# Patient Record
Sex: MALE | Race: WHITE | NOT HISPANIC OR LATINO | Employment: OTHER | URBAN - METROPOLITAN AREA
[De-identification: names, ages, dates, MRNs, and addresses within clinical notes are randomized per-mention and may not be internally consistent; named-entity substitution may affect disease eponyms.]

---

## 2017-01-26 ENCOUNTER — APPOINTMENT (EMERGENCY)
Dept: RADIOLOGY | Facility: HOSPITAL | Age: 57
DRG: 853 | End: 2017-01-26
Payer: COMMERCIAL

## 2017-01-26 ENCOUNTER — ANESTHESIA (INPATIENT)
Dept: PERIOP | Facility: HOSPITAL | Age: 57
DRG: 853 | End: 2017-01-26
Payer: COMMERCIAL

## 2017-01-26 ENCOUNTER — HOSPITAL ENCOUNTER (INPATIENT)
Facility: HOSPITAL | Age: 57
LOS: 8 days | Discharge: NURSING, REHAB OR LONG-TERM CARE | DRG: 853 | End: 2017-02-03
Attending: EMERGENCY MEDICINE | Admitting: SURGERY
Payer: COMMERCIAL

## 2017-01-26 ENCOUNTER — ANESTHESIA EVENT (INPATIENT)
Dept: PERIOP | Facility: HOSPITAL | Age: 57
DRG: 853 | End: 2017-01-26
Payer: COMMERCIAL

## 2017-01-26 DIAGNOSIS — L97.519 DIABETIC ULCER OF RIGHT FOOT ASSOCIATED WITH DIABETES MELLITUS DUE TO UNDERLYING CONDITION (HCC): ICD-10-CM

## 2017-01-26 DIAGNOSIS — E08.621 DIABETIC ULCER OF RIGHT FOOT ASSOCIATED WITH DIABETES MELLITUS DUE TO UNDERLYING CONDITION (HCC): ICD-10-CM

## 2017-01-26 DIAGNOSIS — L03.031 CELLULITIS OF TOE OF RIGHT FOOT: Primary | ICD-10-CM

## 2017-01-26 DIAGNOSIS — R55 SYNCOPE: ICD-10-CM
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
## 2017-01-26 DIAGNOSIS — E11.65 TYPE 2 DIABETES MELLITUS WITH HYPERGLYCEMIA, WITHOUT LONG-TERM CURRENT USE OF INSULIN (HCC): ICD-10-CM

## 2017-01-26 LAB
ALBUMIN SERPL BCP-MCNC: 2.3 G/DL (ref 3.5–5)
ALP SERPL-CCNC: 137 U/L (ref 46–116)
ALT SERPL W P-5'-P-CCNC: 14 U/L (ref 12–78)
ANION GAP SERPL CALCULATED.3IONS-SCNC: 12 MMOL/L (ref 4–13)
ANION GAP SERPL CALCULATED.3IONS-SCNC: 12 MMOL/L (ref 4–13)
APTT PPP: 29 SECONDS (ref 24–36)
AST SERPL W P-5'-P-CCNC: 23 U/L (ref 5–45)
ATRIAL RATE: 127 BPM
BACTERIA UR QL AUTO: NORMAL /HPF
BASOPHILS # BLD AUTO: 0.02 THOUSANDS/ΜL (ref 0–0.1)
BASOPHILS NFR BLD AUTO: 0 % (ref 0–1)
BILIRUB SERPL-MCNC: 0.67 MG/DL (ref 0.2–1)
BILIRUB UR QL STRIP: NEGATIVE
BUN SERPL-MCNC: 18 MG/DL (ref 5–25)
BUN SERPL-MCNC: 21 MG/DL (ref 5–25)
CALCIUM SERPL-MCNC: 9 MG/DL (ref 8.3–10.1)
CALCIUM SERPL-MCNC: 9 MG/DL (ref 8.3–10.1)
CHLORIDE SERPL-SCNC: 93 MMOL/L (ref 100–108)
CHLORIDE SERPL-SCNC: 95 MMOL/L (ref 100–108)
CLARITY UR: CLEAR
CO2 SERPL-SCNC: 24 MMOL/L (ref 21–32)
CO2 SERPL-SCNC: 24 MMOL/L (ref 21–32)
COLOR UR: YELLOW
CREAT SERPL-MCNC: 1.2 MG/DL (ref 0.6–1.3)
CREAT SERPL-MCNC: 1.41 MG/DL (ref 0.6–1.3)
EOSINOPHIL # BLD AUTO: 0 THOUSAND/ΜL (ref 0–0.61)
EOSINOPHIL NFR BLD AUTO: 0 % (ref 0–6)
ERYTHROCYTE [DISTWIDTH] IN BLOOD BY AUTOMATED COUNT: 13.3 % (ref 11.6–15.1)
FINE GRAN CASTS URNS QL MICRO: NORMAL /LPF
GFR SERPL CREATININE-BSD FRML MDRD: 51.8 ML/MIN/1.73SQ M
GFR SERPL CREATININE-BSD FRML MDRD: >60 ML/MIN/1.73SQ M
GLUCOSE SERPL-MCNC: 317 MG/DL (ref 65–140)
GLUCOSE SERPL-MCNC: 357 MG/DL (ref 65–140)
GLUCOSE SERPL-MCNC: 384 MG/DL (ref 65–140)
GLUCOSE UR STRIP-MCNC: ABNORMAL MG/DL
HCT VFR BLD AUTO: 35.9 % (ref 36.5–49.3)
HGB BLD-MCNC: 12.1 G/DL (ref 12–17)
HGB UR QL STRIP.AUTO: ABNORMAL
INR PPP: 1.12 (ref 0.86–1.16)
KETONES UR STRIP-MCNC: ABNORMAL MG/DL
LACTATE SERPL-SCNC: 1 MMOL/L (ref 0.5–2)
LACTATE SERPL-SCNC: 2.4 MMOL/L (ref 0.5–2)
LEUKOCYTE ESTERASE UR QL STRIP: NEGATIVE
LYMPHOCYTES # BLD AUTO: 1.76 THOUSANDS/ΜL (ref 0.6–4.47)
LYMPHOCYTES NFR BLD AUTO: 8 % (ref 14–44)
MCH RBC QN AUTO: 28.4 PG (ref 26.8–34.3)
MCHC RBC AUTO-ENTMCNC: 33.7 G/DL (ref 31.4–37.4)
MCV RBC AUTO: 84 FL (ref 82–98)
MONOCYTES # BLD AUTO: 1.89 THOUSAND/ΜL (ref 0.17–1.22)
MONOCYTES NFR BLD AUTO: 9 % (ref 4–12)
NEUTROPHILS # BLD AUTO: 17.21 THOUSANDS/ΜL (ref 1.85–7.62)
NEUTS SEG NFR BLD AUTO: 83 % (ref 43–75)
NITRITE UR QL STRIP: NEGATIVE
NON-SQ EPI CELLS URNS QL MICRO: NORMAL /HPF
NRBC BLD AUTO-RTO: 0 /100 WBCS
P AXIS: 54 DEGREES
PH UR STRIP.AUTO: 5.5 [PH] (ref 4.5–8)
PLATELET # BLD AUTO: 376 THOUSANDS/UL (ref 149–390)
PMV BLD AUTO: 9.5 FL (ref 8.9–12.7)
POTASSIUM SERPL-SCNC: 4.9 MMOL/L (ref 3.5–5.3)
POTASSIUM SERPL-SCNC: 5 MMOL/L (ref 3.5–5.3)
PR INTERVAL: 134 MS
PROT SERPL-MCNC: 8.5 G/DL (ref 6.4–8.2)
PROT UR STRIP-MCNC: ABNORMAL MG/DL
PROTHROMBIN TIME: 14.5 SECONDS (ref 12–14.3)
QRS AXIS: -39 DEGREES
QRSD INTERVAL: 84 MS
QT INTERVAL: 290 MS
QTC INTERVAL: 421 MS
RBC # BLD AUTO: 4.26 MILLION/UL (ref 3.88–5.62)
RBC #/AREA URNS AUTO: NORMAL /HPF
SODIUM SERPL-SCNC: 129 MMOL/L (ref 136–145)
SODIUM SERPL-SCNC: 131 MMOL/L (ref 136–145)
SP GR UR STRIP.AUTO: 1.03 (ref 1–1.03)
T WAVE AXIS: 36 DEGREES
TROPONIN I SERPL-MCNC: <0.02 NG/ML
UROBILINOGEN UR QL STRIP.AUTO: 0.2 E.U./DL
VENTRICULAR RATE: 127 BPM
WBC # BLD AUTO: 20.97 THOUSAND/UL (ref 4.31–10.16)
WBC #/AREA URNS AUTO: NORMAL /HPF

## 2017-01-26 PROCEDURE — 85025 COMPLETE CBC W/AUTO DIFF WBC: CPT | Performed by: EMERGENCY MEDICINE

## 2017-01-26 PROCEDURE — 73630 X-RAY EXAM OF FOOT: CPT

## 2017-01-26 PROCEDURE — 93005 ELECTROCARDIOGRAM TRACING: CPT

## 2017-01-26 PROCEDURE — 87147 CULTURE TYPE IMMUNOLOGIC: CPT | Performed by: PODIATRIST

## 2017-01-26 PROCEDURE — 70450 CT HEAD/BRAIN W/O DYE: CPT

## 2017-01-26 PROCEDURE — 36415 COLL VENOUS BLD VENIPUNCTURE: CPT

## 2017-01-26 PROCEDURE — 85730 THROMBOPLASTIN TIME PARTIAL: CPT | Performed by: EMERGENCY MEDICINE

## 2017-01-26 PROCEDURE — 96360 HYDRATION IV INFUSION INIT: CPT

## 2017-01-26 PROCEDURE — 96361 HYDRATE IV INFUSION ADD-ON: CPT

## 2017-01-26 PROCEDURE — 96365 THER/PROPH/DIAG IV INF INIT: CPT

## 2017-01-26 PROCEDURE — 87077 CULTURE AEROBIC IDENTIFY: CPT | Performed by: EMERGENCY MEDICINE

## 2017-01-26 PROCEDURE — 0Y6M0ZD DETACHMENT AT RIGHT FOOT, PARTIAL 4TH RAY, OPEN APPROACH: ICD-10-PCS | Performed by: PODIATRIST

## 2017-01-26 PROCEDURE — 0Y6M0ZF DETACHMENT AT RIGHT FOOT, PARTIAL 5TH RAY, OPEN APPROACH: ICD-10-PCS | Performed by: PODIATRIST

## 2017-01-26 PROCEDURE — 87077 CULTURE AEROBIC IDENTIFY: CPT | Performed by: PODIATRIST

## 2017-01-26 PROCEDURE — 87086 URINE CULTURE/COLONY COUNT: CPT | Performed by: EMERGENCY MEDICINE

## 2017-01-26 PROCEDURE — 87205 SMEAR GRAM STAIN: CPT | Performed by: PODIATRIST

## 2017-01-26 PROCEDURE — 96367 TX/PROPH/DG ADDL SEQ IV INF: CPT

## 2017-01-26 PROCEDURE — 84484 ASSAY OF TROPONIN QUANT: CPT | Performed by: EMERGENCY MEDICINE

## 2017-01-26 PROCEDURE — 73700 CT LOWER EXTREMITY W/O DYE: CPT

## 2017-01-26 PROCEDURE — 0Y6M0ZB DETACHMENT AT RIGHT FOOT, PARTIAL 2ND RAY, OPEN APPROACH: ICD-10-PCS | Performed by: PODIATRIST

## 2017-01-26 PROCEDURE — 87186 SC STD MICRODIL/AGAR DIL: CPT | Performed by: PODIATRIST

## 2017-01-26 PROCEDURE — 81001 URINALYSIS AUTO W/SCOPE: CPT | Performed by: EMERGENCY MEDICINE

## 2017-01-26 PROCEDURE — 87070 CULTURE OTHR SPECIMN AEROBIC: CPT | Performed by: PODIATRIST

## 2017-01-26 PROCEDURE — 87075 CULTR BACTERIA EXCEPT BLOOD: CPT | Performed by: PODIATRIST

## 2017-01-26 PROCEDURE — 99285 EMERGENCY DEPT VISIT HI MDM: CPT

## 2017-01-26 PROCEDURE — 83605 ASSAY OF LACTIC ACID: CPT | Performed by: EMERGENCY MEDICINE

## 2017-01-26 PROCEDURE — 0Y6M0ZC DETACHMENT AT RIGHT FOOT, PARTIAL 3RD RAY, OPEN APPROACH: ICD-10-PCS | Performed by: PODIATRIST

## 2017-01-26 PROCEDURE — 90715 TDAP VACCINE 7 YRS/> IM: CPT | Performed by: EMERGENCY MEDICINE

## 2017-01-26 PROCEDURE — 82948 REAGENT STRIP/BLOOD GLUCOSE: CPT

## 2017-01-26 PROCEDURE — 71020 HB CHEST X-RAY 2VW FRONTAL&LATL: CPT

## 2017-01-26 PROCEDURE — 85610 PROTHROMBIN TIME: CPT | Performed by: EMERGENCY MEDICINE

## 2017-01-26 PROCEDURE — 80048 BASIC METABOLIC PNL TOTAL CA: CPT | Performed by: EMERGENCY MEDICINE

## 2017-01-26 PROCEDURE — 87185 SC STD ENZYME DETCJ PER NZM: CPT | Performed by: PODIATRIST

## 2017-01-26 PROCEDURE — 87040 BLOOD CULTURE FOR BACTERIA: CPT | Performed by: EMERGENCY MEDICINE

## 2017-01-26 PROCEDURE — 80053 COMPREHEN METABOLIC PANEL: CPT | Performed by: EMERGENCY MEDICINE

## 2017-01-26 PROCEDURE — 90471 IMMUNIZATION ADMIN: CPT

## 2017-01-26 PROCEDURE — 0Y6M0Z9 DETACHMENT AT RIGHT FOOT, PARTIAL 1ST RAY, OPEN APPROACH: ICD-10-PCS | Performed by: PODIATRIST

## 2017-01-26 RX ORDER — CLINDAMYCIN PHOSPHATE 600 MG/50ML
600 INJECTION INTRAVENOUS EVERY 8 HOURS
Status: DISCONTINUED | OUTPATIENT
Start: 2017-01-26 | End: 2017-01-27

## 2017-01-26 RX ORDER — WARFARIN SODIUM 1 MG/1
TABLET ORAL DAILY
COMMUNITY
End: 2017-02-03 | Stop reason: HOSPADM

## 2017-01-26 RX ORDER — ACETAMINOPHEN 325 MG/1
650 TABLET ORAL ONCE
Status: COMPLETED | OUTPATIENT
Start: 2017-01-26 | End: 2017-01-26

## 2017-01-26 RX ADMIN — ACETAMINOPHEN 650 MG: 325 TABLET, FILM COATED ORAL at 19:43

## 2017-01-26 RX ADMIN — TETANUS TOXOID, REDUCED DIPHTHERIA TOXOID AND ACELLULAR PERTUSSIS VACCINE, ADSORBED 0.5 ML: 5; 2.5; 8; 8; 2.5 SUSPENSION INTRAMUSCULAR at 19:24

## 2017-01-26 RX ADMIN — VANCOMYCIN HYDROCHLORIDE 1750 MG: 1 INJECTION, POWDER, LYOPHILIZED, FOR SOLUTION INTRAVENOUS at 22:07

## 2017-01-26 RX ADMIN — SODIUM CHLORIDE 1000 ML: 0.9 INJECTION, SOLUTION INTRAVENOUS at 19:02

## 2017-01-26 RX ADMIN — CEFEPIME 2000 MG: 2 INJECTION, POWDER, FOR SOLUTION INTRAMUSCULAR; INTRAVENOUS at 20:15

## 2017-01-26 RX ADMIN — SODIUM CHLORIDE 3270 ML: 0.9 INJECTION, SOLUTION INTRAVENOUS at 19:15

## 2017-01-26 RX ADMIN — SODIUM CHLORIDE 1000 ML: 0.9 INJECTION, SOLUTION INTRAVENOUS at 19:15

## 2017-01-26 RX ADMIN — SODIUM CHLORIDE 1000 ML: 0.9 INJECTION, SOLUTION INTRAVENOUS at 20:07

## 2017-01-26 RX ADMIN — METRONIDAZOLE 500 MG: 500 INJECTION, SOLUTION INTRAVENOUS at 21:09

## 2017-01-27 ENCOUNTER — APPOINTMENT (INPATIENT)
Dept: RADIOLOGY | Facility: HOSPITAL | Age: 57
DRG: 853 | End: 2017-01-27
Payer: COMMERCIAL

## 2017-01-27 PROBLEM — L97.529 SKIN ULCERS OF FOOT, BILATERAL (HCC): Status: ACTIVE | Noted: 2017-01-27

## 2017-01-27 PROBLEM — E11.65 DIABETES MELLITUS WITH HYPERGLYCEMIA (HCC): Status: ACTIVE | Noted: 2017-01-27

## 2017-01-27 PROBLEM — L97.519 SKIN ULCERS OF FOOT, BILATERAL (HCC): Status: ACTIVE | Noted: 2017-01-27

## 2017-01-27 PROBLEM — N17.9 AKI (ACUTE KIDNEY INJURY) (HCC): Status: ACTIVE | Noted: 2017-01-27

## 2017-01-27 PROBLEM — A41.9 SEPSIS (HCC): Status: ACTIVE | Noted: 2017-01-27

## 2017-01-27 PROBLEM — E11.40 DIABETIC NEUROPATHY (HCC): Status: ACTIVE | Noted: 2017-01-27

## 2017-01-27 PROBLEM — Z87.891 HISTORY OF TOBACCO ABUSE: Status: ACTIVE | Noted: 2017-01-27

## 2017-01-27 PROBLEM — A48.0 GAS GANGRENE OF FOOT (HCC): Status: ACTIVE | Noted: 2017-01-27

## 2017-01-27 PROBLEM — R65.20 SEVERE SEPSIS (HCC): Status: ACTIVE | Noted: 2017-01-27

## 2017-01-27 PROBLEM — E87.1 HYPONATREMIA: Status: ACTIVE | Noted: 2017-01-27

## 2017-01-27 LAB
ANION GAP SERPL CALCULATED.3IONS-SCNC: 10 MMOL/L (ref 4–13)
ANION GAP SERPL CALCULATED.3IONS-SCNC: 6 MMOL/L (ref 4–13)
BACTERIA UR CULT: NORMAL
BASOPHILS # BLD AUTO: 0.03 THOUSANDS/ΜL (ref 0–0.1)
BASOPHILS NFR BLD AUTO: 0 % (ref 0–1)
BUN SERPL-MCNC: 13 MG/DL (ref 5–25)
BUN SERPL-MCNC: 15 MG/DL (ref 5–25)
CALCIUM SERPL-MCNC: 7.6 MG/DL (ref 8.3–10.1)
CALCIUM SERPL-MCNC: 8.2 MG/DL (ref 8.3–10.1)
CHLORIDE SERPL-SCNC: 102 MMOL/L (ref 100–108)
CHLORIDE SERPL-SCNC: 103 MMOL/L (ref 100–108)
CO2 SERPL-SCNC: 22 MMOL/L (ref 21–32)
CO2 SERPL-SCNC: 25 MMOL/L (ref 21–32)
CREAT SERPL-MCNC: 0.91 MG/DL (ref 0.6–1.3)
CREAT SERPL-MCNC: 0.98 MG/DL (ref 0.6–1.3)
EOSINOPHIL # BLD AUTO: 0.05 THOUSAND/ΜL (ref 0–0.61)
EOSINOPHIL NFR BLD AUTO: 0 % (ref 0–6)
ERYTHROCYTE [DISTWIDTH] IN BLOOD BY AUTOMATED COUNT: 13.4 % (ref 11.6–15.1)
ERYTHROCYTE [DISTWIDTH] IN BLOOD BY AUTOMATED COUNT: 13.4 % (ref 11.6–15.1)
EST. AVERAGE GLUCOSE BLD GHB EST-MCNC: 292 MG/DL
GFR SERPL CREATININE-BSD FRML MDRD: >60 ML/MIN/1.73SQ M
GFR SERPL CREATININE-BSD FRML MDRD: >60 ML/MIN/1.73SQ M
GLUCOSE SERPL-MCNC: 210 MG/DL (ref 65–140)
GLUCOSE SERPL-MCNC: 239 MG/DL (ref 65–140)
GLUCOSE SERPL-MCNC: 248 MG/DL (ref 65–140)
GLUCOSE SERPL-MCNC: 251 MG/DL (ref 65–140)
GLUCOSE SERPL-MCNC: 264 MG/DL (ref 65–140)
GLUCOSE SERPL-MCNC: 316 MG/DL (ref 65–140)
GLUCOSE SERPL-MCNC: 346 MG/DL (ref 65–140)
HBA1C MFR BLD: 11.8 % (ref 4.2–6.3)
HCT VFR BLD AUTO: 29.8 % (ref 36.5–49.3)
HCT VFR BLD AUTO: 31.9 % (ref 36.5–49.3)
HGB BLD-MCNC: 10.7 G/DL (ref 12–17)
HGB BLD-MCNC: 9.8 G/DL (ref 12–17)
LACTATE SERPL-SCNC: 1.2 MMOL/L (ref 0.5–2)
LACTATE SERPL-SCNC: 1.5 MMOL/L (ref 0.5–2)
LYMPHOCYTES # BLD AUTO: 1.66 THOUSANDS/ΜL (ref 0.6–4.47)
LYMPHOCYTES NFR BLD AUTO: 13 % (ref 14–44)
MCH RBC QN AUTO: 27.9 PG (ref 26.8–34.3)
MCH RBC QN AUTO: 28.3 PG (ref 26.8–34.3)
MCHC RBC AUTO-ENTMCNC: 32.9 G/DL (ref 31.4–37.4)
MCHC RBC AUTO-ENTMCNC: 33.5 G/DL (ref 31.4–37.4)
MCV RBC AUTO: 84 FL (ref 82–98)
MCV RBC AUTO: 85 FL (ref 82–98)
MONOCYTES # BLD AUTO: 0.83 THOUSAND/ΜL (ref 0.17–1.22)
MONOCYTES NFR BLD AUTO: 7 % (ref 4–12)
NEUTROPHILS # BLD AUTO: 10.19 THOUSANDS/ΜL (ref 1.85–7.62)
NEUTS SEG NFR BLD AUTO: 80 % (ref 43–75)
NRBC BLD AUTO-RTO: 0 /100 WBCS
PLATELET # BLD AUTO: 298 THOUSANDS/UL (ref 149–390)
PLATELET # BLD AUTO: 300 THOUSANDS/UL (ref 149–390)
PLATELET # BLD AUTO: 310 THOUSANDS/UL (ref 149–390)
PMV BLD AUTO: 8.8 FL (ref 8.9–12.7)
PMV BLD AUTO: 9.3 FL (ref 8.9–12.7)
PMV BLD AUTO: 9.4 FL (ref 8.9–12.7)
POTASSIUM SERPL-SCNC: 4 MMOL/L (ref 3.5–5.3)
POTASSIUM SERPL-SCNC: 4.2 MMOL/L (ref 3.5–5.3)
RBC # BLD AUTO: 3.51 MILLION/UL (ref 3.88–5.62)
RBC # BLD AUTO: 3.78 MILLION/UL (ref 3.88–5.62)
SODIUM SERPL-SCNC: 133 MMOL/L (ref 136–145)
SODIUM SERPL-SCNC: 135 MMOL/L (ref 136–145)
WBC # BLD AUTO: 12.82 THOUSAND/UL (ref 4.31–10.16)
WBC # BLD AUTO: 14.12 THOUSAND/UL (ref 4.31–10.16)

## 2017-01-27 PROCEDURE — 88307 TISSUE EXAM BY PATHOLOGIST: CPT | Performed by: PODIATRIST

## 2017-01-27 PROCEDURE — 88311 DECALCIFY TISSUE: CPT | Performed by: PODIATRIST

## 2017-01-27 PROCEDURE — 97530 THERAPEUTIC ACTIVITIES: CPT

## 2017-01-27 PROCEDURE — 87147 CULTURE TYPE IMMUNOLOGIC: CPT | Performed by: PODIATRIST

## 2017-01-27 PROCEDURE — 83605 ASSAY OF LACTIC ACID: CPT | Performed by: EMERGENCY MEDICINE

## 2017-01-27 PROCEDURE — 83605 ASSAY OF LACTIC ACID: CPT | Performed by: INTERNAL MEDICINE

## 2017-01-27 PROCEDURE — G8979 MOBILITY GOAL STATUS: HCPCS

## 2017-01-27 PROCEDURE — 85027 COMPLETE CBC AUTOMATED: CPT | Performed by: PODIATRIST

## 2017-01-27 PROCEDURE — G8978 MOBILITY CURRENT STATUS: HCPCS

## 2017-01-27 PROCEDURE — 87040 BLOOD CULTURE FOR BACTERIA: CPT | Performed by: INTERNAL MEDICINE

## 2017-01-27 PROCEDURE — 82948 REAGENT STRIP/BLOOD GLUCOSE: CPT

## 2017-01-27 PROCEDURE — 87205 SMEAR GRAM STAIN: CPT | Performed by: PODIATRIST

## 2017-01-27 PROCEDURE — 87185 SC STD ENZYME DETCJ PER NZM: CPT | Performed by: PODIATRIST

## 2017-01-27 PROCEDURE — 87077 CULTURE AEROBIC IDENTIFY: CPT | Performed by: PODIATRIST

## 2017-01-27 PROCEDURE — 97163 PT EVAL HIGH COMPLEX 45 MIN: CPT

## 2017-01-27 PROCEDURE — 80048 BASIC METABOLIC PNL TOTAL CA: CPT | Performed by: INTERNAL MEDICINE

## 2017-01-27 PROCEDURE — 87176 TISSUE HOMOGENIZATION CULTR: CPT | Performed by: PODIATRIST

## 2017-01-27 PROCEDURE — 85025 COMPLETE CBC W/AUTO DIFF WBC: CPT | Performed by: INTERNAL MEDICINE

## 2017-01-27 PROCEDURE — 80048 BASIC METABOLIC PNL TOTAL CA: CPT | Performed by: EMERGENCY MEDICINE

## 2017-01-27 PROCEDURE — 94760 N-INVAS EAR/PLS OXIMETRY 1: CPT

## 2017-01-27 PROCEDURE — 73630 X-RAY EXAM OF FOOT: CPT

## 2017-01-27 PROCEDURE — 83036 HEMOGLOBIN GLYCOSYLATED A1C: CPT | Performed by: INTERNAL MEDICINE

## 2017-01-27 PROCEDURE — 87070 CULTURE OTHR SPECIMN AEROBIC: CPT | Performed by: PODIATRIST

## 2017-01-27 PROCEDURE — 85049 AUTOMATED PLATELET COUNT: CPT | Performed by: EMERGENCY MEDICINE

## 2017-01-27 PROCEDURE — 71010 HB CHEST X-RAY 1 VIEW FRONTAL (PORTABLE): CPT

## 2017-01-27 PROCEDURE — 87186 SC STD MICRODIL/AGAR DIL: CPT | Performed by: PODIATRIST

## 2017-01-27 PROCEDURE — 87075 CULTR BACTERIA EXCEPT BLOOD: CPT | Performed by: PODIATRIST

## 2017-01-27 RX ORDER — ONDANSETRON 2 MG/ML
4 INJECTION INTRAMUSCULAR; INTRAVENOUS ONCE AS NEEDED
Status: DISCONTINUED | OUTPATIENT
Start: 2017-01-27 | End: 2017-01-27 | Stop reason: HOSPADM

## 2017-01-27 RX ORDER — ONDANSETRON 2 MG/ML
INJECTION INTRAMUSCULAR; INTRAVENOUS AS NEEDED
Status: DISCONTINUED | OUTPATIENT
Start: 2017-01-27 | End: 2017-01-27 | Stop reason: SURG

## 2017-01-27 RX ORDER — FENTANYL CITRATE/PF 50 MCG/ML
25 SYRINGE (ML) INJECTION
Status: DISCONTINUED | OUTPATIENT
Start: 2017-01-27 | End: 2017-01-27

## 2017-01-27 RX ORDER — SUCCINYLCHOLINE CHLORIDE 20 MG/ML
INJECTION INTRAMUSCULAR; INTRAVENOUS AS NEEDED
Status: DISCONTINUED | OUTPATIENT
Start: 2017-01-27 | End: 2017-01-27 | Stop reason: SURG

## 2017-01-27 RX ORDER — OXYCODONE HYDROCHLORIDE AND ACETAMINOPHEN 5; 325 MG/1; MG/1
2 TABLET ORAL EVERY 4 HOURS PRN
Status: DISCONTINUED | OUTPATIENT
Start: 2017-01-27 | End: 2017-02-03 | Stop reason: HOSPADM

## 2017-01-27 RX ORDER — INSULIN GLARGINE 100 [IU]/ML
15 INJECTION, SOLUTION SUBCUTANEOUS
Status: DISCONTINUED | OUTPATIENT
Start: 2017-01-27 | End: 2017-01-28

## 2017-01-27 RX ORDER — SODIUM CHLORIDE, SODIUM LACTATE, POTASSIUM CHLORIDE, CALCIUM CHLORIDE 600; 310; 30; 20 MG/100ML; MG/100ML; MG/100ML; MG/100ML
INJECTION, SOLUTION INTRAVENOUS CONTINUOUS PRN
Status: DISCONTINUED | OUTPATIENT
Start: 2017-01-27 | End: 2017-01-27 | Stop reason: SURG

## 2017-01-27 RX ORDER — INSULIN GLARGINE 100 [IU]/ML
10 INJECTION, SOLUTION SUBCUTANEOUS
Status: DISCONTINUED | OUTPATIENT
Start: 2017-01-27 | End: 2017-01-27

## 2017-01-27 RX ORDER — ACETAMINOPHEN 325 MG/1
650 TABLET ORAL ONCE
Status: COMPLETED | OUTPATIENT
Start: 2017-01-27 | End: 2017-01-27

## 2017-01-27 RX ORDER — OXYCODONE HYDROCHLORIDE AND ACETAMINOPHEN 5; 325 MG/1; MG/1
1 TABLET ORAL EVERY 6 HOURS PRN
Status: DISCONTINUED | OUTPATIENT
Start: 2017-01-27 | End: 2017-02-03 | Stop reason: HOSPADM

## 2017-01-27 RX ORDER — PROPOFOL 10 MG/ML
INJECTION, EMULSION INTRAVENOUS AS NEEDED
Status: DISCONTINUED | OUTPATIENT
Start: 2017-01-27 | End: 2017-01-27 | Stop reason: SURG

## 2017-01-27 RX ORDER — SODIUM CHLORIDE 9 MG/ML
100 INJECTION, SOLUTION INTRAVENOUS CONTINUOUS
Status: DISCONTINUED | OUTPATIENT
Start: 2017-01-27 | End: 2017-01-28

## 2017-01-27 RX ORDER — MORPHINE SULFATE 2 MG/ML
2 INJECTION, SOLUTION INTRAMUSCULAR; INTRAVENOUS
Status: DISCONTINUED | OUTPATIENT
Start: 2017-01-27 | End: 2017-01-27 | Stop reason: HOSPADM

## 2017-01-27 RX ORDER — INSULIN GLARGINE 100 [IU]/ML
10 INJECTION, SOLUTION SUBCUTANEOUS ONCE
Status: COMPLETED | OUTPATIENT
Start: 2017-01-27 | End: 2017-01-27

## 2017-01-27 RX ORDER — FENTANYL CITRATE 50 UG/ML
INJECTION, SOLUTION INTRAMUSCULAR; INTRAVENOUS AS NEEDED
Status: DISCONTINUED | OUTPATIENT
Start: 2017-01-27 | End: 2017-01-27 | Stop reason: SURG

## 2017-01-27 RX ADMIN — INSULIN GLARGINE 10 UNITS: 100 INJECTION, SOLUTION SUBCUTANEOUS at 09:49

## 2017-01-27 RX ADMIN — INSULIN LISPRO 2 UNITS: 100 INJECTION, SOLUTION INTRAVENOUS; SUBCUTANEOUS at 20:19

## 2017-01-27 RX ADMIN — SUCCINYLCHOLINE CHLORIDE 100 MG: 20 INJECTION, SOLUTION INTRAMUSCULAR; INTRAVENOUS at 00:06

## 2017-01-27 RX ADMIN — INSULIN LISPRO 5 UNITS: 100 INJECTION, SOLUTION INTRAVENOUS; SUBCUTANEOUS at 20:19

## 2017-01-27 RX ADMIN — ENOXAPARIN SODIUM 40 MG: 40 INJECTION SUBCUTANEOUS at 09:49

## 2017-01-27 RX ADMIN — CLINDAMYCIN PHOSPHATE 600 MG: 12 INJECTION, SOLUTION INTRAMUSCULAR; INTRAVENOUS at 06:16

## 2017-01-27 RX ADMIN — SODIUM CHLORIDE, SODIUM LACTATE, POTASSIUM CHLORIDE, AND CALCIUM CHLORIDE: .6; .31; .03; .02 INJECTION, SOLUTION INTRAVENOUS at 00:03

## 2017-01-27 RX ADMIN — PIPERACILLIN AND TAZOBACTAM 3.38 G: 36; 4.5 INJECTION, POWDER, FOR SOLUTION INTRAVENOUS at 16:44

## 2017-01-27 RX ADMIN — INSULIN HUMAN 4 UNITS: 100 INJECTION, SOLUTION PARENTERAL at 00:56

## 2017-01-27 RX ADMIN — SODIUM CHLORIDE 100 ML/HR: 0.9 INJECTION, SOLUTION INTRAVENOUS at 02:33

## 2017-01-27 RX ADMIN — INSULIN LISPRO 2 UNITS: 100 INJECTION, SOLUTION INTRAVENOUS; SUBCUTANEOUS at 09:48

## 2017-01-27 RX ADMIN — PIPERACILLIN AND TAZOBACTAM 3.38 G: 36; 4.5 INJECTION, POWDER, FOR SOLUTION INTRAVENOUS at 23:05

## 2017-01-27 RX ADMIN — INSULIN LISPRO 2 UNITS: 100 INJECTION, SOLUTION INTRAVENOUS; SUBCUTANEOUS at 13:59

## 2017-01-27 RX ADMIN — INSULIN LISPRO 5 UNITS: 100 INJECTION, SOLUTION INTRAVENOUS; SUBCUTANEOUS at 09:47

## 2017-01-27 RX ADMIN — VANCOMYCIN HYDROCHLORIDE 1750 MG: 1 INJECTION, POWDER, LYOPHILIZED, FOR SOLUTION INTRAVENOUS at 11:00

## 2017-01-27 RX ADMIN — FENTANYL CITRATE 50 MCG: 50 INJECTION, SOLUTION INTRAMUSCULAR; INTRAVENOUS at 00:17

## 2017-01-27 RX ADMIN — FENTANYL CITRATE 50 MCG: 50 INJECTION, SOLUTION INTRAMUSCULAR; INTRAVENOUS at 00:11

## 2017-01-27 RX ADMIN — PIPERACILLIN AND TAZOBACTAM 3.38 G: 36; 4.5 INJECTION, POWDER, FOR SOLUTION INTRAVENOUS at 04:11

## 2017-01-27 RX ADMIN — INSULIN GLARGINE 15 UNITS: 100 INJECTION, SOLUTION SUBCUTANEOUS at 23:04

## 2017-01-27 RX ADMIN — ACETAMINOPHEN 650 MG: 325 TABLET, FILM COATED ORAL at 20:18

## 2017-01-27 RX ADMIN — ONDANSETRON 4 MG: 2 INJECTION INTRAMUSCULAR; INTRAVENOUS at 00:59

## 2017-01-27 RX ADMIN — PROPOFOL 200 MG: 10 INJECTION, EMULSION INTRAVENOUS at 00:06

## 2017-01-27 RX ADMIN — PIPERACILLIN AND TAZOBACTAM 3.38 G: 36; 4.5 INJECTION, POWDER, FOR SOLUTION INTRAVENOUS at 09:41

## 2017-01-27 RX ADMIN — INSULIN LISPRO 5 UNITS: 100 INJECTION, SOLUTION INTRAVENOUS; SUBCUTANEOUS at 13:59

## 2017-01-27 RX ADMIN — SODIUM CHLORIDE 100 ML/HR: 0.9 INJECTION, SOLUTION INTRAVENOUS at 18:56

## 2017-01-27 RX ADMIN — VANCOMYCIN HYDROCHLORIDE 1750 MG: 1 INJECTION, POWDER, LYOPHILIZED, FOR SOLUTION INTRAVENOUS at 23:05

## 2017-01-27 RX ADMIN — INSULIN HUMAN 5 UNITS: 100 INJECTION, SOLUTION PARENTERAL at 02:13

## 2017-01-28 ENCOUNTER — APPOINTMENT (INPATIENT)
Dept: RADIOLOGY | Facility: HOSPITAL | Age: 57
DRG: 853 | End: 2017-01-28
Payer: COMMERCIAL

## 2017-01-28 PROBLEM — N17.9 AKI (ACUTE KIDNEY INJURY) (HCC): Status: RESOLVED | Noted: 2017-01-27 | Resolved: 2017-01-28

## 2017-01-28 PROBLEM — E87.1 HYPONATREMIA: Status: RESOLVED | Noted: 2017-01-27 | Resolved: 2017-01-28

## 2017-01-28 LAB
ANION GAP SERPL CALCULATED.3IONS-SCNC: 5 MMOL/L (ref 4–13)
BASOPHILS # BLD AUTO: 0.04 THOUSANDS/ΜL (ref 0–0.1)
BASOPHILS NFR BLD AUTO: 0 % (ref 0–1)
BUN SERPL-MCNC: 9 MG/DL (ref 5–25)
CALCIUM SERPL-MCNC: 7.8 MG/DL (ref 8.3–10.1)
CHLORIDE SERPL-SCNC: 105 MMOL/L (ref 100–108)
CO2 SERPL-SCNC: 27 MMOL/L (ref 21–32)
CREAT SERPL-MCNC: 0.84 MG/DL (ref 0.6–1.3)
EOSINOPHIL # BLD AUTO: 0.18 THOUSAND/ΜL (ref 0–0.61)
EOSINOPHIL NFR BLD AUTO: 2 % (ref 0–6)
ERYTHROCYTE [DISTWIDTH] IN BLOOD BY AUTOMATED COUNT: 13.6 % (ref 11.6–15.1)
GFR SERPL CREATININE-BSD FRML MDRD: >60 ML/MIN/1.73SQ M
GLUCOSE SERPL-MCNC: 203 MG/DL (ref 65–140)
GLUCOSE SERPL-MCNC: 205 MG/DL (ref 65–140)
GLUCOSE SERPL-MCNC: 261 MG/DL (ref 65–140)
GLUCOSE SERPL-MCNC: 301 MG/DL (ref 65–140)
GLUCOSE SERPL-MCNC: 309 MG/DL (ref 65–140)
GLUCOSE SERPL-MCNC: 334 MG/DL (ref 65–140)
HCT VFR BLD AUTO: 28.4 % (ref 36.5–49.3)
HGB BLD-MCNC: 9.3 G/DL (ref 12–17)
LYMPHOCYTES # BLD AUTO: 2.09 THOUSANDS/ΜL (ref 0.6–4.47)
LYMPHOCYTES NFR BLD AUTO: 17 % (ref 14–44)
MCH RBC QN AUTO: 27.9 PG (ref 26.8–34.3)
MCHC RBC AUTO-ENTMCNC: 32.7 G/DL (ref 31.4–37.4)
MCV RBC AUTO: 85 FL (ref 82–98)
MONOCYTES # BLD AUTO: 0.96 THOUSAND/ΜL (ref 0.17–1.22)
MONOCYTES NFR BLD AUTO: 8 % (ref 4–12)
NEUTROPHILS # BLD AUTO: 8.83 THOUSANDS/ΜL (ref 1.85–7.62)
NEUTS SEG NFR BLD AUTO: 73 % (ref 43–75)
NRBC BLD AUTO-RTO: 0 /100 WBCS
PLATELET # BLD AUTO: 313 THOUSANDS/UL (ref 149–390)
PMV BLD AUTO: 9.3 FL (ref 8.9–12.7)
POTASSIUM SERPL-SCNC: 4 MMOL/L (ref 3.5–5.3)
RBC # BLD AUTO: 3.33 MILLION/UL (ref 3.88–5.62)
SODIUM SERPL-SCNC: 137 MMOL/L (ref 136–145)
VANCOMYCIN TROUGH SERPL-MCNC: 14.6 UG/ML (ref 10–20)
WBC # BLD AUTO: 12.15 THOUSAND/UL (ref 4.31–10.16)

## 2017-01-28 PROCEDURE — 97116 GAIT TRAINING THERAPY: CPT

## 2017-01-28 PROCEDURE — 85025 COMPLETE CBC W/AUTO DIFF WBC: CPT | Performed by: INTERNAL MEDICINE

## 2017-01-28 PROCEDURE — 82948 REAGENT STRIP/BLOOD GLUCOSE: CPT

## 2017-01-28 PROCEDURE — 73720 MRI LWR EXTREMITY W/O&W/DYE: CPT

## 2017-01-28 PROCEDURE — 80202 ASSAY OF VANCOMYCIN: CPT | Performed by: INTERNAL MEDICINE

## 2017-01-28 PROCEDURE — 80048 BASIC METABOLIC PNL TOTAL CA: CPT | Performed by: INTERNAL MEDICINE

## 2017-01-28 PROCEDURE — 97530 THERAPEUTIC ACTIVITIES: CPT

## 2017-01-28 PROCEDURE — A9585 GADOBUTROL INJECTION: HCPCS | Performed by: INTERNAL MEDICINE

## 2017-01-28 RX ORDER — HEPARIN SODIUM 5000 [USP'U]/ML
5000 INJECTION, SOLUTION INTRAVENOUS; SUBCUTANEOUS EVERY 8 HOURS SCHEDULED
Status: DISCONTINUED | OUTPATIENT
Start: 2017-01-28 | End: 2017-02-03 | Stop reason: HOSPADM

## 2017-01-28 RX ORDER — INSULIN GLARGINE 100 [IU]/ML
20 INJECTION, SOLUTION SUBCUTANEOUS
Status: DISCONTINUED | OUTPATIENT
Start: 2017-01-28 | End: 2017-01-29

## 2017-01-28 RX ADMIN — INSULIN LISPRO 5 UNITS: 100 INJECTION, SOLUTION INTRAVENOUS; SUBCUTANEOUS at 09:58

## 2017-01-28 RX ADMIN — INSULIN GLARGINE 20 UNITS: 100 INJECTION, SOLUTION SUBCUTANEOUS at 22:52

## 2017-01-28 RX ADMIN — HEPARIN SODIUM 5000 UNITS: 5000 INJECTION, SOLUTION INTRAVENOUS; SUBCUTANEOUS at 13:02

## 2017-01-28 RX ADMIN — PIPERACILLIN AND TAZOBACTAM 3.38 G: 36; 4.5 INJECTION, POWDER, FOR SOLUTION INTRAVENOUS at 04:32

## 2017-01-28 RX ADMIN — HEPARIN SODIUM 5000 UNITS: 5000 INJECTION, SOLUTION INTRAVENOUS; SUBCUTANEOUS at 22:52

## 2017-01-28 RX ADMIN — INSULIN LISPRO 3 UNITS: 100 INJECTION, SOLUTION INTRAVENOUS; SUBCUTANEOUS at 12:59

## 2017-01-28 RX ADMIN — ENOXAPARIN SODIUM 40 MG: 40 INJECTION SUBCUTANEOUS at 09:58

## 2017-01-28 RX ADMIN — PIPERACILLIN AND TAZOBACTAM 3.38 G: 36; 4.5 INJECTION, POWDER, FOR SOLUTION INTRAVENOUS at 09:57

## 2017-01-28 RX ADMIN — INSULIN LISPRO 4 UNITS: 100 INJECTION, SOLUTION INTRAVENOUS; SUBCUTANEOUS at 18:28

## 2017-01-28 RX ADMIN — INSULIN LISPRO 2 UNITS: 100 INJECTION, SOLUTION INTRAVENOUS; SUBCUTANEOUS at 09:58

## 2017-01-28 RX ADMIN — INSULIN LISPRO 1 UNITS: 100 INJECTION, SOLUTION INTRAVENOUS; SUBCUTANEOUS at 04:39

## 2017-01-28 RX ADMIN — GADOBUTROL 10 ML: 604.72 INJECTION INTRAVENOUS at 08:18

## 2017-01-28 RX ADMIN — VANCOMYCIN HYDROCHLORIDE 1750 MG: 1 INJECTION, POWDER, LYOPHILIZED, FOR SOLUTION INTRAVENOUS at 09:58

## 2017-01-28 RX ADMIN — PIPERACILLIN AND TAZOBACTAM 3.38 G: 36; 4.5 INJECTION, POWDER, FOR SOLUTION INTRAVENOUS at 22:52

## 2017-01-28 RX ADMIN — PIPERACILLIN AND TAZOBACTAM 3.38 G: 36; 4.5 INJECTION, POWDER, FOR SOLUTION INTRAVENOUS at 18:24

## 2017-01-29 LAB
ANION GAP SERPL CALCULATED.3IONS-SCNC: 8 MMOL/L (ref 4–13)
BACTERIA BLD CULT: NORMAL
BACTERIA SPEC ANAEROBE CULT: NORMAL
BUN SERPL-MCNC: 8 MG/DL (ref 5–25)
CALCIUM SERPL-MCNC: 8.3 MG/DL (ref 8.3–10.1)
CHLORIDE SERPL-SCNC: 105 MMOL/L (ref 100–108)
CO2 SERPL-SCNC: 26 MMOL/L (ref 21–32)
CREAT SERPL-MCNC: 0.87 MG/DL (ref 0.6–1.3)
ERYTHROCYTE [DISTWIDTH] IN BLOOD BY AUTOMATED COUNT: 13.5 % (ref 11.6–15.1)
GFR SERPL CREATININE-BSD FRML MDRD: >60 ML/MIN/1.73SQ M
GLUCOSE SERPL-MCNC: 150 MG/DL (ref 65–140)
GLUCOSE SERPL-MCNC: 156 MG/DL (ref 65–140)
GLUCOSE SERPL-MCNC: 177 MG/DL (ref 65–140)
GLUCOSE SERPL-MCNC: 186 MG/DL (ref 65–140)
GLUCOSE SERPL-MCNC: 229 MG/DL (ref 65–140)
GLUCOSE SERPL-MCNC: 258 MG/DL (ref 65–140)
GRAM STN SPEC: NORMAL
HCT VFR BLD AUTO: 29.8 % (ref 36.5–49.3)
HGB BLD-MCNC: 9.7 G/DL (ref 12–17)
MCH RBC QN AUTO: 27.6 PG (ref 26.8–34.3)
MCHC RBC AUTO-ENTMCNC: 32.6 G/DL (ref 31.4–37.4)
MCV RBC AUTO: 85 FL (ref 82–98)
PLATELET # BLD AUTO: 393 THOUSANDS/UL (ref 149–390)
PMV BLD AUTO: 9.5 FL (ref 8.9–12.7)
POTASSIUM SERPL-SCNC: 3.6 MMOL/L (ref 3.5–5.3)
RBC # BLD AUTO: 3.51 MILLION/UL (ref 3.88–5.62)
SODIUM SERPL-SCNC: 139 MMOL/L (ref 136–145)
WBC # BLD AUTO: 12.24 THOUSAND/UL (ref 4.31–10.16)

## 2017-01-29 PROCEDURE — 80048 BASIC METABOLIC PNL TOTAL CA: CPT | Performed by: INTERNAL MEDICINE

## 2017-01-29 PROCEDURE — 85027 COMPLETE CBC AUTOMATED: CPT | Performed by: INTERNAL MEDICINE

## 2017-01-29 PROCEDURE — 82948 REAGENT STRIP/BLOOD GLUCOSE: CPT

## 2017-01-29 RX ORDER — INSULIN GLARGINE 100 [IU]/ML
10 INJECTION, SOLUTION SUBCUTANEOUS
Status: DISCONTINUED | OUTPATIENT
Start: 2017-01-29 | End: 2017-01-31

## 2017-01-29 RX ADMIN — INSULIN LISPRO 1 UNITS: 100 INJECTION, SOLUTION INTRAVENOUS; SUBCUTANEOUS at 17:57

## 2017-01-29 RX ADMIN — INSULIN LISPRO 2 UNITS: 100 INJECTION, SOLUTION INTRAVENOUS; SUBCUTANEOUS at 12:54

## 2017-01-29 RX ADMIN — HEPARIN SODIUM 5000 UNITS: 5000 INJECTION, SOLUTION INTRAVENOUS; SUBCUTANEOUS at 22:16

## 2017-01-29 RX ADMIN — PIPERACILLIN AND TAZOBACTAM 3.38 G: 36; 4.5 INJECTION, POWDER, FOR SOLUTION INTRAVENOUS at 05:19

## 2017-01-29 RX ADMIN — HEPARIN SODIUM 5000 UNITS: 5000 INJECTION, SOLUTION INTRAVENOUS; SUBCUTANEOUS at 16:13

## 2017-01-29 RX ADMIN — INSULIN GLARGINE 10 UNITS: 100 INJECTION, SOLUTION SUBCUTANEOUS at 22:17

## 2017-01-29 RX ADMIN — INSULIN LISPRO 1 UNITS: 100 INJECTION, SOLUTION INTRAVENOUS; SUBCUTANEOUS at 09:45

## 2017-01-29 RX ADMIN — PIPERACILLIN AND TAZOBACTAM 3.38 G: 36; 4.5 INJECTION, POWDER, FOR SOLUTION INTRAVENOUS at 09:52

## 2017-01-29 RX ADMIN — AMPICILLIN SODIUM AND SULBACTAM SODIUM 3 G: 2; 1 INJECTION, POWDER, FOR SOLUTION INTRAMUSCULAR; INTRAVENOUS at 16:51

## 2017-01-29 RX ADMIN — AMPICILLIN SODIUM AND SULBACTAM SODIUM 3 G: 2; 1 INJECTION, POWDER, FOR SOLUTION INTRAMUSCULAR; INTRAVENOUS at 22:22

## 2017-01-29 RX ADMIN — HEPARIN SODIUM 5000 UNITS: 5000 INJECTION, SOLUTION INTRAVENOUS; SUBCUTANEOUS at 05:19

## 2017-01-29 RX ADMIN — INSULIN LISPRO 1 UNITS: 100 INJECTION, SOLUTION INTRAVENOUS; SUBCUTANEOUS at 02:51

## 2017-01-30 ENCOUNTER — APPOINTMENT (INPATIENT)
Dept: RADIOLOGY | Facility: HOSPITAL | Age: 57
DRG: 853 | End: 2017-01-30
Payer: COMMERCIAL

## 2017-01-30 ENCOUNTER — ANESTHESIA (INPATIENT)
Dept: PERIOP | Facility: HOSPITAL | Age: 57
DRG: 853 | End: 2017-01-30
Payer: COMMERCIAL

## 2017-01-30 ENCOUNTER — ANESTHESIA EVENT (INPATIENT)
Dept: PERIOP | Facility: HOSPITAL | Age: 57
DRG: 853 | End: 2017-01-30
Payer: COMMERCIAL

## 2017-01-30 PROBLEM — M86.9 OSTEOMYELITIS OF LEFT FOOT (HCC): Status: ACTIVE | Noted: 2017-01-30

## 2017-01-30 LAB
ANION GAP SERPL CALCULATED.3IONS-SCNC: 8 MMOL/L (ref 4–13)
BACTERIA SPEC ANAEROBE CULT: NORMAL
BACTERIA SPEC ANAEROBE CULT: NORMAL
BACTERIA TISS AEROBE CULT: NORMAL
BACTERIA WND AEROBE CULT: NORMAL
BUN SERPL-MCNC: 6 MG/DL (ref 5–25)
CALCIUM SERPL-MCNC: 8.4 MG/DL (ref 8.3–10.1)
CHLORIDE SERPL-SCNC: 106 MMOL/L (ref 100–108)
CO2 SERPL-SCNC: 27 MMOL/L (ref 21–32)
CREAT SERPL-MCNC: 0.86 MG/DL (ref 0.6–1.3)
ERYTHROCYTE [DISTWIDTH] IN BLOOD BY AUTOMATED COUNT: 13.8 % (ref 11.6–15.1)
GFR SERPL CREATININE-BSD FRML MDRD: >60 ML/MIN/1.73SQ M
GLUCOSE SERPL-MCNC: 145 MG/DL (ref 65–140)
GLUCOSE SERPL-MCNC: 179 MG/DL (ref 65–140)
GLUCOSE SERPL-MCNC: 191 MG/DL (ref 65–140)
GLUCOSE SERPL-MCNC: 196 MG/DL (ref 65–140)
GLUCOSE SERPL-MCNC: 65 MG/DL (ref 65–140)
GLUCOSE SERPL-MCNC: 97 MG/DL (ref 65–140)
GRAM STN SPEC: NORMAL
HCT VFR BLD AUTO: 31.1 % (ref 36.5–49.3)
HCT VFR BLD AUTO: 32.7 % (ref 36.5–49.3)
HGB BLD-MCNC: 10.1 G/DL (ref 12–17)
HGB BLD-MCNC: 10.6 G/DL (ref 12–17)
MCH RBC QN AUTO: 27.7 PG (ref 26.8–34.3)
MCHC RBC AUTO-ENTMCNC: 32.5 G/DL (ref 31.4–37.4)
MCV RBC AUTO: 85 FL (ref 82–98)
PLATELET # BLD AUTO: 453 THOUSANDS/UL (ref 149–390)
PMV BLD AUTO: 9.3 FL (ref 8.9–12.7)
POTASSIUM SERPL-SCNC: 3.7 MMOL/L (ref 3.5–5.3)
RBC # BLD AUTO: 3.64 MILLION/UL (ref 3.88–5.62)
SODIUM SERPL-SCNC: 141 MMOL/L (ref 136–145)
WBC # BLD AUTO: 10.91 THOUSAND/UL (ref 4.31–10.16)

## 2017-01-30 PROCEDURE — 88305 TISSUE EXAM BY PATHOLOGIST: CPT | Performed by: PODIATRIST

## 2017-01-30 PROCEDURE — 85014 HEMATOCRIT: CPT | Performed by: PODIATRIST

## 2017-01-30 PROCEDURE — 85027 COMPLETE CBC AUTOMATED: CPT | Performed by: INTERNAL MEDICINE

## 2017-01-30 PROCEDURE — 0YBM0ZZ EXCISION OF RIGHT FOOT, OPEN APPROACH: ICD-10-PCS | Performed by: PODIATRIST

## 2017-01-30 PROCEDURE — 85018 HEMOGLOBIN: CPT | Performed by: PODIATRIST

## 2017-01-30 PROCEDURE — 0Y6N0Z9 DETACHMENT AT LEFT FOOT, PARTIAL 1ST RAY, OPEN APPROACH: ICD-10-PCS | Performed by: PODIATRIST

## 2017-01-30 PROCEDURE — 80048 BASIC METABOLIC PNL TOTAL CA: CPT | Performed by: INTERNAL MEDICINE

## 2017-01-30 PROCEDURE — 88311 DECALCIFY TISSUE: CPT | Performed by: PODIATRIST

## 2017-01-30 PROCEDURE — 0L8N0ZZ DIVISION OF RIGHT LOWER LEG TENDON, OPEN APPROACH: ICD-10-PCS | Performed by: PODIATRIST

## 2017-01-30 PROCEDURE — 82948 REAGENT STRIP/BLOOD GLUCOSE: CPT

## 2017-01-30 PROCEDURE — 97530 THERAPEUTIC ACTIVITIES: CPT

## 2017-01-30 PROCEDURE — 97110 THERAPEUTIC EXERCISES: CPT

## 2017-01-30 PROCEDURE — 73630 X-RAY EXAM OF FOOT: CPT

## 2017-01-30 RX ORDER — SODIUM CHLORIDE 9 MG/ML
100 INJECTION, SOLUTION INTRAVENOUS CONTINUOUS
Status: DISCONTINUED | OUTPATIENT
Start: 2017-01-30 | End: 2017-02-01

## 2017-01-30 RX ORDER — ONDANSETRON 2 MG/ML
INJECTION INTRAMUSCULAR; INTRAVENOUS AS NEEDED
Status: DISCONTINUED | OUTPATIENT
Start: 2017-01-30 | End: 2017-01-30 | Stop reason: SURG

## 2017-01-30 RX ORDER — BUPIVACAINE HYDROCHLORIDE 5 MG/ML
INJECTION, SOLUTION PERINEURAL AS NEEDED
Status: DISCONTINUED | OUTPATIENT
Start: 2017-01-30 | End: 2017-01-30 | Stop reason: HOSPADM

## 2017-01-30 RX ORDER — PROPOFOL 10 MG/ML
INJECTION, EMULSION INTRAVENOUS AS NEEDED
Status: DISCONTINUED | OUTPATIENT
Start: 2017-01-30 | End: 2017-01-30 | Stop reason: SURG

## 2017-01-30 RX ORDER — LIDOCAINE HYDROCHLORIDE 10 MG/ML
INJECTION, SOLUTION INFILTRATION; PERINEURAL AS NEEDED
Status: DISCONTINUED | OUTPATIENT
Start: 2017-01-30 | End: 2017-01-30 | Stop reason: HOSPADM

## 2017-01-30 RX ORDER — FENTANYL CITRATE/PF 50 MCG/ML
50 SYRINGE (ML) INJECTION
Status: DISCONTINUED | OUTPATIENT
Start: 2017-01-30 | End: 2017-01-30 | Stop reason: HOSPADM

## 2017-01-30 RX ORDER — ONDANSETRON 2 MG/ML
4 INJECTION INTRAMUSCULAR; INTRAVENOUS ONCE AS NEEDED
Status: DISCONTINUED | OUTPATIENT
Start: 2017-01-30 | End: 2017-01-30 | Stop reason: HOSPADM

## 2017-01-30 RX ORDER — FENTANYL CITRATE 50 UG/ML
INJECTION, SOLUTION INTRAMUSCULAR; INTRAVENOUS AS NEEDED
Status: DISCONTINUED | OUTPATIENT
Start: 2017-01-30 | End: 2017-01-30 | Stop reason: SURG

## 2017-01-30 RX ADMIN — PROPOFOL 150 MG: 10 INJECTION, EMULSION INTRAVENOUS at 18:51

## 2017-01-30 RX ADMIN — AMPICILLIN SODIUM AND SULBACTAM SODIUM 3 G: 2; 1 INJECTION, POWDER, FOR SOLUTION INTRAMUSCULAR; INTRAVENOUS at 04:59

## 2017-01-30 RX ADMIN — AMPICILLIN SODIUM AND SULBACTAM SODIUM 3 G: 2; 1 INJECTION, POWDER, FOR SOLUTION INTRAMUSCULAR; INTRAVENOUS at 10:06

## 2017-01-30 RX ADMIN — HEPARIN SODIUM 5000 UNITS: 5000 INJECTION, SOLUTION INTRAVENOUS; SUBCUTANEOUS at 22:58

## 2017-01-30 RX ADMIN — AMPICILLIN SODIUM AND SULBACTAM SODIUM 3 G: 2; 1 INJECTION, POWDER, FOR SOLUTION INTRAMUSCULAR; INTRAVENOUS at 22:57

## 2017-01-30 RX ADMIN — FENTANYL CITRATE 50 MCG: 50 INJECTION, SOLUTION INTRAMUSCULAR; INTRAVENOUS at 18:54

## 2017-01-30 RX ADMIN — FENTANYL CITRATE 50 MCG: 50 INJECTION, SOLUTION INTRAMUSCULAR; INTRAVENOUS at 19:19

## 2017-01-30 RX ADMIN — SODIUM CHLORIDE: 0.9 INJECTION, SOLUTION INTRAVENOUS at 18:44

## 2017-01-30 RX ADMIN — SODIUM CHLORIDE 100 ML/HR: 0.9 INJECTION, SOLUTION INTRAVENOUS at 10:06

## 2017-01-30 RX ADMIN — INSULIN GLARGINE 10 UNITS: 100 INJECTION, SOLUTION SUBCUTANEOUS at 22:57

## 2017-01-30 RX ADMIN — AMPICILLIN SODIUM AND SULBACTAM SODIUM 3 G: 2; 1 INJECTION, POWDER, FOR SOLUTION INTRAMUSCULAR; INTRAVENOUS at 16:53

## 2017-01-30 RX ADMIN — ONDANSETRON 4 MG: 2 INJECTION INTRAMUSCULAR; INTRAVENOUS at 19:57

## 2017-01-31 ENCOUNTER — APPOINTMENT (INPATIENT)
Dept: RADIOLOGY | Facility: HOSPITAL | Age: 57
DRG: 853 | End: 2017-01-31
Payer: COMMERCIAL

## 2017-01-31 LAB
ANION GAP SERPL CALCULATED.3IONS-SCNC: 8 MMOL/L (ref 4–13)
BACTERIA BLD CULT: NORMAL
BUN SERPL-MCNC: 6 MG/DL (ref 5–25)
CALCIUM SERPL-MCNC: 7.8 MG/DL (ref 8.3–10.1)
CHLORIDE SERPL-SCNC: 107 MMOL/L (ref 100–108)
CO2 SERPL-SCNC: 25 MMOL/L (ref 21–32)
CREAT SERPL-MCNC: 0.88 MG/DL (ref 0.6–1.3)
ERYTHROCYTE [DISTWIDTH] IN BLOOD BY AUTOMATED COUNT: 13.9 % (ref 11.6–15.1)
GFR SERPL CREATININE-BSD FRML MDRD: >60 ML/MIN/1.73SQ M
GLUCOSE SERPL-MCNC: 183 MG/DL (ref 65–140)
GLUCOSE SERPL-MCNC: 189 MG/DL (ref 65–140)
GLUCOSE SERPL-MCNC: 220 MG/DL (ref 65–140)
GLUCOSE SERPL-MCNC: 223 MG/DL (ref 65–140)
GLUCOSE SERPL-MCNC: 259 MG/DL (ref 65–140)
GLUCOSE SERPL-MCNC: 322 MG/DL (ref 65–140)
HCT VFR BLD AUTO: 30.4 % (ref 36.5–49.3)
HGB BLD-MCNC: 9.6 G/DL (ref 12–17)
MCH RBC QN AUTO: 27.4 PG (ref 26.8–34.3)
MCHC RBC AUTO-ENTMCNC: 31.6 G/DL (ref 31.4–37.4)
MCV RBC AUTO: 87 FL (ref 82–98)
PLATELET # BLD AUTO: 428 THOUSANDS/UL (ref 149–390)
PMV BLD AUTO: 9 FL (ref 8.9–12.7)
POTASSIUM SERPL-SCNC: 3.5 MMOL/L (ref 3.5–5.3)
RBC # BLD AUTO: 3.5 MILLION/UL (ref 3.88–5.62)
SODIUM SERPL-SCNC: 140 MMOL/L (ref 136–145)
WBC # BLD AUTO: 11.28 THOUSAND/UL (ref 4.31–10.16)

## 2017-01-31 PROCEDURE — G8982 BODY POS GOAL STATUS: HCPCS

## 2017-01-31 PROCEDURE — 93971 EXTREMITY STUDY: CPT

## 2017-01-31 PROCEDURE — 85027 COMPLETE CBC AUTOMATED: CPT | Performed by: INTERNAL MEDICINE

## 2017-01-31 PROCEDURE — G8981 BODY POS CURRENT STATUS: HCPCS

## 2017-01-31 PROCEDURE — 82948 REAGENT STRIP/BLOOD GLUCOSE: CPT

## 2017-01-31 PROCEDURE — 80048 BASIC METABOLIC PNL TOTAL CA: CPT | Performed by: INTERNAL MEDICINE

## 2017-01-31 PROCEDURE — 97164 PT RE-EVAL EST PLAN CARE: CPT

## 2017-01-31 RX ORDER — INSULIN GLARGINE 100 [IU]/ML
20 INJECTION, SOLUTION SUBCUTANEOUS
Status: DISCONTINUED | OUTPATIENT
Start: 2017-01-31 | End: 2017-02-01

## 2017-01-31 RX ADMIN — HEPARIN SODIUM 5000 UNITS: 5000 INJECTION, SOLUTION INTRAVENOUS; SUBCUTANEOUS at 05:20

## 2017-01-31 RX ADMIN — OXYCODONE HYDROCHLORIDE AND ACETAMINOPHEN 2 TABLET: 5; 325 TABLET ORAL at 01:16

## 2017-01-31 RX ADMIN — INSULIN LISPRO 2 UNITS: 100 INJECTION, SOLUTION INTRAVENOUS; SUBCUTANEOUS at 18:45

## 2017-01-31 RX ADMIN — AMPICILLIN SODIUM AND SULBACTAM SODIUM 3 G: 2; 1 INJECTION, POWDER, FOR SOLUTION INTRAMUSCULAR; INTRAVENOUS at 16:39

## 2017-01-31 RX ADMIN — HEPARIN SODIUM 5000 UNITS: 5000 INJECTION, SOLUTION INTRAVENOUS; SUBCUTANEOUS at 13:34

## 2017-01-31 RX ADMIN — INSULIN LISPRO 2 UNITS: 100 INJECTION, SOLUTION INTRAVENOUS; SUBCUTANEOUS at 01:14

## 2017-01-31 RX ADMIN — OXYCODONE HYDROCHLORIDE AND ACETAMINOPHEN 1 TABLET: 5; 325 TABLET ORAL at 16:48

## 2017-01-31 RX ADMIN — AMPICILLIN SODIUM AND SULBACTAM SODIUM 3 G: 2; 1 INJECTION, POWDER, FOR SOLUTION INTRAMUSCULAR; INTRAVENOUS at 05:20

## 2017-01-31 RX ADMIN — AMPICILLIN SODIUM AND SULBACTAM SODIUM 3 G: 2; 1 INJECTION, POWDER, FOR SOLUTION INTRAMUSCULAR; INTRAVENOUS at 23:00

## 2017-01-31 RX ADMIN — HEPARIN SODIUM 5000 UNITS: 5000 INJECTION, SOLUTION INTRAVENOUS; SUBCUTANEOUS at 23:21

## 2017-01-31 RX ADMIN — INSULIN LISPRO 3 UNITS: 100 INJECTION, SOLUTION INTRAVENOUS; SUBCUTANEOUS at 13:32

## 2017-01-31 RX ADMIN — INSULIN GLARGINE 20 UNITS: 100 INJECTION, SOLUTION SUBCUTANEOUS at 23:23

## 2017-01-31 RX ADMIN — AMPICILLIN SODIUM AND SULBACTAM SODIUM 3 G: 2; 1 INJECTION, POWDER, FOR SOLUTION INTRAMUSCULAR; INTRAVENOUS at 11:52

## 2017-02-01 LAB
ANION GAP SERPL CALCULATED.3IONS-SCNC: 8 MMOL/L (ref 4–13)
BACTERIA BLD CULT: NORMAL
BACTERIA BLD CULT: NORMAL
BUN SERPL-MCNC: 5 MG/DL (ref 5–25)
CALCIUM SERPL-MCNC: 8.2 MG/DL (ref 8.3–10.1)
CHLORIDE SERPL-SCNC: 107 MMOL/L (ref 100–108)
CO2 SERPL-SCNC: 25 MMOL/L (ref 21–32)
CREAT SERPL-MCNC: 0.83 MG/DL (ref 0.6–1.3)
ERYTHROCYTE [DISTWIDTH] IN BLOOD BY AUTOMATED COUNT: 14.1 % (ref 11.6–15.1)
GFR SERPL CREATININE-BSD FRML MDRD: >60 ML/MIN/1.73SQ M
GLUCOSE SERPL-MCNC: 138 MG/DL (ref 65–140)
GLUCOSE SERPL-MCNC: 152 MG/DL (ref 65–140)
GLUCOSE SERPL-MCNC: 171 MG/DL (ref 65–140)
GLUCOSE SERPL-MCNC: 184 MG/DL (ref 65–140)
GLUCOSE SERPL-MCNC: 258 MG/DL (ref 65–140)
GLUCOSE SERPL-MCNC: 268 MG/DL (ref 65–140)
HCT VFR BLD AUTO: 31.2 % (ref 36.5–49.3)
HGB BLD-MCNC: 10 G/DL (ref 12–17)
MCH RBC QN AUTO: 27.5 PG (ref 26.8–34.3)
MCHC RBC AUTO-ENTMCNC: 32.1 G/DL (ref 31.4–37.4)
MCV RBC AUTO: 86 FL (ref 82–98)
PLATELET # BLD AUTO: 507 THOUSANDS/UL (ref 149–390)
PMV BLD AUTO: 8.9 FL (ref 8.9–12.7)
POTASSIUM SERPL-SCNC: 4 MMOL/L (ref 3.5–5.3)
RBC # BLD AUTO: 3.63 MILLION/UL (ref 3.88–5.62)
SODIUM SERPL-SCNC: 140 MMOL/L (ref 136–145)
WBC # BLD AUTO: 13.39 THOUSAND/UL (ref 4.31–10.16)

## 2017-02-01 PROCEDURE — 97110 THERAPEUTIC EXERCISES: CPT

## 2017-02-01 PROCEDURE — 80048 BASIC METABOLIC PNL TOTAL CA: CPT | Performed by: INTERNAL MEDICINE

## 2017-02-01 PROCEDURE — 85027 COMPLETE CBC AUTOMATED: CPT | Performed by: INTERNAL MEDICINE

## 2017-02-01 PROCEDURE — 82948 REAGENT STRIP/BLOOD GLUCOSE: CPT

## 2017-02-01 PROCEDURE — 97112 NEUROMUSCULAR REEDUCATION: CPT

## 2017-02-01 RX ORDER — INSULIN GLARGINE 100 [IU]/ML
25 INJECTION, SOLUTION SUBCUTANEOUS
Status: DISCONTINUED | OUTPATIENT
Start: 2017-02-01 | End: 2017-02-03 | Stop reason: HOSPADM

## 2017-02-01 RX ADMIN — HEPARIN SODIUM 5000 UNITS: 5000 INJECTION, SOLUTION INTRAVENOUS; SUBCUTANEOUS at 21:58

## 2017-02-01 RX ADMIN — AMPICILLIN SODIUM AND SULBACTAM SODIUM 3 G: 2; 1 INJECTION, POWDER, FOR SOLUTION INTRAMUSCULAR; INTRAVENOUS at 10:02

## 2017-02-01 RX ADMIN — INSULIN GLARGINE 25 UNITS: 100 INJECTION, SOLUTION SUBCUTANEOUS at 21:58

## 2017-02-01 RX ADMIN — INSULIN LISPRO 1 UNITS: 100 INJECTION, SOLUTION INTRAVENOUS; SUBCUTANEOUS at 10:02

## 2017-02-01 RX ADMIN — HEPARIN SODIUM 5000 UNITS: 5000 INJECTION, SOLUTION INTRAVENOUS; SUBCUTANEOUS at 05:01

## 2017-02-01 RX ADMIN — AMPICILLIN SODIUM AND SULBACTAM SODIUM 3 G: 2; 1 INJECTION, POWDER, FOR SOLUTION INTRAMUSCULAR; INTRAVENOUS at 16:46

## 2017-02-01 RX ADMIN — INSULIN LISPRO 2 UNITS: 100 INJECTION, SOLUTION INTRAVENOUS; SUBCUTANEOUS at 18:12

## 2017-02-01 RX ADMIN — INSULIN LISPRO 2 UNITS: 100 INJECTION, SOLUTION INTRAVENOUS; SUBCUTANEOUS at 13:33

## 2017-02-01 RX ADMIN — HEPARIN SODIUM 5000 UNITS: 5000 INJECTION, SOLUTION INTRAVENOUS; SUBCUTANEOUS at 13:33

## 2017-02-01 RX ADMIN — AMPICILLIN SODIUM AND SULBACTAM SODIUM 3 G: 2; 1 INJECTION, POWDER, FOR SOLUTION INTRAMUSCULAR; INTRAVENOUS at 04:50

## 2017-02-01 RX ADMIN — AMPICILLIN SODIUM AND SULBACTAM SODIUM 3 G: 2; 1 INJECTION, POWDER, FOR SOLUTION INTRAMUSCULAR; INTRAVENOUS at 21:58

## 2017-02-02 LAB
ERYTHROCYTE [DISTWIDTH] IN BLOOD BY AUTOMATED COUNT: 14.3 % (ref 11.6–15.1)
GLUCOSE SERPL-MCNC: 111 MG/DL (ref 65–140)
GLUCOSE SERPL-MCNC: 114 MG/DL (ref 65–140)
GLUCOSE SERPL-MCNC: 122 MG/DL (ref 65–140)
GLUCOSE SERPL-MCNC: 160 MG/DL (ref 65–140)
GLUCOSE SERPL-MCNC: 179 MG/DL (ref 65–140)
HCT VFR BLD AUTO: 33.8 % (ref 36.5–49.3)
HGB BLD-MCNC: 10.7 G/DL (ref 12–17)
MCH RBC QN AUTO: 27.4 PG (ref 26.8–34.3)
MCHC RBC AUTO-ENTMCNC: 31.7 G/DL (ref 31.4–37.4)
MCV RBC AUTO: 86 FL (ref 82–98)
PLATELET # BLD AUTO: 362 THOUSANDS/UL (ref 149–390)
PMV BLD AUTO: 9.6 FL (ref 8.9–12.7)
RBC # BLD AUTO: 3.91 MILLION/UL (ref 3.88–5.62)
WBC # BLD AUTO: 14.98 THOUSAND/UL (ref 4.31–10.16)

## 2017-02-02 PROCEDURE — 97116 GAIT TRAINING THERAPY: CPT

## 2017-02-02 PROCEDURE — 97530 THERAPEUTIC ACTIVITIES: CPT

## 2017-02-02 PROCEDURE — 82948 REAGENT STRIP/BLOOD GLUCOSE: CPT

## 2017-02-02 PROCEDURE — 85027 COMPLETE CBC AUTOMATED: CPT | Performed by: INTERNAL MEDICINE

## 2017-02-02 PROCEDURE — 97110 THERAPEUTIC EXERCISES: CPT

## 2017-02-02 RX ADMIN — AMPICILLIN SODIUM AND SULBACTAM SODIUM 3 G: 2; 1 INJECTION, POWDER, FOR SOLUTION INTRAMUSCULAR; INTRAVENOUS at 10:09

## 2017-02-02 RX ADMIN — HEPARIN SODIUM 5000 UNITS: 5000 INJECTION, SOLUTION INTRAVENOUS; SUBCUTANEOUS at 05:27

## 2017-02-02 RX ADMIN — INSULIN LISPRO 1 UNITS: 100 INJECTION, SOLUTION INTRAVENOUS; SUBCUTANEOUS at 13:32

## 2017-02-02 RX ADMIN — AMPICILLIN SODIUM AND SULBACTAM SODIUM 3 G: 2; 1 INJECTION, POWDER, FOR SOLUTION INTRAMUSCULAR; INTRAVENOUS at 21:48

## 2017-02-02 RX ADMIN — AMPICILLIN SODIUM AND SULBACTAM SODIUM 3 G: 2; 1 INJECTION, POWDER, FOR SOLUTION INTRAMUSCULAR; INTRAVENOUS at 04:05

## 2017-02-02 RX ADMIN — INSULIN GLARGINE 25 UNITS: 100 INJECTION, SOLUTION SUBCUTANEOUS at 21:48

## 2017-02-02 RX ADMIN — HEPARIN SODIUM 5000 UNITS: 5000 INJECTION, SOLUTION INTRAVENOUS; SUBCUTANEOUS at 13:28

## 2017-02-02 RX ADMIN — AMPICILLIN SODIUM AND SULBACTAM SODIUM 3 G: 2; 1 INJECTION, POWDER, FOR SOLUTION INTRAMUSCULAR; INTRAVENOUS at 15:28

## 2017-02-02 RX ADMIN — HEPARIN SODIUM 5000 UNITS: 5000 INJECTION, SOLUTION INTRAVENOUS; SUBCUTANEOUS at 21:48

## 2017-02-03 VITALS
RESPIRATION RATE: 18 BRPM | DIASTOLIC BLOOD PRESSURE: 72 MMHG | WEIGHT: 240.08 LBS | TEMPERATURE: 98.1 F | HEIGHT: 70 IN | BODY MASS INDEX: 34.37 KG/M2 | SYSTOLIC BLOOD PRESSURE: 139 MMHG | OXYGEN SATURATION: 96 % | HEART RATE: 90 BPM

## 2017-02-03 LAB
ANION GAP SERPL CALCULATED.3IONS-SCNC: 9 MMOL/L (ref 4–13)
BUN SERPL-MCNC: 10 MG/DL (ref 5–25)
CALCIUM SERPL-MCNC: 8.7 MG/DL (ref 8.3–10.1)
CHLORIDE SERPL-SCNC: 105 MMOL/L (ref 100–108)
CO2 SERPL-SCNC: 26 MMOL/L (ref 21–32)
CREAT SERPL-MCNC: 0.97 MG/DL (ref 0.6–1.3)
ERYTHROCYTE [DISTWIDTH] IN BLOOD BY AUTOMATED COUNT: 14.2 % (ref 11.6–15.1)
GFR SERPL CREATININE-BSD FRML MDRD: >60 ML/MIN/1.73SQ M
GLUCOSE SERPL-MCNC: 110 MG/DL (ref 65–140)
GLUCOSE SERPL-MCNC: 111 MG/DL (ref 65–140)
GLUCOSE SERPL-MCNC: 155 MG/DL (ref 65–140)
GLUCOSE SERPL-MCNC: 169 MG/DL (ref 65–140)
HCT VFR BLD AUTO: 32 % (ref 36.5–49.3)
HGB BLD-MCNC: 10.5 G/DL (ref 12–17)
MCH RBC QN AUTO: 28.3 PG (ref 26.8–34.3)
MCHC RBC AUTO-ENTMCNC: 32.8 G/DL (ref 31.4–37.4)
MCV RBC AUTO: 86 FL (ref 82–98)
PLATELET # BLD AUTO: 612 THOUSANDS/UL (ref 149–390)
PMV BLD AUTO: 8.6 FL (ref 8.9–12.7)
POTASSIUM SERPL-SCNC: 3.9 MMOL/L (ref 3.5–5.3)
RBC # BLD AUTO: 3.71 MILLION/UL (ref 3.88–5.62)
SODIUM SERPL-SCNC: 140 MMOL/L (ref 136–145)
WBC # BLD AUTO: 13.92 THOUSAND/UL (ref 4.31–10.16)

## 2017-02-03 PROCEDURE — 97110 THERAPEUTIC EXERCISES: CPT

## 2017-02-03 PROCEDURE — 80048 BASIC METABOLIC PNL TOTAL CA: CPT | Performed by: DERMATOLOGY

## 2017-02-03 PROCEDURE — 82948 REAGENT STRIP/BLOOD GLUCOSE: CPT

## 2017-02-03 PROCEDURE — 85027 COMPLETE CBC AUTOMATED: CPT | Performed by: DERMATOLOGY

## 2017-02-03 PROCEDURE — 97116 GAIT TRAINING THERAPY: CPT

## 2017-02-03 RX ORDER — AMOXICILLIN AND CLAVULANATE POTASSIUM 875; 125 MG/1; MG/1
1 TABLET, FILM COATED ORAL 2 TIMES DAILY
Qty: 11 TABLET | Refills: 0
Start: 2017-02-03 | End: 2017-02-13

## 2017-02-03 RX ORDER — INSULIN GLARGINE 100 [IU]/ML
25 INJECTION, SOLUTION SUBCUTANEOUS
Qty: 750 UNITS | Refills: 0 | Status: ON HOLD
Start: 2017-02-03 | End: 2017-07-29

## 2017-02-03 RX ADMIN — AMPICILLIN SODIUM AND SULBACTAM SODIUM 3 G: 2; 1 INJECTION, POWDER, FOR SOLUTION INTRAMUSCULAR; INTRAVENOUS at 09:17

## 2017-02-03 RX ADMIN — AMPICILLIN SODIUM AND SULBACTAM SODIUM 3 G: 2; 1 INJECTION, POWDER, FOR SOLUTION INTRAMUSCULAR; INTRAVENOUS at 03:58

## 2017-02-03 RX ADMIN — INSULIN LISPRO 1 UNITS: 100 INJECTION, SOLUTION INTRAVENOUS; SUBCUTANEOUS at 03:34

## 2017-02-03 RX ADMIN — INSULIN LISPRO 1 UNITS: 100 INJECTION, SOLUTION INTRAVENOUS; SUBCUTANEOUS at 13:23

## 2017-02-03 RX ADMIN — HEPARIN SODIUM 5000 UNITS: 5000 INJECTION, SOLUTION INTRAVENOUS; SUBCUTANEOUS at 05:33

## 2017-07-17 ENCOUNTER — TRANSCRIBE ORDERS (OUTPATIENT)
Dept: ADMINISTRATIVE | Facility: HOSPITAL | Age: 57
End: 2017-07-17

## 2017-07-17 DIAGNOSIS — E11.621 DIABETIC FOOT ULCER ASSOCIATED WITH TYPE 2 DIABETES MELLITUS, UNSPECIFIED LATERALITY, UNSPECIFIED PART OF FOOT, UNSPECIFIED ULCER STAGE (HCC): ICD-10-CM

## 2017-07-17 DIAGNOSIS — L97.512 RIGHT FOOT ULCER, WITH FAT LAYER EXPOSED (HCC): Primary | ICD-10-CM

## 2017-07-17 DIAGNOSIS — L97.509 DIABETIC FOOT ULCER ASSOCIATED WITH TYPE 2 DIABETES MELLITUS, UNSPECIFIED LATERALITY, UNSPECIFIED PART OF FOOT, UNSPECIFIED ULCER STAGE (HCC): ICD-10-CM

## 2017-07-21 ENCOUNTER — HOSPITAL ENCOUNTER (INPATIENT)
Facility: HOSPITAL | Age: 57
LOS: 8 days | DRG: 474 | End: 2017-07-29
Attending: EMERGENCY MEDICINE | Admitting: PODIATRIST
Payer: COMMERCIAL

## 2017-07-21 ENCOUNTER — APPOINTMENT (EMERGENCY)
Dept: RADIOLOGY | Facility: HOSPITAL | Age: 57
DRG: 474 | End: 2017-07-21
Payer: COMMERCIAL

## 2017-07-21 ENCOUNTER — APPOINTMENT (INPATIENT)
Dept: RADIOLOGY | Facility: HOSPITAL | Age: 57
DRG: 474 | End: 2017-07-21
Payer: COMMERCIAL

## 2017-07-21 DIAGNOSIS — M86.9 OSTEOMYELITIS OF LEFT FOOT (HCC): ICD-10-CM

## 2017-07-21 DIAGNOSIS — S91.301S WOUND, OPEN, FOOT, RIGHT, SEQUELA: ICD-10-CM

## 2017-07-21 DIAGNOSIS — R65.20 SEVERE SEPSIS (HCC): ICD-10-CM

## 2017-07-21 DIAGNOSIS — L97.519 SKIN ULCERS OF FOOT, BILATERAL (HCC): Primary | ICD-10-CM

## 2017-07-21 DIAGNOSIS — A41.9 SEPSIS (HCC): ICD-10-CM

## 2017-07-21 DIAGNOSIS — A48.0 GAS GANGRENE OF FOOT (HCC): ICD-10-CM

## 2017-07-21 DIAGNOSIS — R69 DIAGNOSIS UNKNOWN: ICD-10-CM

## 2017-07-21 DIAGNOSIS — E11.65 TYPE 2 DIABETES MELLITUS WITH HYPERGLYCEMIA, UNSPECIFIED LONG TERM INSULIN USE STATUS: ICD-10-CM

## 2017-07-21 DIAGNOSIS — A41.9 SEVERE SEPSIS (HCC): ICD-10-CM

## 2017-07-21 DIAGNOSIS — L97.529 SKIN ULCERS OF FOOT, BILATERAL (HCC): Primary | ICD-10-CM

## 2017-07-21 LAB
ALBUMIN SERPL BCP-MCNC: 2.6 G/DL (ref 3.5–5)
ALP SERPL-CCNC: 135 U/L (ref 46–116)
ALT SERPL W P-5'-P-CCNC: 18 U/L (ref 12–78)
ANION GAP SERPL CALCULATED.3IONS-SCNC: 11 MMOL/L (ref 4–13)
APTT PPP: 46 SECONDS (ref 23–35)
AST SERPL W P-5'-P-CCNC: 12 U/L (ref 5–45)
ATRIAL RATE: 118 BPM
BASOPHILS # BLD AUTO: 0.04 THOUSANDS/ΜL (ref 0–0.1)
BASOPHILS NFR BLD AUTO: 0 % (ref 0–1)
BILIRUB SERPL-MCNC: 0.63 MG/DL (ref 0.2–1)
BUN SERPL-MCNC: 17 MG/DL (ref 5–25)
CALCIUM SERPL-MCNC: 9.3 MG/DL (ref 8.3–10.1)
CHLORIDE SERPL-SCNC: 98 MMOL/L (ref 100–108)
CO2 SERPL-SCNC: 23 MMOL/L (ref 21–32)
CREAT SERPL-MCNC: 1.37 MG/DL (ref 0.6–1.3)
EOSINOPHIL # BLD AUTO: 0.09 THOUSAND/ΜL (ref 0–0.61)
EOSINOPHIL NFR BLD AUTO: 1 % (ref 0–6)
ERYTHROCYTE [DISTWIDTH] IN BLOOD BY AUTOMATED COUNT: 13.3 % (ref 11.6–15.1)
ERYTHROCYTE [SEDIMENTATION RATE] IN BLOOD: 90 MM/HOUR (ref 0–10)
GFR SERPL CREATININE-BSD FRML MDRD: 53.4 ML/MIN/1.73SQ M
GLUCOSE SERPL-MCNC: 277 MG/DL (ref 65–140)
GLUCOSE SERPL-MCNC: 312 MG/DL (ref 65–140)
GLUCOSE SERPL-MCNC: 334 MG/DL (ref 65–140)
HCT VFR BLD AUTO: 37.6 % (ref 36.5–49.3)
HGB BLD-MCNC: 12.9 G/DL (ref 12–17)
INR PPP: 1.16 (ref 0.86–1.16)
LACTATE SERPL-SCNC: 0.9 MMOL/L (ref 0.5–2)
LACTATE SERPL-SCNC: 1.9 MMOL/L (ref 0.5–2)
LYMPHOCYTES # BLD AUTO: 1.07 THOUSANDS/ΜL (ref 0.6–4.47)
LYMPHOCYTES NFR BLD AUTO: 9 % (ref 14–44)
MCH RBC QN AUTO: 29.2 PG (ref 26.8–34.3)
MCHC RBC AUTO-ENTMCNC: 34.3 G/DL (ref 31.4–37.4)
MCV RBC AUTO: 85 FL (ref 82–98)
MONOCYTES # BLD AUTO: 1.12 THOUSAND/ΜL (ref 0.17–1.22)
MONOCYTES NFR BLD AUTO: 10 % (ref 4–12)
NEUTROPHILS # BLD AUTO: 9.24 THOUSANDS/ΜL (ref 1.85–7.62)
NEUTS SEG NFR BLD AUTO: 80 % (ref 43–75)
NRBC BLD AUTO-RTO: 0 /100 WBCS
P AXIS: 57 DEGREES
PLATELET # BLD AUTO: 395 THOUSANDS/UL (ref 149–390)
PMV BLD AUTO: 9.2 FL (ref 8.9–12.7)
POTASSIUM SERPL-SCNC: 4.1 MMOL/L (ref 3.5–5.3)
PR INTERVAL: 130 MS
PROT SERPL-MCNC: 8.4 G/DL (ref 6.4–8.2)
PROTHROMBIN TIME: 14.9 SECONDS (ref 12.1–14.4)
QRS AXIS: -22 DEGREES
QRSD INTERVAL: 82 MS
QT INTERVAL: 300 MS
QTC INTERVAL: 420 MS
RBC # BLD AUTO: 4.42 MILLION/UL (ref 3.88–5.62)
SODIUM SERPL-SCNC: 132 MMOL/L (ref 136–145)
T WAVE AXIS: 45 DEGREES
TROPONIN I SERPL-MCNC: <0.02 NG/ML
VENTRICULAR RATE: 118 BPM
WBC # BLD AUTO: 11.6 THOUSAND/UL (ref 4.31–10.16)

## 2017-07-21 PROCEDURE — 82948 REAGENT STRIP/BLOOD GLUCOSE: CPT

## 2017-07-21 PROCEDURE — 85025 COMPLETE CBC W/AUTO DIFF WBC: CPT | Performed by: EMERGENCY MEDICINE

## 2017-07-21 PROCEDURE — 86140 C-REACTIVE PROTEIN: CPT | Performed by: STUDENT IN AN ORGANIZED HEALTH CARE EDUCATION/TRAINING PROGRAM

## 2017-07-21 PROCEDURE — 93005 ELECTROCARDIOGRAM TRACING: CPT | Performed by: EMERGENCY MEDICINE

## 2017-07-21 PROCEDURE — 93005 ELECTROCARDIOGRAM TRACING: CPT | Performed by: STUDENT IN AN ORGANIZED HEALTH CARE EDUCATION/TRAINING PROGRAM

## 2017-07-21 PROCEDURE — 87147 CULTURE TYPE IMMUNOLOGIC: CPT | Performed by: EMERGENCY MEDICINE

## 2017-07-21 PROCEDURE — 83605 ASSAY OF LACTIC ACID: CPT | Performed by: HOSPITALIST

## 2017-07-21 PROCEDURE — 87186 SC STD MICRODIL/AGAR DIL: CPT | Performed by: EMERGENCY MEDICINE

## 2017-07-21 PROCEDURE — 85730 THROMBOPLASTIN TIME PARTIAL: CPT | Performed by: EMERGENCY MEDICINE

## 2017-07-21 PROCEDURE — 83605 ASSAY OF LACTIC ACID: CPT | Performed by: EMERGENCY MEDICINE

## 2017-07-21 PROCEDURE — 87070 CULTURE OTHR SPECIMN AEROBIC: CPT | Performed by: EMERGENCY MEDICINE

## 2017-07-21 PROCEDURE — 71020 HB CHEST X-RAY 2VW FRONTAL&LATL: CPT

## 2017-07-21 PROCEDURE — 84484 ASSAY OF TROPONIN QUANT: CPT | Performed by: EMERGENCY MEDICINE

## 2017-07-21 PROCEDURE — 36415 COLL VENOUS BLD VENIPUNCTURE: CPT | Performed by: EMERGENCY MEDICINE

## 2017-07-21 PROCEDURE — 85652 RBC SED RATE AUTOMATED: CPT | Performed by: STUDENT IN AN ORGANIZED HEALTH CARE EDUCATION/TRAINING PROGRAM

## 2017-07-21 PROCEDURE — 85610 PROTHROMBIN TIME: CPT | Performed by: EMERGENCY MEDICINE

## 2017-07-21 PROCEDURE — 73630 X-RAY EXAM OF FOOT: CPT

## 2017-07-21 PROCEDURE — 96365 THER/PROPH/DIAG IV INF INIT: CPT

## 2017-07-21 PROCEDURE — 87040 BLOOD CULTURE FOR BACTERIA: CPT | Performed by: EMERGENCY MEDICINE

## 2017-07-21 PROCEDURE — 99284 EMERGENCY DEPT VISIT MOD MDM: CPT

## 2017-07-21 PROCEDURE — 87205 SMEAR GRAM STAIN: CPT | Performed by: EMERGENCY MEDICINE

## 2017-07-21 PROCEDURE — 80053 COMPREHEN METABOLIC PANEL: CPT | Performed by: EMERGENCY MEDICINE

## 2017-07-21 RX ORDER — LISINOPRIL 5 MG/1
5 TABLET ORAL DAILY
COMMUNITY
End: 2018-07-02 | Stop reason: SDUPTHER

## 2017-07-21 RX ORDER — MULTIVIT-MIN/IRON FUM/FOLIC AC 7.5 MG-4
1 TABLET ORAL DAILY
Status: ON HOLD | COMMUNITY
End: 2017-08-10 | Stop reason: CLARIF

## 2017-07-21 RX ORDER — INSULIN GLARGINE 100 [IU]/ML
25 INJECTION, SOLUTION SUBCUTANEOUS
Status: DISCONTINUED | OUTPATIENT
Start: 2017-07-21 | End: 2017-07-23

## 2017-07-21 RX ORDER — ACETAMINOPHEN 325 MG/1
650 TABLET ORAL ONCE
Status: COMPLETED | OUTPATIENT
Start: 2017-07-21 | End: 2017-07-21

## 2017-07-21 RX ORDER — LISINOPRIL 5 MG/1
5 TABLET ORAL DAILY
Status: DISCONTINUED | OUTPATIENT
Start: 2017-07-22 | End: 2017-07-29 | Stop reason: HOSPADM

## 2017-07-21 RX ORDER — METRONIDAZOLE 500 MG/1
500 TABLET ORAL EVERY 8 HOURS SCHEDULED
Status: DISCONTINUED | OUTPATIENT
Start: 2017-07-21 | End: 2017-07-23

## 2017-07-21 RX ADMIN — INSULIN GLARGINE 25 UNITS: 100 INJECTION, SOLUTION SUBCUTANEOUS at 23:06

## 2017-07-21 RX ADMIN — VANCOMYCIN HYDROCHLORIDE 1500 MG: 1 INJECTION, POWDER, LYOPHILIZED, FOR SOLUTION INTRAVENOUS at 18:40

## 2017-07-21 RX ADMIN — SODIUM CHLORIDE 2994 ML: 0.9 INJECTION, SOLUTION INTRAVENOUS at 17:47

## 2017-07-21 RX ADMIN — METRONIDAZOLE 500 MG: 500 TABLET ORAL at 23:06

## 2017-07-21 RX ADMIN — ACETAMINOPHEN 650 MG: 325 TABLET, FILM COATED ORAL at 17:49

## 2017-07-21 RX ADMIN — INSULIN LISPRO 3 UNITS: 100 INJECTION, SOLUTION INTRAVENOUS; SUBCUTANEOUS at 23:36

## 2017-07-21 RX ADMIN — CEFEPIME 2000 MG: 2 INJECTION, POWDER, FOR SOLUTION INTRAMUSCULAR; INTRAVENOUS at 17:47

## 2017-07-22 ENCOUNTER — APPOINTMENT (INPATIENT)
Dept: RADIOLOGY | Facility: HOSPITAL | Age: 57
DRG: 474 | End: 2017-07-22
Payer: COMMERCIAL

## 2017-07-22 LAB
ANION GAP SERPL CALCULATED.3IONS-SCNC: 9 MMOL/L (ref 4–13)
BUN SERPL-MCNC: 11 MG/DL (ref 5–25)
CALCIUM SERPL-MCNC: 8.5 MG/DL (ref 8.3–10.1)
CHLORIDE SERPL-SCNC: 105 MMOL/L (ref 100–108)
CO2 SERPL-SCNC: 25 MMOL/L (ref 21–32)
CREAT SERPL-MCNC: 0.86 MG/DL (ref 0.6–1.3)
ERYTHROCYTE [DISTWIDTH] IN BLOOD BY AUTOMATED COUNT: 13.7 % (ref 11.6–15.1)
EST. AVERAGE GLUCOSE BLD GHB EST-MCNC: 194 MG/DL
GFR SERPL CREATININE-BSD FRML MDRD: >60 ML/MIN/1.73SQ M
GLUCOSE SERPL-MCNC: 168 MG/DL (ref 65–140)
GLUCOSE SERPL-MCNC: 181 MG/DL (ref 65–140)
GLUCOSE SERPL-MCNC: 183 MG/DL (ref 65–140)
GLUCOSE SERPL-MCNC: 194 MG/DL (ref 65–140)
GLUCOSE SERPL-MCNC: 213 MG/DL (ref 65–140)
HBA1C MFR BLD: 8.4 % (ref 4.2–6.3)
HCT VFR BLD AUTO: 33.2 % (ref 36.5–49.3)
HGB BLD-MCNC: 11.3 G/DL (ref 12–17)
MCH RBC QN AUTO: 29 PG (ref 26.8–34.3)
MCHC RBC AUTO-ENTMCNC: 34 G/DL (ref 31.4–37.4)
MCV RBC AUTO: 85 FL (ref 82–98)
PLATELET # BLD AUTO: 345 THOUSANDS/UL (ref 149–390)
PMV BLD AUTO: 9.2 FL (ref 8.9–12.7)
POTASSIUM SERPL-SCNC: 3.7 MMOL/L (ref 3.5–5.3)
RBC # BLD AUTO: 3.9 MILLION/UL (ref 3.88–5.62)
SODIUM SERPL-SCNC: 139 MMOL/L (ref 136–145)
WBC # BLD AUTO: 9.77 THOUSAND/UL (ref 4.31–10.16)

## 2017-07-22 PROCEDURE — 82948 REAGENT STRIP/BLOOD GLUCOSE: CPT

## 2017-07-22 PROCEDURE — 85027 COMPLETE CBC AUTOMATED: CPT | Performed by: STUDENT IN AN ORGANIZED HEALTH CARE EDUCATION/TRAINING PROGRAM

## 2017-07-22 PROCEDURE — 83036 HEMOGLOBIN GLYCOSYLATED A1C: CPT | Performed by: STUDENT IN AN ORGANIZED HEALTH CARE EDUCATION/TRAINING PROGRAM

## 2017-07-22 PROCEDURE — 80048 BASIC METABOLIC PNL TOTAL CA: CPT | Performed by: STUDENT IN AN ORGANIZED HEALTH CARE EDUCATION/TRAINING PROGRAM

## 2017-07-22 PROCEDURE — A9585 GADOBUTROL INJECTION: HCPCS | Performed by: PODIATRIST

## 2017-07-22 PROCEDURE — 73720 MRI LWR EXTREMITY W/O&W/DYE: CPT

## 2017-07-22 RX ADMIN — Medication 1 TABLET: at 08:45

## 2017-07-22 RX ADMIN — INSULIN LISPRO 1 UNITS: 100 INJECTION, SOLUTION INTRAVENOUS; SUBCUTANEOUS at 18:03

## 2017-07-22 RX ADMIN — ENOXAPARIN SODIUM 40 MG: 40 INJECTION SUBCUTANEOUS at 08:45

## 2017-07-22 RX ADMIN — VANCOMYCIN HYDROCHLORIDE 1500 MG: 1 INJECTION, POWDER, LYOPHILIZED, FOR SOLUTION INTRAVENOUS at 17:00

## 2017-07-22 RX ADMIN — METRONIDAZOLE 500 MG: 500 TABLET ORAL at 05:07

## 2017-07-22 RX ADMIN — INSULIN GLARGINE 25 UNITS: 100 INJECTION, SOLUTION SUBCUTANEOUS at 21:41

## 2017-07-22 RX ADMIN — INSULIN LISPRO 1 UNITS: 100 INJECTION, SOLUTION INTRAVENOUS; SUBCUTANEOUS at 13:20

## 2017-07-22 RX ADMIN — INSULIN LISPRO 1 UNITS: 100 INJECTION, SOLUTION INTRAVENOUS; SUBCUTANEOUS at 08:46

## 2017-07-22 RX ADMIN — INSULIN LISPRO 10 UNITS: 100 INJECTION, SOLUTION INTRAVENOUS; SUBCUTANEOUS at 08:47

## 2017-07-22 RX ADMIN — GADOBUTROL 10 ML: 604.72 INJECTION INTRAVENOUS at 09:24

## 2017-07-22 RX ADMIN — METRONIDAZOLE 500 MG: 500 TABLET ORAL at 21:42

## 2017-07-22 RX ADMIN — INSULIN LISPRO 10 UNITS: 100 INJECTION, SOLUTION INTRAVENOUS; SUBCUTANEOUS at 18:02

## 2017-07-22 RX ADMIN — INSULIN LISPRO 10 UNITS: 100 INJECTION, SOLUTION INTRAVENOUS; SUBCUTANEOUS at 13:21

## 2017-07-22 RX ADMIN — CEFEPIME HYDROCHLORIDE 2000 MG: 2 INJECTION, POWDER, FOR SOLUTION INTRAVENOUS at 16:06

## 2017-07-22 RX ADMIN — INSULIN LISPRO 1 UNITS: 100 INJECTION, SOLUTION INTRAVENOUS; SUBCUTANEOUS at 21:42

## 2017-07-22 RX ADMIN — METRONIDAZOLE 500 MG: 500 TABLET ORAL at 15:10

## 2017-07-22 RX ADMIN — LISINOPRIL 5 MG: 5 TABLET ORAL at 08:45

## 2017-07-23 LAB
ANION GAP SERPL CALCULATED.3IONS-SCNC: 8 MMOL/L (ref 4–13)
BUN SERPL-MCNC: 9 MG/DL (ref 5–25)
CALCIUM SERPL-MCNC: 8.5 MG/DL (ref 8.3–10.1)
CHLORIDE SERPL-SCNC: 105 MMOL/L (ref 100–108)
CO2 SERPL-SCNC: 24 MMOL/L (ref 21–32)
CREAT SERPL-MCNC: 0.76 MG/DL (ref 0.6–1.3)
ERYTHROCYTE [DISTWIDTH] IN BLOOD BY AUTOMATED COUNT: 13.5 % (ref 11.6–15.1)
GFR SERPL CREATININE-BSD FRML MDRD: >60 ML/MIN/1.73SQ M
GLUCOSE SERPL-MCNC: 154 MG/DL (ref 65–140)
GLUCOSE SERPL-MCNC: 167 MG/DL (ref 65–140)
GLUCOSE SERPL-MCNC: 180 MG/DL (ref 65–140)
GLUCOSE SERPL-MCNC: 193 MG/DL (ref 65–140)
GLUCOSE SERPL-MCNC: 196 MG/DL (ref 65–140)
GLUCOSE SERPL-MCNC: 241 MG/DL (ref 65–140)
HCT VFR BLD AUTO: 35.6 % (ref 36.5–49.3)
HGB BLD-MCNC: 11.9 G/DL (ref 12–17)
MCH RBC QN AUTO: 28.6 PG (ref 26.8–34.3)
MCHC RBC AUTO-ENTMCNC: 33.4 G/DL (ref 31.4–37.4)
MCV RBC AUTO: 86 FL (ref 82–98)
PLATELET # BLD AUTO: 359 THOUSANDS/UL (ref 149–390)
PMV BLD AUTO: 9.3 FL (ref 8.9–12.7)
POTASSIUM SERPL-SCNC: 3.5 MMOL/L (ref 3.5–5.3)
RBC # BLD AUTO: 4.16 MILLION/UL (ref 3.88–5.62)
SODIUM SERPL-SCNC: 137 MMOL/L (ref 136–145)
WBC # BLD AUTO: 8.53 THOUSAND/UL (ref 4.31–10.16)

## 2017-07-23 PROCEDURE — 82948 REAGENT STRIP/BLOOD GLUCOSE: CPT

## 2017-07-23 PROCEDURE — 85027 COMPLETE CBC AUTOMATED: CPT | Performed by: STUDENT IN AN ORGANIZED HEALTH CARE EDUCATION/TRAINING PROGRAM

## 2017-07-23 PROCEDURE — 80048 BASIC METABOLIC PNL TOTAL CA: CPT | Performed by: STUDENT IN AN ORGANIZED HEALTH CARE EDUCATION/TRAINING PROGRAM

## 2017-07-23 RX ORDER — INSULIN GLARGINE 100 [IU]/ML
30 INJECTION, SOLUTION SUBCUTANEOUS
Status: DISCONTINUED | OUTPATIENT
Start: 2017-07-23 | End: 2017-07-24

## 2017-07-23 RX ADMIN — ENOXAPARIN SODIUM 40 MG: 40 INJECTION SUBCUTANEOUS at 08:26

## 2017-07-23 RX ADMIN — VANCOMYCIN HYDROCHLORIDE 1500 MG: 1 INJECTION, POWDER, LYOPHILIZED, FOR SOLUTION INTRAVENOUS at 02:49

## 2017-07-23 RX ADMIN — INSULIN LISPRO 1 UNITS: 100 INJECTION, SOLUTION INTRAVENOUS; SUBCUTANEOUS at 08:27

## 2017-07-23 RX ADMIN — Medication 1 TABLET: at 08:25

## 2017-07-23 RX ADMIN — INSULIN LISPRO 1 UNITS: 100 INJECTION, SOLUTION INTRAVENOUS; SUBCUTANEOUS at 18:02

## 2017-07-23 RX ADMIN — INSULIN LISPRO 10 UNITS: 100 INJECTION, SOLUTION INTRAVENOUS; SUBCUTANEOUS at 18:01

## 2017-07-23 RX ADMIN — LISINOPRIL 5 MG: 5 TABLET ORAL at 08:25

## 2017-07-23 RX ADMIN — INSULIN LISPRO 10 UNITS: 100 INJECTION, SOLUTION INTRAVENOUS; SUBCUTANEOUS at 12:19

## 2017-07-23 RX ADMIN — METRONIDAZOLE 500 MG: 500 TABLET ORAL at 05:23

## 2017-07-23 RX ADMIN — CEFEPIME HYDROCHLORIDE 2000 MG: 2 INJECTION, POWDER, FOR SOLUTION INTRAVENOUS at 02:49

## 2017-07-23 RX ADMIN — INSULIN LISPRO 2 UNITS: 100 INJECTION, SOLUTION INTRAVENOUS; SUBCUTANEOUS at 22:16

## 2017-07-23 RX ADMIN — INSULIN LISPRO 10 UNITS: 100 INJECTION, SOLUTION INTRAVENOUS; SUBCUTANEOUS at 08:25

## 2017-07-23 RX ADMIN — INSULIN GLARGINE 30 UNITS: 100 INJECTION, SOLUTION SUBCUTANEOUS at 22:16

## 2017-07-23 RX ADMIN — VANCOMYCIN HYDROCHLORIDE 1500 MG: 1 INJECTION, POWDER, LYOPHILIZED, FOR SOLUTION INTRAVENOUS at 15:12

## 2017-07-23 RX ADMIN — INSULIN LISPRO 1 UNITS: 100 INJECTION, SOLUTION INTRAVENOUS; SUBCUTANEOUS at 12:19

## 2017-07-24 ENCOUNTER — APPOINTMENT (INPATIENT)
Dept: NON INVASIVE DIAGNOSTICS | Facility: HOSPITAL | Age: 57
DRG: 474 | End: 2017-07-24
Payer: COMMERCIAL

## 2017-07-24 LAB
ABO GROUP BLD: NORMAL
ANION GAP SERPL CALCULATED.3IONS-SCNC: 8 MMOL/L (ref 4–13)
ATRIAL RATE: 82 BPM
BACTERIA BLD CULT: ABNORMAL
BACTERIA BLD CULT: NORMAL
BLD GP AB SCN SERPL QL: NEGATIVE
BUN SERPL-MCNC: 8 MG/DL (ref 5–25)
CALCIUM SERPL-MCNC: 8.7 MG/DL (ref 8.3–10.1)
CHLORIDE SERPL-SCNC: 106 MMOL/L (ref 100–108)
CO2 SERPL-SCNC: 23 MMOL/L (ref 21–32)
CREAT SERPL-MCNC: 0.74 MG/DL (ref 0.6–1.3)
ERYTHROCYTE [DISTWIDTH] IN BLOOD BY AUTOMATED COUNT: 13.8 % (ref 11.6–15.1)
GFR SERPL CREATININE-BSD FRML MDRD: >60 ML/MIN/1.73SQ M
GLUCOSE SERPL-MCNC: 129 MG/DL (ref 65–140)
GLUCOSE SERPL-MCNC: 239 MG/DL (ref 65–140)
GLUCOSE SERPL-MCNC: 302 MG/DL (ref 65–140)
GLUCOSE SERPL-MCNC: 89 MG/DL (ref 65–140)
GLUCOSE SERPL-MCNC: 95 MG/DL (ref 65–140)
GRAM STN SPEC: ABNORMAL
GRAM STN SPEC: NORMAL
HCT VFR BLD AUTO: 36.6 % (ref 36.5–49.3)
HGB BLD-MCNC: 12.4 G/DL (ref 12–17)
MCH RBC QN AUTO: 29 PG (ref 26.8–34.3)
MCHC RBC AUTO-ENTMCNC: 33.9 G/DL (ref 31.4–37.4)
MCV RBC AUTO: 86 FL (ref 82–98)
P AXIS: 44 DEGREES
PLATELET # BLD AUTO: 434 THOUSANDS/UL (ref 149–390)
PMV BLD AUTO: 9.3 FL (ref 8.9–12.7)
POTASSIUM SERPL-SCNC: 3.7 MMOL/L (ref 3.5–5.3)
PR INTERVAL: 156 MS
QRS AXIS: -10 DEGREES
QRSD INTERVAL: 88 MS
QT INTERVAL: 408 MS
QTC INTERVAL: 476 MS
RBC # BLD AUTO: 4.28 MILLION/UL (ref 3.88–5.62)
RH BLD: POSITIVE
SODIUM SERPL-SCNC: 137 MMOL/L (ref 136–145)
SPECIMEN EXPIRATION DATE: NORMAL
T WAVE AXIS: 17 DEGREES
VANCOMYCIN TROUGH SERPL-MCNC: 14.6 UG/ML (ref 10–20)
VENTRICULAR RATE: 82 BPM
WBC # BLD AUTO: 10.14 THOUSAND/UL (ref 4.31–10.16)

## 2017-07-24 PROCEDURE — 80202 ASSAY OF VANCOMYCIN: CPT | Performed by: INTERNAL MEDICINE

## 2017-07-24 PROCEDURE — 85027 COMPLETE CBC AUTOMATED: CPT | Performed by: STUDENT IN AN ORGANIZED HEALTH CARE EDUCATION/TRAINING PROGRAM

## 2017-07-24 PROCEDURE — 80048 BASIC METABOLIC PNL TOTAL CA: CPT | Performed by: STUDENT IN AN ORGANIZED HEALTH CARE EDUCATION/TRAINING PROGRAM

## 2017-07-24 PROCEDURE — 86920 COMPATIBILITY TEST SPIN: CPT

## 2017-07-24 PROCEDURE — 82948 REAGENT STRIP/BLOOD GLUCOSE: CPT

## 2017-07-24 PROCEDURE — 86900 BLOOD TYPING SEROLOGIC ABO: CPT | Performed by: SURGERY

## 2017-07-24 PROCEDURE — 86901 BLOOD TYPING SEROLOGIC RH(D): CPT | Performed by: SURGERY

## 2017-07-24 PROCEDURE — 87040 BLOOD CULTURE FOR BACTERIA: CPT | Performed by: INTERNAL MEDICINE

## 2017-07-24 PROCEDURE — 93306 TTE W/DOPPLER COMPLETE: CPT

## 2017-07-24 PROCEDURE — 86850 RBC ANTIBODY SCREEN: CPT | Performed by: SURGERY

## 2017-07-24 RX ORDER — INSULIN GLARGINE 100 [IU]/ML
28 INJECTION, SOLUTION SUBCUTANEOUS
Status: DISCONTINUED | OUTPATIENT
Start: 2017-07-24 | End: 2017-07-29 | Stop reason: HOSPADM

## 2017-07-24 RX ORDER — LORAZEPAM 0.5 MG/1
0.5 TABLET ORAL ONCE
Status: COMPLETED | OUTPATIENT
Start: 2017-07-24 | End: 2017-07-24

## 2017-07-24 RX ADMIN — LISINOPRIL 5 MG: 5 TABLET ORAL at 09:38

## 2017-07-24 RX ADMIN — INSULIN GLARGINE 28 UNITS: 100 INJECTION, SOLUTION SUBCUTANEOUS at 21:20

## 2017-07-24 RX ADMIN — VANCOMYCIN HYDROCHLORIDE 1750 MG: 1 INJECTION, POWDER, LYOPHILIZED, FOR SOLUTION INTRAVENOUS at 13:46

## 2017-07-24 RX ADMIN — LORAZEPAM 0.5 MG: 0.5 TABLET ORAL at 21:47

## 2017-07-24 RX ADMIN — INSULIN LISPRO 10 UNITS: 100 INJECTION, SOLUTION INTRAVENOUS; SUBCUTANEOUS at 17:30

## 2017-07-24 RX ADMIN — INSULIN LISPRO 3 UNITS: 100 INJECTION, SOLUTION INTRAVENOUS; SUBCUTANEOUS at 17:30

## 2017-07-24 RX ADMIN — INSULIN LISPRO 10 UNITS: 100 INJECTION, SOLUTION INTRAVENOUS; SUBCUTANEOUS at 09:40

## 2017-07-24 RX ADMIN — ENOXAPARIN SODIUM 40 MG: 40 INJECTION SUBCUTANEOUS at 09:38

## 2017-07-24 RX ADMIN — Medication 1 TABLET: at 09:38

## 2017-07-24 RX ADMIN — INSULIN LISPRO 2 UNITS: 100 INJECTION, SOLUTION INTRAVENOUS; SUBCUTANEOUS at 21:21

## 2017-07-24 RX ADMIN — VANCOMYCIN HYDROCHLORIDE 1500 MG: 1 INJECTION, POWDER, LYOPHILIZED, FOR SOLUTION INTRAVENOUS at 02:55

## 2017-07-25 ENCOUNTER — ANESTHESIA (INPATIENT)
Dept: PERIOP | Facility: HOSPITAL | Age: 57
DRG: 474 | End: 2017-07-25
Payer: COMMERCIAL

## 2017-07-25 ENCOUNTER — ANESTHESIA EVENT (INPATIENT)
Dept: PERIOP | Facility: HOSPITAL | Age: 57
DRG: 474 | End: 2017-07-25
Payer: COMMERCIAL

## 2017-07-25 LAB
ANION GAP SERPL CALCULATED.3IONS-SCNC: 7 MMOL/L (ref 4–13)
BACTERIA WND AEROBE CULT: ABNORMAL
BACTERIA WND AEROBE CULT: ABNORMAL
BUN SERPL-MCNC: 9 MG/DL (ref 5–25)
CALCIUM SERPL-MCNC: 9 MG/DL (ref 8.3–10.1)
CHLORIDE SERPL-SCNC: 106 MMOL/L (ref 100–108)
CO2 SERPL-SCNC: 25 MMOL/L (ref 21–32)
CREAT SERPL-MCNC: 0.78 MG/DL (ref 0.6–1.3)
ERYTHROCYTE [DISTWIDTH] IN BLOOD BY AUTOMATED COUNT: 14 % (ref 11.6–15.1)
GFR SERPL CREATININE-BSD FRML MDRD: 99 ML/MIN/1.73SQ M
GLUCOSE SERPL-MCNC: 147 MG/DL (ref 65–140)
GLUCOSE SERPL-MCNC: 270 MG/DL (ref 65–140)
GLUCOSE SERPL-MCNC: 84 MG/DL (ref 65–140)
GLUCOSE SERPL-MCNC: 87 MG/DL (ref 65–140)
GLUCOSE SERPL-MCNC: 90 MG/DL (ref 65–140)
GRAM STN SPEC: ABNORMAL
HCT VFR BLD AUTO: 36.6 % (ref 36.5–49.3)
HGB BLD-MCNC: 12.2 G/DL (ref 12–17)
INR PPP: 1.11 (ref 0.86–1.16)
MCH RBC QN AUTO: 28.4 PG (ref 26.8–34.3)
MCHC RBC AUTO-ENTMCNC: 33.3 G/DL (ref 31.4–37.4)
MCV RBC AUTO: 85 FL (ref 82–98)
PLATELET # BLD AUTO: 510 THOUSANDS/UL (ref 149–390)
PMV BLD AUTO: 9.2 FL (ref 8.9–12.7)
POTASSIUM SERPL-SCNC: 4.2 MMOL/L (ref 3.5–5.3)
PROTHROMBIN TIME: 14.3 SECONDS (ref 12.1–14.4)
RBC # BLD AUTO: 4.3 MILLION/UL (ref 3.88–5.62)
SODIUM SERPL-SCNC: 138 MMOL/L (ref 136–145)
WBC # BLD AUTO: 11.18 THOUSAND/UL (ref 4.31–10.16)

## 2017-07-25 PROCEDURE — 85027 COMPLETE CBC AUTOMATED: CPT | Performed by: SURGERY

## 2017-07-25 PROCEDURE — 88311 DECALCIFY TISSUE: CPT | Performed by: SURGERY

## 2017-07-25 PROCEDURE — 82948 REAGENT STRIP/BLOOD GLUCOSE: CPT

## 2017-07-25 PROCEDURE — 0Y6H0Z1 DETACHMENT AT RIGHT LOWER LEG, HIGH, OPEN APPROACH: ICD-10-PCS | Performed by: SURGERY

## 2017-07-25 PROCEDURE — 88307 TISSUE EXAM BY PATHOLOGIST: CPT | Performed by: SURGERY

## 2017-07-25 PROCEDURE — 80048 BASIC METABOLIC PNL TOTAL CA: CPT | Performed by: SURGERY

## 2017-07-25 PROCEDURE — 85610 PROTHROMBIN TIME: CPT | Performed by: SURGERY

## 2017-07-25 RX ORDER — PROPOFOL 10 MG/ML
INJECTION, EMULSION INTRAVENOUS AS NEEDED
Status: DISCONTINUED | OUTPATIENT
Start: 2017-07-25 | End: 2017-07-25 | Stop reason: SURG

## 2017-07-25 RX ORDER — PREGABALIN 100 MG/1
100 CAPSULE ORAL ONCE
Status: COMPLETED | OUTPATIENT
Start: 2017-07-25 | End: 2017-07-25

## 2017-07-25 RX ORDER — OXYCODONE HCL 10 MG/1
10 TABLET, FILM COATED, EXTENDED RELEASE ORAL ONCE
Status: COMPLETED | OUTPATIENT
Start: 2017-07-25 | End: 2017-07-25

## 2017-07-25 RX ORDER — EPHEDRINE SULFATE 50 MG/ML
INJECTION, SOLUTION INTRAVENOUS AS NEEDED
Status: DISCONTINUED | OUTPATIENT
Start: 2017-07-25 | End: 2017-07-25 | Stop reason: SURG

## 2017-07-25 RX ORDER — FENTANYL CITRATE 50 UG/ML
INJECTION, SOLUTION INTRAMUSCULAR; INTRAVENOUS AS NEEDED
Status: DISCONTINUED | OUTPATIENT
Start: 2017-07-25 | End: 2017-07-25 | Stop reason: SURG

## 2017-07-25 RX ORDER — ALBUMIN, HUMAN INJ 5% 5 %
SOLUTION INTRAVENOUS CONTINUOUS PRN
Status: DISCONTINUED | OUTPATIENT
Start: 2017-07-25 | End: 2017-07-25 | Stop reason: SURG

## 2017-07-25 RX ORDER — ACETAMINOPHEN 325 MG/1
975 TABLET ORAL EVERY 6 HOURS SCHEDULED
Status: DISCONTINUED | OUTPATIENT
Start: 2017-07-25 | End: 2017-07-27

## 2017-07-25 RX ORDER — ONDANSETRON 2 MG/ML
4 INJECTION INTRAMUSCULAR; INTRAVENOUS EVERY 6 HOURS PRN
Status: DISCONTINUED | OUTPATIENT
Start: 2017-07-25 | End: 2017-07-29 | Stop reason: HOSPADM

## 2017-07-25 RX ORDER — ONDANSETRON 2 MG/ML
4 INJECTION INTRAMUSCULAR; INTRAVENOUS EVERY 4 HOURS PRN
Status: DISCONTINUED | OUTPATIENT
Start: 2017-07-25 | End: 2017-07-25 | Stop reason: HOSPADM

## 2017-07-25 RX ORDER — SODIUM CHLORIDE 9 MG/ML
100 INJECTION, SOLUTION INTRAVENOUS CONTINUOUS
Status: DISCONTINUED | OUTPATIENT
Start: 2017-07-25 | End: 2017-07-25

## 2017-07-25 RX ORDER — SUCCINYLCHOLINE/SOD CL,ISO/PF 100 MG/5ML
SYRINGE (ML) INTRAVENOUS AS NEEDED
Status: DISCONTINUED | OUTPATIENT
Start: 2017-07-25 | End: 2017-07-25 | Stop reason: SURG

## 2017-07-25 RX ORDER — KETAMINE HYDROCHLORIDE 50 MG/ML
INJECTION, SOLUTION, CONCENTRATE INTRAMUSCULAR; INTRAVENOUS AS NEEDED
Status: DISCONTINUED | OUTPATIENT
Start: 2017-07-25 | End: 2017-07-25 | Stop reason: SURG

## 2017-07-25 RX ORDER — DEXMEDETOMIDINE HYDROCHLORIDE 100 UG/ML
INJECTION, SOLUTION INTRAVENOUS AS NEEDED
Status: DISCONTINUED | OUTPATIENT
Start: 2017-07-25 | End: 2017-07-25 | Stop reason: SURG

## 2017-07-25 RX ORDER — FENTANYL CITRATE/PF 50 MCG/ML
50 SYRINGE (ML) INJECTION
Status: DISCONTINUED | OUTPATIENT
Start: 2017-07-25 | End: 2017-07-25 | Stop reason: HOSPADM

## 2017-07-25 RX ORDER — ONDANSETRON 2 MG/ML
INJECTION INTRAMUSCULAR; INTRAVENOUS AS NEEDED
Status: DISCONTINUED | OUTPATIENT
Start: 2017-07-25 | End: 2017-07-25 | Stop reason: SURG

## 2017-07-25 RX ORDER — MIDAZOLAM HYDROCHLORIDE 1 MG/ML
INJECTION INTRAMUSCULAR; INTRAVENOUS AS NEEDED
Status: DISCONTINUED | OUTPATIENT
Start: 2017-07-25 | End: 2017-07-25 | Stop reason: SURG

## 2017-07-25 RX ORDER — MAGNESIUM HYDROXIDE 1200 MG/15ML
LIQUID ORAL AS NEEDED
Status: DISCONTINUED | OUTPATIENT
Start: 2017-07-25 | End: 2017-07-25 | Stop reason: HOSPADM

## 2017-07-25 RX ADMIN — FENTANYL CITRATE 50 MCG: 50 INJECTION, SOLUTION INTRAMUSCULAR; INTRAVENOUS at 14:48

## 2017-07-25 RX ADMIN — EPHEDRINE SULFATE 10 MG: 50 INJECTION, SOLUTION INTRAMUSCULAR; INTRAVENOUS; SUBCUTANEOUS at 14:49

## 2017-07-25 RX ADMIN — HYDROMORPHONE HYDROCHLORIDE 0.2 MG: 1 INJECTION, SOLUTION INTRAMUSCULAR; INTRAVENOUS; SUBCUTANEOUS at 15:51

## 2017-07-25 RX ADMIN — MIDAZOLAM HYDROCHLORIDE 2 MG: 1 INJECTION, SOLUTION INTRAMUSCULAR; INTRAVENOUS at 13:27

## 2017-07-25 RX ADMIN — PREGABALIN 100 MG: 100 CAPSULE ORAL at 13:20

## 2017-07-25 RX ADMIN — SODIUM CHLORIDE 100 ML/HR: 0.9 INJECTION, SOLUTION INTRAVENOUS at 03:15

## 2017-07-25 RX ADMIN — SODIUM CHLORIDE: 0.9 INJECTION, SOLUTION INTRAVENOUS at 06:45

## 2017-07-25 RX ADMIN — DEXMEDETOMIDINE HYDROCHLORIDE 8 MCG: 100 INJECTION, SOLUTION INTRAVENOUS at 15:17

## 2017-07-25 RX ADMIN — FENTANYL CITRATE 50 MCG: 50 INJECTION INTRAMUSCULAR; INTRAVENOUS at 17:10

## 2017-07-25 RX ADMIN — SODIUM CHLORIDE: 0.9 INJECTION, SOLUTION INTRAVENOUS at 14:15

## 2017-07-25 RX ADMIN — KETAMINE HYDROCHLORIDE 20 MG: 50 INJECTION, SOLUTION INTRAMUSCULAR; INTRAVENOUS at 13:50

## 2017-07-25 RX ADMIN — EPHEDRINE SULFATE 5 MG: 50 INJECTION, SOLUTION INTRAMUSCULAR; INTRAVENOUS; SUBCUTANEOUS at 15:04

## 2017-07-25 RX ADMIN — Medication 100 MG: at 13:38

## 2017-07-25 RX ADMIN — KETAMINE HYDROCHLORIDE 20 MG: 50 INJECTION, SOLUTION INTRAMUSCULAR; INTRAVENOUS at 14:29

## 2017-07-25 RX ADMIN — EPHEDRINE SULFATE 10 MG: 50 INJECTION, SOLUTION INTRAMUSCULAR; INTRAVENOUS; SUBCUTANEOUS at 15:46

## 2017-07-25 RX ADMIN — HYDROMORPHONE HYDROCHLORIDE 0.3 MG: 1 INJECTION, SOLUTION INTRAMUSCULAR; INTRAVENOUS; SUBCUTANEOUS at 15:37

## 2017-07-25 RX ADMIN — OXYCODONE HYDROCHLORIDE 10 MG: 10 TABLET, FILM COATED, EXTENDED RELEASE ORAL at 13:19

## 2017-07-25 RX ADMIN — INSULIN GLARGINE 28 UNITS: 100 INJECTION, SOLUTION SUBCUTANEOUS at 22:25

## 2017-07-25 RX ADMIN — ACETAMINOPHEN 975 MG: 325 TABLET, FILM COATED ORAL at 18:36

## 2017-07-25 RX ADMIN — FENTANYL CITRATE 50 MCG: 50 INJECTION INTRAMUSCULAR; INTRAVENOUS at 17:01

## 2017-07-25 RX ADMIN — DEXMEDETOMIDINE HYDROCHLORIDE 8 MCG: 100 INJECTION, SOLUTION INTRAVENOUS at 15:35

## 2017-07-25 RX ADMIN — SODIUM CHLORIDE 100 ML/HR: 0.9 INJECTION, SOLUTION INTRAVENOUS at 17:04

## 2017-07-25 RX ADMIN — FENTANYL CITRATE 25 MCG: 50 INJECTION, SOLUTION INTRAMUSCULAR; INTRAVENOUS at 15:10

## 2017-07-25 RX ADMIN — HYDROMORPHONE HYDROCHLORIDE 0.5 MG: 1 INJECTION, SOLUTION INTRAMUSCULAR; INTRAVENOUS; SUBCUTANEOUS at 20:59

## 2017-07-25 RX ADMIN — KETAMINE HYDROCHLORIDE 10 MG: 50 INJECTION, SOLUTION INTRAMUSCULAR; INTRAVENOUS at 14:23

## 2017-07-25 RX ADMIN — ACETAMINOPHEN 975 MG: 325 TABLET, FILM COATED ORAL at 13:18

## 2017-07-25 RX ADMIN — CEFAZOLIN SODIUM 2000 MG: 2 SOLUTION INTRAVENOUS at 13:45

## 2017-07-25 RX ADMIN — EPHEDRINE SULFATE 5 MG: 50 INJECTION, SOLUTION INTRAMUSCULAR; INTRAVENOUS; SUBCUTANEOUS at 15:19

## 2017-07-25 RX ADMIN — FENTANYL CITRATE 25 MCG: 50 INJECTION, SOLUTION INTRAMUSCULAR; INTRAVENOUS at 15:02

## 2017-07-25 RX ADMIN — ONDANSETRON 4 MG: 2 INJECTION INTRAMUSCULAR; INTRAVENOUS at 16:00

## 2017-07-25 RX ADMIN — HYDROMORPHONE HYDROCHLORIDE 0.4 MG: 1 INJECTION, SOLUTION INTRAMUSCULAR; INTRAVENOUS; SUBCUTANEOUS at 17:19

## 2017-07-25 RX ADMIN — PROPOFOL 150 MG: 10 INJECTION, EMULSION INTRAVENOUS at 13:38

## 2017-07-25 RX ADMIN — EPHEDRINE SULFATE 10 MG: 50 INJECTION, SOLUTION INTRAMUSCULAR; INTRAVENOUS; SUBCUTANEOUS at 14:55

## 2017-07-25 RX ADMIN — INSULIN LISPRO 3 UNITS: 100 INJECTION, SOLUTION INTRAVENOUS; SUBCUTANEOUS at 22:26

## 2017-07-25 RX ADMIN — VANCOMYCIN HYDROCHLORIDE 1750 MG: 1 INJECTION, POWDER, LYOPHILIZED, FOR SOLUTION INTRAVENOUS at 01:54

## 2017-07-25 RX ADMIN — LISINOPRIL 5 MG: 5 TABLET ORAL at 08:51

## 2017-07-25 RX ADMIN — EPHEDRINE SULFATE 10 MG: 50 INJECTION, SOLUTION INTRAMUSCULAR; INTRAVENOUS; SUBCUTANEOUS at 15:43

## 2017-07-25 RX ADMIN — ALBUMIN HUMAN: 0.05 INJECTION, SOLUTION INTRAVENOUS at 15:05

## 2017-07-25 RX ADMIN — FENTANYL CITRATE 250 MCG: 50 INJECTION, SOLUTION INTRAMUSCULAR; INTRAVENOUS at 13:38

## 2017-07-26 LAB
ANION GAP SERPL CALCULATED.3IONS-SCNC: 4 MMOL/L (ref 4–13)
BASOPHILS # BLD AUTO: 0.04 THOUSANDS/ΜL (ref 0–0.1)
BASOPHILS NFR BLD AUTO: 0 % (ref 0–1)
BUN SERPL-MCNC: 11 MG/DL (ref 5–25)
CALCIUM SERPL-MCNC: 8.1 MG/DL (ref 8.3–10.1)
CHLORIDE SERPL-SCNC: 105 MMOL/L (ref 100–108)
CO2 SERPL-SCNC: 25 MMOL/L (ref 21–32)
CREAT SERPL-MCNC: 0.83 MG/DL (ref 0.6–1.3)
CRP SERPL QL: >90 MG/L
EOSINOPHIL # BLD AUTO: 0.13 THOUSAND/ΜL (ref 0–0.61)
EOSINOPHIL NFR BLD AUTO: 1 % (ref 0–6)
ERYTHROCYTE [DISTWIDTH] IN BLOOD BY AUTOMATED COUNT: 14.3 % (ref 11.6–15.1)
GFR SERPL CREATININE-BSD FRML MDRD: 97 ML/MIN/1.73SQ M
GLUCOSE SERPL-MCNC: 105 MG/DL (ref 65–140)
GLUCOSE SERPL-MCNC: 142 MG/DL (ref 65–140)
GLUCOSE SERPL-MCNC: 164 MG/DL (ref 65–140)
GLUCOSE SERPL-MCNC: 197 MG/DL (ref 65–140)
GLUCOSE SERPL-MCNC: 238 MG/DL (ref 65–140)
HCT VFR BLD AUTO: 33.6 % (ref 36.5–49.3)
HGB BLD-MCNC: 10.6 G/DL (ref 12–17)
LYMPHOCYTES # BLD AUTO: 2.43 THOUSANDS/ΜL (ref 0.6–4.47)
LYMPHOCYTES NFR BLD AUTO: 24 % (ref 14–44)
MCH RBC QN AUTO: 28.2 PG (ref 26.8–34.3)
MCHC RBC AUTO-ENTMCNC: 31.5 G/DL (ref 31.4–37.4)
MCV RBC AUTO: 89 FL (ref 82–98)
MONOCYTES # BLD AUTO: 0.67 THOUSAND/ΜL (ref 0.17–1.22)
MONOCYTES NFR BLD AUTO: 7 % (ref 4–12)
NEUTROPHILS # BLD AUTO: 6.67 THOUSANDS/ΜL (ref 1.85–7.62)
NEUTS SEG NFR BLD AUTO: 68 % (ref 43–75)
NRBC BLD AUTO-RTO: 0 /100 WBCS
PLATELET # BLD AUTO: 457 THOUSANDS/UL (ref 149–390)
PMV BLD AUTO: 9.1 FL (ref 8.9–12.7)
POTASSIUM SERPL-SCNC: 4.1 MMOL/L (ref 3.5–5.3)
RBC # BLD AUTO: 3.76 MILLION/UL (ref 3.88–5.62)
SODIUM SERPL-SCNC: 134 MMOL/L (ref 136–145)
WBC # BLD AUTO: 9.98 THOUSAND/UL (ref 4.31–10.16)

## 2017-07-26 PROCEDURE — G8987 SELF CARE CURRENT STATUS: HCPCS

## 2017-07-26 PROCEDURE — 82948 REAGENT STRIP/BLOOD GLUCOSE: CPT

## 2017-07-26 PROCEDURE — 80048 BASIC METABOLIC PNL TOTAL CA: CPT | Performed by: INTERNAL MEDICINE

## 2017-07-26 PROCEDURE — 85025 COMPLETE CBC W/AUTO DIFF WBC: CPT | Performed by: INTERNAL MEDICINE

## 2017-07-26 PROCEDURE — G8979 MOBILITY GOAL STATUS: HCPCS

## 2017-07-26 PROCEDURE — G8988 SELF CARE GOAL STATUS: HCPCS

## 2017-07-26 PROCEDURE — G8978 MOBILITY CURRENT STATUS: HCPCS

## 2017-07-26 PROCEDURE — 97166 OT EVAL MOD COMPLEX 45 MIN: CPT

## 2017-07-26 PROCEDURE — 97163 PT EVAL HIGH COMPLEX 45 MIN: CPT

## 2017-07-26 RX ADMIN — Medication 1 TABLET: at 09:50

## 2017-07-26 RX ADMIN — ACETAMINOPHEN 975 MG: 325 TABLET, FILM COATED ORAL at 18:13

## 2017-07-26 RX ADMIN — HYDROMORPHONE HYDROCHLORIDE 0.5 MG: 1 INJECTION, SOLUTION INTRAMUSCULAR; INTRAVENOUS; SUBCUTANEOUS at 09:51

## 2017-07-26 RX ADMIN — VANCOMYCIN HYDROCHLORIDE 1750 MG: 1 INJECTION, POWDER, LYOPHILIZED, FOR SOLUTION INTRAVENOUS at 01:53

## 2017-07-26 RX ADMIN — INSULIN LISPRO 1 UNITS: 100 INJECTION, SOLUTION INTRAVENOUS; SUBCUTANEOUS at 18:17

## 2017-07-26 RX ADMIN — HYDROMORPHONE HYDROCHLORIDE 0.5 MG: 1 INJECTION, SOLUTION INTRAMUSCULAR; INTRAVENOUS; SUBCUTANEOUS at 13:20

## 2017-07-26 RX ADMIN — ACETAMINOPHEN 975 MG: 325 TABLET, FILM COATED ORAL at 05:04

## 2017-07-26 RX ADMIN — INSULIN LISPRO 10 UNITS: 100 INJECTION, SOLUTION INTRAVENOUS; SUBCUTANEOUS at 13:21

## 2017-07-26 RX ADMIN — INSULIN GLARGINE 28 UNITS: 100 INJECTION, SOLUTION SUBCUTANEOUS at 22:10

## 2017-07-26 RX ADMIN — HYDROMORPHONE HYDROCHLORIDE 0.5 MG: 1 INJECTION, SOLUTION INTRAMUSCULAR; INTRAVENOUS; SUBCUTANEOUS at 19:33

## 2017-07-26 RX ADMIN — LISINOPRIL 5 MG: 5 TABLET ORAL at 09:50

## 2017-07-26 RX ADMIN — ENOXAPARIN SODIUM 40 MG: 40 INJECTION SUBCUTANEOUS at 09:50

## 2017-07-26 RX ADMIN — HYDROMORPHONE HYDROCHLORIDE 0.5 MG: 1 INJECTION, SOLUTION INTRAMUSCULAR; INTRAVENOUS; SUBCUTANEOUS at 02:00

## 2017-07-26 RX ADMIN — INSULIN LISPRO 2 UNITS: 100 INJECTION, SOLUTION INTRAVENOUS; SUBCUTANEOUS at 13:22

## 2017-07-26 RX ADMIN — INSULIN LISPRO 10 UNITS: 100 INJECTION, SOLUTION INTRAVENOUS; SUBCUTANEOUS at 09:51

## 2017-07-26 RX ADMIN — VANCOMYCIN HYDROCHLORIDE 1750 MG: 1 INJECTION, POWDER, LYOPHILIZED, FOR SOLUTION INTRAVENOUS at 13:24

## 2017-07-26 RX ADMIN — INSULIN LISPRO 10 UNITS: 100 INJECTION, SOLUTION INTRAVENOUS; SUBCUTANEOUS at 18:17

## 2017-07-26 RX ADMIN — ACETAMINOPHEN 975 MG: 325 TABLET, FILM COATED ORAL at 13:21

## 2017-07-26 RX ADMIN — ACETAMINOPHEN 975 MG: 325 TABLET, FILM COATED ORAL at 23:40

## 2017-07-27 ENCOUNTER — GENERIC CONVERSION - ENCOUNTER (OUTPATIENT)
Dept: OTHER | Facility: OTHER | Age: 57
End: 2017-07-27

## 2017-07-27 LAB
ABO GROUP BLD BPU: NORMAL
ABO GROUP BLD BPU: NORMAL
ANION GAP SERPL CALCULATED.3IONS-SCNC: 6 MMOL/L (ref 4–13)
BASOPHILS # BLD AUTO: 0.04 THOUSANDS/ΜL (ref 0–0.1)
BASOPHILS NFR BLD AUTO: 0 % (ref 0–1)
BPU ID: NORMAL
BPU ID: NORMAL
BUN SERPL-MCNC: 9 MG/DL (ref 5–25)
CALCIUM SERPL-MCNC: 8.6 MG/DL (ref 8.3–10.1)
CHLORIDE SERPL-SCNC: 107 MMOL/L (ref 100–108)
CO2 SERPL-SCNC: 24 MMOL/L (ref 21–32)
CREAT SERPL-MCNC: 0.74 MG/DL (ref 0.6–1.3)
CROSSMATCH: NORMAL
CROSSMATCH: NORMAL
EOSINOPHIL # BLD AUTO: 0.22 THOUSAND/ΜL (ref 0–0.61)
EOSINOPHIL NFR BLD AUTO: 2 % (ref 0–6)
ERYTHROCYTE [DISTWIDTH] IN BLOOD BY AUTOMATED COUNT: 14.2 % (ref 11.6–15.1)
GFR SERPL CREATININE-BSD FRML MDRD: 102 ML/MIN/1.73SQ M
GLUCOSE SERPL-MCNC: 126 MG/DL (ref 65–140)
GLUCOSE SERPL-MCNC: 150 MG/DL (ref 65–140)
GLUCOSE SERPL-MCNC: 185 MG/DL (ref 65–140)
GLUCOSE SERPL-MCNC: 208 MG/DL (ref 65–140)
GLUCOSE SERPL-MCNC: 295 MG/DL (ref 65–140)
HCT VFR BLD AUTO: 32.8 % (ref 36.5–49.3)
HGB BLD-MCNC: 10.7 G/DL (ref 12–17)
LYMPHOCYTES # BLD AUTO: 3 THOUSANDS/ΜL (ref 0.6–4.47)
LYMPHOCYTES NFR BLD AUTO: 30 % (ref 14–44)
MCH RBC QN AUTO: 28.2 PG (ref 26.8–34.3)
MCHC RBC AUTO-ENTMCNC: 32.6 G/DL (ref 31.4–37.4)
MCV RBC AUTO: 86 FL (ref 82–98)
MONOCYTES # BLD AUTO: 0.87 THOUSAND/ΜL (ref 0.17–1.22)
MONOCYTES NFR BLD AUTO: 9 % (ref 4–12)
NEUTROPHILS # BLD AUTO: 5.78 THOUSANDS/ΜL (ref 1.85–7.62)
NEUTS SEG NFR BLD AUTO: 59 % (ref 43–75)
NRBC BLD AUTO-RTO: 0 /100 WBCS
PLATELET # BLD AUTO: 526 THOUSANDS/UL (ref 149–390)
PMV BLD AUTO: 8.7 FL (ref 8.9–12.7)
POTASSIUM SERPL-SCNC: 3.7 MMOL/L (ref 3.5–5.3)
RBC # BLD AUTO: 3.8 MILLION/UL (ref 3.88–5.62)
SODIUM SERPL-SCNC: 137 MMOL/L (ref 136–145)
UNIT DISPENSE STATUS: NORMAL
UNIT DISPENSE STATUS: NORMAL
UNIT PRODUCT CODE: NORMAL
UNIT PRODUCT CODE: NORMAL
UNIT RH: NORMAL
UNIT RH: NORMAL
WBC # BLD AUTO: 9.95 THOUSAND/UL (ref 4.31–10.16)

## 2017-07-27 PROCEDURE — 85025 COMPLETE CBC W/AUTO DIFF WBC: CPT | Performed by: INTERNAL MEDICINE

## 2017-07-27 PROCEDURE — 02HV33Z INSERTION OF INFUSION DEVICE INTO SUPERIOR VENA CAVA, PERCUTANEOUS APPROACH: ICD-10-PCS | Performed by: INTERNAL MEDICINE

## 2017-07-27 PROCEDURE — C1751 CATH, INF, PER/CENT/MIDLINE: HCPCS

## 2017-07-27 PROCEDURE — 80048 BASIC METABOLIC PNL TOTAL CA: CPT | Performed by: INTERNAL MEDICINE

## 2017-07-27 PROCEDURE — 97535 SELF CARE MNGMENT TRAINING: CPT

## 2017-07-27 PROCEDURE — 82948 REAGENT STRIP/BLOOD GLUCOSE: CPT

## 2017-07-27 PROCEDURE — 36569 INSJ PICC 5 YR+ W/O IMAGING: CPT

## 2017-07-27 PROCEDURE — 97112 NEUROMUSCULAR REEDUCATION: CPT

## 2017-07-27 RX ORDER — ACETAMINOPHEN 325 MG/1
650 TABLET ORAL EVERY 6 HOURS SCHEDULED
Status: DISCONTINUED | OUTPATIENT
Start: 2017-07-27 | End: 2017-07-29 | Stop reason: HOSPADM

## 2017-07-27 RX ORDER — OXYCODONE HYDROCHLORIDE AND ACETAMINOPHEN 5; 325 MG/1; MG/1
1 TABLET ORAL EVERY 8 HOURS PRN
Status: DISCONTINUED | OUTPATIENT
Start: 2017-07-27 | End: 2017-07-29 | Stop reason: HOSPADM

## 2017-07-27 RX ADMIN — INSULIN GLARGINE 28 UNITS: 100 INJECTION, SOLUTION SUBCUTANEOUS at 22:47

## 2017-07-27 RX ADMIN — VANCOMYCIN HYDROCHLORIDE 1750 MG: 1 INJECTION, POWDER, LYOPHILIZED, FOR SOLUTION INTRAVENOUS at 01:26

## 2017-07-27 RX ADMIN — ENOXAPARIN SODIUM 40 MG: 40 INJECTION SUBCUTANEOUS at 08:32

## 2017-07-27 RX ADMIN — OXYCODONE HYDROCHLORIDE AND ACETAMINOPHEN 1 TABLET: 5; 325 TABLET ORAL at 15:50

## 2017-07-27 RX ADMIN — INSULIN LISPRO 1 UNITS: 100 INJECTION, SOLUTION INTRAVENOUS; SUBCUTANEOUS at 13:14

## 2017-07-27 RX ADMIN — ACETAMINOPHEN 650 MG: 325 TABLET, FILM COATED ORAL at 20:42

## 2017-07-27 RX ADMIN — HYDROMORPHONE HYDROCHLORIDE 0.5 MG: 1 INJECTION, SOLUTION INTRAMUSCULAR; INTRAVENOUS; SUBCUTANEOUS at 01:27

## 2017-07-27 RX ADMIN — ACETAMINOPHEN 975 MG: 325 TABLET, FILM COATED ORAL at 05:28

## 2017-07-27 RX ADMIN — INSULIN LISPRO 3 UNITS: 100 INJECTION, SOLUTION INTRAVENOUS; SUBCUTANEOUS at 22:47

## 2017-07-27 RX ADMIN — INSULIN LISPRO 10 UNITS: 100 INJECTION, SOLUTION INTRAVENOUS; SUBCUTANEOUS at 08:32

## 2017-07-27 RX ADMIN — INSULIN LISPRO 1 UNITS: 100 INJECTION, SOLUTION INTRAVENOUS; SUBCUTANEOUS at 08:32

## 2017-07-27 RX ADMIN — LISINOPRIL 5 MG: 5 TABLET ORAL at 08:33

## 2017-07-27 RX ADMIN — Medication 1 TABLET: at 08:33

## 2017-07-27 RX ADMIN — VANCOMYCIN HYDROCHLORIDE 1750 MG: 1 INJECTION, POWDER, LYOPHILIZED, FOR SOLUTION INTRAVENOUS at 14:58

## 2017-07-27 RX ADMIN — INSULIN LISPRO 10 UNITS: 100 INJECTION, SOLUTION INTRAVENOUS; SUBCUTANEOUS at 13:14

## 2017-07-28 LAB
ANION GAP SERPL CALCULATED.3IONS-SCNC: 8 MMOL/L (ref 4–13)
BASOPHILS # BLD AUTO: 0.06 THOUSANDS/ΜL (ref 0–0.1)
BASOPHILS NFR BLD AUTO: 1 % (ref 0–1)
BUN SERPL-MCNC: 8 MG/DL (ref 5–25)
CALCIUM SERPL-MCNC: 8.6 MG/DL (ref 8.3–10.1)
CHLORIDE SERPL-SCNC: 106 MMOL/L (ref 100–108)
CO2 SERPL-SCNC: 26 MMOL/L (ref 21–32)
CREAT SERPL-MCNC: 0.78 MG/DL (ref 0.6–1.3)
EOSINOPHIL # BLD AUTO: 0.31 THOUSAND/ΜL (ref 0–0.61)
EOSINOPHIL NFR BLD AUTO: 3 % (ref 0–6)
ERYTHROCYTE [DISTWIDTH] IN BLOOD BY AUTOMATED COUNT: 14.1 % (ref 11.6–15.1)
GFR SERPL CREATININE-BSD FRML MDRD: 99 ML/MIN/1.73SQ M
GLUCOSE SERPL-MCNC: 139 MG/DL (ref 65–140)
GLUCOSE SERPL-MCNC: 151 MG/DL (ref 65–140)
GLUCOSE SERPL-MCNC: 153 MG/DL (ref 65–140)
GLUCOSE SERPL-MCNC: 193 MG/DL (ref 65–140)
GLUCOSE SERPL-MCNC: 85 MG/DL (ref 65–140)
HCT VFR BLD AUTO: 32.4 % (ref 36.5–49.3)
HGB BLD-MCNC: 10.5 G/DL (ref 12–17)
LYMPHOCYTES # BLD AUTO: 3.41 THOUSANDS/ΜL (ref 0.6–4.47)
LYMPHOCYTES NFR BLD AUTO: 37 % (ref 14–44)
MCH RBC QN AUTO: 28.3 PG (ref 26.8–34.3)
MCHC RBC AUTO-ENTMCNC: 32.4 G/DL (ref 31.4–37.4)
MCV RBC AUTO: 87 FL (ref 82–98)
MONOCYTES # BLD AUTO: 0.63 THOUSAND/ΜL (ref 0.17–1.22)
MONOCYTES NFR BLD AUTO: 7 % (ref 4–12)
NEUTROPHILS # BLD AUTO: 4.71 THOUSANDS/ΜL (ref 1.85–7.62)
NEUTS SEG NFR BLD AUTO: 52 % (ref 43–75)
NRBC BLD AUTO-RTO: 0 /100 WBCS
PLATELET # BLD AUTO: 527 THOUSANDS/UL (ref 149–390)
PMV BLD AUTO: 8.7 FL (ref 8.9–12.7)
POTASSIUM SERPL-SCNC: 3.6 MMOL/L (ref 3.5–5.3)
RBC # BLD AUTO: 3.71 MILLION/UL (ref 3.88–5.62)
SODIUM SERPL-SCNC: 140 MMOL/L (ref 136–145)
WBC # BLD AUTO: 9.18 THOUSAND/UL (ref 4.31–10.16)

## 2017-07-28 PROCEDURE — 82948 REAGENT STRIP/BLOOD GLUCOSE: CPT

## 2017-07-28 PROCEDURE — 97530 THERAPEUTIC ACTIVITIES: CPT

## 2017-07-28 PROCEDURE — 97116 GAIT TRAINING THERAPY: CPT

## 2017-07-28 PROCEDURE — 85025 COMPLETE CBC W/AUTO DIFF WBC: CPT | Performed by: INTERNAL MEDICINE

## 2017-07-28 PROCEDURE — 97112 NEUROMUSCULAR REEDUCATION: CPT

## 2017-07-28 PROCEDURE — 80048 BASIC METABOLIC PNL TOTAL CA: CPT | Performed by: INTERNAL MEDICINE

## 2017-07-28 RX ADMIN — OXYCODONE HYDROCHLORIDE AND ACETAMINOPHEN 1 TABLET: 5; 325 TABLET ORAL at 22:48

## 2017-07-28 RX ADMIN — INSULIN LISPRO 1 UNITS: 100 INJECTION, SOLUTION INTRAVENOUS; SUBCUTANEOUS at 08:14

## 2017-07-28 RX ADMIN — VANCOMYCIN HYDROCHLORIDE 1750 MG: 1 INJECTION, POWDER, LYOPHILIZED, FOR SOLUTION INTRAVENOUS at 14:25

## 2017-07-28 RX ADMIN — ACETAMINOPHEN 650 MG: 325 TABLET, FILM COATED ORAL at 12:30

## 2017-07-28 RX ADMIN — VANCOMYCIN HYDROCHLORIDE 1750 MG: 1 INJECTION, POWDER, LYOPHILIZED, FOR SOLUTION INTRAVENOUS at 03:01

## 2017-07-28 RX ADMIN — OXYCODONE HYDROCHLORIDE AND ACETAMINOPHEN 1 TABLET: 5; 325 TABLET ORAL at 08:13

## 2017-07-28 RX ADMIN — LISINOPRIL 5 MG: 5 TABLET ORAL at 08:13

## 2017-07-28 RX ADMIN — ACETAMINOPHEN 650 MG: 325 TABLET, FILM COATED ORAL at 17:35

## 2017-07-28 RX ADMIN — Medication 1 TABLET: at 08:13

## 2017-07-28 RX ADMIN — ENOXAPARIN SODIUM 40 MG: 40 INJECTION SUBCUTANEOUS at 08:14

## 2017-07-28 RX ADMIN — INSULIN LISPRO 10 UNITS: 100 INJECTION, SOLUTION INTRAVENOUS; SUBCUTANEOUS at 12:30

## 2017-07-28 RX ADMIN — INSULIN LISPRO 10 UNITS: 100 INJECTION, SOLUTION INTRAVENOUS; SUBCUTANEOUS at 18:50

## 2017-07-28 RX ADMIN — ACETAMINOPHEN 650 MG: 325 TABLET, FILM COATED ORAL at 05:18

## 2017-07-28 RX ADMIN — INSULIN LISPRO 10 UNITS: 100 INJECTION, SOLUTION INTRAVENOUS; SUBCUTANEOUS at 08:14

## 2017-07-28 RX ADMIN — INSULIN LISPRO 1 UNITS: 100 INJECTION, SOLUTION INTRAVENOUS; SUBCUTANEOUS at 12:30

## 2017-07-28 RX ADMIN — INSULIN GLARGINE 28 UNITS: 100 INJECTION, SOLUTION SUBCUTANEOUS at 22:48

## 2017-07-28 RX ADMIN — OXYCODONE HYDROCHLORIDE AND ACETAMINOPHEN 1 TABLET: 5; 325 TABLET ORAL at 00:35

## 2017-07-29 ENCOUNTER — HOSPITAL ENCOUNTER (INPATIENT)
Facility: HOSPITAL | Age: 57
LOS: 12 days | Discharge: HOME HEALTH | DRG: 559 | End: 2017-08-10
Attending: PHYSICAL MEDICINE & REHABILITATION | Admitting: PHYSICAL MEDICINE & REHABILITATION
Payer: COMMERCIAL

## 2017-07-29 VITALS
TEMPERATURE: 97.8 F | HEART RATE: 63 BPM | DIASTOLIC BLOOD PRESSURE: 64 MMHG | WEIGHT: 217.5 LBS | HEIGHT: 69 IN | SYSTOLIC BLOOD PRESSURE: 129 MMHG | RESPIRATION RATE: 18 BRPM | BODY MASS INDEX: 32.22 KG/M2 | OXYGEN SATURATION: 97 %

## 2017-07-29 DIAGNOSIS — R65.20 SEVERE SEPSIS (HCC): Primary | ICD-10-CM

## 2017-07-29 DIAGNOSIS — A41.9 SEVERE SEPSIS (HCC): Primary | ICD-10-CM

## 2017-07-29 DIAGNOSIS — E11.65 TYPE 2 DIABETES MELLITUS WITH HYPERGLYCEMIA, UNSPECIFIED LONG TERM INSULIN USE STATUS: ICD-10-CM

## 2017-07-29 DIAGNOSIS — Z89.511 HX OF RIGHT BKA (HCC): ICD-10-CM

## 2017-07-29 LAB
BACTERIA BLD CULT: NORMAL
BACTERIA BLD CULT: NORMAL
GLUCOSE SERPL-MCNC: 108 MG/DL (ref 65–140)
GLUCOSE SERPL-MCNC: 131 MG/DL (ref 65–140)
GLUCOSE SERPL-MCNC: 168 MG/DL (ref 65–140)
GLUCOSE SERPL-MCNC: 168 MG/DL (ref 65–140)

## 2017-07-29 PROCEDURE — 82948 REAGENT STRIP/BLOOD GLUCOSE: CPT

## 2017-07-29 RX ORDER — ASPIRIN 81 MG/1
81 TABLET ORAL DAILY
COMMUNITY

## 2017-07-29 RX ORDER — MULTIVIT-MIN/IRON FUM/FOLIC AC 7.5 MG-4
1 TABLET ORAL DAILY
Status: DISCONTINUED | OUTPATIENT
Start: 2017-07-29 | End: 2017-07-29

## 2017-07-29 RX ORDER — LISINOPRIL 5 MG/1
5 TABLET ORAL DAILY
Status: DISCONTINUED | OUTPATIENT
Start: 2017-07-30 | End: 2017-08-10 | Stop reason: HOSPADM

## 2017-07-29 RX ORDER — INSULIN GLARGINE 100 [IU]/ML
20 INJECTION, SOLUTION SUBCUTANEOUS
COMMUNITY
End: 2017-08-10 | Stop reason: HOSPADM

## 2017-07-29 RX ORDER — ACETAMINOPHEN 325 MG/1
650 TABLET ORAL EVERY 6 HOURS SCHEDULED
Status: DISCONTINUED | OUTPATIENT
Start: 2017-07-29 | End: 2017-08-10

## 2017-07-29 RX ORDER — OXYCODONE HYDROCHLORIDE AND ACETAMINOPHEN 5; 325 MG/1; MG/1
1 TABLET ORAL EVERY 8 HOURS PRN
Status: DISCONTINUED | OUTPATIENT
Start: 2017-07-29 | End: 2017-08-10 | Stop reason: HOSPADM

## 2017-07-29 RX ORDER — INSULIN GLARGINE 100 [IU]/ML
28 INJECTION, SOLUTION SUBCUTANEOUS
Status: DISCONTINUED | OUTPATIENT
Start: 2017-07-29 | End: 2017-07-31

## 2017-07-29 RX ORDER — ATORVASTATIN CALCIUM 10 MG/1
TABLET, FILM COATED ORAL DAILY
Status: ON HOLD | COMMUNITY
End: 2017-08-10 | Stop reason: CLARIF

## 2017-07-29 RX ORDER — INSULIN GLARGINE 100 [IU]/ML
25 INJECTION, SOLUTION SUBCUTANEOUS
Status: DISCONTINUED | OUTPATIENT
Start: 2017-07-29 | End: 2017-07-29

## 2017-07-29 RX ADMIN — ACETAMINOPHEN 650 MG: 325 TABLET, FILM COATED ORAL at 00:10

## 2017-07-29 RX ADMIN — INSULIN LISPRO 1 UNITS: 100 INJECTION, SOLUTION INTRAVENOUS; SUBCUTANEOUS at 21:51

## 2017-07-29 RX ADMIN — VANCOMYCIN HYDROCHLORIDE 1750 MG: 1 INJECTION, POWDER, LYOPHILIZED, FOR SOLUTION INTRAVENOUS at 13:20

## 2017-07-29 RX ADMIN — Medication 1 TABLET: at 08:50

## 2017-07-29 RX ADMIN — ACETAMINOPHEN 650 MG: 325 TABLET, FILM COATED ORAL at 20:48

## 2017-07-29 RX ADMIN — LISINOPRIL 5 MG: 5 TABLET ORAL at 08:50

## 2017-07-29 RX ADMIN — INSULIN GLARGINE 28 UNITS: 100 INJECTION, SOLUTION SUBCUTANEOUS at 21:51

## 2017-07-29 RX ADMIN — ACETAMINOPHEN 650 MG: 325 TABLET, FILM COATED ORAL at 12:58

## 2017-07-29 RX ADMIN — ENOXAPARIN SODIUM 40 MG: 40 INJECTION SUBCUTANEOUS at 08:50

## 2017-07-29 RX ADMIN — VANCOMYCIN HYDROCHLORIDE 1750 MG: 1 INJECTION, POWDER, LYOPHILIZED, FOR SOLUTION INTRAVENOUS at 02:44

## 2017-07-29 RX ADMIN — INSULIN LISPRO 10 UNITS: 100 INJECTION, SOLUTION INTRAVENOUS; SUBCUTANEOUS at 12:58

## 2017-07-29 RX ADMIN — INSULIN LISPRO 10 UNITS: 100 INJECTION, SOLUTION INTRAVENOUS; SUBCUTANEOUS at 16:55

## 2017-07-29 RX ADMIN — ACETAMINOPHEN 650 MG: 325 TABLET, FILM COATED ORAL at 05:30

## 2017-07-29 RX ADMIN — INSULIN LISPRO 10 UNITS: 100 INJECTION, SOLUTION INTRAVENOUS; SUBCUTANEOUS at 08:52

## 2017-07-30 LAB
ANION GAP SERPL CALCULATED.3IONS-SCNC: 8 MMOL/L (ref 4–13)
BASOPHILS # BLD AUTO: 0.07 THOUSANDS/ΜL (ref 0–0.1)
BASOPHILS NFR BLD AUTO: 1 % (ref 0–1)
BUN SERPL-MCNC: 11 MG/DL (ref 5–25)
CALCIUM SERPL-MCNC: 8.9 MG/DL (ref 8.3–10.1)
CHLORIDE SERPL-SCNC: 106 MMOL/L (ref 100–108)
CO2 SERPL-SCNC: 27 MMOL/L (ref 21–32)
CREAT SERPL-MCNC: 0.75 MG/DL (ref 0.6–1.3)
EOSINOPHIL # BLD AUTO: 0.37 THOUSAND/ΜL (ref 0–0.61)
EOSINOPHIL NFR BLD AUTO: 4 % (ref 0–6)
ERYTHROCYTE [DISTWIDTH] IN BLOOD BY AUTOMATED COUNT: 14 % (ref 11.6–15.1)
GFR SERPL CREATININE-BSD FRML MDRD: 101 ML/MIN/1.73SQ M
GLUCOSE P FAST SERPL-MCNC: 80 MG/DL (ref 65–99)
GLUCOSE SERPL-MCNC: 119 MG/DL (ref 65–140)
GLUCOSE SERPL-MCNC: 229 MG/DL (ref 65–140)
GLUCOSE SERPL-MCNC: 80 MG/DL (ref 65–140)
GLUCOSE SERPL-MCNC: 82 MG/DL (ref 65–140)
GLUCOSE SERPL-MCNC: 90 MG/DL (ref 65–140)
HCT VFR BLD AUTO: 34.8 % (ref 36.5–49.3)
HGB BLD-MCNC: 11.3 G/DL (ref 12–17)
LYMPHOCYTES # BLD AUTO: 3.32 THOUSANDS/ΜL (ref 0.6–4.47)
LYMPHOCYTES NFR BLD AUTO: 37 % (ref 14–44)
MCH RBC QN AUTO: 28.3 PG (ref 26.8–34.3)
MCHC RBC AUTO-ENTMCNC: 32.5 G/DL (ref 31.4–37.4)
MCV RBC AUTO: 87 FL (ref 82–98)
MONOCYTES # BLD AUTO: 0.62 THOUSAND/ΜL (ref 0.17–1.22)
MONOCYTES NFR BLD AUTO: 7 % (ref 4–12)
NEUTROPHILS # BLD AUTO: 4.66 THOUSANDS/ΜL (ref 1.85–7.62)
NEUTS SEG NFR BLD AUTO: 51 % (ref 43–75)
NRBC BLD AUTO-RTO: 0 /100 WBCS
PLATELET # BLD AUTO: 560 THOUSANDS/UL (ref 149–390)
PMV BLD AUTO: 8.4 FL (ref 8.9–12.7)
POTASSIUM SERPL-SCNC: 3.9 MMOL/L (ref 3.5–5.3)
RBC # BLD AUTO: 4 MILLION/UL (ref 3.88–5.62)
SODIUM SERPL-SCNC: 141 MMOL/L (ref 136–145)
WBC # BLD AUTO: 9.07 THOUSAND/UL (ref 4.31–10.16)

## 2017-07-30 PROCEDURE — 97116 GAIT TRAINING THERAPY: CPT

## 2017-07-30 PROCEDURE — 97110 THERAPEUTIC EXERCISES: CPT

## 2017-07-30 PROCEDURE — 97166 OT EVAL MOD COMPLEX 45 MIN: CPT

## 2017-07-30 PROCEDURE — 84075 ASSAY ALKALINE PHOSPHATASE: CPT | Performed by: PHYSICAL MEDICINE & REHABILITATION

## 2017-07-30 PROCEDURE — 97535 SELF CARE MNGMENT TRAINING: CPT

## 2017-07-30 PROCEDURE — 80048 BASIC METABOLIC PNL TOTAL CA: CPT | Performed by: PHYSICAL MEDICINE & REHABILITATION

## 2017-07-30 PROCEDURE — 97530 THERAPEUTIC ACTIVITIES: CPT

## 2017-07-30 PROCEDURE — 82948 REAGENT STRIP/BLOOD GLUCOSE: CPT

## 2017-07-30 PROCEDURE — 97162 PT EVAL MOD COMPLEX 30 MIN: CPT

## 2017-07-30 PROCEDURE — 85025 COMPLETE CBC W/AUTO DIFF WBC: CPT | Performed by: PHYSICAL MEDICINE & REHABILITATION

## 2017-07-30 RX ADMIN — INSULIN GLARGINE 28 UNITS: 100 INJECTION, SOLUTION SUBCUTANEOUS at 21:42

## 2017-07-30 RX ADMIN — VANCOMYCIN HYDROCHLORIDE 1750 MG: 1 INJECTION, POWDER, LYOPHILIZED, FOR SOLUTION INTRAVENOUS at 21:42

## 2017-07-30 RX ADMIN — ACETAMINOPHEN 650 MG: 325 TABLET, FILM COATED ORAL at 17:32

## 2017-07-30 RX ADMIN — VANCOMYCIN HYDROCHLORIDE 1750 MG: 1 INJECTION, POWDER, LYOPHILIZED, FOR SOLUTION INTRAVENOUS at 00:30

## 2017-07-30 RX ADMIN — INSULIN LISPRO 10 UNITS: 100 INJECTION, SOLUTION INTRAVENOUS; SUBCUTANEOUS at 12:09

## 2017-07-30 RX ADMIN — ACETAMINOPHEN 650 MG: 325 TABLET, FILM COATED ORAL at 23:30

## 2017-07-30 RX ADMIN — LISINOPRIL 5 MG: 5 TABLET ORAL at 09:00

## 2017-07-30 RX ADMIN — INSULIN LISPRO 10 UNITS: 100 INJECTION, SOLUTION INTRAVENOUS; SUBCUTANEOUS at 09:11

## 2017-07-30 RX ADMIN — VANCOMYCIN HYDROCHLORIDE 1750 MG: 1 INJECTION, POWDER, LYOPHILIZED, FOR SOLUTION INTRAVENOUS at 12:16

## 2017-07-30 RX ADMIN — ENOXAPARIN SODIUM 40 MG: 40 INJECTION SUBCUTANEOUS at 09:00

## 2017-07-30 RX ADMIN — INSULIN LISPRO 10 UNITS: 100 INJECTION, SOLUTION INTRAVENOUS; SUBCUTANEOUS at 16:43

## 2017-07-30 RX ADMIN — OXYCODONE HYDROCHLORIDE AND ACETAMINOPHEN 1 TABLET: 5; 325 TABLET ORAL at 08:53

## 2017-07-30 RX ADMIN — Medication 1 TABLET: at 09:00

## 2017-07-30 RX ADMIN — ACETAMINOPHEN 650 MG: 325 TABLET, FILM COATED ORAL at 06:02

## 2017-07-30 RX ADMIN — ACETAMINOPHEN 650 MG: 325 TABLET, FILM COATED ORAL at 12:15

## 2017-07-30 RX ADMIN — INSULIN LISPRO 2 UNITS: 100 INJECTION, SOLUTION INTRAVENOUS; SUBCUTANEOUS at 21:42

## 2017-07-31 PROBLEM — Z89.511 HX OF RIGHT BKA (HCC): Status: ACTIVE | Noted: 2017-07-31

## 2017-07-31 LAB
ALP SERPL-CCNC: 100 U/L (ref 46–116)
GLUCOSE SERPL-MCNC: 136 MG/DL (ref 65–140)
GLUCOSE SERPL-MCNC: 150 MG/DL (ref 65–140)
GLUCOSE SERPL-MCNC: 171 MG/DL (ref 65–140)
GLUCOSE SERPL-MCNC: 54 MG/DL (ref 65–140)
VANCOMYCIN TROUGH SERPL-MCNC: 25.8 UG/ML (ref 10–20)

## 2017-07-31 PROCEDURE — 82948 REAGENT STRIP/BLOOD GLUCOSE: CPT

## 2017-07-31 PROCEDURE — 97535 SELF CARE MNGMENT TRAINING: CPT

## 2017-07-31 PROCEDURE — 97530 THERAPEUTIC ACTIVITIES: CPT

## 2017-07-31 PROCEDURE — 97112 NEUROMUSCULAR REEDUCATION: CPT

## 2017-07-31 PROCEDURE — 97110 THERAPEUTIC EXERCISES: CPT

## 2017-07-31 PROCEDURE — 80202 ASSAY OF VANCOMYCIN: CPT | Performed by: INTERNAL MEDICINE

## 2017-07-31 PROCEDURE — 97116 GAIT TRAINING THERAPY: CPT

## 2017-07-31 RX ORDER — INSULIN GLARGINE 100 [IU]/ML
20 INJECTION, SOLUTION SUBCUTANEOUS
Status: DISCONTINUED | OUTPATIENT
Start: 2017-07-31 | End: 2017-08-01

## 2017-07-31 RX ADMIN — INSULIN LISPRO 10 UNITS: 100 INJECTION, SOLUTION INTRAVENOUS; SUBCUTANEOUS at 16:56

## 2017-07-31 RX ADMIN — ACETAMINOPHEN 650 MG: 325 TABLET, FILM COATED ORAL at 23:46

## 2017-07-31 RX ADMIN — METFORMIN HYDROCHLORIDE 500 MG: 500 TABLET, FILM COATED ORAL at 16:55

## 2017-07-31 RX ADMIN — OXYCODONE HYDROCHLORIDE AND ACETAMINOPHEN 1 TABLET: 5; 325 TABLET ORAL at 22:40

## 2017-07-31 RX ADMIN — INSULIN LISPRO 10 UNITS: 100 INJECTION, SOLUTION INTRAVENOUS; SUBCUTANEOUS at 08:29

## 2017-07-31 RX ADMIN — LISINOPRIL 5 MG: 5 TABLET ORAL at 08:30

## 2017-07-31 RX ADMIN — ENOXAPARIN SODIUM 40 MG: 40 INJECTION SUBCUTANEOUS at 08:30

## 2017-07-31 RX ADMIN — Medication 1 TABLET: at 08:30

## 2017-07-31 RX ADMIN — INSULIN LISPRO 1 UNITS: 100 INJECTION, SOLUTION INTRAVENOUS; SUBCUTANEOUS at 16:56

## 2017-07-31 RX ADMIN — ACETAMINOPHEN 650 MG: 325 TABLET, FILM COATED ORAL at 05:59

## 2017-07-31 RX ADMIN — ACETAMINOPHEN 650 MG: 325 TABLET, FILM COATED ORAL at 18:37

## 2017-07-31 RX ADMIN — INSULIN LISPRO 1 UNITS: 100 INJECTION, SOLUTION INTRAVENOUS; SUBCUTANEOUS at 21:31

## 2017-07-31 RX ADMIN — ACETAMINOPHEN 650 MG: 325 TABLET, FILM COATED ORAL at 11:29

## 2017-07-31 RX ADMIN — VANCOMYCIN HYDROCHLORIDE 1750 MG: 1 INJECTION, POWDER, LYOPHILIZED, FOR SOLUTION INTRAVENOUS at 10:19

## 2017-07-31 RX ADMIN — INSULIN GLARGINE 20 UNITS: 100 INJECTION, SOLUTION SUBCUTANEOUS at 21:30

## 2017-08-01 LAB
ALP SERPL-CCNC: 99 U/L (ref 46–116)
ANION GAP SERPL CALCULATED.3IONS-SCNC: 6 MMOL/L (ref 4–13)
BASOPHILS # BLD AUTO: 0.12 THOUSANDS/ΜL (ref 0–0.1)
BASOPHILS NFR BLD AUTO: 1 % (ref 0–1)
BUN SERPL-MCNC: 16 MG/DL (ref 5–25)
CALCIUM SERPL-MCNC: 9 MG/DL (ref 8.3–10.1)
CHLORIDE SERPL-SCNC: 106 MMOL/L (ref 100–108)
CO2 SERPL-SCNC: 28 MMOL/L (ref 21–32)
CREAT SERPL-MCNC: 0.9 MG/DL (ref 0.6–1.3)
EOSINOPHIL # BLD AUTO: 0.37 THOUSAND/ΜL (ref 0–0.61)
EOSINOPHIL NFR BLD AUTO: 4 % (ref 0–6)
ERYTHROCYTE [DISTWIDTH] IN BLOOD BY AUTOMATED COUNT: 14.1 % (ref 11.6–15.1)
GFR SERPL CREATININE-BSD FRML MDRD: 94 ML/MIN/1.73SQ M
GLUCOSE P FAST SERPL-MCNC: 69 MG/DL (ref 65–99)
GLUCOSE SERPL-MCNC: 163 MG/DL (ref 65–140)
GLUCOSE SERPL-MCNC: 188 MG/DL (ref 65–140)
GLUCOSE SERPL-MCNC: 69 MG/DL (ref 65–140)
GLUCOSE SERPL-MCNC: 81 MG/DL (ref 65–140)
GLUCOSE SERPL-MCNC: 85 MG/DL (ref 65–140)
HCT VFR BLD AUTO: 35 % (ref 36.5–49.3)
HGB BLD-MCNC: 11.2 G/DL (ref 12–17)
LYMPHOCYTES # BLD AUTO: 3.68 THOUSANDS/ΜL (ref 0.6–4.47)
LYMPHOCYTES NFR BLD AUTO: 40 % (ref 14–44)
MCH RBC QN AUTO: 28 PG (ref 26.8–34.3)
MCHC RBC AUTO-ENTMCNC: 32 G/DL (ref 31.4–37.4)
MCV RBC AUTO: 88 FL (ref 82–98)
MONOCYTES # BLD AUTO: 0.69 THOUSAND/ΜL (ref 0.17–1.22)
MONOCYTES NFR BLD AUTO: 8 % (ref 4–12)
NEUTROPHILS # BLD AUTO: 4.33 THOUSANDS/ΜL (ref 1.85–7.62)
NEUTS SEG NFR BLD AUTO: 47 % (ref 43–75)
NRBC BLD AUTO-RTO: 0 /100 WBCS
PLATELET # BLD AUTO: 495 THOUSANDS/UL (ref 149–390)
PMV BLD AUTO: 8.3 FL (ref 8.9–12.7)
POTASSIUM SERPL-SCNC: 3.9 MMOL/L (ref 3.5–5.3)
RBC # BLD AUTO: 4 MILLION/UL (ref 3.88–5.62)
SODIUM SERPL-SCNC: 140 MMOL/L (ref 136–145)
WBC # BLD AUTO: 9.26 THOUSAND/UL (ref 4.31–10.16)

## 2017-08-01 PROCEDURE — 84075 ASSAY ALKALINE PHOSPHATASE: CPT | Performed by: PHYSICAL MEDICINE & REHABILITATION

## 2017-08-01 PROCEDURE — 80048 BASIC METABOLIC PNL TOTAL CA: CPT | Performed by: INTERNAL MEDICINE

## 2017-08-01 PROCEDURE — 97530 THERAPEUTIC ACTIVITIES: CPT | Performed by: OCCUPATIONAL THERAPY ASSISTANT

## 2017-08-01 PROCEDURE — 97116 GAIT TRAINING THERAPY: CPT | Performed by: PHYSICAL THERAPIST

## 2017-08-01 PROCEDURE — 97110 THERAPEUTIC EXERCISES: CPT | Performed by: PHYSICAL THERAPIST

## 2017-08-01 PROCEDURE — 82948 REAGENT STRIP/BLOOD GLUCOSE: CPT

## 2017-08-01 PROCEDURE — 97110 THERAPEUTIC EXERCISES: CPT | Performed by: OCCUPATIONAL THERAPY ASSISTANT

## 2017-08-01 PROCEDURE — 97530 THERAPEUTIC ACTIVITIES: CPT | Performed by: PHYSICAL THERAPIST

## 2017-08-01 PROCEDURE — 85025 COMPLETE CBC W/AUTO DIFF WBC: CPT | Performed by: INTERNAL MEDICINE

## 2017-08-01 PROCEDURE — 97112 NEUROMUSCULAR REEDUCATION: CPT | Performed by: PHYSICAL THERAPIST

## 2017-08-01 RX ORDER — INSULIN GLARGINE 100 [IU]/ML
15 INJECTION, SOLUTION SUBCUTANEOUS
Status: DISCONTINUED | OUTPATIENT
Start: 2017-08-01 | End: 2017-08-04

## 2017-08-01 RX ADMIN — ACETAMINOPHEN 650 MG: 325 TABLET, FILM COATED ORAL at 17:40

## 2017-08-01 RX ADMIN — OXYCODONE HYDROCHLORIDE AND ACETAMINOPHEN 1 TABLET: 5; 325 TABLET ORAL at 21:12

## 2017-08-01 RX ADMIN — INSULIN GLARGINE 15 UNITS: 100 INJECTION, SOLUTION SUBCUTANEOUS at 22:12

## 2017-08-01 RX ADMIN — ACETAMINOPHEN 650 MG: 325 TABLET, FILM COATED ORAL at 23:22

## 2017-08-01 RX ADMIN — VANCOMYCIN HYDROCHLORIDE 1250 MG: 1 INJECTION, POWDER, LYOPHILIZED, FOR SOLUTION INTRAVENOUS at 21:11

## 2017-08-01 RX ADMIN — METFORMIN HYDROCHLORIDE 500 MG: 500 TABLET, FILM COATED ORAL at 17:40

## 2017-08-01 RX ADMIN — ACETAMINOPHEN 650 MG: 325 TABLET, FILM COATED ORAL at 05:27

## 2017-08-01 RX ADMIN — OXYCODONE HYDROCHLORIDE AND ACETAMINOPHEN 1 TABLET: 5; 325 TABLET ORAL at 06:22

## 2017-08-01 RX ADMIN — ACETAMINOPHEN 650 MG: 325 TABLET, FILM COATED ORAL at 12:07

## 2017-08-01 RX ADMIN — METFORMIN HYDROCHLORIDE 500 MG: 500 TABLET, FILM COATED ORAL at 08:31

## 2017-08-01 RX ADMIN — INSULIN LISPRO 1 UNITS: 100 INJECTION, SOLUTION INTRAVENOUS; SUBCUTANEOUS at 12:07

## 2017-08-01 RX ADMIN — Medication 1 TABLET: at 08:31

## 2017-08-01 RX ADMIN — VANCOMYCIN HYDROCHLORIDE 1250 MG: 1 INJECTION, POWDER, LYOPHILIZED, FOR SOLUTION INTRAVENOUS at 10:53

## 2017-08-01 RX ADMIN — INSULIN LISPRO 1 UNITS: 100 INJECTION, SOLUTION INTRAVENOUS; SUBCUTANEOUS at 22:13

## 2017-08-01 RX ADMIN — ENOXAPARIN SODIUM 40 MG: 40 INJECTION SUBCUTANEOUS at 08:32

## 2017-08-01 RX ADMIN — LISINOPRIL 5 MG: 5 TABLET ORAL at 08:32

## 2017-08-02 LAB
GLUCOSE SERPL-MCNC: 103 MG/DL (ref 65–140)
GLUCOSE SERPL-MCNC: 120 MG/DL (ref 65–140)
GLUCOSE SERPL-MCNC: 131 MG/DL (ref 65–140)
GLUCOSE SERPL-MCNC: 99 MG/DL (ref 65–140)

## 2017-08-02 PROCEDURE — 82948 REAGENT STRIP/BLOOD GLUCOSE: CPT

## 2017-08-02 PROCEDURE — 97535 SELF CARE MNGMENT TRAINING: CPT | Performed by: OCCUPATIONAL THERAPY ASSISTANT

## 2017-08-02 PROCEDURE — 97116 GAIT TRAINING THERAPY: CPT

## 2017-08-02 PROCEDURE — 97110 THERAPEUTIC EXERCISES: CPT

## 2017-08-02 PROCEDURE — 97112 NEUROMUSCULAR REEDUCATION: CPT

## 2017-08-02 PROCEDURE — 97530 THERAPEUTIC ACTIVITIES: CPT | Performed by: OCCUPATIONAL THERAPY ASSISTANT

## 2017-08-02 PROCEDURE — 97110 THERAPEUTIC EXERCISES: CPT | Performed by: OCCUPATIONAL THERAPY ASSISTANT

## 2017-08-02 RX ADMIN — VANCOMYCIN HYDROCHLORIDE 1250 MG: 1 INJECTION, POWDER, LYOPHILIZED, FOR SOLUTION INTRAVENOUS at 09:24

## 2017-08-02 RX ADMIN — ACETAMINOPHEN 650 MG: 325 TABLET, FILM COATED ORAL at 11:26

## 2017-08-02 RX ADMIN — Medication 1 TABLET: at 08:37

## 2017-08-02 RX ADMIN — LISINOPRIL 5 MG: 5 TABLET ORAL at 08:38

## 2017-08-02 RX ADMIN — ACETAMINOPHEN 650 MG: 325 TABLET, FILM COATED ORAL at 05:25

## 2017-08-02 RX ADMIN — OXYCODONE HYDROCHLORIDE AND ACETAMINOPHEN 1 TABLET: 5; 325 TABLET ORAL at 16:01

## 2017-08-02 RX ADMIN — VANCOMYCIN HYDROCHLORIDE 1250 MG: 1 INJECTION, POWDER, LYOPHILIZED, FOR SOLUTION INTRAVENOUS at 22:06

## 2017-08-02 RX ADMIN — ACETAMINOPHEN 650 MG: 325 TABLET, FILM COATED ORAL at 23:38

## 2017-08-02 RX ADMIN — ENOXAPARIN SODIUM 40 MG: 40 INJECTION SUBCUTANEOUS at 08:38

## 2017-08-02 RX ADMIN — METFORMIN HYDROCHLORIDE 500 MG: 500 TABLET, FILM COATED ORAL at 16:01

## 2017-08-02 RX ADMIN — METFORMIN HYDROCHLORIDE 500 MG: 500 TABLET, FILM COATED ORAL at 08:38

## 2017-08-02 RX ADMIN — ACETAMINOPHEN 650 MG: 325 TABLET, FILM COATED ORAL at 18:39

## 2017-08-02 RX ADMIN — INSULIN GLARGINE 15 UNITS: 100 INJECTION, SOLUTION SUBCUTANEOUS at 22:06

## 2017-08-02 RX ADMIN — OXYCODONE HYDROCHLORIDE AND ACETAMINOPHEN 1 TABLET: 5; 325 TABLET ORAL at 08:38

## 2017-08-03 LAB
ANION GAP SERPL CALCULATED.3IONS-SCNC: 8 MMOL/L (ref 4–13)
BASOPHILS # BLD AUTO: 0.09 THOUSANDS/ΜL (ref 0–0.1)
BASOPHILS NFR BLD AUTO: 1 % (ref 0–1)
BUN SERPL-MCNC: 17 MG/DL (ref 5–25)
CALCIUM SERPL-MCNC: 9.2 MG/DL (ref 8.3–10.1)
CHLORIDE SERPL-SCNC: 104 MMOL/L (ref 100–108)
CO2 SERPL-SCNC: 27 MMOL/L (ref 21–32)
CREAT SERPL-MCNC: 0.85 MG/DL (ref 0.6–1.3)
EOSINOPHIL # BLD AUTO: 0.3 THOUSAND/ΜL (ref 0–0.61)
EOSINOPHIL NFR BLD AUTO: 3 % (ref 0–6)
ERYTHROCYTE [DISTWIDTH] IN BLOOD BY AUTOMATED COUNT: 14.1 % (ref 11.6–15.1)
GFR SERPL CREATININE-BSD FRML MDRD: 96 ML/MIN/1.73SQ M
GLUCOSE P FAST SERPL-MCNC: 70 MG/DL (ref 65–99)
GLUCOSE SERPL-MCNC: 102 MG/DL (ref 65–140)
GLUCOSE SERPL-MCNC: 233 MG/DL (ref 65–140)
GLUCOSE SERPL-MCNC: 70 MG/DL (ref 65–140)
GLUCOSE SERPL-MCNC: 88 MG/DL (ref 65–140)
GLUCOSE SERPL-MCNC: 92 MG/DL (ref 65–140)
HCT VFR BLD AUTO: 35.4 % (ref 36.5–49.3)
HGB BLD-MCNC: 11.5 G/DL (ref 12–17)
LYMPHOCYTES # BLD AUTO: 3.67 THOUSANDS/ΜL (ref 0.6–4.47)
LYMPHOCYTES NFR BLD AUTO: 42 % (ref 14–44)
MCH RBC QN AUTO: 28 PG (ref 26.8–34.3)
MCHC RBC AUTO-ENTMCNC: 32.5 G/DL (ref 31.4–37.4)
MCV RBC AUTO: 86 FL (ref 82–98)
MONOCYTES # BLD AUTO: 0.59 THOUSAND/ΜL (ref 0.17–1.22)
MONOCYTES NFR BLD AUTO: 7 % (ref 4–12)
NEUTROPHILS # BLD AUTO: 4.13 THOUSANDS/ΜL (ref 1.85–7.62)
NEUTS SEG NFR BLD AUTO: 47 % (ref 43–75)
NRBC BLD AUTO-RTO: 0 /100 WBCS
PLATELET # BLD AUTO: 523 THOUSANDS/UL (ref 149–390)
PMV BLD AUTO: 8.5 FL (ref 8.9–12.7)
POTASSIUM SERPL-SCNC: 4 MMOL/L (ref 3.5–5.3)
RBC # BLD AUTO: 4.11 MILLION/UL (ref 3.88–5.62)
SODIUM SERPL-SCNC: 139 MMOL/L (ref 136–145)
WBC # BLD AUTO: 8.83 THOUSAND/UL (ref 4.31–10.16)

## 2017-08-03 PROCEDURE — 97110 THERAPEUTIC EXERCISES: CPT

## 2017-08-03 PROCEDURE — 80048 BASIC METABOLIC PNL TOTAL CA: CPT | Performed by: PHYSICIAN ASSISTANT

## 2017-08-03 PROCEDURE — 82948 REAGENT STRIP/BLOOD GLUCOSE: CPT

## 2017-08-03 PROCEDURE — 97530 THERAPEUTIC ACTIVITIES: CPT

## 2017-08-03 PROCEDURE — 85025 COMPLETE CBC W/AUTO DIFF WBC: CPT | Performed by: PHYSICIAN ASSISTANT

## 2017-08-03 PROCEDURE — 97116 GAIT TRAINING THERAPY: CPT

## 2017-08-03 RX ADMIN — OXYCODONE HYDROCHLORIDE AND ACETAMINOPHEN 1 TABLET: 5; 325 TABLET ORAL at 05:15

## 2017-08-03 RX ADMIN — INSULIN GLARGINE 15 UNITS: 100 INJECTION, SOLUTION SUBCUTANEOUS at 22:25

## 2017-08-03 RX ADMIN — ENOXAPARIN SODIUM 40 MG: 40 INJECTION SUBCUTANEOUS at 08:05

## 2017-08-03 RX ADMIN — ACETAMINOPHEN 650 MG: 325 TABLET, FILM COATED ORAL at 17:03

## 2017-08-03 RX ADMIN — Medication 1 TABLET: at 08:06

## 2017-08-03 RX ADMIN — VANCOMYCIN HYDROCHLORIDE 1250 MG: 1 INJECTION, POWDER, LYOPHILIZED, FOR SOLUTION INTRAVENOUS at 22:25

## 2017-08-03 RX ADMIN — METFORMIN HYDROCHLORIDE 500 MG: 500 TABLET, FILM COATED ORAL at 16:48

## 2017-08-03 RX ADMIN — INSULIN LISPRO 2 UNITS: 100 INJECTION, SOLUTION INTRAVENOUS; SUBCUTANEOUS at 22:24

## 2017-08-03 RX ADMIN — VANCOMYCIN HYDROCHLORIDE 1250 MG: 1 INJECTION, POWDER, LYOPHILIZED, FOR SOLUTION INTRAVENOUS at 10:13

## 2017-08-03 RX ADMIN — ACETAMINOPHEN 650 MG: 325 TABLET, FILM COATED ORAL at 12:16

## 2017-08-03 RX ADMIN — LISINOPRIL 5 MG: 5 TABLET ORAL at 08:09

## 2017-08-03 RX ADMIN — ACETAMINOPHEN 650 MG: 325 TABLET, FILM COATED ORAL at 05:13

## 2017-08-03 RX ADMIN — METFORMIN HYDROCHLORIDE 500 MG: 500 TABLET, FILM COATED ORAL at 08:06

## 2017-08-04 LAB
GLUCOSE SERPL-MCNC: 120 MG/DL (ref 65–140)
GLUCOSE SERPL-MCNC: 144 MG/DL (ref 65–140)
GLUCOSE SERPL-MCNC: 79 MG/DL (ref 65–140)
GLUCOSE SERPL-MCNC: 88 MG/DL (ref 65–140)

## 2017-08-04 PROCEDURE — 97116 GAIT TRAINING THERAPY: CPT

## 2017-08-04 PROCEDURE — 97530 THERAPEUTIC ACTIVITIES: CPT

## 2017-08-04 PROCEDURE — 82948 REAGENT STRIP/BLOOD GLUCOSE: CPT

## 2017-08-04 PROCEDURE — 97110 THERAPEUTIC EXERCISES: CPT

## 2017-08-04 PROCEDURE — 97112 NEUROMUSCULAR REEDUCATION: CPT

## 2017-08-04 RX ORDER — INSULIN GLARGINE 100 [IU]/ML
10 INJECTION, SOLUTION SUBCUTANEOUS
Status: DISCONTINUED | OUTPATIENT
Start: 2017-08-04 | End: 2017-08-06

## 2017-08-04 RX ADMIN — OXYCODONE HYDROCHLORIDE AND ACETAMINOPHEN 1 TABLET: 5; 325 TABLET ORAL at 00:22

## 2017-08-04 RX ADMIN — METFORMIN HYDROCHLORIDE 500 MG: 500 TABLET, FILM COATED ORAL at 18:05

## 2017-08-04 RX ADMIN — ENOXAPARIN SODIUM 40 MG: 40 INJECTION SUBCUTANEOUS at 09:27

## 2017-08-04 RX ADMIN — ACETAMINOPHEN 650 MG: 325 TABLET, FILM COATED ORAL at 00:10

## 2017-08-04 RX ADMIN — ACETAMINOPHEN 650 MG: 325 TABLET, FILM COATED ORAL at 23:49

## 2017-08-04 RX ADMIN — INSULIN GLARGINE 10 UNITS: 100 INJECTION, SOLUTION SUBCUTANEOUS at 22:05

## 2017-08-04 RX ADMIN — OXYCODONE HYDROCHLORIDE AND ACETAMINOPHEN 1 TABLET: 5; 325 TABLET ORAL at 10:05

## 2017-08-04 RX ADMIN — Medication 1 TABLET: at 09:29

## 2017-08-04 RX ADMIN — VANCOMYCIN HYDROCHLORIDE 1250 MG: 1 INJECTION, POWDER, LYOPHILIZED, FOR SOLUTION INTRAVENOUS at 22:09

## 2017-08-04 RX ADMIN — ACETAMINOPHEN 650 MG: 325 TABLET, FILM COATED ORAL at 06:05

## 2017-08-04 RX ADMIN — ACETAMINOPHEN 650 MG: 325 TABLET, FILM COATED ORAL at 18:05

## 2017-08-04 RX ADMIN — ACETAMINOPHEN 650 MG: 325 TABLET, FILM COATED ORAL at 11:41

## 2017-08-04 RX ADMIN — VANCOMYCIN HYDROCHLORIDE 1250 MG: 1 INJECTION, POWDER, LYOPHILIZED, FOR SOLUTION INTRAVENOUS at 10:06

## 2017-08-04 RX ADMIN — METFORMIN HYDROCHLORIDE 500 MG: 500 TABLET, FILM COATED ORAL at 09:29

## 2017-08-04 RX ADMIN — LISINOPRIL 5 MG: 5 TABLET ORAL at 09:29

## 2017-08-05 LAB
GLUCOSE SERPL-MCNC: 100 MG/DL (ref 65–140)
GLUCOSE SERPL-MCNC: 106 MG/DL (ref 65–140)
GLUCOSE SERPL-MCNC: 130 MG/DL (ref 65–140)
GLUCOSE SERPL-MCNC: 85 MG/DL (ref 65–140)

## 2017-08-05 PROCEDURE — 97542 WHEELCHAIR MNGMENT TRAINING: CPT

## 2017-08-05 PROCEDURE — 97530 THERAPEUTIC ACTIVITIES: CPT

## 2017-08-05 PROCEDURE — 97116 GAIT TRAINING THERAPY: CPT

## 2017-08-05 PROCEDURE — 82948 REAGENT STRIP/BLOOD GLUCOSE: CPT

## 2017-08-05 PROCEDURE — 97110 THERAPEUTIC EXERCISES: CPT

## 2017-08-05 RX ADMIN — ACETAMINOPHEN 650 MG: 325 TABLET, FILM COATED ORAL at 23:57

## 2017-08-05 RX ADMIN — METFORMIN HYDROCHLORIDE 500 MG: 500 TABLET, FILM COATED ORAL at 08:45

## 2017-08-05 RX ADMIN — ACETAMINOPHEN 650 MG: 325 TABLET, FILM COATED ORAL at 12:09

## 2017-08-05 RX ADMIN — ACETAMINOPHEN 650 MG: 325 TABLET, FILM COATED ORAL at 05:11

## 2017-08-05 RX ADMIN — ACETAMINOPHEN 650 MG: 325 TABLET, FILM COATED ORAL at 18:04

## 2017-08-05 RX ADMIN — ALTEPLASE 2 MG: 2.2 INJECTION, POWDER, LYOPHILIZED, FOR SOLUTION INTRAVENOUS at 18:53

## 2017-08-05 RX ADMIN — LISINOPRIL 5 MG: 5 TABLET ORAL at 08:46

## 2017-08-05 RX ADMIN — Medication 1 TABLET: at 08:45

## 2017-08-05 RX ADMIN — ENOXAPARIN SODIUM 40 MG: 40 INJECTION SUBCUTANEOUS at 08:45

## 2017-08-05 RX ADMIN — VANCOMYCIN HYDROCHLORIDE 1250 MG: 1 INJECTION, POWDER, LYOPHILIZED, FOR SOLUTION INTRAVENOUS at 22:23

## 2017-08-05 RX ADMIN — VANCOMYCIN HYDROCHLORIDE 1250 MG: 1 INJECTION, POWDER, LYOPHILIZED, FOR SOLUTION INTRAVENOUS at 10:48

## 2017-08-05 RX ADMIN — INSULIN GLARGINE 10 UNITS: 100 INJECTION, SOLUTION SUBCUTANEOUS at 22:23

## 2017-08-05 RX ADMIN — METFORMIN HYDROCHLORIDE 500 MG: 500 TABLET, FILM COATED ORAL at 18:04

## 2017-08-06 LAB
GLUCOSE SERPL-MCNC: 122 MG/DL (ref 65–140)
GLUCOSE SERPL-MCNC: 174 MG/DL (ref 65–140)
GLUCOSE SERPL-MCNC: 186 MG/DL (ref 65–140)
GLUCOSE SERPL-MCNC: 84 MG/DL (ref 65–140)

## 2017-08-06 PROCEDURE — 97535 SELF CARE MNGMENT TRAINING: CPT | Performed by: STUDENT IN AN ORGANIZED HEALTH CARE EDUCATION/TRAINING PROGRAM

## 2017-08-06 PROCEDURE — 97530 THERAPEUTIC ACTIVITIES: CPT | Performed by: PHYSICAL THERAPIST

## 2017-08-06 PROCEDURE — 97530 THERAPEUTIC ACTIVITIES: CPT | Performed by: STUDENT IN AN ORGANIZED HEALTH CARE EDUCATION/TRAINING PROGRAM

## 2017-08-06 PROCEDURE — 82948 REAGENT STRIP/BLOOD GLUCOSE: CPT

## 2017-08-06 PROCEDURE — 97110 THERAPEUTIC EXERCISES: CPT | Performed by: PHYSICAL THERAPIST

## 2017-08-06 RX ADMIN — ACETAMINOPHEN 650 MG: 325 TABLET, FILM COATED ORAL at 18:05

## 2017-08-06 RX ADMIN — VANCOMYCIN HYDROCHLORIDE 1250 MG: 1 INJECTION, POWDER, LYOPHILIZED, FOR SOLUTION INTRAVENOUS at 22:22

## 2017-08-06 RX ADMIN — LISINOPRIL 5 MG: 5 TABLET ORAL at 08:51

## 2017-08-06 RX ADMIN — ACETAMINOPHEN 650 MG: 325 TABLET, FILM COATED ORAL at 05:18

## 2017-08-06 RX ADMIN — ENOXAPARIN SODIUM 40 MG: 40 INJECTION SUBCUTANEOUS at 08:42

## 2017-08-06 RX ADMIN — INSULIN LISPRO 1 UNITS: 100 INJECTION, SOLUTION INTRAVENOUS; SUBCUTANEOUS at 12:17

## 2017-08-06 RX ADMIN — METFORMIN HYDROCHLORIDE 500 MG: 500 TABLET, FILM COATED ORAL at 08:46

## 2017-08-06 RX ADMIN — VANCOMYCIN HYDROCHLORIDE 1250 MG: 1 INJECTION, POWDER, LYOPHILIZED, FOR SOLUTION INTRAVENOUS at 11:03

## 2017-08-06 RX ADMIN — METFORMIN HYDROCHLORIDE 500 MG: 500 TABLET, FILM COATED ORAL at 18:05

## 2017-08-06 RX ADMIN — INSULIN LISPRO 1 UNITS: 100 INJECTION, SOLUTION INTRAVENOUS; SUBCUTANEOUS at 22:23

## 2017-08-06 RX ADMIN — ACETAMINOPHEN 650 MG: 325 TABLET, FILM COATED ORAL at 12:18

## 2017-08-06 RX ADMIN — Medication 1 TABLET: at 08:46

## 2017-08-06 RX ADMIN — OXYCODONE HYDROCHLORIDE AND ACETAMINOPHEN 1 TABLET: 5; 325 TABLET ORAL at 00:02

## 2017-08-06 RX ADMIN — ACETAMINOPHEN 650 MG: 325 TABLET, FILM COATED ORAL at 23:35

## 2017-08-07 LAB
ANION GAP SERPL CALCULATED.3IONS-SCNC: 7 MMOL/L (ref 4–13)
BASOPHILS # BLD AUTO: 0.11 THOUSANDS/ΜL (ref 0–0.1)
BASOPHILS NFR BLD AUTO: 2 % (ref 0–1)
BUN SERPL-MCNC: 19 MG/DL (ref 5–25)
CALCIUM SERPL-MCNC: 9 MG/DL (ref 8.3–10.1)
CHLORIDE SERPL-SCNC: 106 MMOL/L (ref 100–108)
CO2 SERPL-SCNC: 26 MMOL/L (ref 21–32)
CREAT SERPL-MCNC: 0.91 MG/DL (ref 0.6–1.3)
EOSINOPHIL # BLD AUTO: 0.33 THOUSAND/ΜL (ref 0–0.61)
EOSINOPHIL NFR BLD AUTO: 5 % (ref 0–6)
ERYTHROCYTE [DISTWIDTH] IN BLOOD BY AUTOMATED COUNT: 14.5 % (ref 11.6–15.1)
GFR SERPL CREATININE-BSD FRML MDRD: 93 ML/MIN/1.73SQ M
GLUCOSE P FAST SERPL-MCNC: 87 MG/DL (ref 65–99)
GLUCOSE SERPL-MCNC: 105 MG/DL (ref 65–140)
GLUCOSE SERPL-MCNC: 138 MG/DL (ref 65–140)
GLUCOSE SERPL-MCNC: 147 MG/DL (ref 65–140)
GLUCOSE SERPL-MCNC: 87 MG/DL (ref 65–140)
GLUCOSE SERPL-MCNC: 91 MG/DL (ref 65–140)
HCT VFR BLD AUTO: 34.5 % (ref 36.5–49.3)
HGB BLD-MCNC: 11.2 G/DL (ref 12–17)
LYMPHOCYTES # BLD AUTO: 2.96 THOUSANDS/ΜL (ref 0.6–4.47)
LYMPHOCYTES NFR BLD AUTO: 41 % (ref 14–44)
MCH RBC QN AUTO: 27.9 PG (ref 26.8–34.3)
MCHC RBC AUTO-ENTMCNC: 32.5 G/DL (ref 31.4–37.4)
MCV RBC AUTO: 86 FL (ref 82–98)
MONOCYTES # BLD AUTO: 0.63 THOUSAND/ΜL (ref 0.17–1.22)
MONOCYTES NFR BLD AUTO: 9 % (ref 4–12)
NEUTROPHILS # BLD AUTO: 3.11 THOUSANDS/ΜL (ref 1.85–7.62)
NEUTS SEG NFR BLD AUTO: 43 % (ref 43–75)
NRBC BLD AUTO-RTO: 0 /100 WBCS
PLATELET # BLD AUTO: 353 THOUSANDS/UL (ref 149–390)
PMV BLD AUTO: 8.8 FL (ref 8.9–12.7)
POTASSIUM SERPL-SCNC: 4.2 MMOL/L (ref 3.5–5.3)
RBC # BLD AUTO: 4.01 MILLION/UL (ref 3.88–5.62)
SODIUM SERPL-SCNC: 139 MMOL/L (ref 136–145)
VANCOMYCIN TROUGH SERPL-MCNC: 16.8 UG/ML (ref 10–20)
WBC # BLD AUTO: 7.16 THOUSAND/UL (ref 4.31–10.16)

## 2017-08-07 PROCEDURE — 80048 BASIC METABOLIC PNL TOTAL CA: CPT | Performed by: PHYSICIAN ASSISTANT

## 2017-08-07 PROCEDURE — 97530 THERAPEUTIC ACTIVITIES: CPT | Performed by: OCCUPATIONAL THERAPY ASSISTANT

## 2017-08-07 PROCEDURE — 85025 COMPLETE CBC W/AUTO DIFF WBC: CPT | Performed by: PHYSICIAN ASSISTANT

## 2017-08-07 PROCEDURE — 97110 THERAPEUTIC EXERCISES: CPT | Performed by: PHYSICAL THERAPIST

## 2017-08-07 PROCEDURE — 97116 GAIT TRAINING THERAPY: CPT | Performed by: PHYSICAL THERAPIST

## 2017-08-07 PROCEDURE — 80202 ASSAY OF VANCOMYCIN: CPT | Performed by: INTERNAL MEDICINE

## 2017-08-07 PROCEDURE — 97535 SELF CARE MNGMENT TRAINING: CPT | Performed by: OCCUPATIONAL THERAPY ASSISTANT

## 2017-08-07 PROCEDURE — 97530 THERAPEUTIC ACTIVITIES: CPT | Performed by: PHYSICAL THERAPIST

## 2017-08-07 PROCEDURE — 82948 REAGENT STRIP/BLOOD GLUCOSE: CPT

## 2017-08-07 PROCEDURE — 97110 THERAPEUTIC EXERCISES: CPT | Performed by: OCCUPATIONAL THERAPY ASSISTANT

## 2017-08-07 RX ADMIN — ACETAMINOPHEN 650 MG: 325 TABLET, FILM COATED ORAL at 18:36

## 2017-08-07 RX ADMIN — LISINOPRIL 5 MG: 5 TABLET ORAL at 08:12

## 2017-08-07 RX ADMIN — Medication 1 TABLET: at 08:12

## 2017-08-07 RX ADMIN — METFORMIN HYDROCHLORIDE 500 MG: 500 TABLET, FILM COATED ORAL at 16:18

## 2017-08-07 RX ADMIN — OXYCODONE HYDROCHLORIDE AND ACETAMINOPHEN 1 TABLET: 5; 325 TABLET ORAL at 16:18

## 2017-08-07 RX ADMIN — ACETAMINOPHEN 650 MG: 325 TABLET, FILM COATED ORAL at 05:25

## 2017-08-07 RX ADMIN — ENOXAPARIN SODIUM 40 MG: 40 INJECTION SUBCUTANEOUS at 08:12

## 2017-08-07 RX ADMIN — VANCOMYCIN HYDROCHLORIDE 1250 MG: 1 INJECTION, POWDER, LYOPHILIZED, FOR SOLUTION INTRAVENOUS at 10:54

## 2017-08-07 RX ADMIN — ACETAMINOPHEN 650 MG: 325 TABLET, FILM COATED ORAL at 12:36

## 2017-08-07 RX ADMIN — VANCOMYCIN HYDROCHLORIDE 1250 MG: 1 INJECTION, POWDER, LYOPHILIZED, FOR SOLUTION INTRAVENOUS at 22:24

## 2017-08-07 RX ADMIN — METFORMIN HYDROCHLORIDE 500 MG: 500 TABLET, FILM COATED ORAL at 08:12

## 2017-08-08 LAB
GLUCOSE SERPL-MCNC: 127 MG/DL (ref 65–140)
GLUCOSE SERPL-MCNC: 129 MG/DL (ref 65–140)
GLUCOSE SERPL-MCNC: 136 MG/DL (ref 65–140)
GLUCOSE SERPL-MCNC: 180 MG/DL (ref 65–140)

## 2017-08-08 PROCEDURE — 97110 THERAPEUTIC EXERCISES: CPT

## 2017-08-08 PROCEDURE — 97112 NEUROMUSCULAR REEDUCATION: CPT | Performed by: PHYSICAL THERAPIST

## 2017-08-08 PROCEDURE — 97535 SELF CARE MNGMENT TRAINING: CPT

## 2017-08-08 PROCEDURE — 97116 GAIT TRAINING THERAPY: CPT | Performed by: PHYSICAL THERAPIST

## 2017-08-08 PROCEDURE — 82948 REAGENT STRIP/BLOOD GLUCOSE: CPT

## 2017-08-08 PROCEDURE — 97530 THERAPEUTIC ACTIVITIES: CPT

## 2017-08-08 PROCEDURE — 97110 THERAPEUTIC EXERCISES: CPT | Performed by: PHYSICAL THERAPIST

## 2017-08-08 PROCEDURE — 97530 THERAPEUTIC ACTIVITIES: CPT | Performed by: PHYSICAL THERAPIST

## 2017-08-08 RX ADMIN — ACETAMINOPHEN 650 MG: 325 TABLET, FILM COATED ORAL at 11:30

## 2017-08-08 RX ADMIN — LISINOPRIL 5 MG: 5 TABLET ORAL at 09:51

## 2017-08-08 RX ADMIN — ACETAMINOPHEN 650 MG: 325 TABLET, FILM COATED ORAL at 01:28

## 2017-08-08 RX ADMIN — METFORMIN HYDROCHLORIDE 500 MG: 500 TABLET, FILM COATED ORAL at 07:43

## 2017-08-08 RX ADMIN — VANCOMYCIN HYDROCHLORIDE 1250 MG: 1 INJECTION, POWDER, LYOPHILIZED, FOR SOLUTION INTRAVENOUS at 21:00

## 2017-08-08 RX ADMIN — INSULIN LISPRO 1 UNITS: 100 INJECTION, SOLUTION INTRAVENOUS; SUBCUTANEOUS at 22:03

## 2017-08-08 RX ADMIN — VANCOMYCIN HYDROCHLORIDE 1250 MG: 1 INJECTION, POWDER, LYOPHILIZED, FOR SOLUTION INTRAVENOUS at 09:50

## 2017-08-08 RX ADMIN — ACETAMINOPHEN 650 MG: 325 TABLET, FILM COATED ORAL at 18:51

## 2017-08-08 RX ADMIN — METFORMIN HYDROCHLORIDE 500 MG: 500 TABLET, FILM COATED ORAL at 16:28

## 2017-08-08 RX ADMIN — ENOXAPARIN SODIUM 40 MG: 40 INJECTION SUBCUTANEOUS at 09:51

## 2017-08-08 RX ADMIN — Medication 1 TABLET: at 09:50

## 2017-08-08 RX ADMIN — ACETAMINOPHEN 650 MG: 325 TABLET, FILM COATED ORAL at 06:08

## 2017-08-09 LAB
GLUCOSE SERPL-MCNC: 127 MG/DL (ref 65–140)
GLUCOSE SERPL-MCNC: 153 MG/DL (ref 65–140)
GLUCOSE SERPL-MCNC: 98 MG/DL (ref 65–140)
GLUCOSE SERPL-MCNC: 99 MG/DL (ref 65–140)

## 2017-08-09 PROCEDURE — 97530 THERAPEUTIC ACTIVITIES: CPT

## 2017-08-09 PROCEDURE — 97535 SELF CARE MNGMENT TRAINING: CPT

## 2017-08-09 PROCEDURE — 97110 THERAPEUTIC EXERCISES: CPT

## 2017-08-09 PROCEDURE — 82948 REAGENT STRIP/BLOOD GLUCOSE: CPT

## 2017-08-09 PROCEDURE — 97542 WHEELCHAIR MNGMENT TRAINING: CPT

## 2017-08-09 PROCEDURE — 97116 GAIT TRAINING THERAPY: CPT

## 2017-08-09 RX ADMIN — ACETAMINOPHEN 650 MG: 325 TABLET, FILM COATED ORAL at 00:37

## 2017-08-09 RX ADMIN — ACETAMINOPHEN 650 MG: 325 TABLET, FILM COATED ORAL at 18:35

## 2017-08-09 RX ADMIN — VANCOMYCIN HYDROCHLORIDE 1250 MG: 1 INJECTION, POWDER, LYOPHILIZED, FOR SOLUTION INTRAVENOUS at 08:24

## 2017-08-09 RX ADMIN — Medication 1 TABLET: at 08:24

## 2017-08-09 RX ADMIN — METFORMIN HYDROCHLORIDE 500 MG: 500 TABLET, FILM COATED ORAL at 08:24

## 2017-08-09 RX ADMIN — OXYCODONE HYDROCHLORIDE AND ACETAMINOPHEN 1 TABLET: 5; 325 TABLET ORAL at 00:37

## 2017-08-09 RX ADMIN — ACETAMINOPHEN 650 MG: 325 TABLET, FILM COATED ORAL at 05:42

## 2017-08-09 RX ADMIN — ACETAMINOPHEN 650 MG: 325 TABLET, FILM COATED ORAL at 23:41

## 2017-08-09 RX ADMIN — INSULIN LISPRO 1 UNITS: 100 INJECTION, SOLUTION INTRAVENOUS; SUBCUTANEOUS at 16:58

## 2017-08-09 RX ADMIN — METFORMIN HYDROCHLORIDE 500 MG: 500 TABLET, FILM COATED ORAL at 16:58

## 2017-08-09 RX ADMIN — LISINOPRIL 5 MG: 5 TABLET ORAL at 08:24

## 2017-08-09 RX ADMIN — ACETAMINOPHEN 650 MG: 325 TABLET, FILM COATED ORAL at 11:30

## 2017-08-09 RX ADMIN — VANCOMYCIN HYDROCHLORIDE 1250 MG: 1 INJECTION, POWDER, LYOPHILIZED, FOR SOLUTION INTRAVENOUS at 20:16

## 2017-08-09 RX ADMIN — ENOXAPARIN SODIUM 40 MG: 40 INJECTION SUBCUTANEOUS at 08:25

## 2017-08-10 VITALS
SYSTOLIC BLOOD PRESSURE: 117 MMHG | RESPIRATION RATE: 20 BRPM | BODY MASS INDEX: 29.04 KG/M2 | HEIGHT: 70 IN | HEART RATE: 68 BPM | DIASTOLIC BLOOD PRESSURE: 65 MMHG | OXYGEN SATURATION: 97 % | TEMPERATURE: 97.5 F | WEIGHT: 202.82 LBS

## 2017-08-10 LAB
GLUCOSE SERPL-MCNC: 110 MG/DL (ref 65–140)
GLUCOSE SERPL-MCNC: 123 MG/DL (ref 65–140)
GLUCOSE SERPL-MCNC: 187 MG/DL (ref 65–140)

## 2017-08-10 PROCEDURE — 97542 WHEELCHAIR MNGMENT TRAINING: CPT

## 2017-08-10 PROCEDURE — 82948 REAGENT STRIP/BLOOD GLUCOSE: CPT

## 2017-08-10 PROCEDURE — 97530 THERAPEUTIC ACTIVITIES: CPT

## 2017-08-10 PROCEDURE — 97110 THERAPEUTIC EXERCISES: CPT

## 2017-08-10 PROCEDURE — 97535 SELF CARE MNGMENT TRAINING: CPT

## 2017-08-10 RX ORDER — LISINOPRIL 5 MG/1
5 TABLET ORAL DAILY
Qty: 30 TABLET | Refills: 0 | Status: SHIPPED | OUTPATIENT
Start: 2017-08-11 | End: 2018-03-26 | Stop reason: ALTCHOICE

## 2017-08-10 RX ADMIN — VANCOMYCIN HYDROCHLORIDE 1250 MG: 1 INJECTION, POWDER, LYOPHILIZED, FOR SOLUTION INTRAVENOUS at 08:46

## 2017-08-10 RX ADMIN — ALTEPLASE 2 MG: 2.2 INJECTION, POWDER, LYOPHILIZED, FOR SOLUTION INTRAVENOUS at 12:02

## 2017-08-10 RX ADMIN — METFORMIN HYDROCHLORIDE 500 MG: 500 TABLET, FILM COATED ORAL at 16:57

## 2017-08-10 RX ADMIN — ACETAMINOPHEN 650 MG: 325 TABLET, FILM COATED ORAL at 05:29

## 2017-08-10 RX ADMIN — Medication 1 TABLET: at 08:10

## 2017-08-10 RX ADMIN — ACETAMINOPHEN 650 MG: 325 TABLET, FILM COATED ORAL at 12:00

## 2017-08-10 RX ADMIN — ENOXAPARIN SODIUM 40 MG: 40 INJECTION SUBCUTANEOUS at 08:10

## 2017-08-10 RX ADMIN — METFORMIN HYDROCHLORIDE 500 MG: 500 TABLET, FILM COATED ORAL at 08:10

## 2017-08-17 ENCOUNTER — ALLSCRIPTS OFFICE VISIT (OUTPATIENT)
Dept: OTHER | Facility: OTHER | Age: 57
End: 2017-08-17

## 2017-08-28 ENCOUNTER — GENERIC CONVERSION - ENCOUNTER (OUTPATIENT)
Dept: OTHER | Facility: OTHER | Age: 57
End: 2017-08-28

## 2017-08-29 ENCOUNTER — GENERIC CONVERSION - ENCOUNTER (OUTPATIENT)
Dept: OTHER | Facility: OTHER | Age: 57
End: 2017-08-29

## 2017-08-31 ENCOUNTER — TRANSCRIBE ORDERS (OUTPATIENT)
Dept: ADMINISTRATIVE | Facility: HOSPITAL | Age: 57
End: 2017-08-31

## 2017-08-31 ENCOUNTER — ALLSCRIPTS OFFICE VISIT (OUTPATIENT)
Dept: OTHER | Facility: OTHER | Age: 57
End: 2017-08-31

## 2017-08-31 ENCOUNTER — APPOINTMENT (OUTPATIENT)
Dept: LAB | Facility: HOSPITAL | Age: 57
End: 2017-08-31
Payer: COMMERCIAL

## 2017-08-31 DIAGNOSIS — A41.9 SEVERE SEPSIS (HCC): ICD-10-CM

## 2017-08-31 DIAGNOSIS — R65.20 SEVERE SEPSIS (HCC): ICD-10-CM

## 2017-08-31 PROCEDURE — 36415 COLL VENOUS BLD VENIPUNCTURE: CPT

## 2017-08-31 PROCEDURE — 87040 BLOOD CULTURE FOR BACTERIA: CPT

## 2017-09-05 LAB
BACTERIA BLD CULT: NORMAL
BACTERIA BLD CULT: NORMAL

## 2017-09-27 ENCOUNTER — ALLSCRIPTS OFFICE VISIT (OUTPATIENT)
Dept: OTHER | Facility: OTHER | Age: 57
End: 2017-09-27

## 2017-09-27 DIAGNOSIS — Z12.11 ENCOUNTER FOR SCREENING FOR MALIGNANT NEOPLASM OF COLON: ICD-10-CM

## 2017-09-27 DIAGNOSIS — R26.2 DIFFICULTY IN WALKING, NOT ELSEWHERE CLASSIFIED: ICD-10-CM

## 2017-09-27 DIAGNOSIS — Z89.511 ACQUIRED ABSENCE OF RIGHT LEG BELOW KNEE (HCC): ICD-10-CM

## 2017-10-24 ENCOUNTER — APPOINTMENT (OUTPATIENT)
Dept: PHYSICAL THERAPY | Facility: CLINIC | Age: 57
End: 2017-10-24
Payer: COMMERCIAL

## 2017-10-24 ENCOUNTER — GENERIC CONVERSION - ENCOUNTER (OUTPATIENT)
Dept: OTHER | Facility: OTHER | Age: 57
End: 2017-10-24

## 2017-10-24 DIAGNOSIS — Z89.511 ACQUIRED ABSENCE OF RIGHT LEG BELOW KNEE (HCC): ICD-10-CM

## 2017-10-24 DIAGNOSIS — R26.2 DIFFICULTY IN WALKING, NOT ELSEWHERE CLASSIFIED: ICD-10-CM

## 2017-10-24 PROCEDURE — G8991 OTHER PT/OT GOAL STATUS: HCPCS

## 2017-10-24 PROCEDURE — G8990 OTHER PT/OT CURRENT STATUS: HCPCS

## 2017-10-24 PROCEDURE — 97161 PT EVAL LOW COMPLEX 20 MIN: CPT

## 2017-10-24 PROCEDURE — 97116 GAIT TRAINING THERAPY: CPT

## 2017-10-26 ENCOUNTER — APPOINTMENT (OUTPATIENT)
Dept: PHYSICAL THERAPY | Facility: CLINIC | Age: 57
End: 2017-10-26
Payer: COMMERCIAL

## 2017-10-26 PROCEDURE — 97110 THERAPEUTIC EXERCISES: CPT

## 2017-10-30 ENCOUNTER — APPOINTMENT (OUTPATIENT)
Dept: PHYSICAL THERAPY | Facility: CLINIC | Age: 57
End: 2017-10-30
Payer: COMMERCIAL

## 2017-10-30 PROCEDURE — 97110 THERAPEUTIC EXERCISES: CPT

## 2017-11-01 ENCOUNTER — APPOINTMENT (OUTPATIENT)
Dept: PHYSICAL THERAPY | Facility: CLINIC | Age: 57
End: 2017-11-01
Payer: COMMERCIAL

## 2017-11-01 PROCEDURE — 97110 THERAPEUTIC EXERCISES: CPT

## 2017-11-03 ENCOUNTER — APPOINTMENT (OUTPATIENT)
Dept: PHYSICAL THERAPY | Facility: CLINIC | Age: 57
End: 2017-11-03
Payer: COMMERCIAL

## 2017-11-03 PROCEDURE — 97116 GAIT TRAINING THERAPY: CPT

## 2017-11-03 PROCEDURE — 97110 THERAPEUTIC EXERCISES: CPT

## 2017-11-06 ENCOUNTER — APPOINTMENT (OUTPATIENT)
Dept: PHYSICAL THERAPY | Facility: CLINIC | Age: 57
End: 2017-11-06
Payer: COMMERCIAL

## 2017-11-08 ENCOUNTER — APPOINTMENT (OUTPATIENT)
Dept: PHYSICAL THERAPY | Facility: CLINIC | Age: 57
End: 2017-11-08
Payer: COMMERCIAL

## 2017-11-08 PROCEDURE — 97112 NEUROMUSCULAR REEDUCATION: CPT

## 2017-11-08 PROCEDURE — 97116 GAIT TRAINING THERAPY: CPT

## 2017-11-08 PROCEDURE — 97110 THERAPEUTIC EXERCISES: CPT

## 2017-11-10 ENCOUNTER — APPOINTMENT (OUTPATIENT)
Dept: PHYSICAL THERAPY | Facility: CLINIC | Age: 57
End: 2017-11-10
Payer: COMMERCIAL

## 2017-11-10 PROCEDURE — 97112 NEUROMUSCULAR REEDUCATION: CPT

## 2017-11-10 PROCEDURE — 97110 THERAPEUTIC EXERCISES: CPT

## 2017-11-10 PROCEDURE — 97116 GAIT TRAINING THERAPY: CPT

## 2017-11-13 ENCOUNTER — TRANSCRIBE ORDERS (OUTPATIENT)
Dept: LAB | Facility: MEDICAL CENTER | Age: 57
End: 2017-11-13

## 2017-11-13 ENCOUNTER — APPOINTMENT (OUTPATIENT)
Dept: PHYSICAL THERAPY | Facility: CLINIC | Age: 57
End: 2017-11-13
Payer: COMMERCIAL

## 2017-11-13 ENCOUNTER — APPOINTMENT (OUTPATIENT)
Dept: LAB | Facility: MEDICAL CENTER | Age: 57
End: 2017-11-13
Payer: COMMERCIAL

## 2017-11-13 ENCOUNTER — GENERIC CONVERSION - ENCOUNTER (OUTPATIENT)
Dept: OTHER | Facility: OTHER | Age: 57
End: 2017-11-13

## 2017-11-13 DIAGNOSIS — E13.8 DIABETES MELLITUS OF OTHER TYPE WITH COMPLICATION, UNSPECIFIED LONG TERM INSULIN USE STATUS: ICD-10-CM

## 2017-11-13 DIAGNOSIS — Z12.11 ENCOUNTER FOR SCREENING FOR MALIGNANT NEOPLASM OF COLON: ICD-10-CM

## 2017-11-13 DIAGNOSIS — E13.8 DIABETES MELLITUS OF OTHER TYPE WITH COMPLICATION, UNSPECIFIED LONG TERM INSULIN USE STATUS: Primary | ICD-10-CM

## 2017-11-13 LAB
25(OH)D3 SERPL-MCNC: 26.6 NG/ML (ref 30–100)
ALBUMIN SERPL BCP-MCNC: 3.6 G/DL (ref 3.5–5)
ALP SERPL-CCNC: 125 U/L (ref 46–116)
ALT SERPL W P-5'-P-CCNC: 22 U/L (ref 12–78)
ANION GAP SERPL CALCULATED.3IONS-SCNC: 6 MMOL/L (ref 4–13)
AST SERPL W P-5'-P-CCNC: 12 U/L (ref 5–45)
BILIRUB SERPL-MCNC: 0.42 MG/DL (ref 0.2–1)
BUN SERPL-MCNC: 15 MG/DL (ref 5–25)
CALCIUM SERPL-MCNC: 9.4 MG/DL (ref 8.3–10.1)
CHLORIDE SERPL-SCNC: 105 MMOL/L (ref 100–108)
CHOLEST SERPL-MCNC: 145 MG/DL (ref 50–200)
CO2 SERPL-SCNC: 25 MMOL/L (ref 21–32)
CREAT SERPL-MCNC: 1.05 MG/DL (ref 0.6–1.3)
CREAT UR-MCNC: 193 MG/DL
EST. AVERAGE GLUCOSE BLD GHB EST-MCNC: 197 MG/DL
GFR SERPL CREATININE-BSD FRML MDRD: 78 ML/MIN/1.73SQ M
GLUCOSE P FAST SERPL-MCNC: 141 MG/DL (ref 65–99)
HBA1C MFR BLD: 8.5 % (ref 4.2–6.3)
HDLC SERPL-MCNC: 42 MG/DL (ref 40–60)
HEMOCCULT STL QL IA: POSITIVE
LDLC SERPL CALC-MCNC: 48 MG/DL (ref 0–100)
MICROALBUMIN UR-MCNC: 125 MG/L (ref 0–20)
MICROALBUMIN/CREAT 24H UR: 65 MG/G CREATININE (ref 0–30)
POTASSIUM SERPL-SCNC: 4.4 MMOL/L (ref 3.5–5.3)
PROT SERPL-MCNC: 7.8 G/DL (ref 6.4–8.2)
SODIUM SERPL-SCNC: 136 MMOL/L (ref 136–145)
TRIGL SERPL-MCNC: 275 MG/DL

## 2017-11-13 PROCEDURE — G8992 OTHER PT/OT  D/C STATUS: HCPCS

## 2017-11-13 PROCEDURE — G8991 OTHER PT/OT GOAL STATUS: HCPCS

## 2017-11-13 PROCEDURE — 82306 VITAMIN D 25 HYDROXY: CPT

## 2017-11-13 PROCEDURE — G0328 FECAL BLOOD SCRN IMMUNOASSAY: HCPCS

## 2017-11-13 PROCEDURE — 83036 HEMOGLOBIN GLYCOSYLATED A1C: CPT

## 2017-11-13 PROCEDURE — 80061 LIPID PANEL: CPT

## 2017-11-13 PROCEDURE — 97110 THERAPEUTIC EXERCISES: CPT

## 2017-11-13 PROCEDURE — 80053 COMPREHEN METABOLIC PANEL: CPT

## 2017-11-13 PROCEDURE — 97112 NEUROMUSCULAR REEDUCATION: CPT

## 2017-11-13 PROCEDURE — 36415 COLL VENOUS BLD VENIPUNCTURE: CPT

## 2017-11-13 PROCEDURE — 82570 ASSAY OF URINE CREATININE: CPT

## 2017-11-13 PROCEDURE — 82043 UR ALBUMIN QUANTITATIVE: CPT

## 2017-11-14 ENCOUNTER — GENERIC CONVERSION - ENCOUNTER (OUTPATIENT)
Dept: OTHER | Facility: OTHER | Age: 57
End: 2017-11-14

## 2017-11-15 ENCOUNTER — APPOINTMENT (OUTPATIENT)
Dept: PHYSICAL THERAPY | Facility: CLINIC | Age: 57
End: 2017-11-15
Payer: COMMERCIAL

## 2017-11-17 ENCOUNTER — APPOINTMENT (OUTPATIENT)
Dept: PHYSICAL THERAPY | Facility: CLINIC | Age: 57
End: 2017-11-17
Payer: COMMERCIAL

## 2017-11-24 ENCOUNTER — ALLSCRIPTS OFFICE VISIT (OUTPATIENT)
Dept: OTHER | Facility: OTHER | Age: 57
End: 2017-11-24

## 2017-11-24 DIAGNOSIS — Z89.511 ACQUIRED ABSENCE OF RIGHT LEG BELOW KNEE (HCC): ICD-10-CM

## 2017-11-24 DIAGNOSIS — E11.9 TYPE 2 DIABETES MELLITUS WITHOUT COMPLICATIONS (HCC): ICD-10-CM

## 2017-11-25 NOTE — PROGRESS NOTES
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 (V49 75) (Z89 511)  3  Ambulatory dysfunction (719 7) (R26 2)    Plan  Diabetes mellitus    · Start: Accu-Chek Patricia Plus In Vitro Strip; USE 1 STRIP Daily   · (1) HEMOGLOBIN A1C; Status:Active; Requested for:24Nov2017;    · *VB - Eye Exam; Status:Active; Requested for:24Nov2017;   Diabetes mellitus, Partial nontraumatic amputation of left foot, Status post below knee amputation ofright lower extremity    · Follow-up visit in 4 Months Evaluation and Treatment  Follow-up  Status: Hold For - Scheduling Requested for: [id number]  Diabetes mellitus, 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    · (1) COMPREHENSIVE METABOLIC PANEL; Status:Active; Requested for:24Nov2017;     Discussion/Summary  Discussion Summary:   Continue low carb diet, monitor sugars  [place] a1c  Increase water  Do home exercises  Fall precautions  Continue present rx  Rto early spring/prn  Mentioned shingrix, pt will continue1     Medication SE Review and Pt Understands Tx: Possible side effects of new medications were reviewed with the patient/guardian today  The treatment plan was reviewed with the patient/guardian  The patient/guardian understands and agrees with the treatment plan    Self Referrals:   Self Referrals: No       1 Amended By: Dedrick Dyer; Nov 24 2017 10:32 PM EST    Chief Complaint  Chief Complaint Free Text Note Form: Pt presents for routine f/up exam  Pt feels well today  No falls and he does use cane  Refills done as requested  Appetite is normal per patient  Pt is due eye exam and he is aware, will sched  in the new year  Chief Complaint Chronic Condition [facility]: Patient is here today for follow up of chronic conditions described in HPI  History of Present Illness  HPI: Pt feels well  Using cane today, prosthetic training went well  No falls  Blood sugars are better, now 160 range   No cp or sob  Doing home exercises  1        1 Amended By: Corina Mendez; Nov 24 2017 10:30 PM EST    Review of Systems  Complete-Male:  Constitutional:1  No fever or chills, feels well, no tiredness, no recent weight gain or weight loss1   Eyes:1  No complaints of eye pain, no red eyes, no discharge from eyes, no itchy eyes1   ENT:1  no complaints of earache, no hearing loss, no nosebleeds, no nasal discharge, no sore throat, no hoarseness1   Cardiovascular: 1  No complaints of slow heart rate, no fast heart rate, no chest pain, no palpitations, no leg claudication, no lower extremity1   Respiratory:1  shortness of breath during exertion1   Gastrointestinal:1  No complaints of abdominal pain, no constipation, no nausea or vomiting, no diarrhea or bloody stools1   Genitourinary:1  No complaints of dysuria, no incontinence, no hesitancy, no nocturia, no genital lesion, no testicular pain1   Musculoskeletal:1  joint stiffness1   Integumentary:1  No complaints of skin rash or skin lesions, no itching, no skin wound, no dry skin1   Neurological:1  difficulty walking1   Endocrine:1  No complaints of proptosis, no hot flashes, no muscle weakness, no erectile dysfunction, no deepening of the voice, no feelings of weakness1   Hematologic/Lymphatic:1  No complaints of swollen glands, no swollen glands in the neck, does not bleed easily, no easy bruising1         1 Amended By: Corina Mendez; Nov 24 2017 10:30 PM EST    Active Problems  1  Ambulatory dysfunction (719 7) (R26 2)  2  Diabetes mellitus (250 00) (E11 9)  3  Encounter for prostate cancer screening (V76 44) (Z12 5)  4  Occult blood positive stool (792 1) (R19 5)  5  Partial nontraumatic amputation of left foot (V49 73) (Z89 432)  6  Screening for depression (V79 0) (Z13 89)  7  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 (V49 75) (Z89 511)  8  Wheelchair bound (V46 3) (Z99 3)    Past Medical History  1   History of No significant past medical history  Active Problems And Past Medical History Reviewed: The active problems and past medical history were reviewed and updated today  Surgical History  1  History of Amputation Of Leg Below Knee  Surgical History Reviewed: The surgical history was reviewed and updated today  Family History  Mother   1  Family history of diabetes mellitus (V18 0) (Z83 3)  Family History Reviewed: The family history was reviewed and updated today  Social History     · Always uses seat belt   · Caffeine use (V49 89) (F15 90)   · Dental care, regularly   · Former smoker (V15 82) (N38 355)   · No illicit drug use   · Occasional alcohol use   · Retired   · Single  Social History Reviewed: The social history was reviewed and updated today  The social history was reviewed and is unchanged  Current Meds  1  Adult Aspirin Low Strength 81 MG TBDP; Take 1 tablet daily; Therapy: (Recorded:17Aug2017) to Recorded  2  Atorvastatin Calcium TABS; TAKE 1 TABLET DAILY; Therapy: (Recorded:17Aug2017) to Recorded  3  HumaLOG 100 UNIT/ML Subcutaneous Solution; Therapy: (Recorded:27Sep2017) to Recorded  4  Lisinopril 2 5 MG Oral Tablet; TAKE 1 TABLET DAILY  Requested for: [id number]; Last Rx:[id number] Ordered  5  MetFORMIN HCl - 500 MG Oral Tablet; TAKE 1 TABLET TWICE DAILY WITH FOOD  Requested for: 33JFB2996; Last Rx:[id number] Ordered  6  Multi Vitamin Mens TABS; TAKE 1 TABLET DAILY; Therapy: (Recorded:17Aug2017) to Recorded  Medication List Reviewed: The medication list was reviewed and updated today  Allergies  1  No Known Drug Allergies  2   No Known Latex Allergies    Vitals  Vital Signs    Recorded: 07IWP4360 10:42AM Recorded: 21XEE3527 10:17AM   Temperature  97 F, Tympanic   Heart Rate  796   Systolic 009    Diastolic 72    Height  5 ft 10 in   Weight  245 lb 5 oz   BMI Calculated  35 2   BSA Calculated  2 28   O2 Saturation  95, RA       Physical Exam   Constitutional1   General appearance: No acute distress, well appearing and well nourished  1 -- Looks well1   Eyes1   Conjunctiva and lids: No swelling, erythema, or discharge  1   Pupils and irises: Equal, round and reactive to light  1   Ears, Nose, Mouth, and Throat1   External inspection of ears and nose: Normal 1   Otoscopic examination: Tympanic membrance translucent with normal light reflex  Canals patent without erythema  1   Nasal mucosa, septum, and turbinates: Normal without edema or erythema  1   Oropharynx: Normal with no erythema, edema, exudate or lesions  1   Pulmonary1   Respiratory effort: No increased work of breathing or signs of respiratory distress  1   Auscultation of lungs: Clear to auscultation, equal breath sounds bilaterally, no wheezes, no rales, no rhonci  1   Cardiovascular1   Palpation of heart: Normal PMI, no thrills  1   Auscultation of heart: Normal rate and rhythm, normal S1 and S2, without murmurs  1   Examination of extremities for edema and/or varicosities: Normal 1   Carotid pulses: Normal 1   Abdomen1   Abdomen: Non-tender, no masses  1   Liver and spleen: No hepatomegaly or splenomegaly  1   Lymphatic1   Palpation of lymph nodes in neck: No lymphadenopathy  1   Musculoskeletal1   Gait and station: Abnormal  1 -- Uses cane1   Digits and nails: Normal without clubbing or cyanosis  1   Inspection/palpation of joints, bones, and muscles: Abnormal  1   Skin1   Skin and subcutaneous tissue: Normal without rashes or lesions  1   Neurologic1   Cranial nerves: Cranial nerves 2-12 intact  1   Reflexes: 2+ and symmetric  1   Sensation: No sensory loss  1   Psychiatric1   Orientation to person, place and time: Normal 1   Mood and affect: Normal 1          1 Amended By: Corina Mendez;  Nov 24 2017 10:31 PM EST    Signatures   Electronically signed by : Marcelino Solis DO; Nov 24 2017 10:47AM EST                       (Author)    Electronically signed by : Marcelino Solis DO; Nov 24 2017 10:32PM EST                       (Author)

## 2018-01-13 NOTE — PROCEDURES
Procedures by Stevo Saravia RN at 7/27/2017 11:07 AM      Author:  Stevo Saravia RN Service:  (none) Author Type:  Registered Nurse    Filed:  7/27/2017 11:08 AM Date of Service:  7/27/2017 11:07 AM Status:  Signed    :  Stevo Saravia RN (Registered Nurse)        Procedure Orders:       1  Insert PICC line [id number] ordered by Carey Brown MD at 07/27/17 1003                  Insert PICC  line  Date/Time: 7/27/2017 11:07 AM  Performed by: Arden Verma by: Dereck Bishop     Patient location:  Bedside  Other Assisting Provider:  Yes (comment) (Sabrina arias)    Consent:     Consent obtained:  Written    Consent given by:  Patient    Procedural risks discussed: by md     Alternatives discussed: by md   Universal protocol:     Procedure explained and questions answered to patient or proxy's satisfaction: yes      Relevant documents present and verified: yes      Test results available and properly labeled: yes       Imaging studies available: yes      Required blood products, implants, devices, and special equipment available: yes      Site/side marked: yes      Immediately prior to procedure, a time out was called: yes      Patient identity confirmed:  Verbally with patient and arm band  Pre-procedure details:     Hand hygiene: Hand hygiene performed prior to insertion      Sterile barrier technique: All elements of maximal sterile technique followed      Skin preparation:  2% chlorhexidine    Skin preparation agent: Skin preparation agent completely dried prior to procedure    Indications:     PICC line indications: long term antibiotics    Anesthesia (see MAR for exact dosages):      Anesthesia method:  Local infiltration    Local anesthetic:  Lidocaine 1% w/o epi  Procedure details:     Vessel type: vein      Laterality:  Right    Approach: percutaneous technique used      Patient position:  Flat    Procedural supplies:  Double lumen    Catheter size:  5 Fr    Landmarks identified: yes      Ultrasound guidance: yes      Sterile ultrasound techniques: Sterile gel and sterile probe covers were used      Number of attempts:  1    Successful placement: yes      Vessel of catheter tip end:  Sherlock 3CG confirmed    Total catheter length (cm):  41    Catheter out on skin (cm):  0    Max flow rate:  999    Arm circumference:  33  Post-procedure details:     Post-procedure:  Dressing applied and securement device placed    Assessment:  Blood return through all ports    Post-procedure complications: none      Patient tolerance of procedure:   Tolerated well, no immediate complications                     Received for:Provider  EPIC   Jul 27 2017 11:08AM [facility] Standard Time

## 2018-01-14 VITALS
SYSTOLIC BLOOD PRESSURE: 126 MMHG | HEIGHT: 70 IN | DIASTOLIC BLOOD PRESSURE: 75 MMHG | HEART RATE: 78 BPM | WEIGHT: 202 LBS | BODY MASS INDEX: 28.92 KG/M2 | TEMPERATURE: 97.1 F

## 2018-01-14 VITALS
TEMPERATURE: 97 F | WEIGHT: 245.31 LBS | OXYGEN SATURATION: 95 % | BODY MASS INDEX: 35.12 KG/M2 | HEART RATE: 105 BPM | SYSTOLIC BLOOD PRESSURE: 124 MMHG | HEIGHT: 70 IN | DIASTOLIC BLOOD PRESSURE: 72 MMHG

## 2018-01-14 VITALS
OXYGEN SATURATION: 94 % | TEMPERATURE: 98.3 F | SYSTOLIC BLOOD PRESSURE: 124 MMHG | WEIGHT: 202 LBS | BODY MASS INDEX: 28.92 KG/M2 | HEART RATE: 99 BPM | DIASTOLIC BLOOD PRESSURE: 72 MMHG | HEIGHT: 70 IN

## 2018-01-14 VITALS
WEIGHT: 202 LBS | DIASTOLIC BLOOD PRESSURE: 61 MMHG | TEMPERATURE: 97.9 F | HEIGHT: 70 IN | HEART RATE: 89 BPM | SYSTOLIC BLOOD PRESSURE: 137 MMHG | BODY MASS INDEX: 28.92 KG/M2

## 2018-01-17 NOTE — MISCELLANEOUS
Message  Patient had a R BKA done was told by Dr Rashaun Arguello that he would get back to him after he contacts someone about getting him a Prosthetic  I looked in the patients note but Dr Rashaun Arguello did not complete it and was unable to give him information he needed  I reached out to Ricardo Lowe and she remembered the person and stated that she would call the patient with the information  Active Problems    1  Diabetes mellitus (250 00) (E11 9)    Current Meds   1  Adult Aspirin Low Strength 81 MG TBDP; Take 1 tablet daily; Therapy: (Recorded:17Aug2017) to Recorded   2  Atorvastatin Calcium TABS; TAKE 1 TABLET DAILY; Therapy: (Recorded:17Aug2017) to Recorded   3  Lisinopril 2 5 MG Oral Tablet; TAKE 1 TABLET DAILY; Therapy: (Recorded:17Aug2017) to Recorded   4  MetFORMIN HCl - 500 MG Oral Tablet; TAKE 1 TABLET TWICE DAILY WITH FOOD; Therapy: (Recorded:17Aug2017) to Recorded   5  Multi Vitamin Mens TABS; TAKE 1 TABLET DAILY; Therapy: (Recorded:17Aug2017) to Recorded    Allergies    1   No Known Drug Allergies    Signatures   Electronically signed by : Pantera Olivas OM; Aug 28 2017 12:45PM EST                       (Author)

## 2018-01-17 NOTE — MISCELLANEOUS
Message  I spoke with Lata Shelton, He has Ilya from [employer] card  He is to call [facility] and make an appointment  I was told that [facility] are the ones who should set up his prosthetic  I told Lata Shelton if there are any problems at all to call me and I will figure it out  Active Problems    1  Diabetes mellitus (250 00) (E11 9)    Current Meds   1  Adult Aspirin Low Strength 81 MG TBDP; Take 1 tablet daily; Therapy: (Recorded:17Aug2017) to Recorded   2  Atorvastatin Calcium TABS; TAKE 1 TABLET DAILY; Therapy: (Recorded:17Aug2017) to Recorded   3  Lisinopril 2 5 MG Oral Tablet; TAKE 1 TABLET DAILY; Therapy: (Recorded:17Aug2017) to Recorded   4  MetFORMIN HCl - 500 MG Oral Tablet; TAKE 1 TABLET TWICE DAILY WITH FOOD; Therapy: (Recorded:17Aug2017) to Recorded   5  Multi Vitamin Mens TABS; TAKE 1 TABLET DAILY; Therapy: (Recorded:17Aug2017) to Recorded    Allergies    1   No Known Drug Allergies    Signatures   Electronically signed by : Daniela Briceño, ; Aug 29 2017  8:05AM EST                       (Author)

## 2018-03-22 RX ORDER — ATORVASTATIN CALCIUM 80 MG/1
1 TABLET, FILM COATED ORAL DAILY
COMMUNITY
End: 2018-07-09 | Stop reason: SDUPTHER

## 2018-03-26 ENCOUNTER — OFFICE VISIT (OUTPATIENT)
Dept: INTERNAL MEDICINE CLINIC | Facility: CLINIC | Age: 58
End: 2018-03-26
Payer: COMMERCIAL

## 2018-03-26 VITALS
DIASTOLIC BLOOD PRESSURE: 70 MMHG | HEIGHT: 70 IN | WEIGHT: 249.13 LBS | SYSTOLIC BLOOD PRESSURE: 124 MMHG | TEMPERATURE: 97.2 F | BODY MASS INDEX: 35.66 KG/M2 | HEART RATE: 100 BPM | OXYGEN SATURATION: 97 %

## 2018-03-26 DIAGNOSIS — Z89.511 HX OF RIGHT BKA (HCC): ICD-10-CM

## 2018-03-26 DIAGNOSIS — R19.5 HEME POSITIVE STOOL: Primary | ICD-10-CM

## 2018-03-26 DIAGNOSIS — M86.372 CHRONIC MULTIFOCAL OSTEOMYELITIS OF LEFT FOOT (HCC): ICD-10-CM

## 2018-03-26 DIAGNOSIS — R26.2 AMBULATORY DYSFUNCTION: ICD-10-CM

## 2018-03-26 DIAGNOSIS — E11.42 TYPE 2 DIABETES MELLITUS WITH DIABETIC POLYNEUROPATHY, UNSPECIFIED LONG TERM INSULIN USE STATUS: ICD-10-CM

## 2018-03-26 PROBLEM — E11.9 DIABETES MELLITUS (HCC): Status: ACTIVE | Noted: 2017-01-27

## 2018-03-26 PROCEDURE — 99214 OFFICE O/P EST MOD 30 MIN: CPT | Performed by: INTERNAL MEDICINE

## 2018-03-26 RX ORDER — INSULIN GLARGINE 100 [IU]/ML
15 INJECTION, SOLUTION SUBCUTANEOUS
COMMUNITY
End: 2018-07-27 | Stop reason: SDUPTHER

## 2018-03-26 NOTE — PROGRESS NOTES
Assessment/Plan:    No problem-specific Assessment & Plan notes found for this encounter  Diagnoses and all orders for this visit:    Heme positive stool  -     Ambulatory referral to Colorectal Surgery; Future    Type 2 diabetes mellitus with diabetic polyneuropathy, unspecified long term insulin use status (HCC)    Chronic multifocal osteomyelitis of left foot (HCC)    Hx of right BKA (HCC)    Ambulatory dysfunction    Other orders  -     atorvastatin (LIPITOR) 80 mg tablet; Take 1 tablet by mouth daily  -     Insulin Syringe-Needle U-100 (B-D INS SYR ULTRAFINE 1CC/30G) 30G X 1/2" 1 ML MISC; by Does not apply route daily  -     glucose blood (GLUCOCARD VITAL TEST) test strip; 1 Squirt by In Vitro route daily  -     Discontinue: insulin lispro (HUMALOG) 100 units/mL injection; Inject under the skin  -     Multiple Vitamin (MULTI-VITAMIN DAILY PO); Take 1 tablet by mouth daily  -     insulin glargine (LANTUS) 100 units/mL subcutaneous injection; Inject 20 Units under the skin daily at bedtime           Patient ID: Eileen Murphy is a 60 y o  male  HPI   Pt doing better overall  No falls  Sugar is below 200 range and he is trying more with his diet  He does some home exercises  He feels better and has been starting to get out of the house  Did not get labs yet  The following portions of the patient's history were reviewed and updated as appropriate:   Past Medical History:   Diagnosis Date    Diabetes mellitus ([facility] [address] )     Hypertension      Past Surgical History:   Procedure Laterality Date    LEG AMPUTATION THROUGH LOWER TIBIA AND FIBULA Right 7/25/2017    Procedure: AMPUTATION BELOW KNEE (BKA);   Surgeon: Dionicio Sandoval MD;  [facility]: [facility];  Service: General    MS AMPUTATION FOOT,TRANSMETATARSAL Right 1/26/2017    Procedure: AMPUTATION TRANSMETATARSAL (TMA); OPEN;  Surgeon: Zabrina Melendez DPM;  [facility];  Service: Podiatry    MS AMPUTATION METATARSAL+TOE,SINGLE Left 1/30/2017 Procedure: Left partial 1st ray amputation;  Surgeon: Cristela Orourke DPM;  Location: [facility];  Service: Podiatry   [address] Right 1/30/2017    Procedure: DEBRIDEMENT FOOT/TOE ([address]) delayed closure, LINDA;  Surgeon: Cristela Orourke DPM;  Location: [facility];  Service:      Social History     Social History    Marital status: Single     Spouse name: N/A    Number of children: N/A    Years of education: N/A     Occupational History    retired      Social History Main Topics    Smoking status: Former Smoker    Smokeless tobacco: Never Used      Comment: quit 9 years ago    Alcohol use 6 6 oz/week     6 Cans of beer, 5 Shots of liquor per week      Comment: social ; occasional use as per [employer]    Drug use: No    Sexual activity: Not on file     Other Topics Concern    Not on file     Social History Narrative    Always uses seat belt    Caffeine use    Dental care regularly     Family History   Problem Relation Age of Onset    Diabetes Mother      No Known Allergies      Review of Systems   Constitutional: Negative  HENT: Negative  Eyes: Negative  Respiratory: Positive for shortness of breath  Cardiovascular: Negative  Gastrointestinal: Negative  Endocrine: Negative  Genitourinary: Negative  Musculoskeletal: Positive for gait problem  Skin: Negative  Allergic/Immunologic: Negative  Neurological: Positive for numbness  Hematological: Negative  Psychiatric/Behavioral: Negative  /70   Pulse 100   Temp (!) 97 2 °F (36 2 °C) (Tympanic)   Ht 5' 10" (1 778 m)   Wt 113 kg (249 lb 2 oz)   SpO2 97%   BMI 35 75 kg/m²      Physical Exam   Constitutional: He is oriented to person, place, and time  He appears well-developed and well-nourished  No distress  HENT:   Head: Normocephalic and atraumatic     Right Ear: External ear normal    Left Ear: External ear normal    Nose: Nose normal    Mouth/Throat: Oropharynx is clear and moist  No oropharyngeal exudate  Eyes: Conjunctivae and EOM are normal  Pupils are equal, round, and reactive to light  No scleral icterus  Neck: Normal range of motion  Neck supple  No JVD present  Cardiovascular: Normal rate, regular rhythm, normal heart sounds and intact distal pulses  Pulmonary/Chest: Effort normal and breath sounds normal    Abdominal: Soft  Bowel sounds are normal    Musculoskeletal: He exhibits no edema, tenderness or deformity  Lymphadenopathy:     He has no cervical adenopathy  Neurological: He is alert and oriented to person, place, and time  He has normal reflexes  He displays normal reflexes  No cranial nerve deficit  He exhibits abnormal muscle tone  Coordination abnormal    Skin: Skin is warm and dry  He is not diaphoretic  Psychiatric: He has a normal mood and affect  His behavior is normal  Judgment and thought content normal    Nursing note and vitals reviewed

## 2018-06-27 ENCOUNTER — APPOINTMENT (OUTPATIENT)
Dept: LAB | Facility: MEDICAL CENTER | Age: 58
End: 2018-06-27
Payer: COMMERCIAL

## 2018-06-27 ENCOUNTER — TRANSCRIBE ORDERS (OUTPATIENT)
Dept: RADIOLOGY | Facility: MEDICAL CENTER | Age: 58
End: 2018-06-27

## 2018-06-27 DIAGNOSIS — E11.9 TYPE 2 DIABETES MELLITUS WITHOUT COMPLICATIONS (HCC): ICD-10-CM

## 2018-06-27 DIAGNOSIS — Z89.511 ACQUIRED ABSENCE OF RIGHT LEG BELOW KNEE (HCC): ICD-10-CM

## 2018-06-27 LAB
ALBUMIN SERPL BCP-MCNC: 3.7 G/DL (ref 3.5–5)
ALP SERPL-CCNC: 123 U/L (ref 46–116)
ALT SERPL W P-5'-P-CCNC: 28 U/L (ref 12–78)
ANION GAP SERPL CALCULATED.3IONS-SCNC: 8 MMOL/L (ref 4–13)
AST SERPL W P-5'-P-CCNC: 12 U/L (ref 5–45)
BILIRUB SERPL-MCNC: 0.67 MG/DL (ref 0.2–1)
BUN SERPL-MCNC: 13 MG/DL (ref 5–25)
CALCIUM SERPL-MCNC: 9.5 MG/DL (ref 8.3–10.1)
CHLORIDE SERPL-SCNC: 104 MMOL/L (ref 100–108)
CO2 SERPL-SCNC: 26 MMOL/L (ref 21–32)
CREAT SERPL-MCNC: 1.02 MG/DL (ref 0.6–1.3)
EST. AVERAGE GLUCOSE BLD GHB EST-MCNC: 174 MG/DL
GFR SERPL CREATININE-BSD FRML MDRD: 80 ML/MIN/1.73SQ M
GLUCOSE P FAST SERPL-MCNC: 147 MG/DL (ref 65–99)
HBA1C MFR BLD: 7.7 % (ref 4.2–6.3)
POTASSIUM SERPL-SCNC: 4.5 MMOL/L (ref 3.5–5.3)
PROT SERPL-MCNC: 8 G/DL (ref 6.4–8.2)
SODIUM SERPL-SCNC: 138 MMOL/L (ref 136–145)

## 2018-06-27 PROCEDURE — 36415 COLL VENOUS BLD VENIPUNCTURE: CPT

## 2018-06-27 PROCEDURE — 83036 HEMOGLOBIN GLYCOSYLATED A1C: CPT

## 2018-06-27 PROCEDURE — 80053 COMPREHEN METABOLIC PANEL: CPT

## 2018-07-02 DIAGNOSIS — I10 HYPERTENSION, UNSPECIFIED TYPE: ICD-10-CM

## 2018-07-02 DIAGNOSIS — E11.9 TYPE 2 DIABETES MELLITUS WITHOUT COMPLICATION, WITHOUT LONG-TERM CURRENT USE OF INSULIN (HCC): ICD-10-CM

## 2018-07-02 DIAGNOSIS — E11.9 TYPE 2 DIABETES MELLITUS WITHOUT COMPLICATION, WITHOUT LONG-TERM CURRENT USE OF INSULIN (HCC): Primary | ICD-10-CM

## 2018-07-02 PROCEDURE — 4010F ACE/ARB THERAPY RXD/TAKEN: CPT | Performed by: INTERNAL MEDICINE

## 2018-07-02 RX ORDER — LISINOPRIL 2.5 MG/1
2.5 TABLET ORAL DAILY
Qty: 90 TABLET | Refills: 3 | Status: SHIPPED | OUTPATIENT
Start: 2018-07-02 | End: 2018-07-02 | Stop reason: SDUPTHER

## 2018-07-02 RX ORDER — LISINOPRIL 2.5 MG/1
2.5 TABLET ORAL DAILY
Qty: 90 TABLET | Refills: 0 | Status: SHIPPED | OUTPATIENT
Start: 2018-07-02 | End: 2018-11-02 | Stop reason: SDUPTHER

## 2018-07-09 DIAGNOSIS — E78.5 HYPERLIPIDEMIA, UNSPECIFIED HYPERLIPIDEMIA TYPE: Primary | ICD-10-CM

## 2018-07-09 RX ORDER — ATORVASTATIN CALCIUM 80 MG/1
80 TABLET, FILM COATED ORAL DAILY
Qty: 30 TABLET | Refills: 5 | Status: SHIPPED | OUTPATIENT
Start: 2018-07-09 | End: 2019-05-03 | Stop reason: SDUPTHER

## 2018-07-27 ENCOUNTER — OFFICE VISIT (OUTPATIENT)
Dept: INTERNAL MEDICINE CLINIC | Facility: CLINIC | Age: 58
End: 2018-07-27
Payer: COMMERCIAL

## 2018-07-27 VITALS
BODY MASS INDEX: 35.38 KG/M2 | HEART RATE: 110 BPM | HEIGHT: 70 IN | SYSTOLIC BLOOD PRESSURE: 122 MMHG | TEMPERATURE: 97.2 F | DIASTOLIC BLOOD PRESSURE: 70 MMHG | WEIGHT: 247.13 LBS | OXYGEN SATURATION: 96 %

## 2018-07-27 DIAGNOSIS — Z89.511 HX OF RIGHT BKA (HCC): ICD-10-CM

## 2018-07-27 DIAGNOSIS — Z12.5 PROSTATE CANCER SCREENING: ICD-10-CM

## 2018-07-27 DIAGNOSIS — Z79.4 TYPE 2 DIABETES MELLITUS WITHOUT COMPLICATION, WITH LONG-TERM CURRENT USE OF INSULIN (HCC): Primary | ICD-10-CM

## 2018-07-27 DIAGNOSIS — R26.2 AMBULATORY DYSFUNCTION: ICD-10-CM

## 2018-07-27 DIAGNOSIS — E11.9 TYPE 2 DIABETES MELLITUS WITHOUT COMPLICATION, WITH LONG-TERM CURRENT USE OF INSULIN (HCC): Primary | ICD-10-CM

## 2018-07-27 DIAGNOSIS — I10 ESSENTIAL HYPERTENSION: ICD-10-CM

## 2018-07-27 PROBLEM — E78.5 HYPERLIPIDEMIA: Status: ACTIVE | Noted: 2017-04-14

## 2018-07-27 PROCEDURE — 99214 OFFICE O/P EST MOD 30 MIN: CPT | Performed by: INTERNAL MEDICINE

## 2018-07-27 PROCEDURE — 3074F SYST BP LT 130 MM HG: CPT | Performed by: INTERNAL MEDICINE

## 2018-07-27 PROCEDURE — 3078F DIAST BP <80 MM HG: CPT | Performed by: INTERNAL MEDICINE

## 2018-07-27 PROCEDURE — 3008F BODY MASS INDEX DOCD: CPT | Performed by: INTERNAL MEDICINE

## 2018-07-27 RX ORDER — INSULIN GLARGINE 100 [IU]/ML
15 INJECTION, SOLUTION SUBCUTANEOUS
Qty: 10 ML | Refills: 5 | Status: SHIPPED | OUTPATIENT
Start: 2018-07-27 | End: 2018-07-27 | Stop reason: SDUPTHER

## 2018-07-27 RX ORDER — INSULIN GLARGINE 100 [IU]/ML
15 INJECTION, SOLUTION SUBCUTANEOUS
Qty: 10 ML | Refills: 0 | Status: CANCELLED | OUTPATIENT
Start: 2018-07-27

## 2018-07-27 RX ORDER — ATORVASTATIN CALCIUM 40 MG/1
TABLET, FILM COATED ORAL
COMMUNITY
Start: 2018-05-29 | End: 2018-07-27 | Stop reason: SDUPTHER

## 2018-07-27 NOTE — PROGRESS NOTES
Assessment/Plan:         Diagnoses and all orders for this visit:    Type 2 diabetes mellitus without complication, with long-term current use of insulin (HCC)  -     insulin glargine (LANTUS) 100 units/mL subcutaneous injection; Inject 15 Units under the skin daily at bedtime  -     HEMOGLOBIN A1C W/ EAG ESTIMATION; Future  -     LDL cholesterol, direct; Future    Prostate cancer screening  -     PSA, Total Screen; Future    Essential hypertension  -     Comprehensive metabolic panel; Future  -     LDL cholesterol, direct; Future    Hx of right BKA (HCC)  Pt stable - getting out a little more and is comfortable on the prosthesis    Ambulatory dysfunction  He is comfortable on prosthesis and does some home exercises    Other orders  -     B-D INS SYRINGE 0 5CC/30GX1/2" 30G X 1/2" 0 5 ML MISC;   -     Discontinue: atorvastatin (LIPITOR) 40 mg tablet;     Rto 3 months  Flu shot in the Fall  Discussed shingrix       Patient ID: Vasquez Paul is a 60 y o  male  HPI   Pt doing well generally  Average sugar is down to 160 range  He has appt with GI for heme positive stools - bowels regular and no bleeding noted  No abdominal pain or weight loss  No falls and is more active - getting out of the house a bit more  Review of Systems   HENT: Negative  Eyes: Negative  Respiratory: Negative  Cardiovascular: Negative  Gastrointestinal: Negative  Endocrine: Negative  Genitourinary: Negative  Musculoskeletal: Positive for arthralgias  Skin: Negative  Allergic/Immunologic: Negative  Neurological: Negative for weakness  Hematological: Negative  Psychiatric/Behavioral: Negative  /70   Pulse (!) 110   Temp (!) 97 2 °F (36 2 °C) (Tympanic)   Ht 5' 10" (1 778 m)   Wt 112 kg (247 lb 2 oz)   SpO2 96%   BMI 35 46 kg/m²      Physical Exam   Constitutional: He is oriented to person, place, and time  He appears well-developed and well-nourished  No distress     HENT:   Head: Normocephalic and atraumatic  Right Ear: External ear normal    Left Ear: External ear normal    Nose: Nose normal    Mouth/Throat: Oropharynx is clear and moist  No oropharyngeal exudate  Eyes: Conjunctivae and EOM are normal  Pupils are equal, round, and reactive to light  No scleral icterus  Neck: Normal range of motion  Neck supple  No JVD present  Cardiovascular: Normal rate, regular rhythm, normal heart sounds and intact distal pulses  Pulmonary/Chest: Effort normal and breath sounds normal    Abdominal: Soft  Bowel sounds are normal    Musculoskeletal: Normal range of motion  He exhibits deformity  He exhibits no edema or tenderness  Lymphadenopathy:     He has no cervical adenopathy  Neurological: He is alert and oriented to person, place, and time  He has normal reflexes  He displays normal reflexes  No cranial nerve deficit  He exhibits abnormal muscle tone  Coordination abnormal    Right bka   Skin: Skin is warm and dry  He is not diaphoretic  Psychiatric: He has a normal mood and affect  His behavior is normal  Judgment and thought content normal    Nursing note and vitals reviewed

## 2018-08-06 ENCOUNTER — TELEPHONE (OUTPATIENT)
Dept: INTERNAL MEDICINE CLINIC | Facility: CLINIC | Age: 58
End: 2018-08-06

## 2018-08-06 NOTE — TELEPHONE ENCOUNTER
Pt states that he can no longer afford the lantus 15 unit at bedtime  He states that he spends $210 every 3 months on the medication and wants to know if there is a cheaper alternative

## 2018-08-06 NOTE — TELEPHONE ENCOUNTER
Let patient know - are there any other insulins like levimir or another optionor can we get prior auth or patient assistance

## 2018-08-06 NOTE — TELEPHONE ENCOUNTER
Barbara Desouza can you check with his insurance and see if there is a less expensive option similar to lantus

## 2018-10-08 DIAGNOSIS — E16.2 HYPOGLYCEMIA: Primary | ICD-10-CM

## 2018-10-22 ENCOUNTER — APPOINTMENT (OUTPATIENT)
Dept: LAB | Facility: MEDICAL CENTER | Age: 58
End: 2018-10-22
Payer: COMMERCIAL

## 2018-10-22 DIAGNOSIS — Z79.4 TYPE 2 DIABETES MELLITUS WITHOUT COMPLICATION, WITH LONG-TERM CURRENT USE OF INSULIN (HCC): ICD-10-CM

## 2018-10-22 DIAGNOSIS — Z12.5 PROSTATE CANCER SCREENING: ICD-10-CM

## 2018-10-22 DIAGNOSIS — I10 ESSENTIAL HYPERTENSION: ICD-10-CM

## 2018-10-22 DIAGNOSIS — E11.9 TYPE 2 DIABETES MELLITUS WITHOUT COMPLICATION, WITH LONG-TERM CURRENT USE OF INSULIN (HCC): ICD-10-CM

## 2018-10-22 LAB
ALBUMIN SERPL BCP-MCNC: 3.6 G/DL (ref 3.5–5)
ALP SERPL-CCNC: 122 U/L (ref 46–116)
ALT SERPL W P-5'-P-CCNC: 32 U/L (ref 12–78)
ANION GAP SERPL CALCULATED.3IONS-SCNC: 5 MMOL/L (ref 4–13)
AST SERPL W P-5'-P-CCNC: 11 U/L (ref 5–45)
BILIRUB SERPL-MCNC: 0.81 MG/DL (ref 0.2–1)
BUN SERPL-MCNC: 16 MG/DL (ref 5–25)
CALCIUM SERPL-MCNC: 8.8 MG/DL (ref 8.3–10.1)
CHLORIDE SERPL-SCNC: 104 MMOL/L (ref 100–108)
CO2 SERPL-SCNC: 27 MMOL/L (ref 21–32)
CREAT SERPL-MCNC: 1.09 MG/DL (ref 0.6–1.3)
EST. AVERAGE GLUCOSE BLD GHB EST-MCNC: 189 MG/DL
GFR SERPL CREATININE-BSD FRML MDRD: 74 ML/MIN/1.73SQ M
GLUCOSE P FAST SERPL-MCNC: 190 MG/DL (ref 65–99)
HBA1C MFR BLD: 8.2 % (ref 4.2–6.3)
LDLC SERPL DIRECT ASSAY-MCNC: 117 MG/DL (ref 0–100)
POTASSIUM SERPL-SCNC: 4.6 MMOL/L (ref 3.5–5.3)
PROT SERPL-MCNC: 7.8 G/DL (ref 6.4–8.2)
PSA SERPL-MCNC: 1.1 NG/ML (ref 0–4)
SODIUM SERPL-SCNC: 136 MMOL/L (ref 136–145)

## 2018-10-22 PROCEDURE — G0103 PSA SCREENING: HCPCS

## 2018-10-22 PROCEDURE — 83721 ASSAY OF BLOOD LIPOPROTEIN: CPT

## 2018-10-22 PROCEDURE — 83036 HEMOGLOBIN GLYCOSYLATED A1C: CPT

## 2018-10-22 PROCEDURE — 36415 COLL VENOUS BLD VENIPUNCTURE: CPT

## 2018-10-22 PROCEDURE — 80053 COMPREHEN METABOLIC PANEL: CPT

## 2018-10-31 ENCOUNTER — OFFICE VISIT (OUTPATIENT)
Dept: GASTROENTEROLOGY | Facility: HOSPITAL | Age: 58
End: 2018-10-31
Payer: COMMERCIAL

## 2018-10-31 VITALS
TEMPERATURE: 97.8 F | HEIGHT: 70 IN | DIASTOLIC BLOOD PRESSURE: 81 MMHG | WEIGHT: 247 LBS | SYSTOLIC BLOOD PRESSURE: 168 MMHG | BODY MASS INDEX: 35.36 KG/M2 | HEART RATE: 91 BPM

## 2018-10-31 DIAGNOSIS — Z12.11 ENCOUNTER FOR SCREENING COLONOSCOPY: ICD-10-CM

## 2018-10-31 DIAGNOSIS — E11.42 TYPE 2 DIABETES MELLITUS WITH DIABETIC POLYNEUROPATHY, UNSPECIFIED WHETHER LONG TERM INSULIN USE (HCC): ICD-10-CM

## 2018-10-31 DIAGNOSIS — Z12.11 ENCOUNTER FOR SCREENING COLONOSCOPY: Primary | ICD-10-CM

## 2018-10-31 DIAGNOSIS — R19.5 POSITIVE FECAL IMMUNOCHEMICAL TEST: Primary | ICD-10-CM

## 2018-10-31 PROCEDURE — 99244 OFF/OP CNSLTJ NEW/EST MOD 40: CPT | Performed by: INTERNAL MEDICINE

## 2018-10-31 RX ORDER — POLYETHYLENE GLYCOL 3350, SODIUM SULFATE, SODIUM CHLORIDE, POTASSIUM CHLORIDE, ASCORBIC ACID, SODIUM ASCORBATE 7.5-2.691G
2000 KIT ORAL ONCE
Qty: 2000 ML | Refills: 0 | Status: SHIPPED | OUTPATIENT
Start: 2018-10-31 | End: 2019-08-09 | Stop reason: ALTCHOICE

## 2018-10-31 NOTE — PROGRESS NOTES
[facility] - Outpatient Consultation  Judy Walters 60 y o  male MRN: [id number]  Encounter: [id number]          ASSESSMENT AND PLAN:      1  Positive fecal immunochemical test  I will schedule for colonoscopy because of his history of a fecal immunochemical test   I spoke to him about the benefits and risks of the procedure as well as instructions for the bowel preparation and answered all of his questions  - Case request operating room: COLONOSCOPY  - MOVIPREP 100 g; Take 2,000 mL by mouth once for 1 dose  Dispense: 2000 mL; Refill: 0    3  Type 2 diabetes mellitus with diabetic polyneuropathy, unspecified whether long term insulin use ([address] )  Because of his insulin-dependent diabetes mellitus will schedule his procedure at the hospital   We have given him a handout which details his medication adjustments in relation to the timing of the procedure     ______________________________________________________________________    HPI:  He presents for evaluation because of a positive fecal immunochemical test   He has never previously had a colonoscopy and he denies any family history of colon polyps or colon cancer  He also denies any bleeding, abdominal pain, change in bowel habits, and weight loss and any upper GI symptoms such as reflux or dysphagia  He has diabetes mellitus complicated by neuropathy and a right BKA  REVIEW OF SYSTEMS:    CONSTITUTIONAL: Denies any fever, chills, rigors, and weight loss  HEENT: No earache or tinnitus  Denies hearing loss or visual disturbances  CARDIOVASCULAR: No chest pain or palpitations  RESPIRATORY: Denies any cough, hemoptysis, shortness of breath or dyspnea on exertion  GASTROINTESTINAL: As noted in the History of Present Illness  GENITOURINARY: No problems with urination  Denies any hematuria or dysuria  NEUROLOGIC: No dizziness or vertigo, denies headaches  MUSCULOSKELETAL: Denies any muscle or joint pain, but has right BKA  SKIN: Denies skin rashes or itching  ENDOCRINE: Denies excessive thirst  Denies intolerance to heat or cold  PSYCHOSOCIAL: Denies depression or anxiety  Denies any recent memory loss  Historical Information   Past Medical History:   Diagnosis Date    Diabetes mellitus ([address] )     Hypertension      Past Surgical History:   Procedure Laterality Date    LEG AMPUTATION THROUGH LOWER TIBIA AND FIBULA Right 7/25/2017    Procedure: AMPUTATION BELOW KNEE (BKA);   Surgeon: Tiffany Maier MD;  [facility];  Service: [address] Right 1/26/2017    Procedure: AMPUTATION TRANSMETATARSAL (TMA); OPEN;  Surgeon: Cristela Orourke DPM;  [facility];  Service: Podiatry    SC AMPUTATION METATARSAL+TOE,SINGLE Left 1/30/2017    Procedure: Left partial 1st ray amputation;  Surgeon: Cristela Orourke DPM;  [facility];  Service: Podiatry    WOUND DEBRIDEMENT Right 1/30/2017    Procedure: DEBRIDEMENT FOOT/TOE [facility] OUT) delayed closure, LINDA;  Surgeon: Cristela Orourke DPM;  [facility];  Service:      Social History   History   Alcohol Use    6 6 oz/week    6 Cans of beer, 5 Shots of liquor per week     Comment: social ; occasional use as per [employer]     History   Drug Use No     History   Smoking Status    Former Smoker    Types: Cigarettes   Smokeless Tobacco    Never Used     Comment: quit 9 years ago     Family History   Problem Relation Age of Onset    Diabetes Mother        Meds/Allergies       Current Outpatient Prescriptions:     aspirin (ECOTRIN LOW STRENGTH) 81 mg EC tablet    atorvastatin (LIPITOR) 80 mg tablet    B-D INS SYRINGE 0 5CC/30GX1/2" 30G X 1/2" 0 5 ML MISC    glucose blood (GLUCOCARD VITAL TEST) test strip    insulin glargine (BASAGLAR KWIKPEN) 100 units/mL injection pen    Insulin Syringe-Needle U-100 (B-D INS SYR ULTRAFINE 1CC/30G) 30G X 1/2" 1 ML MISC    lisinopril (ZESTRIL) 2 5 mg tablet    metFORMIN (GLUCOPHAGE) 500 mg tablet    Multiple Vitamin (MULTI-VITAMIN DAILY PO)    MOVIPREP 100 g    No Known Allergies        Objective     Blood pressure 168/81, pulse 91, temperature 97 8 °F (36 6 °C), temperature source Tympanic, height 5' 10" (1 778 m), weight 112 kg (247 lb)  Body mass index is 35 44 kg/m²  PHYSICAL EXAM:      General Appearance:   Alert, cooperative, no distress   HEENT:   Normocephalic, atraumatic, anicteric      Neck:  Supple, symmetrical, trachea midline   Lungs:   Clear to auscultation bilaterally; no rales, rhonchi or wheezing; respirations unlabored    Heart[de-identified]   Regular rate and rhythm; no murmur, rub, or gallop  Abdomen:   Soft, obese, non-tender, non-distended; normal bowel sounds; no masses, no organomegaly    Genitalia:   Deferred    Rectal:   Deferred    Extremities:  No cyanosis, clubbing or edema, but has right BKA    Pulses:  2+ and symmetric    Skin:  No jaundice, rashes, or lesions    Lymph nodes:  No palpable cervical lymphadenopathy        Lab Results:   No visits with results within 1 Day(s) from this visit  Latest known visit with results is:   Appointment on 10/22/2018   Component Date Value    PSA 10/22/2018 1 1     Hemoglobin A1C 10/22/2018 8 2*    EAG 10/22/2018 189     Sodium 10/22/2018 136     Potassium 10/22/2018 4 6     Chloride 10/22/2018 104     CO2 10/22/2018 27     ANION GAP 10/22/2018 5     BUN 10/22/2018 16     Creatinine 10/22/2018 1 09     Glucose, Fasting 10/22/2018 190*    Calcium 10/22/2018 8 8     AST 10/22/2018 11     ALT 10/22/2018 32     Alkaline Phosphatase 10/22/2018 122*    Total Protein 10/22/2018 7 8     Albumin 10/22/2018 3 6     Total Bilirubin 10/22/2018 0 81     eGFR 10/22/2018 74     LDL Direct 10/22/2018 117*         Radiology Results:   No results found

## 2018-10-31 NOTE — LETTER
October 31, 2018     Lorelei Stallworth DO  [facility]    Patient: Quan Joshua   YOB: 1960   Date of Visit: 10/31/2018       Dear Dr Donta Short: Thank you for referring Cristy Elena to me for evaluation  Below are my notes for this consultation  If you have questions, please do not hesitate to call me  I look forward to following your patient along with you  Sincerely,        Cherie Cooper MD        CC: No Recipients  Cherie Cooper MD  10/31/2018 12:08 PM  Sign at close encounter  [facility] - Outpatient Consultation  Quan Joshua 60 y o  male MRN: [id number]  Encounter: [id number]          ASSESSMENT AND PLAN:      1  Positive fecal immunochemical test  I will schedule for colonoscopy because of his history of a fecal immunochemical test   I spoke to him about the benefits and risks of the procedure as well as instructions for the bowel preparation and answered all of his questions  - Case request operating room: COLONOSCOPY  - MOVIPREP 100 g; Take 2,000 mL by mouth once for 1 dose  Dispense: 2000 mL; Refill: 0    3  Type 2 diabetes mellitus with diabetic polyneuropathy, unspecified whether long term insulin use ([facility] )  Because of his insulin-dependent diabetes mellitus will schedule his procedure at the hospital   We have given him a handout which details his medication adjustments in relation to the timing of the procedure     ______________________________________________________________________    HPI:  He presents for evaluation because of a positive fecal immunochemical test   He has never previously had a colonoscopy and he denies any family history of colon polyps or colon cancer  He also denies any bleeding, abdominal pain, change in bowel habits, and weight loss and any upper GI symptoms such as reflux or dysphagia  He has diabetes mellitus complicated by neuropathy and a right BKA        REVIEW OF SYSTEMS:    CONSTITUTIONAL: Denies any fever, chills, rigors, and weight loss  HEENT: No earache or tinnitus  Denies hearing loss or visual disturbances  CARDIOVASCULAR: No chest pain or palpitations  RESPIRATORY: Denies any cough, hemoptysis, shortness of breath or dyspnea on exertion  GASTROINTESTINAL: As noted in the History of Present Illness  GENITOURINARY: No problems with urination  Denies any hematuria or dysuria  NEUROLOGIC: No dizziness or vertigo, denies headaches  MUSCULOSKELETAL: Denies any muscle or joint pain, but has right BKA  SKIN: Denies skin rashes or itching  ENDOCRINE: Denies excessive thirst  Denies intolerance to heat or cold  PSYCHOSOCIAL: Denies depression or anxiety  Denies any recent memory loss  Historical Information   Past Medical History:   Diagnosis Date    Diabetes mellitus [address] )     Hypertension      Past Surgical History:   Procedure Laterality Date    LEG AMPUTATION THROUGH LOWER TIBIA AND FIBULA Right 7/25/2017    Procedure: AMPUTATION BELOW KNEE (BKA);   Surgeon: Nanette Hopper MD;  [facility];  Service: General    NC AMPUTATION FOOT,TRANSMETATARSAL Right 1/26/2017    Procedure: AMPUTATION TRANSMETATARSAL (TMA); OPEN;  Surgeon: Lindy Hugo DPM;  [facility];  Service: Podiatry    NC AMPUTATION METATARSAL+TOE,SINGLE Left 1/30/2017    Procedure: Left partial 1st ray amputation;  Surgeon: Lindy Hugo DPM;  [facility];  Service: Podiatry    WOUND DEBRIDEMENT Right 1/30/2017    Procedure: DEBRIDEMENT FOOT/TOE ([address]) delayed closure, LINDA;  Surgeon: Lindy Hugo DPM;  [facility];  Service:      Social History   History   Alcohol Use    6 6 oz/week    6 Cans of beer, 5 Shots of liquor per week     Comment: social ; occasional use as per [employer]     History   Drug Use No     History   Smoking Status    Former Smoker    Types: Cigarettes   Smokeless Tobacco    Never Used     Comment: quit 9 years ago Family History   Problem Relation Age of Onset    Diabetes Mother        Meds/Allergies       Current Outpatient Prescriptions:     aspirin (ECOTRIN LOW STRENGTH) 81 mg EC tablet    atorvastatin (LIPITOR) 80 mg tablet    B-D INS SYRINGE 0 5CC/30GX1/2" 30G X 1/2" 0 5 ML MISC    glucose blood (GLUCOCARD VITAL TEST) test strip    insulin glargine (BASAGLAR KWIKPEN) 100 units/mL injection pen    Insulin Syringe-Needle U-100 (B-D INS SYR ULTRAFINE 1CC/30G) 30G X 1/2" 1 ML MISC    lisinopril (ZESTRIL) 2 5 mg tablet    metFORMIN (GLUCOPHAGE) 500 mg tablet    Multiple Vitamin (MULTI-VITAMIN DAILY PO)    MOVIPREP 100 g    No Known Allergies        Objective     Blood pressure 168/81, pulse 91, temperature 97 8 °F (36 6 °C), temperature source Tympanic, height 5' 10" (1 778 m), weight 112 kg (247 lb)  Body mass index is 35 44 kg/m²  PHYSICAL EXAM:      General Appearance:   Alert, cooperative, no distress   HEENT:   Normocephalic, atraumatic, anicteric      Neck:  Supple, symmetrical, trachea midline   Lungs:   Clear to auscultation bilaterally; no rales, rhonchi or wheezing; respirations unlabored    Heart[de-identified]   Regular rate and rhythm; no murmur, rub, or gallop  Abdomen:   Soft, obese, non-tender, non-distended; normal bowel sounds; no masses, no organomegaly    Genitalia:   Deferred    Rectal:   Deferred    Extremities:  No cyanosis, clubbing or edema, but has right BKA    Pulses:  2+ and symmetric    Skin:  No jaundice, rashes, or lesions    Lymph nodes:  No palpable cervical lymphadenopathy        Lab Results:   No visits with results within 1 Day(s) from this visit     Latest known visit with results is:   Appointment on 10/22/2018   Component Date Value    PSA 10/22/2018 1 1     Hemoglobin A1C 10/22/2018 8 2*    EAG 10/22/2018 189     Sodium 10/22/2018 136     Potassium 10/22/2018 4 6     Chloride 10/22/2018 104     CO2 10/22/2018 27     ANION GAP 10/22/2018 5     BUN 10/22/2018 16     Creatinine 10/22/2018 1 09     Glucose, Fasting 10/22/2018 190*    Calcium 10/22/2018 8 8     AST 10/22/2018 11     ALT 10/22/2018 32     Alkaline Phosphatase 10/22/2018 122*    Total Protein 10/22/2018 7 8     Albumin 10/22/2018 3 6     Total Bilirubin 10/22/2018 0 81     eGFR 10/22/2018 74     LDL Direct 10/22/2018 117*         Radiology Results:   No results found

## 2018-11-01 ENCOUNTER — TELEPHONE (OUTPATIENT)
Dept: INTERNAL MEDICINE CLINIC | Facility: CLINIC | Age: 58
End: 2018-11-01

## 2018-11-01 NOTE — TELEPHONE ENCOUNTER
Pts insurance company wants to know what other insulin is similar to lantus so they can see what is covered

## 2018-11-02 ENCOUNTER — OFFICE VISIT (OUTPATIENT)
Dept: INTERNAL MEDICINE CLINIC | Facility: CLINIC | Age: 58
End: 2018-11-02
Payer: COMMERCIAL

## 2018-11-02 VITALS
BODY MASS INDEX: 35.15 KG/M2 | TEMPERATURE: 97.9 F | DIASTOLIC BLOOD PRESSURE: 72 MMHG | HEIGHT: 70 IN | HEART RATE: 105 BPM | OXYGEN SATURATION: 96 % | WEIGHT: 245.5 LBS | SYSTOLIC BLOOD PRESSURE: 124 MMHG

## 2018-11-02 DIAGNOSIS — Z79.4 TYPE 2 DIABETES MELLITUS WITHOUT COMPLICATION, WITH LONG-TERM CURRENT USE OF INSULIN (HCC): Primary | ICD-10-CM

## 2018-11-02 DIAGNOSIS — E11.9 TYPE 2 DIABETES MELLITUS WITHOUT COMPLICATION, WITHOUT LONG-TERM CURRENT USE OF INSULIN (HCC): ICD-10-CM

## 2018-11-02 DIAGNOSIS — Z12.11 COLON CANCER SCREENING: ICD-10-CM

## 2018-11-02 DIAGNOSIS — Z23 NEED FOR INFLUENZA VACCINATION: ICD-10-CM

## 2018-11-02 DIAGNOSIS — Z89.511 HX OF RIGHT BKA (HCC): ICD-10-CM

## 2018-11-02 DIAGNOSIS — E11.9 TYPE 2 DIABETES MELLITUS WITHOUT COMPLICATION, WITH LONG-TERM CURRENT USE OF INSULIN (HCC): Primary | ICD-10-CM

## 2018-11-02 DIAGNOSIS — R26.2 AMBULATORY DYSFUNCTION: ICD-10-CM

## 2018-11-02 DIAGNOSIS — I10 HYPERTENSION, UNSPECIFIED TYPE: ICD-10-CM

## 2018-11-02 DIAGNOSIS — Z87.891 HISTORY OF TOBACCO ABUSE: ICD-10-CM

## 2018-11-02 PROCEDURE — 3008F BODY MASS INDEX DOCD: CPT | Performed by: INTERNAL MEDICINE

## 2018-11-02 PROCEDURE — 4010F ACE/ARB THERAPY RXD/TAKEN: CPT | Performed by: INTERNAL MEDICINE

## 2018-11-02 PROCEDURE — 90682 RIV4 VACC RECOMBINANT DNA IM: CPT

## 2018-11-02 PROCEDURE — 3078F DIAST BP <80 MM HG: CPT | Performed by: INTERNAL MEDICINE

## 2018-11-02 PROCEDURE — 99214 OFFICE O/P EST MOD 30 MIN: CPT | Performed by: INTERNAL MEDICINE

## 2018-11-02 PROCEDURE — 90471 IMMUNIZATION ADMIN: CPT

## 2018-11-02 PROCEDURE — 3074F SYST BP LT 130 MM HG: CPT | Performed by: INTERNAL MEDICINE

## 2018-11-02 RX ORDER — LISINOPRIL 2.5 MG/1
2.5 TABLET ORAL DAILY
Qty: 90 TABLET | Refills: 3 | Status: SHIPPED | OUTPATIENT
Start: 2018-11-02 | End: 2019-05-03 | Stop reason: SDUPTHER

## 2018-11-02 NOTE — PROGRESS NOTES
Assessment/Plan:         Diagnoses and all orders for this visit:    Type 2 diabetes mellitus without complication, with long-term current use of insulin ([facility])  -     insulin glargine (BASAGLAR KWIKPEN) 100 units/mL injection pen; Inject 15 Units under the skin daily at bedtime  Pt did run out of his insulin and has been off for a period of time  He will monitor and call in 3 weeks if no change    Colon cancer screening  -     Occult Blood, Fecal Immunochemical; Future    Need for influenza vaccination  -     influenza vaccine, 5803-5121, quadrivalent, recombinant, PF, 0 5 mL, for patients 18 yr+ (FLUBLOK)  Flu shot today    Hypertension, unspecified type  -     lisinopril (ZESTRIL) 2 5 mg tablet; Take 1 tablet (2 5 mg total) by mouth daily for 90 days Resume on 8/11/17  No added salt diet He tries to walk as the weather allows around town    Hx right BKA - patient tolerating his prosthetic well and is more active    History of tobacco abuse    Ambulatory dysfunction  No falls, tries to stay active in town as the weather permits    Aware of shingrix and will consider when available    Rto 3 month       Patient ID: Vania Schaeffer is a 60 y o  male  HPI  Pt has been feeling well overall but admits his sugar has been higher - he did run out of lantus and then insurance would not cover the same Rx so he just got basaglar rx a day ago  No chest pain or sob  He is getting out and tries to stay active  Prosthesis has been functioning and no stump issues reported  No falls  He does want flu shot today  He states his sugar will get down again once back on the insulin as well as watching his food choices better  He offers no new complaints  He has not noted any change in vision since his sugars have been higher  Review of Systems   HENT: Negative  Eyes: Negative  Respiratory: Negative  Cardiovascular: Negative  Gastrointestinal: Negative  Endocrine: Negative  Genitourinary: Negative  Musculoskeletal: Positive for arthralgias and gait problem  Skin: Negative  Allergic/Immunologic: Negative  Neurological: Positive for weakness  Hematological: Negative  Psychiatric/Behavioral: Negative  Past Medical History:   Diagnosis Date    Diabetes mellitus ([address] )     Hypertension      Past Surgical History:   Procedure Laterality Date    LEG AMPUTATION THROUGH LOWER TIBIA AND FIBULA Right 7/25/2017    Procedure: AMPUTATION BELOW KNEE (BKA);   Surgeon: Ha Fisher MD;  [facility];  Service: General    [place] [facility] Right 1/26/2017    Procedure: AMPUTATION TRANSMETATARSAL (TMA); OPEN;  Surgeon: Perla Desouza DPM;  [facility];  Service: Podiatry    [place] AMPUTATION METATARSAL+TOE,SINGLE Left 1/30/2017    Procedure: Left partial 1st ray amputation;  Surgeon: Perla Desouza DPM;  [facility];  Service: Podiatry   [address] [place] [address] Right 1/30/2017    Procedure: DEBRIDEMENT FOOT/TOE ([address]) delayed closure, LINDA;  Surgeon: Perla Desouza DPM;  [facility];  Service:      No Known Allergies  Social History     Social History    Marital status: Single     Spouse name: N/A    Number of children: N/A    Years of education: N/A     Occupational History    retired      Social History Main Topics    Smoking status: Former Smoker     Types: Cigarettes    Smokeless tobacco: Never Used      Comment: quit 9 years ago    Alcohol use 6 6 oz/week     6 Cans of beer, 5 Shots of liquor per week      Comment: social ; occasional use as per [employer]    Drug use: No    Sexual activity: Not on file     Other Topics Concern    Not on file     Social History Narrative    Always uses seat belt    Caffeine use    Dental care regularly               /72   Pulse 105   Temp 97 9 °F (36 6 °C) (Tympanic)   Ht 5' 10" (1 778 m)   Wt 111 kg (245 lb 8 oz)   SpO2 96%   BMI 35 23 kg/m²          Physical Exam   Constitutional: He is oriented to person, place, and time  He appears well-developed and well-nourished  No distress  HENT:   Head: Normocephalic and atraumatic  Right Ear: External ear normal    Left Ear: External ear normal    Nose: Nose normal    Mouth/Throat: Oropharynx is clear and moist  No oropharyngeal exudate  Eyes: Pupils are equal, round, and reactive to light  Conjunctivae and EOM are normal  No scleral icterus  Neck: Normal range of motion  Neck supple  No JVD present  Cardiovascular: Normal rate, regular rhythm, normal heart sounds and intact distal pulses  No murmur heard  Pulmonary/Chest: Effort normal and breath sounds normal  No respiratory distress  He has no wheezes  He has no rales  Abdominal: Soft  Bowel sounds are normal    Musculoskeletal: Normal range of motion  He exhibits deformity  He exhibits no edema or tenderness  Lymphadenopathy:     He has no cervical adenopathy  Neurological: He is alert and oriented to person, place, and time  Skin: Skin is warm and dry  He is not diaphoretic  Psychiatric: He has a normal mood and affect  His behavior is normal  Judgment and thought content normal    Nursing note and vitals reviewed

## 2018-11-06 ENCOUNTER — TELEPHONE (OUTPATIENT)
Dept: GASTROENTEROLOGY | Facility: CLINIC | Age: 58
End: 2018-11-06

## 2018-11-06 NOTE — TELEPHONE ENCOUNTER
Colonoscopy scheduled with Dr Shelly Case 11/28/18 at [facility] and diabetic instructions given in office

## 2018-11-12 DIAGNOSIS — E11.9 TYPE 2 DIABETES MELLITUS WITHOUT COMPLICATION, WITH LONG-TERM CURRENT USE OF INSULIN (HCC): Primary | ICD-10-CM

## 2018-11-12 DIAGNOSIS — Z79.4 TYPE 2 DIABETES MELLITUS WITHOUT COMPLICATION, WITH LONG-TERM CURRENT USE OF INSULIN (HCC): Primary | ICD-10-CM

## 2018-11-27 ENCOUNTER — ANESTHESIA EVENT (OUTPATIENT)
Dept: PERIOP | Facility: HOSPITAL | Age: 58
End: 2018-11-27
Payer: COMMERCIAL

## 2018-11-28 ENCOUNTER — ANESTHESIA (OUTPATIENT)
Dept: PERIOP | Facility: HOSPITAL | Age: 58
End: 2018-11-28
Payer: COMMERCIAL

## 2018-11-28 ENCOUNTER — HOSPITAL ENCOUNTER (OUTPATIENT)
Facility: HOSPITAL | Age: 58
Setting detail: OUTPATIENT SURGERY
Discharge: HOME | End: 2018-11-28
Attending: INTERNAL MEDICINE | Admitting: INTERNAL MEDICINE
Payer: COMMERCIAL

## 2018-11-28 VITALS
RESPIRATION RATE: 16 BRPM | HEART RATE: 84 BPM | WEIGHT: 247 LBS | BODY MASS INDEX: 35.36 KG/M2 | DIASTOLIC BLOOD PRESSURE: 65 MMHG | TEMPERATURE: 97 F | HEIGHT: 70 IN | OXYGEN SATURATION: 95 % | SYSTOLIC BLOOD PRESSURE: 136 MMHG

## 2018-11-28 DIAGNOSIS — R19.5 POSITIVE FECAL IMMUNOCHEMICAL TEST: ICD-10-CM

## 2018-11-28 PROCEDURE — 88305 TISSUE EXAM BY PATHOLOGIST: CPT | Performed by: PATHOLOGY

## 2018-11-28 PROCEDURE — 45385 COLONOSCOPY W/LESION REMOVAL: CPT | Performed by: INTERNAL MEDICINE

## 2018-11-28 RX ORDER — LIDOCAINE HYDROCHLORIDE 10 MG/ML
INJECTION, SOLUTION INFILTRATION; PERINEURAL AS NEEDED
Status: DISCONTINUED | OUTPATIENT
Start: 2018-11-28 | End: 2018-11-28 | Stop reason: SURG

## 2018-11-28 RX ORDER — PROPOFOL 10 MG/ML
INJECTION, EMULSION INTRAVENOUS CONTINUOUS PRN
Status: DISCONTINUED | OUTPATIENT
Start: 2018-11-28 | End: 2018-11-28 | Stop reason: SURG

## 2018-11-28 RX ORDER — SODIUM CHLORIDE, SODIUM LACTATE, POTASSIUM CHLORIDE, CALCIUM CHLORIDE 600; 310; 30; 20 MG/100ML; MG/100ML; MG/100ML; MG/100ML
125 INJECTION, SOLUTION INTRAVENOUS CONTINUOUS
Status: DISCONTINUED | OUTPATIENT
Start: 2018-11-28 | End: 2018-11-28 | Stop reason: HOSPADM

## 2018-11-28 RX ORDER — PROPOFOL 10 MG/ML
INJECTION, EMULSION INTRAVENOUS AS NEEDED
Status: DISCONTINUED | OUTPATIENT
Start: 2018-11-28 | End: 2018-11-28 | Stop reason: SURG

## 2018-11-28 RX ORDER — SODIUM CHLORIDE, SODIUM LACTATE, POTASSIUM CHLORIDE, CALCIUM CHLORIDE 600; 310; 30; 20 MG/100ML; MG/100ML; MG/100ML; MG/100ML
125 INJECTION, SOLUTION INTRAVENOUS CONTINUOUS
Status: DISCONTINUED | OUTPATIENT
Start: 2018-11-28 | End: 2018-11-28

## 2018-11-28 RX ADMIN — PROPOFOL 100 MG: 10 INJECTION, EMULSION INTRAVENOUS at 10:56

## 2018-11-28 RX ADMIN — SODIUM CHLORIDE, SODIUM LACTATE, POTASSIUM CHLORIDE, AND CALCIUM CHLORIDE 125 ML/HR: .6; .31; .03; .02 INJECTION, SOLUTION INTRAVENOUS at 10:16

## 2018-11-28 RX ADMIN — LIDOCAINE HYDROCHLORIDE 30 MG: 10 INJECTION, SOLUTION INFILTRATION; PERINEURAL at 10:56

## 2018-11-28 RX ADMIN — PROPOFOL 30 MG: 10 INJECTION, EMULSION INTRAVENOUS at 11:03

## 2018-11-28 RX ADMIN — PROPOFOL 20 MG: 10 INJECTION, EMULSION INTRAVENOUS at 10:58

## 2018-11-28 RX ADMIN — SODIUM CHLORIDE, SODIUM LACTATE, POTASSIUM CHLORIDE, AND CALCIUM CHLORIDE: .6; .31; .03; .02 INJECTION, SOLUTION INTRAVENOUS at 10:22

## 2018-11-28 RX ADMIN — PROPOFOL 80 MCG/KG/MIN: 10 INJECTION, EMULSION INTRAVENOUS at 10:59

## 2018-11-28 NOTE — ANESTHESIA POSTPROCEDURE EVALUATION
Post-Op Assessment Note      CV Status:  Stable    Mental Status:  Alert and awake    Hydration Status:  Euvolemic    PONV Controlled:  Controlled    Airway Patency:  Patent    Post Op Vitals Reviewed: Yes          Staff: Anesthesiologist, CRNA       Comments: VSS, reflexes intact, maintains own airway            /57 (11/28/18 1124)    Temp (!) 97 4 °F (36 3 °C) (11/28/18 1124)    Pulse 85 (11/28/18 1124)   Resp 18 (11/28/18 1124)    SpO2 97 % (11/28/18 1124)

## 2018-11-28 NOTE — H&P (VIEW-ONLY)
[facility] - Outpatient Consultation  Jethro Rick 60 y o  male MRN: [id number]  Encounter: [id number]          ASSESSMENT AND PLAN:      1  Positive fecal immunochemical test  I will schedule for colonoscopy because of his history of a fecal immunochemical test   I spoke to him about the benefits and risks of the procedure as well as instructions for the bowel preparation and answered all of his questions  - Case request operating room: COLONOSCOPY  - MOVIPREP 100 g; Take 2,000 mL by mouth once for 1 dose  Dispense: 2000 mL; Refill: 0    3  Type 2 diabetes mellitus with diabetic polyneuropathy, unspecified whether long term insulin use ([address] )  Because of his insulin-dependent diabetes mellitus will schedule his procedure at the hospital   We have given him a handout which details his medication adjustments in relation to the timing of the procedure     ______________________________________________________________________    HPI:  He presents for evaluation because of a positive fecal immunochemical test   He has never previously had a colonoscopy and he denies any family history of colon polyps or colon cancer  He also denies any bleeding, abdominal pain, change in bowel habits, and weight loss and any upper GI symptoms such as reflux or dysphagia  He has diabetes mellitus complicated by neuropathy and a right BKA  REVIEW OF SYSTEMS:    CONSTITUTIONAL: Denies any fever, chills, rigors, and weight loss  HEENT: No earache or tinnitus  Denies hearing loss or visual disturbances  CARDIOVASCULAR: No chest pain or palpitations  RESPIRATORY: Denies any cough, hemoptysis, shortness of breath or dyspnea on exertion  GASTROINTESTINAL: As noted in the History of Present Illness  GENITOURINARY: No problems with urination  Denies any hematuria or dysuria  NEUROLOGIC: No dizziness or vertigo, denies headaches  MUSCULOSKELETAL: Denies any muscle or joint pain, but has right BKA  SKIN: Denies skin rashes or itching  ENDOCRINE: Denies excessive thirst  Denies intolerance to heat or cold  PSYCHOSOCIAL: Denies depression or anxiety  Denies any recent memory loss  Historical Information   Past Medical History:   Diagnosis Date    Diabetes mellitus [address] )     Hypertension      Past Surgical History:   Procedure Laterality Date    LEG AMPUTATION THROUGH LOWER TIBIA AND FIBULA Right 7/25/2017    Procedure: AMPUTATION BELOW KNEE (BKA);   Surgeon: Nica Soni MD;  [facility];  Service: [address] Right 1/26/2017    Procedure: AMPUTATION TRANSMETATARSAL (TMA); OPEN;  Surgeon: Christel Collins DPM;  [facility];  Service: Podiatry    DC AMPUTATION METATARSAL+TOE,SINGLE Left 1/30/2017    Procedure: Left partial 1st ray amputation;  Surgeon: Christel Collins DPM;  [facility];  Service: Podiatry    WOUND DEBRIDEMENT Right 1/30/2017    Procedure: DEBRIDEMENT FOOT/TOE [facility] OUT) delayed closure, LINDA;  Surgeon: Christel Collins DPM;  [facility];  Service:      Social History   History   Alcohol Use    6 6 oz/week    6 Cans of beer, 5 Shots of liquor per week     Comment: social ; occasional use as per [employer]     History   Drug Use No     History   Smoking Status    Former Smoker    Types: Cigarettes   Smokeless Tobacco    Never Used     Comment: quit 9 years ago     Family History   Problem Relation Age of Onset    Diabetes Mother        Meds/Allergies       Current Outpatient Prescriptions:     aspirin (ECOTRIN LOW STRENGTH) 81 mg EC tablet    atorvastatin (LIPITOR) 80 mg tablet    B-D INS SYRINGE 0 5CC/30GX1/2" 30G X 1/2" 0 5 ML MISC    glucose blood (GLUCOCARD VITAL TEST) test strip    insulin glargine (BASAGLAR KWIKPEN) 100 units/mL injection pen    Insulin Syringe-Needle U-100 (B-D INS SYR ULTRAFINE 1CC/30G) 30G X 1/2" 1 ML MISC    lisinopril (ZESTRIL) 2 5 mg tablet    metFORMIN (GLUCOPHAGE) 500 mg tablet    Multiple Vitamin (MULTI-VITAMIN DAILY PO)    MOVIPREP 100 g    No Known Allergies        Objective     Blood pressure 168/81, pulse 91, temperature 97 8 °F (36 6 °C), temperature source Tympanic, height 5' 10" (1 778 m), weight 112 kg (247 lb)  Body mass index is 35 44 kg/m²  PHYSICAL EXAM:      General Appearance:   Alert, cooperative, no distress   HEENT:   Normocephalic, atraumatic, anicteric      Neck:  Supple, symmetrical, trachea midline   Lungs:   Clear to auscultation bilaterally; no rales, rhonchi or wheezing; respirations unlabored    Heart[de-identified]   Regular rate and rhythm; no murmur, rub, or gallop  Abdomen:   Soft, obese, non-tender, non-distended; normal bowel sounds; no masses, no organomegaly    Genitalia:   Deferred    Rectal:   Deferred    Extremities:  No cyanosis, clubbing or edema, but has right BKA    Pulses:  2+ and symmetric    Skin:  No jaundice, rashes, or lesions    Lymph nodes:  No palpable cervical lymphadenopathy        Lab Results:   No visits with results within 1 Day(s) from this visit  Latest known visit with results is:   Appointment on 10/22/2018   Component Date Value    PSA 10/22/2018 1 1     Hemoglobin A1C 10/22/2018 8 2*    EAG 10/22/2018 189     Sodium 10/22/2018 136     Potassium 10/22/2018 4 6     Chloride 10/22/2018 104     CO2 10/22/2018 27     ANION GAP 10/22/2018 5     BUN 10/22/2018 16     Creatinine 10/22/2018 1 09     Glucose, Fasting 10/22/2018 190*    Calcium 10/22/2018 8 8     AST 10/22/2018 11     ALT 10/22/2018 32     Alkaline Phosphatase 10/22/2018 122*    Total Protein 10/22/2018 7 8     Albumin 10/22/2018 3 6     Total Bilirubin 10/22/2018 0 81     eGFR 10/22/2018 74     LDL Direct 10/22/2018 117*         Radiology Results:   No results found

## 2018-11-28 NOTE — ANESTHESIA PREPROCEDURE EVALUATION
Review of Systems/Medical History  Patient summary reviewed    No history of anesthetic complications     Cardiovascular  EKG reviewed, Hyperlipidemia, Hypertension controlled,    Pulmonary  Smoker ex-smoker  ,        GI/Hepatic    Bowel prep       Negative  ROS        Endo/Other  Diabetes well controlled type 2 Insulin,   Obesity  morbid obesity   GYN       Hematology  Negative hematology ROS      Musculoskeletal  Negative musculoskeletal ROS        Neurology  Negative neurology ROS      Psychology   Negative psychology ROS              Physical Exam    Airway    Mallampati score: I  TM Distance: >3 FB  Neck ROM: full     Dental   Comment: Very poor dental condition with multiple missing and broken teeth ,     Cardiovascular  Rhythm: regular, Rate: normal,     Pulmonary  Breath sounds clear to auscultation,     Other Findings        Anesthesia Plan  ASA Score- 3     Anesthesia Type- IV sedation with anesthesia with ASA Monitors  Additional Monitors:   Airway Plan:         Plan Factors-  Patient did not smoke on day of surgery  Induction- intravenous  Postoperative Plan-     Informed Consent- Anesthetic plan and risks discussed with patient  I personally reviewed this patient with the CRNA  Discussed and agreed on the Anesthesia Plan with the CARLOS MANUEL Regan

## 2018-11-28 NOTE — OP NOTE
Colonoscopy Procedure Note    Procedure: Colonoscopy    Sedation: Monitored anesthesia care, check anesthesia records      ASA Class: 3    INDICATIONS:  Positive fecal immunochemical test    POST-OP DIAGNOSIS: See the impression below    Procedure Details     Prior colonoscopy: No prior colonoscopy  Informed consent was obtained for the procedure, including sedation  Risks of perforation, hemorrhage, adverse drug reaction and aspiration were discussed  The patient was placed in the left lateral decubitus position  Based on the pre-procedure assessment, including review of the patient's medical history, medications, allergies, and review of systems, he had been deemed to be an appropriate candidate for conscious sedation; he was therefore sedated with the medications listed below  The patient was monitored continuously with telemetry, pulse oximetry, blood pressure monitoring, and direct observations  A rectal examination was performed  The colonoscope was inserted into the rectum and advanced under direct vision to the cecum, which was identified by the ileocecal valve and appendiceal orifice  The quality of the colonic preparation was good  A careful inspection was made as the colonoscope was withdrawn, including a retroflexed view of the rectum; findings and interventions are described below  Findings: There was a 10 millimeter sessile polyp in the descending colon that was removed with cold snare polypectomy  There was a 20 millimeter pedunculated polyp in the sigmoid colon that was removed with hot snare polypectomy  Retroflexed view of the rectum was unremarkable  Complications: None; patient tolerated the procedure well  Impression:    Two colon polyps were removed    Recommendations:  Repeat colonoscopy in 3 years or sooner if clinically indicated      Repeat colonoscopy is being recommended at an interval of less than 10 years, this is because of a personal history of polyps or colon cancer  Await pathology results

## 2019-02-01 ENCOUNTER — OFFICE VISIT (OUTPATIENT)
Dept: INTERNAL MEDICINE CLINIC | Facility: CLINIC | Age: 59
End: 2019-02-01
Payer: COMMERCIAL

## 2019-02-01 VITALS
SYSTOLIC BLOOD PRESSURE: 128 MMHG | WEIGHT: 229.38 LBS | HEART RATE: 118 BPM | OXYGEN SATURATION: 97 % | DIASTOLIC BLOOD PRESSURE: 70 MMHG | TEMPERATURE: 96.3 F | BODY MASS INDEX: 32.84 KG/M2 | HEIGHT: 70 IN

## 2019-02-01 DIAGNOSIS — E11.43 DIABETIC AUTONOMIC NEUROPATHY ASSOCIATED WITH TYPE 2 DIABETES MELLITUS (HCC): ICD-10-CM

## 2019-02-01 DIAGNOSIS — E55.9 VITAMIN D DEFICIENCY: ICD-10-CM

## 2019-02-01 DIAGNOSIS — E78.2 MIXED HYPERLIPIDEMIA: ICD-10-CM

## 2019-02-01 DIAGNOSIS — E11.9 TYPE 2 DIABETES MELLITUS WITHOUT COMPLICATION, WITH LONG-TERM CURRENT USE OF INSULIN (HCC): Primary | ICD-10-CM

## 2019-02-01 DIAGNOSIS — Z79.4 TYPE 2 DIABETES MELLITUS WITHOUT COMPLICATION, WITH LONG-TERM CURRENT USE OF INSULIN (HCC): Primary | ICD-10-CM

## 2019-02-01 DIAGNOSIS — E66.9 OBESITY (BMI 30.0-34.9): ICD-10-CM

## 2019-02-01 PROBLEM — L97.519 SKIN ULCERS OF FOOT, BILATERAL (HCC): Status: RESOLVED | Noted: 2017-01-27 | Resolved: 2019-02-01

## 2019-02-01 PROBLEM — A48.0 GAS GANGRENE OF FOOT (HCC): Status: RESOLVED | Noted: 2017-01-27 | Resolved: 2019-02-01

## 2019-02-01 PROBLEM — L97.529 SKIN ULCERS OF FOOT, BILATERAL (HCC): Status: RESOLVED | Noted: 2017-01-27 | Resolved: 2019-02-01

## 2019-02-01 PROBLEM — R19.5 POSITIVE FECAL IMMUNOCHEMICAL TEST: Status: RESOLVED | Noted: 2018-10-31 | Resolved: 2019-02-01

## 2019-02-01 PROBLEM — A41.9 SEVERE SEPSIS (HCC): Status: RESOLVED | Noted: 2017-01-27 | Resolved: 2019-02-01

## 2019-02-01 PROBLEM — R65.20 SEVERE SEPSIS (HCC): Status: RESOLVED | Noted: 2017-01-27 | Resolved: 2019-02-01

## 2019-02-01 PROBLEM — M86.9 OSTEOMYELITIS OF LEFT FOOT (HCC): Status: RESOLVED | Noted: 2017-01-30 | Resolved: 2019-02-01

## 2019-02-01 PROCEDURE — 99214 OFFICE O/P EST MOD 30 MIN: CPT | Performed by: INTERNAL MEDICINE

## 2019-02-01 PROCEDURE — 1036F TOBACCO NON-USER: CPT | Performed by: INTERNAL MEDICINE

## 2019-02-01 PROCEDURE — 3008F BODY MASS INDEX DOCD: CPT | Performed by: INTERNAL MEDICINE

## 2019-02-01 NOTE — PATIENT INSTRUCTIONS
Low Fat Diet   AMBULATORY CARE:   A low-fat diet  is an eating plan that is low in total fat, unhealthy fat, and cholesterol  You may need to follow a low-fat diet if you have trouble digesting or absorbing fat  You may also need to follow this diet if you have high cholesterol  You can also lower your cholesterol by increasing the amount of fiber in your diet  Soluble fiber is a type of fiber that helps to decrease cholesterol levels  Different types of fat in food:   · Limit unhealthy fats  A diet that is high in cholesterol, saturated fat, and trans fat may cause unhealthy cholesterol levels  Unhealthy cholesterol levels increase your risk of heart disease  ¨ Cholesterol:  Limit intake of cholesterol to less than 200 mg per day  Cholesterol is found in meat, eggs, and dairy  ¨ Saturated fat:  Limit saturated fat to less than 7% of your total daily calories  Ask your dietitian how many calories you need each day  Saturated fat is found in butter, cheese, ice cream, whole milk, and palm oil  Saturated fat is also found in meat, such as beef, pork, chicken skin, and processed meats  Processed meats include sausage, hot dogs, and bologna  ¨ Trans fat:  Avoid trans fat as much as possible  Trans fat is used in fried and baked foods  Foods that say trans fat free on the label may still have up to 0 5 grams of trans fat per serving  · Include healthy fats  Replace foods that are high in saturated and trans fat with foods high in healthy fats  This may help to decrease high cholesterol levels  ¨ Monounsaturated fats: These are found in avocados, nuts, and vegetable oils, such as olive, canola, and sunflower oil  ¨ Polyunsaturated fats: These can be found in vegetable oils, such as soybean or corn oil  Omega-3 fats can help to decrease the risk of heart disease  Omega-3 fats are found in fish, such as salmon, herring, trout, and tuna   Omega-3 fats can also be found in plant foods, such as walnuts, flaxseed, soybeans, and canola oil    Foods to limit or avoid:   · Grains:      ¨ Snacks that are made with partially hydrogenated oils, such as chips, regular crackers, and butter-flavored popcorn    ¨ High-fat baked goods, such as biscuits, croissants, doughnuts, pies, cookies, and pastries    · Dairy:      ¨ Whole milk, 2% milk, and yogurt and ice cream made with whole milk    ¨ Half and half creamer, heavy cream, and whipping cream    ¨ Cheese, cream cheese, and sour cream    · Meats and proteins:      ¨ High-fat cuts of meat (T-bone steak, regular hamburger, and ribs)    ¨ Fried meat, poultry (turkey and chicken), and fish    ¨ Poultry (chicken and turkey) with skin    ¨ Cold cuts (salami or bologna), hot dogs, ortega, and sausage    ¨ Whole eggs and egg yolks    · Vegetables and fruits with added fat:      ¨ Fried vegetables or vegetables in butter or high-fat sauces, such as cream or cheese sauces    ¨ Fried fruit or fruit served with butter or cream    · Fats:      ¨ Butter, stick margarine, and shortening    ¨ Coconut, palm oil, and palm kernel oil  Foods to include:   · Grains:      ¨ Whole-grain breads, cereals, pasta, and brown rice    ¨ Low-fat crackers and pretzels    · Vegetables and fruits:      ¨ Fresh, frozen, or canned vegetables (no salt or low-sodium)    ¨ Fresh, frozen, dried, or canned fruit (canned in light syrup or fruit juice)    ¨ Avocado    · Low-fat dairy products:      ¨ Nonfat (skim) or 1% milk    ¨ Nonfat or low-fat cheese, yogurt, and cottage cheese    · Meats and proteins:      ¨ Chicken or turkey with no skin    ¨ Baked or broiled fish    ¨ Lean beef and pork (loin, round, extra lean hamburger)    ¨ Beans and peas, unsalted nuts, soy products    ¨ Egg whites and substitutes    ¨ Seeds and nuts    · Fats:      ¨ Unsaturated oil, such as canola, olive, peanut, soybean, or sunflower oil    ¨ Soft or liquid margarine and vegetable oil spread    ¨ Low-fat salad dressing  Other ways to decrease fat:   · Read food labels before you buy foods  Choose foods that have less than 30% of calories from fat  Choose low-fat or fat-free dairy products  Remember that fat free does not mean calorie free  These foods still contain calories, and too many calories can lead to weight gain  · Trim fat from meat and avoid fried food  Trim all visible fat from meat before you cook it  Remove the skin from poultry  Do not simon meat, fish, or poultry  Bake, roast, boil, or broil these foods instead  Avoid fried foods  Eat a baked potato instead of Western Arely fries  Steam vegetables instead of sautéing them in butter  · Add less fat to foods  Use imitation ortega bits on salads and baked potatoes instead of regular ortega bits  Use fat-free or low-fat salad dressings instead of regular dressings  Use low-fat or nonfat butter-flavored topping instead of regular butter or margarine on popcorn and other foods  Ways to decrease fat in recipes:  Replace high-fat ingredients with low-fat or nonfat ones  This may cause baked goods to be drier than usual  You may need to use nonfat cooking spray on pans to prevent food from sticking  You also may need to change the amount of other ingredients, such as water, in the recipe  Try the following:  · Use low-fat or light margarine instead of regular margarine or shortening  · Use lean ground turkey breast or chicken, or lean ground beef (less than 5% fat) instead of hamburger  · Add 1 teaspoon of canola oil to 8 ounces of skim milk instead of using cream or half and half  · Use grated zucchini, carrots, or apples in breads instead of coconut  · Use blenderized, low-fat cottage cheese, plain tofu, or low-fat ricotta cheese instead of cream cheese  · Use 1 egg white and 1 teaspoon of canola oil, or use ¼ cup (2 ounces) of fat-free egg substitute instead of a whole egg       · Replace half of the oil that is called for in a recipe with applesauce when you bake  Use 3 tablespoons of cocoa powder and 1 tablespoon of canola oil instead of a square of baking chocolate  How to increase fiber:  Eat enough high-fiber foods to get 20 to 30 grams of fiber every day  Slowly increase your fiber intake to avoid stomach cramps, gas, and other problems  · Eat 3 ounces of whole-grain foods each day  An ounce is about 1 slice of bread  Eat whole-grain breads, such as whole-wheat bread  Whole wheat, whole-wheat flour, or other whole grains should be listed as the first ingredient on the food label  Replace white flour with whole-grain flour or use half of each in recipes  Whole-grain flour is heavier than white flour, so you may have to add more yeast or baking powder  · Eat a high-fiber cereal for breakfast   Oatmeal is a good source of soluble fiber  Look for cereals that have bran or fiber in the name  Choose whole-grain products, such as brown rice, barley, and whole-wheat pasta  · Eat more beans, peas, and lentils  For example, add beans to soups or salads  Eat at least 5 cups of fruits and vegetables each day  Eat fruits and vegetables with the peel because the peel is high in fiber  © 2017 [address] [employer] Information is for End User's use only and may not be sold, redistributed or otherwise used for commercial purposes  All illustrations and images included in CareNotes® are the copyrighted property of A [employer] M [employer]  or Jhony Hatch  The above information is an  only  It is not intended as medical advice for individual conditions or treatments  Talk to your doctor, nurse or pharmacist before following any medical regimen to see if it is safe and effective for you

## 2019-02-01 NOTE — PROGRESS NOTES
Assessment/Plan:         Diagnoses and all orders for this visit:    Type 2 diabetes mellitus without complication, with long-term current use of insulin ([facility])  -     Microalbumin / creatinine urine ratio  Recheck hgba1c and he is working on diet and exercise at home    Diabetic autonomic neuropathy associated with type 2 diabetes mellitus ([facility] )    Mixed hyperlipidemia  Low fat diet and exercise more regularly  Continue atorvastatin    Vitamin d deficiency  Recheck vit d level with upcoming labs  Continue supplement       Patient ID: Myriam Bosworth is a 60 y o  male  HPI   Pt doing generally well but sugar has been higher which he attributes to cabin fever and less activity  Has been a little more tired as well Diet fair control and no worse Drinking more water  No falls but getting out with the snow and ice has been limiting due to his amputation  He tries to eat healthier foods but it is hard living alone  Review of Systems   Constitutional: Negative  HENT: Negative  Eyes: Negative  Respiratory: Negative  Cardiovascular: Negative  Gastrointestinal: Negative  Endocrine: Negative  Genitourinary: Negative  Musculoskeletal: Positive for arthralgias  Skin: Negative  Neurological: Negative  Hematological: Negative  Psychiatric/Behavioral: Negative  Past Medical History:   Diagnosis Date    Diabetes mellitus ([facility] )     Hypertension      Past Surgical History:   Procedure Laterality Date    LEG AMPUTATION THROUGH LOWER TIBIA AND FIBULA Right 7/25/2017    Procedure: AMPUTATION BELOW KNEE (BKA);   Surgeon: Quinten Keller MD;  [facility];  Service: General    NV AMPUTATION FOOT,TRANSMETATARSAL Right 1/26/2017    Procedure: AMPUTATION TRANSMETATARSAL (TMA); OPEN;  Surgeon: Linn Martinez DPM;  [facility];  Service: Podiatry    NV AMPUTATION METATARSAL+TOE,SINGLE Left 1/30/2017    Procedure: Left partial 1st ray amputation;  Surgeon: Linn Martinez BERNY;  [facility];  Service: Podiatry    NE COLONOSCOPY FLX DX W/COLLJ [place] 1978 PFRMD N/A 11/28/2018    Procedure: COLONOSCOPY;  Surgeon: Gabby Sweet MD;  [facility];  Service: Gastroenterology    WOUND DEBRIDEMENT Right 1/30/2017    Procedure: DEBRIDEMENT FOOT/TOE ([address]) delayed closure, LINDA;  Surgeon: Perla Desouza DPM;  [facility];  Service:      Social History     Social History    Marital status: Single     Spouse name: N/A    Number of children: N/A    Years of education: N/A     Occupational History    retired      Social History Main Topics    Smoking status: Former Smoker     Types: Cigarettes    Smokeless tobacco: Never Used      Comment: quit 9 years ago    Alcohol use 6 6 oz/week     6 Cans of beer, 5 Shots of liquor per week      Comment: social ; occasional use as per [employer]    Drug use: No    Sexual activity: Not on file     Other Topics Concern    Not on file     Social History Narrative    Always uses seat belt    Caffeine use    Dental care regularly     No Known Allergies              /70   Pulse (!) 118   Temp (!) 96 3 °F (35 7 °C) (Tympanic)   Ht 5' 10" (1 778 m)   Wt 104 kg (229 lb 6 oz)   SpO2 97%   BMI 32 91 kg/m²          Physical Exam   Constitutional: He is oriented to person, place, and time  He appears well-developed and well-nourished  No distress  HENT:   Head: Normocephalic and atraumatic  Right Ear: External ear normal    Left Ear: External ear normal    Nose: Nose normal    Mouth/Throat: Oropharynx is clear and moist  No oropharyngeal exudate  Eyes: Pupils are equal, round, and reactive to light  Conjunctivae and EOM are normal  No scleral icterus  Neck: Normal range of motion  Neck supple  No JVD present  Cardiovascular: Normal rate, regular rhythm, normal heart sounds and intact distal pulses  No murmur heard  Pulmonary/Chest: Effort normal and breath sounds normal    Abdominal: Soft   Bowel sounds are normal  He exhibits no distension  There is no tenderness  There is no rebound  Musculoskeletal: Normal range of motion  He exhibits deformity  He exhibits no edema or tenderness  Lymphadenopathy:     He has no cervical adenopathy  Neurological: He is alert and oriented to person, place, and time  He displays abnormal reflex  No cranial nerve deficit  He exhibits abnormal muscle tone  Coordination abnormal    Skin: Skin is warm and dry  He is not diaphoretic  Psychiatric: He has a normal mood and affect  His behavior is normal  Judgment and thought content normal    Nursing note and vitals reviewed  BMI Counseling: Body mass index is 32 91 kg/m²  Discussed the patient's BMI with him  The BMI is above average  BMI counseling and education was provided to the patient  Exercise recommendations include exercising 3-5 times per week

## 2019-04-18 ENCOUNTER — APPOINTMENT (OUTPATIENT)
Dept: LAB | Facility: MEDICAL CENTER | Age: 59
End: 2019-04-18
Payer: COMMERCIAL

## 2019-04-18 DIAGNOSIS — E78.2 MIXED HYPERLIPIDEMIA: ICD-10-CM

## 2019-04-18 DIAGNOSIS — Z79.4 TYPE 2 DIABETES MELLITUS WITHOUT COMPLICATION, WITH LONG-TERM CURRENT USE OF INSULIN (HCC): ICD-10-CM

## 2019-04-18 DIAGNOSIS — E55.9 VITAMIN D DEFICIENCY: ICD-10-CM

## 2019-04-18 DIAGNOSIS — E11.9 TYPE 2 DIABETES MELLITUS WITHOUT COMPLICATION, WITH LONG-TERM CURRENT USE OF INSULIN (HCC): ICD-10-CM

## 2019-04-18 LAB
25(OH)D3 SERPL-MCNC: 19.7 NG/ML (ref 30–100)
ALBUMIN SERPL BCP-MCNC: 3.5 G/DL (ref 3.5–5)
ALP SERPL-CCNC: 126 U/L (ref 46–116)
ALT SERPL W P-5'-P-CCNC: 19 U/L (ref 12–78)
ANION GAP SERPL CALCULATED.3IONS-SCNC: 7 MMOL/L (ref 4–13)
AST SERPL W P-5'-P-CCNC: 8 U/L (ref 5–45)
BILIRUB SERPL-MCNC: 0.38 MG/DL (ref 0.2–1)
BUN SERPL-MCNC: 8 MG/DL (ref 5–25)
CALCIUM SERPL-MCNC: 8.7 MG/DL (ref 8.3–10.1)
CHLORIDE SERPL-SCNC: 108 MMOL/L (ref 100–108)
CHOLEST SERPL-MCNC: 217 MG/DL (ref 50–200)
CO2 SERPL-SCNC: 25 MMOL/L (ref 21–32)
CREAT SERPL-MCNC: 0.97 MG/DL (ref 0.6–1.3)
CREAT UR-MCNC: 210 MG/DL
EST. AVERAGE GLUCOSE BLD GHB EST-MCNC: 232 MG/DL
GFR SERPL CREATININE-BSD FRML MDRD: 84 ML/MIN/1.73SQ M
GLUCOSE P FAST SERPL-MCNC: 280 MG/DL (ref 65–99)
HBA1C MFR BLD: 9.7 % (ref 4.2–6.3)
HDLC SERPL-MCNC: 36 MG/DL (ref 40–60)
LDLC SERPL CALC-MCNC: 118 MG/DL (ref 0–100)
MICROALBUMIN UR-MCNC: 454 MG/L (ref 0–20)
MICROALBUMIN/CREAT 24H UR: 216 MG/G CREATININE (ref 0–30)
NONHDLC SERPL-MCNC: 181 MG/DL
POTASSIUM SERPL-SCNC: 4.3 MMOL/L (ref 3.5–5.3)
PROT SERPL-MCNC: 7.6 G/DL (ref 6.4–8.2)
SODIUM SERPL-SCNC: 140 MMOL/L (ref 136–145)
TRIGL SERPL-MCNC: 313 MG/DL

## 2019-04-18 PROCEDURE — 82306 VITAMIN D 25 HYDROXY: CPT

## 2019-04-18 PROCEDURE — 3060F POS MICROALBUMINURIA REV: CPT | Performed by: INTERNAL MEDICINE

## 2019-04-18 PROCEDURE — 82570 ASSAY OF URINE CREATININE: CPT | Performed by: INTERNAL MEDICINE

## 2019-04-18 PROCEDURE — 83036 HEMOGLOBIN GLYCOSYLATED A1C: CPT

## 2019-04-18 PROCEDURE — 82043 UR ALBUMIN QUANTITATIVE: CPT | Performed by: INTERNAL MEDICINE

## 2019-04-18 PROCEDURE — 80053 COMPREHEN METABOLIC PANEL: CPT

## 2019-04-18 PROCEDURE — 36415 COLL VENOUS BLD VENIPUNCTURE: CPT

## 2019-04-18 PROCEDURE — 80061 LIPID PANEL: CPT

## 2019-05-03 ENCOUNTER — OFFICE VISIT (OUTPATIENT)
Dept: INTERNAL MEDICINE CLINIC | Facility: CLINIC | Age: 59
End: 2019-05-03
Payer: COMMERCIAL

## 2019-05-03 VITALS
WEIGHT: 235 LBS | TEMPERATURE: 97.8 F | HEART RATE: 99 BPM | OXYGEN SATURATION: 95 % | HEIGHT: 70 IN | DIASTOLIC BLOOD PRESSURE: 72 MMHG | SYSTOLIC BLOOD PRESSURE: 124 MMHG | BODY MASS INDEX: 33.64 KG/M2

## 2019-05-03 DIAGNOSIS — E55.9 VITAMIN D DEFICIENCY: ICD-10-CM

## 2019-05-03 DIAGNOSIS — E11.43 DIABETIC AUTONOMIC NEUROPATHY ASSOCIATED WITH TYPE 2 DIABETES MELLITUS (HCC): Primary | ICD-10-CM

## 2019-05-03 DIAGNOSIS — Z79.4 TYPE 2 DIABETES MELLITUS WITHOUT COMPLICATION, WITH LONG-TERM CURRENT USE OF INSULIN (HCC): ICD-10-CM

## 2019-05-03 DIAGNOSIS — I10 HYPERTENSION, UNSPECIFIED TYPE: ICD-10-CM

## 2019-05-03 DIAGNOSIS — E16.2 HYPOGLYCEMIA: ICD-10-CM

## 2019-05-03 DIAGNOSIS — I10 ESSENTIAL HYPERTENSION: ICD-10-CM

## 2019-05-03 DIAGNOSIS — R26.2 AMBULATORY DYSFUNCTION: ICD-10-CM

## 2019-05-03 DIAGNOSIS — Z89.511 HX OF RIGHT BKA (HCC): ICD-10-CM

## 2019-05-03 DIAGNOSIS — E11.9 TYPE 2 DIABETES MELLITUS WITHOUT COMPLICATION, WITHOUT LONG-TERM CURRENT USE OF INSULIN (HCC): ICD-10-CM

## 2019-05-03 DIAGNOSIS — E78.5 HYPERLIPIDEMIA, UNSPECIFIED HYPERLIPIDEMIA TYPE: ICD-10-CM

## 2019-05-03 DIAGNOSIS — E11.9 TYPE 2 DIABETES MELLITUS WITHOUT COMPLICATION, WITH LONG-TERM CURRENT USE OF INSULIN (HCC): ICD-10-CM

## 2019-05-03 PROBLEM — R19.5 HEME POSITIVE STOOL: Status: RESOLVED | Noted: 2017-11-14 | Resolved: 2019-05-03

## 2019-05-03 PROCEDURE — 3074F SYST BP LT 130 MM HG: CPT | Performed by: INTERNAL MEDICINE

## 2019-05-03 PROCEDURE — 4010F ACE/ARB THERAPY RXD/TAKEN: CPT | Performed by: INTERNAL MEDICINE

## 2019-05-03 PROCEDURE — 3008F BODY MASS INDEX DOCD: CPT | Performed by: INTERNAL MEDICINE

## 2019-05-03 PROCEDURE — 1036F TOBACCO NON-USER: CPT | Performed by: INTERNAL MEDICINE

## 2019-05-03 PROCEDURE — 99214 OFFICE O/P EST MOD 30 MIN: CPT | Performed by: INTERNAL MEDICINE

## 2019-05-03 PROCEDURE — 3078F DIAST BP <80 MM HG: CPT | Performed by: INTERNAL MEDICINE

## 2019-05-03 RX ORDER — LISINOPRIL 2.5 MG/1
2.5 TABLET ORAL DAILY
Qty: 90 TABLET | Refills: 3 | Status: SHIPPED | OUTPATIENT
Start: 2019-05-03 | End: 2020-01-07 | Stop reason: SDUPTHER

## 2019-05-03 RX ORDER — ATORVASTATIN CALCIUM 80 MG/1
80 TABLET, FILM COATED ORAL DAILY
Qty: 90 TABLET | Refills: 3 | Status: SHIPPED | OUTPATIENT
Start: 2019-05-03 | End: 2020-04-10 | Stop reason: SDUPTHER

## 2019-08-02 ENCOUNTER — APPOINTMENT (OUTPATIENT)
Dept: LAB | Facility: MEDICAL CENTER | Age: 59
End: 2019-08-02
Payer: MEDICARE

## 2019-08-02 DIAGNOSIS — I10 ESSENTIAL HYPERTENSION: ICD-10-CM

## 2019-08-02 DIAGNOSIS — E11.43 DIABETIC AUTONOMIC NEUROPATHY ASSOCIATED WITH TYPE 2 DIABETES MELLITUS (HCC): ICD-10-CM

## 2019-08-02 LAB
ALBUMIN SERPL BCP-MCNC: 3.6 G/DL (ref 3.5–5)
ALP SERPL-CCNC: 122 U/L (ref 46–116)
ALT SERPL W P-5'-P-CCNC: 28 U/L (ref 12–78)
ANION GAP SERPL CALCULATED.3IONS-SCNC: 7 MMOL/L (ref 4–13)
AST SERPL W P-5'-P-CCNC: 9 U/L (ref 5–45)
BILIRUB SERPL-MCNC: 0.5 MG/DL (ref 0.2–1)
BUN SERPL-MCNC: 12 MG/DL (ref 5–25)
CALCIUM SERPL-MCNC: 9.2 MG/DL (ref 8.3–10.1)
CHLORIDE SERPL-SCNC: 109 MMOL/L (ref 100–108)
CO2 SERPL-SCNC: 24 MMOL/L (ref 21–32)
CREAT SERPL-MCNC: 1.03 MG/DL (ref 0.6–1.3)
EST. AVERAGE GLUCOSE BLD GHB EST-MCNC: 212 MG/DL
GFR SERPL CREATININE-BSD FRML MDRD: 79 ML/MIN/1.73SQ M
GLUCOSE P FAST SERPL-MCNC: 161 MG/DL (ref 65–99)
HBA1C MFR BLD: 9 % (ref 4.2–6.3)
LDLC SERPL DIRECT ASSAY-MCNC: 45 MG/DL (ref 0–100)
POTASSIUM SERPL-SCNC: 3.8 MMOL/L (ref 3.5–5.3)
PROT SERPL-MCNC: 7.6 G/DL (ref 6.4–8.2)
SODIUM SERPL-SCNC: 140 MMOL/L (ref 136–145)

## 2019-08-02 PROCEDURE — 80053 COMPREHEN METABOLIC PANEL: CPT

## 2019-08-02 PROCEDURE — 83721 ASSAY OF BLOOD LIPOPROTEIN: CPT

## 2019-08-02 PROCEDURE — 83036 HEMOGLOBIN GLYCOSYLATED A1C: CPT

## 2019-08-02 PROCEDURE — 36415 COLL VENOUS BLD VENIPUNCTURE: CPT

## 2019-08-09 ENCOUNTER — OFFICE VISIT (OUTPATIENT)
Dept: INTERNAL MEDICINE CLINIC | Facility: CLINIC | Age: 59
End: 2019-08-09
Payer: MEDICARE

## 2019-08-09 VITALS
DIASTOLIC BLOOD PRESSURE: 78 MMHG | HEIGHT: 70 IN | SYSTOLIC BLOOD PRESSURE: 124 MMHG | HEART RATE: 111 BPM | TEMPERATURE: 98.5 F | WEIGHT: 232 LBS | OXYGEN SATURATION: 96 % | BODY MASS INDEX: 33.21 KG/M2

## 2019-08-09 DIAGNOSIS — E11.42 TYPE 2 DIABETES MELLITUS WITH DIABETIC POLYNEUROPATHY, UNSPECIFIED WHETHER LONG TERM INSULIN USE (HCC): Primary | ICD-10-CM

## 2019-08-09 DIAGNOSIS — E11.43 DIABETIC AUTONOMIC NEUROPATHY ASSOCIATED WITH TYPE 2 DIABETES MELLITUS (HCC): ICD-10-CM

## 2019-08-09 DIAGNOSIS — E78.2 MIXED HYPERLIPIDEMIA: ICD-10-CM

## 2019-08-09 DIAGNOSIS — Z89.511 HX OF RIGHT BKA (HCC): ICD-10-CM

## 2019-08-09 PROCEDURE — 99214 OFFICE O/P EST MOD 30 MIN: CPT | Performed by: INTERNAL MEDICINE

## 2019-08-09 NOTE — PROGRESS NOTES
Assessment/Plan:         Diagnoses and all orders for this visit:    Type 2 diabetes mellitus with diabetic polyneuropathy, unspecified whether long term insulin use ([facility] )  -     HEMOGLOBIN A1C W/ EAG ESTIMATION; Future  -     Comprehensive metabolic panel; Future  His average for past month is 150 range so am optimistic as is patient that subsequent A1c will be below 8  He is motivated and compliant with insulin now  He will setup eye appt by next visit    Hyperlipidemia-  LDL stable range,continue statin    S/p BKA - use walking stick,fall precautions Pt remains quite active    Diabetic neuropathy - Hopeful that improved A1c will help control sxs He denies any new sxs of tingling or numbness    Rto 3 months/flu shot       Patient ID: Michelle Richard is a 60 y o  male  HPI   Pt excited because his sugars have been better He was disappointed his A1c was not lower range  His average for past month was 156 and he has noted lower numbers He is taking his insulin regularly and trying to portion cotnrol food choices He is drinking more water as well and is happy to see lower numbers  No falls He does use a cane when out  He has been active but not getting out as much due to humid weather recently  No chest pain or sob He is overdue for eye exam and will schedule        Review of Systems   Constitutional: Negative  HENT: Negative  Respiratory: Negative  Cardiovascular: Negative  Gastrointestinal: Negative  Genitourinary: Negative  Musculoskeletal: Positive for gait problem  Skin: Negative  Hematological: Negative  Psychiatric/Behavioral: Negative  Past Medical History:   Diagnosis Date    Diabetes mellitus ([facility] )     Hypertension      Past Surgical History:   Procedure Laterality Date    LEG AMPUTATION THROUGH LOWER TIBIA AND FIBULA Right 7/25/2017    Procedure: AMPUTATION BELOW KNEE (BKA);   Surgeon: Sánchez Jarvis MD;  [facility];  Service: General    CA AMPUTATION FOOT,TRANSMETATARSAL Right 1/26/2017    Procedure: AMPUTATION TRANSMETATARSAL (TMA); OPEN;  Surgeon: Abdelrahman Cordova DPM;  [facility];  Service: Podiatry    [place] AMPUTATION METATARSAL+TOE,SINGLE Left 1/30/2017    Procedure: Left partial 1st ray amputation;  Surgeon: Abdelrahman Cordova DPM;  [facility];  Service: Podiatry    [place] COLONOSCOPY FLX DX W/COLLJ [address] 1978 PFRMD N/A 11/28/2018    Procedure: COLONOSCOPY;  Surgeon: Warren Trejo MD;  [facility];  Service: Gastroenterology    WOUND DEBRIDEMENT Right 1/30/2017    Procedure: DEBRIDEMENT FOOT/TOE ([address]) delayed closure, LINDA;  Surgeon: Abdelrahman Cordova DPM;  [facility];  Service:      Social History     Socioeconomic History    Marital status: Single     Spouse name: Not on file    Number of children: Not on file    Years of education: Not on file    Highest education level: Not on file   Occupational History    Occupation: retired   Social Needs    Financial resource strain: Not on file    Food insecurity:     Worry: Not on file     Inability: Not on file   [employer] needs:     Medical: Not on file     Non-medical: Not on file   Tobacco Use    Smoking status: Former Smoker     Types: Cigarettes    Smokeless tobacco: Never Used    Tobacco comment: quit 9 years ago   Substance and Sexual Activity    Alcohol use:  Yes     Alcohol/week: 11 0 standard drinks     Types: 6 Cans of beer, 5 Shots of liquor per week     Comment: social ; occasional use as per [employer]    Drug use: No    Sexual activity: Not on file   Lifestyle    Physical activity:     Days per week: Not on file     Minutes per session: Not on file    Stress: Not on file   Relationships    Social connections:     Talks on phone: Not on file     Gets together: Not on file     Attends [employer] service: Not on file     Active member of club or organization: Not on file     Attends meetings of clubs or organizations: Not on file     Relationship status: Not on file    Intimate partner violence:     Fear of current or ex partner: Not on file     Emotionally abused: Not on file     Physically abused: Not on file     Forced sexual activity: Not on file   Other Topics Concern    Not on file   Social History Narrative    Always uses seat belt    Caffeine use    Dental care regularly     No Known Allergies            /78   Pulse (!) 111   Temp 98 5 °F (36 9 °C) (Tympanic)   Ht 5' 10" (1 778 m)   Wt 105 kg (232 lb)   SpO2 96%   BMI 33 29 kg/m²          Physical Exam   Constitutional: He is oriented to person, place, and time  He appears well-developed and well-nourished  No distress  HENT:   Head: Normocephalic and atraumatic  Mouth/Throat: No oropharyngeal exudate  Eyes: Pupils are equal, round, and reactive to light  Conjunctivae and EOM are normal  No scleral icterus  Neck: Normal range of motion  Neck supple  No JVD present  Cardiovascular: Normal rate and regular rhythm  Pulmonary/Chest: Effort normal and breath sounds normal  No respiratory distress  He has no wheezes  Abdominal: Soft  Bowel sounds are normal    Musculoskeletal: Normal range of motion  Lymphadenopathy:     He has no cervical adenopathy  Neurological: He is alert and oriented to person, place, and time  He displays normal reflexes  No cranial nerve deficit  Skin: Skin is warm and dry  He is not diaphoretic  Psychiatric: He has a normal mood and affect  His behavior is normal  Judgment and thought content normal    Nursing note and vitals reviewed

## 2019-08-16 DIAGNOSIS — E11.9 TYPE 2 DIABETES MELLITUS WITHOUT COMPLICATION, WITH LONG-TERM CURRENT USE OF INSULIN (HCC): ICD-10-CM

## 2019-08-16 DIAGNOSIS — Z79.4 TYPE 2 DIABETES MELLITUS WITHOUT COMPLICATION, WITH LONG-TERM CURRENT USE OF INSULIN (HCC): ICD-10-CM

## 2019-08-17 RX ORDER — INSULIN GLARGINE 100 [IU]/ML
INJECTION, SOLUTION SUBCUTANEOUS
Qty: 15 ML | Refills: 0 | Status: SHIPPED | OUTPATIENT
Start: 2019-08-17 | End: 2020-02-18 | Stop reason: SDUPTHER

## 2019-10-28 ENCOUNTER — APPOINTMENT (OUTPATIENT)
Dept: LAB | Facility: MEDICAL CENTER | Age: 59
End: 2019-10-28
Payer: MEDICARE

## 2019-10-28 DIAGNOSIS — E11.42 TYPE 2 DIABETES MELLITUS WITH DIABETIC POLYNEUROPATHY, UNSPECIFIED WHETHER LONG TERM INSULIN USE (HCC): ICD-10-CM

## 2019-10-28 LAB
ALBUMIN SERPL BCP-MCNC: 3.8 G/DL (ref 3.5–5)
ALP SERPL-CCNC: 120 U/L (ref 46–116)
ALT SERPL W P-5'-P-CCNC: 29 U/L (ref 12–78)
ANION GAP SERPL CALCULATED.3IONS-SCNC: 7 MMOL/L (ref 4–13)
AST SERPL W P-5'-P-CCNC: 12 U/L (ref 5–45)
BILIRUB SERPL-MCNC: 0.68 MG/DL (ref 0.2–1)
BUN SERPL-MCNC: 11 MG/DL (ref 5–25)
CALCIUM SERPL-MCNC: 9.7 MG/DL (ref 8.3–10.1)
CHLORIDE SERPL-SCNC: 107 MMOL/L (ref 100–108)
CO2 SERPL-SCNC: 27 MMOL/L (ref 21–32)
CREAT SERPL-MCNC: 0.98 MG/DL (ref 0.6–1.3)
GFR SERPL CREATININE-BSD FRML MDRD: 83 ML/MIN/1.73SQ M
GLUCOSE P FAST SERPL-MCNC: 115 MG/DL (ref 65–99)
POTASSIUM SERPL-SCNC: 4 MMOL/L (ref 3.5–5.3)
PROT SERPL-MCNC: 7.8 G/DL (ref 6.4–8.2)
SODIUM SERPL-SCNC: 141 MMOL/L (ref 136–145)

## 2019-10-28 PROCEDURE — 80053 COMPREHEN METABOLIC PANEL: CPT

## 2019-10-28 PROCEDURE — 36415 COLL VENOUS BLD VENIPUNCTURE: CPT

## 2019-11-04 ENCOUNTER — APPOINTMENT (OUTPATIENT)
Dept: LAB | Facility: MEDICAL CENTER | Age: 59
End: 2019-11-04
Payer: MEDICARE

## 2019-11-04 DIAGNOSIS — E11.42 TYPE 2 DIABETES MELLITUS WITH DIABETIC POLYNEUROPATHY, UNSPECIFIED WHETHER LONG TERM INSULIN USE (HCC): ICD-10-CM

## 2019-11-04 LAB
EST. AVERAGE GLUCOSE BLD GHB EST-MCNC: 192 MG/DL
HBA1C MFR BLD: 8.3 % (ref 4.2–6.3)

## 2019-11-04 PROCEDURE — 83036 HEMOGLOBIN GLYCOSYLATED A1C: CPT

## 2019-11-04 PROCEDURE — 36415 COLL VENOUS BLD VENIPUNCTURE: CPT

## 2019-11-18 ENCOUNTER — OFFICE VISIT (OUTPATIENT)
Dept: INTERNAL MEDICINE CLINIC | Facility: CLINIC | Age: 59
End: 2019-11-18
Payer: MEDICARE

## 2019-11-18 VITALS
TEMPERATURE: 98.2 F | HEART RATE: 102 BPM | HEIGHT: 70 IN | BODY MASS INDEX: 34.56 KG/M2 | SYSTOLIC BLOOD PRESSURE: 122 MMHG | WEIGHT: 241.38 LBS | OXYGEN SATURATION: 98 % | DIASTOLIC BLOOD PRESSURE: 78 MMHG

## 2019-11-18 DIAGNOSIS — Z79.4 TYPE 2 DIABETES MELLITUS WITHOUT COMPLICATION, WITH LONG-TERM CURRENT USE OF INSULIN (HCC): ICD-10-CM

## 2019-11-18 DIAGNOSIS — Z00.00 MEDICARE ANNUAL WELLNESS VISIT, SUBSEQUENT: Primary | ICD-10-CM

## 2019-11-18 DIAGNOSIS — Z23 FLU VACCINE NEED: ICD-10-CM

## 2019-11-18 DIAGNOSIS — Z11.59 ENCOUNTER FOR HEPATITIS C SCREENING TEST FOR LOW RISK PATIENT: ICD-10-CM

## 2019-11-18 DIAGNOSIS — E11.9 TYPE 2 DIABETES MELLITUS WITHOUT COMPLICATION, WITH LONG-TERM CURRENT USE OF INSULIN (HCC): ICD-10-CM

## 2019-11-18 PROCEDURE — G0008 ADMIN INFLUENZA VIRUS VAC: HCPCS

## 2019-11-18 PROCEDURE — G0402 INITIAL PREVENTIVE EXAM: HCPCS | Performed by: INTERNAL MEDICINE

## 2019-11-18 PROCEDURE — 90682 RIV4 VACC RECOMBINANT DNA IM: CPT

## 2019-11-18 NOTE — PROGRESS NOTES
Assessment and Plan:     Problem List Items Addressed This Visit        Endocrine    Diabetes mellitus ([facility] )    Relevant Orders    HEMOGLOBIN A1C W/ EAG ESTIMATION       Other    Medicare annual wellness visit, subsequent - Primary    Flu vaccine need    Relevant Orders    influenza vaccine, [id number], quadrivalent, recombinant, PF, 0 5 mL, for patients 18 yr+ (FLUBLOK)      Flu shot today  Recheck A1c in 3 months It is improving and FBS much better  Weight loss, portion control  Rto 3 months     Preventive health issues were discussed with patient, and age appropriate screening tests were ordered as noted in patient's After Visit Summary  Personalized health advice and appropriate referrals for health education or preventive services given if needed, as noted in patient's After Visit Summary  History of Present Illness:     Patient presents for Medicare Annual Wellness visit    Patient Care Team:  Otilia Renee DO as PCP - Eric Cranker, DO Margreta Pitcher, DO Lowella Lofty, MD as Endoscopist     Problem List:     Patient Active Problem List   Diagnosis    Diabetes mellitus ([facility] )    Diabetic neuropathy [facility]    Hx of right BKA ([facility] )    Ambulatory dysfunction    Hypertension    Hyperlipidemia    Vitamin D deficiency    Medicare annual wellness visit, subsequent    Flu vaccine need      Past Medical and Surgical History:     Past Medical History:   Diagnosis Date    Diabetes mellitus ([facility] )     Hypertension      Past Surgical History:   Procedure Laterality Date    LEG AMPUTATION THROUGH LOWER TIBIA AND FIBULA Right 7/25/2017    Procedure: AMPUTATION BELOW KNEE (BKA);   Surgeon: Mariana Mohan MD;  [facility];  Service: General    AK AMPUTATION FOOT,TRANSMETATARSAL Right 1/26/2017    Procedure: AMPUTATION TRANSMETATARSAL (TMA); OPEN;  Surgeon: Janett Barbosa DPM;  [facility];  Service: Podiatry    AK AMPUTATION METATARSAL+TOE,SINGLE Left 1/30/2017    Procedure: Left partial 1st ray amputation;  Surgeon: Robbi Saint, DPM;  [facility];  Service: Podiatry    ID COLONOSCOPY FLX DX W/COLLGERALD Booth 1978 PFRMD N/A 11/28/2018    Procedure: COLONOSCOPY;  Surgeon: Alexandria Irvin MD;  [facility];  Service: Gastroenterology    WOUND DEBRIDEMENT Right 1/30/2017    Procedure: DEBRIDEMENT FOOT/TOE ([address]) delayed closure, LINDA;  Surgeon: Robbi Saint, DPM;  [facility];  Service:       Family History:     Family History   Problem Relation Age of Onset    Diabetes Mother       Social History:     Social History     Socioeconomic History    Marital status: Single     Spouse name: None    Number of children: None    Years of education: None    Highest education level: None   Occupational History    Occupation: retired   Social Needs    Financial resource strain: None    Food insecurity:     Worry: None     Inability: None    Transportation needs:     Medical: None     Non-medical: None   Tobacco Use    Smoking status: Former Smoker     Types: Cigarettes    Smokeless tobacco: Never Used    Tobacco comment: quit 9 years ago   Substance and Sexual Activity    Alcohol use:  Yes     Alcohol/week: 11 0 standard drinks     Types: 6 Cans of beer, 5 Shots of liquor per week     Comment: social ; occasional use as per [employer]    Drug use: No    Sexual activity: None   Lifestyle    Physical activity:     Days per week: None     Minutes per session: None    Stress: None   Relationships    Social connections:     Talks on phone: None     Gets together: None     Attends Anabaptism service: None     Active member of club or organization: None     Attends meetings of clubs or organizations: None     Relationship status: None    Intimate partner violence:     Fear of current or ex partner: None     Emotionally abused: None     Physically abused: None     Forced sexual activity: None   Other Topics Concern    None   Social History Narrative    Always uses seat belt    Caffeine use    Dental care regularly       Medications and Allergies:     Current Outpatient Medications   Medication Sig Dispense Refill    aspirin (ECOTRIN LOW STRENGTH) 81 mg EC tablet Take 81 mg by mouth daily Resume on 8/11/17       B-D INS SYRINGE 0 5CC/30GX1/2" 30G X 1/2" 0 5 ML MISC       BASAGLAR KWIKPEN 100 units/mL injection pen INJECT 15 UNITS UNDER THE SKIN DAILY AT BEDTIME FOR 90 DAYS 15 mL 0    Insulin Pen Needle 30G X 8 MM MISC Inject under the skin daily at bedtime 100 each 5    Insulin Syringe-Needle U-100 (B-D INS SYR ULTRAFINE 1CC/30G) 30G X 1/2" 1 ML MISC by Does not apply route daily      Multiple Vitamin (MULTI-VITAMIN DAILY PO) Take 1 tablet by mouth daily      atorvastatin (LIPITOR) 80 mg tablet Take 1 tablet (80 mg total) by mouth daily for 90 days 90 tablet 3    lisinopril (ZESTRIL) 2 5 mg tablet Take 1 tablet (2 5 mg total) by mouth daily for 90 days Resume on 8/11/17 90 tablet 3    metFORMIN (GLUCOPHAGE) 500 mg tablet Take 1 tablet (500 mg total) by mouth 2 (two) times a day with meals for 90 days 180 tablet 3     No current facility-administered medications for this visit        No Known Allergies   Immunizations:     Immunization History   Administered Date(s) Administered    INFLUENZA 01/13/2012    Influenza Quadrivalent Preservative Free 3 years and older IM 11/01/2016    Influenza Quadrivalent, 6-35 Months IM 09/27/2017    Influenza, recombinant, quadrivalent,injectable, preservative free 11/02/2018    Pneumococcal Polysaccharide PPV23 01/13/2012, 11/01/2016    Tdap 01/26/2017, 01/26/2017      Health Maintenance:         Topic Date Due    Hepatitis C Screening  1960    CRC Screening: Colonoscopy  11/28/2028         Topic Date Due    Hepatitis B Vaccine (1 of 3 - Risk 3-dose series) 03/07/1978    Influenza Vaccine  07/01/2019      Medicare Health Risk Assessment:     /78   Pulse 102   Temp 98 2 °F (36 8 °C) (Tympanic)   Ht 5' 10" (1 778 m) Wt 109 kg (241 lb 6 oz)   SpO2 98%   BMI 34 63 kg/m²      Letty March is here for his Subsequent Wellness visit  Last Medicare Wellness visit information reviewed, patient interviewed and updates made to the record today  Health Risk Assessment:   Patient rates overall health as good  Patient feels that their physical health rating is slightly better  Eyesight was rated as same  Hearing was rated as same  Patient feels that their emotional and mental health rating is same  Pain experienced in the last 7 days has been none  Patient states that he has experienced no weight loss or gain in last 6 months  Depression Screening:   PHQ-2 Score: 0      Fall Risk Screening: In the past year, patient has experienced: no history of falling in past year      Home Safety:  Patient does not have trouble with stairs inside or outside of their home  Patient has working smoke alarms and has no working carbon monoxide detector  Home safety hazards include: none  Nutrition:   Current diet is Diabetic  Medications:   Patient is currently taking over-the-counter supplements  OTC medications include: see medication list  Patient is able to manage medications  Activities of Daily Living (ADLs)/Instrumental Activities of Daily Living (IADLs):   Walk and transfer into and out of bed and chair?: Yes  Dress and groom yourself?: Yes    Bathe or shower yourself?: Yes    Feed yourself?  Yes  Do your laundry/housekeeping?: Yes  Manage your money, pay your bills and track your expenses?: Yes  Make your own meals?: Yes    Do your own shopping?: Yes    Previous Hospitalizations:   Any hospitalizations or ED visits within the last 12 months?: No      Advance Care Planning:   Living will: No    Durable POA for healthcare: No    Advanced directive: No    End of Life Decisions reviewed with patient: Yes    Provider agrees with end of life decisions: Yes      PREVENTIVE SCREENINGS      Cardiovascular Screening:    General: History Lipid Disorder and Screening Current      Diabetes Screening:     General: History Diabetes and Screening Current      Colorectal Cancer Screening:     General: Screening Current      Prostate Cancer Screening:    General: Screening Not Indicated      Osteoporosis Screening:    General: Screening Not Indicated      Abdominal Aortic Aneurysm (AAA) Screening:    Risk factors include: tobacco use        General: Screening Not Indicated      Lung Cancer Screening:     General: Screening Not Indicated      Hepatitis C Screening:    General: Risks and Benefits Discussed    Hep C Screening Accepted: Yes      Other Counseling Topics:   Calcium and vitamin D intake         Flory Dick DO

## 2020-01-07 DIAGNOSIS — I10 HYPERTENSION, UNSPECIFIED TYPE: ICD-10-CM

## 2020-01-07 RX ORDER — LISINOPRIL 2.5 MG/1
2.5 TABLET ORAL DAILY
Qty: 90 TABLET | Refills: 3 | Status: SHIPPED | OUTPATIENT
Start: 2020-01-07 | End: 2020-06-05 | Stop reason: SDUPTHER

## 2020-02-05 ENCOUNTER — APPOINTMENT (OUTPATIENT)
Dept: LAB | Facility: MEDICAL CENTER | Age: 60
End: 2020-02-05
Payer: COMMERCIAL

## 2020-02-05 DIAGNOSIS — E11.9 TYPE 2 DIABETES MELLITUS WITHOUT COMPLICATION, WITH LONG-TERM CURRENT USE OF INSULIN (HCC): ICD-10-CM

## 2020-02-05 DIAGNOSIS — Z11.59 ENCOUNTER FOR HEPATITIS C SCREENING TEST FOR LOW RISK PATIENT: ICD-10-CM

## 2020-02-05 DIAGNOSIS — Z79.4 TYPE 2 DIABETES MELLITUS WITHOUT COMPLICATION, WITH LONG-TERM CURRENT USE OF INSULIN (HCC): ICD-10-CM

## 2020-02-05 LAB
EST. AVERAGE GLUCOSE BLD GHB EST-MCNC: 237 MG/DL
HBA1C MFR BLD: 9.9 % (ref 4.2–6.3)
HCV AB SER QL: NORMAL

## 2020-02-05 PROCEDURE — 83036 HEMOGLOBIN GLYCOSYLATED A1C: CPT

## 2020-02-05 PROCEDURE — 86803 HEPATITIS C AB TEST: CPT

## 2020-02-05 PROCEDURE — 36415 COLL VENOUS BLD VENIPUNCTURE: CPT

## 2020-02-18 ENCOUNTER — OFFICE VISIT (OUTPATIENT)
Dept: INTERNAL MEDICINE CLINIC | Facility: CLINIC | Age: 60
End: 2020-02-18
Payer: COMMERCIAL

## 2020-02-18 ENCOUNTER — CLINICAL SUPPORT (OUTPATIENT)
Dept: INTERNAL MEDICINE CLINIC | Facility: CLINIC | Age: 60
End: 2020-02-18
Payer: COMMERCIAL

## 2020-02-18 VITALS
OXYGEN SATURATION: 98 % | HEIGHT: 70 IN | DIASTOLIC BLOOD PRESSURE: 70 MMHG | TEMPERATURE: 98.5 F | BODY MASS INDEX: 34.83 KG/M2 | WEIGHT: 243.25 LBS | SYSTOLIC BLOOD PRESSURE: 124 MMHG | HEART RATE: 109 BPM

## 2020-02-18 DIAGNOSIS — Z79.4 TYPE 2 DIABETES MELLITUS WITHOUT COMPLICATION, WITH LONG-TERM CURRENT USE OF INSULIN (HCC): Primary | ICD-10-CM

## 2020-02-18 DIAGNOSIS — E11.9 TYPE 2 DIABETES MELLITUS WITHOUT COMPLICATION, WITH LONG-TERM CURRENT USE OF INSULIN (HCC): Primary | ICD-10-CM

## 2020-02-18 DIAGNOSIS — I10 ESSENTIAL HYPERTENSION: ICD-10-CM

## 2020-02-18 DIAGNOSIS — E11.51 DIABETIC PERIPHERAL ANGIOPATHY (HCC): ICD-10-CM

## 2020-02-18 DIAGNOSIS — Z89.511 HX OF RIGHT BKA (HCC): ICD-10-CM

## 2020-02-18 DIAGNOSIS — E78.2 MIXED HYPERLIPIDEMIA: ICD-10-CM

## 2020-02-18 LAB
LEFT EYE DIABETIC RETINOPATHY: NORMAL
LEFT EYE IMAGE QUALITY: NORMAL
LEFT EYE MACULAR EDEMA: NORMAL
LEFT EYE OTHER RETINOPATHY: NORMAL
RIGHT EYE DIABETIC RETINOPATHY: NORMAL
RIGHT EYE IMAGE QUALITY: NORMAL
RIGHT EYE MACULAR EDEMA: NORMAL
RIGHT EYE OTHER RETINOPATHY: NORMAL
SEVERITY (EYE EXAM): NORMAL

## 2020-02-18 PROCEDURE — 3008F BODY MASS INDEX DOCD: CPT | Performed by: INTERNAL MEDICINE

## 2020-02-18 PROCEDURE — 2025F 7 FLD RTA PHOTO W/O RTNOPTHY: CPT | Performed by: INTERNAL MEDICINE

## 2020-02-18 PROCEDURE — 99214 OFFICE O/P EST MOD 30 MIN: CPT | Performed by: INTERNAL MEDICINE

## 2020-02-18 PROCEDURE — 1036F TOBACCO NON-USER: CPT | Performed by: INTERNAL MEDICINE

## 2020-02-18 PROCEDURE — 3046F HEMOGLOBIN A1C LEVEL >9.0%: CPT | Performed by: INTERNAL MEDICINE

## 2020-02-18 PROCEDURE — 3078F DIAST BP <80 MM HG: CPT | Performed by: INTERNAL MEDICINE

## 2020-02-18 PROCEDURE — 92250 FUNDUS PHOTOGRAPHY W/I&R: CPT

## 2020-02-18 PROCEDURE — 3074F SYST BP LT 130 MM HG: CPT | Performed by: INTERNAL MEDICINE

## 2020-02-18 NOTE — PROGRESS NOTES
Assessment/Plan:         Diagnoses and all orders for this visit:    Type 2 diabetes mellitus without complication, with long-term current use of insulin ([facility])  -     HEMOGLOBIN A1C W/ EAG ESTIMATION; Future  -     insulin glargine (Basaglar KwikPen) 100 units/mL injection pen; Inject 15 Units under the skin daily  -     Lipid panel; Future  -     Comprehensive metabolic panel; Future  He is aware of elevated HgbA1c He will improve diet and recheck 3 months     Hx of right BKA [facility])  Fall precautions   He will call Mahi for possible adjustment as has leg length discrepancy    Diabetic peripheral angiopathy (HCC)  -     Comprehensive metabolic panel; Future    Mixed hyperlipidemia  Rx for fbw    Essential hypertension  No added salt diet  Exercise at home Increase water intake      Rto 3months         Patient ID: Rashmi Soliz is a 60 y o  male  HPI  Pt doing ok He has noted his one leg seems shorter No pain but he thinks needs adjustment of prosthetic No falls No chest pain or sob He is ware a1c is higher and states he did cheat often with cookies  He is thinking his diet is back to baseline and he will monitor more as his sugars had been higher  No chest pain or sob  No uri sxs Bowels are regular He is having Iris test done here today      Review of Systems   Constitutional: Negative for chills and fever  HENT: Negative  Respiratory: Negative for cough and shortness of breath  Cardiovascular: Negative  Gastrointestinal: Negative  Genitourinary: Negative  Musculoskeletal: Positive for arthralgias and gait problem  Skin: Negative  Neurological: Negative for dizziness and headaches  Hematological: Negative  Psychiatric/Behavioral: Negative          Past Medical History:   Diagnosis Date    Diabetes mellitus ([facility] )     Hypertension      Past Surgical History:   Procedure Laterality Date    LEG AMPUTATION THROUGH LOWER TIBIA AND FIBULA Right 7/25/2017    Procedure: AMPUTATION BELOW KNEE (BKA);  Surgeon: Michelle Santana MD;  [facility];  Service: General    VA AMPUTATION FOOT,TRANSMETATARSAL Right 1/26/2017    Procedure: AMPUTATION TRANSMETATARSAL (TMA); OPEN;  Surgeon: Keshav Gomez DPM;  [facility];  Service: Podiatry    VA AMPUTATION METATARSAL+TOE,SINGLE Left 1/30/2017    Procedure: Left partial 1st ray amputation;  Surgeon: Keshav Gomez DPM;  [facility];  Service: Podiatry    VA COLONOSCOPY FLX DX W/COLLJ Soharsha 1978 PFRMD N/A 11/28/2018    Procedure: COLONOSCOPY;  Surgeon: Luis Walker MD;  [facility];  Service: Gastroenterology    WOUND DEBRIDEMENT Right 1/30/2017    Procedure: DEBRIDEMENT FOOT/TOE ([address]) delayed closure, LINDA;  Surgeon: Keshav Gomez DPM;  [facility];  Service:      Social History     Socioeconomic History    Marital status: Single     Spouse name: Not on file    Number of children: Not on file    Years of education: Not on file    Highest education level: Not on file   Occupational History    Occupation: retired   Social Needs    Financial resource strain: Not on file    Food insecurity:     Worry: Not on file     Inability: Not on file   [employer] needs:     Medical: Not on file     Non-medical: Not on file   Tobacco Use    Smoking status: Former Smoker     Types: Cigarettes    Smokeless tobacco: Never Used    Tobacco comment: quit 9 years ago   Substance and Sexual Activity    Alcohol use:  Yes     Alcohol/week: 11 0 standard drinks     Types: 6 Cans of beer, 5 Shots of liquor per week     Comment: social ; occasional use as per [employer]    Drug use: No    Sexual activity: Not on file   Lifestyle    Physical activity:     Days per week: Not on file     Minutes per session: Not on file    Stress: Not on file   Relationships    Social connections:     Talks on phone: Not on file     Gets together: Not on file     Attends [employer] service: Not on file     Active member of club or organization: Not on file     Attends meetings of clubs or organizations: Not on file     Relationship status: Not on file    Intimate partner violence:     Fear of current or ex partner: Not on file     Emotionally abused: Not on file     Physically abused: Not on file     Forced sexual activity: Not on file   Other Topics Concern    Not on file   Social History Narrative    Always uses seat belt    Caffeine use    Dental care regularly     No Known Allergies  BMI Counseling: Body mass index is 34 9 kg/m²  The BMI is above normal  Nutrition recommendations include moderation in carbohydrate intake  Exercise recommendations include strength training exercises  No pharmacotherapy was ordered  /70   Pulse (!) 109   Temp 98 5 °F (36 9 °C) (Tympanic)   Ht 5' 10" (1 778 m)   Wt 110 kg (243 lb 4 oz)   SpO2 98%   BMI 34 90 kg/m²          Physical Exam   Constitutional: He is oriented to person, place, and time  He appears well-developed and well-nourished  No distress  HENT:   Head: Normocephalic and atraumatic  Mouth/Throat: Oropharynx is clear and moist    Eyes: Pupils are equal, round, and reactive to light  Conjunctivae and EOM are normal  No scleral icterus  Neck: Normal range of motion  Neck supple  No JVD present  Cardiovascular: Normal rate and regular rhythm  No murmur heard  Pulmonary/Chest: Effort normal and breath sounds normal  No respiratory distress  He has no wheezes  Abdominal: Soft  Bowel sounds are normal  He exhibits no distension  There is no tenderness  Musculoskeletal: Normal range of motion  He exhibits deformity  He exhibits no edema  Lymphadenopathy:     He has no cervical adenopathy  Neurological: He is alert and oriented to person, place, and time  He displays abnormal reflex  No cranial nerve deficit  He exhibits abnormal muscle tone  Coordination abnormal    Skin: Skin is warm and dry  Capillary refill takes less than 2 seconds  He is not diaphoretic   No erythema  Psychiatric: He has a normal mood and affect  His behavior is normal  Judgment and thought content normal    Nursing note and vitals reviewed

## 2020-04-10 DIAGNOSIS — E78.5 HYPERLIPIDEMIA, UNSPECIFIED HYPERLIPIDEMIA TYPE: ICD-10-CM

## 2020-04-10 RX ORDER — ATORVASTATIN CALCIUM 80 MG/1
80 TABLET, FILM COATED ORAL DAILY
Qty: 90 TABLET | Refills: 3 | Status: SHIPPED | OUTPATIENT
Start: 2020-04-10 | End: 2020-12-14

## 2020-05-11 ENCOUNTER — APPOINTMENT (OUTPATIENT)
Dept: LAB | Facility: MEDICAL CENTER | Age: 60
End: 2020-05-11
Payer: COMMERCIAL

## 2020-05-11 DIAGNOSIS — E11.51 DIABETIC PERIPHERAL ANGIOPATHY (HCC): ICD-10-CM

## 2020-05-11 DIAGNOSIS — Z79.4 TYPE 2 DIABETES MELLITUS WITHOUT COMPLICATION, WITH LONG-TERM CURRENT USE OF INSULIN (HCC): ICD-10-CM

## 2020-05-11 DIAGNOSIS — E11.9 TYPE 2 DIABETES MELLITUS WITHOUT COMPLICATION, WITH LONG-TERM CURRENT USE OF INSULIN (HCC): ICD-10-CM

## 2020-05-11 LAB
ALBUMIN SERPL BCP-MCNC: 3.7 G/DL (ref 3.5–5)
ALP SERPL-CCNC: 122 U/L (ref 46–116)
ALT SERPL W P-5'-P-CCNC: 27 U/L (ref 12–78)
ANION GAP SERPL CALCULATED.3IONS-SCNC: 7 MMOL/L (ref 4–13)
AST SERPL W P-5'-P-CCNC: 13 U/L (ref 5–45)
BILIRUB SERPL-MCNC: 0.59 MG/DL (ref 0.2–1)
BUN SERPL-MCNC: 11 MG/DL (ref 5–25)
CALCIUM SERPL-MCNC: 9.4 MG/DL (ref 8.3–10.1)
CHLORIDE SERPL-SCNC: 105 MMOL/L (ref 100–108)
CHOLEST SERPL-MCNC: 117 MG/DL (ref 50–200)
CO2 SERPL-SCNC: 27 MMOL/L (ref 21–32)
CREAT SERPL-MCNC: 1.06 MG/DL (ref 0.6–1.3)
EST. AVERAGE GLUCOSE BLD GHB EST-MCNC: 223 MG/DL
GFR SERPL CREATININE-BSD FRML MDRD: 75 ML/MIN/1.73SQ M
GLUCOSE P FAST SERPL-MCNC: 156 MG/DL (ref 65–99)
HBA1C MFR BLD: 9.4 %
HDLC SERPL-MCNC: 41 MG/DL
LDLC SERPL CALC-MCNC: 45 MG/DL (ref 0–100)
NONHDLC SERPL-MCNC: 76 MG/DL
POTASSIUM SERPL-SCNC: 4.2 MMOL/L (ref 3.5–5.3)
PROT SERPL-MCNC: 7.7 G/DL (ref 6.4–8.2)
SODIUM SERPL-SCNC: 139 MMOL/L (ref 136–145)
TRIGL SERPL-MCNC: 157 MG/DL

## 2020-05-11 PROCEDURE — 3046F HEMOGLOBIN A1C LEVEL >9.0%: CPT | Performed by: INTERNAL MEDICINE

## 2020-05-11 PROCEDURE — 80061 LIPID PANEL: CPT

## 2020-05-11 PROCEDURE — 83036 HEMOGLOBIN GLYCOSYLATED A1C: CPT

## 2020-05-11 PROCEDURE — 36415 COLL VENOUS BLD VENIPUNCTURE: CPT

## 2020-05-11 PROCEDURE — 80053 COMPREHEN METABOLIC PANEL: CPT

## 2020-05-18 ENCOUNTER — TELEMEDICINE (OUTPATIENT)
Dept: INTERNAL MEDICINE CLINIC | Facility: CLINIC | Age: 60
End: 2020-05-18
Payer: COMMERCIAL

## 2020-05-18 DIAGNOSIS — E11.9 TYPE 2 DIABETES MELLITUS WITHOUT COMPLICATION, WITH LONG-TERM CURRENT USE OF INSULIN (HCC): Primary | ICD-10-CM

## 2020-05-18 DIAGNOSIS — Z89.511 HX OF RIGHT BKA (HCC): ICD-10-CM

## 2020-05-18 DIAGNOSIS — Z79.4 TYPE 2 DIABETES MELLITUS WITHOUT COMPLICATION, WITH LONG-TERM CURRENT USE OF INSULIN (HCC): Primary | ICD-10-CM

## 2020-05-18 DIAGNOSIS — E78.2 MIXED HYPERLIPIDEMIA: ICD-10-CM

## 2020-05-18 PROCEDURE — 99213 OFFICE O/P EST LOW 20 MIN: CPT | Performed by: INTERNAL MEDICINE

## 2020-06-05 ENCOUNTER — TELEPHONE (OUTPATIENT)
Dept: INTERNAL MEDICINE CLINIC | Facility: CLINIC | Age: 60
End: 2020-06-05

## 2020-06-05 DIAGNOSIS — I10 HYPERTENSION, UNSPECIFIED TYPE: ICD-10-CM

## 2020-06-05 DIAGNOSIS — E11.9 TYPE 2 DIABETES MELLITUS WITHOUT COMPLICATION, WITHOUT LONG-TERM CURRENT USE OF INSULIN (HCC): ICD-10-CM

## 2020-06-05 PROCEDURE — 4010F ACE/ARB THERAPY RXD/TAKEN: CPT | Performed by: INTERNAL MEDICINE

## 2020-06-05 RX ORDER — LISINOPRIL 2.5 MG/1
2.5 TABLET ORAL DAILY
Qty: 90 TABLET | Refills: 3 | Status: SHIPPED | OUTPATIENT
Start: 2020-06-05 | End: 2020-10-20 | Stop reason: SDUPTHER

## 2020-10-20 DIAGNOSIS — I10 HYPERTENSION, UNSPECIFIED TYPE: ICD-10-CM

## 2020-10-20 RX ORDER — LISINOPRIL 2.5 MG/1
2.5 TABLET ORAL DAILY
Qty: 90 TABLET | Refills: 3 | Status: SHIPPED | OUTPATIENT
Start: 2020-10-20 | End: 2021-08-29 | Stop reason: SDUPTHER

## 2020-11-10 ENCOUNTER — VBI (OUTPATIENT)
Dept: ADMINISTRATIVE | Facility: OTHER | Age: 60
End: 2020-11-10

## 2020-11-19 ENCOUNTER — VBI (OUTPATIENT)
Dept: ADMINISTRATIVE | Facility: OTHER | Age: 60
End: 2020-11-19

## 2020-11-23 ENCOUNTER — VBI (OUTPATIENT)
Dept: ADMINISTRATIVE | Facility: OTHER | Age: 60
End: 2020-11-23

## 2020-11-24 ENCOUNTER — OFFICE VISIT (OUTPATIENT)
Dept: INTERNAL MEDICINE CLINIC | Facility: CLINIC | Age: 60
End: 2020-11-24
Payer: COMMERCIAL

## 2020-11-24 VITALS
SYSTOLIC BLOOD PRESSURE: 124 MMHG | HEIGHT: 70 IN | TEMPERATURE: 96.8 F | BODY MASS INDEX: 34.25 KG/M2 | DIASTOLIC BLOOD PRESSURE: 70 MMHG | WEIGHT: 239.25 LBS

## 2020-11-24 DIAGNOSIS — Z00.00 MEDICARE ANNUAL WELLNESS VISIT, SUBSEQUENT: Primary | ICD-10-CM

## 2020-11-24 DIAGNOSIS — Z23 FLU VACCINE NEED: ICD-10-CM

## 2020-11-24 DIAGNOSIS — E11.9 TYPE 2 DIABETES MELLITUS WITHOUT COMPLICATION, WITHOUT LONG-TERM CURRENT USE OF INSULIN (HCC): ICD-10-CM

## 2020-11-24 LAB — SL AMB POCT HEMOGLOBIN AIC: 10.3 (ref ?–6.5)

## 2020-11-24 PROCEDURE — 3078F DIAST BP <80 MM HG: CPT | Performed by: INTERNAL MEDICINE

## 2020-11-24 PROCEDURE — 90682 RIV4 VACC RECOMBINANT DNA IM: CPT | Performed by: INTERNAL MEDICINE

## 2020-11-24 PROCEDURE — 3074F SYST BP LT 130 MM HG: CPT | Performed by: INTERNAL MEDICINE

## 2020-11-24 PROCEDURE — G0008 ADMIN INFLUENZA VIRUS VAC: HCPCS | Performed by: INTERNAL MEDICINE

## 2020-11-24 PROCEDURE — 1036F TOBACCO NON-USER: CPT | Performed by: INTERNAL MEDICINE

## 2020-11-24 PROCEDURE — 3725F SCREEN DEPRESSION PERFORMED: CPT | Performed by: INTERNAL MEDICINE

## 2020-11-24 PROCEDURE — 3008F BODY MASS INDEX DOCD: CPT | Performed by: INTERNAL MEDICINE

## 2020-11-24 PROCEDURE — 3046F HEMOGLOBIN A1C LEVEL >9.0%: CPT | Performed by: INTERNAL MEDICINE

## 2020-11-24 PROCEDURE — 83036 HEMOGLOBIN GLYCOSYLATED A1C: CPT | Performed by: INTERNAL MEDICINE

## 2020-11-24 PROCEDURE — G0439 PPPS, SUBSEQ VISIT: HCPCS | Performed by: INTERNAL MEDICINE

## 2020-12-13 DIAGNOSIS — E78.5 HYPERLIPIDEMIA, UNSPECIFIED HYPERLIPIDEMIA TYPE: ICD-10-CM

## 2020-12-14 RX ORDER — ATORVASTATIN CALCIUM 80 MG/1
TABLET, FILM COATED ORAL
Qty: 90 TABLET | Refills: 3 | Status: SHIPPED | OUTPATIENT
Start: 2020-12-14

## 2021-01-06 ENCOUNTER — OFFICE VISIT (OUTPATIENT)
Dept: ENDOCRINOLOGY | Facility: CLINIC | Age: 61
End: 2021-01-06
Payer: COMMERCIAL

## 2021-01-06 VITALS
HEIGHT: 70 IN | DIASTOLIC BLOOD PRESSURE: 72 MMHG | HEART RATE: 91 BPM | OXYGEN SATURATION: 98 % | WEIGHT: 242.6 LBS | BODY MASS INDEX: 34.73 KG/M2 | SYSTOLIC BLOOD PRESSURE: 138 MMHG

## 2021-01-06 DIAGNOSIS — E55.9 VITAMIN D DEFICIENCY: ICD-10-CM

## 2021-01-06 DIAGNOSIS — E78.2 MIXED HYPERLIPIDEMIA: ICD-10-CM

## 2021-01-06 DIAGNOSIS — E11.42 TYPE 2 DIABETES MELLITUS WITH DIABETIC POLYNEUROPATHY, UNSPECIFIED WHETHER LONG TERM INSULIN USE (HCC): Primary | ICD-10-CM

## 2021-01-06 DIAGNOSIS — I10 ESSENTIAL HYPERTENSION: ICD-10-CM

## 2021-01-06 PROCEDURE — 3078F DIAST BP <80 MM HG: CPT | Performed by: NURSE PRACTITIONER

## 2021-01-06 PROCEDURE — 1036F TOBACCO NON-USER: CPT | Performed by: NURSE PRACTITIONER

## 2021-01-06 PROCEDURE — 3008F BODY MASS INDEX DOCD: CPT | Performed by: NURSE PRACTITIONER

## 2021-01-06 PROCEDURE — 99204 OFFICE O/P NEW MOD 45 MIN: CPT | Performed by: NURSE PRACTITIONER

## 2021-01-06 PROCEDURE — 3075F SYST BP GE 130 - 139MM HG: CPT | Performed by: NURSE PRACTITIONER

## 2021-01-06 NOTE — PATIENT INSTRUCTIONS
1  Increase Metformin to 1000 mg with breakfast and dinner  2  Increase Basaglar to 25 units  3  Test your blood sugar 2x daily at different times  4  Send me your sheet every 1-2 weeks  5  I will call you if I want you to start meal time insulin (humalog)  6  Get your labs done prior to your next appointment

## 2021-01-06 NOTE — ASSESSMENT & PLAN NOTE
Recommended that patient continue to supplement daily with 2000 International Units use of vitamin-D  Last vitamin-D from 1 year ago was low

## 2021-01-06 NOTE — PROGRESS NOTES
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, and peripheral neuropathy and angiopathy  When patient was initially diagnosed, he was placed on metformin  Eventually long-acting Basaglar was added  He typically checks his blood sugars once in the morning  He states that they had been running between 130 and 160 but now have been elevated over 250 without any significant change in his diet or daily routine  He denies any recent infections or hospitalizations  He does notice increased thirst and urination as well as a mild change in vision  Current regimen:     Basaglar 15 units daily (takes around 10-11pm); usually takes 20  Metformin 500 mg BID    Diet:  Patient states that he does follow a low carb diet  He does not eat white bread  He states he enjoys lean proteins like fish and chicken  He states he eats a fair number of vegetables  B: cereal, PB toast, toaster waffles  L: sandwich on wheat bread with tomato or has some fruit on the side  D: protein, instant mashed, vegetables  Vice: chip in the evening     Meter: unsure    Exercise: Sedentary- does try to get out and walk daily; walks to senior center when it is open; this has been negatively impacted by COVID-19      Influenza vaccine: Up to date    Pneumovax: Up to date    Ophthalmology:  Referral provided    Podiatry/Foot care: + numbness/tingling in left foot; no recent podiatry appointment; Last diabetic foot exam performed by PCP on 11/24/2020    Dentist: Looking for a dentist, thinks he wants dentures    Diabetes education/nutrition:  None    For his hypertension, he is taking 2 5 mg of lisinopril daily  He denies headache and cough  For his hyperlipidemia, he is taking 80 mg of atorvastatin daily  He denies myalgias  Patient Active Problem List   Diagnosis    Diabetes mellitus ([place] [address] )    Diabetic neuropathy ([place] [address] )    Hx of right BKA ([place] [address] )    Ambulatory dysfunction    Hypertension    Hyperlipidemia    Vitamin D deficiency    Medicare annual wellness visit, subsequent    Flu vaccine need    Diabetic peripheral angiopathy ([place] [address] )      Past Medical History:   Diagnosis Date    Diabetes mellitus ([place] [address] )     Hypertension       Past Surgical History:   Procedure Laterality Date    LEG AMPUTATION THROUGH LOWER TIBIA AND FIBULA Right 7/25/2017    Procedure: AMPUTATION BELOW KNEE (BKA);   Surgeon: Cole Baker MD;  [facility];  Service: General    WI AMPUTATION FOOT,TRANSMETATARSAL Right 1/26/2017    Procedure: AMPUTATION TRANSMETATARSAL (TMA); OPEN;  Surgeon: Anh Ernst DPM;  [facility];  Service: Podiatry    WI AMPUTATION METATARSAL+TOE,SINGLE Left 1/30/2017    Procedure: Left partial 1st ray amputation;  Surgeon: Anh Ernst DPM;  [facility];  Service: Podiatry    WI COLONOSCOPY FLX DX W/HERMAN Booth 1978 PFRMD N/A 11/28/2018    Procedure: COLONOSCOPY;  Surgeon: Lisy Jung MD;  [facility];  Service: Gastroenterology    WOUND DEBRIDEMENT Right 1/30/2017    Procedure: DEBRIDEMENT FOOT/TOE ([address]) delayed closure, LINDA;  Surgeon: Anh Ernst DPM;  [facility];  Service:       Family History   Problem Relation Age of Onset    Diabetes Mother      Social History     Tobacco Use    Smoking status: Former Smoker     Types: Cigarettes    Smokeless tobacco: Never Used    Tobacco comment: quit 9 years ago   Substance Use Topics    Alcohol use: Yes     Alcohol/week: 11 0 standard drinks     Types: 6 Cans of beer, 5 Shots of liquor per week     Comment: social ; occasional use as per All[employer]     No Known Allergies      Current Outpatient Medications:     aspirin (ECOTRIN LOW STRENGTH) 81 mg EC tablet, Take 81 mg by mouth daily Resume on 8/11/17 , Disp: , Rfl:     atorvastatin (LIPITOR) 80 mg tablet, TAKE 1 TABLET DAILY, Disp: 90 tablet, Rfl: 3    B-D INS SYRINGE 0 5CC/30GX1/2" 30G X 1/2" 0 5 ML MISC, , Disp: , Rfl:     insulin glargine (Basaglar KwikPen) 100 units/mL injection pen, Inject 15 Units under the skin daily, Disp: 5 pen, Rfl: 5    Insulin Pen Needle 30G X 8 MM MISC, Inject under the skin daily at bedtime, Disp: 100 each, Rfl: 5    Insulin Syringe-Needle U-100 (B-D INS SYR ULTRAFINE 1CC/30G) 30G X 1/2" 1 ML MISC, by Does not apply route daily, Disp: , Rfl:     lisinopril (ZESTRIL) 2 5 mg tablet, Take 1 tablet (2 5 mg total) by mouth daily, Disp: 90 tablet, Rfl: 3    Multiple Vitamin (MULTI-VITAMIN DAILY PO), Take 1 tablet by mouth daily, Disp: , Rfl:     metFORMIN (GLUCOPHAGE) 1000 MG tablet, Take 1 tablet (1,000 mg total) by mouth 2 (two) times a day with meals, Disp: 180 tablet, Rfl: 0    Review of Systems   Constitutional: Negative for activity change, appetite change, fatigue and unexpected weight change  HENT: Positive for dental problem  Negative for sore throat, trouble swallowing and voice change  Eyes: Positive for visual disturbance  Respiratory: Positive for shortness of breath (with exertion)  Negative for cough and chest tightness  Cardiovascular: Negative for chest pain, palpitations and leg swelling  Gastrointestinal: Negative for constipation, diarrhea, nausea and vomiting  Endocrine: Positive for polydipsia and polyuria  Negative for polyphagia  Genitourinary: Positive for frequency  Musculoskeletal: Positive for arthralgias, back pain and gait problem (Uses cane)  Negative for myalgias  Skin: Negative for wound  Allergic/Immunologic: Positive for environmental allergies  Negative for food allergies  Neurological: Positive for weakness and numbness  Negative for dizziness, light-headedness and headaches  Hematological: Does not bruise/bleed easily  Psychiatric/Behavioral: Negative for decreased concentration, dysphoric mood and sleep disturbance  The patient is not nervous/anxious  Physical Exam:  Body mass index is 34 81 kg/m²  /72 (BP Location: Left arm, Cuff Size: Adult)   Pulse 91   Ht 5' 10" (1 778 m)   Wt 110 kg (242 lb 9 6 oz)   SpO2 98%   BMI 34 81 kg/m²    Wt Readings from Last 3 Encounters:   01/06/21 110 kg (242 lb 9 6 oz)   11/24/20 109 kg (239 lb 4 oz)   02/18/20 110 kg (243 lb 4 oz)       Physical Exam  Vitals signs reviewed  Constitutional:       General: He is not in acute distress  Appearance: He is well-developed  He is not ill-appearing  HENT:      Head: Normocephalic and atraumatic  Comments: Mask in place  Eyes:      Pupils: Pupils are equal, round, and reactive to light  Neck:      Musculoskeletal: Normal range of motion and neck supple  Thyroid: No thyromegaly  Cardiovascular:      Rate and Rhythm: Normal rate and regular rhythm  Pulses: Normal pulses  Heart sounds: Normal heart sounds  Pulmonary:      Effort: Pulmonary effort is normal       Breath sounds: Normal breath sounds  Abdominal:      General: Bowel sounds are normal       Palpations: Abdomen is soft  Musculoskeletal:      Right lower leg: No edema  Left lower leg: No edema  Lymphadenopathy:      Cervical: No cervical adenopathy  Skin:     General: Skin is warm and dry  Capillary Refill: Capillary refill takes less than 2 seconds  Neurological:      Mental Status: He is alert and oriented to person, place, and time        Gait: Gait abnormal    Psychiatric:         Mood and Affect: Mood normal          Behavior: Behavior normal  Thought Content: Thought content normal          Judgment: Judgment normal          Labs:   Lab Results   Component Value Date    HGBA1C 10 3 (A) 11/24/2020    HGBA1C 9 4 (H) 05/11/2020    HGBA1C 9 9 (H) 02/05/2020     Lab Results   Component Value Date    CREATININE 1 06 05/11/2020    CREATININE 0 98 10/28/2019    CREATININE 1 03 08/02/2019    BUN 11 05/11/2020     01/06/2014    K 4 2 05/11/2020     05/11/2020    CO2 27 05/11/2020     eGFR   Date Value Ref Range Status   05/11/2020 75 ml/min/1 73sq m Final     Lab Results   Component Value Date    HDL 41 05/11/2020    TRIG 157 (H) 05/11/2020     Lab Results   Component Value Date    ALT 27 05/11/2020    AST 13 05/11/2020    ALKPHOS 122 (H) 05/11/2020     No results found for: JRD0NBGRLVOR  No results found for: FREET4, TSI    Impression & Plan:    Problem List Items Addressed This Visit        Endocrine    Diabetes mellitus ([facility] [address] ) - Primary     Patient is uncontrolled  Likely will need mealtime insulin  However, given lack of data, will increase metformin to 1000 mg twice daily and Basaglar to 25 units daily  Test blood sugar at least 2 times daily at various times and send in sugar logs every week  Once those are reviewed, will likely initiate mealtime insulin  Given history of amputations, will avoid SGLT-2 inhibitors  However, once patient's HgA1C has reduced, he may benefit from a GLP-1  Lab Results   Component Value Date    HGBA1C 10 3 (A) 11/24/2020            Relevant Medications    metFORMIN (GLUCOPHAGE) 1000 MG tablet    Other Relevant Orders    Ambulatory referral to Ophthalmology    Ambulatory referral to Diabetic Education    Microalbumin / creatinine urine ratio Lab Collect    CBC and differential Lab Collect       Cardiovascular and Mediastinum    Hypertension     BP is 138/72  Continue current regimen  Other    Hyperlipidemia     Continue statin           Vitamin D deficiency     Recommended that patient continue to supplement daily with 2000 International Units use of vitamin-D  Last vitamin-D from 1 year ago was low  Orders Placed This Encounter   Procedures    Microalbumin / creatinine urine ratio Lab Collect     Standing Status:   Future     Standing Expiration Date:   1/6/2022    CBC and differential Lab Collect     This is a patient instruction: This test is non-fasting  Please drink two glasses of water morning of bloodwork  Standing Status:   Future     Standing Expiration Date:   1/6/2022    Ambulatory referral to Ophthalmology     Standing Status:   Future     Standing Expiration Date:   1/6/2022     Referral Priority:   Routine     Referral Type:   Consult - AMB     Referral Reason:   Specialty Services Required     Referred to Provider:   Perla Matthew MD     Requested Specialty:   Ophthalmology     Number of Visits Requested:   1     Expiration Date:   1/6/2022    Ambulatory referral to Diabetic Education     Standing Status:   Future     Standing Expiration Date:   1/6/2022     Referral Priority:   Routine     Referral Type:   Consult - AMB     Referral Reason:   Specialty Services Required     Requested Specialty:   Diabetes Services     Number of Visits Requested:   1     Expiration Date:   1/6/2022       Patient Instructions   1  Increase Metformin to 1000 mg with breakfast and dinner  2  Increase Basaglar to 25 units  3  Test your blood sugar 2x daily at different times  4  Send me your sheet every 1-2 weeks  5  I will call you if I want you to start meal time insulin (humalog)  6  Get your labs done prior to your next appointment  Discussed with the patient and all questioned fully answered  He will call me if any problems arise  Follow-up appointment in 3 months       Counseled patient on diagnostic results, prognosis, risk and benefit of treatment options, instruction for management, importance of treatment compliance, Risk  factor reduction and impressions    ALFONSO Jean

## 2021-01-06 NOTE — ASSESSMENT & PLAN NOTE
Patient is uncontrolled  Likely will need mealtime insulin  However, given lack of data, will increase metformin to 1000 mg twice daily and Basaglar to 25 units daily  Test blood sugar at least 2 times daily at various times and send in sugar logs every week  Once those are reviewed, will likely initiate mealtime insulin  Given history of amputations, will avoid SGLT-2 inhibitors  However, once patient's HgA1C has reduced, he may benefit from a GLP-1     Lab Results   Component Value Date    HGBA1C 10 3 (A) 11/24/2020

## 2021-01-13 ENCOUNTER — TELEPHONE (OUTPATIENT)
Dept: DIABETES SERVICES | Facility: CLINIC | Age: 61
End: 2021-01-13

## 2021-01-13 NOTE — TELEPHONE ENCOUNTER
Patient was to be seen for MNT today for type 2 diabetes  It is noted that he is admitted inpatient at [facility] today  We will cancel the appointment and reach out to reschedule in the future

## 2021-02-05 ENCOUNTER — PATIENT OUTREACH (OUTPATIENT)
Dept: INTERNAL MEDICINE CLINIC | Facility: CLINIC | Age: 61
End: 2021-02-05

## 2021-02-05 ENCOUNTER — TELEPHONE (OUTPATIENT)
Dept: INTERNAL MEDICINE CLINIC | Facility: CLINIC | Age: 61
End: 2021-02-05

## 2021-02-05 DIAGNOSIS — Z71.89 ENCOUNTER FOR COUNSELING FOR CARE MANAGEMENT OF PATIENT WITH CHRONIC CONDITIONS AND COMPLEX HEALTH NEEDS USING NURSE-BASED MODEL: Primary | ICD-10-CM

## 2021-02-05 NOTE — TELEPHONE ENCOUNTER
Baldev Medina, are you able to help this patient with ideas about transportation to office and for testing please?

## 2021-02-05 NOTE — TELEPHONE ENCOUNTER
Thank you! He doesn't have anything pending at this time but will have labs to do after visit and possibly other testing

## 2021-02-05 NOTE — TELEPHONE ENCOUNTER
Patient unable to come to appt because [facility] doesn't come to [facility]  He has no other source of transportation or way to get testing done thru this area  Asking if you have any ideas, he would like to continue to see you but unsure about getting to office further?

## 2021-02-05 NOTE — PROGRESS NOTES
Outpatient Care Management Note:  Received in basket PCP referral for a patient requiring transportation  Patient lives in [place] and his PCP is in [facility]  Patient does have services with [facility]  [facility]  Confirmes that they will not transport a patient out of county unless they have medical assistance transportation  Call placed to [employer], patient's medical insurer  Patient does not have a transportation benefit  Only other alternative would be transportation through [place], if available, which would be billed to the PCP office  It would be approximately 33 miles round trip  In basket message sent to PCP and office staff to make them aware of same

## 2021-02-05 NOTE — TELEPHONE ENCOUNTER
Spoke with patient and he said he found a ride for the appointment and he will be coming in  A1C can be checked in office if needed

## 2021-02-09 ENCOUNTER — TELEPHONE (OUTPATIENT)
Dept: ENDOCRINOLOGY | Facility: CLINIC | Age: 61
End: 2021-02-09

## 2021-02-09 DIAGNOSIS — E11.42 TYPE 2 DIABETES MELLITUS WITH DIABETIC POLYNEUROPATHY, UNSPECIFIED WHETHER LONG TERM INSULIN USE (HCC): Primary | ICD-10-CM

## 2021-02-09 NOTE — TELEPHONE ENCOUNTER
New email sent    Blood sugar log    Medications:  [id number]  BID  Basaglar 25 units once daily

## 2021-02-12 NOTE — TELEPHONE ENCOUNTER
Log reviewed  I will start mealtime insulin for him, especially for dinner  I will wait to do this until next week when we will all be here to talk to him and provide education

## 2021-02-18 RX ORDER — INSULIN LISPRO 100 [IU]/ML
INJECTION, SOLUTION INTRAVENOUS; SUBCUTANEOUS
Qty: 5 PEN | Refills: 2 | Status: SHIPPED | OUTPATIENT
Start: 2021-02-18 | End: 2021-02-19 | Stop reason: SDUPTHER

## 2021-02-18 NOTE — TELEPHONE ENCOUNTER
I see that I misread when his appointment was  I would like to increase his Basaglar to 30 units daily  I would like him to start mealtime insulin  Humalog 5 units 3 times daily before meals  Anticipate that he will need more than this  However, I would like to start him slow so we do not cause any hypoglycemia  Please continue to check blood sugar 3 times daily and provide us with those numbers

## 2021-02-19 DIAGNOSIS — E11.9 TYPE 2 DIABETES MELLITUS WITHOUT COMPLICATION, WITH LONG-TERM CURRENT USE OF INSULIN (HCC): ICD-10-CM

## 2021-02-19 DIAGNOSIS — Z79.4 TYPE 2 DIABETES MELLITUS WITHOUT COMPLICATION, WITH LONG-TERM CURRENT USE OF INSULIN (HCC): ICD-10-CM

## 2021-02-19 RX ORDER — INSULIN GLARGINE 100 [IU]/ML
15 INJECTION, SOLUTION SUBCUTANEOUS DAILY
Qty: 5 PEN | Refills: 5 | Status: SHIPPED | OUTPATIENT
Start: 2021-02-19 | End: 2021-03-30 | Stop reason: DRUGHIGH

## 2021-02-19 NOTE — TELEPHONE ENCOUNTER
Called pt and advised    Notes he can't drive so he desires future Rx's to be sent to [facility]   Rx entered now for same

## 2021-02-22 ENCOUNTER — TELEPHONE (OUTPATIENT)
Dept: ENDOCRINOLOGY | Facility: CLINIC | Age: 61
End: 2021-02-22

## 2021-02-22 DIAGNOSIS — E11.42 TYPE 2 DIABETES MELLITUS WITH DIABETIC POLYNEUROPATHY, UNSPECIFIED WHETHER LONG TERM INSULIN USE (HCC): Primary | ICD-10-CM

## 2021-02-22 NOTE — TELEPHONE ENCOUNTER
Blood sugar log    Medications:  Basaglar 15 units daily  Humalog 5 units 3 times a day  Metformin 1000mg twice a day

## 2021-02-23 RX ORDER — INSULIN LISPRO 100 [IU]/ML
INJECTION, SOLUTION INTRAVENOUS; SUBCUTANEOUS
Qty: 5 PEN | Refills: 2 | Status: SHIPPED | OUTPATIENT
Start: 2021-02-23 | End: 2021-03-30 | Stop reason: CLARIF

## 2021-02-23 NOTE — TELEPHONE ENCOUNTER
Has not been able to get humalog from [facility] at this time  SO HE IS NOT TAKING IT  Rx was sent today    Also needsTesting supplies sent  to [employer] [facility]  for [employer]  Test strips   Testing 3-4 times daily

## 2021-02-23 NOTE — TELEPHONE ENCOUNTER
Results reviewed  Please ask the patient to be sure to check BG at least three times daily especially now that he has been started on mealtime insulin  Thank you

## 2021-02-24 ENCOUNTER — OFFICE VISIT (OUTPATIENT)
Dept: INTERNAL MEDICINE CLINIC | Facility: CLINIC | Age: 61
End: 2021-02-24
Payer: COMMERCIAL

## 2021-02-24 VITALS
DIASTOLIC BLOOD PRESSURE: 78 MMHG | OXYGEN SATURATION: 96 % | TEMPERATURE: 93 F | BODY MASS INDEX: 34.5 KG/M2 | HEIGHT: 70 IN | SYSTOLIC BLOOD PRESSURE: 124 MMHG | WEIGHT: 241 LBS

## 2021-02-24 DIAGNOSIS — Z89.511 HX OF RIGHT BKA (HCC): ICD-10-CM

## 2021-02-24 DIAGNOSIS — Z79.4 TYPE 2 DIABETES MELLITUS WITHOUT COMPLICATION, WITH LONG-TERM CURRENT USE OF INSULIN (HCC): Primary | ICD-10-CM

## 2021-02-24 DIAGNOSIS — I10 ESSENTIAL HYPERTENSION: ICD-10-CM

## 2021-02-24 DIAGNOSIS — E11.9 TYPE 2 DIABETES MELLITUS WITHOUT COMPLICATION, WITH LONG-TERM CURRENT USE OF INSULIN (HCC): Primary | ICD-10-CM

## 2021-02-24 LAB — SL AMB POCT HEMOGLOBIN AIC: 10.7 (ref ?–6.5)

## 2021-02-24 PROCEDURE — 3078F DIAST BP <80 MM HG: CPT | Performed by: INTERNAL MEDICINE

## 2021-02-24 PROCEDURE — 3074F SYST BP LT 130 MM HG: CPT | Performed by: INTERNAL MEDICINE

## 2021-02-24 PROCEDURE — 1036F TOBACCO NON-USER: CPT | Performed by: INTERNAL MEDICINE

## 2021-02-24 PROCEDURE — 3046F HEMOGLOBIN A1C LEVEL >9.0%: CPT | Performed by: INTERNAL MEDICINE

## 2021-02-24 PROCEDURE — 83036 HEMOGLOBIN GLYCOSYLATED A1C: CPT | Performed by: INTERNAL MEDICINE

## 2021-02-24 PROCEDURE — 99214 OFFICE O/P EST MOD 30 MIN: CPT | Performed by: INTERNAL MEDICINE

## 2021-02-24 PROCEDURE — 3008F BODY MASS INDEX DOCD: CPT | Performed by: INTERNAL MEDICINE

## 2021-02-24 RX ORDER — INSULIN ASPART 100 [IU]/ML
INJECTION, SOLUTION INTRAVENOUS; SUBCUTANEOUS
Qty: 5 PEN | Refills: 2 | Status: SHIPPED | OUTPATIENT
Start: 2021-02-24

## 2021-02-24 NOTE — PROGRESS NOTES
Assessment/Plan:         Diagnoses and all orders for this visit:    Type 2 diabetes mellitus without complication, with long-term current use of insulin (HCC)  -     POCT hemoglobin A1c  He is working with endocrine and sugars are trending lower A1c now 10 7 which is coming down  He will setup eye exam  He has endocrine appt in April     Hx of right BKA ([facility] [address] )  Pt had fall last year with fx ribs but is stable at present He has been in touch with prosthetic company but presently feels stable     Essential hypertension  Bp stable Continue current rx    Pt can no longer get transport service to [facility] so he was provided with [address] office in [address] and he will call to schedule followup there     Pt is interested in covid vaccine [place] and is on My Chart          Patient ID: Ayaan Faye is a 63 y o  male  HPI  Pt doing ok He had a fall with fx of multiple ribs several months ago He was in [facility] for J[facility]ony Hold[facility] he is feeling better [facility] no longer transports to this county so pt is planning to change PCP to Niru Jhaveri option He is working with endocrine thru [address] and his sugars are starting to come down but today it was in the 220 range still His diet is fair control No chest pain or sob No fever or chills He does want covid vaccine when he can get and would go to [facility]       Review of Systems   Constitutional: Negative  HENT: Negative  Respiratory: Negative  Cardiovascular: Negative  Gastrointestinal: Negative  Genitourinary: Negative  Musculoskeletal: Positive for arthralgias and gait problem  Skin: Negative  Hematological: Negative  Psychiatric/Behavioral: Negative  Past Medical History:   Diagnosis Date    Diabetes mellitus ([facility] [address] )     Hypertension      Past Surgical History:   Procedure Laterality Date    LEG AMPUTATION THROUGH LOWER TIBIA AND FIBULA Right 7/25/2017    Procedure: AMPUTATION BELOW KNEE (BKA);   Surgeon: Elizabeth Gleason MD;  Location:  MAIN OR;  Service: General    HI AMPUTATION FOOT,TRANSMETATARSAL Right 1/26/2017    Procedure: AMPUTATION TRANSMETATARSAL (TMA); OPEN;  Surgeon: Ly Castro DPM;  [facility];  Service: Podiatry    HI AMPUTATION METATARSAL+TOE,SINGLE Left 1/30/2017    Procedure: Left partial 1st ray amputation;  Surgeon: Ly Castro DPM;  [facility];  Service: Podiatry    HI COLONOSCOPY FLX DX W/COLLJ Emmy 1978 PFRMD N/A 11/28/2018    Procedure: COLONOSCOPY;  Surgeon: Dorita Romero MD;  [facility];  Service: Gastroenterology    WOUND DEBRIDEMENT Right 1/30/2017    Procedure: DEBRIDEMENT FOOT/TOE ([address]) delayed closure, LINDA;  Surgeon: Ly Castro DPM;  [facility];  Service:      Social History     Socioeconomic History    Marital status: Single     Spouse name: Not on file    Number of children: Not on file    Years of education: Not on file    Highest education level: Not on file   Occupational History    Occupation: retired   Social Needs    Financial resource strain: Not on file    Food insecurity     Worry: Not on file     Inability: Not on file   Sumerian needs     Medical: Not on file     Non-medical: Not on file   Tobacco Use    Smoking status: Former Smoker     Types: Cigarettes    Smokeless tobacco: Never Used    Tobacco comment: quit 9 years ago   Substance and Sexual Activity    Alcohol use:  Yes     Alcohol/week: 11 0 standard drinks     Types: 6 Cans of beer, 5 Shots of liquor per week     Comment: social ; occasional use as per [employer]scripts    Drug use: No    Sexual activity: Not on file   Lifestyle    Physical activity     Days per week: Not on file     Minutes per session: Not on file    Stress: Not on file   Relationships    Social connections     Talks on phone: Not on file     Gets together: Not on file     Attends [employer] service: Not on file     Active member of club or organization: Not on file     Attends meetings of clubs or organizations: Not on file     Relationship status: Not on file    Intimate partner violence     Fear of current or ex partner: Not on file     Emotionally abused: Not on file     Physically abused: Not on file     Forced sexual activity: Not on file   Other Topics Concern    Not on file   Social History Narrative    Always uses seat belt    Caffeine use    Dental care regularly     No Known Allergies  BMI Counseling: Body mass index is 34 58 kg/m²  The BMI is above normal  Nutrition recommendations include consuming healthier snacks and moderation in carbohydrate intake  Exercise recommendations include exercising 3-5 times per week  No pharmacotherapy was ordered  /78   Temp (!) 93 °F (33 9 °C)   Ht 5' 10" (1 778 m)   Wt 109 kg (241 lb)   SpO2 96%   BMI 34 58 kg/m²          Physical Exam  Vitals signs reviewed  Constitutional:       General: He is not in acute distress  Appearance: Normal appearance  He is normal weight  He is not ill-appearing, toxic-appearing or diaphoretic  HENT:      Head: Normocephalic and atraumatic  Right Ear: Tympanic membrane, ear canal and external ear normal  There is no impacted cerumen  Left Ear: Tympanic membrane, ear canal and external ear normal  There is no impacted cerumen  Nose: Nose normal       Mouth/Throat:      Mouth: Mucous membranes are dry  Eyes:      General: No scleral icterus  Extraocular Movements: Extraocular movements intact  Conjunctiva/sclera: Conjunctivae normal       Pupils: Pupils are equal, round, and reactive to light  Neck:      Musculoskeletal: Normal range of motion and neck supple  Cardiovascular:      Rate and Rhythm: Normal rate and regular rhythm  Pulses: Normal pulses  Heart sounds: Normal heart sounds  Pulmonary:      Effort: Pulmonary effort is normal  No respiratory distress  Breath sounds: Normal breath sounds  No wheezing     Abdominal:      General: Bowel sounds are normal  There is no distension  Palpations: Abdomen is soft  Tenderness: There is no abdominal tenderness  Musculoskeletal:         General: Deformity present  No tenderness  Left lower leg: No edema  Skin:     General: Skin is dry  Coloration: Skin is not jaundiced or pale  Findings: Lesion present  No erythema  Comments: Scabbed lesion left calf   Neurological:      General: No focal deficit present  Mental Status: He is alert and oriented to person, place, and time  Mental status is at baseline  Cranial Nerves: No cranial nerve deficit  Sensory: Sensory deficit present  Gait: Gait abnormal    Psychiatric:         Mood and Affect: Mood normal          Behavior: Behavior normal          Thought Content:  Thought content normal          Judgment: Judgment normal

## 2021-03-10 DIAGNOSIS — Z23 ENCOUNTER FOR IMMUNIZATION: ICD-10-CM

## 2021-03-11 ENCOUNTER — TELEPHONE (OUTPATIENT)
Dept: ENDOCRINOLOGY | Facility: CLINIC | Age: 61
End: 2021-03-11

## 2021-03-11 NOTE — TELEPHONE ENCOUNTER
Glucose log reviewed  Patient's sugars are consistently elevated  Increase NovoLog to 10 units prior to meals  Increase Basaglar to 35 units daily

## 2021-03-22 ENCOUNTER — TELEPHONE (OUTPATIENT)
Dept: ENDOCRINOLOGY | Facility: CLINIC | Age: 61
End: 2021-03-22

## 2021-03-22 DIAGNOSIS — E11.42 TYPE 2 DIABETES MELLITUS WITH DIABETIC POLYNEUROPATHY, UNSPECIFIED WHETHER LONG TERM INSULIN USE (HCC): Primary | ICD-10-CM

## 2021-03-22 NOTE — TELEPHONE ENCOUNTER
Blood sugar log    Medications:  Novolog 10 units 3 times a day prior to meals  Basaglar 35 units daily

## 2021-03-30 RX ORDER — INSULIN GLARGINE 100 [IU]/ML
INJECTION, SOLUTION SUBCUTANEOUS
Qty: 30 ML | Refills: 2 | Status: SHIPPED | OUTPATIENT
Start: 2021-03-30 | End: 2021-07-23 | Stop reason: SDUPTHER

## 2021-03-30 NOTE — TELEPHONE ENCOUNTER
Called pt and advised  Notes he can't afford to increase his Basaglar as its costing him approx $140 each time he gets it refills   Asked if he would be interested in a patient assistance program but notes he is not eligible  At this time waiting for a call back as he prefers to not increase dose

## 2021-03-30 NOTE — TELEPHONE ENCOUNTER
Glucose log reviewed  Please increase Basaglar to 40 units daily  Continue to focus on a low-carbohydrate diet

## 2021-03-30 NOTE — TELEPHONE ENCOUNTER
Called pt with advice  P became very frustrated and upset  Feels we just don't understand  Notes he pays $240 between both insulins monthly  Again suggested he try for pt assistance however pt made aware he would need to  the meds in the office every 3 months and he is not able/willing to do that  Apologized for his tone with the nurse but feels he can't do anything else at this time  Asked if he could wait and discuss at  4/13/21 appt upcoming   Advised I would inform Karen Hampton

## 2021-03-30 NOTE — TELEPHONE ENCOUNTER
Would he be willing to add a once weekly injection of a GLP-1 like ozempic or trulicity? This would give him better glucose control as well as provide some cardiac protection  These medications can also be expensive but we may be able to get financial assistance from the company? His most recent HgA1C is 10 7 %  This increases his risk for worsening kidney function, neuropathy, heart attack, and stroke

## 2021-03-31 NOTE — TELEPHONE ENCOUNTER
Patient called really upset saying that [facility] charged his account over $150 00  He said that he does not have money for that and has other bills to pay  I told him I will call [facility] [employer] and see what they say  [employer] and they stated it was already shipped  He will have to call [phone] to get a return shipping label and return it for his money back

## 2021-04-13 ENCOUNTER — TELEMEDICINE (OUTPATIENT)
Dept: ENDOCRINOLOGY | Facility: CLINIC | Age: 61
End: 2021-04-13
Payer: COMMERCIAL

## 2021-04-13 DIAGNOSIS — E78.2 MIXED HYPERLIPIDEMIA: ICD-10-CM

## 2021-04-13 DIAGNOSIS — E11.42 TYPE 2 DIABETES MELLITUS WITH DIABETIC POLYNEUROPATHY, UNSPECIFIED WHETHER LONG TERM INSULIN USE (HCC): Primary | ICD-10-CM

## 2021-04-13 PROCEDURE — 99213 OFFICE O/P EST LOW 20 MIN: CPT | Performed by: NURSE PRACTITIONER

## 2021-04-13 NOTE — ASSESSMENT & PLAN NOTE
Patient continues to experience hyperglycemia  Due to his set monthly budget, he does not feel he can afford to increase his insulin doses at this time  Reviewed how and when to take his insulins  Reviewed the importance of following a low carbohydrate diet     Lab Results   Component Value Date    HGBA1C 10 7 (A) 02/24/2021

## 2021-04-13 NOTE — PROGRESS NOTES
Virtual Brief Visit    Assessment/Plan:    Problem List Items Addressed This Visit        Endocrine    Diabetes mellitus ([facility] [address] ) - Primary     Patient continues to experience hyperglycemia  Due to his set monthly budget, he does not feel he can afford to increase his insulin doses at this time  Reviewed how and when to take his insulins  Reviewed the importance of following a low carbohydrate diet  Lab Results   Component Value Date    HGBA1C 10 7 (A) 02/24/2021               Other    Hyperlipidemia     Check fasting lipid panel  Continue statin  Reason for visit is   Chief Complaint   Patient presents with    Virtual Brief Visit        [facility]    Provider located at [place] [id number] [phone]    Recent Visits  No visits were found meeting these conditions  Showing recent visits within past 7 days and meeting all other requirements     Today's Visits  Date Type Provider Dept   04/13/21 [id number], CRNP  [facility] [place]   Showing today's visits and meeting all other requirements     Future Appointments  No visits were found meeting these conditions  Showing future appointments within next 150 days and meeting all other requirements        After connecting through telephone, the patient was identified by name and date of birth  Yves Winchester was informed that this is a telemedicine visit and that the visit is being conducted through telephone  My office door was closed  No one else was in the room  He acknowledged consent and understanding of privacy and security of the platform  The patient has agreed to participate and understands he can discontinue the visit at any time  Patient is aware this is a billable service   It was my intent to perform this visit via video technology but the patient was not able to do a video connection so the visit was completed via audio telephone only  History of Present Illness:   Maura Yeh is a 57 y o  male with a history of type 2 diabetes with long term use of insulin for approximately 10 years  Reports complications of R BKA d/t gangrene, and peripheral neuropathy and angiopathy  Denies recent illness or hospitalizations  Denies recent severe hypoglycemic or severe hyperglycemic episodes  Denies any issues with his current regimen  home glucose monitoring: are performed regularly twice daily    Home blood glucose readings: 130-150   225-250    Current regimen: Basaglar 40 units daily  Novolog 5-5-5 (patient not taking)  Metformin 1000 BID      Patient reports that he is feeling well  He had a hospitalization since his last office visit after falling and fracturing his ribs  He is healing well and feeling better at this time  In discussing how he is taking his insulin, he states that he is taking his 5 units of novolog after he eats  Last Eye Exam: Overdue  Last Foot Exam: 11/24/2020    For HTN, he is taking 2 5 mg of lisinopril daily  He denies HA and cough  For his HLD, he is taking 80 mg of atorvastatin daily  He denies myalgias  Past Medical History:   Diagnosis Date    Diabetes mellitus ([address] )     Hypertension        Past Surgical History:   Procedure Laterality Date    LEG AMPUTATION THROUGH LOWER TIBIA AND FIBULA Right 7/25/2017    Procedure: AMPUTATION BELOW KNEE (BKA);   Surgeon: Lydia Storm MD;  [facility];  Service: General    NE AMPUTATION FOOT,TRANSMETATARSAL Right 1/26/2017    Procedure: AMPUTATION TRANSMETATARSAL (TMA); OPEN;  Surgeon: Ava Goodwin DPM;  [facility];  Service: Podiatry    NE AMPUTATION METATARSAL+TOE,SINGLE Left 1/30/2017    Procedure: Left partial 1st ray amputation;  Surgeon: Ava Goodwin DPM;  [facility];  Service: Podiatry    NE COLONOSCOPY FLX DX W/COLLJ SPEC WHEN PFRMD N/A 11/28/2018    Procedure: COLONOSCOPY;  Surgeon: Tray Ritchie MD;  Location: [facility];  Service: Gastroenterology    WOUND DEBRIDEMENT Right 1/30/2017    Procedure: DEBRIDEMENT FOOT/TOE ([address]) delayed closure, LINDA;  Surgeon: Luz Aguilera DPM;  [facility];  Service:        Current Outpatient Medications   Medication Sig Dispense Refill    aspirin (ECOTRIN LOW STRENGTH) 81 mg EC tablet Take 81 mg by mouth daily Resume on 8/11/17       atorvastatin (LIPITOR) 80 mg tablet TAKE 1 TABLET DAILY 90 tablet 3    B-D INS SYRINGE 0 5CC/30GX1/2" 30G X 1/2" 0 5 ML MISC       glucose blood test strip Test blood sugars 4 times a day 400 each 3    insulin aspart (NovoLOG FlexPen) 100 UNIT/ML injection pen Inject 5 units three times a day prior to meals (Patient not taking: Reported on 2/24/2021) 5 pen 2    insulin glargine (Basaglar KwikPen) 100 units/mL injection pen Inject 40 units daily  30 mL 2    Insulin Pen Needle 30G X 8 MM MISC Inject under the skin daily at bedtime 100 each 5    Insulin Syringe-Needle U-100 (B-D INS SYR ULTRAFINE 1CC/30G) 30G X 1/2" 1 ML MISC by Does not apply route daily      lisinopril (ZESTRIL) 2 5 mg tablet Take 1 tablet (2 5 mg total) by mouth daily 90 tablet 3    metFORMIN (GLUCOPHAGE) 1000 MG tablet Take 1 tablet (1,000 mg total) by mouth 2 (two) times a day with meals (Patient taking differently: Take 2,000 mg by mouth 2 (two) times a day with meals ) 180 tablet 0    Multiple Vitamin (MULTI-VITAMIN DAILY PO) Take 1 tablet by mouth daily       No current facility-administered medications for this visit  No Known Allergies    Review of Systems   Constitutional: Negative for activity change, appetite change, fatigue and unexpected weight change  HENT: Negative for dental problem  Eyes: Negative for visual disturbance  Respiratory: Positive for chest tightness  Negative for cough and shortness of breath      Cardiovascular: Negative for chest pain, palpitations and leg swelling  Gastrointestinal: Negative for constipation, diarrhea, nausea and vomiting  Endocrine: Negative for polydipsia, polyphagia and polyuria  Genitourinary: Negative for frequency  Musculoskeletal: Positive for arthralgias, back pain and gait problem  Negative for joint swelling and myalgias  Skin: Negative for wound  Neurological: Positive for numbness  Negative for dizziness, weakness, light-headedness and headaches  Psychiatric/Behavioral: Negative for decreased concentration, dysphoric mood and sleep disturbance  The patient is not nervous/anxious  There were no vitals filed for this visit  I spent 15 minutes directly with the patient during this visit    [address] [place] [address] acknowledges that he has consented to an online visit or consultation  He understands that the online visit is based solely on information provided by him, and that, in the absence of a face-to-face physical evaluation by the physician, the diagnosis he receives is both limited and provisional in terms of accuracy and completeness  This is not intended to replace a full medical face-to-face evaluation by the physician  Grecia Ortiz understands and accepts these terms

## 2021-04-19 ENCOUNTER — IMMUNIZATIONS (OUTPATIENT)
Dept: FAMILY MEDICINE CLINIC | Facility: HOSPITAL | Age: 61
End: 2021-04-19

## 2021-04-19 DIAGNOSIS — Z23 ENCOUNTER FOR IMMUNIZATION: Primary | ICD-10-CM

## 2021-04-19 PROCEDURE — 91301 SARS-COV-2 / COVID-19 MRNA VACCINE (MODERNA) 100 MCG: CPT

## 2021-04-19 PROCEDURE — 0011A SARS-COV-2 / COVID-19 MRNA VACCINE (MODERNA) 100 MCG: CPT

## 2021-04-26 ENCOUNTER — OFFICE VISIT (OUTPATIENT)
Dept: INTERNAL MEDICINE CLINIC | Facility: CLINIC | Age: 61
End: 2021-04-26
Payer: COMMERCIAL

## 2021-04-26 VITALS
WEIGHT: 250.4 LBS | HEART RATE: 89 BPM | BODY MASS INDEX: 35.85 KG/M2 | SYSTOLIC BLOOD PRESSURE: 116 MMHG | OXYGEN SATURATION: 98 % | DIASTOLIC BLOOD PRESSURE: 54 MMHG | HEIGHT: 70 IN | TEMPERATURE: 97.1 F

## 2021-04-26 DIAGNOSIS — E11.51 DIABETIC PERIPHERAL ANGIOPATHY (HCC): ICD-10-CM

## 2021-04-26 DIAGNOSIS — E11.42 TYPE 2 DIABETES MELLITUS WITH DIABETIC POLYNEUROPATHY, WITH LONG-TERM CURRENT USE OF INSULIN (HCC): Primary | ICD-10-CM

## 2021-04-26 DIAGNOSIS — Z79.4 TYPE 2 DIABETES MELLITUS WITH DIABETIC POLYNEUROPATHY, WITH LONG-TERM CURRENT USE OF INSULIN (HCC): Primary | ICD-10-CM

## 2021-04-26 DIAGNOSIS — E55.9 VITAMIN D DEFICIENCY: ICD-10-CM

## 2021-04-26 DIAGNOSIS — E78.2 MIXED HYPERLIPIDEMIA: ICD-10-CM

## 2021-04-26 DIAGNOSIS — Z12.5 SCREENING FOR PROSTATE CANCER: ICD-10-CM

## 2021-04-26 DIAGNOSIS — E11.43 DIABETIC AUTONOMIC NEUROPATHY ASSOCIATED WITH TYPE 2 DIABETES MELLITUS (HCC): ICD-10-CM

## 2021-04-26 DIAGNOSIS — I10 ESSENTIAL HYPERTENSION: ICD-10-CM

## 2021-04-26 DIAGNOSIS — Z89.511 HX OF RIGHT BKA (HCC): ICD-10-CM

## 2021-04-26 PROCEDURE — 3078F DIAST BP <80 MM HG: CPT | Performed by: FAMILY MEDICINE

## 2021-04-26 PROCEDURE — 1036F TOBACCO NON-USER: CPT | Performed by: FAMILY MEDICINE

## 2021-04-26 PROCEDURE — 3008F BODY MASS INDEX DOCD: CPT | Performed by: FAMILY MEDICINE

## 2021-04-26 PROCEDURE — 3074F SYST BP LT 130 MM HG: CPT | Performed by: FAMILY MEDICINE

## 2021-04-26 PROCEDURE — 99214 OFFICE O/P EST MOD 30 MIN: CPT | Performed by: FAMILY MEDICINE

## 2021-04-26 RX ORDER — CHOLECALCIFEROL (VITAMIN D3) 125 MCG
CAPSULE ORAL
COMMUNITY

## 2021-04-26 NOTE — PROGRESS NOTES
Assessment/Plan:    No problem-specific Assessment & Plan notes found for this encounter  Diagnoses and all orders for this visit:    Type 2 diabetes mellitus with diabetic polyneuropathy, with long-term current use of insulin ([facility] )  -     Comprehensive metabolic panel; Future  -     Hemoglobin A1C; Future  -     Microalbumin / creatinine urine ratio  -     Ambulatory referral to Ophthalmology; Future    Diabetic autonomic neuropathy associated with type 2 diabetes mellitus ([facility] [address] )    Diabetic peripheral angiopathy (HCC)    Essential hypertension  -     CBC and differential; Future  -     Comprehensive metabolic panel; Future    Mixed hyperlipidemia  -     Comprehensive metabolic panel; Future  -     Lipid panel; Future  -     TSH, 3rd generation; Future    Hx of right BKA (HCC)    Vitamin D deficiency  -     Vitamin D 25 hydroxy; Future    Screening for prostate cancer  -     PSA, Total Screen; Future    Other orders  -     Cholecalciferol (Vitamin D) 125 MCG (5000 UT) CAPS; Take by mouth         orders and recommendations as noted above  Reviewed available old records  Reviewed previous laboratory testing  Hemoglobin A1c with previous laboratory testing was significantly elevated around 11  Discussed with him that this needs to be much better controlled  He does feel that it is improved since following up with endocrine and having medication adjustments  Slip given for repeat laboratory testing  Discussed with him the importance of following up with Ophthalmology on a regular basis especially with his poor control of the blood sugars  Stressed him the importance of better blood sugar control especially with him having already had a right BKA  Check feet daily especially since he has minimal sensation on the bottom of his left foot  He should follow-up with Podiatry regularly  He does have a small superficial abrasion on medial aspect of his left shin; advised him to watch this area closely    Continue follow blood sugars at least 2 to 3 times a day  Continue with the atorvastatin  Has not had a recent lipid panel  Slip given for this  Discussed with him a goal of an LDL less than 100 but preferably less than 70  Discussed with him a goal for his triglycerides less than 150  Watch diet for both fatty foods and carbohydrates  Watch for any hypoglycemic episodes  Increase fiber in diet  Continue with the metformin as previously  Continue with the lisinopril for blood pressure but also for renal protection  Continue with vitamin-D supplementation  Will check vitamin-D level with upcoming laboratory testing  Referral given for Ophthalmology  He slip given for PSA  He is up-to-date on his colonoscopy  He will need Prevnar 13 vaccination but advised him that he will need to wait at least 2 weeks after his 2nd COVID vaccination  Be very careful to avoid falls  Will have him follow up in about 3-4 months or sooner if needed  Subjective:      Patient ID: Leslee Crawley is a 57 y o  male  He presents to establish as a new patient  He had been treated for his diabetes for approximately the last 5 years  He thinks that he was likely diabetic longer than this  Does have a strong family history of diabetes  About 3 years ago he developed an ulceration on his right foot and ultimately ended up with right BKA  He has been using prosthesis since that point  He admits that his blood sugars had been very poorly controlled in the past   Had usually been running significantly above 200  He has begun following up with endocrinology and has been on doses of the insulin that have been adjusted  Continues with the metformin  Denies any hypoglycemic episodes  Blood sugars now ir usually in the mid 100s  Last laboratory testing which was completed a few months ago did still show a significantly elevated hemoglobin A1c at above 11  Has not followed up with Ophthalmology regularly    Tolerating his atorvastatin without difficulty  Has not had a recent lipid panel  Denies any muscle aches or weakness with this  Denies any chest pain or palpitations  Denies any significant shortness of breath  Continues with the lisinopril for blood pressure but also for renal protection with his being diabetic  Continues with vitamin-D supplementation  Appetite has been generally stable  Denies any nausea, vomiting, or diarrhea  He is up-to-date on his colonoscopy  Denies any current urinary symptoms  Usually sleeps relatively well  Has some chronic numbness on the bottom of his left foot  The following portions of the patient's history were reviewed and updated as appropriate:   He  has a past medical history of Diabetes mellitus ([place] [address] ) and Hypertension  He   Patient Active Problem List    Diagnosis Date Noted    Diabetic peripheral angiopathy ([place] [address] ) 02/18/2020    Vitamin D deficiency 02/01/2019    Ambulatory dysfunction 10/17/2017    Hx of right BKA ([place] [address] ) 07/31/2017    Hypertension 04/14/2017    Hyperlipidemia 04/14/2017    Diabetes mellitus ([place] [address] ) 01/27/2017    Diabetic neuropathy ([place] [address] ) 01/27/2017     He  has a past surgical history that includes pr amputation metatarsal+toe,single (Left, 1/30/2017); Wound debridement (Right, 1/30/2017); pr amputation foot,transmetatarsal (Right, 1/26/2017); Leg amputation, lower tibia/fibula (Right, 7/25/2017); and pr colonoscopy flx dx w/collj spec when pfrmd (N/A, 11/28/2018)  His family history includes Diabetes in his mother  He  reports that he has quit smoking  His smoking use included cigarettes  He has never used smokeless tobacco  He reports current alcohol use of about 11 0 standard drinks of alcohol per week  He reports that he does not use drugs    Current Outpatient Medications   Medication Sig Dispense Refill    aspirin (ECOTRIN LOW STRENGTH) 81 mg EC tablet Take 81 mg by mouth daily Resume on 8/11/17       atorvastatin (LIPITOR) 80 mg tablet TAKE 1 TABLET DAILY 90 tablet 3    B-D INS SYRINGE 0 5CC/30GX1/2" 30G X 1/2" 0 5 ML MISC       Cholecalciferol (Vitamin D) 125 MCG (5000 UT) CAPS Take by mouth      glucose blood test strip Test blood sugars 4 times a day 400 each 3    insulin aspart (NovoLOG FlexPen) 100 UNIT/ML injection pen Inject 5 units three times a day prior to meals (Patient taking differently: Inject 10 units three times a day prior to meals) 5 pen 2    insulin glargine (Basaglar KwikPen) 100 units/mL injection pen Inject 40 units daily  (Patient taking differently: Inject 35 units daily ) 30 mL 2    Insulin Pen Needle 30G X 8 MM MISC Inject under the skin daily at bedtime 100 each 5    Insulin Syringe-Needle U-100 (B-D INS SYR ULTRAFINE 1CC/30G) 30G X 1/2" 1 ML MISC by Does not apply route daily      lisinopril (ZESTRIL) 2 5 mg tablet Take 1 tablet (2 5 mg total) by mouth daily 90 tablet 3    metFORMIN (GLUCOPHAGE) 1000 MG tablet Take 1 tablet (1,000 mg total) by mouth 2 (two) times a day with meals 180 tablet 0    Multiple Vitamin (MULTI-VITAMIN DAILY PO) Take 1 tablet by mouth daily       No current facility-administered medications for this visit  Current Outpatient Medications on File Prior to Visit   Medication Sig    aspirin (ECOTRIN LOW STRENGTH) 81 mg EC tablet Take 81 mg by mouth daily Resume on 8/11/17     atorvastatin (LIPITOR) 80 mg tablet TAKE 1 TABLET DAILY    B-D INS SYRINGE 0 5CC/30GX1/2" 30G X 1/2" 0 5 ML MISC     Cholecalciferol (Vitamin D) 125 MCG (5000 UT) CAPS Take by mouth    glucose blood test strip Test blood sugars 4 times a day    insulin aspart (NovoLOG FlexPen) 100 UNIT/ML injection pen Inject 5 units three times a day prior to meals (Patient taking differently: Inject 10 units three times a day prior to meals)    insulin glargine (Basaglar KwikPen) 100 units/mL injection pen Inject 40 units daily   (Patient taking differently: Inject 35 units daily )    Insulin Pen Needle 30G X 8 MM MISC Inject under the skin daily at bedtime    Insulin Syringe-Needle U-100 (B-D INS SYR ULTRAFINE 1CC/30G) 30G X 1/2" 1 ML MISC by Does not apply route daily    lisinopril (ZESTRIL) 2 5 mg tablet Take 1 tablet (2 5 mg total) by mouth daily    metFORMIN (GLUCOPHAGE) 1000 MG tablet Take 1 tablet (1,000 mg total) by mouth 2 (two) times a day with meals    Multiple Vitamin (MULTI-VITAMIN DAILY PO) Take 1 tablet by mouth daily     No current facility-administered medications on file prior to visit  He has No Known Allergies       Review of Systems   Constitutional: Negative for activity change, appetite change, chills and fever  HENT: Negative for hearing loss  Eyes: Negative for pain and visual disturbance  Respiratory: Negative for cough, chest tightness and shortness of breath  Cardiovascular: Negative for chest pain and palpitations  Gastrointestinal: Negative for abdominal pain, blood in stool, diarrhea, nausea and vomiting  Endocrine: Negative for polydipsia, polyphagia and polyuria  Genitourinary: Negative for dysuria  Musculoskeletal: Positive for arthralgias and gait problem  Skin: Negative for color change  Neurological: Positive for numbness  Negative for dizziness and headaches  Hematological: Does not bruise/bleed easily  Psychiatric/Behavioral: Negative for behavioral problems, decreased concentration, dysphoric mood and sleep disturbance  The patient is not nervous/anxious  Objective:      /54 (BP Location: Left arm, Patient Position: Sitting, Cuff Size: Large)   Pulse 89   Temp (!) 97 1 °F (36 2 °C)   Ht 5' 10" (1 778 m)   Wt 114 kg (250 lb 6 4 oz)   SpO2 98%   BMI 35 93 kg/m²          Physical Exam  Vitals signs and nursing note reviewed  Constitutional:       General: He is not in acute distress  Appearance: He is well-developed, well-groomed and overweight  HENT:      Head: Normocephalic and atraumatic        Comments:   Moist mucous membranes; slight cerumen bilaterally     Nose: Nose normal    Eyes:      Conjunctiva/sclera: Conjunctivae normal       Pupils: Pupils are equal, round, and reactive to light  Neck:      Thyroid: No thyromegaly  Vascular: No carotid bruit  Cardiovascular:      Rate and Rhythm: Normal rate and regular rhythm  Heart sounds: Normal heart sounds  No murmur  No friction rub  No gallop  Pulmonary:      Breath sounds: No wheezing or rales  Chest:      Chest wall: No tenderness  Abdominal:      General: There is no distension  Tenderness: There is no abdominal tenderness  There is no guarding or rebound  Musculoskeletal:      Comments:  Right BKA   Lymphadenopathy:      Cervical: No cervical adenopathy  Skin:     General: Skin is warm and dry  Comments:  Significantly dry skin over the left lower leg; venous stasis changes; small scabbed area left medial lower leg with no surrounding erythema   Neurological:      Mental Status: He is alert and oriented to person, place, and time  Psychiatric:         Mood and Affect: Mood and affect normal          Speech: Speech normal          Behavior: Behavior normal  Behavior is cooperative

## 2021-04-27 ENCOUNTER — VBI (OUTPATIENT)
Dept: ADMINISTRATIVE | Facility: OTHER | Age: 61
End: 2021-04-27

## 2021-04-30 ENCOUNTER — VBI (OUTPATIENT)
Dept: ADMINISTRATIVE | Facility: OTHER | Age: 61
End: 2021-04-30

## 2021-04-30 NOTE — TELEPHONE ENCOUNTER
04/30/21 7:23 AM     See documentation in the [facility]

## 2021-05-18 ENCOUNTER — IMMUNIZATIONS (OUTPATIENT)
Dept: FAMILY MEDICINE CLINIC | Facility: HOSPITAL | Age: 61
End: 2021-05-18

## 2021-05-18 DIAGNOSIS — Z23 ENCOUNTER FOR IMMUNIZATION: Primary | ICD-10-CM

## 2021-05-18 PROCEDURE — 0012A SARS-COV-2 / COVID-19 MRNA VACCINE (MODERNA) 100 MCG: CPT

## 2021-05-18 PROCEDURE — 91301 SARS-COV-2 / COVID-19 MRNA VACCINE (MODERNA) 100 MCG: CPT

## 2021-06-11 ENCOUNTER — VBI (OUTPATIENT)
Dept: ADMINISTRATIVE | Facility: OTHER | Age: 61
End: 2021-06-11

## 2021-06-14 DIAGNOSIS — E11.42 TYPE 2 DIABETES MELLITUS WITH DIABETIC POLYNEUROPATHY, UNSPECIFIED WHETHER LONG TERM INSULIN USE (HCC): ICD-10-CM

## 2021-07-23 DIAGNOSIS — E11.42 TYPE 2 DIABETES MELLITUS WITH DIABETIC POLYNEUROPATHY, UNSPECIFIED WHETHER LONG TERM INSULIN USE (HCC): ICD-10-CM

## 2021-07-26 RX ORDER — INSULIN GLARGINE 100 [IU]/ML
INJECTION, SOLUTION SUBCUTANEOUS
Qty: 30 ML | Refills: 2 | Status: SHIPPED | OUTPATIENT
Start: 2021-07-26 | End: 2022-03-10

## 2021-08-24 ENCOUNTER — RA CDI HCC (OUTPATIENT)
Dept: OTHER | Facility: HOSPITAL | Age: 61
End: 2021-08-24

## 2021-08-25 ENCOUNTER — APPOINTMENT (OUTPATIENT)
Dept: LAB | Facility: MEDICAL CENTER | Age: 61
End: 2021-08-25
Payer: COMMERCIAL

## 2021-08-25 DIAGNOSIS — E11.9 TYPE 2 DIABETES MELLITUS WITHOUT COMPLICATION, WITHOUT LONG-TERM CURRENT USE OF INSULIN (HCC): ICD-10-CM

## 2021-08-25 DIAGNOSIS — E11.42 TYPE 2 DIABETES MELLITUS WITH DIABETIC POLYNEUROPATHY, UNSPECIFIED WHETHER LONG TERM INSULIN USE (HCC): ICD-10-CM

## 2021-08-25 DIAGNOSIS — E78.2 MIXED HYPERLIPIDEMIA: ICD-10-CM

## 2021-08-25 DIAGNOSIS — I10 ESSENTIAL HYPERTENSION: ICD-10-CM

## 2021-08-25 DIAGNOSIS — E55.9 VITAMIN D DEFICIENCY: ICD-10-CM

## 2021-08-25 DIAGNOSIS — Z12.5 SCREENING FOR PROSTATE CANCER: ICD-10-CM

## 2021-08-25 DIAGNOSIS — E11.42 TYPE 2 DIABETES MELLITUS WITH DIABETIC POLYNEUROPATHY, WITH LONG-TERM CURRENT USE OF INSULIN (HCC): ICD-10-CM

## 2021-08-25 DIAGNOSIS — Z79.4 TYPE 2 DIABETES MELLITUS WITH DIABETIC POLYNEUROPATHY, WITH LONG-TERM CURRENT USE OF INSULIN (HCC): ICD-10-CM

## 2021-08-25 PROBLEM — E66.01 MORBID OBESITY DUE TO EXCESS CALORIES (HCC): Status: ACTIVE | Noted: 2021-08-25

## 2021-08-25 LAB
25(OH)D3 SERPL-MCNC: 28.8 NG/ML (ref 30–100)
ALBUMIN SERPL BCP-MCNC: 3.4 G/DL (ref 3.5–5)
ALP SERPL-CCNC: 139 U/L (ref 46–116)
ALT SERPL W P-5'-P-CCNC: 31 U/L (ref 12–78)
ANION GAP SERPL CALCULATED.3IONS-SCNC: 6 MMOL/L (ref 4–13)
AST SERPL W P-5'-P-CCNC: 14 U/L (ref 5–45)
BASOPHILS # BLD AUTO: 0.09 THOUSANDS/ΜL (ref 0–0.1)
BASOPHILS NFR BLD AUTO: 1 % (ref 0–1)
BILIRUB SERPL-MCNC: 0.61 MG/DL (ref 0.2–1)
BUN SERPL-MCNC: 16 MG/DL (ref 5–25)
CALCIUM ALBUM COR SERPL-MCNC: 9.8 MG/DL (ref 8.3–10.1)
CALCIUM SERPL-MCNC: 9.3 MG/DL (ref 8.3–10.1)
CHLORIDE SERPL-SCNC: 108 MMOL/L (ref 100–108)
CHOLEST SERPL-MCNC: 97 MG/DL (ref 50–200)
CO2 SERPL-SCNC: 26 MMOL/L (ref 21–32)
CREAT SERPL-MCNC: 0.96 MG/DL (ref 0.6–1.3)
CREAT UR-MCNC: 277 MG/DL
EOSINOPHIL # BLD AUTO: 0.19 THOUSAND/ΜL (ref 0–0.61)
EOSINOPHIL NFR BLD AUTO: 2 % (ref 0–6)
ERYTHROCYTE [DISTWIDTH] IN BLOOD BY AUTOMATED COUNT: 13.3 % (ref 11.6–15.1)
EST. AVERAGE GLUCOSE BLD GHB EST-MCNC: 206 MG/DL
GFR SERPL CREATININE-BSD FRML MDRD: 84 ML/MIN/1.73SQ M
GLUCOSE P FAST SERPL-MCNC: 73 MG/DL (ref 65–99)
HBA1C MFR BLD: 8.8 %
HCT VFR BLD AUTO: 47.9 % (ref 36.5–49.3)
HDLC SERPL-MCNC: 35 MG/DL
HGB BLD-MCNC: 15.6 G/DL (ref 12–17)
IMM GRANULOCYTES # BLD AUTO: 0.05 THOUSAND/UL (ref 0–0.2)
IMM GRANULOCYTES NFR BLD AUTO: 1 % (ref 0–2)
LDLC SERPL CALC-MCNC: 30 MG/DL (ref 0–100)
LYMPHOCYTES # BLD AUTO: 4.95 THOUSANDS/ΜL (ref 0.6–4.47)
LYMPHOCYTES NFR BLD AUTO: 45 % (ref 14–44)
MCH RBC QN AUTO: 30.1 PG (ref 26.8–34.3)
MCHC RBC AUTO-ENTMCNC: 32.6 G/DL (ref 31.4–37.4)
MCV RBC AUTO: 93 FL (ref 82–98)
MICROALBUMIN UR-MCNC: 483 MG/L (ref 0–20)
MICROALBUMIN/CREAT 24H UR: 174 MG/G CREATININE (ref 0–30)
MONOCYTES # BLD AUTO: 0.8 THOUSAND/ΜL (ref 0.17–1.22)
MONOCYTES NFR BLD AUTO: 7 % (ref 4–12)
NEUTROPHILS # BLD AUTO: 4.94 THOUSANDS/ΜL (ref 1.85–7.62)
NEUTS SEG NFR BLD AUTO: 44 % (ref 43–75)
NONHDLC SERPL-MCNC: 62 MG/DL
NRBC BLD AUTO-RTO: 0 /100 WBCS
PLATELET # BLD AUTO: 327 THOUSANDS/UL (ref 149–390)
PMV BLD AUTO: 9.3 FL (ref 8.9–12.7)
POTASSIUM SERPL-SCNC: 3.9 MMOL/L (ref 3.5–5.3)
PROT SERPL-MCNC: 7.6 G/DL (ref 6.4–8.2)
PSA SERPL-MCNC: 1.7 NG/ML (ref 0–4)
RBC # BLD AUTO: 5.18 MILLION/UL (ref 3.88–5.62)
SODIUM SERPL-SCNC: 140 MMOL/L (ref 136–145)
TRIGL SERPL-MCNC: 161 MG/DL
TSH SERPL DL<=0.05 MIU/L-ACNC: 0.72 UIU/ML (ref 0.36–3.74)
WBC # BLD AUTO: 11.02 THOUSAND/UL (ref 4.31–10.16)

## 2021-08-25 PROCEDURE — 84443 ASSAY THYROID STIM HORMONE: CPT

## 2021-08-25 PROCEDURE — 82570 ASSAY OF URINE CREATININE: CPT | Performed by: FAMILY MEDICINE

## 2021-08-25 PROCEDURE — 36415 COLL VENOUS BLD VENIPUNCTURE: CPT

## 2021-08-25 PROCEDURE — 80061 LIPID PANEL: CPT

## 2021-08-25 PROCEDURE — 3062F POS MACROALBUMINURIA REV: CPT | Performed by: FAMILY MEDICINE

## 2021-08-25 PROCEDURE — G0103 PSA SCREENING: HCPCS

## 2021-08-25 PROCEDURE — 85025 COMPLETE CBC W/AUTO DIFF WBC: CPT

## 2021-08-25 PROCEDURE — 80053 COMPREHEN METABOLIC PANEL: CPT

## 2021-08-25 PROCEDURE — 83036 HEMOGLOBIN GLYCOSYLATED A1C: CPT

## 2021-08-25 PROCEDURE — 82043 UR ALBUMIN QUANTITATIVE: CPT | Performed by: FAMILY MEDICINE

## 2021-08-25 PROCEDURE — 82306 VITAMIN D 25 HYDROXY: CPT

## 2021-08-25 PROCEDURE — 3052F HG A1C>EQUAL 8.0%<EQUAL 9.0%: CPT | Performed by: FAMILY MEDICINE

## 2021-08-29 PROCEDURE — 4010F ACE/ARB THERAPY RXD/TAKEN: CPT | Performed by: FAMILY MEDICINE

## 2021-08-30 ENCOUNTER — OFFICE VISIT (OUTPATIENT)
Dept: INTERNAL MEDICINE CLINIC | Facility: CLINIC | Age: 61
End: 2021-08-30
Payer: COMMERCIAL

## 2021-08-30 VITALS
WEIGHT: 254 LBS | OXYGEN SATURATION: 96 % | HEART RATE: 81 BPM | HEIGHT: 70 IN | DIASTOLIC BLOOD PRESSURE: 64 MMHG | TEMPERATURE: 98.2 F | BODY MASS INDEX: 36.36 KG/M2 | SYSTOLIC BLOOD PRESSURE: 118 MMHG

## 2021-08-30 DIAGNOSIS — H25.9 AGE-RELATED CATARACT OF BOTH EYES, UNSPECIFIED AGE-RELATED CATARACT TYPE: ICD-10-CM

## 2021-08-30 DIAGNOSIS — E78.2 MIXED HYPERLIPIDEMIA: ICD-10-CM

## 2021-08-30 DIAGNOSIS — Z01.818 PRE-OP EVALUATION: ICD-10-CM

## 2021-08-30 DIAGNOSIS — Z89.511 HX OF RIGHT BKA (HCC): ICD-10-CM

## 2021-08-30 DIAGNOSIS — E11.51 DIABETIC PERIPHERAL ANGIOPATHY (HCC): ICD-10-CM

## 2021-08-30 DIAGNOSIS — I10 ESSENTIAL HYPERTENSION: ICD-10-CM

## 2021-08-30 DIAGNOSIS — E11.43 DIABETIC AUTONOMIC NEUROPATHY ASSOCIATED WITH TYPE 2 DIABETES MELLITUS (HCC): ICD-10-CM

## 2021-08-30 DIAGNOSIS — E11.42 TYPE 2 DIABETES MELLITUS WITH DIABETIC POLYNEUROPATHY, WITH LONG-TERM CURRENT USE OF INSULIN (HCC): Primary | ICD-10-CM

## 2021-08-30 DIAGNOSIS — Z79.4 TYPE 2 DIABETES MELLITUS WITH DIABETIC POLYNEUROPATHY, WITH LONG-TERM CURRENT USE OF INSULIN (HCC): Primary | ICD-10-CM

## 2021-08-30 DIAGNOSIS — E55.9 VITAMIN D DEFICIENCY: ICD-10-CM

## 2021-08-30 PROBLEM — E66.01 MORBID OBESITY DUE TO EXCESS CALORIES (HCC): Status: RESOLVED | Noted: 2021-08-25 | Resolved: 2021-08-30

## 2021-08-30 PROCEDURE — 99214 OFFICE O/P EST MOD 30 MIN: CPT | Performed by: FAMILY MEDICINE

## 2021-08-30 PROCEDURE — 1036F TOBACCO NON-USER: CPT | Performed by: FAMILY MEDICINE

## 2021-08-30 PROCEDURE — 3074F SYST BP LT 130 MM HG: CPT | Performed by: FAMILY MEDICINE

## 2021-08-30 PROCEDURE — 3008F BODY MASS INDEX DOCD: CPT | Performed by: FAMILY MEDICINE

## 2021-08-30 PROCEDURE — 3078F DIAST BP <80 MM HG: CPT | Performed by: FAMILY MEDICINE

## 2021-08-30 PROCEDURE — 3725F SCREEN DEPRESSION PERFORMED: CPT | Performed by: FAMILY MEDICINE

## 2021-08-30 NOTE — PROGRESS NOTES
Assessment/Plan:    No problem-specific Assessment & Plan notes found for this encounter  Diagnoses and all orders for this visit:    Type 2 diabetes mellitus with diabetic polyneuropathy, with long-term current use of insulin (HCC)  -     CBC and differential; Future  -     Comprehensive metabolic panel; Future  -     Hemoglobin A1C; Future    Diabetic autonomic neuropathy associated with type 2 diabetes mellitus (HCC)  -     CBC and differential; Future  -     Comprehensive metabolic panel; Future  -     Hemoglobin A1C; Future    Diabetic peripheral angiopathy (HCC)  -     CBC and differential; Future  -     Comprehensive metabolic panel; Future  -     Hemoglobin A1C; Future    Essential hypertension  -     CBC and differential; Future  -     Comprehensive metabolic panel; Future    Mixed hyperlipidemia  -     CBC and differential; Future  -     Comprehensive metabolic panel; Future  -     Lipid panel; Future  -     TSH, 3rd generation; Future    Vitamin D deficiency  -     CBC and differential; Future  -     Comprehensive metabolic panel; Future  -     Vitamin D 25 hydroxy; Future    Hx of right BKA (HCC)  -     CBC and differential; Future  -     Comprehensive metabolic panel; Future    Pre-op evaluation    Age-related cataract of both eyes, unspecified age-related cataract type       Orders and recommendations as noted above  Reviewed recent laboratory testing with him  Hemoglobin A1c has improved significantly but not nearly at goal   Discussed with him increasing the dose if his Basaglar but he declines at present  He would rather try to watch his diet more closely and try to increase his activity level with the cooler fall weather coming  Discussed goal of trying to get the hemoglobin A1c less than 8  Watch carbohydrate intake  Try to eat healthier  Continue follow-up with Ophthalmology on a regular basis  He may proceed with the cataract surgery as planned    This is a low risk procedure and does not require sedation with his ophthalmologist   Check left foot daily  Watch for any open areas  Watch for any irritation along the stump on the right where he has his prosthesis  Continue with the atorvastatin  Cholesterol relatively well controlled  Stressed to him the importance of watching for any open areas especially since he does have the neuropathy  Continue with vitamin-D supplementation  Continue with the lisinopril as previously  Watch salt intake  Increase activity level as noted  Up-to-date on the COVID vaccination  Recommend flu shot in the fall  He wishes to hold off on the additional pneumonia vaccine at present  May consider this with the flu shot  Will have him follow up in about 3-4 months with laboratory testing prior to that visit  Follow up sooner if needed  Subjective:      Patient ID: Te Azevedo is a 57 y o  male  He presents for routine follow-up but also for preop prior to cataract surgery  He will be proceeding with cataract surgery near the middle of September with his subsequent surgery being in October  Vision has been steadily worsening  Having difficulty with reading  Follows up regularly with Ophthalmology  He has noticed significant improvement in his blood sugars  They have been ranging in mid to upper 100s which is a significant improvement for him  Denies any hypoglycemic episodes  Tolerating the Basaglar and the metformin without difficulty  Continues to check his left foot daily  Has not noticed any open areas  Has not had any difficulty with his right lower extremity prosthesis  Continues with the aspirin on a daily basis  Tolerating his atorvastatin without difficulty  Denies any significant muscle aches or weakness with this  Denies any chest pain or palpitations  Denies any significant shortness of breath  Tolerating lisinopril well  Denies any headaches or localized weakness  Appetite has been generally good    Denies any nausea, vomiting, or diarrhea  He does admit that his appetite has decreased somewhat with warmer weather  Trying to increases activity level  Wants to try to walk more but has difficulty since where he lives [place] a lot [place]  Denies any recent illnesses  The following portions of the patient's history were reviewed and updated as appropriate:   He  has a past medical history of Diabetes mellitus ([place] [address] ) and Hypertension  He   Patient Active Problem List    Diagnosis Date Noted    Pre-op evaluation 08/30/2021    Age-related cataract of both eyes 08/30/2021    Diabetic peripheral angiopathy ([place] [address] ) 02/18/2020    Vitamin D deficiency 02/01/2019    Ambulatory dysfunction 10/17/2017    Hx of right BKA ([place] [address] ) 07/31/2017    Hypertension 04/14/2017    Hyperlipidemia 04/14/2017    Diabetes mellitus ([place] [address] ) 01/27/2017    Diabetic neuropathy ([place] [address] ) 01/27/2017     He  has a past surgical history that includes pr amputation metatarsal+toe,single (Left, 1/30/2017); Wound debridement (Right, 1/30/2017); pr amputation foot,transmetatarsal (Right, 1/26/2017); Leg amputation, lower tibia/fibula (Right, 7/25/2017); and pr colonoscopy flx dx w/collj spec when pfrmd (N/A, 11/28/2018)  His family history includes Diabetes in his mother  He  reports that he has quit smoking  His smoking use included cigarettes  He has never used smokeless tobacco  He reports current alcohol use of about 11 0 standard drinks of alcohol per week  He reports that he does not use drugs    Current Outpatient Medications   Medication Sig Dispense Refill    aspirin (ECOTRIN LOW STRENGTH) 81 mg EC tablet Take 81 mg by mouth daily Resume on 8/11/17       atorvastatin (LIPITOR) 80 mg tablet TAKE 1 TABLET DAILY 90 tablet 3    B-D INS SYRINGE 0 5CC/30GX1/2" 30G X 1/2" 0 5 ML MISC       Cholecalciferol (Vitamin D) 125 MCG (5000 UT) CAPS Take by mouth      glucose blood test strip Test blood sugars 4 times a day 400 each 3    insulin aspart (NovoLOG FlexPen) 100 UNIT/ML injection pen Inject 5 units three times a day prior to meals (Patient taking differently: Inject 10 units three times a day prior to meals) 5 pen 2    insulin glargine (Basaglar KwikPen) 100 units/mL injection pen Inject 40 units daily  30 mL 2    Insulin Pen Needle 30G X 8 MM MISC Inject under the skin daily at bedtime 100 each 5    Insulin Syringe-Needle U-100 (B-D INS SYR ULTRAFINE 1CC/30G) 30G X 1/2" 1 ML MISC by Does not apply route daily      lisinopril (ZESTRIL) 2 5 mg tablet Take 1 tablet (2 5 mg total) by mouth daily 90 tablet 3    metFORMIN (GLUCOPHAGE) 1000 MG tablet Take 1 tablet (1,000 mg total) by mouth 2 (two) times a day with meals 180 tablet 1    Multiple Vitamin (MULTI-VITAMIN DAILY PO) Take 1 tablet by mouth daily       No current facility-administered medications for this visit  Current Outpatient Medications on File Prior to Visit   Medication Sig    aspirin (ECOTRIN LOW STRENGTH) 81 mg EC tablet Take 81 mg by mouth daily Resume on 8/11/17     atorvastatin (LIPITOR) 80 mg tablet TAKE 1 TABLET DAILY    B-D INS SYRINGE 0 5CC/30GX1/2" 30G X 1/2" 0 5 ML MISC     Cholecalciferol (Vitamin D) 125 MCG (5000 UT) CAPS Take by mouth    glucose blood test strip Test blood sugars 4 times a day    insulin aspart (NovoLOG FlexPen) 100 UNIT/ML injection pen Inject 5 units three times a day prior to meals (Patient taking differently: Inject 10 units three times a day prior to meals)    insulin glargine (Basaglar KwikPen) 100 units/mL injection pen Inject 40 units daily      Insulin Pen Needle 30G X 8 MM MISC Inject under the skin daily at bedtime    Insulin Syringe-Needle U-100 (B-D INS SYR ULTRAFINE 1CC/30G) 30G X 1/2" 1 ML MISC by Does not apply route daily    lisinopril (ZESTRIL) 2 5 mg tablet Take 1 tablet (2 5 mg total) by mouth daily    metFORMIN (GLUCOPHAGE) 1000 MG tablet Take 1 tablet (1,000 mg total) by mouth 2 (two) times a day with meals    Multiple Vitamin (MULTI-VITAMIN DAILY PO) Take 1 tablet by mouth daily     No current facility-administered medications on file prior to visit  He has No Known Allergies       Review of Systems   Constitutional: Negative for activity change, appetite change, chills and fever  HENT: Negative for hearing loss  Eyes: Positive for visual disturbance  Negative for pain  Respiratory: Negative for cough, chest tightness and shortness of breath  Cardiovascular: Negative for chest pain and palpitations  Gastrointestinal: Negative for abdominal pain, blood in stool, diarrhea, nausea and vomiting  Endocrine: Negative for polydipsia, polyphagia and polyuria  As per HPI   Genitourinary: Negative for dysuria  Musculoskeletal: Positive for gait problem  Negative for arthralgias  Skin: Negative for color change  Neurological: Negative for dizziness and headaches  Hematological: Does not bruise/bleed easily  Psychiatric/Behavioral: Negative for behavioral problems, decreased concentration and dysphoric mood  The patient is not nervous/anxious  Objective:      /64 (BP Location: Left arm, Patient Position: Sitting, Cuff Size: Large)   Pulse 81   Temp 98 2 °F (36 8 °C) (Temporal)   Ht 5' 10" (1 778 m)   Wt 115 kg (254 lb)   SpO2 96%   BMI 36 45 kg/m²          Physical Exam  Vitals and nursing note reviewed  Constitutional:       General: He is not in acute distress  Appearance: He is well-developed, well-groomed and overweight  HENT:      Head: Normocephalic and atraumatic  Nose: Nose normal    Eyes:      Conjunctiva/sclera: Conjunctivae normal       Pupils: Pupils are equal, round, and reactive to light  Comments:   Cataracts bilaterally   Cardiovascular:      Rate and Rhythm: Normal rate and regular rhythm  Heart sounds: Normal heart sounds  No murmur heard  No friction rub  No gallop  Pulmonary:      Breath sounds: No wheezing or rales     Chest:      Chest wall: No tenderness  Abdominal:      General: There is no distension  Tenderness: There is no abdominal tenderness  There is no guarding or rebound  Musculoskeletal:      Comments:  Right BKA; walks steadily with cane   Lymphadenopathy:      Cervical: No cervical adenopathy  Skin:     General: Skin is warm and dry  Comments:  Venous stasis changes left lower extremity; no open areas   Neurological:      Mental Status: He is alert and oriented to person, place, and time  Psychiatric:         Mood and Affect: Mood and affect normal          Behavior: Behavior normal  Behavior is cooperative

## 2021-09-14 ENCOUNTER — VBI (OUTPATIENT)
Dept: ADMINISTRATIVE | Facility: OTHER | Age: 61
End: 2021-09-14

## 2021-10-05 ENCOUNTER — VBI (OUTPATIENT)
Dept: ADMINISTRATIVE | Facility: OTHER | Age: 61
End: 2021-10-05

## 2021-12-28 ENCOUNTER — RA CDI HCC (OUTPATIENT)
Dept: OTHER | Facility: HOSPITAL | Age: 61
End: 2021-12-28

## 2021-12-28 NOTE — PROGRESS NOTES
[facility]  coding opportunities          Number of diagnosis code(s) already on the problem list added to FYI fla     Chart Reviewed * (Number of) Inbasket suggestions sent to Provider: 1     Problem listed updated   Provider Accepted, (number of) suggestions accepted: 1               Patients insurance company: [address]  (Medicare Advantage and Commercial)           [facility]  coding opportunities          Number of diagnosis code(s) already on the problem list added to [employer] fla     Chart Reviewed * (Number of) Inbasket suggestions sent to Provider: 1      E11 42, Z79 4 T2DM with diabetic polyneuropathy and long term current insulin use ([facility] )    E11 51 T2DM with diabetic peripheral angiopathy without gangrene ([facility] )    Z89 511 History of right BKA ([facility] )    E66 01 Severe obesity with a BMI 35 0 -39 9 ([facility] )  * BMI > 35 with comorbid condition     If this is correct, please document and assess at your next visit 22                Patients insurance company: [address]  (Medicare Advantage and Commercial)

## 2022-01-12 ENCOUNTER — RA CDI HCC (OUTPATIENT)
Dept: OTHER | Facility: HOSPITAL | Age: 62
End: 2022-01-12

## 2022-01-12 NOTE — PROGRESS NOTES
[place] [address]  coding opportunities          Number of diagnosis code(s) already on the problem list added to FYI fla                     Patients insurance company: [address]  (Medicare Advantage and Commercial)     Visit status: Patient canceled the appointment        [place] [address]  coding opportunities          Number of diagnosis code(s) already on the problem list added to FYI fla      Based on clinical documentation indicated in your record, it appears that the patient may have the following conditions;    E11 42, Z79 4 T2DM with polyneuropathy and long term current insulin use ([facility] )  E11 51 T2DM with peripheral angiopathy without gangrene  Z89 511 Personal hx of right below knee amputation ([facility] )  E66 01 Severe obesity with BMI 35 0 - 39 9 ([facility] )    If this is correct, please document and assess at your next visit  22               Patients insurance company: [address]  (Medicare Advantage and Commercial)

## 2022-03-09 ENCOUNTER — APPOINTMENT (OUTPATIENT)
Dept: LAB | Facility: MEDICAL CENTER | Age: 62
End: 2022-03-09
Payer: COMMERCIAL

## 2022-03-09 DIAGNOSIS — Z89.511 HX OF RIGHT BKA (HCC): ICD-10-CM

## 2022-03-09 DIAGNOSIS — Z79.4 TYPE 2 DIABETES MELLITUS WITH DIABETIC POLYNEUROPATHY, WITH LONG-TERM CURRENT USE OF INSULIN (HCC): ICD-10-CM

## 2022-03-09 DIAGNOSIS — E11.42 TYPE 2 DIABETES MELLITUS WITH DIABETIC POLYNEUROPATHY, WITH LONG-TERM CURRENT USE OF INSULIN (HCC): ICD-10-CM

## 2022-03-09 DIAGNOSIS — E78.2 MIXED HYPERLIPIDEMIA: ICD-10-CM

## 2022-03-09 DIAGNOSIS — E55.9 VITAMIN D DEFICIENCY: ICD-10-CM

## 2022-03-09 DIAGNOSIS — I10 ESSENTIAL HYPERTENSION: ICD-10-CM

## 2022-03-09 DIAGNOSIS — E11.51 DIABETIC PERIPHERAL ANGIOPATHY (HCC): ICD-10-CM

## 2022-03-09 DIAGNOSIS — E11.43 DIABETIC AUTONOMIC NEUROPATHY ASSOCIATED WITH TYPE 2 DIABETES MELLITUS (HCC): ICD-10-CM

## 2022-03-09 LAB
25(OH)D3 SERPL-MCNC: 24.2 NG/ML (ref 30–100)
ALBUMIN SERPL BCP-MCNC: 3.5 G/DL (ref 3.5–5)
ALP SERPL-CCNC: 138 U/L (ref 46–116)
ALT SERPL W P-5'-P-CCNC: 32 U/L (ref 12–78)
ANION GAP SERPL CALCULATED.3IONS-SCNC: 2 MMOL/L (ref 4–13)
AST SERPL W P-5'-P-CCNC: 9 U/L (ref 5–45)
BASOPHILS # BLD AUTO: 0.08 THOUSANDS/ΜL (ref 0–0.1)
BASOPHILS NFR BLD AUTO: 1 % (ref 0–1)
BILIRUB SERPL-MCNC: 0.44 MG/DL (ref 0.2–1)
BUN SERPL-MCNC: 12 MG/DL (ref 5–25)
CALCIUM SERPL-MCNC: 9.3 MG/DL (ref 8.3–10.1)
CHLORIDE SERPL-SCNC: 107 MMOL/L (ref 100–108)
CHOLEST SERPL-MCNC: 121 MG/DL
CO2 SERPL-SCNC: 30 MMOL/L (ref 21–32)
CREAT SERPL-MCNC: 0.98 MG/DL (ref 0.6–1.3)
EOSINOPHIL # BLD AUTO: 0.23 THOUSAND/ΜL (ref 0–0.61)
EOSINOPHIL NFR BLD AUTO: 2 % (ref 0–6)
ERYTHROCYTE [DISTWIDTH] IN BLOOD BY AUTOMATED COUNT: 12.9 % (ref 11.6–15.1)
EST. AVERAGE GLUCOSE BLD GHB EST-MCNC: 249 MG/DL
GFR SERPL CREATININE-BSD FRML MDRD: 81 ML/MIN/1.73SQ M
GLUCOSE P FAST SERPL-MCNC: 158 MG/DL (ref 65–99)
HBA1C MFR BLD: 10.3 %
HCT VFR BLD AUTO: 47.1 % (ref 36.5–49.3)
HDLC SERPL-MCNC: 36 MG/DL
HGB BLD-MCNC: 15.7 G/DL (ref 12–17)
IMM GRANULOCYTES # BLD AUTO: 0.04 THOUSAND/UL (ref 0–0.2)
IMM GRANULOCYTES NFR BLD AUTO: 0 % (ref 0–2)
LDLC SERPL CALC-MCNC: 48 MG/DL (ref 0–100)
LYMPHOCYTES # BLD AUTO: 4.24 THOUSANDS/ΜL (ref 0.6–4.47)
LYMPHOCYTES NFR BLD AUTO: 42 % (ref 14–44)
MCH RBC QN AUTO: 29.1 PG (ref 26.8–34.3)
MCHC RBC AUTO-ENTMCNC: 33.3 G/DL (ref 31.4–37.4)
MCV RBC AUTO: 87 FL (ref 82–98)
MONOCYTES # BLD AUTO: 0.62 THOUSAND/ΜL (ref 0.17–1.22)
MONOCYTES NFR BLD AUTO: 6 % (ref 4–12)
NEUTROPHILS # BLD AUTO: 4.85 THOUSANDS/ΜL (ref 1.85–7.62)
NEUTS SEG NFR BLD AUTO: 49 % (ref 43–75)
NONHDLC SERPL-MCNC: 85 MG/DL
NRBC BLD AUTO-RTO: 0 /100 WBCS
PLATELET # BLD AUTO: 290 THOUSANDS/UL (ref 149–390)
PMV BLD AUTO: 9.3 FL (ref 8.9–12.7)
POTASSIUM SERPL-SCNC: 4 MMOL/L (ref 3.5–5.3)
PROT SERPL-MCNC: 7.5 G/DL (ref 6.4–8.2)
RBC # BLD AUTO: 5.39 MILLION/UL (ref 3.88–5.62)
SODIUM SERPL-SCNC: 139 MMOL/L (ref 136–145)
TRIGL SERPL-MCNC: 185 MG/DL
TSH SERPL DL<=0.05 MIU/L-ACNC: 1.27 UIU/ML (ref 0.36–3.74)
WBC # BLD AUTO: 10.06 THOUSAND/UL (ref 4.31–10.16)

## 2022-03-09 PROCEDURE — 3046F HEMOGLOBIN A1C LEVEL >9.0%: CPT | Performed by: FAMILY MEDICINE

## 2022-03-09 PROCEDURE — 85025 COMPLETE CBC W/AUTO DIFF WBC: CPT

## 2022-03-09 PROCEDURE — 84443 ASSAY THYROID STIM HORMONE: CPT

## 2022-03-09 PROCEDURE — 83036 HEMOGLOBIN GLYCOSYLATED A1C: CPT

## 2022-03-09 PROCEDURE — 80061 LIPID PANEL: CPT

## 2022-03-09 PROCEDURE — 80053 COMPREHEN METABOLIC PANEL: CPT

## 2022-03-09 PROCEDURE — 36415 COLL VENOUS BLD VENIPUNCTURE: CPT

## 2022-03-09 PROCEDURE — 82306 VITAMIN D 25 HYDROXY: CPT

## 2022-03-10 ENCOUNTER — OFFICE VISIT (OUTPATIENT)
Dept: INTERNAL MEDICINE CLINIC | Facility: CLINIC | Age: 62
End: 2022-03-10
Payer: COMMERCIAL

## 2022-03-10 VITALS
DIASTOLIC BLOOD PRESSURE: 76 MMHG | SYSTOLIC BLOOD PRESSURE: 118 MMHG | HEART RATE: 85 BPM | TEMPERATURE: 97.3 F | BODY MASS INDEX: 35.65 KG/M2 | WEIGHT: 249 LBS | HEIGHT: 70 IN | OXYGEN SATURATION: 92 %

## 2022-03-10 DIAGNOSIS — E11.51 DIABETIC PERIPHERAL ANGIOPATHY (HCC): ICD-10-CM

## 2022-03-10 DIAGNOSIS — E11.43 DIABETIC AUTONOMIC NEUROPATHY ASSOCIATED WITH TYPE 2 DIABETES MELLITUS (HCC): Primary | ICD-10-CM

## 2022-03-10 DIAGNOSIS — E78.2 MIXED HYPERLIPIDEMIA: ICD-10-CM

## 2022-03-10 DIAGNOSIS — Z89.511 HX OF RIGHT BKA (HCC): ICD-10-CM

## 2022-03-10 DIAGNOSIS — E66.01 SEVERE OBESITY (BMI 35.0-39.9) WITH COMORBIDITY (HCC): ICD-10-CM

## 2022-03-10 DIAGNOSIS — E11.42 TYPE 2 DIABETES MELLITUS WITH DIABETIC POLYNEUROPATHY, UNSPECIFIED WHETHER LONG TERM INSULIN USE (HCC): ICD-10-CM

## 2022-03-10 DIAGNOSIS — E55.9 VITAMIN D DEFICIENCY: ICD-10-CM

## 2022-03-10 PROBLEM — H25.9 AGE-RELATED CATARACT OF BOTH EYES: Status: RESOLVED | Noted: 2021-08-30 | Resolved: 2022-03-10

## 2022-03-10 PROCEDURE — G0439 PPPS, SUBSEQ VISIT: HCPCS | Performed by: FAMILY MEDICINE

## 2022-03-10 PROCEDURE — 3725F SCREEN DEPRESSION PERFORMED: CPT | Performed by: FAMILY MEDICINE

## 2022-03-10 PROCEDURE — 99214 OFFICE O/P EST MOD 30 MIN: CPT | Performed by: FAMILY MEDICINE

## 2022-03-10 PROCEDURE — 3074F SYST BP LT 130 MM HG: CPT | Performed by: FAMILY MEDICINE

## 2022-03-10 PROCEDURE — 3078F DIAST BP <80 MM HG: CPT | Performed by: FAMILY MEDICINE

## 2022-03-10 PROCEDURE — 3008F BODY MASS INDEX DOCD: CPT | Performed by: FAMILY MEDICINE

## 2022-03-10 PROCEDURE — 1036F TOBACCO NON-USER: CPT | Performed by: FAMILY MEDICINE

## 2022-03-10 RX ORDER — INSULIN GLARGINE 100 [IU]/ML
25 INJECTION, SOLUTION SUBCUTANEOUS EVERY 12 HOURS SCHEDULED
Qty: 30 ML | Refills: 2
Start: 2022-03-10 | End: 2022-07-11 | Stop reason: SDUPTHER

## 2022-03-10 NOTE — PATIENT INSTRUCTIONS

## 2022-03-10 NOTE — PROGRESS NOTES
Assessment and Plan:     Problem List Items Addressed This Visit        Endocrine    Diabetes mellitus ([facility] )    Relevant Medications    metFORMIN (GLUCOPHAGE) 1000 MG tablet    insulin glargine (Basaglar KwikPen) 100 units/mL injection pen    Other Relevant Orders    CBC and differential    Comprehensive metabolic panel    Hemoglobin A1C    Diabetic neuropathy (HCC) - Primary    Relevant Medications    metFORMIN (GLUCOPHAGE) 1000 MG tablet    insulin glargine (Basaglar KwikPen) 100 units/mL injection pen    Other Relevant Orders    CBC and differential    Comprehensive metabolic panel    Hemoglobin A1C    Diabetic peripheral angiopathy (HCC)    Relevant Medications    metFORMIN (GLUCOPHAGE) 1000 MG tablet    insulin glargine (Basaglar KwikPen) 100 units/mL injection pen    Other Relevant Orders    CBC and differential    Hemoglobin A1C       Other    Hx of right BKA (HCC)    Relevant Orders    CBC and differential    Hyperlipidemia    Relevant Orders    CBC and differential    Lipid panel    TSH, 3rd generation    Vitamin D deficiency    Relevant Orders    CBC and differential    Vitamin D 25 hydroxy    Severe obesity (BMI 35 0-39  9) with comorbidity ([facility] )    Relevant Orders    CBC and differential        BMI Counseling: Body mass index is 35 73 kg/m²  The BMI is above normal  Nutrition recommendations include decreasing portion sizes, encouraging healthy choices of fruits and vegetables, decreasing fast food intake, consuming healthier snacks, limiting drinks that contain sugar, moderation in carbohydrate intake and reducing intake of cholesterol  Exercise recommendations include exercising 3-5 times per week  Rationale for BMI follow-up plan is due to patient being overweight or obese  Depression Screening and Follow-up Plan: Patient was screened for depression during today's encounter  They screened negative with a PHQ-2 score of 0        Preventive health issues were discussed with patient, and age appropriate screening tests were ordered as noted in patient's After Visit Summary  Personalized health advice and appropriate referrals for health education or preventive services given if needed, as noted in patient's After Visit Summary  History of Present Illness:     Patient presents for Medicare Annual Wellness visit    Patient Care Team:  Itz Fang MD as PCP - General (Family Medicine)  Miranda Franks, DO Belkys Amado, DO Brad Mclaughlin MD as Endoscopist     Problem List:     Patient Active Problem List   Diagnosis    Diabetes mellitus ([facility] )    Diabetic neuropathy ([facility] )    Hx of right BKA ([facility] )    Ambulatory dysfunction    Hypertension    Hyperlipidemia    Vitamin D deficiency    Diabetic peripheral angiopathy ([facility] )    Severe obesity (BMI 35 0-39  9) with comorbidity [facility])      Past Medical and Surgical History:     Past Medical History:   Diagnosis Date    Diabetes mellitus ([facility] )     Hypertension      Past Surgical History:   Procedure Laterality Date    LEG AMPUTATION THROUGH LOWER TIBIA AND FIBULA Right 7/25/2017    Procedure: AMPUTATION BELOW KNEE (BKA);   Surgeon: Tahir Banuelos MD;  [facility];  Service: General    ND AMPUTATION FOOT,TRANSMETATARSAL Right 1/26/2017    Procedure: AMPUTATION TRANSMETATARSAL (TMA); OPEN;  Surgeon: Mona Carpenter DPM;  [facility];  Service: Podiatry    ND AMPUTATION METATARSAL+TOE,SINGLE Left 1/30/2017    Procedure: Left partial 1st ray amputation;  Surgeon: Mona Carpenter DPM;  [facility];  Service: Podiatry    ND COLONOSCOPY FLX DX W/COLLJ Emmy 1978 PFRMD N/A 11/28/2018    Procedure: COLONOSCOPY;  Surgeon: Brad Mclaughlin MD;  Location: [facility];  Service: Gastroenterology    WOUND DEBRIDEMENT Right 1/30/2017    Procedure: DEBRIDEMENT FOOT/TOE ([address]) delayed closure, LINDA;  Surgeon: Mona Carpenter DPM;  [facility];  Service:       Family History:     Family History   Problem Relation Age of Onset    Diabetes Mother       Social History:     Social History     Socioeconomic History    Marital status: Single     Spouse name: None    Number of children: None    Years of education: None    Highest education level: None   Occupational History    Occupation: retired   Tobacco Use    Smoking status: Former Smoker     Types: Cigarettes    Smokeless tobacco: Never Used    Tobacco comment: quit 9 years ago   Vaping Use    Vaping Use: Never used   Substance and Sexual Activity    Alcohol use: Yes     Alcohol/week: 11 0 standard drinks     Types: 6 Cans of beer, 5 Shots of liquor per week     Comment: social ; occasional use as per [employer]    Drug use: No    Sexual activity: None   Other Topics Concern    None   Social History Narrative    Always uses seat belt    Caffeine use    Dental care regularly     Social Determinants of Health     Financial Resource Strain: Not on file   Food Insecurity: Not on file   Transportation Needs: Not on file   Physical Activity: Not on file   Stress: Not on file   Social Connections: Not on file   Intimate Partner Violence: Not on file   Housing Stability: Not on file      Medications and Allergies:     Current Outpatient Medications   Medication Sig Dispense Refill    aspirin (ECOTRIN LOW STRENGTH) 81 mg EC tablet Take 81 mg by mouth daily Resume on 8/11/17       atorvastatin (LIPITOR) 80 mg tablet TAKE 1 TABLET DAILY 90 tablet 3    Cholecalciferol (Vitamin D) 125 MCG (5000 UT) CAPS Take by mouth      glucose blood test strip Test blood sugars 4 times a day 400 each 3    insulin aspart (NovoLOG FlexPen) 100 UNIT/ML injection pen Inject 5 units three times a day prior to meals (Patient taking differently: Inject 10 units three times a day prior to meals) 5 pen 2    insulin glargine (Basaglar KwikPen) 100 units/mL injection pen Inject 25 Units under the skin every 12 (twelve) hours Inject 40 units daily   30 mL 2    Insulin Pen Needle 30G X 8 MM MISC Inject under the skin daily at bedtime 100 each 5    lisinopril (ZESTRIL) 2 5 mg tablet Take 1 tablet (2 5 mg total) by mouth daily 90 tablet 3    metFORMIN (GLUCOPHAGE) 1000 MG tablet Take 1 tablet (1,000 mg total) by mouth 2 (two) times a day with meals 180 tablet 1    Multiple Vitamin (MULTI-VITAMIN DAILY PO) Take 1 tablet by mouth daily       No current facility-administered medications for this visit  No Known Allergies   Immunizations:     Immunization History   Administered Date(s) Administered    COVID-19 MODERNA VACC 0 5 ML IM 04/19/2021, 05/18/2021    INFLUENZA 01/13/2012    Influenza Quadrivalent Preservative Free 3 years and older IM 11/01/2016    Influenza Quadrivalent, 6-35 Months IM 09/27/2017    Influenza, recombinant, quadrivalent,injectable, preservative free 11/02/2018, 11/18/2019, 11/24/2020    Pneumococcal Polysaccharide PPV23 01/13/2012, 11/01/2016    Tdap 01/26/2017, 01/26/2017      Health Maintenance:         Topic Date Due    HIV Screening  Never done    Colorectal Cancer Screening  11/28/2028    Hepatitis C Screening  Completed         Topic Date Due    Influenza Vaccine (1) 09/01/2021    COVID-19 Vaccine (3 - Booster for Moderna series) 10/18/2021      Medicare Health Risk Assessment:     /76 (BP Location: Left arm, Patient Position: Sitting, Cuff Size: Large)   Pulse 85   Temp (!) 97 3 °F (36 3 °C)   Ht 5' 10" (1 778 m)   Wt 113 kg (249 lb)   SpO2 92%   BMI 35 73 kg/m²      Lucretia Tolentino is here for his Subsequent Wellness visit  Last Medicare Wellness visit information reviewed, patient interviewed and updates made to the record today  Health Risk Assessment:   Patient rates overall health as very good  Patient feels that their physical health rating is much better  Patient is satisfied with their life  Eyesight was rated as same  Hearing was rated as same  Patient feels that their emotional and mental health rating is same  Patients states they are never, rarely angry  Patient states they are never, rarely unusually tired/fatigued  Pain experienced in the last 7 days has been none  Patient states that he has experienced no weight loss or gain in last 6 months  Depression Screening:   PHQ-2 Score: 0      Fall Risk Screening: In the past year, patient has experienced: no history of falling in past year      Home Safety:  Patient has trouble with stairs inside or outside of their home  Patient has working smoke alarms and has working carbon monoxide detector  Home safety hazards include: none  Nutrition:   Current diet is Regular and Low Carb  Medications:   Patient is currently taking over-the-counter supplements  OTC medications include: see medication list  Patient is able to manage medications  Activities of Daily Living (ADLs)/Instrumental Activities of Daily Living (IADLs):   Walk and transfer into and out of bed and chair?: Yes  Dress and groom yourself?: Yes    Bathe or shower yourself?: Yes    Feed yourself? Yes  Do your laundry/housekeeping?: Yes  Manage your money, pay your bills and track your expenses?: Yes  Make your own meals?: Yes    Do your own shopping?: Yes    Previous Hospitalizations:   Any hospitalizations or ED visits within the last 12 months?: No      Advance Care Planning:   Living will: Yes    Durable POA for healthcare:  Yes    Advanced directive: Yes    Provider agrees with end of life decisions: Yes      Cognitive Screening:   Provider or family/friend/caregiver concerned regarding cognition?: No    PREVENTIVE SCREENINGS      Cardiovascular Screening:    General: Screening Not Indicated and History Lipid Disorder      Diabetes Screening:     General: Screening Not Indicated and History Diabetes      Colorectal Cancer Screening:     General: Screening Current      Prostate Cancer Screening:    General: Screening Current      Osteoporosis Screening:    General: Screening Not Indicated      Abdominal Aortic Aneurysm (AAA) Screening:    Risk factors include: tobacco use        General: Screening Not Indicated      Lung Cancer Screening:     General: Screening Not Indicated      Hepatitis C Screening:    General: Screening Current    Screening, Brief Intervention, and Referral to Treatment (SBIRT)    Screening  Typical number of drinks in a day: 0  Typical number of drinks in a week: 0  Interpretation: Low risk drinking behavior  Single Item Drug Screening:  How often have you used an illegal drug (including marijuana) or a prescription medication for non-medical reasons in the past year? never    Single Item Drug Screen Score: 0  Interpretation: Negative screen for possible drug use disorder    Brief Intervention  Alcohol & drug use screenings were reviewed  No concerns regarding substance use disorder identified         Sara Trotter MD

## 2022-03-11 NOTE — PROGRESS NOTES
Assessment/Plan:    No problem-specific Assessment & Plan notes found for this encounter  Diagnoses and all orders for this visit:    Diabetic autonomic neuropathy associated with type 2 diabetes mellitus ([facility] )  -     CBC and differential; Future  -     Comprehensive metabolic panel; Future  -     Hemoglobin A1C; Future    Type 2 diabetes mellitus with diabetic polyneuropathy, unspecified whether long term insulin use (HCC)  -     metFORMIN (GLUCOPHAGE) 1000 MG tablet; Take 1 tablet (1,000 mg total) by mouth 2 (two) times a day with meals  -     insulin glargine (Basaglar KwikPen) 100 units/mL injection pen; Inject 25 Units under the skin every 12 (twelve) hours Inject 40 units daily   -     CBC and differential; Future  -     Comprehensive metabolic panel; Future  -     Hemoglobin A1C; Future    Diabetic peripheral angiopathy (HCC)  -     CBC and differential; Future  -     Hemoglobin A1C; Future    Mixed hyperlipidemia  -     CBC and differential; Future  -     Lipid panel; Future  -     TSH, 3rd generation; Future    Vitamin D deficiency  -     CBC and differential; Future  -     Vitamin D 25 hydroxy; Future    Hx of right BKA (HCC)  -     CBC and differential; Future    Severe obesity (BMI 35 0-39  9) with comorbidity ([place] [address] )  -     CBC and differential; Future      orders recommendations as noted above  At blood sugars still significantly higher than goal   Discussed options with him  Discussed the addition of another medication verses increasing his Basaglar  Will start by increasing the [address] [facility] [address]  Will gradually increase  Will have him start taking the Basaglar 25 mg twice a day  Advised him to call the office in about 2 weeks  Will gradually increase the dosage by 5 units at a time for better blood sugar control  Discussed with him the need to watch his carbohydrate intake much more closely  Discussed the risks of chronically elevated blood sugars especially since he has already had a right BKA  Continue with the NovoLog with meals  Continue with the metformin  Watch for any hypoglycemic episodes  This is especially important when he increases is activity level with the nicer weather  Continue with the lisinopril for kidney protection  Continue with the atorvastatin  Reviewed recent laboratory testing with him  Continue with vitamin-D supplementation  Check left foot daily  Recommend following up with Podiatry regularly  Be very careful to avoid falls  Wishes to hold off on the pneumonia vaccinations at present  Will have him follow up in about 3-5 months with laboratory testing prior to that visit  Follow up sooner if needed  Subjective:      Patient ID: Constantin Gaming is a 60 y o  male  He presents for routine follow-up as well as Medicare wellness visit  Has generally been doing well  Is going somewhat stir crazy being stuck indoors with the winter weather  Had not been going out very much because of fear of falling with the ice  Is looking forward to the nicer weather when he can get out walking more  Blood sugars have improved over the last week or so  Blood sugars have now been in the 150s to 180s generally over the last few weeks  Prior to that the blood sugars had been significantly elevated often over 250  He is unsure why this happened since his diet does not vary greatly  Has been taking the Basaglar daily as well as NovoLog with meals and the metformin  Denies any hypoglycemic episodes  Has some occasional phantom limb pain on the right  Some numbness and tingling in the left foot  Vision has been generally stable  Continues with the lisinopril for renal protection  Tolerating the atorvastatin without difficulty  Denies any significant muscle aches or weakness  Appetite has been good  Denies any nausea, vomiting, or diarrhea  Sleeps generally well          The following portions of the patient's history were reviewed and updated as appropriate:   He  has a past medical history of Diabetes mellitus ([place] [address] ) and Hypertension  He   Patient Active Problem List    Diagnosis Date Noted    Severe obesity (BMI 35 0-39  9) with comorbidity ([place] [address] ) 08/25/2021    Diabetic peripheral angiopathy ([place] [address] ) 02/18/2020    Vitamin D deficiency 02/01/2019    Ambulatory dysfunction 10/17/2017    Hx of right BKA ([place] [address] ) 07/31/2017    Hypertension 04/14/2017    Hyperlipidemia 04/14/2017    Diabetes mellitus ([place] [address] ) 01/27/2017    Diabetic neuropathy ([place] [address] ) 01/27/2017     He  has a past surgical history that includes pr amputation metatarsal+toe,single (Left, 1/30/2017); Wound debridement (Right, 1/30/2017); pr amputation foot,transmetatarsal (Right, 1/26/2017); Leg amputation, lower tibia/fibula (Right, 7/25/2017); and pr colonoscopy flx dx w/collj spec when pfrmd (N/A, 11/28/2018)  His family history includes Diabetes in his mother  He  reports that he has quit smoking  His smoking use included cigarettes  He has never used smokeless tobacco  He reports current alcohol use of about 11 0 standard drinks of alcohol per week  He reports that he does not use drugs  Current Outpatient Medications   Medication Sig Dispense Refill    aspirin (ECOTRIN LOW STRENGTH) 81 mg EC tablet Take 81 mg by mouth daily Resume on 8/11/17       atorvastatin (LIPITOR) 80 mg tablet TAKE 1 TABLET DAILY 90 tablet 3    Cholecalciferol (Vitamin D) 125 MCG (5000 UT) CAPS Take by mouth      glucose blood test strip Test blood sugars 4 times a day 400 each 3    insulin aspart (NovoLOG FlexPen) 100 UNIT/ML injection pen Inject 5 units three times a day prior to meals (Patient taking differently: Inject 10 units three times a day prior to meals) 5 pen 2    insulin glargine (Basaglar KwikPen) 100 units/mL injection pen Inject 25 Units under the skin every 12 (twelve) hours Inject 40 units daily   30 mL 2    Insulin Pen Needle 30G X 8 MM MISC Inject under the skin daily at bedtime 100 each 5    lisinopril (ZESTRIL) 2 5 mg tablet Take 1 tablet (2 5 mg total) by mouth daily 90 tablet 3    metFORMIN (GLUCOPHAGE) 1000 MG tablet Take 1 tablet (1,000 mg total) by mouth 2 (two) times a day with meals 180 tablet 1    Multiple Vitamin (MULTI-VITAMIN DAILY PO) Take 1 tablet by mouth daily       No current facility-administered medications for this visit  Current Outpatient Medications on File Prior to Visit   Medication Sig    aspirin (ECOTRIN LOW STRENGTH) 81 mg EC tablet Take 81 mg by mouth daily Resume on 8/11/17     atorvastatin (LIPITOR) 80 mg tablet TAKE 1 TABLET DAILY    Cholecalciferol (Vitamin D) 125 MCG (5000 UT) CAPS Take by mouth    glucose blood test strip Test blood sugars 4 times a day    insulin aspart (NovoLOG FlexPen) 100 UNIT/ML injection pen Inject 5 units three times a day prior to meals (Patient taking differently: Inject 10 units three times a day prior to meals)    Insulin Pen Needle 30G X 8 MM MISC Inject under the skin daily at bedtime    lisinopril (ZESTRIL) 2 5 mg tablet Take 1 tablet (2 5 mg total) by mouth daily    Multiple Vitamin (MULTI-VITAMIN DAILY PO) Take 1 tablet by mouth daily     No current facility-administered medications on file prior to visit  He has No Known Allergies       Review of Systems   Constitutional: Negative for activity change, appetite change, chills and fever  HENT: Negative for hearing loss  Eyes: Negative for pain and visual disturbance  Respiratory: Negative for cough, chest tightness and shortness of breath  Cardiovascular: Negative for chest pain and palpitations  Gastrointestinal: Negative for abdominal pain, blood in stool, diarrhea, nausea and vomiting  Endocrine: Negative for polydipsia, polyphagia and polyuria  As per HPI   Genitourinary: Negative for dysuria  Musculoskeletal: Negative for arthralgias and gait problem  Skin: Negative for color change  Neurological: Negative for dizziness, light-headedness and headaches  Hematological: Does not bruise/bleed easily  Psychiatric/Behavioral: Negative for behavioral problems and sleep disturbance  The patient is not nervous/anxious  Objective:      /76 (BP Location: Left arm, Patient Position: Sitting, Cuff Size: Large)   Pulse 85   Temp (!) 97 3 °F (36 3 °C)   Ht 5' 10" (1 778 m)   Wt 113 kg (249 lb)   SpO2 92%   BMI 35 73 kg/m²          Physical Exam  Vitals and nursing note reviewed  Constitutional:       General: He is not in acute distress  Appearance: He is well-developed, well-groomed and overweight  Comments: Pleasant and interactive   HENT:      Head: Normocephalic and atraumatic  Comments: Moist mucous membranes; slight cerumen bilaterally     Nose: Nose normal    Eyes:      Conjunctiva/sclera: Conjunctivae normal       Pupils: Pupils are equal, round, and reactive to light  Neck:      Thyroid: No thyromegaly  Cardiovascular:      Rate and Rhythm: Normal rate and regular rhythm  Heart sounds: Normal heart sounds  No murmur heard  No friction rub  No gallop  Pulmonary:      Breath sounds: No decreased breath sounds, wheezing or rales  Chest:      Chest wall: No tenderness  Abdominal:      General: There is no distension  Tenderness: There is no abdominal tenderness  There is no guarding or rebound  Musculoskeletal:      Cervical back: Neck supple  Comments: Right BKA; slight degenerative changes left knee   Lymphadenopathy:      Cervical: No cervical adenopathy  Skin:     General: Skin is warm and dry  Neurological:      Mental Status: He is alert and oriented to person, place, and time  Psychiatric:         Mood and Affect: Mood and affect normal          Speech: Speech normal          Behavior: Behavior normal  Behavior is cooperative           Cognition and Memory: Cognition and memory normal

## 2022-03-11 NOTE — PROGRESS NOTES
Diabetic Foot Exam    Patient's shoes and socks removed  Right Foot/Ankle   Right Foot Inspection  Amputation: amputation right foot     Left Foot/Ankle  Left Foot Inspection  Skin Exam: skin intact, dry skin and callus  No warmth, no erythema, no maceration, normal color, no pre-ulcer and no ulcer  Toe Exam: ROM and strength within normal limits  Sensory   Monofilament testing: diminished    Vascular  Capillary refills: < 3 seconds  The left DP pulse is 1+  The left PT pulse is 0       Assign Risk Category  Deformity present  Loss of protective sensation  Weak pulses  Risk: 2

## 2022-07-05 ENCOUNTER — RA CDI HCC (OUTPATIENT)
Dept: OTHER | Facility: HOSPITAL | Age: 62
End: 2022-07-05

## 2022-07-06 ENCOUNTER — APPOINTMENT (OUTPATIENT)
Dept: LAB | Facility: MEDICAL CENTER | Age: 62
End: 2022-07-06
Payer: COMMERCIAL

## 2022-07-06 DIAGNOSIS — E11.43 DIABETIC AUTONOMIC NEUROPATHY ASSOCIATED WITH TYPE 2 DIABETES MELLITUS (HCC): ICD-10-CM

## 2022-07-06 DIAGNOSIS — E11.42 TYPE 2 DIABETES MELLITUS WITH DIABETIC POLYNEUROPATHY, UNSPECIFIED WHETHER LONG TERM INSULIN USE (HCC): ICD-10-CM

## 2022-07-06 DIAGNOSIS — E11.51 DIABETIC PERIPHERAL ANGIOPATHY (HCC): ICD-10-CM

## 2022-07-06 DIAGNOSIS — E78.2 MIXED HYPERLIPIDEMIA: ICD-10-CM

## 2022-07-06 DIAGNOSIS — Z89.511 HX OF RIGHT BKA (HCC): ICD-10-CM

## 2022-07-06 DIAGNOSIS — E66.01 SEVERE OBESITY (BMI 35.0-39.9) WITH COMORBIDITY (HCC): ICD-10-CM

## 2022-07-06 DIAGNOSIS — E55.9 VITAMIN D DEFICIENCY: ICD-10-CM

## 2022-07-06 LAB
25(OH)D3 SERPL-MCNC: 25.9 NG/ML (ref 30–100)
ALBUMIN SERPL BCP-MCNC: 3.5 G/DL (ref 3.5–5)
ALP SERPL-CCNC: 137 U/L (ref 46–116)
ALT SERPL W P-5'-P-CCNC: 27 U/L (ref 12–78)
ANION GAP SERPL CALCULATED.3IONS-SCNC: 7 MMOL/L (ref 4–13)
AST SERPL W P-5'-P-CCNC: 12 U/L (ref 5–45)
BASOPHILS # BLD AUTO: 0.08 THOUSANDS/ΜL (ref 0–0.1)
BASOPHILS NFR BLD AUTO: 1 % (ref 0–1)
BILIRUB SERPL-MCNC: 0.89 MG/DL (ref 0.2–1)
BUN SERPL-MCNC: 15 MG/DL (ref 5–25)
CALCIUM SERPL-MCNC: 9.4 MG/DL (ref 8.3–10.1)
CHLORIDE SERPL-SCNC: 106 MMOL/L (ref 100–108)
CHOLEST SERPL-MCNC: 139 MG/DL
CO2 SERPL-SCNC: 25 MMOL/L (ref 21–32)
CREAT SERPL-MCNC: 1.24 MG/DL (ref 0.6–1.3)
EOSINOPHIL # BLD AUTO: 0.19 THOUSAND/ΜL (ref 0–0.61)
EOSINOPHIL NFR BLD AUTO: 2 % (ref 0–6)
ERYTHROCYTE [DISTWIDTH] IN BLOOD BY AUTOMATED COUNT: 12.9 % (ref 11.6–15.1)
GFR SERPL CREATININE-BSD FRML MDRD: 61 ML/MIN/1.73SQ M
GLUCOSE P FAST SERPL-MCNC: 253 MG/DL (ref 65–99)
HCT VFR BLD AUTO: 47.5 % (ref 36.5–49.3)
HDLC SERPL-MCNC: 36 MG/DL
HGB BLD-MCNC: 15.6 G/DL (ref 12–17)
IMM GRANULOCYTES # BLD AUTO: 0.02 THOUSAND/UL (ref 0–0.2)
IMM GRANULOCYTES NFR BLD AUTO: 0 % (ref 0–2)
LDLC SERPL CALC-MCNC: 67 MG/DL (ref 0–100)
LYMPHOCYTES # BLD AUTO: 3.52 THOUSANDS/ΜL (ref 0.6–4.47)
LYMPHOCYTES NFR BLD AUTO: 46 % (ref 14–44)
MCH RBC QN AUTO: 29.7 PG (ref 26.8–34.3)
MCHC RBC AUTO-ENTMCNC: 32.8 G/DL (ref 31.4–37.4)
MCV RBC AUTO: 91 FL (ref 82–98)
MONOCYTES # BLD AUTO: 0.57 THOUSAND/ΜL (ref 0.17–1.22)
MONOCYTES NFR BLD AUTO: 7 % (ref 4–12)
NEUTROPHILS # BLD AUTO: 3.4 THOUSANDS/ΜL (ref 1.85–7.62)
NEUTS SEG NFR BLD AUTO: 44 % (ref 43–75)
NONHDLC SERPL-MCNC: 103 MG/DL
NRBC BLD AUTO-RTO: 0 /100 WBCS
PLATELET # BLD AUTO: 304 THOUSANDS/UL (ref 149–390)
PMV BLD AUTO: 9.7 FL (ref 8.9–12.7)
POTASSIUM SERPL-SCNC: 4.3 MMOL/L (ref 3.5–5.3)
PROT SERPL-MCNC: 7.6 G/DL (ref 6.4–8.2)
RBC # BLD AUTO: 5.25 MILLION/UL (ref 3.88–5.62)
SODIUM SERPL-SCNC: 138 MMOL/L (ref 136–145)
TRIGL SERPL-MCNC: 179 MG/DL
TSH SERPL DL<=0.05 MIU/L-ACNC: 0.67 UIU/ML (ref 0.45–4.5)
WBC # BLD AUTO: 7.78 THOUSAND/UL (ref 4.31–10.16)

## 2022-07-06 PROCEDURE — 80061 LIPID PANEL: CPT

## 2022-07-06 PROCEDURE — 83036 HEMOGLOBIN GLYCOSYLATED A1C: CPT

## 2022-07-06 PROCEDURE — 84443 ASSAY THYROID STIM HORMONE: CPT

## 2022-07-06 PROCEDURE — 85025 COMPLETE CBC W/AUTO DIFF WBC: CPT

## 2022-07-06 PROCEDURE — 80053 COMPREHEN METABOLIC PANEL: CPT

## 2022-07-06 PROCEDURE — 36415 COLL VENOUS BLD VENIPUNCTURE: CPT

## 2022-07-06 PROCEDURE — 82306 VITAMIN D 25 HYDROXY: CPT

## 2022-07-07 LAB
EST. AVERAGE GLUCOSE BLD GHB EST-MCNC: 260 MG/DL
HBA1C MFR BLD: 10.7 %

## 2022-07-07 PROCEDURE — 3046F HEMOGLOBIN A1C LEVEL >9.0%: CPT | Performed by: FAMILY MEDICINE

## 2022-07-11 ENCOUNTER — OFFICE VISIT (OUTPATIENT)
Dept: INTERNAL MEDICINE CLINIC | Facility: CLINIC | Age: 62
End: 2022-07-11
Payer: COMMERCIAL

## 2022-07-11 VITALS
TEMPERATURE: 96.7 F | OXYGEN SATURATION: 94 % | SYSTOLIC BLOOD PRESSURE: 122 MMHG | HEART RATE: 93 BPM | HEIGHT: 70 IN | DIASTOLIC BLOOD PRESSURE: 74 MMHG | WEIGHT: 255 LBS | BODY MASS INDEX: 36.51 KG/M2

## 2022-07-11 DIAGNOSIS — E78.2 MIXED HYPERLIPIDEMIA: ICD-10-CM

## 2022-07-11 DIAGNOSIS — E11.43 DIABETIC AUTONOMIC NEUROPATHY ASSOCIATED WITH TYPE 2 DIABETES MELLITUS (HCC): ICD-10-CM

## 2022-07-11 DIAGNOSIS — E55.9 VITAMIN D DEFICIENCY: ICD-10-CM

## 2022-07-11 DIAGNOSIS — Z89.511 HX OF RIGHT BKA (HCC): ICD-10-CM

## 2022-07-11 DIAGNOSIS — E11.42 TYPE 2 DIABETES MELLITUS WITH DIABETIC POLYNEUROPATHY, UNSPECIFIED WHETHER LONG TERM INSULIN USE (HCC): ICD-10-CM

## 2022-07-11 DIAGNOSIS — I10 PRIMARY HYPERTENSION: ICD-10-CM

## 2022-07-11 DIAGNOSIS — E11.42 TYPE 2 DIABETES MELLITUS WITH DIABETIC POLYNEUROPATHY, WITH LONG-TERM CURRENT USE OF INSULIN (HCC): Primary | ICD-10-CM

## 2022-07-11 DIAGNOSIS — E11.51 DIABETIC PERIPHERAL ANGIOPATHY (HCC): ICD-10-CM

## 2022-07-11 DIAGNOSIS — Z79.4 TYPE 2 DIABETES MELLITUS WITH DIABETIC POLYNEUROPATHY, WITH LONG-TERM CURRENT USE OF INSULIN (HCC): Primary | ICD-10-CM

## 2022-07-11 PROCEDURE — 99214 OFFICE O/P EST MOD 30 MIN: CPT | Performed by: FAMILY MEDICINE

## 2022-07-11 PROCEDURE — 3074F SYST BP LT 130 MM HG: CPT | Performed by: FAMILY MEDICINE

## 2022-07-11 PROCEDURE — 3078F DIAST BP <80 MM HG: CPT | Performed by: FAMILY MEDICINE

## 2022-07-11 RX ORDER — INSULIN GLARGINE 100 [IU]/ML
35 INJECTION, SOLUTION SUBCUTANEOUS EVERY 12 HOURS SCHEDULED
Qty: 30 ML | Refills: 2 | Status: SHIPPED | OUTPATIENT
Start: 2022-07-11

## 2022-07-11 NOTE — PROGRESS NOTES
Assessment/Plan:    No problem-specific Assessment & Plan notes found for this encounter  Diagnoses and all orders for this visit:    Type 2 diabetes mellitus with diabetic polyneuropathy, with long-term current use of insulin (HCC)  -     CBC and differential; Future  -     Comprehensive metabolic panel; Future  -     Hemoglobin A1C; Future  -     Microalbumin / creatinine urine ratio    Diabetic autonomic neuropathy associated with type 2 diabetes mellitus (HCC)  -     CBC and differential; Future    Diabetic peripheral angiopathy (HCC)  -     CBC and differential; Future    Primary hypertension  -     CBC and differential; Future  -     Comprehensive metabolic panel; Future    Mixed hyperlipidemia  -     CBC and differential; Future  -     Lipid panel; Future  -     TSH, 3rd generation; Future    Hx of right BKA (HCC)  -     CBC and differential; Future    Vitamin D deficiency  -     CBC and differential; Future    Type 2 diabetes mellitus with diabetic polyneuropathy, unspecified whether long term insulin use (HCC)  -     CBC and differential; Future  -     insulin glargine (Basaglar KwikPen) 100 units/mL injection pen; Inject 35 Units under the skin every 12 (twelve) hours    orders and recommendations as noted above  Hemoglobin A1c remains significantly elevated  Try to watch carbohydrate intake more closely  Increasing activity level is difficult with his difficulty with ambulation with the right BKA  Discussed medication changes  Will increase his Basaglar as noted above  Continue with the NovoLog with meals  Continue with the metformin  Discussed additional medications if his hemoglobin A1c is not improved  Follow blood sugars at home on a regular basis and bring a list of these to the next visit  Would recommend follow-up with endocrine but transportation is an issue    Importance of good blood sugar control for control of potential end-organ damage discussed especially with his history of the right BKA  Follow-up with Ophthalmology regularly  Check left foot daily  Watch for any open areas  Continue with the lisinopril as previously  Watch salt intake  Stay adequately hydrated  Continue with the atorvastatin  Cholesterol controlled  Continue with vitamin-D supplementation  Will continue follow this with routine laboratory testing  Follow-up regularly with Podiatry recommended  Coronavirus booster discussed  Recommend flu shot in the fall  Will have him follow up in about 3 months with laboratory testing prior to visit  Follow up sooner if needed  Subjective:      Patient ID: Myriam Bosworth is a 60 y o  male  He presents for routine follow-up  Has generally been doing relatively well  He has not been able to be as active as he wishes especially with the difficulty ambulating with the right BKA  He would like to be more active but the [place] and this is difficult for him to walk  Tries to watch his diet  He has been running low on NovoLog because of some financial issues  Has been trying to take this regularly  Takes this [address] on a daily basis  Tolerating the metformin well  Denies any GI side effects from this  Vision has been stable  Denies any difficulty with his prosthesis for the right leg  Tolerating lisinopril well  Denies any significant headaches or localized weakness  Denies any chest pain or palpitations  Continues with the atorvastatin  Denies any significant muscle aches or weakness with this  Appetite has been generally stable  Denies any nausea, vomiting, or diarrhea  Usually sleeps relatively well  The following portions of the patient's history were reviewed and updated as appropriate:   He  has a past medical history of Diabetes mellitus ([address] ) and Hypertension  He   Patient Active Problem List    Diagnosis Date Noted    Severe obesity (BMI 35 0-39  9) with comorbidity ([address] ) 08/25/2021    Diabetic peripheral angiopathy ([place] [address] ) 02/18/2020    Vitamin D deficiency 02/01/2019    Ambulatory dysfunction 10/17/2017    Hx of right BKA ([place] [address] ) 07/31/2017    Hypertension 04/14/2017    Hyperlipidemia 04/14/2017    Diabetes mellitus ([place] [address] ) 01/27/2017    Diabetic neuropathy ([place] [address] ) 01/27/2017     He  has a past surgical history that includes pr amputation metatarsal+toe,single (Left, 1/30/2017); Wound debridement (Right, 1/30/2017); pr amputation foot,transmetatarsal (Right, 1/26/2017); Leg amputation, lower tibia/fibula (Right, 7/25/2017); and pr colonoscopy flx dx w/collj spec when pfrmd (N/A, 11/28/2018)  His family history includes Diabetes in his mother  He  reports that he has quit smoking  His smoking use included cigarettes  He has never used smokeless tobacco  He reports current alcohol use of about 11 0 standard drinks of alcohol per week  He reports that he does not use drugs    Current Outpatient Medications   Medication Sig Dispense Refill    aspirin (ECOTRIN LOW STRENGTH) 81 mg EC tablet Take 81 mg by mouth daily Resume on 8/11/17       atorvastatin (LIPITOR) 80 mg tablet TAKE 1 TABLET DAILY 90 tablet 3    Cholecalciferol (Vitamin D) 125 MCG (5000 UT) CAPS Take by mouth      glucose blood test strip Test blood sugars 4 times a day 400 each 3    insulin aspart (NovoLOG FlexPen) 100 UNIT/ML injection pen Inject 5 units three times a day prior to meals (Patient taking differently: Inject 10 units three times a day prior to meals) 5 pen 2    insulin glargine (Basaglar KwikPen) 100 units/mL injection pen Inject 35 Units under the skin every 12 (twelve) hours 30 mL 2    Insulin Pen Needle 30G X 8 MM MISC Inject under the skin daily at bedtime 100 each 5    lisinopril (ZESTRIL) 2 5 mg tablet Take 1 tablet (2 5 mg total) by mouth daily 90 tablet 3    metFORMIN (GLUCOPHAGE) 1000 MG tablet Take 1 tablet (1,000 mg total) by mouth 2 (two) times a day with meals 180 tablet 1    Multiple Vitamin (MULTI-VITAMIN DAILY PO) Take 1 tablet by mouth daily       No current facility-administered medications for this visit  Current Outpatient Medications on File Prior to Visit   Medication Sig    aspirin (ECOTRIN LOW STRENGTH) 81 mg EC tablet Take 81 mg by mouth daily Resume on 8/11/17     atorvastatin (LIPITOR) 80 mg tablet TAKE 1 TABLET DAILY    Cholecalciferol (Vitamin D) 125 MCG (5000 UT) CAPS Take by mouth    glucose blood test strip Test blood sugars 4 times a day    insulin aspart (NovoLOG FlexPen) 100 UNIT/ML injection pen Inject 5 units three times a day prior to meals (Patient taking differently: Inject 10 units three times a day prior to meals)    Insulin Pen Needle 30G X 8 MM MISC Inject under the skin daily at bedtime    lisinopril (ZESTRIL) 2 5 mg tablet Take 1 tablet (2 5 mg total) by mouth daily    metFORMIN (GLUCOPHAGE) 1000 MG tablet Take 1 tablet (1,000 mg total) by mouth 2 (two) times a day with meals    Multiple Vitamin (MULTI-VITAMIN DAILY PO) Take 1 tablet by mouth daily     No current facility-administered medications on file prior to visit  He has No Known Allergies       Review of Systems   Constitutional: Positive for activity change  Negative for appetite change, chills and fever  HENT: Negative for hearing loss  Eyes: Negative for pain and visual disturbance  Respiratory: Negative for cough, chest tightness and shortness of breath  Cardiovascular: Negative for chest pain and palpitations  Gastrointestinal: Negative for abdominal pain, blood in stool, diarrhea, nausea and vomiting  Endocrine: Positive for polyuria  Negative for polydipsia and polyphagia  Genitourinary: Negative for dysuria  Musculoskeletal: Positive for gait problem  Negative for arthralgias  Skin: Negative for color change  Neurological: Negative for dizziness and headaches  Hematological: Does not bruise/bleed easily     Psychiatric/Behavioral: Negative for behavioral problems, decreased concentration, dysphoric mood and sleep disturbance  The patient is not nervous/anxious  Objective:      /74 (BP Location: Left arm, Patient Position: Sitting, Cuff Size: Large)   Pulse 93   Temp (!) 96 7 °F (35 9 °C)   Ht 5' 10" (1 778 m)   Wt 116 kg (255 lb)   SpO2 94%   BMI 36 59 kg/m²          Physical Exam  Vitals and nursing note reviewed  Constitutional:       General: He is not in acute distress  Appearance: He is well-developed, well-groomed and overweight  HENT:      Head: Normocephalic and atraumatic  Comments: Cerumen bilaterally     Nose: Nose normal    Eyes:      Conjunctiva/sclera: Conjunctivae normal       Pupils: Pupils are equal, round, and reactive to light  Cardiovascular:      Rate and Rhythm: Normal rate and regular rhythm  Heart sounds: Normal heart sounds  No murmur heard  No friction rub  No gallop  Pulmonary:      Breath sounds: No wheezing or rales  Chest:      Chest wall: No tenderness  Abdominal:      General: There is no distension  Tenderness: There is no abdominal tenderness  There is no guarding or rebound  Musculoskeletal:      Comments: Right BKA   Lymphadenopathy:      Cervical: No cervical adenopathy  Skin:     General: Skin is warm and dry  Neurological:      Mental Status: He is alert and oriented to person, place, and time  Psychiatric:         Mood and Affect: Mood and affect normal          Speech: Speech normal          Behavior: Behavior normal  Behavior is cooperative  Cognition and Memory: Cognition and memory normal       Comments: Pleasant and interactive         BMI Counseling: Body mass index is 36 59 kg/m²  The BMI is above normal  Nutrition recommendations include encouraging healthy choices of fruits and vegetables, decreasing fast food intake, consuming healthier snacks, limiting drinks that contain sugar, moderation in carbohydrate intake and reducing intake of cholesterol   Rationale for BMI follow-up plan is due to patient being overweight or obese

## 2022-07-11 NOTE — PATIENT INSTRUCTIONS

## 2022-08-16 LAB
LEFT EYE DIABETIC RETINOPATHY: NORMAL
RIGHT EYE DIABETIC RETINOPATHY: NORMAL
SEVERITY (EYE EXAM): NORMAL

## 2022-08-26 DIAGNOSIS — E11.42 TYPE 2 DIABETES MELLITUS WITH DIABETIC POLYNEUROPATHY, UNSPECIFIED WHETHER LONG TERM INSULIN USE (HCC): ICD-10-CM

## 2022-08-28 DIAGNOSIS — E11.42 TYPE 2 DIABETES MELLITUS WITH DIABETIC POLYNEUROPATHY, UNSPECIFIED WHETHER LONG TERM INSULIN USE (HCC): ICD-10-CM

## 2022-08-29 RX ORDER — INSULIN ASPART 100 [IU]/ML
INJECTION, SOLUTION INTRAVENOUS; SUBCUTANEOUS
Qty: 3 ML | Refills: 3 | Status: SHIPPED | OUTPATIENT
Start: 2022-08-29

## 2022-08-29 NOTE — TELEPHONE ENCOUNTER
Last office visit: 4/13/21 (telemedicine)  Next office visit: None  Last labs: 7/6/21    Patient has not seen our office in over a year, I called patient and he stated this refill request was suppose to be sent to his PCP  Routing medication refill to their office

## 2022-10-04 DIAGNOSIS — E78.5 HYPERLIPIDEMIA, UNSPECIFIED HYPERLIPIDEMIA TYPE: ICD-10-CM

## 2022-10-04 RX ORDER — ATORVASTATIN CALCIUM 80 MG/1
80 TABLET, FILM COATED ORAL DAILY
Qty: 90 TABLET | Refills: 3 | Status: SHIPPED | OUTPATIENT
Start: 2022-10-04

## 2022-11-14 ENCOUNTER — APPOINTMENT (OUTPATIENT)
Dept: LAB | Facility: CLINIC | Age: 62
End: 2022-11-14

## 2022-11-14 DIAGNOSIS — Z89.511 HX OF RIGHT BKA (HCC): ICD-10-CM

## 2022-11-14 DIAGNOSIS — E78.2 MIXED HYPERLIPIDEMIA: ICD-10-CM

## 2022-11-14 DIAGNOSIS — E11.42 TYPE 2 DIABETES MELLITUS WITH DIABETIC POLYNEUROPATHY, UNSPECIFIED WHETHER LONG TERM INSULIN USE (HCC): ICD-10-CM

## 2022-11-14 DIAGNOSIS — E11.43 DIABETIC AUTONOMIC NEUROPATHY ASSOCIATED WITH TYPE 2 DIABETES MELLITUS (HCC): ICD-10-CM

## 2022-11-14 DIAGNOSIS — Z79.4 TYPE 2 DIABETES MELLITUS WITH DIABETIC POLYNEUROPATHY, WITH LONG-TERM CURRENT USE OF INSULIN (HCC): ICD-10-CM

## 2022-11-14 DIAGNOSIS — I10 PRIMARY HYPERTENSION: ICD-10-CM

## 2022-11-14 DIAGNOSIS — E55.9 VITAMIN D DEFICIENCY: ICD-10-CM

## 2022-11-14 DIAGNOSIS — E11.51 DIABETIC PERIPHERAL ANGIOPATHY (HCC): ICD-10-CM

## 2022-11-14 DIAGNOSIS — E11.42 TYPE 2 DIABETES MELLITUS WITH DIABETIC POLYNEUROPATHY, WITH LONG-TERM CURRENT USE OF INSULIN (HCC): ICD-10-CM

## 2022-11-14 LAB
ALBUMIN SERPL BCP-MCNC: 3.2 G/DL (ref 3.5–5)
ALP SERPL-CCNC: 148 U/L (ref 46–116)
ALT SERPL W P-5'-P-CCNC: 30 U/L (ref 12–78)
ANION GAP SERPL CALCULATED.3IONS-SCNC: 7 MMOL/L (ref 4–13)
AST SERPL W P-5'-P-CCNC: 22 U/L (ref 5–45)
BASOPHILS # BLD AUTO: 0.08 THOUSANDS/ÂΜL (ref 0–0.1)
BASOPHILS NFR BLD AUTO: 1 % (ref 0–1)
BILIRUB SERPL-MCNC: 0.58 MG/DL (ref 0.2–1)
BUN SERPL-MCNC: 15 MG/DL (ref 5–25)
CALCIUM ALBUM COR SERPL-MCNC: 9.9 MG/DL (ref 8.3–10.1)
CALCIUM SERPL-MCNC: 9.3 MG/DL (ref 8.3–10.1)
CHLORIDE SERPL-SCNC: 104 MMOL/L (ref 96–108)
CHOLEST SERPL-MCNC: 166 MG/DL
CO2 SERPL-SCNC: 23 MMOL/L (ref 21–32)
CREAT SERPL-MCNC: 1.06 MG/DL (ref 0.6–1.3)
CREAT UR-MCNC: 260 MG/DL
EOSINOPHIL # BLD AUTO: 0.27 THOUSAND/ÂΜL (ref 0–0.61)
EOSINOPHIL NFR BLD AUTO: 3 % (ref 0–6)
ERYTHROCYTE [DISTWIDTH] IN BLOOD BY AUTOMATED COUNT: 12.9 % (ref 11.6–15.1)
GFR SERPL CREATININE-BSD FRML MDRD: 74 ML/MIN/1.73SQ M
GLUCOSE P FAST SERPL-MCNC: 255 MG/DL (ref 65–99)
HCT VFR BLD AUTO: 46.6 % (ref 36.5–49.3)
HDLC SERPL-MCNC: 39 MG/DL
HGB BLD-MCNC: 15.6 G/DL (ref 12–17)
IMM GRANULOCYTES # BLD AUTO: 0.02 THOUSAND/UL (ref 0–0.2)
IMM GRANULOCYTES NFR BLD AUTO: 0 % (ref 0–2)
LDLC SERPL CALC-MCNC: 70 MG/DL (ref 0–100)
LYMPHOCYTES # BLD AUTO: 3.74 THOUSANDS/ÂΜL (ref 0.6–4.47)
LYMPHOCYTES NFR BLD AUTO: 43 % (ref 14–44)
MCH RBC QN AUTO: 29.8 PG (ref 26.8–34.3)
MCHC RBC AUTO-ENTMCNC: 33.5 G/DL (ref 31.4–37.4)
MCV RBC AUTO: 89 FL (ref 82–98)
MICROALBUMIN UR-MCNC: 1150 MG/L (ref 0–20)
MICROALBUMIN/CREAT 24H UR: 442 MG/G CREATININE (ref 0–30)
MONOCYTES # BLD AUTO: 0.58 THOUSAND/ÂΜL (ref 0.17–1.22)
MONOCYTES NFR BLD AUTO: 7 % (ref 4–12)
NEUTROPHILS # BLD AUTO: 3.92 THOUSANDS/ÂΜL (ref 1.85–7.62)
NEUTS SEG NFR BLD AUTO: 46 % (ref 43–75)
NONHDLC SERPL-MCNC: 127 MG/DL
NRBC BLD AUTO-RTO: 0 /100 WBCS
PLATELET # BLD AUTO: 252 THOUSANDS/UL (ref 149–390)
PMV BLD AUTO: 9.6 FL (ref 8.9–12.7)
POTASSIUM SERPL-SCNC: 4.5 MMOL/L (ref 3.5–5.3)
PROT SERPL-MCNC: 7.3 G/DL (ref 6.4–8.4)
RBC # BLD AUTO: 5.23 MILLION/UL (ref 3.88–5.62)
SODIUM SERPL-SCNC: 134 MMOL/L (ref 135–147)
TRIGL SERPL-MCNC: 283 MG/DL
TSH SERPL DL<=0.05 MIU/L-ACNC: 1.38 UIU/ML (ref 0.45–4.5)
WBC # BLD AUTO: 8.61 THOUSAND/UL (ref 4.31–10.16)

## 2022-11-15 LAB
EST. AVERAGE GLUCOSE BLD GHB EST-MCNC: 295 MG/DL
HBA1C MFR BLD: 11.9 %

## 2022-11-16 ENCOUNTER — OFFICE VISIT (OUTPATIENT)
Dept: INTERNAL MEDICINE CLINIC | Facility: CLINIC | Age: 62
End: 2022-11-16

## 2022-11-16 VITALS
HEIGHT: 70 IN | WEIGHT: 255 LBS | BODY MASS INDEX: 36.51 KG/M2 | TEMPERATURE: 97.7 F | OXYGEN SATURATION: 96 % | DIASTOLIC BLOOD PRESSURE: 70 MMHG | SYSTOLIC BLOOD PRESSURE: 122 MMHG | HEART RATE: 82 BPM

## 2022-11-16 DIAGNOSIS — I10 PRIMARY HYPERTENSION: ICD-10-CM

## 2022-11-16 DIAGNOSIS — E66.01 SEVERE OBESITY (BMI 35.0-39.9) WITH COMORBIDITY (HCC): ICD-10-CM

## 2022-11-16 DIAGNOSIS — E78.2 MIXED HYPERLIPIDEMIA: ICD-10-CM

## 2022-11-16 DIAGNOSIS — E55.9 VITAMIN D DEFICIENCY: ICD-10-CM

## 2022-11-16 DIAGNOSIS — E11.42 TYPE 2 DIABETES MELLITUS WITH DIABETIC POLYNEUROPATHY, UNSPECIFIED WHETHER LONG TERM INSULIN USE (HCC): ICD-10-CM

## 2022-11-16 DIAGNOSIS — Z23 NEED FOR INFLUENZA VACCINATION: ICD-10-CM

## 2022-11-16 DIAGNOSIS — E11.42 TYPE 2 DIABETES MELLITUS WITH DIABETIC POLYNEUROPATHY, WITH LONG-TERM CURRENT USE OF INSULIN (HCC): Primary | ICD-10-CM

## 2022-11-16 DIAGNOSIS — Z12.5 SCREENING FOR PROSTATE CANCER: ICD-10-CM

## 2022-11-16 DIAGNOSIS — Z79.4 TYPE 2 DIABETES MELLITUS WITH DIABETIC POLYNEUROPATHY, WITH LONG-TERM CURRENT USE OF INSULIN (HCC): Primary | ICD-10-CM

## 2022-11-16 DIAGNOSIS — E11.43 DIABETIC AUTONOMIC NEUROPATHY ASSOCIATED WITH TYPE 2 DIABETES MELLITUS (HCC): ICD-10-CM

## 2022-11-16 RX ORDER — INSULIN GLARGINE 100 [IU]/ML
45 INJECTION, SOLUTION SUBCUTANEOUS EVERY 12 HOURS SCHEDULED
Qty: 30 ML | Refills: 2 | Status: SHIPPED | OUTPATIENT
Start: 2022-11-16

## 2022-11-17 ENCOUNTER — TELEPHONE (OUTPATIENT)
Dept: ADMINISTRATIVE | Facility: OTHER | Age: 62
End: 2022-11-17

## 2022-11-17 NOTE — TELEPHONE ENCOUNTER
----- Message from Griselda Dupree sent at 11/16/2022  9:29 AM EST -----  11/16/22 9:29 AM    Hello, our patient Malia Hu has had Diabetic Eye Exam completed/performed  Please assist in updating the patient chart by making an External outreach to [facility] located in [id number] The date of service is within last year      Thank you,  David Ricardo MA   PRIMARY CARE [place] [id number]

## 2022-11-17 NOTE — LETTER
Diabetic Eye Exam Form    Date Requested: 22  Patient: Jr Pemberton  Patient : 1960   Referring Provider: Humberto Parks MD      DIABETIC Eye Exam Date _______________________________      Type of Exam MUST be documented for Diabetic Eye Exams  Please CHECK ONE  Retinal Exam       Dilated Retinal Exam       OCT       Optomap-Iris Exam      Fundus Photography       Left Eye - Please check Retinopathy or No Retinopathy        Exam did show retinopathy    Exam did not show retinopathy       Right Eye - Please check Retinopathy or No Retinopathy       Exam did show retinopathy    Exam did not show retinopathy       Comments __________________________________________________________    Practice Providing Exam ______________________________________________    Exam Performed By (print name) _______________________________________      Provider Signature ___________________________________________________      These reports are needed for  compliance  Please fax this completed form and a copy of the Diabetic Eye Exam report to our office located at [place] [address] as soon as possible via [phone] attention Edith: Phone [phone]  We thank you for your assistance in treating our mutual patient

## 2022-11-17 NOTE — PROGRESS NOTES
Assessment/Plan:    No problem-specific Assessment & Plan notes found for this encounter  Diagnoses and all orders for this visit:    Type 2 diabetes mellitus with diabetic polyneuropathy, with long-term current use of insulin (HCC)  -     CBC and differential; Future  -     Comprehensive metabolic panel; Future  -     Hemoglobin A1C; Future    Diabetic autonomic neuropathy associated with type 2 diabetes mellitus (HCC)  -     CBC and differential; Future    Primary hypertension  -     CBC and differential; Future  -     Comprehensive metabolic panel; Future    Mixed hyperlipidemia  -     CBC and differential; Future  -     Comprehensive metabolic panel; Future  -     Lipid panel; Future  -     TSH, 3rd generation; Future    Vitamin D deficiency  -     CBC and differential; Future    Severe obesity (BMI 35 0-39  9) with comorbidity (HCC)  -     CBC and differential; Future    Type 2 diabetes mellitus with diabetic polyneuropathy, unspecified whether long term insulin use (HCC)  -     Insulin Glargine Solostar (Basaglar KwikPen) 100 UNIT/ML SOPN; Inject 0 45 mL (45 Units total) under the skin every 12 (twelve) hours  -     CBC and differential; Future    Need for influenza vaccination  -     influenza vaccine, quadrivalent, recombinant, PF, 0 5 mL, for patients 18 yr+ (FLUBLOK)    Screening for prostate cancer  -     PSA, Total Screen; Future    orders recommendations as noted above  Recent laboratory testing reviewed with him  Hemoglobin A1c remains very significantly elevated  Discussed dietary changes with him  Discussed the addition of another medication but he wishes to hold off on this because of financial concerns  Will increase the dose of his Basaglar to 45 units twice a day  Advised him to follow his blood sugars regularly  Advised him that we will adjust the dose in about 2 weeks if his blood sugars have not decreased significantly  Discussed goal of having his blood sugars under 200   Likely will need an increase in his NovoLog with his meals  Continue with the metformin as previously  Watch for any hypoglycemic episodes although unlikely  Continue current blood pressure medications  Blood pressure well controlled  Watch salt intake  Continue with the atorvastatin regularly  Regular follow-up with ophthalmology stressed especially with the significantly elevated blood sugars  Check left foot daily  Be very careful to avoid falls  Continue with vitamin-D supplementation  Try to increase activity level although this is difficult with his ambulatory dysfunction in his current living status  Flu shot given today  Coronavirus booster discussed  Will have him follow up in about 3-5 months with laboratory testing prior to that visit  Follow up sooner if needed  Subjective:      Patient ID: Fausto Ledezma is a 60 y o  male  He presents for routine follow-up  His blood sugars continue to be significantly elevated  Usually in the 300s and sometimes higher than that  This morning his blood sugar was 229  Continues with the Basaglar twice daily and the NovoLog with meals  Denies any hypoglycemic episodes  Continues with the metformin regularly  Has not followed up with Ophthalmology recently  Has difficulty exercising regularly with his difficulty ambulating with the right BKA  [place] so this makes ambulation by walking around town difficult  Tolerating his blood pressure medications without difficulty  Denies any headaches or localized weakness  Denies any chest pain or palpitations  Continues with the atorvastatin without difficulty  Denies any significant muscle aches or weakness with this  Has had some recent difficulty with his right-sided prosthesis  Plans on contacting the prosthesis company to discuss further          The following portions of the patient's history were reviewed and updated as appropriate: He  has a past medical history of Diabetes mellitus ([place] [address] ) and Hypertension  He   Patient Active Problem List    Diagnosis Date Noted   • Severe obesity (BMI 35 0-39  9) with comorbidity ([place] [address] ) 08/25/2021   • Diabetic peripheral angiopathy ([place] [address] ) 02/18/2020   • Vitamin D deficiency 02/01/2019   • Ambulatory dysfunction 10/17/2017   • Hx of right BKA ([place] [address] ) 07/31/2017   • Hypertension 04/14/2017   • Hyperlipidemia 04/14/2017   • Diabetes mellitus ([place] [address] ) 01/27/2017   • Diabetic neuropathy ([place] [address] ) 01/27/2017     He  has a past surgical history that includes pr amputation metatarsal+toe,single (Left, 1/30/2017); Wound debridement (Right, 1/30/2017); pr amputation foot,transmetatarsal (Right, 1/26/2017); Leg amputation, lower tibia/fibula (Right, 7/25/2017); and pr colonoscopy flx dx w/collj spec when pfrmd (N/A, 11/28/2018)  His family history includes Diabetes in his mother  He  reports that he has quit smoking  His smoking use included cigarettes  He has never used smokeless tobacco  He reports current alcohol use of about 11 0 standard drinks per week  He reports that he does not use drugs    Current Outpatient Medications   Medication Sig Dispense Refill   • aspirin (ECOTRIN LOW STRENGTH) 81 mg EC tablet Take 81 mg by mouth daily Resume on 8/11/17      • atorvastatin (LIPITOR) 80 mg tablet Take 1 tablet (80 mg total) by mouth daily 90 tablet 3   • Cholecalciferol (Vitamin D) 125 MCG (5000 UT) CAPS Take by mouth     • insulin aspart (NovoLOG FlexPen) 100 UNIT/ML injection pen Inject 5 units three times a day prior to meals 3 mL 3   • Insulin Glargine Solostar (Basaglar KwikPen) 100 UNIT/ML SOPN Inject 0 45 mL (45 Units total) under the skin every 12 (twelve) hours 30 mL 2   • Insulin Pen Needle 30G X 8 MM MISC Inject under the skin daily at bedtime 100 each 5   • lisinopril (ZESTRIL) 2 5 mg tablet Take 1 tablet (2 5 mg total) by mouth daily 90 tablet 3   • metFORMIN (GLUCOPHAGE) 1000 MG tablet TAKE 1 TABLET TWICE DAILY  WITH MEALS 180 tablet 1 • Multiple Vitamin (MULTI-VITAMIN DAILY PO) Take 1 tablet by mouth daily     • glucose blood test strip Test blood sugars 4 times a day 400 each 3     No current facility-administered medications for this visit  Current Outpatient Medications on File Prior to Visit   Medication Sig   • aspirin (ECOTRIN LOW STRENGTH) 81 mg EC tablet Take 81 mg by mouth daily Resume on 8/11/17    • atorvastatin (LIPITOR) 80 mg tablet Take 1 tablet (80 mg total) by mouth daily   • Cholecalciferol (Vitamin D) 125 MCG (5000 UT) CAPS Take by mouth   • insulin aspart (NovoLOG FlexPen) 100 UNIT/ML injection pen Inject 5 units three times a day prior to meals   • Insulin Pen Needle 30G X 8 MM MISC Inject under the skin daily at bedtime   • lisinopril (ZESTRIL) 2 5 mg tablet Take 1 tablet (2 5 mg total) by mouth daily   • metFORMIN (GLUCOPHAGE) 1000 MG tablet TAKE 1 TABLET TWICE DAILY  WITH MEALS   • Multiple Vitamin (MULTI-VITAMIN DAILY PO) Take 1 tablet by mouth daily   • glucose blood test strip Test blood sugars 4 times a day     No current facility-administered medications on file prior to visit  He has No Known Allergies       Review of Systems   Constitutional: Positive for fatigue  Negative for activity change, appetite change, chills and fever  HENT: Negative for hearing loss  Eyes: Negative for pain and visual disturbance  Respiratory: Negative for cough, chest tightness and shortness of breath  Cardiovascular: Negative for chest pain and palpitations  Gastrointestinal: Negative for abdominal pain, blood in stool, diarrhea, nausea and vomiting  Endocrine: Negative for polydipsia, polyphagia and polyuria  As per HPI   Genitourinary: Negative for dysuria  Musculoskeletal: Positive for arthralgias and gait problem  Skin: Negative for color change  Neurological: Negative for dizziness and headaches  Hematological: Does not bruise/bleed easily     Psychiatric/Behavioral: Negative for behavioral problems, decreased concentration and dysphoric mood  The patient is not nervous/anxious  Objective:      /70 (BP Location: Left arm, Patient Position: Sitting, Cuff Size: Large)   Pulse 82   Temp 97 7 °F (36 5 °C)   Ht 5' 10" (1 778 m)   Wt 116 kg (255 lb)   SpO2 96%   BMI 36 59 kg/m²          Physical Exam  Vitals and nursing note reviewed  Constitutional:       General: He is not in acute distress  Appearance: He is well-developed, well-groomed and overweight  HENT:      Head: Normocephalic and atraumatic  Comments: Chronic facial asymmetry     Nose: Nose normal    Eyes:      Conjunctiva/sclera: Conjunctivae normal       Pupils: Pupils are equal, round, and reactive to light  Cardiovascular:      Rate and Rhythm: Normal rate and regular rhythm  Heart sounds: Normal heart sounds  No murmur heard  No friction rub  No gallop  Pulmonary:      Breath sounds: No wheezing or rales  Chest:      Chest wall: No tenderness  Abdominal:      General: There is no distension  Tenderness: There is no abdominal tenderness  There is no guarding or rebound  Musculoskeletal:      Comments: Right-sided BKA   Lymphadenopathy:      Cervical: No cervical adenopathy  Skin:     General: Skin is warm and dry  Neurological:      Mental Status: He is alert and oriented to person, place, and time  Psychiatric:         Mood and Affect: Mood and affect, mood and affect normal          Speech: Speech normal          Behavior: Behavior normal  Behavior is cooperative           Cognition and Memory: Cognition and memory normal

## 2022-11-17 NOTE — TELEPHONE ENCOUNTER
Upon review of the In Basket request and the patient's chart, initial outreach has been made via fax to facility  , please see Contacts section for details       Thank you  Willie Mcknight MA

## 2022-11-17 NOTE — LETTER
Diabetic Eye Exam Form    Date Requested: 22  Patient: Lori Liu  Patient : 1960   Referring Provider: Dedrick Addison MD      DIABETIC Eye Exam Date _______________________________      Type of Exam MUST be documented for Diabetic Eye Exams  Please CHECK ONE  Retinal Exam       Dilated Retinal Exam       OCT       Optomap-Iris Exam      Fundus Photography       Left Eye - Please check Retinopathy or No Retinopathy        Exam did show retinopathy    Exam did not show retinopathy       Right Eye - Please check Retinopathy or No Retinopathy       Exam did show retinopathy    Exam did not show retinopathy       Comments __________________________________________________________    Practice Providing Exam ______________________________________________    Exam Performed By (print name) _______________________________________      Provider Signature ___________________________________________________      These reports are needed for  compliance  Please fax this completed form and a copy of the Diabetic Eye Exam report to our office located at [place] [id number] as soon as possible via [phone] attention Edith: Phone [phone]  We thank you for your assistance in treating our mutual patient

## 2022-11-18 ENCOUNTER — VBI (OUTPATIENT)
Dept: ADMINISTRATIVE | Facility: OTHER | Age: 62
End: 2022-11-18

## 2022-11-22 NOTE — TELEPHONE ENCOUNTER
As a follow-up, a second attempt has been made for outreach via fax to facility  , please see Contacts section for details      Thank you  Nacho Rick MA

## 2023-01-26 ENCOUNTER — VBI (OUTPATIENT)
Dept: ADMINISTRATIVE | Facility: OTHER | Age: 63
End: 2023-01-26

## 2023-04-20 LAB — HBA1C MFR BLD HPLC: 12.8 %

## 2023-04-24 ENCOUNTER — APPOINTMENT (OUTPATIENT)
Dept: LAB | Facility: MEDICAL CENTER | Age: 63
End: 2023-04-24

## 2023-04-24 DIAGNOSIS — I10 PRIMARY HYPERTENSION: ICD-10-CM

## 2023-04-24 DIAGNOSIS — E55.9 VITAMIN D DEFICIENCY: ICD-10-CM

## 2023-04-24 DIAGNOSIS — E11.43 DIABETIC AUTONOMIC NEUROPATHY ASSOCIATED WITH TYPE 2 DIABETES MELLITUS (HCC): ICD-10-CM

## 2023-04-24 DIAGNOSIS — E78.2 MIXED HYPERLIPIDEMIA: ICD-10-CM

## 2023-04-24 DIAGNOSIS — Z12.5 SCREENING FOR PROSTATE CANCER: ICD-10-CM

## 2023-04-24 DIAGNOSIS — E66.01 SEVERE OBESITY (BMI 35.0-39.9) WITH COMORBIDITY (HCC): ICD-10-CM

## 2023-04-24 DIAGNOSIS — E11.42 TYPE 2 DIABETES MELLITUS WITH DIABETIC POLYNEUROPATHY, UNSPECIFIED WHETHER LONG TERM INSULIN USE (HCC): ICD-10-CM

## 2023-04-24 DIAGNOSIS — Z79.4 TYPE 2 DIABETES MELLITUS WITH DIABETIC POLYNEUROPATHY, WITH LONG-TERM CURRENT USE OF INSULIN (HCC): ICD-10-CM

## 2023-04-24 DIAGNOSIS — E11.42 TYPE 2 DIABETES MELLITUS WITH DIABETIC POLYNEUROPATHY, WITH LONG-TERM CURRENT USE OF INSULIN (HCC): ICD-10-CM

## 2023-04-24 LAB
BASOPHILS # BLD AUTO: 0.08 THOUSANDS/ΜL (ref 0–0.1)
BASOPHILS NFR BLD AUTO: 1 % (ref 0–1)
EOSINOPHIL # BLD AUTO: 0.22 THOUSAND/ΜL (ref 0–0.61)
EOSINOPHIL NFR BLD AUTO: 3 % (ref 0–6)
ERYTHROCYTE [DISTWIDTH] IN BLOOD BY AUTOMATED COUNT: 13.2 % (ref 11.6–15.1)
HCT VFR BLD AUTO: 47.5 % (ref 36.5–49.3)
HGB BLD-MCNC: 15.3 G/DL (ref 12–17)
IMM GRANULOCYTES # BLD AUTO: 0.02 THOUSAND/UL (ref 0–0.2)
IMM GRANULOCYTES NFR BLD AUTO: 0 % (ref 0–2)
LYMPHOCYTES # BLD AUTO: 2.81 THOUSANDS/ΜL (ref 0.6–4.47)
LYMPHOCYTES NFR BLD AUTO: 43 % (ref 14–44)
MCH RBC QN AUTO: 29.1 PG (ref 26.8–34.3)
MCHC RBC AUTO-ENTMCNC: 32.2 G/DL (ref 31.4–37.4)
MCV RBC AUTO: 91 FL (ref 82–98)
MONOCYTES # BLD AUTO: 0.54 THOUSAND/ΜL (ref 0.17–1.22)
MONOCYTES NFR BLD AUTO: 8 % (ref 4–12)
NEUTROPHILS # BLD AUTO: 2.85 THOUSANDS/ΜL (ref 1.85–7.62)
NEUTS SEG NFR BLD AUTO: 45 % (ref 43–75)
NRBC BLD AUTO-RTO: 0 /100 WBCS
PLATELET # BLD AUTO: 259 THOUSANDS/UL (ref 149–390)
PMV BLD AUTO: 9.6 FL (ref 8.9–12.7)
PSA SERPL-MCNC: 1.9 NG/ML (ref 0–4)
RBC # BLD AUTO: 5.25 MILLION/UL (ref 3.88–5.62)
WBC # BLD AUTO: 6.52 THOUSAND/UL (ref 4.31–10.16)

## 2023-04-25 LAB
ALBUMIN SERPL BCP-MCNC: 3 G/DL (ref 3.5–5)
ALP SERPL-CCNC: 116 U/L (ref 46–116)
ALT SERPL W P-5'-P-CCNC: 22 U/L (ref 12–78)
ANION GAP SERPL CALCULATED.3IONS-SCNC: 7 MMOL/L (ref 4–13)
AST SERPL W P-5'-P-CCNC: 12 U/L (ref 5–45)
BILIRUB SERPL-MCNC: 0.33 MG/DL (ref 0.2–1)
BUN SERPL-MCNC: 9 MG/DL (ref 5–25)
CALCIUM ALBUM COR SERPL-MCNC: 9.9 MG/DL (ref 8.3–10.1)
CALCIUM SERPL-MCNC: 9.1 MG/DL (ref 8.3–10.1)
CHLORIDE SERPL-SCNC: 109 MMOL/L (ref 96–108)
CHOLEST SERPL-MCNC: 250 MG/DL
CO2 SERPL-SCNC: 24 MMOL/L (ref 21–32)
CREAT SERPL-MCNC: 0.87 MG/DL (ref 0.6–1.3)
EST. AVERAGE GLUCOSE BLD GHB EST-MCNC: 315 MG/DL
GFR SERPL CREATININE-BSD FRML MDRD: 91 ML/MIN/1.73SQ M
GLUCOSE P FAST SERPL-MCNC: 141 MG/DL (ref 65–99)
HBA1C MFR BLD: 12.6 %
HDLC SERPL-MCNC: 43 MG/DL
LDLC SERPL CALC-MCNC: 157 MG/DL (ref 0–100)
NONHDLC SERPL-MCNC: 207 MG/DL
POTASSIUM SERPL-SCNC: 4 MMOL/L (ref 3.5–5.3)
PROT SERPL-MCNC: 6.8 G/DL (ref 6.4–8.4)
SODIUM SERPL-SCNC: 140 MMOL/L (ref 135–147)
TRIGL SERPL-MCNC: 249 MG/DL
TSH SERPL DL<=0.05 MIU/L-ACNC: 0.94 UIU/ML (ref 0.45–4.5)

## 2023-04-26 ENCOUNTER — OFFICE VISIT (OUTPATIENT)
Dept: INTERNAL MEDICINE CLINIC | Facility: CLINIC | Age: 63
End: 2023-04-26

## 2023-04-26 ENCOUNTER — TELEPHONE (OUTPATIENT)
Dept: INTERNAL MEDICINE CLINIC | Facility: CLINIC | Age: 63
End: 2023-04-26

## 2023-04-26 VITALS
HEIGHT: 70 IN | DIASTOLIC BLOOD PRESSURE: 78 MMHG | BODY MASS INDEX: 34.79 KG/M2 | TEMPERATURE: 97 F | HEART RATE: 90 BPM | SYSTOLIC BLOOD PRESSURE: 126 MMHG | WEIGHT: 243 LBS | OXYGEN SATURATION: 97 %

## 2023-04-26 DIAGNOSIS — E11.42 TYPE 2 DIABETES MELLITUS WITH DIABETIC POLYNEUROPATHY, WITH LONG-TERM CURRENT USE OF INSULIN (HCC): Primary | ICD-10-CM

## 2023-04-26 DIAGNOSIS — Z89.511 HX OF RIGHT BKA (HCC): ICD-10-CM

## 2023-04-26 DIAGNOSIS — E78.2 MIXED HYPERLIPIDEMIA: ICD-10-CM

## 2023-04-26 DIAGNOSIS — R26.2 AMBULATORY DYSFUNCTION: ICD-10-CM

## 2023-04-26 DIAGNOSIS — E11.43 DIABETIC AUTONOMIC NEUROPATHY ASSOCIATED WITH TYPE 2 DIABETES MELLITUS (HCC): ICD-10-CM

## 2023-04-26 DIAGNOSIS — Z79.4 TYPE 2 DIABETES MELLITUS WITH DIABETIC POLYNEUROPATHY, WITH LONG-TERM CURRENT USE OF INSULIN (HCC): Primary | ICD-10-CM

## 2023-04-26 DIAGNOSIS — E11.51 DIABETIC PERIPHERAL ANGIOPATHY (HCC): ICD-10-CM

## 2023-04-26 DIAGNOSIS — E66.01 SEVERE OBESITY (BMI 35.0-39.9) WITH COMORBIDITY (HCC): ICD-10-CM

## 2023-04-26 DIAGNOSIS — Z59.82 INABILITY TO ACQUIRE TRANSPORTATION: ICD-10-CM

## 2023-04-26 DIAGNOSIS — Z59.9 FINANCIAL DIFFICULTIES: ICD-10-CM

## 2023-04-26 DIAGNOSIS — I10 PRIMARY HYPERTENSION: ICD-10-CM

## 2023-04-26 SDOH — ECONOMIC STABILITY - INCOME SECURITY: PROBLEM RELATED TO HOUSING AND ECONOMIC CIRCUMSTANCES, UNSPECIFIED: Z59.9

## 2023-04-26 SDOH — ECONOMIC STABILITY - TRANSPORTATION SECURITY: TRANSPORTATION INSECURITY: Z59.82

## 2023-04-26 NOTE — PROGRESS NOTES
Assessment and Plan:     Problem List Items Addressed This Visit        Endocrine    Diabetes mellitus ([facility] ) - Primary    Relevant Orders    Comprehensive metabolic panel    Hemoglobin A1C    Diabetic neuropathy ([facility] [address] )    Relevant Orders    Comprehensive metabolic panel    Hemoglobin A1C    Diabetic peripheral angiopathy ([facility] )    Relevant Orders    Comprehensive metabolic panel    Hemoglobin A1C       Cardiovascular and Mediastinum    Hypertension    Relevant Orders    CBC and differential    Comprehensive metabolic panel       Other    Hx of right BKA (HCC)    Ambulatory dysfunction    Hyperlipidemia    Relevant Orders    Comprehensive metabolic panel    Lipid panel    TSH, 3rd generation    Severe obesity (BMI 35 0-39  9) with comorbidity [facility])   Other Visit Diagnoses     Financial difficulties        Relevant Orders    Ambulatory referral to social work care management program    Inability to acquire transportation        Relevant Orders    Ambulatory referral to social work care management program        Orders and recommendations as noted above  Previous laboratory testing reviewed with him  Hemoglobin A1c remains significantly elevated  He had not, however, been taking his insulin regularly because of financial issues  Will refer to case management to see if there is any assistance for this  Continue with the metformin  Follow-up with ophthalmology regularly  Recommend follow-up with podiatry  Importance of good blood sugar control discussed  Continue with the lisinopril as previously  Continue with the atorvastatin  Watch diet  Increase fiber in diet  Try to remain as active as possible but be careful to avoid falls  Issue with the inner lining of his right prosthetic leg discussed  We will look into options to help replace this if possible  We will have him follow-up in about 3 months with laboratory testing prior to that visit  Follow-up sooner if needed  BMI Counseling:  Body mass index is 34 87 kg/m²  The BMI is above normal  Nutrition recommendations include decreasing portion sizes, encouraging healthy choices of fruits and vegetables, decreasing fast food intake, consuming healthier snacks, limiting drinks that contain sugar, moderation in carbohydrate intake and reducing intake of cholesterol  Exercise recommendations include exercising 3-5 times per week  Rationale for BMI follow-up plan is due to patient being overweight or obese  Preventive health issues were discussed with patient, and age appropriate screening tests were ordered as noted in patient's After Visit Summary  Personalized health advice and appropriate referrals for health education or preventive services given if needed, as noted in patient's After Visit Summary  History of Present Illness:     Patient presents for a Medicare Wellness Visit    He presents for routine follow-up as well as Medicare wellness visit  Has been having some financial issues affording his insulin  He had not been taking his insulin for quite a while  He feels this is why his hemoglobin A1c has not decreased significantly  Since he has resumed taking the insulin, his blood sugars have decreased significantly  Blood sugars now are usually in the low 200s fasting  Prior to resuming the insulin, his blood sugars were over 100 points higher than this fasting  Having some increased issues with his right prosthetic leg  Inner lining is wearing and does not fit as snugly  Denies any significant vision changes  Tolerating his lisinopril without difficulty  Denies any headaches or localized weakness  Denies any chest pain or palpitations  Tolerating the atorvastatin without difficulty  Denies any significant muscle aches or weakness with this       Patient Care Team:  Millie Dinh MD as PCP - General (Family Medicine)  DO Cedric Ge DO Lylia Gaines, MD as Endoscopist     Review of Systems:     Review of Systems   Constitutional: Negative for activity change, appetite change, chills and fever  HENT: Negative for hearing loss  Eyes: Negative for pain and visual disturbance  Respiratory: Negative for cough, chest tightness and shortness of breath  Cardiovascular: Negative for chest pain and palpitations  Gastrointestinal: Negative for abdominal pain, blood in stool, diarrhea, nausea and vomiting  Endocrine: Negative for polydipsia, polyphagia and polyuria  As per HPI   Genitourinary: Negative for dysuria  Musculoskeletal: Positive for arthralgias  Negative for gait problem  Skin: Negative for color change  Neurological: Negative for dizziness and headaches  Hematological: Does not bruise/bleed easily  Psychiatric/Behavioral: Positive for sleep disturbance  Negative for behavioral problems  The patient is nervous/anxious  Problem List:     Patient Active Problem List   Diagnosis   • Diabetes mellitus ([facility] )   • [facility]   • Hx of right BKA ([facility] )   • Ambulatory dysfunction   • Hypertension   • Hyperlipidemia   • Vitamin D deficiency   • Diabetic peripheral angiopathy (HCC)   • Severe obesity (BMI 35 0-39  9) with comorbidity [facility])      Past Medical and Surgical History:     Past Medical History:   Diagnosis Date   • Diabetes mellitus ([facility] )    • Hypertension      Past Surgical History:   Procedure Laterality Date   • LEG AMPUTATION THROUGH LOWER TIBIA AND FIBULA Right 7/25/2017    Procedure: AMPUTATION BELOW KNEE (BKA);   Surgeon: Corina Gastelum MD;  Location: [facility];  Service: General   • MA AMPUTATION FOOT TRANSMETARSAL Right 1/26/2017    Procedure: AMPUTATION TRANSMETATARSAL (TMA); OPEN;  Surgeon: Cassie Jimenez DPM;  Location: [facility];  Service: Podiatry   • MA AMPUTATION METATARSAL W/TOE SINGLE Left 1/30/2017    Procedure: Left partial 1st ray amputation;  Surgeon: Cassie Jimenez DPM;  Location: [facility];  Service: Podiatry   • MA COLONOSCOPY FLX DX W/COLLJ Uzma Mcclelland Do [facility] [address] WHEN PFRMD N/A 11/28/2018    Procedure: COLONOSCOPY;  Surgeon: Roseline Watts MD;  Location: [facility];  Service: Gastroenterology   • WOUND DEBRIDEMENT Right 1/30/2017    Procedure: DEBRIDEMENT FOOT/TOE ([address]) delayed closure, LINDA;  Surgeon: Vista Barthel, DPM;  Location:  MAIN OR;  Service:       Family History:     Family History   Problem Relation Age of Onset   • Diabetes Mother       Social History:     Social History     Socioeconomic History   • Marital status: Single     Spouse name: None   • Number of children: None   • Years of education: None   • Highest education level: None   Occupational History   • Occupation: retired   Tobacco Use   • Smoking status: Former     Types: Cigarettes   • Smokeless tobacco: Never   • Tobacco comments:     quit 9 years ago   Vaping Use   • Vaping Use: Never used   Substance and Sexual Activity   • Alcohol use: Yes     Alcohol/week: 11 0 standard drinks     Types: 6 Cans of beer, 5 Shots of liquor per week     Comment: social ; occasional use as per [employer]   • Drug use: No   • Sexual activity: None   Other Topics Concern   • None   Social History Narrative    Always uses seat belt    Caffeine use    Dental care regularly     Social Determinants of Health     Financial Resource Strain: Medium Risk   • Difficulty of Paying Living Expenses: Somewhat hard   Food Insecurity: Not on file   Transportation Needs: Unmet Transportation Needs   • Lack of Transportation (Medical):  Yes   • Lack of Transportation (Non-Medical): Yes   Physical Activity: Not on file   Stress: Not on file   Social Connections: Not on file   Intimate Partner Violence: Not on file   Housing Stability: Not on file      Medications and Allergies:     Current Outpatient Medications   Medication Sig Dispense Refill   • aspirin (ECOTRIN LOW STRENGTH) 81 mg EC tablet Take 81 mg by mouth daily Resume on 8/11/17      • atorvastatin (LIPITOR) 80 mg tablet Take 1 tablet (80 mg total) by mouth daily 90 tablet 3   • Cholecalciferol (Vitamin D) 125 MCG (5000 UT) CAPS Take by mouth     • glucose blood test strip Test blood sugars 4 times a day 400 each 3   • insulin aspart (NovoLOG FlexPen) 100 UNIT/ML injection pen Inject 5 units three times a day prior to meals 3 mL 3   • Insulin Glargine Solostar (Basaglar KwikPen) 100 UNIT/ML SOPN Inject 0 45 mL (45 Units total) under the skin every 12 (twelve) hours 30 mL 2   • Insulin Pen Needle 30G X 8 MM MISC Inject under the skin daily at bedtime 100 each 5   • lisinopril (ZESTRIL) 2 5 mg tablet Take 1 tablet (2 5 mg total) by mouth daily 90 tablet 3   • metFORMIN (GLUCOPHAGE) 1000 MG tablet TAKE 1 TABLET TWICE DAILY  WITH MEALS 180 tablet 1   • Multiple Vitamin (MULTI-VITAMIN DAILY PO) Take 1 tablet by mouth daily       No current facility-administered medications for this visit  No Known Allergies   Immunizations:     Immunization History   Administered Date(s) Administered   • COVID-19 MODERNA VACC 0 5 ML IM 04/19/2021, 05/18/2021   • INFLUENZA 01/13/2012   • Influenza Quadrivalent Preservative Free 3 years and older IM 11/01/2016   • Influenza Quadrivalent, 6-35 Months IM 09/27/2017   • Influenza, recombinant, quadrivalent,injectable, preservative free 11/02/2018, 11/18/2019, 11/24/2020, 11/16/2022   • Pneumococcal Polysaccharide PPV23 01/13/2012, 11/01/2016   • Tdap 01/26/2017, 01/26/2017      Health Maintenance:         Topic Date Due   • HIV Screening  Never done   • Colorectal Cancer Screening  11/28/2028   • Hepatitis C Screening  Completed         Topic Date Due   • Pneumococcal Vaccine: Pediatrics (0 to 5 Years) and At-Risk Patients (6 to 59 Years) (2 - PCV) 11/01/2017   • COVID-19 Vaccine (3 - Booster for Cristina Ewings series) 07/13/2021      Medicare Screening Tests and Risk Assessments:     Jamie Maxwell is here for his Subsequent Wellness visit  Last Medicare Wellness visit information reviewed, patient interviewed and updates made to the record today        Health Risk Assessment:   Patient rates overall health as good  Patient feels that their physical health rating is slightly better  Patient is satisfied with their life  Eyesight was rated as same  Hearing was rated as same  Patient feels that their emotional and mental health rating is slightly worse  Patients states they are never, rarely angry  Patient states they are sometimes unusually tired/fatigued  Pain experienced in the last 7 days has been none  Patient states that he has experienced no weight loss or gain in last 6 months  Fall Risk Screening: In the past year, patient has experienced: no history of falling in past year      Home Safety:  Patient has trouble with stairs inside or outside of their home  Patient has working smoke alarms and has no working carbon monoxide detector  Home safety hazards include: none  Nutrition:   Current diet is Regular and Diabetic  Medications:   Patient is currently taking over-the-counter supplements  OTC medications include: Multivitamin, vitamin D, low dose aspitin  Patient is able to manage medications  Activities of Daily Living (ADLs)/Instrumental Activities of Daily Living (IADLs):   Walk and transfer into and out of bed and chair?: Yes  Dress and groom yourself?: Yes    Bathe or shower yourself?: Yes    Feed yourself?  Yes  Do your laundry/housekeeping?: Yes  Manage your money, pay your bills and track your expenses?: Yes  Make your own meals?: Yes    Do your own shopping?: Yes    Previous Hospitalizations:   Any hospitalizations or ED visits within the last 12 months?: No      Advance Care Planning:   Living will: No    Durable POA for healthcare: No    Advanced directive: No    Five wishes given: Yes      Cognitive Screening:   Provider or family/friend/caregiver concerned regarding cognition?: No    PREVENTIVE SCREENINGS      Cardiovascular Screening:    General: Screening Not Indicated and History Lipid Disorder      Diabetes Screening:     General: "Screening Not Indicated and History Diabetes      Colorectal Cancer Screening:     General: Screening Current      Prostate Cancer Screening:    General: Screening Current      Osteoporosis Screening:    General: Screening Not Indicated      Abdominal Aortic Aneurysm (AAA) Screening:    Risk factors include: tobacco use        General: Screening Not Indicated      Lung Cancer Screening:     General: Screening Not Indicated      Hepatitis C Screening:    General: Screening Current    Screening, Brief Intervention, and Referral to Treatment (SBIRT)    Screening  Typical number of drinks in a day: 0  Typical number of drinks in a week: 1  Interpretation: Low risk drinking behavior  AUDIT-C Screenin) How often did you have a drink containing alcohol in the past year? 2 to 4 times a month  2) How many drinks did you have on a typical day when you were drinking in the past year? 5 to 6  3) How often did you have 6 or more drinks on one occasion in the past year? less than monthly    AUDIT-C Score: 3  Interpretation: Score 0-3 (male): Negative screen for alcohol misuse    Single Item Drug Screening:  How often have you used an illegal drug (including marijuana) or a prescription medication for non-medical reasons in the past year? never    Single Item Drug Screen Score: 0  Interpretation: Negative screen for possible drug use disorder    Brief Intervention  Alcohol & drug use screenings were reviewed  No concerns regarding substance use disorder identified  No results found  Physical Exam:     /78 (BP Location: Left arm, Patient Position: Sitting, Cuff Size: Large)   Pulse 90   Temp (!) 97 °F (36 1 °C)   Ht 5' 10\" (1 778 m)   Wt 110 kg (243 lb)   SpO2 97%   BMI 34 87 kg/m²     Physical Exam  Vitals and nursing note reviewed  Constitutional:       General: He is not in acute distress  Appearance: He is well-developed, well-groomed and overweight     HENT:      Head: Normocephalic and " atraumatic  Comments: Poor dentition  Eyes:      Conjunctiva/sclera: Conjunctivae normal    Cardiovascular:      Rate and Rhythm: Normal rate and regular rhythm  Pulses: Pulses are weak  Dorsalis pedis pulses are 0 on the left side  Posterior tibial pulses are 0 on the left side  Heart sounds: No murmur heard  Pulmonary:      Effort: Pulmonary effort is normal  No respiratory distress  Breath sounds: Normal breath sounds  Abdominal:      Palpations: Abdomen is soft  Tenderness: There is no abdominal tenderness  Musculoskeletal:         General: No swelling  Cervical back: Neck supple  Comments: Right BKA   Feet:      Right foot: amputated     Left foot:      Skin integrity: Callus and dry skin present  No ulcer, skin breakdown, erythema or warmth  Skin:     General: Skin is warm and dry  Capillary Refill: Capillary refill takes less than 2 seconds  Neurological:      Mental Status: He is alert  Psychiatric:         Mood and Affect: Mood and affect normal          Speech: Speech normal          Behavior: Behavior is cooperative  Cognition and Memory: Cognition and memory normal         Diabetic Foot Exam    Patient's shoes and socks removed  Right Foot/Ankle   Right Foot Inspection  Amputation: amputation right foot     Left Foot/Ankle  Left Foot Inspection  Skin Exam: skin normal, skin intact, dry skin and callus  No warmth, no erythema, no maceration, normal color, no pre-ulcer and no ulcer  Toe Exam: ROM and strength within normal limits  Sensory   Monofilament testing: absent    Vascular  Capillary refills: elevated  The left DP pulse is 0  The left PT pulse is 0       Assign Risk Category  Deformity present  Loss of protective sensation  Weak pulses  Risk: 3      Femi Braga MD

## 2023-04-26 NOTE — TELEPHONE ENCOUNTER
I called [facility] regarding his sleeve for his prothesis  Yamilet Troys, stated to send a script for prosthetic supplies  She stated she did look him up and he is still in their system, but she does not think it will be covered    [phone]

## 2023-04-26 NOTE — PATIENT INSTRUCTIONS
Medicare Preventive Visit Patient Instructions  Thank you for completing your Welcome to Medicare Visit or Medicare Annual Wellness Visit today  Your next wellness visit will be due in one year (4/26/2024)  The screening/preventive services that you may require over the next 5-10 years are detailed below  Some tests may not apply to you based off risk factors and/or age  Screening tests ordered at today's visit but not completed yet may show as past due  Also, please note that scanned in results may not display below  Preventive Screenings:  Service Recommendations Previous Testing/Comments   Colorectal Cancer Screening  · Colonoscopy    · Fecal Occult Blood Test (FOBT)/Fecal Immunochemical Test (FIT)  · Fecal DNA/Cologuard Test  · Flexible Sigmoidoscopy Age: 39-70 years old   Colonoscopy: every 10 years (May be performed more frequently if at higher risk)  OR  FOBT/FIT: every 1 year  OR  Cologuard: every 3 years  OR  Sigmoidoscopy: every 5 years  Screening may be recommended earlier than age 39 if at higher risk for colorectal cancer  Also, an individualized decision between you and your healthcare provider will decide whether screening between the ages of 74-80 would be appropriate   Colonoscopy: 11/28/2018  FOBT/FIT: Not on file  Cologuard: Not on file  Sigmoidoscopy: Not on file    Screening Current     Prostate Cancer Screening Individualized decision between patient and health care provider in men between ages of 53-78   Medicare will cover every 12 months beginning on the day after your 50th birthday PSA: 1 9 ng/mL     Screening Current     Hepatitis C Screening Once for adults born between 1945 and 1965  More frequently in patients at high risk for Hepatitis C Hep C Antibody: 02/05/2020    Screening Current   Diabetes Screening 1-2 times per year if you're at risk for diabetes or have pre-diabetes Fasting glucose: 141 mg/dL (4/24/2023)  A1C: 12 6 % (4/24/2023)  Screening Not Indicated  History Diabetes Cholesterol Screening Once every 5 years if you don't have a lipid disorder  May order more often based on risk factors  Lipid panel: 04/24/2023  Screening Not Indicated  History Lipid Disorder      Other Preventive Screenings Covered by Medicare:  1  Abdominal Aortic Aneurysm (AAA) Screening: covered once if your at risk  You're considered to be at risk if you have a family history of AAA or a male between the age of 73-68 who smoking at least 100 cigarettes in your lifetime  2  Lung Cancer Screening: covers low dose CT scan once per year if you meet all of the following conditions: (1) Age 50-69; (2) No signs or symptoms of lung cancer; (3) Current smoker or have quit smoking within the last 15 years; (4) You have a tobacco smoking history of at least 20 pack years (packs per day x number of years you smoked); (5) You get a written order from a healthcare provider  3  Glaucoma Screening: covered annually if you're considered high risk: (1) You have diabetes OR (2) Family history of glaucoma OR (3)  aged 48 and older OR (3)  American aged 72 and older  3  Osteoporosis Screening: covered every 2 years if you meet one of the following conditions: (1) Have a vertebral abnormality; (2) On glucocorticoid therapy for more than 3 months; (3) Have primary hyperparathyroidism; (4) On osteoporosis medications and need to assess response to drug therapy  5  HIV Screening: covered annually if you're between the age of 12-76  Also covered annually if you are younger than 13 and older than 72 with risk factors for HIV infection  For pregnant patients, it is covered up to 3 times per pregnancy      Immunizations:  Immunization Recommendations   Influenza Vaccine Annual influenza vaccination during flu season is recommended for all persons aged >= 6 months who do not have contraindications   Pneumococcal Vaccine   * Pneumococcal conjugate vaccine = PCV13 (Prevnar 13), PCV15 (Vaxneuvance), PCV20 (Prevnar 20)  * Pneumococcal polysaccharide vaccine = PPSV23 (Pneumovax) Adults 2364 years old: 1-3 doses may be recommended based on certain risk factors  Adults 72 years old: 1-2 doses may be recommended based off what pneumonia vaccine you previously received   Hepatitis B Vaccine 3 dose series if at intermediate or high risk (ex: diabetes, end stage renal disease, liver disease)   Tetanus (Td) Vaccine - COST NOT COVERED BY MEDICARE PART B Following completion of primary series, a booster dose should be given every 10 years to maintain immunity against tetanus  Td may also be given as tetanus wound prophylaxis  Tdap Vaccine - COST NOT COVERED BY MEDICARE PART B Recommended at least once for all adults  For pregnant patients, recommended with each pregnancy  Shingles Vaccine (Shingrix) - COST NOT COVERED BY MEDICARE PART B  2 shot series recommended in those aged 48 and above     Health Maintenance Due:      Topic Date Due   • HIV Screening  Never done   • Colorectal Cancer Screening  11/28/2028   • Hepatitis C Screening  Completed     Immunizations Due:      Topic Date Due   • Pneumococcal Vaccine: Pediatrics (0 to 5 Years) and At-Risk Patients (6 to 59 Years) (2 - PCV) 11/01/2017   • COVID-19 Vaccine (3 - Booster for Moderna series) 07/13/2021     Advance Directives   What are advance directives? Advance directives are legal documents that state your wishes and plans for medical care  These plans are made ahead of time in case you lose your ability to make decisions for yourself  Advance directives can apply to any medical decision, such as the treatments you want, and if you want to donate organs  What are the types of advance directives? There are many types of advance directives, and each state has rules about how to use them  You may choose a combination of any of the following:  · Living will: This is a written record of the treatment you want   You can also choose which treatments you do not want, which to limit, and which to stop at a certain time  This includes surgery, medicine, IV fluid, and tube feedings  · Durable power of  for healthcare [facility]): This is a written record that states who you want to make healthcare choices for you when you are unable to make them for yourself  This person, called a proxy, is usually a family member or a friend  You may choose more than 1 proxy  · Do not resuscitate (DNR) order:  A DNR order is used in case your heart stops beating or you stop breathing  It is a request not to have certain forms of treatment, such as CPR  A DNR order may be included in other types of advance directives  · Medical directive: This covers the care that you want if you are in a coma, near death, or unable to make decisions for yourself  You can list the treatments you want for each condition  Treatment may include pain medicine, surgery, blood transfusions, dialysis, IV or tube feedings, and a ventilator (breathing machine)  · Values history: This document has questions about your views, beliefs, and how you feel and think about life  This information can help others choose the care that you would choose  Why are advance directives important? An advance directive helps you control your care  Although spoken wishes may be used, it is better to have your wishes written down  Spoken wishes can be misunderstood, or not followed  Treatments may be given even if you do not want them  An advance directive may make it easier for your family to make difficult choices about your care  Weight Management   Why it is important to manage your weight:  Being overweight increases your risk of health conditions such as heart disease, high blood pressure, type 2 diabetes, and certain types of cancer  It can also increase your risk for osteoarthritis, sleep apnea, and other respiratory problems  Aim for a slow, steady weight loss   Even a small amount of weight loss can lower your risk of health problems  How to lose weight safely:  A safe and healthy way to lose weight is to eat fewer calories and get regular exercise  You can lose up about 1 pound a week by decreasing the number of calories you eat by 500 calories each day  Healthy meal plan for weight management:  A healthy meal plan includes a variety of foods, contains fewer calories, and helps you stay healthy  A healthy meal plan includes the following:  · Eat whole-grain foods more often  A healthy meal plan should contain fiber  Fiber is the part of grains, fruits, and vegetables that is not broken down by your body  Whole-grain foods are healthy and provide extra fiber in your diet  Some examples of whole-grain foods are whole-wheat breads and pastas, oatmeal, brown rice, and bulgur  · Eat a variety of vegetables every day  Include dark, leafy greens such as spinach, kale, riley greens, and mustard greens  Eat yellow and orange vegetables such as carrots, sweet potatoes, and winter squash  · Eat a variety of fruits every day  Choose fresh or canned fruit (canned in its own juice or light syrup) instead of juice  Fruit juice has very little or no fiber  · Eat low-fat dairy foods  Drink fat-free (skim) milk or 1% milk  Eat fat-free yogurt and low-fat cottage cheese  Try low-fat cheeses such as mozzarella and other reduced-fat cheeses  · Choose meat and other protein foods that are low in fat  Choose beans or other legumes such as split peas or lentils  Choose fish, skinless poultry (chicken or turkey), or lean cuts of red meat (beef or pork)  Before you cook meat or poultry, cut off any visible fat  · Use less fat and oil  Try baking foods instead of frying them  Add less fat, such as margarine, sour cream, regular salad dressing and mayonnaise to foods  Eat fewer high-fat foods  Some examples of high-fat foods include french fries, doughnuts, ice cream, and cakes  · Eat fewer sweets    Limit foods and drinks that are high in sugar  This includes candy, cookies, regular soda, and sweetened drinks  Exercise:  Exercise at least 30 minutes per day on most days of the week  Some examples of exercise include walking, biking, dancing, and swimming  You can also fit in more physical activity by taking the stairs instead of the elevator or parking farther away from stores  Ask your healthcare provider about the best exercise plan for you  © Copyright [employer] 2018 Information is for End User's use only and may not be sold, redistributed or otherwise used for commercial purposes   All illustrations and images included in CareNotes® are the copyrighted property of A D A M , Inc  or [address] [place] [address]

## 2023-04-27 ENCOUNTER — PATIENT OUTREACH (OUTPATIENT)
Dept: INTERNAL MEDICINE CLINIC | Facility: CLINIC | Age: 63
End: 2023-04-27

## 2023-04-27 NOTE — PROGRESS NOTES
KATARZYNA SAVAGE reviewed chart  Referral received for financial difficulties and transportation needs  Pt is a diabetic and had stopped taking his insulin due to affordability  Per Office Visit yesterday, pt now on Metformin in the meantime  Per chart review, pt had arrangements for transportation with [employer] in 2021, however struggled with needing out of county transport  As of 2021, pt had not had transportation benefits through [place] at that time  KATARZYNA SAVAGE left VM regarding the above and provided contact information for return call to further discuss resources as available  KAATRZYNA SAVAGE will attempt another outreach within 1 week

## 2023-05-02 ENCOUNTER — PATIENT OUTREACH (OUTPATIENT)
Dept: INTERNAL MEDICINE CLINIC | Facility: CLINIC | Age: 63
End: 2023-05-02

## 2023-05-02 NOTE — PROGRESS NOTES
KATARZYNA SAVAGE called patient regarding SDOH referral for transportation concerns  Pt states that he uses [employer] if friends/family are unavailable to transport  Pt states that he does not have any issues getting to and from appointments, just has to wait a little longer to get home sometimes if he misses a bus during an appointment  Pt informed KATARZYNA SAVAGE that most of his medications do not have a co-pay, however both his Novolog and Basaglar were $94   Pt confirmed he has never tried to use the [employer]  Pt informed KATARZYNA SAVAGE that he prefers [facility] mail order pharmacy  CM called [facility] Zafar Jimenez (p: [phone])-Shae whom confirmed that she would not be able to input any coupons, KATARZYNA SAVAGE would have to provide coupon information to the local [facility] pharmacy  KATARZYNA SAVAGE called [facility] (p: [phone]) and spoke with [facility] confirmed that all insulin prescriptions went to [facility], local office does not have access to those prescriptions  [facility] informed KATARZYNA SAVAGE that [facility] does not accept coupons, and that if pt would like to use these, the Provider would need to send prescription to a local pharmacy  KATARZYNA SAVAGE called pt and informed him of the above  Pt expressed interest in [address] information  KATARZYNA SAVAGE informed pt that the [facility] coupon will be mailed along with [address] and [employer] for [employer]  KATARZYNA SAVAGE sent in basket message to Cameron Weber asking for [employer] to be sent to patient  Note routed to PCP    Pt expresses no further need for KATARZYNA SAVAGE at this time  Pt aware of availability as needed

## 2023-06-07 ENCOUNTER — TELEPHONE (OUTPATIENT)
Dept: INTERNAL MEDICINE CLINIC | Facility: CLINIC | Age: 63
End: 2023-06-07

## 2023-06-07 NOTE — TELEPHONE ENCOUNTER
I spoke to Pretty @ [facility] about the prosthetic supplies. The script was sent on 4/27/23. Pretty scheduled it ws never received,  She gave us another fax and it was resend.

## 2023-07-21 ENCOUNTER — APPOINTMENT (OUTPATIENT)
Dept: LAB | Facility: MEDICAL CENTER | Age: 63
End: 2023-07-21
Payer: COMMERCIAL

## 2023-07-21 DIAGNOSIS — E11.51 DIABETIC PERIPHERAL ANGIOPATHY (HCC): ICD-10-CM

## 2023-07-21 DIAGNOSIS — Z79.4 TYPE 2 DIABETES MELLITUS WITH DIABETIC POLYNEUROPATHY, WITH LONG-TERM CURRENT USE OF INSULIN (HCC): ICD-10-CM

## 2023-07-21 DIAGNOSIS — E11.42 TYPE 2 DIABETES MELLITUS WITH DIABETIC POLYNEUROPATHY, WITH LONG-TERM CURRENT USE OF INSULIN (HCC): ICD-10-CM

## 2023-07-21 DIAGNOSIS — E11.43 DIABETIC AUTONOMIC NEUROPATHY ASSOCIATED WITH TYPE 2 DIABETES MELLITUS (HCC): ICD-10-CM

## 2023-07-21 DIAGNOSIS — I10 PRIMARY HYPERTENSION: ICD-10-CM

## 2023-07-21 DIAGNOSIS — E78.2 MIXED HYPERLIPIDEMIA: ICD-10-CM

## 2023-07-21 LAB
ALBUMIN SERPL BCP-MCNC: 3.2 G/DL (ref 3.5–5)
ALP SERPL-CCNC: 133 U/L (ref 46–116)
ALT SERPL W P-5'-P-CCNC: 20 U/L (ref 12–78)
ANION GAP SERPL CALCULATED.3IONS-SCNC: 4 MMOL/L
AST SERPL W P-5'-P-CCNC: 9 U/L (ref 5–45)
BASOPHILS # BLD AUTO: 0.08 THOUSANDS/ÂΜL (ref 0–0.1)
BASOPHILS NFR BLD AUTO: 1 % (ref 0–1)
BILIRUB SERPL-MCNC: 0.56 MG/DL (ref 0.2–1)
BUN SERPL-MCNC: 15 MG/DL (ref 5–25)
CALCIUM ALBUM COR SERPL-MCNC: 10.1 MG/DL (ref 8.3–10.1)
CALCIUM SERPL-MCNC: 9.5 MG/DL (ref 8.3–10.1)
CHLORIDE SERPL-SCNC: 107 MMOL/L (ref 96–108)
CHOLEST SERPL-MCNC: 137 MG/DL
CO2 SERPL-SCNC: 27 MMOL/L (ref 21–32)
CREAT SERPL-MCNC: 1.08 MG/DL (ref 0.6–1.3)
EOSINOPHIL # BLD AUTO: 0.3 THOUSAND/ÂΜL (ref 0–0.61)
EOSINOPHIL NFR BLD AUTO: 4 % (ref 0–6)
ERYTHROCYTE [DISTWIDTH] IN BLOOD BY AUTOMATED COUNT: 13.1 % (ref 11.6–15.1)
EST. AVERAGE GLUCOSE BLD GHB EST-MCNC: 303 MG/DL
GFR SERPL CREATININE-BSD FRML MDRD: 72 ML/MIN/1.73SQ M
GLUCOSE P FAST SERPL-MCNC: 286 MG/DL (ref 65–99)
HBA1C MFR BLD: 12.2 %
HCT VFR BLD AUTO: 47.2 % (ref 36.5–49.3)
HDLC SERPL-MCNC: 42 MG/DL
HGB BLD-MCNC: 15.5 G/DL (ref 12–17)
IMM GRANULOCYTES # BLD AUTO: 0.04 THOUSAND/UL (ref 0–0.2)
IMM GRANULOCYTES NFR BLD AUTO: 1 % (ref 0–2)
LDLC SERPL CALC-MCNC: 61 MG/DL (ref 0–100)
LYMPHOCYTES # BLD AUTO: 3.03 THOUSANDS/ÂΜL (ref 0.6–4.47)
LYMPHOCYTES NFR BLD AUTO: 36 % (ref 14–44)
MCH RBC QN AUTO: 29.5 PG (ref 26.8–34.3)
MCHC RBC AUTO-ENTMCNC: 32.8 G/DL (ref 31.4–37.4)
MCV RBC AUTO: 90 FL (ref 82–98)
MONOCYTES # BLD AUTO: 0.62 THOUSAND/ÂΜL (ref 0.17–1.22)
MONOCYTES NFR BLD AUTO: 8 % (ref 4–12)
NEUTROPHILS # BLD AUTO: 4.25 THOUSANDS/ÂΜL (ref 1.85–7.62)
NEUTS SEG NFR BLD AUTO: 50 % (ref 43–75)
NONHDLC SERPL-MCNC: 95 MG/DL
NRBC BLD AUTO-RTO: 0 /100 WBCS
PLATELET # BLD AUTO: 288 THOUSANDS/UL (ref 149–390)
PMV BLD AUTO: 9.5 FL (ref 8.9–12.7)
POTASSIUM SERPL-SCNC: 4 MMOL/L (ref 3.5–5.3)
PROT SERPL-MCNC: 7.2 G/DL (ref 6.4–8.4)
RBC # BLD AUTO: 5.26 MILLION/UL (ref 3.88–5.62)
SODIUM SERPL-SCNC: 138 MMOL/L (ref 135–147)
TRIGL SERPL-MCNC: 168 MG/DL
TSH SERPL DL<=0.05 MIU/L-ACNC: 1.46 UIU/ML (ref 0.45–4.5)
WBC # BLD AUTO: 8.32 THOUSAND/UL (ref 4.31–10.16)

## 2023-07-21 PROCEDURE — 80061 LIPID PANEL: CPT

## 2023-07-21 PROCEDURE — 83036 HEMOGLOBIN GLYCOSYLATED A1C: CPT

## 2023-07-21 PROCEDURE — 80053 COMPREHEN METABOLIC PANEL: CPT

## 2023-07-21 PROCEDURE — 36415 COLL VENOUS BLD VENIPUNCTURE: CPT

## 2023-07-21 PROCEDURE — 84443 ASSAY THYROID STIM HORMONE: CPT

## 2023-07-21 PROCEDURE — 85025 COMPLETE CBC W/AUTO DIFF WBC: CPT

## 2023-07-26 ENCOUNTER — TELEPHONE (OUTPATIENT)
Dept: ADMINISTRATIVE | Facility: OTHER | Age: 63
End: 2023-07-26

## 2023-07-26 ENCOUNTER — OFFICE VISIT (OUTPATIENT)
Dept: INTERNAL MEDICINE CLINIC | Facility: CLINIC | Age: 63
End: 2023-07-26
Payer: COMMERCIAL

## 2023-07-26 VITALS
SYSTOLIC BLOOD PRESSURE: 133 MMHG | DIASTOLIC BLOOD PRESSURE: 60 MMHG | HEIGHT: 70 IN | TEMPERATURE: 98.3 F | BODY MASS INDEX: 34.27 KG/M2 | OXYGEN SATURATION: 96 % | HEART RATE: 84 BPM | WEIGHT: 239.4 LBS

## 2023-07-26 DIAGNOSIS — E11.42 TYPE 2 DIABETES MELLITUS WITH DIABETIC POLYNEUROPATHY, UNSPECIFIED WHETHER LONG TERM INSULIN USE (HCC): ICD-10-CM

## 2023-07-26 DIAGNOSIS — I10 PRIMARY HYPERTENSION: ICD-10-CM

## 2023-07-26 DIAGNOSIS — E66.01 SEVERE OBESITY (BMI 35.0-39.9) WITH COMORBIDITY (HCC): ICD-10-CM

## 2023-07-26 DIAGNOSIS — E11.43 DIABETIC AUTONOMIC NEUROPATHY ASSOCIATED WITH TYPE 2 DIABETES MELLITUS (HCC): ICD-10-CM

## 2023-07-26 DIAGNOSIS — Z79.4 TYPE 2 DIABETES MELLITUS WITH DIABETIC POLYNEUROPATHY, WITH LONG-TERM CURRENT USE OF INSULIN (HCC): Primary | ICD-10-CM

## 2023-07-26 DIAGNOSIS — Z89.511 HX OF RIGHT BKA (HCC): ICD-10-CM

## 2023-07-26 DIAGNOSIS — E11.42 TYPE 2 DIABETES MELLITUS WITH DIABETIC POLYNEUROPATHY, WITH LONG-TERM CURRENT USE OF INSULIN (HCC): Primary | ICD-10-CM

## 2023-07-26 DIAGNOSIS — E11.51 DIABETIC PERIPHERAL ANGIOPATHY (HCC): ICD-10-CM

## 2023-07-26 DIAGNOSIS — E78.2 MIXED HYPERLIPIDEMIA: ICD-10-CM

## 2023-07-26 PROCEDURE — 99214 OFFICE O/P EST MOD 30 MIN: CPT | Performed by: FAMILY MEDICINE

## 2023-07-26 RX ORDER — INSULIN ASPART 100 [IU]/ML
15 INJECTION, SOLUTION INTRAVENOUS; SUBCUTANEOUS
Qty: 45 ML | Refills: 1
Start: 2023-07-26

## 2023-07-26 NOTE — TELEPHONE ENCOUNTER
Upon review of the In Basket request we were able to locate, review, and update the patient chart as requested for Diabetic Eye Exam.    Any additional questions or concerns should be emailed to the Practice Liaisons via the appropriate education email address, please do not reply via In Basket. Thank you  Minerva England         07/26/23 3:23 PM     VB [employer] SmartForm used to document caregap status.     Minerva England

## 2023-07-26 NOTE — LETTER
Diabetic Eye Exam Form    Date Requested: 23  Patient: Mari May  Patient : 1960   Referring Provider: Virgie Alves MD      DIABETIC Eye Exam Date _______________________________      Type of Exam MUST be documented for Diabetic Eye Exams. Please CHECK ONE. Retinal Exam       Dilated Retinal Exam       OCT       Optomap-Iris Exam      Fundus Photography       Left Eye - Please check Retinopathy or No Retinopathy        Exam did show retinopathy    Exam did not show retinopathy       Right Eye - Please check Retinopathy or No Retinopathy       Exam did show retinopathy    Exam did not show retinopathy       Comments __________________________________________________________    Practice Providing Exam ______________________________________________    Exam Performed By (print name) _______________________________________      Provider Signature ___________________________________________________      These reports are needed for  compliance. Please fax this completed form and a copy of the Diabetic Eye Exam report to our office located at [address] [place] [address] as soon as possible via Fax [phone] attention Linsey [place]: Phone [phone]  We thank you for your assistance in treating our mutual patient.

## 2023-07-26 NOTE — PROGRESS NOTES
Assessment/Plan:    No problem-specific Assessment & Plan notes found for this encounter. Diagnoses and all orders for this visit:    Type 2 diabetes mellitus with diabetic polyneuropathy, with long-term current use of insulin (HCC)  -     CBC and differential; Future  -     Comprehensive metabolic panel; Future  -     Hemoglobin A1C; Future  -     Lipid panel; Future    Diabetic autonomic neuropathy associated with type 2 diabetes mellitus (HCC)  -     CBC and differential; Future  -     Lipid panel; Future    Diabetic peripheral angiopathy (HCC)  -     CBC and differential; Future  -     Lipid panel; Future    Mixed hyperlipidemia  -     CBC and differential; Future  -     Comprehensive metabolic panel; Future    Hx of right BKA (HCC)  -     CBC and differential; Future    Primary hypertension  -     CBC and differential; Future    Type 2 diabetes mellitus with diabetic polyneuropathy, unspecified whether long term insulin use (HCC)  -     insulin aspart (NovoLOG FlexPen) 100 UNIT/ML injection pen; Inject 15 Units under the skin 3 (three) times a day with meals  -     CBC and differential; Future    Severe obesity (BMI 35.0-39. 9) with comorbidity ([address])  -     TSH, 3rd generation; Future     Orders and recommendations as noted above. Reviewed recent laboratory testing with him. Hemoglobin A1c remains significantly elevated and has only improved slightly. Discussed adding an additional medication. Financially this is difficult for him. Discussed with him then increasing his current insulin regimen. Will increase the dose of NovoLog with his meals. Will initially increase him to 10 units with meals and then to 15 units with meals depending on his response. Discussed trying medications such as Invokana or possibly Ozempic but this would likely be cost prohibitive at this point. Continue with the metformin. Continue to follow-up with ophthalmology regularly. Check left foot daily.   Continue to follow-up with [employer] for replacement sleeve for his right prosthesis. Blood pressure relatively well controlled. Continue with the lisinopril. Watch salt intake. Try to remain as active as possible. Continue with the atorvastatin. Try to remain as active as possible. Be very careful to avoid falls. We will have him follow-up in about 3 to 4 months with laboratory testing prior to that visit. Follow-up sooner if needed. Subjective:      Patient ID: Elle Phelps is a 58 y.o. male. He presents for routine follow-up. Has generally been doing relatively well. He remains very happy and jovial despite having some significant financial issues. He tries to remain as active as possible. Is having some difficulty with his right prosthesis and is awaiting a new sleeve for the stump area. Taking his insulin and metformin regularly. Has been taking 45 units of the Basaglar twice daily and 5 units of the NovoLog with meals. Denies any hypoglycemic episodes. Blood sugars are usually above 200 when he checks them but does not check them as often as he should. Tolerating the metformin without GI symptoms. Is hesitant to add additional medications because of the financial cost with this. Follows up with ophthalmology regularly. Checks left foot regularly. Tolerating the atorvastatin without difficulty. Denies any significant muscle aches or weakness with this. Continues with the lisinopril. Denies any headaches or localized weakness. No chest pain or palpitations. Appetite has been relatively good. Admits that he does not usually eat as healthy as he should. The following portions of the patient's history were reviewed and updated as appropriate:   He  has a past medical history of Diabetes mellitus ([address]) and Hypertension. He   Patient Active Problem List    Diagnosis Date Noted   • Severe obesity (BMI 35.0-39. 9) with comorbidity ([address]) 08/25/2021   • Diabetic peripheral angiopathy ([address] [facility]) 02/18/2020   • Vitamin D deficiency 02/01/2019   • Ambulatory dysfunction 10/17/2017   • Hx of right BKA ([address] [facility]) 07/31/2017   • Hypertension 04/14/2017   • Hyperlipidemia 04/14/2017   • Diabetes mellitus ([facility]) 01/27/2017   • Diabetic neuropathy ([facility]) 01/27/2017     He  has a past surgical history that includes pr amputation metatarsal w/toe single (Left, 1/30/2017); Wound debridement (Right, 1/30/2017); pr amputation foot transmetarsal (Right, 1/26/2017); Leg amputation, lower tibia/fibula (Right, 7/25/2017); and pr colonoscopy flx dx w/collj spec when pfrmd (N/A, 11/28/2018). His family history includes Diabetes in his mother. He  reports that he has quit smoking. His smoking use included cigarettes. He has never used smokeless tobacco. He reports current alcohol use of about 11.0 standard drinks of alcohol per week. He reports that he does not use drugs.   Current Outpatient Medications   Medication Sig Dispense Refill   • aspirin (ECOTRIN LOW STRENGTH) 81 mg EC tablet Take 81 mg by mouth daily Resume on 8/11/17      • atorvastatin (LIPITOR) 80 mg tablet Take 1 tablet (80 mg total) by mouth daily 90 tablet 3   • Cholecalciferol (Vitamin D) 125 MCG (5000 UT) CAPS Take by mouth     • glucose blood test strip Test blood sugars 4 times a day 400 each 3   • insulin aspart (NovoLOG FlexPen) 100 UNIT/ML injection pen Inject 15 Units under the skin 3 (three) times a day with meals 45 mL 1   • Insulin Glargine Solostar (Basaglar KwikPen) 100 UNIT/ML SOPN Inject 0.45 mL (45 Units total) under the skin every 12 (twelve) hours 30 mL 2   • Insulin Pen Needle 30G X 8 MM MISC Inject under the skin daily at bedtime 100 each 5   • lisinopril (ZESTRIL) 2.5 mg tablet Take 1 tablet (2.5 mg total) by mouth daily 90 tablet 3   • metFORMIN (GLUCOPHAGE) 1000 MG tablet TAKE 1 TABLET TWICE DAILY  WITH MEALS 180 tablet 1   • Multiple Vitamin (MULTI-VITAMIN DAILY PO) Take 1 tablet by mouth daily       No current facility-administered medications for this visit. Current Outpatient Medications on File Prior to Visit   Medication Sig   • aspirin (ECOTRIN LOW STRENGTH) 81 mg EC tablet Take 81 mg by mouth daily Resume on 8/11/17    • atorvastatin (LIPITOR) 80 mg tablet Take 1 tablet (80 mg total) by mouth daily   • Cholecalciferol (Vitamin D) 125 MCG (5000 UT) CAPS Take by mouth   • glucose blood test strip Test blood sugars 4 times a day   • Insulin Glargine Solostar (Basaglar KwikPen) 100 UNIT/ML SOPN Inject 0.45 mL (45 Units total) under the skin every 12 (twelve) hours   • Insulin Pen Needle 30G X 8 MM MISC Inject under the skin daily at bedtime   • lisinopril (ZESTRIL) 2.5 mg tablet Take 1 tablet (2.5 mg total) by mouth daily   • metFORMIN (GLUCOPHAGE) 1000 MG tablet TAKE 1 TABLET TWICE DAILY  WITH MEALS   • Multiple Vitamin (MULTI-VITAMIN DAILY PO) Take 1 tablet by mouth daily     No current facility-administered medications on file prior to visit. He has No Known Allergies. .    Review of Systems   Constitutional: Positive for fatigue. Negative for activity change, appetite change, chills and fever. HENT: Negative for congestion and hearing loss. Eyes: Negative for pain and visual disturbance. Respiratory: Negative for cough, chest tightness and shortness of breath. Cardiovascular: Negative for chest pain and palpitations. Gastrointestinal: Negative for abdominal pain, blood in stool, diarrhea, nausea and vomiting. Endocrine: Negative for polydipsia, polyphagia and polyuria. As per HPI   Genitourinary: Negative for dysuria. Musculoskeletal: Positive for arthralgias and gait problem. Skin: Negative for color change. Neurological: Negative for dizziness and headaches. Hematological: Does not bruise/bleed easily. Psychiatric/Behavioral: Negative for behavioral problems, decreased concentration, dysphoric mood and sleep disturbance. The patient is not nervous/anxious. Objective:      /60   Pulse 84   Temp 98.3 °F (36.8 °C)   Ht 5' 10" (1.778 m)   Wt 109 kg (239 lb 6.4 oz)   SpO2 96%   BMI 34.35 kg/m²          Physical Exam  Vitals and nursing note reviewed. Constitutional:       General: He is not in acute distress. Appearance: He is well-developed and well-groomed. HENT:      Head: Normocephalic and atraumatic. Comments: Limited dentition; poor dentition     Nose: Nose normal.   Eyes:      Conjunctiva/sclera: Conjunctivae normal.      Pupils: Pupils are equal, round, and reactive to light. Cardiovascular:      Rate and Rhythm: Normal rate and regular rhythm. Heart sounds: Normal heart sounds. No murmur heard. No friction rub. No gallop. Pulmonary:      Breath sounds: No wheezing or rales. Chest:      Chest wall: No tenderness. Abdominal:      General: There is no distension. Tenderness: There is no abdominal tenderness. There is no guarding or rebound. Musculoskeletal:      Comments: Right BKA   Lymphadenopathy:      Cervical: No cervical adenopathy. Skin:     General: Skin is warm and dry. Neurological:      Mental Status: He is alert and oriented to person, place, and time. Psychiatric:         Mood and Affect: Mood and affect normal.         Speech: Speech normal.         Behavior: Behavior normal. Behavior is cooperative. Cognition and Memory: Cognition and memory normal.             Below is the patient's most recent value for Albumin, ALT, AST, BUN, Calcium, Chloride, Cholesterol, CO2, Creatinine, GFR, Glucose, HDL, Hematocrit, Hemoglobin, Hemoglobin A1C, LDL, Magnesium, Phosphorus, Platelets, Potassium, PSA, Sodium, Triglycerides, and WBC.    Lab Results   Component Value Date    ALT 20 07/21/2023    AST 9 07/21/2023    BUN 15 07/21/2023    CALCIUM 9.5 07/21/2023     07/21/2023    CO2 27 07/21/2023    CREATININE 1.08 07/21/2023    HDL 42 07/21/2023    HCT 47.2 07/21/2023    HGB 15.5 07/21/2023 HGBA1C 12.2 (H) 07/21/2023     07/21/2023    K 4.0 07/21/2023    PSA 1.9 04/24/2023     01/06/2014    TRIG 168 (H) 07/21/2023    WBC 8.32 07/21/2023     Note: for a comprehensive list of the patient's lab results, access the Results Review activity.

## 2023-07-26 NOTE — TELEPHONE ENCOUNTER
Upon review of the In Basket request and the patient's chart, initial outreach has been made via fax to facility. Please see Contacts section for details.      Thank you  Lavelle White

## 2023-07-26 NOTE — TELEPHONE ENCOUNTER
----- Message from [facility] [place] sent at 7/26/2023  9:17 AM EDT -----  07/26/23 9:17 AM    Hello, our patient Lacrshaila Ruiz has had DM eye exam completed/performed. Please assist in updating the patient chart by Dr. Remington Pattreson. The date of service is April 2023.     Thank you,  [facility] [place]   [address]

## 2023-10-19 ENCOUNTER — VBI (OUTPATIENT)
Dept: ADMINISTRATIVE | Facility: OTHER | Age: 63
End: 2023-10-19

## 2023-10-23 ENCOUNTER — RA CDI HCC (OUTPATIENT)
Dept: OTHER | Facility: HOSPITAL | Age: 63
End: 2023-10-23

## 2023-10-23 NOTE — PROGRESS NOTES
[address] [facility] coding opportunities          Chart Reviewed number of suggestions sent to Provider: 1   E11.65  Patients Insurance     Medicare Insurance: [employer]

## 2023-10-27 ENCOUNTER — APPOINTMENT (OUTPATIENT)
Dept: LAB | Facility: MEDICAL CENTER | Age: 63
End: 2023-10-27
Payer: COMMERCIAL

## 2023-10-27 DIAGNOSIS — E11.42 TYPE 2 DIABETES MELLITUS WITH DIABETIC POLYNEUROPATHY, UNSPECIFIED WHETHER LONG TERM INSULIN USE (HCC): ICD-10-CM

## 2023-10-27 DIAGNOSIS — E11.51 DIABETIC PERIPHERAL ANGIOPATHY (HCC): ICD-10-CM

## 2023-10-27 DIAGNOSIS — I10 PRIMARY HYPERTENSION: ICD-10-CM

## 2023-10-27 DIAGNOSIS — E11.42 TYPE 2 DIABETES MELLITUS WITH DIABETIC POLYNEUROPATHY, WITH LONG-TERM CURRENT USE OF INSULIN (HCC): ICD-10-CM

## 2023-10-27 DIAGNOSIS — Z79.4 TYPE 2 DIABETES MELLITUS WITH DIABETIC POLYNEUROPATHY, WITH LONG-TERM CURRENT USE OF INSULIN (HCC): ICD-10-CM

## 2023-10-27 DIAGNOSIS — E66.01 SEVERE OBESITY (BMI 35.0-39.9) WITH COMORBIDITY (HCC): ICD-10-CM

## 2023-10-27 DIAGNOSIS — Z89.511 HX OF RIGHT BKA (HCC): ICD-10-CM

## 2023-10-27 DIAGNOSIS — E11.43 DIABETIC AUTONOMIC NEUROPATHY ASSOCIATED WITH TYPE 2 DIABETES MELLITUS (HCC): ICD-10-CM

## 2023-10-27 DIAGNOSIS — E78.2 MIXED HYPERLIPIDEMIA: ICD-10-CM

## 2023-10-27 LAB
ALBUMIN SERPL BCP-MCNC: 3.7 G/DL (ref 3.5–5)
ALP SERPL-CCNC: 117 U/L (ref 34–104)
ALT SERPL W P-5'-P-CCNC: 9 U/L (ref 7–52)
ANION GAP SERPL CALCULATED.3IONS-SCNC: 15 MMOL/L
AST SERPL W P-5'-P-CCNC: 13 U/L (ref 13–39)
BASOPHILS # BLD AUTO: 0.03 THOUSANDS/ÂΜL (ref 0–0.1)
BASOPHILS NFR BLD AUTO: 0 % (ref 0–1)
BILIRUB SERPL-MCNC: 0.56 MG/DL (ref 0.2–1)
BUN SERPL-MCNC: 16 MG/DL (ref 5–25)
CALCIUM SERPL-MCNC: 8.7 MG/DL (ref 8.4–10.2)
CHLORIDE SERPL-SCNC: 97 MMOL/L (ref 96–108)
CHOLEST SERPL-MCNC: 146 MG/DL
CO2 SERPL-SCNC: 23 MMOL/L (ref 21–32)
CREAT SERPL-MCNC: 1.09 MG/DL (ref 0.6–1.3)
EOSINOPHIL # BLD AUTO: 0.01 THOUSAND/ÂΜL (ref 0–0.61)
EOSINOPHIL NFR BLD AUTO: 0 % (ref 0–6)
ERYTHROCYTE [DISTWIDTH] IN BLOOD BY AUTOMATED COUNT: 12.5 % (ref 11.6–15.1)
GFR SERPL CREATININE-BSD FRML MDRD: 71 ML/MIN/1.73SQ M
GLUCOSE P FAST SERPL-MCNC: 281 MG/DL (ref 65–99)
HCT VFR BLD AUTO: 47.8 % (ref 36.5–49.3)
HDLC SERPL-MCNC: 33 MG/DL
HGB BLD-MCNC: 15.4 G/DL (ref 12–17)
IMM GRANULOCYTES # BLD AUTO: 0.04 THOUSAND/UL (ref 0–0.2)
IMM GRANULOCYTES NFR BLD AUTO: 0 % (ref 0–2)
LDLC SERPL CALC-MCNC: 76 MG/DL (ref 0–100)
LYMPHOCYTES # BLD AUTO: 2.61 THOUSANDS/ÂΜL (ref 0.6–4.47)
LYMPHOCYTES NFR BLD AUTO: 27 % (ref 14–44)
MCH RBC QN AUTO: 29.4 PG (ref 26.8–34.3)
MCHC RBC AUTO-ENTMCNC: 32.2 G/DL (ref 31.4–37.4)
MCV RBC AUTO: 91 FL (ref 82–98)
MONOCYTES # BLD AUTO: 0.97 THOUSAND/ÂΜL (ref 0.17–1.22)
MONOCYTES NFR BLD AUTO: 10 % (ref 4–12)
NEUTROPHILS # BLD AUTO: 6.01 THOUSANDS/ÂΜL (ref 1.85–7.62)
NEUTS SEG NFR BLD AUTO: 63 % (ref 43–75)
NONHDLC SERPL-MCNC: 113 MG/DL
NRBC BLD AUTO-RTO: 0 /100 WBCS
PLATELET # BLD AUTO: 224 THOUSANDS/UL (ref 149–390)
PMV BLD AUTO: 10.1 FL (ref 8.9–12.7)
POTASSIUM SERPL-SCNC: 3.8 MMOL/L (ref 3.5–5.3)
PROT SERPL-MCNC: 7.6 G/DL (ref 6.4–8.4)
RBC # BLD AUTO: 5.23 MILLION/UL (ref 3.88–5.62)
SODIUM SERPL-SCNC: 135 MMOL/L (ref 135–147)
TRIGL SERPL-MCNC: 187 MG/DL
TSH SERPL DL<=0.05 MIU/L-ACNC: 1.65 UIU/ML (ref 0.45–4.5)
WBC # BLD AUTO: 9.67 THOUSAND/UL (ref 4.31–10.16)

## 2023-10-27 PROCEDURE — 80053 COMPREHEN METABOLIC PANEL: CPT

## 2023-10-27 PROCEDURE — 84443 ASSAY THYROID STIM HORMONE: CPT

## 2023-10-27 PROCEDURE — 83036 HEMOGLOBIN GLYCOSYLATED A1C: CPT

## 2023-10-27 PROCEDURE — 80061 LIPID PANEL: CPT

## 2023-10-27 PROCEDURE — 85025 COMPLETE CBC W/AUTO DIFF WBC: CPT

## 2023-10-27 PROCEDURE — 36415 COLL VENOUS BLD VENIPUNCTURE: CPT

## 2023-10-28 LAB
EST. AVERAGE GLUCOSE BLD GHB EST-MCNC: 358 MG/DL
HBA1C MFR BLD: 14.1 %

## 2023-10-28 PROCEDURE — 3046F HEMOGLOBIN A1C LEVEL >9.0%: CPT | Performed by: FAMILY MEDICINE

## 2023-10-30 ENCOUNTER — OFFICE VISIT (OUTPATIENT)
Dept: INTERNAL MEDICINE CLINIC | Facility: CLINIC | Age: 63
End: 2023-10-30
Payer: COMMERCIAL

## 2023-10-30 VITALS
TEMPERATURE: 98 F | SYSTOLIC BLOOD PRESSURE: 128 MMHG | OXYGEN SATURATION: 95 % | BODY MASS INDEX: 31.92 KG/M2 | HEART RATE: 70 BPM | WEIGHT: 223 LBS | HEIGHT: 70 IN | DIASTOLIC BLOOD PRESSURE: 74 MMHG

## 2023-10-30 DIAGNOSIS — E11.51 DIABETIC PERIPHERAL ANGIOPATHY (HCC): ICD-10-CM

## 2023-10-30 DIAGNOSIS — E11.42 TYPE 2 DIABETES MELLITUS WITH DIABETIC POLYNEUROPATHY, WITH LONG-TERM CURRENT USE OF INSULIN (HCC): Primary | ICD-10-CM

## 2023-10-30 DIAGNOSIS — Z23 ENCOUNTER FOR IMMUNIZATION: ICD-10-CM

## 2023-10-30 DIAGNOSIS — I10 PRIMARY HYPERTENSION: ICD-10-CM

## 2023-10-30 DIAGNOSIS — E11.43 DIABETIC AUTONOMIC NEUROPATHY ASSOCIATED WITH TYPE 2 DIABETES MELLITUS (HCC): ICD-10-CM

## 2023-10-30 DIAGNOSIS — Z79.4 TYPE 2 DIABETES MELLITUS WITH DIABETIC POLYNEUROPATHY, WITH LONG-TERM CURRENT USE OF INSULIN (HCC): Primary | ICD-10-CM

## 2023-10-30 DIAGNOSIS — Z89.511 HX OF RIGHT BKA (HCC): ICD-10-CM

## 2023-10-30 DIAGNOSIS — E11.42 TYPE 2 DIABETES MELLITUS WITH DIABETIC POLYNEUROPATHY, UNSPECIFIED WHETHER LONG TERM INSULIN USE (HCC): ICD-10-CM

## 2023-10-30 DIAGNOSIS — Z59.9 FINANCIAL DIFFICULTIES: ICD-10-CM

## 2023-10-30 DIAGNOSIS — E78.2 MIXED HYPERLIPIDEMIA: ICD-10-CM

## 2023-10-30 DIAGNOSIS — E55.9 VITAMIN D DEFICIENCY: ICD-10-CM

## 2023-10-30 PROCEDURE — G0008 ADMIN INFLUENZA VIRUS VAC: HCPCS | Performed by: FAMILY MEDICINE

## 2023-10-30 PROCEDURE — 99214 OFFICE O/P EST MOD 30 MIN: CPT | Performed by: FAMILY MEDICINE

## 2023-10-30 PROCEDURE — 90686 IIV4 VACC NO PRSV 0.5 ML IM: CPT | Performed by: FAMILY MEDICINE

## 2023-10-30 PROCEDURE — 3074F SYST BP LT 130 MM HG: CPT | Performed by: FAMILY MEDICINE

## 2023-10-30 PROCEDURE — 3078F DIAST BP <80 MM HG: CPT | Performed by: FAMILY MEDICINE

## 2023-10-30 RX ORDER — INSULIN GLARGINE 100 [IU]/ML
55 INJECTION, SOLUTION SUBCUTANEOUS EVERY 12 HOURS SCHEDULED
Qty: 30 ML | Refills: 2 | Status: SHIPPED | OUTPATIENT
Start: 2023-10-30

## 2023-10-30 SDOH — ECONOMIC STABILITY - INCOME SECURITY: PROBLEM RELATED TO HOUSING AND ECONOMIC CIRCUMSTANCES, UNSPECIFIED: Z59.9

## 2023-10-30 NOTE — PROGRESS NOTES
Assessment/Plan:       1. Type 2 diabetes mellitus with diabetic polyneuropathy, with long-term current use of insulin (HCC)  Assessment & Plan:    Lab Results   Component Value Date    HGBA1C 14.1 (H) 10/27/2023     Hemoglobin A1c remains significantly elevated as has his blood sugars at home. Will increase the Basaglar to 55 units twice a day and continue with the 15 units of the NovoLog with meals. Continue with the metformin. Discussed additional medication such as Ozempic or Jardiance but he has significant financial constraints that prohibit this. We will place a referral to case management to hopefully help with this. Will refer to endocrinology. Watch diet. Follow-up with ophthalmology regularly. Recommend following up with podiatry given his history of the right BKA. Orders:  -     CBC and differential; Future  -     Comprehensive metabolic panel; Future  -     Hemoglobin A1C; Future  -     Albumin / creatinine urine ratio  -     Ambulatory Referral to Endocrinology; Future    2. Diabetic autonomic neuropathy associated with type 2 diabetes mellitus (HCC)  -     CBC and differential; Future  -     Ambulatory Referral to Endocrinology; Future    3. Diabetic peripheral angiopathy (HCC)  -     CBC and differential; Future  -     Ambulatory Referral to Endocrinology; Future    4. Primary hypertension  Assessment & Plan:  Continue with the lisinopril. Blood pressure well controlled. Watch salt intake. Stay adequately hydrated. Orders:  -     CBC and differential; Future  -     Comprehensive metabolic panel; Future    5. Mixed hyperlipidemia  Assessment & Plan:  Continue with the atorvastatin. Watch diet. Orders:  -     CBC and differential; Future  -     Comprehensive metabolic panel; Future  -     Lipid panel; Future  -     TSH, 3rd generation; Future    6. Hx of right BKA (HCC)  -     CBC and differential; Future    7.  Type 2 diabetes mellitus with diabetic polyneuropathy, unspecified whether long term insulin use (HCC)  Assessment & Plan:    Lab Results   Component Value Date    HGBA1C 14.1 (H) 10/27/2023     Hemoglobin A1c remains significantly elevated as has his blood sugars at home. Will increase the Basaglar to 55 units twice a day and continue with the 15 units of the NovoLog with meals. Continue with the metformin. Discussed additional medication such as Ozempic or Jardiance but he has significant financial constraints that prohibit this. We will place a referral to case management to hopefully help with this. Will refer to endocrinology. Watch diet. Follow-up with ophthalmology regularly. Recommend following up with podiatry given his history of the right BKA. Orders:  -     CBC and differential; Future  -     Insulin Glargine Solostar (Basaglar KwikPen) 100 UNIT/ML SOPN; Inject 0.55 mL (55 Units total) under the skin every 12 (twelve) hours    8. Encounter for immunization  -     CBC and differential; Future  -     influenza vaccine, quadrivalent, 0.5 mL, preservative-free, for adult and pediatric patients 6 mos+ (AFLURIA, FLUARIX, FLULAVAL, FLUZONE)    9. Vitamin D deficiency  Assessment & Plan:  Continue vitamin D supplementation. Orders:  -     Vitamin D 25 hydroxy; Future    10. Financial difficulties  -     Ambulatory Referral to Social Work Care Management Program; Future    Orders and recommendations as noted above. Reviewed recent laboratory testing with him. Discussed my concerns regarding the significant elevation in his hemoglobin A1c. Discussed goal of hemoglobin A1c less than 7. Financial concerns discussed. Flu shot given today. Discussed with him the RSV vaccination as well as the COVID booster. We will have him follow-up in about 2 months or sooner if needed. Subjective:      Patient ID: Anette Garsia is a 58 y.o. male. He presents for routine follow-up. Has been having significantly high blood sugars.   Blood sugars sometimes just register "high" on his glucometer. When he does get measurements, they are usually in the high 200s to high 300s. Admits that he has been urinating more frequently. Is more thirsty. He has noted weight loss despite his appetite being very good. Continues with 45 units of the Basaglar twice a day as well as the rapid acting insulin with his meals. Admits that he does not usually watch his carbohydrate intake. His brother is concerned regarding his left leg. Denies any open areas but does notice some darkening of the skin. Denies any difficulty with the right prosthesis. Denies any pain at present into either the stump on the right or his left leg. Somewhat more tired. Admits to significant financial difficulty with his medications. This is limited the choice of medications for him. Tolerating lisinopril without difficulty. Denies any headaches or localized weakness. Tolerating the atorvastatin without difficulty. Denies any significant muscle aches or weakness. The following portions of the patient's history were reviewed and updated as appropriate: He  has a past medical history of Diabetes mellitus ([address]) and Hypertension. He   Patient Active Problem List    Diagnosis Date Noted   • Severe obesity (BMI 35.0-39. 9) with comorbidity ([address]) 08/25/2021   • Diabetic peripheral angiopathy ([address]) 02/18/2020   • Vitamin D deficiency 02/01/2019   • Ambulatory dysfunction 10/17/2017   • Hx of right BKA ([address]) 07/31/2017   • Hypertension 04/14/2017   • Hyperlipidemia 04/14/2017   • Diabetes mellitus ([address]) 01/27/2017   • Diabetic neuropathy ([address]) 01/27/2017     He  has a past surgical history that includes pr amputation metatarsal w/toe single (Left, 1/30/2017); Wound debridement (Right, 1/30/2017); pr amputation foot transmetarsal (Right, 1/26/2017); Leg amputation, lower tibia/fibula (Right, 7/25/2017); and pr colonoscopy flx dx w/collj spec when pfrmd (N/A, 11/28/2018).   His family history includes Diabetes in his mother. He  reports that he has quit smoking. His smoking use included cigarettes. He has never used smokeless tobacco. He reports current alcohol use of about 11.0 standard drinks of alcohol per week. He reports that he does not use drugs. Current Outpatient Medications   Medication Sig Dispense Refill   • aspirin (ECOTRIN LOW STRENGTH) 81 mg EC tablet Take 81 mg by mouth daily Resume on 8/11/17      • atorvastatin (LIPITOR) 80 mg tablet Take 1 tablet (80 mg total) by mouth daily 90 tablet 3   • Cholecalciferol (Vitamin D) 125 MCG (5000 UT) CAPS Take by mouth     • glucose blood test strip Test blood sugars 4 times a day 400 each 3   • insulin aspart (NovoLOG FlexPen) 100 UNIT/ML injection pen Inject 15 Units under the skin 3 (three) times a day with meals 45 mL 1   • Insulin Glargine Solostar (Basaglar KwikPen) 100 UNIT/ML SOPN Inject 0.55 mL (55 Units total) under the skin every 12 (twelve) hours 30 mL 2   • Insulin Pen Needle 30G X 8 MM MISC Inject under the skin daily at bedtime 100 each 5   • lisinopril (ZESTRIL) 2.5 mg tablet Take 1 tablet (2.5 mg total) by mouth daily 90 tablet 3   • metFORMIN (GLUCOPHAGE) 1000 MG tablet TAKE 1 TABLET TWICE DAILY  WITH MEALS 180 tablet 1   • Multiple Vitamin (MULTI-VITAMIN DAILY PO) Take 1 tablet by mouth daily       No current facility-administered medications for this visit.      Current Outpatient Medications on File Prior to Visit   Medication Sig   • aspirin (ECOTRIN LOW STRENGTH) 81 mg EC tablet Take 81 mg by mouth daily Resume on 8/11/17    • atorvastatin (LIPITOR) 80 mg tablet Take 1 tablet (80 mg total) by mouth daily   • Cholecalciferol (Vitamin D) 125 MCG (5000 UT) CAPS Take by mouth   • glucose blood test strip Test blood sugars 4 times a day   • insulin aspart (NovoLOG FlexPen) 100 UNIT/ML injection pen Inject 15 Units under the skin 3 (three) times a day with meals   • Insulin Pen Needle 30G X 8 MM MISC Inject under the skin daily at bedtime • lisinopril (ZESTRIL) 2.5 mg tablet Take 1 tablet (2.5 mg total) by mouth daily   • metFORMIN (GLUCOPHAGE) 1000 MG tablet TAKE 1 TABLET TWICE DAILY  WITH MEALS   • Multiple Vitamin (MULTI-VITAMIN DAILY PO) Take 1 tablet by mouth daily   • [DISCONTINUED] Insulin Glargine Solostar (Basaglar KwikPen) 100 UNIT/ML SOPN Inject 0.45 mL (45 Units total) under the skin every 12 (twelve) hours     No current facility-administered medications on file prior to visit. He has No Known Allergies. .    Review of Systems   Constitutional:  Positive for activity change, fatigue and unexpected weight change. Negative for appetite change, chills and fever. HENT:  Negative for hearing loss. Eyes:  Negative for pain and visual disturbance. Respiratory:  Negative for cough, chest tightness and shortness of breath. Cardiovascular:  Negative for chest pain and palpitations. Gastrointestinal:  Negative for abdominal pain, blood in stool, diarrhea, nausea and vomiting. Endocrine: Negative for polydipsia, polyphagia and polyuria. As per HPI   Genitourinary:  Negative for dysuria. Musculoskeletal:  Positive for gait problem. Negative for arthralgias. Skin:  Positive for color change. Neurological:  Negative for dizziness and headaches. Hematological:  Does not bruise/bleed easily. Psychiatric/Behavioral:  Negative for behavioral problems. The patient is not nervous/anxious. Objective:  /74 (BP Location: Left arm, Patient Position: Sitting, Cuff Size: Large)   Pulse 70   Temp 98 °F (36.7 °C)   Ht 5' 10" (1.778 m)   Wt 101 kg (223 lb)   SpO2 95%   BMI 32.00 kg/m²          Physical Exam  Vitals and nursing note reviewed. Constitutional:       General: He is not in acute distress. Appearance: He is well-developed. Comments: Slightly disheveled appearing   HENT:      Head: Normocephalic and atraumatic.       Nose: Nose normal.   Eyes:      Conjunctiva/sclera: Conjunctivae normal. Pupils: Pupils are equal, round, and reactive to light. Cardiovascular:      Rate and Rhythm: Normal rate and regular rhythm. Heart sounds: Normal heart sounds. No murmur heard. No friction rub. No gallop. Pulmonary:      Breath sounds: No wheezing or rales. Chest:      Chest wall: No tenderness. Abdominal:      General: There is no distension. Tenderness: There is no abdominal tenderness. There is no guarding or rebound. Musculoskeletal:      Comments: Right BKA   Lymphadenopathy:      Cervical: No cervical adenopathy. Skin:     General: Skin is warm and dry. Comments: Venous stasis changes left lower extremity; skin with some dirt over the legs; right prosthesis with significant dirt into shoe   Neurological:      Mental Status: He is alert and oriented to person, place, and time. Psychiatric:         Mood and Affect: Mood and affect normal.         Speech: Speech normal.         Behavior: Behavior normal. Behavior is cooperative. Cognition and Memory: Cognition and memory normal.         Below is the patient's most recent value for Albumin, ALT, AST, BUN, Calcium, Chloride, Cholesterol, CO2, Creatinine, GFR, Glucose, HDL, Hematocrit, Hemoglobin, Hemoglobin A1C, LDL, Magnesium, Phosphorus, Platelets, Potassium, PSA, Sodium, Triglycerides, and WBC. Lab Results   Component Value Date    ALT 9 10/27/2023    AST 13 10/27/2023    BUN 16 10/27/2023    CALCIUM 8.7 10/27/2023    CL 97 10/27/2023    CO2 23 10/27/2023    CREATININE 1.09 10/27/2023    HDL 33 (L) 10/27/2023    HCT 47.8 10/27/2023    HGB 15.4 10/27/2023    HGBA1C 14.1 (H) 10/27/2023     10/27/2023    K 3.8 10/27/2023    PSA 1.9 04/24/2023     01/06/2014    TRIG 187 (H) 10/27/2023    WBC 9.67 10/27/2023     Note: for a comprehensive list of the patient's lab results, access the Results Review activity.

## 2023-10-31 ENCOUNTER — TELEPHONE (OUTPATIENT)
Dept: ENDOCRINOLOGY | Facility: CLINIC | Age: 63
End: 2023-10-31

## 2023-10-31 NOTE — ASSESSMENT & PLAN NOTE
Lab Results   Component Value Date    HGBA1C 14.1 (H) 10/27/2023     Hemoglobin A1c remains significantly elevated as has his blood sugars at home. Will increase the Basaglar to 55 units twice a day and continue with the 15 units of the NovoLog with meals. Continue with the metformin. Discussed additional medication such as Ozempic or Jardiance but he has significant financial constraints that prohibit this. We will place a referral to case management to hopefully help with this. Will refer to endocrinology. Watch diet. Follow-up with ophthalmology regularly. Recommend following up with podiatry given his history of the right BKA.

## 2023-10-31 NOTE — TELEPHONE ENCOUNTER
Received referral for Skyler Partida. Called for patient to contact office to schedule a follow-up Appointment. Mailbox full. Last OV via telemed on 4/13/21.

## 2023-10-31 NOTE — ASSESSMENT & PLAN NOTE
Continue with the lisinopril. Blood pressure well controlled. Watch salt intake. Stay adequately hydrated.

## 2023-11-06 ENCOUNTER — PATIENT OUTREACH (OUTPATIENT)
Dept: INTERNAL MEDICINE CLINIC | Facility: CLINIC | Age: 63
End: 2023-11-06

## 2023-11-06 NOTE — PROGRESS NOTES
KATARZYNA SAVAGE received a referral from patient's PCP due to patient having financial difficulty specifically with affording medication. KATARZYNA SAVAGE called patient ([phone]). Patient answered and KATARZYNA SAVAGE introduced KATARZYNA SAVAGE role and reason for calling. KATARZYNA SAVAGE completed [facility] psychosocial assessment with patient. Patient is single, disabled and lives alone in an apartment. Patient receives $2,084 a month in SSI. He is not behind on any bills at this time. He has his medications delivered to his home. He does not have a car but utilizes [employer]. Patient utilizes an [facility] to ambulate and is independent with all ADLs/ IADLs. Patient has no MH hx. Patient receives $25 a month in food stamps. Patient stated that the only medications that he has difficulty with affording is Novolog and Basaglar. KATARZYNA SAVAGE discussed PAP for both medications. Patient would like KATARZYNA SAVAGE to mail him both PAP applications. KATARZYNA SAVAGE placed in outgoing mail and will continue to follow up regarding.

## 2023-11-22 ENCOUNTER — PATIENT OUTREACH (OUTPATIENT)
Dept: INTERNAL MEDICINE CLINIC | Facility: CLINIC | Age: 63
End: 2023-11-22

## 2023-11-22 NOTE — PROGRESS NOTES
KATARZYNA SAVAGE called patient ([phone]) to follow up and discuss if he had received Novolog and Basaglar PAP applications if he had started them. Patient did not answer, KATARZYNA SAVAGE left a message and requested a call back.

## 2023-12-12 ENCOUNTER — VBI (OUTPATIENT)
Dept: ADMINISTRATIVE | Facility: OTHER | Age: 63
End: 2023-12-12

## 2023-12-22 ENCOUNTER — PATIENT OUTREACH (OUTPATIENT)
Dept: INTERNAL MEDICINE CLINIC | Facility: CLINIC | Age: 63
End: 2023-12-22

## 2023-12-22 NOTE — PROGRESS NOTES
KATARZYNA SAVAGE contacted patient to follow up on PAP applications sent by Connie. Patient confirmed receiving the applications in the mail. He has not yet completed them, however, he states they are straightforward and he does not have any questions regarding them. Patient denies needing additional assistance. Patient uses [facility] for transportation so he plans to bring the applications to PCP at his next appointment for the Prescriber portion to be completed as he cannot get to the office otherwise. Referral will be closed. Patient expressed gratitude for the call.

## 2024-03-25 ENCOUNTER — OFFICE VISIT (OUTPATIENT)
Dept: INTERNAL MEDICINE CLINIC | Facility: CLINIC | Age: 64
End: 2024-03-25
Payer: COMMERCIAL

## 2024-03-25 VITALS
BODY MASS INDEX: 32.88 KG/M2 | TEMPERATURE: 97.9 F | HEIGHT: 70 IN | DIASTOLIC BLOOD PRESSURE: 78 MMHG | WEIGHT: 229.7 LBS | HEART RATE: 103 BPM | OXYGEN SATURATION: 98 % | SYSTOLIC BLOOD PRESSURE: 142 MMHG

## 2024-03-25 DIAGNOSIS — I10 PRIMARY HYPERTENSION: ICD-10-CM

## 2024-03-25 DIAGNOSIS — Z79.4 TYPE 2 DIABETES MELLITUS WITH DIABETIC POLYNEUROPATHY, WITH LONG-TERM CURRENT USE OF INSULIN (HCC): Primary | ICD-10-CM

## 2024-03-25 DIAGNOSIS — E55.9 VITAMIN D DEFICIENCY: ICD-10-CM

## 2024-03-25 DIAGNOSIS — E66.01 SEVERE OBESITY (BMI 35.0-39.9) WITH COMORBIDITY (HCC): ICD-10-CM

## 2024-03-25 DIAGNOSIS — Z89.511 HX OF RIGHT BKA (HCC): ICD-10-CM

## 2024-03-25 DIAGNOSIS — E78.2 MIXED HYPERLIPIDEMIA: ICD-10-CM

## 2024-03-25 DIAGNOSIS — E11.43 DIABETIC AUTONOMIC NEUROPATHY ASSOCIATED WITH TYPE 2 DIABETES MELLITUS (HCC): ICD-10-CM

## 2024-03-25 DIAGNOSIS — E11.51 DIABETIC PERIPHERAL ANGIOPATHY (HCC): ICD-10-CM

## 2024-03-25 DIAGNOSIS — E11.42 TYPE 2 DIABETES MELLITUS WITH DIABETIC POLYNEUROPATHY, WITH LONG-TERM CURRENT USE OF INSULIN (HCC): Primary | ICD-10-CM

## 2024-03-25 PROCEDURE — G2211 COMPLEX E/M VISIT ADD ON: HCPCS | Performed by: FAMILY MEDICINE

## 2024-03-25 PROCEDURE — 99214 OFFICE O/P EST MOD 30 MIN: CPT | Performed by: FAMILY MEDICINE

## 2024-03-25 NOTE — PROGRESS NOTES
Assessment/Plan:       1. Type 2 diabetes mellitus with diabetic polyneuropathy, with long-term current use of insulin (HCC)  Assessment & Plan:  I will obtain blood work.    Orders:  -     CBC and differential; Future  -     Comprehensive metabolic panel; Future  -     Hemoglobin A1C; Future  -     TSH, 3rd generation; Future  -     Albumin / creatinine urine ratio    2. Diabetic autonomic neuropathy associated with type 2 diabetes mellitus (HCC)  -     CBC and differential; Future  -     Comprehensive metabolic panel; Future  -     Hemoglobin A1C; Future    3. Diabetic peripheral angiopathy (HCC)  -     CBC and differential; Future  -     Comprehensive metabolic panel; Future  -     Hemoglobin A1C; Future    4. Primary hypertension  -     CBC and differential; Future  -     Comprehensive metabolic panel; Future    5. Mixed hyperlipidemia  -     CBC and differential; Future  -     Comprehensive metabolic panel; Future  -     Lipid panel; Future    6. Vitamin D deficiency  -     CBC and differential; Future  -     Comprehensive metabolic panel; Future  -     Vitamin D 25 hydroxy; Future    7. Hx of right BKA (HCC)  -     CBC and differential; Future  -     Comprehensive metabolic panel; Future    8. Severe obesity (BMI 35.0-39.9) with comorbidity (HCC)    Orders and recommendations as noted above.  Discussed with him trying to control his blood sugars better.  Watch diet more closely.  Watch carbohydrate intake.  Discussed getting laboratory testing especially since his previous hemoglobin A1c had been significantly elevated.  Very difficult to control his blood sugars especially with the issues with the cost of medications.  Discussed increasing the dose of his Basaglar and coverage medications but he is hesitant to do so until after his laboratory testing.  Discussed following up with ophthalmology regularly.  Check left foot daily.  Watch for any areas of irritation on the left leg or the right stump.  Continue  with the atorvastatin.  Will check lipid panel with upcoming laboratory testing.  Continue with lisinopril for renal protection.  Blood pressure currently controlled.  Continue with the metformin.  Discussed with him additional medications such as Ozempic or Jardiance but is hesitant because of the cost.  Will have him follow-up in about 2 to 3 months or sooner if needed depending on laboratory testing.    Follow-up  The patient will follow up in 3 months.      Depression Screening and Follow-up Plan: Patient was screened for depression during today's encounter. They screened negative with a PHQ-2 score of 0.               Subjective:      Patient ID: Art Joseph is a 64 y.o. male who presents for follow-up.    He continues with issues with his blood sugars.  Often his blood sugars are over 200.  Cannot state specific numbers, however.  Has continued with the Basaglar twice a day as well as the NovoLog.  Is hesitant to follow-up with endocrinology because of transportation issues.  Has had issues with cost of medications in the past.  Is hesitant to add any additional medications because of potential cost.  Has not had his laboratory testing completed but does plan to do so in the near future.  Tolerating the atorvastatin without difficulty.  Denies any significant muscle aches or weakness.  Tolerating lisinopril.  Denies any headaches or localized weakness.  Denies any GI issues from the metformin.    The patient's blood sugar levels have been above 200 mg/dL. He neglected to perform his blood tests. He experiences no sleep disturbances. His dietary intake has been satisfactory. He is managing his medications adequately. He administers atorvastatin on a daily basis. His neuropathy-related pain has been manageable. Occasionally, he experiences phantom limb pain. His visual acuity is within the normal range. He is injecting 55 units of insulin twice daily and 15 units with meals.    He accidentally kicked a piece  of his bed, resulting in a leg scrape. His condition has improved since the incident.    The following portions of the patient's history were reviewed and updated as appropriate: He  has a past medical history of Diabetes mellitus ([facility]) and Hypertension.  He   Patient Active Problem List    Diagnosis Date Noted   • Severe obesity (BMI 35.0-39.9) with comorbidity ([facility]) 08/25/2021   • Diabetic peripheral angiopathy ([facility]) 02/18/2020   • Vitamin D deficiency 02/01/2019   • Ambulatory dysfunction 10/17/2017   • Hx of right BKA ([facility]) 07/31/2017   • Hypertension 04/14/2017   • Hyperlipidemia 04/14/2017   • Diabetes mellitus ([facility]) 01/27/2017   • Diabetic neuropathy ([facility]) 01/27/2017     He  has a past surgical history that includes pr amputation metatarsal w/toe single (Left, 1/30/2017); Wound debridement (Right, 1/30/2017); pr amputation foot transmetarsal (Right, 1/26/2017); Leg amputation, lower tibia/fibula (Right, 7/25/2017); and pr colonoscopy flx dx w/collj spec when pfrmd (N/A, 11/28/2018).  His family history includes Diabetes in his mother.  He  reports that he has quit smoking. His smoking use included cigarettes. He has never used smokeless tobacco. He reports current alcohol use of about 11.0 standard drinks of alcohol per week. He reports that he does not use drugs.  Current Outpatient Medications   Medication Sig Dispense Refill   • aspirin (ECOTRIN LOW STRENGTH) 81 mg EC tablet Take 81 mg by mouth daily Resume on 8/11/17      • atorvastatin (LIPITOR) 80 mg tablet Take 1 tablet (80 mg total) by mouth daily 90 tablet 3   • Cholecalciferol (Vitamin D) 125 MCG (5000 UT) CAPS Take by mouth     • glucose blood test strip Test blood sugars 4 times a day 400 each 3   • insulin aspart (NovoLOG FlexPen) 100 UNIT/ML injection pen Inject 15 Units under the skin 3 (three) times a day with meals 45 mL 1   • Insulin Glargine Solostar (Basaglar KwikPen) 100 UNIT/ML SOPN Inject 0.55 mL (55 Units total) under the skin every 12  (twelve) hours 30 mL 2   • Insulin Pen Needle 30G X 8 MM MISC Inject under the skin daily at bedtime 100 each 5   • lisinopril (ZESTRIL) 2.5 mg tablet Take 1 tablet (2.5 mg total) by mouth daily 90 tablet 3   • metFORMIN (GLUCOPHAGE) 1000 MG tablet TAKE 1 TABLET TWICE DAILY  WITH MEALS 180 tablet 1   • Multiple Vitamin (MULTI-VITAMIN DAILY PO) Take 1 tablet by mouth daily       No current facility-administered medications for this visit.     Current Outpatient Medications on File Prior to Visit   Medication Sig   • aspirin (ECOTRIN LOW STRENGTH) 81 mg EC tablet Take 81 mg by mouth daily Resume on 8/11/17    • atorvastatin (LIPITOR) 80 mg tablet Take 1 tablet (80 mg total) by mouth daily   • Cholecalciferol (Vitamin D) 125 MCG (5000 UT) CAPS Take by mouth   • glucose blood test strip Test blood sugars 4 times a day   • insulin aspart (NovoLOG FlexPen) 100 UNIT/ML injection pen Inject 15 Units under the skin 3 (three) times a day with meals   • Insulin Glargine Solostar (Basaglar KwikPen) 100 UNIT/ML SOPN Inject 0.55 mL (55 Units total) under the skin every 12 (twelve) hours   • Insulin Pen Needle 30G X 8 MM MISC Inject under the skin daily at bedtime   • lisinopril (ZESTRIL) 2.5 mg tablet Take 1 tablet (2.5 mg total) by mouth daily   • metFORMIN (GLUCOPHAGE) 1000 MG tablet TAKE 1 TABLET TWICE DAILY  WITH MEALS   • Multiple Vitamin (MULTI-VITAMIN DAILY PO) Take 1 tablet by mouth daily     No current facility-administered medications on file prior to visit.     He has No Known Allergies..    Review of Systems   Constitutional:  Positive for activity change. Negative for appetite change, chills and fever.   HENT:  Negative for hearing loss.    Eyes:  Negative for pain and visual disturbance.   Respiratory:  Negative for chest tightness and shortness of breath.    Cardiovascular:  Negative for chest pain and palpitations.   Gastrointestinal:  Negative for abdominal pain, blood in stool, diarrhea, nausea and vomiting.  "  Endocrine: Negative for polydipsia, polyphagia and polyuria.        As per HPI   Genitourinary:  Negative for dysuria.   Musculoskeletal:  Positive for gait problem. Negative for arthralgias.   Skin:  Negative for color change.        As per HPI   Neurological:  Negative for dizziness and headaches.   Hematological:  Does not bruise/bleed easily.   Psychiatric/Behavioral:  Negative for behavioral problems, decreased concentration and dysphoric mood. The patient is not nervous/anxious.      Constitutional: Negative for activity change, appetite change, chills and fever.   HENT: Negative for hearing loss.    Eyes: Negative for pain and visual disturbance.   Respiratory: Negative for chest tightness and shortness of breath.    Cardiovascular: Negative for chest pain and palpitations.   Gastrointestinal: Negative for abdominal pain, blood in stool, diarrhea, nausea and vomiting.   Endocrine: Negative for polydipsia, polyphagia and polyuria.   Genitourinary: Negative for dysuria.   Musculoskeletal: Negative for arthralgias and gait problem.   Skin: Negative for color change.   Neurological: Negative for dizziness and headaches.   Hematological: Does not bruise/bleed easily.   Psychiatric/Behavioral: Negative for behavioral problems. The patient is not nervous/anxious.      Objective:  /78 (BP Location: Left arm, Patient Position: Sitting, Cuff Size: Large)   Pulse 103   Temp 97.9 °F (36.6 °C)   Ht 5' 10\" (1.778 m)   Wt 104 kg (229 lb 11.2 oz)   SpO2 98%   BMI 32.96 kg/m²          Physical Exam  Vitals and nursing note reviewed.   Constitutional:       General: He is not in acute distress.     Appearance: He is well-developed.      Comments: Somewhat disheveled and clothing is somewhat dirty   HENT:      Head: Normocephalic and atraumatic.      Nose: Nose normal.   Eyes:      Conjunctiva/sclera: Conjunctivae normal.      Pupils: Pupils are equal, round, and reactive to light.   Cardiovascular:      Rate and " Rhythm: Normal rate and regular rhythm.      Heart sounds: Normal heart sounds. No murmur heard.     No friction rub. No gallop.   Pulmonary:      Breath sounds: No wheezing or rales.   Chest:      Chest wall: No tenderness.   Abdominal:      General: There is no distension.      Tenderness: There is no abdominal tenderness. There is no guarding or rebound.   Musculoskeletal:      Comments: Right BKA   Lymphadenopathy:      Cervical: No cervical adenopathy.   Skin:     General: Skin is warm and dry.      Comments: Scaling of skin left lower extremity with some venous stasis changes   Neurological:      Mental Status: He is alert and oriented to person, place, and time.   Psychiatric:         Mood and Affect: Mood and affect normal.         Speech: Speech normal.         Behavior: Behavior normal. Behavior is cooperative.         Cognition and Memory: Cognition and memory normal.      Comments: Pleasant and interactive     Below is the patient's most recent value for Albumin, ALT, AST, BUN, Calcium, Chloride, Cholesterol, CO2, Creatinine, GFR, Glucose, HDL, Hematocrit, Hemoglobin, Hemoglobin A1C, LDL, Magnesium, Phosphorus, Platelets, Potassium, PSA, Sodium, Triglycerides, and WBC.   Lab Results   Component Value Date    ALT 9 10/27/2023    AST 13 10/27/2023    BUN 16 10/27/2023    CALCIUM 8.7 10/27/2023    CL 97 10/27/2023    CO2 23 10/27/2023    CREATININE 1.09 10/27/2023    HDL 33 (L) 10/27/2023    HCT 47.8 10/27/2023    HGB 15.4 10/27/2023    HGBA1C 14.1 (H) 10/27/2023    PHOS 3.1 01/12/2021     10/27/2023    K 3.8 10/27/2023    PSA 1.9 04/24/2023     01/06/2014    TRIG 187 (H) 10/27/2023    WBC 9.67 10/27/2023     Note: for a comprehensive list of the patient's lab results, access the Results Review activity.    I have personally reviewed results with the patient.        Transcribed for Kerry Christine MD, by Polo Frey on 03/25/24 at 9:57 PM. Powered by Dragon [employer].

## 2024-04-12 ENCOUNTER — OFFICE VISIT (OUTPATIENT)
Dept: URGENT CARE | Facility: CLINIC | Age: 64
End: 2024-04-12
Payer: COMMERCIAL

## 2024-04-12 ENCOUNTER — TELEPHONE (OUTPATIENT)
Dept: INTERNAL MEDICINE CLINIC | Facility: CLINIC | Age: 64
End: 2024-04-12

## 2024-04-12 VITALS
DIASTOLIC BLOOD PRESSURE: 68 MMHG | RESPIRATION RATE: 20 BRPM | OXYGEN SATURATION: 96 % | SYSTOLIC BLOOD PRESSURE: 147 MMHG | TEMPERATURE: 97.6 F | HEART RATE: 82 BPM

## 2024-04-12 DIAGNOSIS — L03.115 CELLULITIS OF RIGHT LEG: Primary | ICD-10-CM

## 2024-04-12 DIAGNOSIS — S81.801A OPEN WOUND OF RIGHT LOWER LEG, INITIAL ENCOUNTER: ICD-10-CM

## 2024-04-12 PROCEDURE — 99213 OFFICE O/P EST LOW 20 MIN: CPT | Performed by: PHYSICIAN ASSISTANT

## 2024-04-12 RX ORDER — CEPHALEXIN 500 MG/1
500 CAPSULE ORAL EVERY 6 HOURS SCHEDULED
Qty: 28 CAPSULE | Refills: 0 | Status: SHIPPED | OUTPATIENT
Start: 2024-04-12 | End: 2024-04-19

## 2024-04-12 NOTE — PROGRESS NOTES
[facility]    NAME: Art Joseph is a 64 y.o. male  : 1960    MRN: [id number]  DATE: 2024  TIME: 2:17 PM    Assessment and Plan   Cellulitis of right leg [L03.115]  1. Cellulitis of right leg  cephalexin (KEFLEX) 500 mg capsule      2. Open wound of right lower leg, initial encounter  Ambulatory Referral to Wound Care          Patient Instructions     Patient Instructions   Antibiotic as directed.  Follow up with wound care.      Chief Complaint     Chief Complaint   Patient presents with    Wound Check     Left lower leg injury from hitting it with his prosthetic, now open and draining and manju wound red, occurred 3 days ago        History of Present Illness   64-year-old male here with complaint of wound on his left lower leg and redness.  Patient states that his prosthetic from his right hit the front of his left leg and caused an open wound.  His left lower leg is getting very red.  No fever or chills.        Review of Systems   Review of Systems   Constitutional:  Negative for chills, fatigue and fever.   HENT:  Negative for congestion, ear pain, postnasal drip, sinus pressure and sore throat.    Respiratory:  Negative for cough, shortness of breath and wheezing.    Skin:  Positive for color change and wound.   Neurological:  Negative for headaches.   All other systems reviewed and are negative.      Current Medications     Current Outpatient Medications:     aspirin (ECOTRIN LOW STRENGTH) 81 mg EC tablet, Take 81 mg by mouth daily Resume on 17 , Disp: , Rfl:     atorvastatin (LIPITOR) 80 mg tablet, Take 1 tablet (80 mg total) by mouth daily, Disp: 90 tablet, Rfl: 3    cephalexin (KEFLEX) 500 mg capsule, Take 1 capsule (500 mg total) by mouth every 6 (six) hours for 7 days, Disp: 28 capsule, Rfl: 0    Cholecalciferol (Vitamin D) 125 MCG (5000 UT) CAPS, Take by mouth, Disp: , Rfl:     glucose blood test strip, Test blood sugars 4 times a day, Disp: 400 each, Rfl: 3    insulin  aspart (NovoLOG FlexPen) 100 UNIT/ML injection pen, Inject 15 Units under the skin 3 (three) times a day with meals, Disp: 45 mL, Rfl: 1    Insulin Glargine Solostar (Basaglar KwikPen) 100 UNIT/ML SOPN, Inject 0.55 mL (55 Units total) under the skin every 12 (twelve) hours, Disp: 30 mL, Rfl: 2    Insulin Pen Needle 30G X 8 MM MISC, Inject under the skin daily at bedtime, Disp: 100 each, Rfl: 5    lisinopril (ZESTRIL) 2.5 mg tablet, Take 1 tablet (2.5 mg total) by mouth daily, Disp: 90 tablet, Rfl: 3    metFORMIN (GLUCOPHAGE) 1000 MG tablet, TAKE 1 TABLET TWICE DAILY  WITH MEALS, Disp: 180 tablet, Rfl: 1    Multiple Vitamin (MULTI-VITAMIN DAILY PO), Take 1 tablet by mouth daily, Disp: , Rfl:     Current Allergies     Allergies as of 04/12/2024    (No Known Allergies)          The following portions of the patient's history were reviewed and updated as appropriate: allergies, current medications, past family history, past medical history, past social history, past surgical history and problem list.   Past Medical History:   Diagnosis Date    Diabetes mellitus (HCC)     Hypertension      Past Surgical History:   Procedure Laterality Date    LEG AMPUTATION THROUGH LOWER TIBIA AND FIBULA Right 7/25/2017    Procedure: AMPUTATION BELOW KNEE (BKA);  Surgeon: Mynor Sanchez MD;  Location:  MAIN OR;  Service: General    OH AMPUTATION FOOT TRANSMETARSAL Right 1/26/2017    Procedure: AMPUTATION TRANSMETATARSAL (TMA); OPEN;  Surgeon: Leonard Lomeli DPM;  Location:  MAIN OR;  Service: Podiatry    OH AMPUTATION METATARSAL W/TOE SINGLE Left 1/30/2017    Procedure: Left partial 1st ray amputation;  Surgeon: Leonard Lomeli DPM;  Location:  MAIN OR;  Service: Podiatry    OH COLONOSCOPY FLX DX W/COLLJ SPEC WHEN PFRMD N/A 11/28/2018    Procedure: COLONOSCOPY;  Surgeon: González Napier MD;  Location: [facility];  Service: Gastroenterology    WOUND DEBRIDEMENT Right 1/30/2017    Procedure: DEBRIDEMENT FOOT/TOE (WASH OUT) delayed  [place];  Surgeon: Leonard Lomeli DPM;  Location: [facility];  Service:      Family History   Problem Relation Age of Onset    Diabetes Mother      Social History     Socioeconomic History    Marital status: Single     Spouse name: Not on file    Number of children: Not on file    Years of education: Not on file    Highest education level: Not on file   Occupational History    Occupation: retired   Tobacco Use    Smoking status: Former     Types: Cigarettes    Smokeless tobacco: Never    Tobacco comments:     quit 9 years ago   Vaping Use    Vaping status: Never Used   Substance and Sexual Activity    Alcohol use: Yes     Alcohol/week: 11.0 standard drinks of alcohol     Types: 6 Cans of beer, 5 Shots of liquor per week     Comment: social ; occasional use as per [employer]    Drug use: No    Sexual activity: Not on file   Other Topics Concern    Not on file   Social History Narrative    Always uses seat belt    Caffeine use    Dental care regularly     Social Determinants of Health     Financial Resource Strain: Medium Risk (4/25/2023)    Overall Financial Resource Strain (CARDIA)     Difficulty of Paying Living Expenses: Somewhat hard   Food Insecurity: Not on file   Transportation Needs: Unmet Transportation Needs (4/25/2023)    PRAPARE - Transportation     Lack of Transportation (Medical): Yes     Lack of Transportation (Non-Medical): Yes   Physical Activity: Not on file   Stress: Not on file   Social Connections: Not on file   Intimate Partner Violence: Not on file   Housing Stability: Not on file     Medications have been verified.    Objective   /68   Pulse 82   Temp 97.6 °F (36.4 °C)   Resp 20   SpO2 96%      Physical Exam   Physical Exam  Vitals reviewed.   Constitutional:       General: He is not in acute distress.     Appearance: He is well-developed.   HENT:      Head: Normocephalic and atraumatic.      Right Ear: Tympanic membrane normal.      Left Ear: Tympanic membrane normal.      Nose:  No mucosal edema.   Cardiovascular:      Rate and Rhythm: Normal rate and regular rhythm.      Heart sounds: Normal heart sounds.   Pulmonary:      Effort: Pulmonary effort is normal. No respiratory distress.      Breath sounds: Normal breath sounds.   Musculoskeletal:      Left lower leg: Edema present.   Skin:     Findings: Erythema present.      Comments: Left lower leg with 2 scabbed over areas with surrounding erythema

## 2024-04-12 NOTE — TELEPHONE ENCOUNTER
Patient called stating that he has an open wound on his left shin for about a week and it is open (no blood but oozing).  It started as a little red bruise with water bumps.    After consulting Christy I told patient as instructed, that he can come in now to be seen but he is in [place].  I then told him that it should be seen before Monday and he said that he will go to Urgent Care today.

## 2024-04-18 ENCOUNTER — TELEPHONE (OUTPATIENT)
Dept: INTERNAL MEDICINE CLINIC | Facility: CLINIC | Age: 64
End: 2024-04-18

## 2024-04-18 NOTE — LETTER
Date: 4/18/2024    To whom it may concern:     This is to certify that Art Joseph has been under my care for the following diagnosis: Ambulatory Dysfunction ,unable to drive .        I feel that Art Joseph is unable to serve on Jury Duty at this time for the above mentioned medical reasons.                  Sincerely,  Kerry Christine

## 2024-04-18 NOTE — TELEPHONE ENCOUNTER
Patient called stating he needs a note to be excused from jury duty . Note Sent                   Yes, hello. My name is LUIS Cordova P as in [place]. My telephone number, [phone]. I had received a notification to serve for jury duty with the  [place] And of course, due to my disability and not being able to drive, I can't do that. And I was told that what I needed to do was to call Doctor Rock and just have her put on something with her doctor's name on it. That I, Art Joseph, would be unable to serve on jury duty. And I have a fax number that you [email or web address] can fax it to. That fax number being [phone] again [phone]. Just started a note from the doctor with the doctor doctor's name on it stating that eye drop and it would be unable to serve on jury duty due to my handicap. If you have further questions you need to call me back. My telephone number again [phone]. I'd like to thank you montse for your assistance. I hope you have a wonderful day. And is there any questions that they said give me a call. Thank you very much. Bye, bye.

## 2024-04-23 ENCOUNTER — HOSPITAL ENCOUNTER (INPATIENT)
Facility: HOSPITAL | Age: 64
DRG: 617 | End: 2024-04-23
Attending: EMERGENCY MEDICINE | Admitting: FAMILY MEDICINE
Payer: COMMERCIAL

## 2024-04-23 ENCOUNTER — OFFICE VISIT (OUTPATIENT)
Dept: WOUND CARE | Facility: CLINIC | Age: 64
DRG: 617 | End: 2024-04-23
Payer: COMMERCIAL

## 2024-04-23 ENCOUNTER — APPOINTMENT (EMERGENCY)
Dept: RADIOLOGY | Facility: HOSPITAL | Age: 64
DRG: 617 | End: 2024-04-23
Payer: COMMERCIAL

## 2024-04-23 VITALS
BODY MASS INDEX: 32.78 KG/M2 | RESPIRATION RATE: 18 BRPM | DIASTOLIC BLOOD PRESSURE: 68 MMHG | HEIGHT: 70 IN | HEART RATE: 72 BPM | SYSTOLIC BLOOD PRESSURE: 132 MMHG | WEIGHT: 229 LBS | TEMPERATURE: 96.1 F

## 2024-04-23 DIAGNOSIS — L97.522 ULCER OF LEFT FOOT WITH FAT LAYER EXPOSED (HCC): ICD-10-CM

## 2024-04-23 DIAGNOSIS — L03.116 CELLULITIS OF LEFT LOWER EXTREMITY: ICD-10-CM

## 2024-04-23 DIAGNOSIS — E11.9 DIABETES (HCC): ICD-10-CM

## 2024-04-23 DIAGNOSIS — E55.9 VITAMIN D DEFICIENCY: ICD-10-CM

## 2024-04-23 DIAGNOSIS — S91.309A OPEN WOUND OF FOOT, INITIAL ENCOUNTER: ICD-10-CM

## 2024-04-23 DIAGNOSIS — L03.119 CELLULITIS OF FOOT: ICD-10-CM

## 2024-04-23 DIAGNOSIS — M86.172 ACUTE OSTEOMYELITIS OF METATARSAL BONE OF LEFT FOOT (HCC): ICD-10-CM

## 2024-04-23 DIAGNOSIS — E11.42 TYPE 2 DIABETES MELLITUS WITH DIABETIC POLYNEUROPATHY, UNSPECIFIED WHETHER LONG TERM INSULIN USE (HCC): ICD-10-CM

## 2024-04-23 DIAGNOSIS — E11.621 TYPE 2 DIABETES MELLITUS WITH FOOT ULCER, WITH LONG-TERM CURRENT USE OF INSULIN (HCC): ICD-10-CM

## 2024-04-23 DIAGNOSIS — R79.89 ELEVATED PLATELET COUNT: ICD-10-CM

## 2024-04-23 DIAGNOSIS — L97.509 TYPE 2 DIABETES MELLITUS WITH FOOT ULCER, WITH LONG-TERM CURRENT USE OF INSULIN (HCC): ICD-10-CM

## 2024-04-23 DIAGNOSIS — B37.2 CANDIDIASIS, INTERTRIGO: ICD-10-CM

## 2024-04-23 DIAGNOSIS — B88.8 INFESTATION BY BED BUG: ICD-10-CM

## 2024-04-23 DIAGNOSIS — R60.0 LOCALIZED EDEMA: ICD-10-CM

## 2024-04-23 DIAGNOSIS — L03.90 CELLULITIS: Primary | ICD-10-CM

## 2024-04-23 DIAGNOSIS — L97.921 ULCER OF LEG, CHRONIC, LEFT, LIMITED TO BREAKDOWN OF SKIN (HCC): ICD-10-CM

## 2024-04-23 DIAGNOSIS — Z79.4 TYPE 2 DIABETES MELLITUS WITH FOOT ULCER, WITH LONG-TERM CURRENT USE OF INSULIN (HCC): ICD-10-CM

## 2024-04-23 DIAGNOSIS — M86.472 CHRONIC OSTEOMYELITIS OF LEFT FOOT WITH DRAINING SINUS (HCC): ICD-10-CM

## 2024-04-23 PROBLEM — L03.115 CELLULITIS OF RIGHT LOWER EXTREMITY: Status: ACTIVE | Noted: 2024-04-23

## 2024-04-23 LAB
ALBUMIN SERPL BCP-MCNC: 3.7 G/DL (ref 3.5–5)
ALP SERPL-CCNC: 140 U/L (ref 34–104)
ALT SERPL W P-5'-P-CCNC: 6 U/L (ref 7–52)
ANION GAP SERPL CALCULATED.3IONS-SCNC: 13 MMOL/L (ref 4–13)
AST SERPL W P-5'-P-CCNC: 17 U/L (ref 13–39)
BASOPHILS # BLD AUTO: 0.06 THOUSANDS/ÂΜL (ref 0–0.1)
BASOPHILS NFR BLD AUTO: 1 % (ref 0–1)
BILIRUB SERPL-MCNC: 0.51 MG/DL (ref 0.2–1)
BUN SERPL-MCNC: 17 MG/DL (ref 5–25)
CALCIUM SERPL-MCNC: 9.2 MG/DL (ref 8.4–10.2)
CHLORIDE SERPL-SCNC: 99 MMOL/L (ref 96–108)
CO2 SERPL-SCNC: 22 MMOL/L (ref 21–32)
CREAT SERPL-MCNC: 0.92 MG/DL (ref 0.6–1.3)
CRP SERPL QL: 51.8 MG/L
EOSINOPHIL # BLD AUTO: 0.21 THOUSAND/ÂΜL (ref 0–0.61)
EOSINOPHIL NFR BLD AUTO: 2 % (ref 0–6)
ERYTHROCYTE [DISTWIDTH] IN BLOOD BY AUTOMATED COUNT: 12.5 % (ref 11.6–15.1)
ERYTHROCYTE [SEDIMENTATION RATE] IN BLOOD: 77 MM/HOUR (ref 0–19)
GFR SERPL CREATININE-BSD FRML MDRD: 86 ML/MIN/1.73SQ M
GLUCOSE SERPL-MCNC: 221 MG/DL (ref 65–140)
GLUCOSE SERPL-MCNC: 268 MG/DL (ref 65–140)
GLUCOSE SERPL-MCNC: 86 MG/DL (ref 65–140)
HCT VFR BLD AUTO: 45.2 % (ref 36.5–49.3)
HGB BLD-MCNC: 14.5 G/DL (ref 12–17)
IMM GRANULOCYTES # BLD AUTO: 0.04 THOUSAND/UL (ref 0–0.2)
IMM GRANULOCYTES NFR BLD AUTO: 0 % (ref 0–2)
LACTATE SERPL-SCNC: 1.8 MMOL/L (ref 0.5–2)
LYMPHOCYTES # BLD AUTO: 2.1 THOUSANDS/ÂΜL (ref 0.6–4.47)
LYMPHOCYTES NFR BLD AUTO: 19 % (ref 14–44)
MCH RBC QN AUTO: 28.5 PG (ref 26.8–34.3)
MCHC RBC AUTO-ENTMCNC: 32.1 G/DL (ref 31.4–37.4)
MCV RBC AUTO: 89 FL (ref 82–98)
MONOCYTES # BLD AUTO: 0.78 THOUSAND/ÂΜL (ref 0.17–1.22)
MONOCYTES NFR BLD AUTO: 7 % (ref 4–12)
NEUTROPHILS # BLD AUTO: 7.63 THOUSANDS/ÂΜL (ref 1.85–7.62)
NEUTS SEG NFR BLD AUTO: 71 % (ref 43–75)
NRBC BLD AUTO-RTO: 0 /100 WBCS
PLATELET # BLD AUTO: 367 THOUSANDS/UL (ref 149–390)
PMV BLD AUTO: 9.1 FL (ref 8.9–12.7)
POTASSIUM SERPL-SCNC: 4.5 MMOL/L (ref 3.5–5.3)
PROCALCITONIN SERPL-MCNC: <0.05 NG/ML
PROT SERPL-MCNC: 8 G/DL (ref 6.4–8.4)
RBC # BLD AUTO: 5.09 MILLION/UL (ref 3.88–5.62)
SODIUM SERPL-SCNC: 134 MMOL/L (ref 135–147)
WBC # BLD AUTO: 10.82 THOUSAND/UL (ref 4.31–10.16)

## 2024-04-23 PROCEDURE — 83605 ASSAY OF LACTIC ACID: CPT | Performed by: EMERGENCY MEDICINE

## 2024-04-23 PROCEDURE — 86140 C-REACTIVE PROTEIN: CPT | Performed by: EMERGENCY MEDICINE

## 2024-04-23 PROCEDURE — 99285 EMERGENCY DEPT VISIT HI MDM: CPT | Performed by: EMERGENCY MEDICINE

## 2024-04-23 PROCEDURE — 82948 REAGENT STRIP/BLOOD GLUCOSE: CPT

## 2024-04-23 PROCEDURE — 99204 OFFICE O/P NEW MOD 45 MIN: CPT | Performed by: PODIATRIST

## 2024-04-23 PROCEDURE — 84145 PROCALCITONIN (PCT): CPT | Performed by: EMERGENCY MEDICINE

## 2024-04-23 PROCEDURE — 85025 COMPLETE CBC W/AUTO DIFF WBC: CPT | Performed by: EMERGENCY MEDICINE

## 2024-04-23 PROCEDURE — 80053 COMPREHEN METABOLIC PANEL: CPT | Performed by: EMERGENCY MEDICINE

## 2024-04-23 PROCEDURE — 99284 EMERGENCY DEPT VISIT MOD MDM: CPT

## 2024-04-23 PROCEDURE — 73630 X-RAY EXAM OF FOOT: CPT

## 2024-04-23 PROCEDURE — 1124F ACP DISCUSS-NO DSCNMKR DOCD: CPT | Performed by: INTERNAL MEDICINE

## 2024-04-23 PROCEDURE — 73590 X-RAY EXAM OF LOWER LEG: CPT

## 2024-04-23 PROCEDURE — 99223 1ST HOSP IP/OBS HIGH 75: CPT | Performed by: FAMILY MEDICINE

## 2024-04-23 PROCEDURE — 99213 OFFICE O/P EST LOW 20 MIN: CPT | Performed by: PODIATRIST

## 2024-04-23 PROCEDURE — 85652 RBC SED RATE AUTOMATED: CPT | Performed by: EMERGENCY MEDICINE

## 2024-04-23 PROCEDURE — 36415 COLL VENOUS BLD VENIPUNCTURE: CPT | Performed by: EMERGENCY MEDICINE

## 2024-04-23 PROCEDURE — 96365 THER/PROPH/DIAG IV INF INIT: CPT

## 2024-04-23 PROCEDURE — 87040 BLOOD CULTURE FOR BACTERIA: CPT | Performed by: EMERGENCY MEDICINE

## 2024-04-23 RX ORDER — ATORVASTATIN CALCIUM 40 MG/1
80 TABLET, FILM COATED ORAL DAILY
Status: DISPENSED | OUTPATIENT
Start: 2024-04-23

## 2024-04-23 RX ORDER — CEFEPIME HYDROCHLORIDE 2 G/50ML
2000 INJECTION, SOLUTION INTRAVENOUS ONCE
Status: COMPLETED | OUTPATIENT
Start: 2024-04-23 | End: 2024-04-23

## 2024-04-23 RX ORDER — INSULIN LISPRO 100 [IU]/ML
1-6 INJECTION, SOLUTION INTRAVENOUS; SUBCUTANEOUS EVERY 6 HOURS
Status: DISCONTINUED | OUTPATIENT
Start: 2024-04-23 | End: 2024-04-27

## 2024-04-23 RX ORDER — ASPIRIN 81 MG/1
81 TABLET, CHEWABLE ORAL DAILY
Status: DISPENSED | OUTPATIENT
Start: 2024-04-24

## 2024-04-23 RX ORDER — INSULIN GLARGINE 100 [IU]/ML
55 INJECTION, SOLUTION SUBCUTANEOUS EVERY 12 HOURS SCHEDULED
Status: DISCONTINUED | OUTPATIENT
Start: 2024-04-23 | End: 2024-04-23

## 2024-04-23 RX ORDER — CEFEPIME HYDROCHLORIDE 2 G/50ML
2000 INJECTION, SOLUTION INTRAVENOUS EVERY 8 HOURS
Status: DISPENSED | OUTPATIENT
Start: 2024-04-23

## 2024-04-23 RX ORDER — INSULIN LISPRO 100 [IU]/ML
15 INJECTION, SOLUTION INTRAVENOUS; SUBCUTANEOUS
Status: DISCONTINUED | OUTPATIENT
Start: 2024-04-23 | End: 2024-04-24

## 2024-04-23 RX ORDER — INSULIN GLARGINE 100 [IU]/ML
30 INJECTION, SOLUTION SUBCUTANEOUS EVERY 12 HOURS SCHEDULED
Status: DISCONTINUED | OUTPATIENT
Start: 2024-04-23 | End: 2024-04-27

## 2024-04-23 RX ORDER — METRONIDAZOLE 500 MG/1
500 TABLET ORAL EVERY 8 HOURS SCHEDULED
Status: DISPENSED | OUTPATIENT
Start: 2024-04-23

## 2024-04-23 RX ORDER — LISINOPRIL 5 MG/1
2.5 TABLET ORAL DAILY
Status: DISPENSED | OUTPATIENT
Start: 2024-04-23

## 2024-04-23 RX ORDER — ENOXAPARIN SODIUM 100 MG/ML
40 INJECTION SUBCUTANEOUS DAILY
Status: DISPENSED | OUTPATIENT
Start: 2024-04-23

## 2024-04-23 RX ORDER — INSULIN LISPRO 100 [IU]/ML
1-6 INJECTION, SOLUTION INTRAVENOUS; SUBCUTANEOUS
Status: DISCONTINUED | OUTPATIENT
Start: 2024-04-23 | End: 2024-04-23

## 2024-04-23 RX ADMIN — VANCOMYCIN HYDROCHLORIDE 1250 MG: 5 INJECTION, POWDER, LYOPHILIZED, FOR SOLUTION INTRAVENOUS at 23:35

## 2024-04-23 RX ADMIN — VANCOMYCIN HYDROCHLORIDE 2000 MG: 1 INJECTION, POWDER, LYOPHILIZED, FOR SOLUTION INTRAVENOUS at 11:50

## 2024-04-23 RX ADMIN — LISINOPRIL 2.5 MG: 5 TABLET ORAL at 14:44

## 2024-04-23 RX ADMIN — DESMOPRESSIN ACETATE 80 MG: 0.2 TABLET ORAL at 17:35

## 2024-04-23 RX ADMIN — CEFEPIME HYDROCHLORIDE 2000 MG: 2 INJECTION, SOLUTION INTRAVENOUS at 11:13

## 2024-04-23 RX ADMIN — METRONIDAZOLE 500 MG: 500 TABLET ORAL at 14:47

## 2024-04-23 RX ADMIN — METRONIDAZOLE 500 MG: 500 TABLET ORAL at 23:30

## 2024-04-23 RX ADMIN — INSULIN LISPRO 3 UNITS: 100 INJECTION, SOLUTION INTRAVENOUS; SUBCUTANEOUS at 17:35

## 2024-04-23 RX ADMIN — ENOXAPARIN SODIUM 40 MG: 40 INJECTION SUBCUTANEOUS at 14:43

## 2024-04-23 RX ADMIN — CEFEPIME HYDROCHLORIDE 2000 MG: 2 INJECTION, SOLUTION INTRAVENOUS at 22:52

## 2024-04-23 RX ADMIN — INSULIN LISPRO 15 UNITS: 100 INJECTION, SOLUTION INTRAVENOUS; SUBCUTANEOUS at 17:36

## 2024-04-23 NOTE — PLAN OF CARE
Problem: PAIN - ADULT  Goal: Verbalizes/displays adequate comfort level or baseline comfort level  Description: Interventions:  - Encourage patient to monitor pain and request assistance  - Assess pain using appropriate pain scale  - Administer analgesics based on type and severity of pain and evaluate response  - Implement non-pharmacological measures as appropriate and evaluate response  - Consider cultural and social influences on pain and pain management  - Notify physician/advanced practitioner if interventions unsuccessful or patient reports new pain  Outcome: Progressing     Problem: INFECTION - ADULT  Goal: Absence or prevention of progression during hospitalization  Description: INTERVENTIONS:  - Assess and monitor for signs and symptoms of infection  - Monitor lab/diagnostic results  - Monitor all insertion sites, i.e. indwelling lines, tubes, and drains  - Monitor endotracheal if appropriate and nasal secretions for changes in amount and color  - [place] appropriate cooling/warming therapies per order  - Administer medications as ordered  - Instruct and encourage patient and family to use good hand hygiene technique  - Identify and instruct in appropriate isolation precautions for identified infection/condition  Outcome: Progressing     Problem: SAFETY ADULT  Goal: Patient will remain free of falls  Description: INTERVENTIONS:  - Educate patient/family on patient safety including physical limitations  - Instruct patient to call for assistance with activity   - Consult OT/PT to assist with strengthening/mobility   - Keep Call bell within reach  - Keep bed low and locked with side rails adjusted as appropriate  - Keep care items and personal belongings within reach  - Initiate and maintain comfort rounds  - Make Fall Risk Sign visible to staff  - Offer Toileting every 2 Hours, in advance of need  - Initiate/Maintain bed/chair alarm  - Obtain necessary fall risk management equipment: as needed  - Apply  yellow socks and bracelet for high fall risk patients  - Consider moving patient to room near nurses station  Outcome: Progressing     Problem: DISCHARGE PLANNING  Goal: Discharge to home or other facility with appropriate resources  Description: INTERVENTIONS:  - Identify barriers to discharge w/patient and caregiver  - Arrange for needed discharge resources and transportation as appropriate  - Identify discharge learning needs (meds, wound care, etc.)  - Arrange for interpretive services to assist at discharge as needed  - Refer to Case Management Department for coordinating discharge planning if the patient needs post-hospital services based on physician/advanced practitioner order or complex needs related to functional status, cognitive ability, or social support system  Outcome: Progressing     Problem: Knowledge Deficit  Goal: Patient/family/caregiver demonstrates understanding of disease process, treatment plan, medications, and discharge instructions  Description: Complete learning assessment and assess knowledge base.  Interventions:  - Provide teaching at level of understanding  - Provide teaching via preferred learning methods  Outcome: Progressing     Problem: METABOLIC, FLUID AND ELECTROLYTES - ADULT  Goal: Fluid balance maintained  Description: INTERVENTIONS:  - Monitor labs   - Monitor I/O and WT  - Instruct patient on fluid and nutrition as appropriate  - Assess for signs & symptoms of volume excess or deficit  Outcome: Progressing  Goal: Glucose maintained within target range  Description: INTERVENTIONS:  - Monitor Blood Glucose as ordered  - Assess for signs and symptoms of hyperglycemia and hypoglycemia  - Administer ordered medications to maintain glucose within target range  - Assess nutritional intake and initiate nutrition service referral as needed  Outcome: Progressing     Problem: SKIN/TISSUE INTEGRITY - ADULT  Goal: Incision(s), wounds(s) or drain site(s) healing without S/S of  infection  Description: INTERVENTIONS  - Assess and document dressing, incision, wound bed, drain sites and surrounding tissue  - Provide patient and family education  - Perform skin care/dressing changes as ordered  Outcome: Progressing     Problem: MUSCULOSKELETAL - ADULT  Goal: Maintain or return mobility to safest level of function  Description: INTERVENTIONS:  - Assess patient's ability to carry out ADLs; assess patient's baseline for ADL function and identify physical deficits which impact ability to perform ADLs (bathing, care of mouth/teeth, toileting, grooming, dressing, etc.)  - Assess/evaluate cause of self-care deficits   - Assess range of motion  - Assess patient's mobility  - Assess patient's need for assistive devices and provide as appropriate  - Encourage maximum independence but intervene and supervise when necessary  - Involve family in performance of ADLs  - Assess for home care needs following discharge   - Consider OT consult to assist with ADL evaluation and planning for discharge  - Provide patient education as appropriate  Outcome: Progressing

## 2024-04-23 NOTE — PROGRESS NOTES
Art Joseph is a 64 y.o. male who is currently ordered Vancomycin IV with management by the Pharmacy Consult service.  Relevant clinical data and objective / subjective history reviewed.  Vancomycin Assessment:  Indication and Goal AUC/Trough: Soft tissue (goal -600, trough >10)  Clinical Status: stable  Renal Function:  SCr: 0.92 mg/dL  CrCl: 96.7 mL/min  Renal replacement: Not on dialysis  Days of Therapy: 1  Current Dose: received 2000mg loading dose  Vancomycin Plan:  New Dosinmg Q12H  Estimated AUC: 531 mcg*hr/mL  Estimated Trough: 15.7 mcg/mL  Next Level: 2024 in the AM  Renal Function Monitoring: Daily BMP and UOP  Pharmacy will continue to follow closely for s/sx of nephrotoxicity, infusion reactions and appropriateness of therapy.  BMP and CBC will be ordered per protocol. We will continue to follow the patient’s culture results and clinical progress daily.    Reggie Saez, Pharmacist

## 2024-04-23 NOTE — ASSESSMENT & PLAN NOTE
Patient seen by outpatient wound care today for open wound of the left lower extremity  Subsequently sent to ED due to concern for left lower extremity cellulitis with erythema and ulceration noted as well as severe ulceration and purulent drainage of the left foot  On admission, WBC at 10.82, CRP 51.8, sed rate 77  Blood culture x2  XR L foot + XR L tibia/fibula order  MRI left foot ordered  IV cefepime 2 g q8h + IV vancomycin 1.25 g q12h with PO metronidazole 500 mg q8h  Podiatry consult placed, appreciate recommendations  Wound care consult

## 2024-04-23 NOTE — PROGRESS NOTES
Wound Procedure Treatment    Performed by: Kimberlee Irizarry RN  Authorized by: Myron Bhakta DPM  Associated wounds:   Wound 04/23/24 Venous Ulcer Leg Left  Wound 04/23/24 Diabetic Ulcer Foot Left  Wound cleansed with:  Soap and water  Applied primary dressing:  Other  Applied secondary dressing:  ABD  Wound secured with:  Kerlix and Tape  Home care instructions:  [employer]

## 2024-04-23 NOTE — ED NOTES
Pt taken to decon shower and deconed for bed bugs before triage     Jerri Garcia, RN  04/23/24 0077

## 2024-04-23 NOTE — H&P
"[facility]  H&P  Name: Art Joseph 64 y.o. male I MRN: [id number]  Unit/Bed#: RM07 I Date of Admission: 4/23/2024   Date of Service: 4/23/2024 I Hospital Day: 0      Assessment/Plan   Cellulitis of left lower extremity  Assessment & Plan  Patient seen by outpatient wound care today for open wound of the left lower extremity  Subsequently sent to ED due to concern for left lower extremity cellulitis with erythema and ulceration noted as well as severe ulceration and purulent drainage of the left foot  On admission, WBC at 10.82, CRP 51.8, sed rate 77  Blood culture x2  XR L foot + XR L tibia/fibula order  MRI left foot ordered  IV cefepime 2 g q8h + IV vancomycin 1.25 g q12h with PO metronidazole 500 mg q8h  Podiatry consult placed, appreciate recommendations  Wound care consult     Hyperlipidemia  Assessment & Plan  Continue PTA lipitor 80 mg QD    Hypertension  Assessment & Plan  BPs 150s/60-80s on admission  Continue PTA lisinopril   Monitor vitals    Diabetes mellitus (HCC)  Assessment & Plan  Lab Results   Component Value Date    HGBA1C 14.1 (H) 10/27/2023       No results for input(s): \"POCGLU\" in the last 72 hours.    Blood Sugar Average: Last 72 hrs:  Hx of uncontrolled blood glucose levels  Algorithm 3 sliding scale before meals  Continue PTA lantus 55 units BID; will decrease if pt remains NPO for procedure  Continue PTA humalog 15 units TID with meals  POCT before meals and at bedtime             VTE Prophylaxis: Enoxaparin (Lovenox)  / reason for no mechanical VTE prophylaxis lower extremity wound    Code Status: Level 1/Full code    Discussion with family: [email or web address] [phone]    Anticipated Length of Stay:  Patient will be admitted on an Inpatient basis with an anticipated length of stay of  > 2 midnights.   Justification for Hospital Stay: cellulitis r/o osteomyelitis, IV antibiotics, podiatry consult, wound care    Total Time for Visit, including Counseling / " Coordination of Care: 60 minutes.  Greater than 50% of this total time spent on direct patient counseling and coordination of care.    Chief Complaint:   Wound check    History of Present Illness:    Art Joseph is a 64 y.o. male with past medical history of diabetes mellitus type II with insulin dependence, hypertension, peripheral artery disease, s/p right lower limb amputation (2017) who presents for evaluation of left lower extremity wound. The patient reports that he hit his left leg with the prostatic he wears on his right leg, causing an open wound approximately a week and a half ago. He was initially seen at urgent care on 4/12/2024 and prescribed Keflex for 7 days without improvement of symptoms. He then followed up with wound care earlier today and was sent to the ED due to concern for cellulitis. Assessment in the ED revealed elevated CRP of 51.8, sed rate of 77, elevated white blood cells at 10.82, and physical exam positive for increased erythema and warmth over the left lower extremity with ulceration and malodorous drainage from the left foot. The patient was subsequently admitted to inpatient [facility] level of care for IV antibiotic treatment and further assessment of his cellulitis. On admission, he denies shortness of breath, chest pain, nausea, vomiting, and changes in bowel movements.    Review of Systems:    Review of Systems   Constitutional:  Negative for chills, fatigue and fever.   Respiratory:  Negative for chest tightness, shortness of breath and wheezing.    Gastrointestinal:  Negative for abdominal pain, constipation, diarrhea, nausea and vomiting.   Genitourinary:  Negative for difficulty urinating and urgency.   Skin:  Positive for wound.   Neurological:  Negative for dizziness, tremors, syncope and weakness.       Past Medical and Surgical History:     Past Medical History:   Diagnosis Date    Diabetes mellitus (HCC)     Hypertension        Past Surgical History:   Procedure  Laterality Date    LEG AMPUTATION THROUGH LOWER TIBIA AND FIBULA Right 7/25/2017    Procedure: AMPUTATION BELOW KNEE (BKA);  Surgeon: Mynor Sanchez MD;  Location: [facility];  Service: General    [place] AMPUTATION FOOT TRANSMETARSAL Right 1/26/2017    Procedure: AMPUTATION TRANSMETATARSAL (TMA); OPEN;  Surgeon: Leonard Lomeli DPM;  Location: [facility];  Service: Podiatry    [place] AMPUTATION METATARSAL W/TOE SINGLE Left 1/30/2017    Procedure: Left partial 1st ray amputation;  Surgeon: Leonard Lomeli DPM;  Location: [facility];  Service: Podiatry    [place] COLONOSCOPY FLX DX W/COLLJ SPEC WHEN PFRMD N/A 11/28/2018    Procedure: COLONOSCOPY;  Surgeon: González Napier MD;  Location: [facility];  Service: Gastroenterology    WOUND DEBRIDEMENT Right 1/30/2017    Procedure: DEBRIDEMENT FOOT/TOE (WASH OUT) delayed closure, LINDA;  Surgeon: Leonard Lomeli DPM;  Location: [facility];  Service:        Meds/Allergies:    Prior to Admission medications    Medication Sig Start Date End Date Taking? Authorizing Provider   aspirin (ECOTRIN LOW STRENGTH) 81 mg EC tablet Take 81 mg by mouth daily Resume on 8/11/17     Historical Provider, MD   atorvastatin (LIPITOR) 80 mg tablet Take 1 tablet (80 mg total) by mouth daily 10/4/22   Kerry Christine MD   Cholecalciferol (Vitamin D) 125 MCG (5000 UT) CAPS Take by mouth    Historical Provider, MD   glucose blood test strip Test blood sugars 4 times a day 2/23/21   ALFONSO Andrew   insulin aspart (NovoLOG FlexPen) 100 UNIT/ML injection pen Inject 15 Units under the skin 3 (three) times a day with meals 7/26/23   Kerry Christine MD   Insulin Glargine Solostar (Basaglar KwikPen) 100 UNIT/ML SOPN Inject 0.55 mL (55 Units total) under the skin every 12 (twelve) hours 10/30/23   Kerry Christine MD   Insulin Pen Needle 30G X 8 MM MISC Inject under the skin daily at bedtime 5/3/19   Nereyda Bain DO   lisinopril (ZESTRIL) 2.5 mg tablet Take 1 tablet (2.5 mg total) by mouth daily  8/29/21   Kerry Christine MD   metFORMIN (GLUCOPHAGE) 1000 MG tablet TAKE 1 TABLET TWICE DAILY  WITH MEALS 8/27/22   Kerry Christine MD   Multiple Vitamin (MULTI-VITAMIN DAILY PO) Take 1 tablet by mouth daily    Historical Provider, MD     I have reviewed home medications with patient personally.    Allergies: No Known Allergies    Social History:     Marital Status: Single   Occupation: retired  Patient Pre-hospital Living Situation: lives alone  Patient Pre-hospital Level of Mobility: ambulates with no assistance   Patient Pre-hospital Diet Restrictions: none  Substance Use History:   Social History     Substance and Sexual Activity   Alcohol Use Yes    Alcohol/week: 11.0 standard drinks of alcohol    Types: 6 Cans of beer, 5 Shots of liquor per week    Comment: social ; occasional use as per [employer]     Social History     Tobacco Use   Smoking Status Former    Types: Cigarettes   Smokeless Tobacco Never   Tobacco Comments    quit 9 years ago     Social History     Substance and Sexual Activity   Drug Use No       Family History:    Family History   Problem Relation Age of Onset    Diabetes Mother        Physical Exam:     Vitals:   Blood Pressure: 154/54 (04/23/24 1230)  Pulse: 86 (04/23/24 1230)  Temperature: 97.7 °F (36.5 °C) (04/23/24 1038)  Temp Source: Temporal (04/23/24 1038)  Respirations: 20 (04/23/24 1230)  Weight - Scale: 104 kg (229 lb 4.5 oz) (04/23/24 1038)  SpO2: 95 % (04/23/24 1230)    Physical Exam  Constitutional:       Appearance: Normal appearance.   HENT:      Head: Normocephalic and atraumatic.   Eyes:      Pupils: Pupils are equal, round, and reactive to light.   Cardiovascular:      Rate and Rhythm: Normal rate and regular rhythm.      Pulses: Normal pulses.      Heart sounds: Normal heart sounds.   Pulmonary:      Effort: Pulmonary effort is normal.      Breath sounds: Normal breath sounds.   Abdominal:      General: Bowel sounds are normal. There is no distension.       Tenderness: There is no abdominal tenderness. There is no guarding.   Musculoskeletal:      Cervical back: Normal range of motion and neck supple.   Skin:     General: Skin is warm and dry.      Comments: Erythema with multiple ulcers noted at left lower extremity below the knee; ulcerative wounds with malodorous drainage at left foot   Neurological:      General: No focal deficit present.      Mental Status: He is alert and oriented to person, place, and time.           Additional Data:     Lab Results: I have personally reviewed pertinent reports.      Results from last 7 days   Lab Units 04/23/24  1102   WBC Thousand/uL 10.82*   HEMOGLOBIN g/dL 14.5   HEMATOCRIT % 45.2   PLATELETS Thousands/uL 367   SEGS PCT % 71   LYMPHO PCT % 19   MONO PCT % 7   EOS PCT % 2     Results from last 7 days   Lab Units 04/23/24  1102   SODIUM mmol/L 134*   POTASSIUM mmol/L 4.5   CHLORIDE mmol/L 99   CO2 mmol/L 22   BUN mg/dL 17   CREATININE mg/dL 0.92   ANION GAP mmol/L 13   CALCIUM mg/dL 9.2   ALBUMIN g/dL 3.7   TOTAL BILIRUBIN mg/dL 0.51   ALK PHOS U/L 140*   ALT U/L 6*   AST U/L 17   GLUCOSE RANDOM mg/dL 221*                 Results from last 7 days   Lab Units 04/23/24  1127 04/23/24  1102   LACTIC ACID mmol/L 1.8  --    PROCALCITONIN ng/ml  --  <0.05       Imaging: I have personally reviewed pertinent reports.      XR foot 3+ views LEFT    (Results Pending)   XR tibia fibula 2 views LEFT    (Results Pending)   MRI inpatient order    (Results Pending)         Allscripts / [facility] Records Reviewed: Yes     ** Please Note: This note has been constructed using a voice recognition system. **

## 2024-04-23 NOTE — ED PROVIDER NOTES
History  Chief Complaint   Patient presents with    Wound Check     PT SENT FROM WOUND CARE FOR ULCERS ON LEFT LOWER LEG AND FOR ADMISSION     HPI    64-year-old male with past medical history of peripheral artery disease, diabetes, hypertension who presents for evaluation of left leg wound.  Patient was sent from wound care.  Patient has had a wound on his left leg for the past few weeks.  He was treated with Keflex without improvement in symptoms.  He was seen at wound care today and they were concerned about worsening cellulitis of sentiment to the emergency department for admission for IV antibiotics.  Patient denies fevers.  Denies chest pain or shortness of breath.  Denies abdominal pain, nausea, vomiting, or diarrhea. There was also concerns about bed bugs, patient was decontaminated prior to being roomed in ER.     Prior to Admission Medications   Prescriptions Last Dose Informant Patient Reported? Taking?   Cholecalciferol (Vitamin D) 125 MCG (5000 UT) CAPS   Yes No   Sig: Take by mouth   Insulin Glargine Solostar (Basaglar KwikPen) 100 UNIT/ML SOPN   No No   Sig: Inject 0.55 mL (55 Units total) under the skin every 12 (twelve) hours   Insulin Pen Needle 30G X 8 MM MISC   No No   Sig: Inject under the skin daily at bedtime   Multiple Vitamin (MULTI-VITAMIN DAILY PO)   Yes No   Sig: Take 1 tablet by mouth daily   aspirin (ECOTRIN LOW STRENGTH) 81 mg EC tablet   Yes No   Sig: Take 81 mg by mouth daily Resume on 8/11/17    atorvastatin (LIPITOR) 80 mg tablet   No No   Sig: Take 1 tablet (80 mg total) by mouth daily   glucose blood test strip   No No   Sig: Test blood sugars 4 times a day   insulin aspart (NovoLOG FlexPen) 100 UNIT/ML injection pen   No No   Sig: Inject 15 Units under the skin 3 (three) times a day with meals   lisinopril (ZESTRIL) 2.5 mg tablet   No No   Sig: Take 1 tablet (2.5 mg total) by mouth daily   metFORMIN (GLUCOPHAGE) 1000 MG tablet   No No   Sig: TAKE 1 TABLET TWICE DAILY  WITH MEALS       Facility-Administered Medications: None       Past Medical History:   Diagnosis Date    Diabetes mellitus (HCC)     Hypertension        Past Surgical History:   Procedure Laterality Date    LEG AMPUTATION THROUGH LOWER TIBIA AND FIBULA Right 7/25/2017    Procedure: AMPUTATION BELOW KNEE (BKA);  Surgeon: Mynor Sanchez MD;  Location:  MAIN OR;  Service: General    OH AMPUTATION FOOT TRANSMETARSAL Right 1/26/2017    Procedure: AMPUTATION TRANSMETATARSAL (TMA); OPEN;  Surgeon: Leonard Lomeli DPM;  Location:  MAIN OR;  Service: Podiatry    OH AMPUTATION METATARSAL W/TOE SINGLE Left 1/30/2017    Procedure: Left partial 1st ray amputation;  Surgeon: Leonard Lomeli DPM;  Location:  MAIN OR;  Service: Podiatry    OH COLONOSCOPY FLX DX W/COLLJ SPEC WHEN PFRMD N/A 11/28/2018    Procedure: COLONOSCOPY;  Surgeon: González Napier MD;  Location: [facility];  Service: Gastroenterology    WOUND DEBRIDEMENT Right 1/30/2017    Procedure: DEBRIDEMENT FOOT/TOE (WASH OUT) delayed closure, LINDA;  Surgeon: Leonard Lomeli DPM;  Location:  MAIN OR;  Service:        Family History   Problem Relation Age of Onset    Diabetes Mother      I have reviewed and agree with the history as documented.    E-Cigarette/Vaping    E-Cigarette Use Never User      E-Cigarette/Vaping Substances    Nicotine No     THC No     CBD No     Flavoring No     Other No     Unknown No      Social History     Tobacco Use    Smoking status: Former     Types: Cigarettes    Smokeless tobacco: Never    Tobacco comments:     quit 9 years ago   Vaping Use    Vaping status: Never Used   Substance Use Topics    Alcohol use: Yes     Alcohol/week: 11.0 standard drinks of alcohol     Types: 6 Cans of beer, 5 Shots of liquor per week     Comment: social ; occasional use as per [employer]    Drug use: No       Review of Systems   Constitutional:  Negative for appetite change, chills and fever.   HENT:  Negative for congestion, rhinorrhea and sore throat.    Respiratory:   Negative for cough and shortness of breath.    Cardiovascular:  Positive for leg swelling. Negative for chest pain.   Gastrointestinal:  Negative for abdominal pain, diarrhea, nausea and vomiting.   Genitourinary:  Negative for dysuria, frequency, hematuria and urgency.   Musculoskeletal:  Negative for arthralgias and myalgias.   Skin:  Positive for wound. Negative for rash.   Neurological:  Negative for dizziness, weakness, light-headedness, numbness and headaches.   All other systems reviewed and are negative.      Physical Exam  Physical Exam  Vitals and nursing note reviewed.   Constitutional:       General: He is not in acute distress.     Appearance: Normal appearance. He is well-developed. He is obese. He is not ill-appearing, toxic-appearing or diaphoretic.   HENT:      Head: Normocephalic and atraumatic.      Right Ear: External ear normal.      Left Ear: External ear normal.      Nose: Nose normal.      Mouth/Throat:      Mouth: Mucous membranes are moist.      Pharynx: Oropharynx is clear.   Eyes:      Extraocular Movements: Extraocular movements intact.      Conjunctiva/sclera: Conjunctivae normal.   Cardiovascular:      Rate and Rhythm: Normal rate and regular rhythm.      Pulses: Normal pulses.      Heart sounds: Normal heart sounds. No murmur heard.     No friction rub. No gallop.   Pulmonary:      Effort: Pulmonary effort is normal. No respiratory distress.      Breath sounds: Normal breath sounds. No wheezing or rales.   Abdominal:      General: There is no distension.      Palpations: Abdomen is soft.      Tenderness: There is no abdominal tenderness. There is no guarding or rebound.   Musculoskeletal:         General: No tenderness.      Cervical back: Neck supple.      Left lower leg: Edema present.      Comments: Right BKA.    Skin:     General: Skin is warm and dry.      Coloration: Skin is not pale.      Findings: No rash.      Comments: Erythema and superficial wound over the left lower  extremity, erythema and swelling to the left foot with malodorous drainage from the foot.   Neurological:      General: No focal deficit present.      Mental Status: He is alert and oriented to person, place, and time.      Cranial Nerves: No cranial nerve deficit.      Sensory: No sensory deficit.      Motor: No weakness.   Psychiatric:         Mood and Affect: Mood normal.         Behavior: Behavior normal.         Vital Signs  ED Triage Vitals [04/23/24 1038]   Temperature Pulse Respirations Blood Pressure SpO2   97.7 °F (36.5 °C) 90 20 157/88 98 %      Temp Source Heart Rate Source Patient Position - Orthostatic VS BP Location FiO2 (%)   Temporal Monitor Sitting Right arm --      Pain Score       No Pain           Vitals:    04/23/24 1038 04/23/24 1115 04/23/24 1230   BP: 157/88 150/68 154/54   Pulse: 90 87 86   Patient Position - Orthostatic VS: Sitting Sitting          Visual Acuity      ED Medications  Medications   vancomycin (VANCOCIN) 2,000 mg in sodium chloride 0.9 % 500 mL IVPB (2,000 mg Intravenous New Bag 4/23/24 1150)   cefepime (MAXIPIME) IVPB (premix in dextrose) 2,000 mg 50 mL (0 mg Intravenous Stopped 4/23/24 1150)       Diagnostic Studies  Results Reviewed       Procedure Component Value Units Date/Time    Lactic acid, plasma (w/reflex if result > 2.0) [[id number]]  (Normal) Collected: 04/23/24 1127    Lab Status: Final result Specimen: Blood from Arm, Left Updated: 04/23/24 1217     LACTIC ACID 1.8 mmol/L     Narrative:      Result may be elevated if tourniquet was used during collection.    Procalcitonin [[id number]]  (Normal) Collected: 04/23/24 1102    Lab Status: Final result Specimen: Blood from Arm, Left Updated: 04/23/24 1138     Procalcitonin <0.05 ng/ml     Comprehensive metabolic panel [[id number]]  (Abnormal) Collected: 04/23/24 1102    Lab Status: Final result Specimen: Blood from Arm, Left Updated: 04/23/24 1137     Sodium 134 mmol/L      Potassium 4.5 mmol/L      Chloride 99 mmol/L       CO2 22 mmol/L      ANION GAP 13 mmol/L      BUN 17 mg/dL      Creatinine 0.92 mg/dL      Glucose 221 mg/dL      Calcium 9.2 mg/dL      AST 17 U/L      ALT 6 U/L      Alkaline Phosphatase 140 U/L      Total Protein 8.0 g/dL      Albumin 3.7 g/dL      Total Bilirubin 0.51 mg/dL      eGFR 86 ml/min/1.73sq m     Narrative:      National Kidney Disease Foundation guidelines for Chronic Kidney Disease (CKD):     Stage 1 with normal or high GFR (GFR > 90 mL/min/1.73 square meters)    Stage 2 Mild CKD (GFR = 60-89 mL/min/1.73 square meters)    Stage 3A Moderate CKD (GFR = 45-59 mL/min/1.73 square meters)    Stage 3B Moderate CKD (GFR = 30-44 mL/min/1.73 square meters)    Stage 4 Severe CKD (GFR = 15-29 mL/min/1.73 square meters)    Stage 5 End Stage CKD (GFR <15 mL/min/1.73 square meters)  Note: GFR calculation is accurate only with a steady state creatinine    C-reactive protein [[id number]]  (Abnormal) Collected: 04/23/24 1102    Lab Status: Final result Specimen: Blood from Arm, Left Updated: 04/23/24 1137     CRP 51.8 mg/L     Sedimentation rate, automated [[id number]]  (Abnormal) Collected: 04/23/24 1102    Lab Status: Final result Specimen: Blood from Arm, Left Updated: 04/23/24 1124     Sed Rate 77 mm/hour     CBC and differential [[id number]]  (Abnormal) Collected: 04/23/24 1102    Lab Status: Final result Specimen: Blood from Arm, Left Updated: 04/23/24 1113     WBC 10.82 Thousand/uL      RBC 5.09 Million/uL      Hemoglobin 14.5 g/dL      Hematocrit 45.2 %      MCV 89 fL      MCH 28.5 pg      MCHC 32.1 g/dL      RDW 12.5 %      MPV 9.1 fL      Platelets 367 Thousands/uL      nRBC 0 /100 WBCs      Segmented % 71 %      Immature Grans % 0 %      Lymphocytes % 19 %      Monocytes % 7 %      Eosinophils Relative 2 %      Basophils Relative 1 %      Absolute Neutrophils 7.63 Thousands/µL      Absolute Immature Grans 0.04 Thousand/uL      Absolute Lymphocytes 2.10 Thousands/µL      Absolute Monocytes 0.78 Thousand/µL       Eosinophils Absolute 0.21 Thousand/µL      Basophils Absolute 0.06 Thousands/µL     Blood culture #1 [[id number]] Collected: 04/23/24 1106    Lab Status: In process Specimen: Blood from Arm, Right Updated: 04/23/24 1110    Blood culture #2 [[id number]] Collected: 04/23/24 1102    Lab Status: In process Specimen: Blood from Arm, Left Updated: 04/23/24 1110                   XR foot 3+ views LEFT    (Results Pending)   XR tibia fibula 2 views LEFT    (Results Pending)              Procedures  Procedures         ED Course                               SBIRT 22yo+      Flowsheet Row Most Recent Value   Initial Alcohol Screen: US AUDIT-C     1. How often do you have a drink containing alcohol? 0 Filed at: 04/23/2024 1040   2. How many drinks containing alcohol do you have on a typical day you are drinking?  0 Filed at: 04/23/2024 1040   3a. Male UNDER 65: How often do you have five or more drinks on one occasion? 0 Filed at: 04/23/2024 1040   Audit-C Score 0 Filed at: 04/23/2024 1040   GOVIND: How many times in the past year have you...    Used an illegal drug or used a prescription medication for non-medical reasons? Never Filed at: 04/23/2024 1040                      Medical Decision Making  Amount and/or Complexity of Data Reviewed  Labs: ordered.  Radiology: ordered.    Risk  Prescription drug management.  Decision regarding hospitalization.      64-year-old male with past medical history of peripheral artery disease, diabetes, hypertension who presents for evaluation of left leg wound.   Patient has had a left lower extremity leg wound for the past few weeks, treated with Keflex without improvement in wound.  He does have redness and superficial ulcerations over the shin as well as a wound to the bottom of the left foot with malodorous drainage.  He otherwise has no systemic symptoms, no fevers, vitals normal.  Concern for cellulitis versus osteomyelitis, failure of outpatient antibiotics.  Will obtain CBC to  evaluate for leukocytosis, CMP to evaluate for metabolic abnormality, Pro-Michele and lactate.  Will obtain blood cultures.  Will treat with vancomycin and cefepime.  We will obtain x-rays to evaluate for osteomyelitis or soft tissue gas.  Will obtain ESR and CRP to evaluate for inflammation.  Will plan for admission for IV antibiotics.  Reviewed labs, ESR and CRP slightly elevated.  Reviewed and interpreted x-ray, no evidence of osteo on x-ray, no soft tissue gas.  Patient has slight leukocytosis.  Labs otherwise without significant abnormalities.  Discussed results with patient, will plan for admission.  Patient agreeable for admission. Discussed with SLIM for admission.       Disposition  Final diagnoses:   Cellulitis   Diabetes (HCC)   Open wound of foot, initial encounter     Time reflects when diagnosis was documented in both MDM as applicable and the Disposition within this note       Time User Action Codes Description Comment    4/23/2024 11:47 AM Brittny Borges [L03.90] Cellulitis     4/23/2024 11:47 AM Brittny Borges [E11.9] Diabetes (HCC)     4/23/2024 11:48 AM Brittny Borges [S91.309A] Open wound of foot, initial encounter           ED Disposition       ED Disposition   Admit    Condition   Stable    Date/Time   Tue Apr 23, 2024 1147    Comment   Case was discussed with SIMRAN and the patient's admission status was agreed to be Admission Status: inpatient status to the service of Dr. Nj.               Follow-up Information    None         Patient's Medications   Discharge Prescriptions    No medications on file       No discharge procedures on file.    PDMP Review       None            ED Provider  Electronically Signed by             Brittny Borges MD  04/23/24 7102

## 2024-04-24 ENCOUNTER — APPOINTMENT (INPATIENT)
Dept: MRI IMAGING | Facility: HOSPITAL | Age: 64
DRG: 617 | End: 2024-04-24
Payer: COMMERCIAL

## 2024-04-24 PROBLEM — L03.116 CELLULITIS OF LEFT LOWER EXTREMITY: Status: ACTIVE | Noted: 2024-04-24

## 2024-04-24 LAB
ALBUMIN SERPL BCP-MCNC: 3.1 G/DL (ref 3.5–5)
ALP SERPL-CCNC: 117 U/L (ref 34–104)
ALT SERPL W P-5'-P-CCNC: 7 U/L (ref 7–52)
ANION GAP SERPL CALCULATED.3IONS-SCNC: 8 MMOL/L (ref 4–13)
AST SERPL W P-5'-P-CCNC: 10 U/L (ref 13–39)
BASOPHILS # BLD AUTO: 0.07 THOUSANDS/ÂΜL (ref 0–0.1)
BASOPHILS NFR BLD AUTO: 1 % (ref 0–1)
BILIRUB SERPL-MCNC: 0.42 MG/DL (ref 0.2–1)
BUN SERPL-MCNC: 13 MG/DL (ref 5–25)
CALCIUM ALBUM COR SERPL-MCNC: 9.7 MG/DL (ref 8.3–10.1)
CALCIUM SERPL-MCNC: 9 MG/DL (ref 8.4–10.2)
CHLORIDE SERPL-SCNC: 106 MMOL/L (ref 96–108)
CO2 SERPL-SCNC: 24 MMOL/L (ref 21–32)
CREAT SERPL-MCNC: 0.84 MG/DL (ref 0.6–1.3)
EOSINOPHIL # BLD AUTO: 0.26 THOUSAND/ÂΜL (ref 0–0.61)
EOSINOPHIL NFR BLD AUTO: 3 % (ref 0–6)
ERYTHROCYTE [DISTWIDTH] IN BLOOD BY AUTOMATED COUNT: 12.6 % (ref 11.6–15.1)
GFR SERPL CREATININE-BSD FRML MDRD: 91 ML/MIN/1.73SQ M
GLUCOSE SERPL-MCNC: 100 MG/DL (ref 65–140)
GLUCOSE SERPL-MCNC: 110 MG/DL (ref 65–140)
GLUCOSE SERPL-MCNC: 124 MG/DL (ref 65–140)
GLUCOSE SERPL-MCNC: 138 MG/DL (ref 65–140)
GLUCOSE SERPL-MCNC: 189 MG/DL (ref 65–140)
GLUCOSE SERPL-MCNC: 205 MG/DL (ref 65–140)
GLUCOSE SERPL-MCNC: 227 MG/DL (ref 65–140)
HCT VFR BLD AUTO: 40.3 % (ref 36.5–49.3)
HGB BLD-MCNC: 13.1 G/DL (ref 12–17)
IMM GRANULOCYTES # BLD AUTO: 0.02 THOUSAND/UL (ref 0–0.2)
IMM GRANULOCYTES NFR BLD AUTO: 0 % (ref 0–2)
LYMPHOCYTES # BLD AUTO: 2.07 THOUSANDS/ÂΜL (ref 0.6–4.47)
LYMPHOCYTES NFR BLD AUTO: 27 % (ref 14–44)
MAGNESIUM SERPL-MCNC: 2 MG/DL (ref 1.9–2.7)
MCH RBC QN AUTO: 28.3 PG (ref 26.8–34.3)
MCHC RBC AUTO-ENTMCNC: 32.5 G/DL (ref 31.4–37.4)
MCV RBC AUTO: 87 FL (ref 82–98)
MONOCYTES # BLD AUTO: 0.68 THOUSAND/ÂΜL (ref 0.17–1.22)
MONOCYTES NFR BLD AUTO: 9 % (ref 4–12)
NEUTROPHILS # BLD AUTO: 4.71 THOUSANDS/ÂΜL (ref 1.85–7.62)
NEUTS SEG NFR BLD AUTO: 60 % (ref 43–75)
NRBC BLD AUTO-RTO: 0 /100 WBCS
PHOSPHATE SERPL-MCNC: 3.4 MG/DL (ref 2.3–4.1)
PLATELET # BLD AUTO: 329 THOUSANDS/UL (ref 149–390)
PMV BLD AUTO: 8.8 FL (ref 8.9–12.7)
POTASSIUM SERPL-SCNC: 4.4 MMOL/L (ref 3.5–5.3)
PROT SERPL-MCNC: 6.8 G/DL (ref 6.4–8.4)
RBC # BLD AUTO: 4.63 MILLION/UL (ref 3.88–5.62)
SODIUM SERPL-SCNC: 138 MMOL/L (ref 135–147)
WBC # BLD AUTO: 7.81 THOUSAND/UL (ref 4.31–10.16)

## 2024-04-24 PROCEDURE — 85025 COMPLETE CBC W/AUTO DIFF WBC: CPT | Performed by: FAMILY MEDICINE

## 2024-04-24 PROCEDURE — 82948 REAGENT STRIP/BLOOD GLUCOSE: CPT

## 2024-04-24 PROCEDURE — 83735 ASSAY OF MAGNESIUM: CPT | Performed by: FAMILY MEDICINE

## 2024-04-24 PROCEDURE — 99232 SBSQ HOSP IP/OBS MODERATE 35: CPT | Performed by: FAMILY MEDICINE

## 2024-04-24 PROCEDURE — 73718 MRI LOWER EXTREMITY W/O DYE: CPT

## 2024-04-24 PROCEDURE — 84100 ASSAY OF PHOSPHORUS: CPT | Performed by: FAMILY MEDICINE

## 2024-04-24 PROCEDURE — 80053 COMPREHEN METABOLIC PANEL: CPT | Performed by: FAMILY MEDICINE

## 2024-04-24 RX ORDER — INSULIN LISPRO 100 [IU]/ML
8 INJECTION, SOLUTION INTRAVENOUS; SUBCUTANEOUS
Status: DISCONTINUED | OUTPATIENT
Start: 2024-04-24 | End: 2024-04-27

## 2024-04-24 RX ADMIN — METRONIDAZOLE 500 MG: 500 TABLET ORAL at 05:35

## 2024-04-24 RX ADMIN — CHOLECALCIFEROL TAB 25 MCG (1000 UNIT) 1000 UNITS: 25 TAB at 08:17

## 2024-04-24 RX ADMIN — LISINOPRIL 2.5 MG: 5 TABLET ORAL at 08:18

## 2024-04-24 RX ADMIN — ASPIRIN 81 MG 81 MG: 81 TABLET ORAL at 08:18

## 2024-04-24 RX ADMIN — CEFEPIME HYDROCHLORIDE 2000 MG: 2 INJECTION, SOLUTION INTRAVENOUS at 05:34

## 2024-04-24 RX ADMIN — VANCOMYCIN HYDROCHLORIDE 1250 MG: 5 INJECTION, POWDER, LYOPHILIZED, FOR SOLUTION INTRAVENOUS at 11:17

## 2024-04-24 RX ADMIN — INSULIN LISPRO 8 UNITS: 100 INJECTION, SOLUTION INTRAVENOUS; SUBCUTANEOUS at 17:08

## 2024-04-24 RX ADMIN — VANCOMYCIN HYDROCHLORIDE 1250 MG: 5 INJECTION, POWDER, LYOPHILIZED, FOR SOLUTION INTRAVENOUS at 23:23

## 2024-04-24 RX ADMIN — INSULIN LISPRO 2 UNITS: 100 INJECTION, SOLUTION INTRAVENOUS; SUBCUTANEOUS at 17:08

## 2024-04-24 RX ADMIN — METRONIDAZOLE 500 MG: 500 TABLET ORAL at 14:33

## 2024-04-24 RX ADMIN — CEFEPIME HYDROCHLORIDE 2000 MG: 2 INJECTION, SOLUTION INTRAVENOUS at 20:07

## 2024-04-24 RX ADMIN — INSULIN GLARGINE 30 UNITS: 100 INJECTION, SOLUTION SUBCUTANEOUS at 20:06

## 2024-04-24 RX ADMIN — CEFEPIME HYDROCHLORIDE 2000 MG: 2 INJECTION, SOLUTION INTRAVENOUS at 13:00

## 2024-04-24 RX ADMIN — METRONIDAZOLE 500 MG: 500 TABLET ORAL at 23:23

## 2024-04-24 RX ADMIN — DESMOPRESSIN ACETATE 80 MG: 0.2 TABLET ORAL at 17:05

## 2024-04-24 RX ADMIN — ENOXAPARIN SODIUM 40 MG: 40 INJECTION SUBCUTANEOUS at 08:20

## 2024-04-24 NOTE — CASE MANAGEMENT
Case Management Assessment & Discharge Planning Note    Patient name Art Joseph  Location /410-01 MRN [id number]  : 1960 Date 2024       Current Admission Date: 2024  Current Admission Diagnosis:Diabetes mellitus (HCC)   Patient Active Problem List    Diagnosis Date Noted    Cellulitis of left lower extremity 2024    Severe obesity (BMI 35.0-39.9) with comorbidity (HCC) 2021    Diabetic peripheral angiopathy (HCC) 2020    Vitamin D deficiency 2019    Ambulatory dysfunction 10/17/2017    Hx of right BKA (HCC) 2017    Hypertension 2017    Hyperlipidemia 2017    Diabetes mellitus ([facility]) 2017    Diabetic neuropathy ([facility]) 2017      LOS (days): 1  Geometric Mean LOS (GMLOS) (days): 3.2  Days to GMLOS:2.1     OBJECTIVE:    Risk of Unplanned Readmission Score: 7.97         Current admission status: Inpatient       Preferred Pharmacy:   [facility] [place] - [address]  [address] [address]  Phone: [phone] Fax: [phone]    [facility] - [place] - [address]  [address] [address]  Phone: [phone] Fax: [phone]    [id number] [address]  [address] [address]  Phone: [phone] Fax: [phone]    Primary Care Provider: Kerry Christine MD    Primary Insurance: [facility]  Secondary Insurance:     ASSESSMENT:  Active Health Care Proxies       Willis Joseph [facility] Representative - Brother   Primary Phone: [phone] (Home)                 Advance Directives  Does patient have a Health Care POA?: No  Was patient offered paperwork?: Yes (states he has the paperwork but just hasn't done it yet)  Does patient currently have a Health Care decision maker?: Yes, please see Health Care Proxy section  Does patient have Advance Directives?: No  Was  patient offered paperwork?: Yes (states he has paperwork just hasn't done it yet)  Primary Contact: Willis Joseph (Brother)   [place]   [phone] (H)         Readmission Root Cause  30 Day Readmission: No    Patient Information  Admitted from:: Home  Mental Status: Alert  During Assessment patient was accompanied by: Not accompanied during assessment  Assessment information provided by:: Patient  Primary Caregiver: Self  Support Systems: Family members  County of Residence: [place]  What city do you live in?: [place]  Type of Current Residence: Apartment  Upon entering residence, is there a bedroom on the main floor (no further steps)?: Yes  Upon entering residence, is there a bathroom on the main floor (no further steps)?: Yes  Living Arrangements: Lives Alone    Activities of Daily Living Prior to Admission  Functional Status: Independent  Completes ADLs independently?: Yes  Ambulates independently?: Yes  Does patient use assisted devices?: Yes  Assisted Devices (DME) used: Quad Cane, Other (Comment) (prosthetic)  Does patient currently own DME?: Yes  What DME does the patient currently own?: Quad Cane, Other (Comment), Walker (prosthetic)  Does patient have a history of Outpatient Therapy (PT/OT)?: Yes (OP therapy in [place] in the past)  Does the patient have a history of Short-Term Rehab?: No  Does patient have a history of HHC?: No  Does patient currently have HHC?: No         Patient Information Continued  Income Source: SSI/SSD  Does patient have prescription coverage?: Yes  Does patient receive dialysis treatments?: No  Does patient have a history of Mental Health Diagnosis?: No         Means of Transportation  Means of Transport to [facility]:: Other (Comment) ([facility])      Social Determinants of Health (SDOH)      Flowsheet Row Most Recent Value   Housing Stability    In the last 12 months, was there a time when you were not able to pay the mortgage or rent on time? N   In the last 12 months, how many places  have you lived? 1   In the last 12 months, was there a time when you did not have a steady place to sleep or slept in a shelter (including now)? N   Transportation Needs    In the past 12 months, has lack of transportation kept you from medical appointments or from getting medications? no   In the past 12 months, has lack of transportation kept you from meetings, work, or from getting things needed for daily living? No   Food Insecurity    Within the past 12 months, you worried that your food would run out before you got the money to buy more. Never true   Within the past 12 months, the food you bought just didn't last and you didn't have money to get more. Never true   Utilities    In the past 12 months has the electric, gas, oil, or water company threatened to shut off services in your home? No            DISCHARGE DETAILS:    Discharge planning discussed with:: patient        CM contacted family/caregiver?: No- see comments (declines)  Were Treatment Team discharge recommendations reviewed with patient/caregiver?: Yes  Did patient/caregiver verbalize understanding of patient care needs?: Yes            Requested Home Health Care         Is the patient interested in HHC at discharge?: Yes  Home Health Discipline requested:: Nursing, Occupational Therapy, Physical Therapy  Home Health Agency Name:: Other  HHA External Referral Reason (only applicable if external HHA name selected): Services not provided in network or near patient location  Home Health Follow-Up Provider:: PCP  Home Health Services Needed:: Wound/Ostomy Care, Strengthening/Theraputic Exercises to Improve Function, Gait/ADL Training, Evaluate Functional Status and Safety  Homebound Criteria Met:: Uses an Assist Device (i.e. cane, walker, etc)  Supporting Clincal Findings:: Limited Endurance    DME Referral Provided  Referral made for DME?: No         Would you like to participate in our [facility] service program?  : No - Declined        Discharge Destination Plan:: Home with Home Health Care  Transport at Discharge : Family (brother)

## 2024-04-24 NOTE — PROGRESS NOTES
[facility]  Progress Note  Name: Art Joseph I  MRN: [id number]  Unit/Bed#: 410-01 I Date of Admission: 4/23/2024   Date of Service: 4/24/2024 I Hospital Day: 1    Assessment/Plan   Cellulitis of left lower extremity  Assessment & Plan  Patient seen by outpatient wound care on 4/23/24 for open wound of the left lower extremity  Subsequently sent to ED due to concern for left lower extremity cellulitis with erythema and ulceration noted as well as severe ulceration and purulent drainage of the left foot  On admission, WBC at 10.82, CRP 51.8, sed rate 77  Blood culture x2 not yet resulted  XR L foot + XR L tibia/fibula order show no acute osseous abnormality and no evidence of infection.   MRI left foot ordered  IV cefepime 2 g q8h + IV vancomycin 1.25 g q12h with PO metronidazole 500 mg q8h  Podiatry consult placed, appreciate recommendations  Wound care consult     Hyperlipidemia  Assessment & Plan  Continue PTA lipitor 80 mg QD    Hypertension  Assessment & Plan  BPs 150s/60-80s on admission, now 130s/80s  Continue PTA lisinopril   Monitor vitals    Diabetes mellitus (HCC)  Assessment & Plan  Lab Results   Component Value Date    HGBA1C 14.1 (H) 10/27/2023       Recent Labs     04/23/24  2037 04/24/24  0000 04/24/24  0548 04/24/24  1103   POCGLU 86 100 110 138       Blood Sugar Average: Last 72 hrs: (P) 140.4  Hx of uncontrolled blood glucose levels  Algorithm 3 sliding scale before meals  Continue PTA lantus 55 units BID; will decrease if pt remains NPO for procedure  Continue PTA humalog 15 units TID with meals  POCT before meals and at bedtime               VTE Pharmacologic Prophylaxis:   Pharmacologic: Enoxaparin (Lovenox)  Mechanical VTE Prophylaxis in Place: No      Discussions with Specialists or Other Care Team Provider: yes, with supervising attending    Education and Discussions with Family / Patient: yes, with patient    Time Spent for Care: 30 minutes.  More than 50% of  total time spent on counseling and coordination of care as described above.    Current Length of Stay: 1 day(s)    Current Patient Status: Inpatient   Certification Statement: The patient will continue to require additional inpatient hospital stay due to IV antibiotics, pending blood cultures, MRI imaging r/o osteomyelitis    Discharge Plan: to be determined    Code Status: Level 1 - Full Code      Subjective:   Patient seen and examined at bedside this morning. No acute events overnight. VSS. Denies chest pain, nausea, shortness of breath, vomiting, and changes in bowel movements.    Objective:     Vitals:   Temp (24hrs), Av.1 °F (36.7 °C), Min:97.8 °F (36.6 °C), Max:98.4 °F (36.9 °C)    Temp:  [97.8 °F (36.6 °C)-98.4 °F (36.9 °C)] 98.2 °F (36.8 °C)  HR:  [77-90] 88  Resp:  [16-20] 19  BP: (128-200)/(54-95) 139/67  SpO2:  [91 %-97 %] 97 %  Body mass index is 31.03 kg/m².     Input and Output Summary (last 24 hours):       Intake/Output Summary (Last 24 hours) at 2024 1215  Last data filed at 2024 0753  Gross per 24 hour   Intake 1340 ml   Output 1400 ml   Net -60 ml       Physical Exam:     Physical Exam  Constitutional:       General: He is not in acute distress.     Appearance: Normal appearance.   HENT:      Head: Normocephalic and atraumatic.   Cardiovascular:      Rate and Rhythm: Normal rate and regular rhythm.      Pulses: Normal pulses.      Heart sounds: Normal heart sounds.   Pulmonary:      Effort: Pulmonary effort is normal.      Breath sounds: Normal breath sounds.   Musculoskeletal:      Cervical back: Normal range of motion and neck supple.   Skin:     General: Skin is warm and dry.      Comments: Erythema with multiple ulcers noted at left lower extremity below the knee; ulcerative wounds at left foot, no apparent drainage   Neurological:      General: No focal deficit present.      Mental Status: He is alert and oriented to person, place, and time.           Additional Data:      Labs:    Results from last 7 days   Lab Units 04/24/24  0636   WBC Thousand/uL 7.81   HEMOGLOBIN g/dL 13.1   HEMATOCRIT % 40.3   PLATELETS Thousands/uL 329   SEGS PCT % 60   LYMPHO PCT % 27   MONO PCT % 9   EOS PCT % 3     Results from last 7 days   Lab Units 04/24/24  0636   SODIUM mmol/L 138   POTASSIUM mmol/L 4.4   CHLORIDE mmol/L 106   CO2 mmol/L 24   BUN mg/dL 13   CREATININE mg/dL 0.84   ANION GAP mmol/L 8   CALCIUM mg/dL 9.0   ALBUMIN g/dL 3.1*   TOTAL BILIRUBIN mg/dL 0.42   ALK PHOS U/L 117*   ALT U/L 7   AST U/L 10*   GLUCOSE RANDOM mg/dL 124         Results from last 7 days   Lab Units 04/24/24  1103 04/24/24  0548 04/24/24  0000 04/23/24  2037 04/23/24  1614   POC GLUCOSE mg/dl 138 110 100 86 268*         Results from last 7 days   Lab Units 04/23/24  1127 04/23/24  1102   LACTIC ACID mmol/L 1.8  --    PROCALCITONIN ng/ml  --  <0.05           * I Have Reviewed All Lab Data Listed Above.  * Additional Pertinent Lab Tests Reviewed: All Labs For Current Hospital Admission Reviewed    Imaging:    Imaging Reports Reviewed Today Include:   XR foot 3+ views LEFT   Final Result      No radiographic evidence of osteomyelitis.      Workstation performed: [id number]         XR tibia fibula 2 views LEFT   Final Result      No acute osseous abnormality. No evidence of infection.            Workstation performed: [id number]         MRI inpatient order    (Results Pending)       Imaging Personally Reviewed by Myself Includes:    XR foot 3+ views LEFT   Final Result      No radiographic evidence of osteomyelitis.      Workstation performed: [id number]         XR tibia fibula 2 views LEFT   Final Result      No acute osseous abnormality. No evidence of infection.            Workstation performed: [id number]         MRI inpatient order    (Results Pending)         Recent Cultures (last 7 days):     Results from last 7 days   Lab Units 04/23/24  1106 04/23/24  1102   BLOOD CULTURE  Received in Microbiology Lab. Culture in  Progress. Received in Microbiology Lab. Culture in Progress.       Last 24 Hours Medication List:   Current Facility-Administered Medications   Medication Dose Route Frequency Provider Last Rate    aspirin  81 mg Oral Daily Marilyn Velazquez MD      atorvastatin  80 mg Oral Daily Marilyn Velazquez MD      cefepime  2,000 mg Intravenous Q8H Bella jN MD 2,000 mg (04/24/24 0534)    Cholecalciferol  1,000 Units Oral Daily Marilyn Velazquez MD      enoxaparin  40 mg Subcutaneous Daily Bella Nj MD      insulin glargine  30 Units Subcutaneous Q12H [facility] Bella Nj MD      insulin lispro  1-6 Units Subcutaneous Q6H Rashid Rubalcava MD      insulin lispro  15 Units Subcutaneous TID With Meals Marilyn Velazquez MD      lisinopril  2.5 mg Oral Daily Marilyn Velazquez MD      metroNIDAZOLE  500 mg Oral Q8H [facility] Bella Nj MD      vancomycin  12.5 mg/kg Intravenous Q12H Bella Nj MD 1,250 mg (04/24/24 1117)        Today, Patient Was Seen By: Marilyn Velazquez MD    ** Please Note: Dictation voice to text software may have been used in the creation of this document. **

## 2024-04-24 NOTE — ASSESSMENT & PLAN NOTE
Patient seen by outpatient wound care on 4/23/24 for open wound of the left lower extremity  Subsequently sent to ED due to concern for left lower extremity cellulitis with erythema and ulceration noted as well as severe ulceration and purulent drainage of the left foot  On admission, WBC at 10.82, CRP 51.8, sed rate 77  Blood culture x2 not yet resulted  XR L foot + XR L tibia/fibula order show no acute osseous abnormality and no evidence of infection.   MRI left foot ordered  IV cefepime 2 g q8h + IV vancomycin 1.25 g q12h with PO metronidazole 500 mg q8h  Podiatry consult placed, appreciate recommendations  Wound care consult

## 2024-04-24 NOTE — PROGRESS NOTES
Patient:    MRN:  [id number]    Aidin Request ID:  [id number]    Level of care reserved:  Home Health Agency    Partner Reserved:  [facility] [place] [address] [phone]    Clinical needs requested:    Geography searched:  [phone]    Start of Service:    Request sent:  1:33pm EDT on 4/24/2024 by Katelin Callaway    Partner reserved:  2:29pm EDT on 4/24/2024 by Katelin Callaway    Choice list shared:  2:28pm EDT on 4/24/2024 by Katelin Callaway

## 2024-04-24 NOTE — DISCHARGE INSTR - OTHER ORDERS
Skin and Wound Care Plan:   1- Ehob offloading cushion to chair when OOB  2- Elevate heels off the bed and while in the recliner with pillows to offload heels  3- Hydraguard to bilateral heels and lower buttocks BID and PRN  4- Skin nourishing cream daily  5- Cleanse left lower leg and foot with soap and water, pat dry. Place adaptic oil emulsion dressing on the open areas, cover open areas with calcium alginate, 4X4's, ABD pads and wrap with kerlex, change daily and PRN with visible drainage until seen by podiatry  6- Place Allevyn/Mepilex bordered dressing to the sacrum, russell with P, date, peel back daily for skin assessment, reapply and change every 3 days and PRN

## 2024-04-24 NOTE — ASSESSMENT & PLAN NOTE
Lab Results   Component Value Date    HGBA1C 14.1 (H) 10/27/2023       Recent Labs     04/23/24 2037 04/24/24  0000 04/24/24  0548 04/24/24  1103   POCGLU 86 100 110 138       Blood Sugar Average: Last 72 hrs: (P) 140.4  Hx of uncontrolled blood glucose levels  Algorithm 3 sliding scale before meals  Continue PTA lantus 55 units BID; will decrease if pt remains NPO for procedure  Continue PTA humalog 15 units TID with meals  POCT before meals and at bedtime

## 2024-04-24 NOTE — CASE MANAGEMENT
Case Management Discharge Planning Note    Patient name Art Joseph  Location /410-01 MRN [id number]  : 1960 Date 2024       Current Admission Date: 2024  Current Admission Diagnosis:Diabetes mellitus (HCC)   Patient Active Problem List    Diagnosis Date Noted    Cellulitis of left lower extremity 2024    Severe obesity (BMI 35.0-39.9) with comorbidity (HCC) 2021    Diabetic peripheral angiopathy (HCC) 2020    Vitamin D deficiency 2019    Ambulatory dysfunction 10/17/2017    Hx of right BKA (HCC) 2017    Hypertension 2017    Hyperlipidemia 2017    Diabetes mellitus ([facility]) 2017    Diabetic neuropathy ([facility]) 2017      LOS (days): 1  Geometric Mean LOS (GMLOS) (days): 3.2  Days to GMLOS:2.1     OBJECTIVE:  Risk of Unplanned Readmission Score: 7.97         Current admission status: Inpatient   Preferred Pharmacy:   [facility] STR[facility] - [address]  [address] [address]  Phone: [phone] Fax: [phone]    [facility] - [address]  [address]  [place] [address]  Phone: [phone] Fax: [phone]    [facility]pharm[facility] #[id number] - JIMI MAN - [address]  [address] [address]  Phone: [phone] Fax: [phone]    Primary Care Provider: Kerry Christine MD    Primary Insurance: [facility]  Secondary Insurance:     DISCHARGE DETAILS:    Discharge planning discussed with:: patient  Freedom of Choice: Yes  Comments - Freedom of Choice: referral to [facility], if cannot accept then [facility]  CM contacted family/caregiver?: No- see comments (declines)  Were Treatment Team discharge recommendations reviewed with patient/caregiver?: Yes  Did patient/caregiver verbalize understanding of patient care needs?: Yes            Requested Home Health Care         Is the patient interested in [facility]  at discharge?: Yes  Home Health Discipline requested:: Nursing, Occupational Therapy, Physical Therapy  Home Health Agency Name:: Other  HHA External Referral Reason (only applicable if external HHA name selected): Services not provided in network or near patient location  Home Health Follow-Up Provider:: PCP  Home Health Services Needed:: Wound/Ostomy Care, Strengthening/Theraputic Exercises to Improve Function, Gait/ADL Training, Evaluate Functional Status and Safety  Homebound Criteria Met:: Uses an Assist Device (i.e. cane, walker, etc)  Supporting Clincal Findings:: Limited Endurance    DME Referral Provided  Referral made for DME?: No         Would you like to participate in our [facility] service program?  : No - Declined       Discharge Destination Plan:: Home with Home Health Care  Transport at Discharge : Family (brother)

## 2024-04-24 NOTE — PLAN OF CARE
Problem: PAIN - ADULT  Goal: Verbalizes/displays adequate comfort level or baseline comfort level  Description: Interventions:  - Encourage patient to monitor pain and request assistance  - Assess pain using appropriate pain scale  - Administer analgesics based on type and severity of pain and evaluate response  - Implement non-pharmacological measures as appropriate and evaluate response  - Consider cultural and social influences on pain and pain management  - Notify physician/advanced practitioner if interventions unsuccessful or patient reports new pain  Outcome: Progressing     Problem: INFECTION - ADULT  Goal: Absence or prevention of progression during hospitalization  Description: INTERVENTIONS:  - Assess and monitor for signs and symptoms of infection  - Monitor lab/diagnostic results  - Monitor all insertion sites, i.e. indwelling lines, tubes, and drains  - Monitor endotracheal if appropriate and nasal secretions for changes in amount and color  - Esmond appropriate cooling/warming therapies per order  - Administer medications as ordered  - Instruct and encourage patient and family to use good hand hygiene technique  - Identify and instruct in appropriate isolation precautions for identified infection/condition  Outcome: Progressing     Problem: SAFETY ADULT  Goal: Patient will remain free of falls  Description: INTERVENTIONS:  - Educate patient/family on patient safety including physical limitations  - Instruct patient to call for assistance with activity   - Consult OT/PT to assist with strengthening/mobility   - Keep Call bell within reach  - Keep bed low and locked with side rails adjusted as appropriate  - Keep care items and personal belongings within reach  - Initiate and maintain comfort rounds  - Make Fall Risk Sign visible to staff  - Offer Toileting every 2 Hours, in advance of need  - Initiate/Maintain bed/chair alarm  - Obtain necessary fall risk management equipment: as needed  - Apply  yellow socks and bracelet for high fall risk patients  - Consider moving patient to room near nurses station  Outcome: Progressing     Problem: DISCHARGE PLANNING  Goal: Discharge to home or other facility with appropriate resources  Description: INTERVENTIONS:  - Identify barriers to discharge w/patient and caregiver  - Arrange for needed discharge resources and transportation as appropriate  - Identify discharge learning needs (meds, wound care, etc.)  - Arrange for interpretive services to assist at discharge as needed  - Refer to Case Management Department for coordinating discharge planning if the patient needs post-hospital services based on physician/advanced practitioner order or complex needs related to functional status, cognitive ability, or social support system  Outcome: Progressing     Problem: Knowledge Deficit  Goal: Patient/family/caregiver demonstrates understanding of disease process, treatment plan, medications, and discharge instructions  Description: Complete learning assessment and assess knowledge base.  Interventions:  - Provide teaching at level of understanding  - Provide teaching via preferred learning methods  Outcome: Progressing     Problem: METABOLIC, FLUID AND ELECTROLYTES - ADULT  Goal: Fluid balance maintained  Description: INTERVENTIONS:  - Monitor labs   - Monitor I/O and WT  - Instruct patient on fluid and nutrition as appropriate  - Assess for signs & symptoms of volume excess or deficit  Outcome: Progressing  Goal: Glucose maintained within target range  Description: INTERVENTIONS:  - Monitor Blood Glucose as ordered  - Assess for signs and symptoms of hyperglycemia and hypoglycemia  - Administer ordered medications to maintain glucose within target range  - Assess nutritional intake and initiate nutrition service referral as needed  Outcome: Progressing     Problem: SKIN/TISSUE INTEGRITY - ADULT  Goal: Incision(s), wounds(s) or drain site(s) healing without S/S of  infection  Description: INTERVENTIONS  - Assess and document dressing, incision, wound bed, drain sites and surrounding tissue  - Provide patient and family education  - Perform skin care/dressing changes as ordered  Outcome: Progressing     Problem: MUSCULOSKELETAL - ADULT  Goal: Maintain or return mobility to safest level of function  Description: INTERVENTIONS:  - Assess patient's ability to carry out ADLs; assess patient's baseline for ADL function and identify physical deficits which impact ability to perform ADLs (bathing, care of mouth/teeth, toileting, grooming, dressing, etc.)  - Assess/evaluate cause of self-care deficits   - Assess range of motion  - Assess patient's mobility  - Assess patient's need for assistive devices and provide as appropriate  - Encourage maximum independence but intervene and supervise when necessary  - Involve family in performance of ADLs  - Assess for home care needs following discharge   - Consider OT consult to assist with ADL evaluation and planning for discharge  - Provide patient education as appropriate  Outcome: Progressing     Problem: Prexisting or High Potential for Compromised Skin Integrity  Goal: Skin integrity is maintained or improved  Description: INTERVENTIONS:  - Identify patients at risk for skin breakdown  - Assess and monitor skin integrity  - Assess and monitor nutrition and hydration status  - Monitor labs   - Assess for incontinence   - Turn and reposition patient  - Assist with mobility/ambulation  - Relieve pressure over bony prominences  - Avoid friction and shearing  - Provide appropriate hygiene as needed including keeping skin clean and dry  - Evaluate need for skin moisturizer/barrier cream  - Collaborate with interdisciplinary team   - Patient/family teaching  - Consider wound care consult   Outcome: Progressing

## 2024-04-24 NOTE — WOUND OSTOMY CARE
Consult Note - Wound   Art Joseph 64 y.o. male MRN: [id number]  Unit/Bed#: 410-01 Encounter: [id number]      History and Present Illness:  Patient is 64 year old male admitted to [facility] with cellulitis of the left lower extremity. Patient history significant for DDM, diabetic neuropathy, diabetic peripheral angiopathy, HTN, right BKA with well healed scar, and ambulatory dysfunction, appears to have Charcot foot to the left foot with prior amputation of hallux. Patient was decontaminated in the ED for bed bugs.  Wound care consulted for left lower leg and foot cellulitis.   Patient was seen by podiatry at the Wound Management center on 04/23/2024, orders for wound care written at that time by provider, cleansed and applied dressings.     Assessment Findings:   Patient in bed for assessment, he is awake, alert and oriented. He is not incontinent, is able to turn onto his side without assistance, and is a moderate assist with hygiene care. Patient has a right BKA with a well healed scar.     1- POA- Left lower leg scattered ulcerations with yellow slough in the wound bases, some areas with dry yellow and other areas with moist yellow wound bases. Scant purulent drainage from a mid lower leg, proximal wound when cleansed, white and beefy red wound base. Dried bloody drainage proximal area closest wound to the distal knee. Medial posterior region dry with two crusted areas, no drainage or open areas.  2-  POA-diabetic ulcer to the left medial distal foot. Wound bed is fluctuant with foul smelling purulent drainage. Dark purple area to the proximal wound bed. Left plantar foot is scaly with scattered partial thickness dry wound beds. Beefy red areas to some of the wound beds, wounds are dry. Amputation of hallux in the past and partial D2 per patient. Appearance of Charcot foot.   3- Buttocks and sacrum blanchable and intact    Skin and Wound Care Plan:   1- Ehob offloading cushion to chair when OOB  2- Elevate heels  off the bed and while in the recliner with pillows to offload heels  3- Hydraguard to bilateral heels and lower buttocks BID and PRN  4- Skin nourishing cream daily  5- Cleanse left lower leg and foot with soap and water, pat dry. Place adaptic oil emulsion dressing on the open areas, cover open areas with calcium alginate, 4X4's, ABD pads and wrap with kerlex, change daily and PRN with visible drainage  6- Place Allevyn/Mepilex bordered dressing to the sacrum, russell with P, date, peel back daily for skin assessment, reapply and change every 3 days and PRN    Wound:  Wound 04/23/24 Venous Ulcer Leg Left (Active)   Wound Image   04/24/24 1114   Wound Description Beefy red;Brown;Yellow;Slough;Fragile 04/24/24 1107   Kelly-wound Assessment Scaly;Fragile;Excoriated 04/24/24 1107   Wound Length (cm) 22 cm 04/24/24 1107   Wound Width (cm) 9.5 cm 04/24/24 1107   Wound Depth (cm) 0.1 cm 04/24/24 1107   Wound Surface Area (cm^2) 209 cm^2 04/24/24 1107   Wound Volume (cm^3) 20.9 cm^3 04/24/24 1107   Calculated Wound Volume (cm^3) 20.9 cm^3 04/24/24 1107   Change in Wound Size % -132.22 04/24/24 1107   Drainage Amount Scant 04/24/24 1107   Drainage Description Serosanguineous 04/24/24 1107   Non-staged Wound Description Partial thickness 04/24/24 1107   Treatments Cleansed 04/24/24 1107   Dressing ABD;Dry dressing;Calcium Alginate;Non adherent;Gauze 04/24/24 1107   Wound packed? No 04/24/24 1107   Dressing Changed New 04/24/24 1107   Patient Tolerance Tolerated well 04/24/24 1107       Wound 04/23/24 Diabetic Ulcer Foot Left (Active)   Wound Image   04/24/24 1114   Wound Description Beefy red;Brown;Eschar;Necrotic;Fragile;Yellow;Slough 04/24/24 1108   Kelly-wound Assessment Edema;Fragile;Erythema;Scaly 04/24/24 1108   Wound Length (cm) 11 cm 04/24/24 1108   Wound Width (cm) 6 cm 04/24/24 1108   Wound Depth (cm) 0.1 cm 04/23/24 1018   Wound Surface Area (cm^2) 66 cm^2 04/24/24 1108   Wound Volume (cm^3) 8.8 cm^3 04/23/24 1018    Calculated Wound Volume (cm^3) 8.8 cm^3 04/23/24 1018   Drainage Amount Scant 04/24/24 1108   Drainage Description Foul smelling;Yellow;Purulent 04/24/24 1108   Non-staged Wound Description Full thickness 04/24/24 1108   Treatments Cleansed 04/24/24 1108   Dressing ABD;Calcium Alginate;Dry dressing;Gauze;Non adherent 04/24/24 1108   Dressing Changed New 04/24/24 1108   Patient Tolerance Tolerated well 04/24/24 1108       Wound 04/23/24 Tibial Left;Posterior (Active)   Wound Image   04/24/24 1114   Wound Description Beefy red;Dry;Brown 04/24/24 1114   Kelly-wound Assessment Erythema;Scaly 04/24/24 1114   Wound Length (cm) 2.5 cm 04/24/24 1114   Wound Width (cm) 3 cm 04/24/24 1114   Wound Surface Area (cm^2) 7.5 cm^2 04/24/24 1114   Drainage Amount None 04/24/24 1114   Non-staged Wound Description Partial thickness 04/24/24 1114   Treatments Cleansed 04/24/24 1114   Dressing Calcium Alginate;ABD;Dry dressing;Gauze;Non adherent 04/24/24 1114   Wound packed? No 04/24/24 1114   Dressing Changed New 04/24/24 1114   Patient Tolerance Tolerated well 04/24/24 1114     Reviewed plan of care with primary RN Anca  Recommendations written as orders  Wound care team to follow weekly while admitted  Questions or concerns [facility] Wound Care Nurse    Libertad Oliveira BSN, RN, CWON

## 2024-04-24 NOTE — UTILIZATION REVIEW
Initial Clinical Review    Admission: Date/Time/Statement:   Admission Orders (From admission, onward)       Ordered        04/23/24 1148  INPATIENT ADMISSION  Once                          Orders Placed This Encounter   Procedures    INPATIENT ADMISSION     Standing Status:   Standing     Number of Occurrences:   1     Order Specific Question:   Level of Care     Answer:   Med Surg [16]     Order Specific Question:   Estimated length of stay     Answer:   More than 2 Midnights     Order Specific Question:   Certification     Answer:   I certify that inpatient services are medically necessary for this patient for a duration of greater than two midnights. See H&P and MD Progress Notes for additional information about the patient's course of treatment.     ED Arrival Information       Expected   -    Arrival   4/23/2024 10:01    Acuity   Urgent              Means of arrival   Walk-In    Escorted by   Self    Service   Hospitalist    Admission type   Emergency              Arrival complaint   Abnormal labs             Chief Complaint   Patient presents with    Wound Check     PT SENT FROM WOUND CARE FOR ULCERS ON LEFT LOWER LEG AND FOR ADMISSION       Initial Presentation: 64 y.o. male presents to ed from wound care center for evaluation and treatment   of LLE ulcers. PMHX: DM, PAD,HTN. RBKA.    Wound treated with keflex without  improvement. Wound care staff concerned for bed bugs. Clinical assessment significant for LLE erythema, edema and malodorus drainage. CRP 51.8, SED 77, WBC 10.82   Initially treated with iv vancomycin, iv cefepime, sq lovenox, po flagyl.  Admit to inpatient med surg for cellulitis of LLE. Plan includes MRI< iv cefepime and iv vancomycin, po flagyl, consult wound care. Follow up culturee    Anticipated Length of Stay/Certification Statement: Patient will be admitted on an Inpatient basis with an anticipated length of stay of  > 2 midnights.   Justification for Hospital Stay: cellulitis r/o  osteomyelitis, IV antibiotics, podiatry consult, wound care       Date: 4-24-24    Day 2: inpatient med surg     ON exam:  Erythema with multiple ulcers noted at left lower extremity below the knee; ulcerative wounds at left foot, no apparent drainage. Continue iv cefepime, iv vancomycin and po flagyl.. Obtain arterial studies.  MRI shows small focus of osteomyelitis at distal stump of the remnant first metatarsal.      Date: 4-25-24   Day 3: Has surpassed a 2nd midnight with active treatments and services.  Podiatry consult completed today for venous stasis ulceration of LLE, ulceration of L foot and L foot osteomyelitis.   Plan for TMA due to severe soft tissue compromise , wounds and deranged foot. NPO midnight for procedure 4-26. General surgery consult completed.  LEAD - triphasic waveforms throughout without e/o any atherosclerotic disease, unreliable WILLY and MTP, normal tracings. GT amputated. From vascular standpoint ok to proceed with TMA.         ED Triage Vitals [04/23/24 1038]   97.7 °F (36.5 °C) 90 20 157/88 98 %      Temporal Monitor   --      No Pain          04/23/24 98.1 kg (216 lb 4.3 oz)     Additional Vital Signs:     Date/Time Temp Pulse Resp BP MAP (mmHg) SpO2 O2 Device Patient Position - Orthostatic VS   04/24/24 15:02:40 98.2 °F (36.8 °C) 85 -- 138/67 91 93 % -- --   04/24/24 08:17:49 -- 88 -- 139/67 91 97 % -- --   04/24/24 07:09:14 98.2 °F (36.8 °C) 81 19 128/62 84 94 % None (Room air) Lying   04/24/24 0012 -- -- -- -- -- -- None (Room air) --   04/23/24 22:50:52 98.4 °F (36.9 °C) 77 16 129/63 85 91 % None (Room air) Lying   04/23/24 15:44:09 97.8 °F (36.6 °C) 90 18 137/71 93 96 % None (Room air) Lying   04/23/24 1444 -- -- -- 200/95   Abnormal  -- -- -- --   04/23/24 1400 -- 90 20 153/81 109 95 % None (Room air) Sitting   04/23/24 1230 -- 86 20 154/54 94 95 % None (Room air) --   04/23/24 1115 -- 87 20 150/68 98 97 % None (Room air) Sitting   04/23/24 1038 97.7 °F (36.5 °C) 90 20 157/88 --  98 % None (Room air)          Pertinent Labs/Diagnostic Test Results:                           XR foot 3+ views LEFT   (04/23 1456)      No radiographic evidence of osteomyelitis.         XR tibia fibula 2 views LEFT    (04/23 1456)      No acute osseous abnormality. No evidence of infection.            Results from last 7 days   Lab Units 04/24/24  0636 04/23/24  1102   WBC Thousand/uL 7.81 10.82*   HEMOGLOBIN g/dL 13.1 14.5   HEMATOCRIT % 40.3 45.2   PLATELETS Thousands/uL 329 367   TOTAL NEUT ABS Thousands/µL 4.71 7.63*         Results from last 7 days   Lab Units 04/24/24  0636 04/23/24  1102   SODIUM mmol/L 138 134*   POTASSIUM mmol/L 4.4 4.5   CHLORIDE mmol/L 106 99   CO2 mmol/L 24 22   ANION GAP mmol/L 8 13   BUN mg/dL 13 17   CREATININE mg/dL 0.84 0.92   EGFR ml/min/1.73sq m 91 86   CALCIUM mg/dL 9.0 9.2   MAGNESIUM mg/dL 2.0  --    PHOSPHORUS mg/dL 3.4  --      Results from last 7 days   Lab Units 04/24/24  0636 04/23/24  1102   AST U/L 10* 17   ALT U/L 7 6*   ALK PHOS U/L 117* 140*   TOTAL PROTEIN g/dL 6.8 8.0   ALBUMIN g/dL 3.1* 3.7   TOTAL BILIRUBIN mg/dL 0.42 0.51     Results from last 7 days   Lab Units 04/24/24  1103 04/24/24  0548 04/24/24  0000 04/23/24  2037 04/23/24  1614   POC GLUCOSE mg/dl 138 110 100 86 268*     Results from last 7 days   Lab Units 04/24/24  0636 04/23/24  1102   GLUCOSE RANDOM mg/dL 124 221*         Results from last 7 days   Lab Units 04/23/24  1102   PROCALCITONIN ng/ml <0.05     Results from last 7 days   Lab Units 04/23/24  1127   LACTIC ACID mmol/L 1.8     Results from last 7 days   Lab Units 04/23/24  1102   CRP mg/L 51.8*   SED RATE mm/hour 77*     Results from last 7 days   Lab Units 04/23/24  1106 04/23/24  1102   BLOOD CULTURE  No Growth at 24 hrs. No Growth at 24 hrs.     ED Treatment:   Medication Administration from 04/23/2024 1001 to 04/23/2024 1539         Date/Time Order Dose Route Action     04/23/2024 1150 EDT vancomycin (VANCOCIN) 2,000 mg in sodium  chloride 0.9 % 500 mL IVPB 2,000 mg Intravenous New Bag     04/23/2024 1113 EDT cefepime (MAXIPIME) IVPB (premix in dextrose) 2,000 mg 50 mL 2,000 mg Intravenous New Bag     04/23/2024 1443 EDT enoxaparin (LOVENOX) subcutaneous injection 40 mg 40 mg Subcutaneous Given     04/23/2024 1444 EDT lisinopril (ZESTRIL) tablet 2.5 mg 2.5 mg Oral Given     04/23/2024 1447 EDT metroNIDAZOLE (FLAGYL) tablet 500 mg 500 mg Oral Given          Past Medical History:   Diagnosis Date    Diabetes mellitus (HCC)     Hypertension      Present on Admission:   Diabetes mellitus (HCC)   Hypertension   Hyperlipidemia      Admitting Diagnosis:     Cellulitis [L03.90]  Diabetes (HCC) [E11.9]  Visit for wound check [Z51.89]  Open wound of foot, initial encounter [S91.309A]    Age/Sex: 64 y.o. male    Scheduled Medications:  aspirin, 81 mg, Oral, Daily  atorvastatin, 80 mg, Oral, Daily  cefepime, 2,000 mg, Intravenous, Q8H  Cholecalciferol, 1,000 Units, Oral, Daily  enoxaparin, 40 mg, Subcutaneous, Daily  insulin glargine, 30 Units, Subcutaneous, Q12H JASPER  insulin lispro, 1-6 Units, Subcutaneous, Q6H  insulin lispro, 8 Units, Subcutaneous, TID With Meals  lisinopril, 2.5 mg, Oral, Daily  metroNIDAZOLE, 500 mg, Oral, Q8H JASPER  vancomycin, 12.5 mg/kg, Intravenous, Q12H      Continuous IV Infusions:     PRN Meds:       IP CONSULT TO PODIATRY  IP CONSULT TO PHARMACY    Network Utilization Review Department  ATTENTION: Please call with any questions or concerns to [phone] and carefully listen to the prompts so that you are directed to the right person. All voicemails are confidential.   For Discharge needs, contact Care Management DC Support Team at [phone] opt. [phone]  Send all requests for admission clinical reviews, approved or denied determinations and any other requests to dedicated fax number below belonging to the campus where the patient is receiving treatment. List of dedicated fax numbers for the Facilities:  FACILITY NAME UR FAX  NUMBER   ADMISSION DENIALS (Administrative/Medical Necessity) [phone]   DISCHARGE SUPPORT TEAM (NETWORK) [phone]   PARENT CHILD HEALTH (Maternity/NICU/Pediatrics) [phone]   [facility] [phone]   [facility] [phone]   Counts include 234 beds at the [facility] [phone]   [facility] [phone]   Critical access hospital [phone]   [facility] [phone]   [facility] [phone]   [facility] [phone]   [facility] [phone]   [facility] [phone]   [facility] [phone]   [facility] [phone]

## 2024-04-24 NOTE — PROGRESS NOTES
Art Joseph is a 64 y.o. male who is currently ordered Vancomycin IV with management by the Pharmacy Consult service.  Relevant clinical data and objective / subjective history reviewed.  Vancomycin Assessment:  Indication and Goal AUC/Trough: Soft tissue (goal -600, trough >10)  Clinical Status: stable  Micro:     Renal Function:  SCr: 0.84 mg/dL  CrCl: 102.9 mL/min  Renal replacement: Not on dialysis  Days of Therapy: 2  Current Dose: 1250mg Q12H  Vancomycin Plan:  New Dosing: same dose  Estimated AUC: 512 mcg*hr/mL  Estimated Trough: 14.6 mcg/mL  Next Level: tomorrow in the AM  Renal Function Monitoring: Daily BMP and UOP  Pharmacy will continue to follow closely for s/sx of nephrotoxicity, infusion reactions and appropriateness of therapy.  BMP and CBC will be ordered per protocol. We will continue to follow the patient’s culture results and clinical progress daily.    Reggie Saez, Pharmacist

## 2024-04-25 ENCOUNTER — ANESTHESIA EVENT (INPATIENT)
Dept: PERIOP | Facility: HOSPITAL | Age: 64
DRG: 617 | End: 2024-04-25
Payer: COMMERCIAL

## 2024-04-25 ENCOUNTER — APPOINTMENT (INPATIENT)
Dept: NON INVASIVE DIAGNOSTICS | Facility: HOSPITAL | Age: 64
DRG: 617 | End: 2024-04-25
Payer: COMMERCIAL

## 2024-04-25 ENCOUNTER — TELEPHONE (OUTPATIENT)
Age: 64
End: 2024-04-25

## 2024-04-25 PROBLEM — M86.172 ACUTE OSTEOMYELITIS OF METATARSAL BONE OF LEFT FOOT (HCC): Status: ACTIVE | Noted: 2017-01-30

## 2024-04-25 LAB
ANION GAP SERPL CALCULATED.3IONS-SCNC: 7 MMOL/L (ref 4–13)
BASOPHILS # BLD AUTO: 0.07 THOUSANDS/ÂΜL (ref 0–0.1)
BASOPHILS NFR BLD AUTO: 1 % (ref 0–1)
BUN SERPL-MCNC: 14 MG/DL (ref 5–25)
CALCIUM SERPL-MCNC: 8.8 MG/DL (ref 8.4–10.2)
CHLORIDE SERPL-SCNC: 105 MMOL/L (ref 96–108)
CO2 SERPL-SCNC: 25 MMOL/L (ref 21–32)
CREAT SERPL-MCNC: 0.8 MG/DL (ref 0.6–1.3)
EOSINOPHIL # BLD AUTO: 0.27 THOUSAND/ÂΜL (ref 0–0.61)
EOSINOPHIL NFR BLD AUTO: 3 % (ref 0–6)
ERYTHROCYTE [DISTWIDTH] IN BLOOD BY AUTOMATED COUNT: 12.4 % (ref 11.6–15.1)
GFR SERPL CREATININE-BSD FRML MDRD: 93 ML/MIN/1.73SQ M
GLUCOSE SERPL-MCNC: 156 MG/DL (ref 65–140)
GLUCOSE SERPL-MCNC: 173 MG/DL (ref 65–140)
GLUCOSE SERPL-MCNC: 178 MG/DL (ref 65–140)
GLUCOSE SERPL-MCNC: 178 MG/DL (ref 65–140)
GLUCOSE SERPL-MCNC: 222 MG/DL (ref 65–140)
GLUCOSE SERPL-MCNC: 260 MG/DL (ref 65–140)
HCT VFR BLD AUTO: 41.4 % (ref 36.5–49.3)
HGB BLD-MCNC: 13.4 G/DL (ref 12–17)
IMM GRANULOCYTES # BLD AUTO: 0.03 THOUSAND/UL (ref 0–0.2)
IMM GRANULOCYTES NFR BLD AUTO: 0 % (ref 0–2)
LYMPHOCYTES # BLD AUTO: 2.11 THOUSANDS/ÂΜL (ref 0.6–4.47)
LYMPHOCYTES NFR BLD AUTO: 24 % (ref 14–44)
MCH RBC QN AUTO: 28.3 PG (ref 26.8–34.3)
MCHC RBC AUTO-ENTMCNC: 32.4 G/DL (ref 31.4–37.4)
MCV RBC AUTO: 88 FL (ref 82–98)
MONOCYTES # BLD AUTO: 0.71 THOUSAND/ÂΜL (ref 0.17–1.22)
MONOCYTES NFR BLD AUTO: 8 % (ref 4–12)
NEUTROPHILS # BLD AUTO: 5.61 THOUSANDS/ÂΜL (ref 1.85–7.62)
NEUTS SEG NFR BLD AUTO: 64 % (ref 43–75)
NRBC BLD AUTO-RTO: 0 /100 WBCS
PLATELET # BLD AUTO: 354 THOUSANDS/UL (ref 149–390)
PMV BLD AUTO: 8.9 FL (ref 8.9–12.7)
POTASSIUM SERPL-SCNC: 3.9 MMOL/L (ref 3.5–5.3)
RBC # BLD AUTO: 4.73 MILLION/UL (ref 3.88–5.62)
SODIUM SERPL-SCNC: 137 MMOL/L (ref 135–147)
VANCOMYCIN SERPL-MCNC: 17.7 UG/ML (ref 10–20)
WBC # BLD AUTO: 8.8 THOUSAND/UL (ref 4.31–10.16)

## 2024-04-25 PROCEDURE — 93926 LOWER EXTREMITY STUDY: CPT

## 2024-04-25 PROCEDURE — 93922 UPR/L XTREMITY ART 2 LEVELS: CPT | Performed by: SURGERY

## 2024-04-25 PROCEDURE — 99223 1ST HOSP IP/OBS HIGH 75: CPT | Performed by: SURGERY

## 2024-04-25 PROCEDURE — 85025 COMPLETE CBC W/AUTO DIFF WBC: CPT

## 2024-04-25 PROCEDURE — 80048 BASIC METABOLIC PNL TOTAL CA: CPT

## 2024-04-25 PROCEDURE — 93926 LOWER EXTREMITY STUDY: CPT | Performed by: SURGERY

## 2024-04-25 PROCEDURE — 99233 SBSQ HOSP IP/OBS HIGH 50: CPT | Performed by: FAMILY MEDICINE

## 2024-04-25 PROCEDURE — 80202 ASSAY OF VANCOMYCIN: CPT | Performed by: FAMILY MEDICINE

## 2024-04-25 PROCEDURE — 99223 1ST HOSP IP/OBS HIGH 75: CPT | Performed by: PODIATRIST

## 2024-04-25 PROCEDURE — 82948 REAGENT STRIP/BLOOD GLUCOSE: CPT

## 2024-04-25 RX ADMIN — INSULIN GLARGINE 30 UNITS: 100 INJECTION, SOLUTION SUBCUTANEOUS at 21:38

## 2024-04-25 RX ADMIN — INSULIN GLARGINE 30 UNITS: 100 INJECTION, SOLUTION SUBCUTANEOUS at 09:18

## 2024-04-25 RX ADMIN — ASPIRIN 81 MG 81 MG: 81 TABLET ORAL at 09:18

## 2024-04-25 RX ADMIN — DESMOPRESSIN ACETATE 80 MG: 0.2 TABLET ORAL at 18:16

## 2024-04-25 RX ADMIN — VANCOMYCIN HYDROCHLORIDE 1250 MG: 5 INJECTION, POWDER, LYOPHILIZED, FOR SOLUTION INTRAVENOUS at 10:34

## 2024-04-25 RX ADMIN — INSULIN LISPRO 8 UNITS: 100 INJECTION, SOLUTION INTRAVENOUS; SUBCUTANEOUS at 13:03

## 2024-04-25 RX ADMIN — CEFEPIME HYDROCHLORIDE 2000 MG: 2 INJECTION, SOLUTION INTRAVENOUS at 04:27

## 2024-04-25 RX ADMIN — CHOLECALCIFEROL TAB 25 MCG (1000 UNIT) 1000 UNITS: 25 TAB at 09:18

## 2024-04-25 RX ADMIN — CEFEPIME HYDROCHLORIDE 2000 MG: 2 INJECTION, SOLUTION INTRAVENOUS at 21:39

## 2024-04-25 RX ADMIN — METRONIDAZOLE 500 MG: 500 TABLET ORAL at 05:01

## 2024-04-25 RX ADMIN — INSULIN LISPRO 8 UNITS: 100 INJECTION, SOLUTION INTRAVENOUS; SUBCUTANEOUS at 18:15

## 2024-04-25 RX ADMIN — METRONIDAZOLE 500 MG: 500 TABLET ORAL at 21:39

## 2024-04-25 RX ADMIN — METRONIDAZOLE 500 MG: 500 TABLET ORAL at 14:03

## 2024-04-25 RX ADMIN — INSULIN LISPRO 2 UNITS: 100 INJECTION, SOLUTION INTRAVENOUS; SUBCUTANEOUS at 18:16

## 2024-04-25 RX ADMIN — CEFEPIME HYDROCHLORIDE 2000 MG: 2 INJECTION, SOLUTION INTRAVENOUS at 12:27

## 2024-04-25 RX ADMIN — LISINOPRIL 2.5 MG: 5 TABLET ORAL at 09:18

## 2024-04-25 RX ADMIN — VANCOMYCIN HYDROCHLORIDE 1250 MG: 5 INJECTION, POWDER, LYOPHILIZED, FOR SOLUTION INTRAVENOUS at 23:14

## 2024-04-25 NOTE — PLAN OF CARE
Problem: PAIN - ADULT  Goal: Verbalizes/displays adequate comfort level or baseline comfort level  Description: Interventions:  - Encourage patient to monitor pain and request assistance  - Assess pain using appropriate pain scale  - Administer analgesics based on type and severity of pain and evaluate response  - Implement non-pharmacological measures as appropriate and evaluate response  - Consider cultural and social influences on pain and pain management  - Notify physician/advanced practitioner if interventions unsuccessful or patient reports new pain  Outcome: Progressing     Problem: INFECTION - ADULT  Goal: Absence or prevention of progression during hospitalization  Description: INTERVENTIONS:  - Assess and monitor for signs and symptoms of infection  - Monitor lab/diagnostic results  - Monitor all insertion sites, i.e. indwelling lines, tubes, and drains  - Monitor endotracheal if appropriate and nasal secretions for changes in amount and color  - [place] appropriate cooling/warming therapies per order  - Administer medications as ordered  - Instruct and encourage patient and family to use good hand hygiene technique  - Identify and instruct in appropriate isolation precautions for identified infection/condition  Outcome: Progressing     Problem: SAFETY ADULT  Goal: Patient will remain free of falls  Description: INTERVENTIONS:  - Educate patient/family on patient safety including physical limitations  - Instruct patient to call for assistance with activity   - Consult OT/PT to assist with strengthening/mobility   - Keep Call bell within reach  - Keep bed low and locked with side rails adjusted as appropriate  - Keep care items and personal belongings within reach  - Initiate and maintain comfort rounds  - Make Fall Risk Sign visible to staff  - Offer Toileting every 2 Hours, in advance of need  - Initiate/Maintain bed/chair alarm  - Obtain necessary fall risk management equipment: as needed  - Apply  yellow socks and bracelet for high fall risk patients  - Consider moving patient to room near nurses station  Outcome: Progressing     Problem: DISCHARGE PLANNING  Goal: Discharge to home or other facility with appropriate resources  Description: INTERVENTIONS:  - Identify barriers to discharge w/patient and caregiver  - Arrange for needed discharge resources and transportation as appropriate  - Identify discharge learning needs (meds, wound care, etc.)  - Arrange for interpretive services to assist at discharge as needed  - Refer to Case Management Department for coordinating discharge planning if the patient needs post-hospital services based on physician/advanced practitioner order or complex needs related to functional status, cognitive ability, or social support system  Outcome: Progressing     Problem: Knowledge Deficit  Goal: Patient/family/caregiver demonstrates understanding of disease process, treatment plan, medications, and discharge instructions  Description: Complete learning assessment and assess knowledge base.  Interventions:  - Provide teaching at level of understanding  - Provide teaching via preferred learning methods  Outcome: Progressing     Problem: METABOLIC, FLUID AND ELECTROLYTES - ADULT  Goal: Fluid balance maintained  Description: INTERVENTIONS:  - Monitor labs   - Monitor I/O and WT  - Instruct patient on fluid and nutrition as appropriate  - Assess for signs & symptoms of volume excess or deficit  Outcome: Progressing  Goal: Glucose maintained within target range  Description: INTERVENTIONS:  - Monitor Blood Glucose as ordered  - Assess for signs and symptoms of hyperglycemia and hypoglycemia  - Administer ordered medications to maintain glucose within target range  - Assess nutritional intake and initiate nutrition service referral as needed  Outcome: Progressing     Problem: SKIN/TISSUE INTEGRITY - ADULT  Goal: Incision(s), wounds(s) or drain site(s) healing without S/S of  infection  Description: INTERVENTIONS  - Assess and document dressing, incision, wound bed, drain sites and surrounding tissue  - Provide patient and family education  - Perform skin care/dressing changes as ordered  Outcome: Progressing     Problem: MUSCULOSKELETAL - ADULT  Goal: Maintain or return mobility to safest level of function  Description: INTERVENTIONS:  - Assess patient's ability to carry out ADLs; assess patient's baseline for ADL function and identify physical deficits which impact ability to perform ADLs (bathing, care of mouth/teeth, toileting, grooming, dressing, etc.)  - Assess/evaluate cause of self-care deficits   - Assess range of motion  - Assess patient's mobility  - Assess patient's need for assistive devices and provide as appropriate  - Encourage maximum independence but intervene and supervise when necessary  - Involve family in performance of ADLs  - Assess for home care needs following discharge   - Consider OT consult to assist with ADL evaluation and planning for discharge  - Provide patient education as appropriate  Outcome: Progressing     Problem: Prexisting or High Potential for Compromised Skin Integrity  Goal: Skin integrity is maintained or improved  Description: INTERVENTIONS:  - Identify patients at risk for skin breakdown  - Assess and monitor skin integrity  - Assess and monitor nutrition and hydration status  - Monitor labs   - Assess for incontinence   - Turn and reposition patient  - Assist with mobility/ambulation  - Relieve pressure over bony prominences  - Avoid friction and shearing  - Provide appropriate hygiene as needed including keeping skin clean and dry  - Evaluate need for skin moisturizer/barrier cream  - Collaborate with interdisciplinary team   - Patient/family teaching  - Consider wound care consult   Outcome: Progressing     Problem: Nutrition/Hydration-ADULT  Goal: Nutrient/Hydration intake appropriate for improving, restoring or maintaining nutritional  needs  Description: Monitor and assess patient's nutrition/hydration status for malnutrition. Collaborate with interdisciplinary team and initiate plan and interventions as ordered.  Monitor patient's weight and dietary intake as ordered or per policy. Utilize nutrition screening tool and intervene as necessary. Determine patient's food preferences and provide high-protein, high-caloric foods as appropriate.     INTERVENTIONS:  - Monitor oral intake, urinary output, labs, and treatment plans  - Assess nutrition and hydration status and recommend course of action  - Evaluate amount of meals eaten  - Assist patient with eating if necessary   - Allow adequate time for meals  - Recommend/ encourage appropriate diets, oral nutritional supplements, and vitamin/mineral supplements  - Order, calculate, and assess calorie counts as needed  - Recommend, monitor, and adjust tube feedings and TPN/PPN based on assessed needs  - Assess need for intravenous fluids  - Provide specific nutrition/hydration education as appropriate  - Include patient/family/caregiver in decisions related to nutrition  Outcome: Progressing

## 2024-04-25 NOTE — ASSESSMENT & PLAN NOTE
Pt sent to ED from outpatient wound care d/t sent to ED severe ulceration and purulent drainage of the left foot   MRI left foot shows small 4 mm focus of confluent T1 marrow abnormality in the distal and plantar aspect of the stump of the first metatarsal, suspect small focus of osteomyelitis   Continue IV cefepime 2 g q8h + IV vancomycin 1.25 g q12h with PO metronidazole 500 mg q8h   Vascular surgery consult placed by podiatry, recommend proceeding with surgery  Podiatry consulted, will pursue TMA due to the severe soft tissue compromise wounds and deranged foot after 2:30 pm tomorrow  Wound care  NPO after midnight

## 2024-04-25 NOTE — PLAN OF CARE
Problem: PAIN - ADULT  Goal: Verbalizes/displays adequate comfort level or baseline comfort level  Description: Interventions:  - Encourage patient to monitor pain and request assistance  - Assess pain using appropriate pain scale  - Administer analgesics based on type and severity of pain and evaluate response  - Implement non-pharmacological measures as appropriate and evaluate response  - Consider cultural and social influences on pain and pain management  - Notify physician/advanced practitioner if interventions unsuccessful or patient reports new pain  Outcome: Progressing     Problem: INFECTION - ADULT  Goal: Absence or prevention of progression during hospitalization  Description: INTERVENTIONS:  - Assess and monitor for signs and symptoms of infection  - Monitor lab/diagnostic results  - Monitor all insertion sites, i.e. indwelling lines, tubes, and drains  - Monitor endotracheal if appropriate and nasal secretions for changes in amount and color  - [place] appropriate cooling/warming therapies per order  - Administer medications as ordered  - Instruct and encourage patient and family to use good hand hygiene technique  - Identify and instruct in appropriate isolation precautions for identified infection/condition  Outcome: Progressing     Problem: SAFETY ADULT  Goal: Patient will remain free of falls  Description: INTERVENTIONS:  - Educate patient/family on patient safety including physical limitations  - Instruct patient to call for assistance with activity   - Consult OT/PT to assist with strengthening/mobility   - Keep Call bell within reach  - Keep bed low and locked with side rails adjusted as appropriate  - Keep care items and personal belongings within reach  - Initiate and maintain comfort rounds  - Make Fall Risk Sign visible to staff  - Offer Toileting every 2 Hours, in advance of need  - Initiate/Maintain bed/chair alarm  - Obtain necessary fall risk management equipment: as needed  - Apply  yellow socks and bracelet for high fall risk patients  - Consider moving patient to room near nurses station  Outcome: Progressing     Problem: DISCHARGE PLANNING  Goal: Discharge to home or other facility with appropriate resources  Description: INTERVENTIONS:  - Identify barriers to discharge w/patient and caregiver  - Arrange for needed discharge resources and transportation as appropriate  - Identify discharge learning needs (meds, wound care, etc.)  - Arrange for interpretive services to assist at discharge as needed  - Refer to Case Management Department for coordinating discharge planning if the patient needs post-hospital services based on physician/advanced practitioner order or complex needs related to functional status, cognitive ability, or social support system  Outcome: Progressing

## 2024-04-25 NOTE — PROGRESS NOTES
[facility]  Progress Note  Name: Art Joseph I  MRN: [id number]  Unit/Bed#: 410-01 I Date of Admission: 4/23/2024   Date of Service: 4/25/2024 I Hospital Day: 2    Assessment/Plan   * Acute osteomyelitis of metatarsal bone of left foot (HCC)  Assessment & Plan  Pt sent to ED from outpatient wound care d/t sent to ED severe ulceration and purulent drainage of the left foot   MRI left foot shows small 4 mm focus of confluent T1 marrow abnormality in the distal and plantar aspect of the stump of the first metatarsal, suspect small focus of osteomyelitis   Continue IV cefepime 2 g q8h + IV vancomycin 1.25 g q12h with PO metronidazole 500 mg q8h   Vascular surgery consult placed by podiatry, recommend proceeding with surgery  Podiatry consulted, will pursue TMA due to the severe soft tissue compromise wounds and deranged foot after 2:30 pm tomorrow  Wound care  NPO after midnight    Cellulitis of left lower extremity  Assessment & Plan  Patient seen by outpatient wound care on 4/23/24 for open wound of the left lower extremity  Subsequently sent to ED due to concern for left lower extremity cellulitis with erythema and ulceration noted as well as severe ulceration and purulent drainage of the left foot  On admission, WBC at 10.82, CRP 51.8, sed rate 77  Blood culture x2 show no growth to date  XR L foot + XR L tibia/fibula order show no acute osseous abnormality and no evidence of infection.   Vas arterial duplex ordered  IV cefepime 2 g q8h + IV vancomycin 1.25 g q12h with PO metronidazole 500 mg q8h  Podiatry consult placed, appreciate recommendations  Wound care consult     Hyperlipidemia  Assessment & Plan  Continue PTA lipitor 80 mg QD    Hypertension  Assessment & Plan  BPs 150s/60-80s on admission, now 130s/80s  Continue PTA lisinopril   Monitor vitals    Diabetes mellitus (HCC)  Assessment & Plan  Lab Results   Component Value Date    HGBA1C 14.1 (H) 10/27/2023       Recent Labs      24  2303 24  0445 24  0800   POCGLU 189* 205* 173* 178*         Blood Sugar Average: Last 72 hrs: (P) 167.4  Hx of uncontrolled blood glucose levels  Algorithm 3 sliding scale before meals  PTA lantus 55 units BID decreased to lantus 30 units BID d/t NPO status for procedure  PTA humalog 15 units TID with meals decreased to humalog 8 units TID with meals  POCT before meals and at bedtime               VTE Pharmacologic Prophylaxis:   Pharmacologic: Enoxaparin (Lovenox)  Mechanical VTE Prophylaxis in Place: No      Discussions with Specialists or Other Care Team Provider: yes, with supervising attending    Education and Discussions with Family / Patient: yes, with patient    Time Spent for Care: 30 minutes.  More than 50% of total time spent on counseling and coordination of care as described above.    Current Length of Stay: 2 day(s)    Current Patient Status: Inpatient   Certification Statement: The patient will continue to require additional inpatient hospital stay due to osteomyelitis of left foot, IV antibiotic treatment, TMA procedure     Discharge Plan: to be determined    Code Status: Level 1 - Full Code      Subjective:   Patient seen and examined at bedside this morning. No acute events overnight.Patient seen and examined at bedside this morning. No acute overnight events. Reports no new symptoms and feels as though the swelling in his left lower limb has decreased but is still anxious regarding pending surgical evaluation. Denies chest pain, shortness of breath, nausea, vomiting, and changes in bowel movements.    Objective:     Vitals:   Temp (24hrs), Av.2 °F (36.8 °C), Min:98.1 °F (36.7 °C), Max:98.3 °F (36.8 °C)    Temp:  [98.1 °F (36.7 °C)-98.3 °F (36.8 °C)] 98.1 °F (36.7 °C)  HR:  [76-87] 76  Resp:  [16-20] 16  BP: (138-151)/(67-81) 149/79  SpO2:  [93 %-97 %] 97 %  Body mass index is 31.03 kg/m².     Input and Output Summary (last 24 hours):       Intake/Output  Summary (Last 24 hours) at 4/25/2024 1345  Last data filed at 4/25/2024 1200  Gross per 24 hour   Intake 1780 ml   Output 2375 ml   Net -595 ml       Physical Exam:     Physical Exam  Constitutional:       Appearance: Normal appearance.   HENT:      Head: Normocephalic and atraumatic.   Cardiovascular:      Rate and Rhythm: Normal rate and regular rhythm.      Pulses: Normal pulses.      Heart sounds: Normal heart sounds.   Pulmonary:      Effort: Pulmonary effort is normal.      Breath sounds: Normal breath sounds.   Musculoskeletal:      Cervical back: Normal range of motion and neck supple.   Skin:     General: Skin is warm and dry.      Comments: Dressing on left foot and LLE C/D/I   Neurological:      General: No focal deficit present.      Mental Status: He is alert and oriented to person, place, and time.           Additional Data:     Labs:    Results from last 7 days   Lab Units 04/25/24  0456   WBC Thousand/uL 8.80   HEMOGLOBIN g/dL 13.4   HEMATOCRIT % 41.4   PLATELETS Thousands/uL 354   SEGS PCT % 64   LYMPHO PCT % 24   MONO PCT % 8   EOS PCT % 3     Results from last 7 days   Lab Units 04/25/24  0456 04/24/24  0636   SODIUM mmol/L 137 138   POTASSIUM mmol/L 3.9 4.4   CHLORIDE mmol/L 105 106   CO2 mmol/L 25 24   BUN mg/dL 14 13   CREATININE mg/dL 0.80 0.84   ANION GAP mmol/L 7 8   CALCIUM mg/dL 8.8 9.0   ALBUMIN g/dL  --  3.1*   TOTAL BILIRUBIN mg/dL  --  0.42   ALK PHOS U/L  --  117*   ALT U/L  --  7   AST U/L  --  10*   GLUCOSE RANDOM mg/dL 178* 124         Results from last 7 days   Lab Units 04/25/24  1132 04/25/24  0800 04/25/24  0445 04/24/24  2303 04/24/24  2004 04/24/24  1631 04/24/24  1103 04/24/24  0548 04/24/24  0000 04/23/24  2037 04/23/24  1614   POC GLUCOSE mg/dl 156* 178* 173* 205* 189* 227* 138 110 100 86 268*         Results from last 7 days   Lab Units 04/23/24  1127 04/23/24  1102   LACTIC ACID mmol/L 1.8  --    PROCALCITONIN ng/ml  --  <0.05           * I Have Reviewed All Lab Data  Listed Above.  * Additional Pertinent Lab Tests Reviewed: All Labs For Current Hospital Admission Reviewed    Imaging:    Imaging Reports Reviewed Today Include:   MRI foot/forefoot toes left wo contrast   Final Result      Small 4 mm focus of  confluent T1 marrow abnormality in the distal and plantar aspect of the stump of the first metatarsal, with possible small sinus tract to the area of overlying ulcer (image 41 series 7) suspect small focus of  osteomyelitis         Mild T2 hyperintensity seen in the stump of the second proximal phalanx and in the third middle and distal changes probably due to inhomogeneous fat suppression      Arthritic changes at the first tarsometatarsal joint      The study was marked in EPIC for significant notification.               Workstation performed: [id number]         XR foot 3+ views LEFT   Final Result      No radiographic evidence of osteomyelitis.      Workstation performed: [id number]         XR tibia fibula 2 views LEFT   Final Result      No acute osseous abnormality. No evidence of infection.            Workstation performed: [id number]         VAS ARTERIAL DUPLEX-LOWER LIMB UNILATERAL    (Results Pending)       Imaging Personally Reviewed by Myself Includes:    MRI foot/forefoot toes left wo contrast   Final Result      Small 4 mm focus of  confluent T1 marrow abnormality in the distal and plantar aspect of the stump of the first metatarsal, with possible small sinus tract to the area of overlying ulcer (image 41 series 7) suspect small focus of  osteomyelitis         Mild T2 hyperintensity seen in the stump of the second proximal phalanx and in the third middle and distal changes probably due to inhomogeneous fat suppression      Arthritic changes at the first tarsometatarsal joint      The study was marked in EPIC for significant notification.               Workstation performed: [id number]         XR foot 3+ views LEFT   Final Result      No radiographic evidence of  osteomyelitis.      Workstation performed: [id number]         XR tibia fibula 2 views LEFT   Final Result      No acute osseous abnormality. No evidence of infection.            Workstation performed: [id number]         VAS ARTERIAL DUPLEX-LOWER LIMB UNILATERAL    (Results Pending)         Recent Cultures (last 7 days):     Results from last 7 days   Lab Units 04/23/24  1106 04/23/24  1102   BLOOD CULTURE  No Growth at 48 hrs. No Growth at 48 hrs.       Last 24 Hours Medication List:   Current Facility-Administered Medications   Medication Dose Route Frequency Provider Last Rate    aspirin  81 mg Oral Daily Marilyn Velazquez MD      atorvastatin  80 mg Oral Daily Marilyn Velazquez MD      cefepime  2,000 mg Intravenous Q8H Bella Nj MD 2,000 mg (04/25/24 1227)    Cholecalciferol  1,000 Units Oral Daily Marilyn Velazquez MD      enoxaparin  40 mg Subcutaneous Daily Bella Nj MD      insulin glargine  30 Units Subcutaneous Q12H [facility] Bella Nj MD      insulin lispro  1-6 Units Subcutaneous Q6H Rashid Rubalcava MD      insulin lispro  8 Units Subcutaneous TID With Meals Bella Nj MD      lisinopril  2.5 mg Oral Daily Marilyn Velazquez MD      metroNIDAZOLE  500 mg Oral Q8H [facility] Bella Nj MD      vancomycin  12.5 mg/kg Intravenous Q12H Bella Nj MD 1,250 mg (04/25/24 1034)        Today, Patient Was Seen By: Marilyn Velazquez MD    ** Please Note: Dictation voice to text software may have been used in the creation of this document. **

## 2024-04-25 NOTE — ASSESSMENT & PLAN NOTE
Patient seen by outpatient wound care on 4/23/24 for open wound of the left lower extremity  Subsequently sent to ED due to concern for left lower extremity cellulitis with erythema and ulceration noted as well as severe ulceration and purulent drainage of the left foot  On admission, WBC at 10.82, CRP 51.8, sed rate 77  Blood culture x2 show no growth to date  XR L foot + XR L tibia/fibula order show no acute osseous abnormality and no evidence of infection.   Vas arterial duplex ordered  IV cefepime 2 g q8h + IV vancomycin 1.25 g q12h with PO metronidazole 500 mg q8h  Podiatry consult placed, appreciate recommendations  Wound care consult

## 2024-04-25 NOTE — PROGRESS NOTES
Art Joseph is a 64 y.o. male who is currently ordered Vancomycin IV with management by the Pharmacy Consult service.  Relevant clinical data and objective / subjective history reviewed.  Vancomycin Assessment:  Indication and Goal AUC/Trough: Soft tissue (goal -600, trough >10)  Clinical Status: stable  Micro:     Renal Function:  SCr: 0.8 mg/dL  CrCl: 86 mL/min  Renal replacement: Not on dialysis  Days of Therapy: 3  Current Dose: 1250mg IV q12h  Vancomycin Plan:  New Dosing: same  Estimated AUC: 465 mcg*hr/mL  Estimated Trough: 12.7 mcg/mL  Next Level: 5/2/24 with AM labs  Renal Function Monitoring: Daily BMP and UOP  Pharmacy will continue to follow closely for s/sx of nephrotoxicity, infusion reactions and appropriateness of therapy.  BMP and CBC will be ordered per protocol. We will continue to follow the patient’s culture results and clinical progress daily.    Christopher Guardado, Pharmacist

## 2024-04-25 NOTE — CONSULTS
[facility] Podiatry - Consultation    Patient Information:   Art Joseph 64 y.o. male MRN: [id number]  Unit/Bed#: 410-01 Encounter: [id number]  PCP: Kerry Christine MD  Date of Admission:  4/23/2024  Date of Consultation: 04/25/24  Requesting Physician: Bella Nj MD      ASSESSMENT:    Art Joseph is a 64 y.o. male with:    Venous stasis ulceration left lower extremity  Ulceration of left foot  Osteomyelitis left foot  Diabetes  Peripheral arterial disease  Right below-knee amputation  Cellulitis of the left lower extremity    PLAN:    Arterial studies were reviewed and do show a supranormal WILLY but with good waveforms distally, will consult vascular for evaluation  MRI reviewed and shows small focus of osteomyelitis and distal stump of the remnant first metatarsal  Due to the severe soft tissue compromise wounds and deranged foot we will pursue TMA, plan for n.p.o. after midnight, Dr. Rubalcava performing procedure, available after 1430 tomorrow  We discussed with on-call provider for timing, timing to be determined  Rest of care per primary team.    SUBJECTIVE    History of Present Illness:    Art Joseph is a 64 y.o. male with past medical history of right below-knee amp ulceration left lower extremity with venous stasis and cellulitis who presents with ulcerations left lower extremity also left foot who was sent here from wound care.     Review of Systems:    Constitutional: Negative.    HENT: Negative.    Eyes: Negative.    Respiratory: Negative.    Cardiovascular: Negative.    Gastrointestinal: Negative.    Musculoskeletal: As above  Skin: As above  Neurological: Numbness  Psych: Negative.     Past Medical and Surgical History:     Past Medical History:   Diagnosis Date    Diabetes mellitus (HCC)     Hypertension        Past Surgical History:   Procedure Laterality Date    LEG AMPUTATION THROUGH LOWER TIBIA AND FIBULA Right 7/25/2017    Procedure: AMPUTATION BELOW KNEE (BKA);  Surgeon:  Mynor Sanchez MD;  Location: [facility];  Service: General    WV AMPUTATION FOOT TRANSMETARSAL Right 1/26/2017    Procedure: AMPUTATION TRANSMETATARSAL (TMA); OPEN;  Surgeon: Leonard Lomeli DPM;  Location: [facility];  Service: Podiatry    WV AMPUTATION METATARSAL W/TOE SINGLE Left 1/30/2017    Procedure: Left partial 1st ray amputation;  Surgeon: Leonard Lomeli DPM;  Location: [facility];  Service: Podiatry    WV COLONOSCOPY FLX DX W/COLLJ SPEC WHEN PFRMD N/A 11/28/2018    Procedure: COLONOSCOPY;  Surgeon: González Napier MD;  Location: [facility];  Service: Gastroenterology    WOUND DEBRIDEMENT Right 1/30/2017    Procedure: DEBRIDEMENT FOOT/TOE (WASH OUT) delayed closure, LINDA;  Surgeon: Leonard Lomeli DPM;  Location: [facility];  Service:        Meds/Allergies:    Medications Prior to Admission   Medication    aspirin (ECOTRIN LOW STRENGTH) 81 mg EC tablet    atorvastatin (LIPITOR) 80 mg tablet    Cholecalciferol (Vitamin D) 125 MCG (5000 UT) CAPS    glucose blood test strip    insulin aspart (NovoLOG FlexPen) 100 UNIT/ML injection pen    Insulin Glargine Solostar (Basaglar KwikPen) 100 UNIT/ML SOPN    Insulin Pen Needle 30G X 8 MM MISC    lisinopril (ZESTRIL) 2.5 mg tablet    metFORMIN (GLUCOPHAGE) 1000 MG tablet    Multiple Vitamin (MULTI-VITAMIN DAILY PO)       No Known Allergies    Social History:     Marital Status: Single    Substance Use History:   Social History     Substance and Sexual Activity   Alcohol Use Yes    Alcohol/week: 11.0 standard drinks of alcohol    Types: 6 Cans of beer, 5 Shots of liquor per week    Comment: social ; occasional use as per [employer]     Social History     Tobacco Use   Smoking Status Former    Types: Cigarettes   Smokeless Tobacco Never   Tobacco Comments    quit 9 years ago     Social History     Substance and Sexual Activity   Drug Use No       Family History:    Family History   Problem Relation Age of Onset    Diabetes Mother          OBJECTIVE:    Vitals:   Blood  "Pressure: 149/79 (04/25/24 0919)  Pulse: 76 (04/25/24 0919)  Temperature: 98.1 °F (36.7 °C) (04/25/24 0802)  Temp Source: Oral (04/24/24 0709)  Respirations: 16 (04/25/24 0919)  Height: 5' 10\" (177.8 cm) (04/23/24 1539)  Weight - Scale: 98.1 kg (216 lb 4.3 oz) (04/23/24 1539)  SpO2: 97 % (04/25/24 0919)    Physical Exam:     General Appearance: Alert, cooperative, no distress.  HEENT: Head normocephalic, atraumatic, without obvious abnormality.  Heart: Normal rate and rhythm.  Lungs: Non-labored breathing. No respiratory distress.  Abdomen: Without distension.  Psychiatric: AAOx3  Lower Extremity:  Vascular:   Mild strikethrough present on dressings, continued ulceration left lower extremity and also left foot.  Improvement in cellulitis drastically  Additional Data:     Lab Results: I have personally reviewed pertinent labs including:    Results from last 7 days   Lab Units 04/25/24  0456   WBC Thousand/uL 8.80   HEMOGLOBIN g/dL 13.4   HEMATOCRIT % 41.4   PLATELETS Thousands/uL 354   SEGS PCT % 64   LYMPHO PCT % 24   MONO PCT % 8   EOS PCT % 3     Results from last 7 days   Lab Units 04/25/24  0456 04/24/24  0636   POTASSIUM mmol/L 3.9 4.4   CHLORIDE mmol/L 105 106   CO2 mmol/L 25 24   BUN mg/dL 14 13   CREATININE mg/dL 0.80 0.84   CALCIUM mg/dL 8.8 9.0   ALK PHOS U/L  --  117*   ALT U/L  --  7   AST U/L  --  10*           Cultures: I have personally reviewed pertinent cultures including:    Results from last 7 days   Lab Units 04/23/24  1106 04/23/24  1102   BLOOD CULTURE  No Growth at 24 hrs. No Growth at 24 hrs.           Imaging: I have personally reviewed pertinent films in PACS.  EKG, Pathology, and Other Studies: I have personally reviewed pertinent reports.        ** Please Note: Portions of the record may have been created with voice recognition software. Occasional wrong word or \"sound a like\" substitutions may have occurred due to the inherent limitations of voice recognition software. Read the chart " carefully and recognize, using context, where substitutions have occurred. **

## 2024-04-25 NOTE — ASSESSMENT & PLAN NOTE
Lab Results   Component Value Date    HGBA1C 14.1 (H) 10/27/2023       Recent Labs     04/24/24  2004 04/24/24  2303 04/25/24  0445 04/25/24  0800   POCGLU 189* 205* 173* 178*         Blood Sugar Average: Last 72 hrs: (P) 167.4  Hx of uncontrolled blood glucose levels  Algorithm 3 sliding scale before meals  PTA lantus 55 units BID decreased to lantus 30 units BID d/t NPO status for procedure  PTA humalog 15 units TID with meals decreased to humalog 8 units TID with meals  POCT before meals and at bedtime

## 2024-04-25 NOTE — CONSULTS
"Consultation - Vascular Surgery   Art Joseph 64 y.o. male MRN: [id number]  Unit/Bed#: 410-01 Encounter: [id number]      Assessment/Plan      Assessment:  65yo M w/ PMH of poorly controlled DM (A1C 14.1) , HTN, HLD, PAD s/p R BKA (2017), now presenting with:    Osteomyelitis L foot  Venous stasis ulcerations, LLE  Cellulitis LLE    - 1-2 wk hx of traumatic wound to L foot after hitting this area accidentally with RLE prosthetic device  - WBC 8.8 (7.8, 10.8)  - hemoglobin stable   - Cr 0.8  - Blood cx NGTD  - 4/24 MRI L foot \"Small 4 mm focus of confluent T1 marrow abnormality in the distal and plantar aspect of the stump of the first metatarsal, with possible small sinus tract to the area of overlying ulcer suspect small focus of osteomyelitis \"  - 4/24 LEADs \"LEFT LOWER LIMB: This resting evaluation shows no evidence of significant lower extremity arterial occlusive disease. No evidence of focal stenosis with Duplex. Unable to obtain TBI due to amputation.  WILLY: 1.55 , which is unreliable due to medial calcinosis.  Prior: none  MTP:  Waveform only, unable to obtain pressure.  GTP:  Hallux amputated.  PVR/ PPG tracings are normal.\"      Plan:  Discussed with Dr. Alesia Velasquez of vascular surgery, ok to proceed with scheduled podiatric procedure. If there is any concern for bleeding/wound healing in intra- or post-operative period would recommend re-engaging vascular surgery and consideration of angiogram  - IV abx  - NPO at [place] for procedure w/ podiatry, ok for diet today   - local wound care per wound care RN and podiatry   - remainder of medical management per primary team     History of Present Illness   Physician Requesting Consult: Bella Nj MD  Reason for Consult / Principal Problem: pre-op optimization, PAD    HPI: Art Joseph is a 64 y.o. year old male w/ Hx of poorly controlled DM, HTN, HLD, PAD PAD status post right BKA who now presents with venous stasis ulcerations with surrounding " cellulitis of left lower extremity.  Patient reports that he hit his left leg with a prostatic that he wears on his right leg causing an open wound approximately 1 to 2 weeks ago.  He was initially seen at an urgent care and was prescribed a course of oral antibiotics without improvement of symptoms.  He followed up with wound care on 4/23 and was sent to the ED due to concern for cellulitis.   Workup consisted of left lower extremity x-rays which were unremarkable as well as a left lower extremity MRI which was suspicious for a small focus of osteomyelitis overlying the stump of the left first metatarsal.  He was seen by podiatry who is recommending a left TMA.  Vascular surgery consulted for preoperative optimization.  Lower extremity duplex reviewed and notable for  a supra normal WILLY of 1.55.     Inpatient consult to Vascular Surgery  Consult performed by: Kerry Orellana PA-C  Consult ordered by: Myron Bhakta DPM          Review of Systems   Constitutional:  Negative for appetite change, chills and fever.   Respiratory:  Negative for chest tightness and shortness of breath.    Cardiovascular:  Negative for chest pain.   Gastrointestinal:  Negative for abdominal pain.   Musculoskeletal:         S/p R BKA   Skin:  Positive for color change and wound.   All other ROS neg unless stated above     Historical Information   Past Medical History:   Diagnosis Date    Diabetes mellitus (HCC)     Hypertension      Past Surgical History:   Procedure Laterality Date    LEG AMPUTATION THROUGH LOWER TIBIA AND FIBULA Right 7/25/2017    Procedure: AMPUTATION BELOW KNEE (BKA);  Surgeon: Mynor Sanchez MD;  Location: [facility];  Service: General    NV AMPUTATION FOOT TRANSMETARSAL Right 1/26/2017    Procedure: AMPUTATION TRANSMETATARSAL (TMA); OPEN;  Surgeon: Leonard Lomeli DPM;  Location: [facility];  Service: Podiatry    NV AMPUTATION METATARSAL W/TOE SINGLE Left 1/30/2017    Procedure: Left partial 1st ray  "amputation;  Surgeon: Leonard Lomeli DPM;  Location:  MAIN OR;  Service: Podiatry    SC COLONOSCOPY FLX DX W/COLLJ SPEC WHEN PFRMD N/A 11/28/2018    Procedure: COLONOSCOPY;  Surgeon: González Napier MD;  Location: [facility];  Service: Gastroenterology    WOUND DEBRIDEMENT Right 1/30/2017    Procedure: DEBRIDEMENT FOOT/TOE (WASH OUT) delayed closure, LINDA;  Surgeon: Leonard Lomeli DPM;  Location:  MAIN OR;  Service:      Social History   Social History     Substance and Sexual Activity   Alcohol Use Yes    Alcohol/week: 11.0 standard drinks of alcohol    Types: 6 Cans of beer, 5 Shots of liquor per week    Comment: social ; occasional use as per [employer]     Social History     Substance and Sexual Activity   Drug Use No     E-Cigarette/Vaping    E-Cigarette Use Never User      E-Cigarette/Vaping Substances    Nicotine No     THC No     CBD No     Flavoring No     Other No     Unknown No      Social History     Tobacco Use   Smoking Status Former    Types: Cigarettes   Smokeless Tobacco Never   Tobacco Comments    quit 9 years ago     Family History: non-contributory}    Meds/Allergies   all current active meds have been reviewed  No Known Allergies    Objective   Vitals: Blood pressure 149/79, pulse 76, temperature 98.1 °F (36.7 °C), resp. rate 16, height 5' 10\" (1.778 m), weight 98.1 kg (216 lb 4.3 oz), SpO2 97%.,Body mass index is 31.03 kg/m².    Intake/Output Summary (Last 24 hours) at 4/25/2024 1034  Last data filed at 4/25/2024 0919  Gross per 24 hour   Intake 840 ml   Output 2125 ml   Net -1285 ml     Invasive Devices       Peripheral Intravenous Line  Duration             Peripheral IV 04/24/24 Proximal;Right;Ventral (anterior) Forearm 1 day                    Physical Exam  Vitals reviewed.   Constitutional:       General: He is not in acute distress.     Appearance: He is not toxic-appearing.   HENT:      Head: Normocephalic and atraumatic.   Eyes:      General: No scleral icterus.  Cardiovascular:      " "Rate and Rhythm: Normal rate.      Pulses:           Dorsalis pedis pulses are 2+ on the left side. Right dorsalis pedis pulse not accessible.        Posterior tibial pulses are detected w/ Doppler on the left side. Right posterior tibial pulse not accessible.   Pulmonary:      Effort: Pulmonary effort is normal.   Musculoskeletal:      Comments: LLE motor function intact, sensation diminished   S/p R BKA   Skin:     General: Skin is warm and dry.      Findings: Erythema present.      Comments: LLE ulcerations overlying L shin, mild surrounding erythema, improved from photos reviewed in media tab  Multiple superficial ulcerations overlying L foot, no active drainage   Neurological:      General: No focal deficit present.      Mental Status: He is alert and oriented to person, place, and time.   Psychiatric:         Mood and Affect: Mood normal.         Behavior: Behavior normal.         Lab Results: I have personally reviewed pertinent reports.  , CBC with diff:   Lab Results   Component Value Date    WBC 8.80 04/25/2024    HGB 13.4 04/25/2024    HCT 41.4 04/25/2024    MCV 88 04/25/2024     04/25/2024    RBC 4.73 04/25/2024    MCH 28.3 04/25/2024    MCHC 32.4 04/25/2024    RDW 12.4 04/25/2024    MPV 8.9 04/25/2024    NRBC 0 04/25/2024   , BMP/CMP:   Lab Results   Component Value Date    SODIUM 137 04/25/2024    K 3.9 04/25/2024     04/25/2024    CO2 25 04/25/2024    BUN 14 04/25/2024    CREATININE 0.80 04/25/2024    CALCIUM 8.8 04/25/2024    EGFR 93 04/25/2024   , Wound Culure: No results found for: \"WOUNDCULT\"  Imaging Studies: I have personally reviewed pertinent reports.      Narrative & Impression   XR TIBIA FIBULA 2 VW LEFT     INDICATION: left leg infection.     COMPARISON: None     FINDINGS:     No acute fracture or dislocation.     No significant degenerative changes.     No lytic or blastic osseous lesion.     Atherosclerotic calcifications. Otherwise unremarkable soft tissues.   " "  IMPRESSION:     No acute osseous abnormality. No evidence of infection.     Narrative & Impression   XR FOOT 3+ VW LEFT     INDICATION: left foot infection.     COMPARISON: 1/30/2017     FINDINGS:     Status post first transmetatarsal amputation. Status post second toe distal and middle phalangeal resection. Neuropathic changes distal second through fifth metatarsals with resorptive changes evident.     Calcaneal enthesophyte(s).     No lytic or blastic osseous lesion.     Unremarkable soft tissues.     IMPRESSION:     No radiographic evidence of osteomyelitis.     4/24 LLE MRI \"IMPRESSION:     Small 4 mm focus of  confluent T1 marrow abnormality in the distal and plantar aspect of the stump of the first metatarsal, with possible small sinus tract to the area of overlying ulcer (image 41 series 7) suspect small focus of  osteomyelitis        Mild T2 hyperintensity seen in the stump of the second proximal phalanx and in the third middle and distal changes probably due to inhomogeneous fat suppression     Arthritic changes at the first tarsometatarsal joint\"      4/24 LEADs \"CONCLUSION:  LEFT LOWER LIMB:  This resting evaluation shows no evidence of significant lower extremity  arterial occlusive disease. No evidence of focal stenosis with Duplex. Unable to  obtain TBI due to amputation.  Ankle/Brachial index: 1.55 , which is unreliable due to medial calcinosis.  Prior: none  Metatarsal pressure:  Waveform only, unable to obtain pressure.  Great toe pressure:  Hallux amputated.  PVR/ PPG tracings are normal.\"    EKG, Pathology, and Other Studies: I have personally reviewed pertinent reports.    VTE Prophylaxis: Enoxaparin (Lovenox)     Code Status: Level 1 - Full Code  Advance Directive and Living Will:      Power of :    POLST:      Counseling / Coordination of Care  Counseling/Coordination of Care: Total floor / unit time spent today 30 minutes. Greater than 50% of total time was spent with the patient and " "/ or family counseling and / or coordination of care. A description of the counseling / coordination of care: history and physical, discussion w primary, discussion w attending , documentation     Kerry Orellana, PAC  04/25/24  **Please Note: Portions of the record may have been created using voice recognition software.  Occasional wrong word or \"sound a like\" substitutions may have occurred due to the inherent limitations of voice recognition software.  Read the chart carefully and recognize, using context, where substitutions have occurred.**       "

## 2024-04-25 NOTE — TELEPHONE ENCOUNTER
Sammie from [facility] called requesting a home health order for patient. She is requesting a call back to know if provider will sign a home health order.

## 2024-04-26 ENCOUNTER — ANESTHESIA (INPATIENT)
Dept: PERIOP | Facility: HOSPITAL | Age: 64
DRG: 617 | End: 2024-04-26
Payer: COMMERCIAL

## 2024-04-26 ENCOUNTER — APPOINTMENT (INPATIENT)
Dept: RADIOLOGY | Facility: HOSPITAL | Age: 64
DRG: 617 | End: 2024-04-26
Payer: COMMERCIAL

## 2024-04-26 LAB
ANION GAP SERPL CALCULATED.3IONS-SCNC: 8 MMOL/L (ref 4–13)
BASOPHILS # BLD AUTO: 0.06 THOUSANDS/ÂΜL (ref 0–0.1)
BASOPHILS NFR BLD AUTO: 1 % (ref 0–1)
BUN SERPL-MCNC: 16 MG/DL (ref 5–25)
CALCIUM SERPL-MCNC: 8.8 MG/DL (ref 8.4–10.2)
CHLORIDE SERPL-SCNC: 105 MMOL/L (ref 96–108)
CO2 SERPL-SCNC: 24 MMOL/L (ref 21–32)
CREAT SERPL-MCNC: 0.76 MG/DL (ref 0.6–1.3)
EOSINOPHIL # BLD AUTO: 0.3 THOUSAND/ÂΜL (ref 0–0.61)
EOSINOPHIL NFR BLD AUTO: 4 % (ref 0–6)
ERYTHROCYTE [DISTWIDTH] IN BLOOD BY AUTOMATED COUNT: 12.5 % (ref 11.6–15.1)
GFR SERPL CREATININE-BSD FRML MDRD: 95 ML/MIN/1.73SQ M
GLUCOSE SERPL-MCNC: 132 MG/DL (ref 65–140)
GLUCOSE SERPL-MCNC: 133 MG/DL (ref 65–140)
GLUCOSE SERPL-MCNC: 158 MG/DL (ref 65–140)
GLUCOSE SERPL-MCNC: 175 MG/DL (ref 65–140)
GLUCOSE SERPL-MCNC: 205 MG/DL (ref 65–140)
GLUCOSE SERPL-MCNC: 91 MG/DL (ref 65–140)
GLUCOSE SERPL-MCNC: 97 MG/DL (ref 65–140)
HCT VFR BLD AUTO: 42.4 % (ref 36.5–49.3)
HGB BLD-MCNC: 13.7 G/DL (ref 12–17)
IMM GRANULOCYTES # BLD AUTO: 0.02 THOUSAND/UL (ref 0–0.2)
IMM GRANULOCYTES NFR BLD AUTO: 0 % (ref 0–2)
LYMPHOCYTES # BLD AUTO: 2.08 THOUSANDS/ÂΜL (ref 0.6–4.47)
LYMPHOCYTES NFR BLD AUTO: 25 % (ref 14–44)
MCH RBC QN AUTO: 28.2 PG (ref 26.8–34.3)
MCHC RBC AUTO-ENTMCNC: 32.3 G/DL (ref 31.4–37.4)
MCV RBC AUTO: 87 FL (ref 82–98)
MONOCYTES # BLD AUTO: 0.66 THOUSAND/ÂΜL (ref 0.17–1.22)
MONOCYTES NFR BLD AUTO: 8 % (ref 4–12)
NEUTROPHILS # BLD AUTO: 5.2 THOUSANDS/ÂΜL (ref 1.85–7.62)
NEUTS SEG NFR BLD AUTO: 62 % (ref 43–75)
NRBC BLD AUTO-RTO: 0 /100 WBCS
PLATELET # BLD AUTO: 361 THOUSANDS/UL (ref 149–390)
PMV BLD AUTO: 9.1 FL (ref 8.9–12.7)
POTASSIUM SERPL-SCNC: 3.8 MMOL/L (ref 3.5–5.3)
RBC # BLD AUTO: 4.86 MILLION/UL (ref 3.88–5.62)
SODIUM SERPL-SCNC: 137 MMOL/L (ref 135–147)
WBC # BLD AUTO: 8.32 THOUSAND/UL (ref 4.31–10.16)

## 2024-04-26 PROCEDURE — 28122 PARTIAL REMOVAL OF FOOT BONE: CPT | Performed by: STUDENT IN AN ORGANIZED HEALTH CARE EDUCATION/TRAINING PROGRAM

## 2024-04-26 PROCEDURE — 88311 DECALCIFY TISSUE: CPT | Performed by: PATHOLOGY

## 2024-04-26 PROCEDURE — 87205 SMEAR GRAM STAIN: CPT | Performed by: STUDENT IN AN ORGANIZED HEALTH CARE EDUCATION/TRAINING PROGRAM

## 2024-04-26 PROCEDURE — 87077 CULTURE AEROBIC IDENTIFY: CPT | Performed by: STUDENT IN AN ORGANIZED HEALTH CARE EDUCATION/TRAINING PROGRAM

## 2024-04-26 PROCEDURE — 85025 COMPLETE CBC W/AUTO DIFF WBC: CPT

## 2024-04-26 PROCEDURE — 87186 SC STD MICRODIL/AGAR DIL: CPT | Performed by: STUDENT IN AN ORGANIZED HEALTH CARE EDUCATION/TRAINING PROGRAM

## 2024-04-26 PROCEDURE — 73630 X-RAY EXAM OF FOOT: CPT

## 2024-04-26 PROCEDURE — 88305 TISSUE EXAM BY PATHOLOGIST: CPT | Performed by: PATHOLOGY

## 2024-04-26 PROCEDURE — 80048 BASIC METABOLIC PNL TOTAL CA: CPT

## 2024-04-26 PROCEDURE — 88307 TISSUE EXAM BY PATHOLOGIST: CPT | Performed by: PATHOLOGY

## 2024-04-26 PROCEDURE — 0Y6N0Z9 DETACHMENT AT LEFT FOOT, PARTIAL 1ST RAY, OPEN APPROACH: ICD-10-PCS | Performed by: STUDENT IN AN ORGANIZED HEALTH CARE EDUCATION/TRAINING PROGRAM

## 2024-04-26 PROCEDURE — 87075 CULTR BACTERIA EXCEPT BLOOD: CPT | Performed by: STUDENT IN AN ORGANIZED HEALTH CARE EDUCATION/TRAINING PROGRAM

## 2024-04-26 PROCEDURE — 87070 CULTURE OTHR SPECIMN AEROBIC: CPT | Performed by: STUDENT IN AN ORGANIZED HEALTH CARE EDUCATION/TRAINING PROGRAM

## 2024-04-26 PROCEDURE — 99232 SBSQ HOSP IP/OBS MODERATE 35: CPT | Performed by: FAMILY MEDICINE

## 2024-04-26 PROCEDURE — 82948 REAGENT STRIP/BLOOD GLUCOSE: CPT

## 2024-04-26 RX ORDER — LIDOCAINE HYDROCHLORIDE 10 MG/ML
INJECTION, SOLUTION EPIDURAL; INFILTRATION; INTRACAUDAL; PERINEURAL AS NEEDED
Status: DISCONTINUED | OUTPATIENT
Start: 2024-04-26 | End: 2024-04-26

## 2024-04-26 RX ORDER — SODIUM CHLORIDE, SODIUM LACTATE, POTASSIUM CHLORIDE, CALCIUM CHLORIDE 600; 310; 30; 20 MG/100ML; MG/100ML; MG/100ML; MG/100ML
INJECTION, SOLUTION INTRAVENOUS CONTINUOUS PRN
Status: DISCONTINUED | OUTPATIENT
Start: 2024-04-26 | End: 2024-04-26

## 2024-04-26 RX ORDER — PROPOFOL 10 MG/ML
INJECTION, EMULSION INTRAVENOUS AS NEEDED
Status: DISCONTINUED | OUTPATIENT
Start: 2024-04-26 | End: 2024-04-26

## 2024-04-26 RX ORDER — ONDANSETRON 2 MG/ML
INJECTION INTRAMUSCULAR; INTRAVENOUS AS NEEDED
Status: DISCONTINUED | OUTPATIENT
Start: 2024-04-26 | End: 2024-04-26

## 2024-04-26 RX ORDER — EPHEDRINE SULFATE 50 MG/ML
INJECTION INTRAVENOUS AS NEEDED
Status: DISCONTINUED | OUTPATIENT
Start: 2024-04-26 | End: 2024-04-26

## 2024-04-26 RX ORDER — FENTANYL CITRATE/PF 50 MCG/ML
25 SYRINGE (ML) INJECTION
Status: DISCONTINUED | OUTPATIENT
Start: 2024-04-26 | End: 2024-04-26 | Stop reason: HOSPADM

## 2024-04-26 RX ORDER — ONDANSETRON 2 MG/ML
4 INJECTION INTRAMUSCULAR; INTRAVENOUS ONCE AS NEEDED
Status: DISCONTINUED | OUTPATIENT
Start: 2024-04-26 | End: 2024-04-26 | Stop reason: HOSPADM

## 2024-04-26 RX ORDER — PHENYLEPHRINE HCL IN 0.9% NACL 1 MG/10 ML
SYRINGE (ML) INTRAVENOUS AS NEEDED
Status: DISCONTINUED | OUTPATIENT
Start: 2024-04-26 | End: 2024-04-26

## 2024-04-26 RX ORDER — FENTANYL CITRATE 50 UG/ML
INJECTION, SOLUTION INTRAMUSCULAR; INTRAVENOUS AS NEEDED
Status: DISCONTINUED | OUTPATIENT
Start: 2024-04-26 | End: 2024-04-26

## 2024-04-26 RX ORDER — HYDROMORPHONE HCL IN WATER/PF 6 MG/30 ML
0.2 PATIENT CONTROLLED ANALGESIA SYRINGE INTRAVENOUS
Status: DISCONTINUED | OUTPATIENT
Start: 2024-04-26 | End: 2024-04-26 | Stop reason: HOSPADM

## 2024-04-26 RX ORDER — MAGNESIUM HYDROXIDE 1200 MG/15ML
LIQUID ORAL AS NEEDED
Status: DISCONTINUED | OUTPATIENT
Start: 2024-04-26 | End: 2024-04-26 | Stop reason: HOSPADM

## 2024-04-26 RX ADMIN — Medication 100 MCG: at 16:23

## 2024-04-26 RX ADMIN — EPHEDRINE SULFATE 10 MG: 50 INJECTION, SOLUTION INTRAVENOUS at 16:04

## 2024-04-26 RX ADMIN — ASPIRIN 81 MG 81 MG: 81 TABLET ORAL at 09:20

## 2024-04-26 RX ADMIN — CHOLECALCIFEROL TAB 25 MCG (1000 UNIT) 1000 UNITS: 25 TAB at 09:20

## 2024-04-26 RX ADMIN — METRONIDAZOLE 500 MG: 500 TABLET ORAL at 05:04

## 2024-04-26 RX ADMIN — INSULIN GLARGINE 20 UNITS: 100 INJECTION, SOLUTION SUBCUTANEOUS at 09:21

## 2024-04-26 RX ADMIN — CEFEPIME HYDROCHLORIDE 2000 MG: 2 INJECTION, SOLUTION INTRAVENOUS at 20:26

## 2024-04-26 RX ADMIN — VANCOMYCIN HYDROCHLORIDE 1250 MG: 5 INJECTION, POWDER, LYOPHILIZED, FOR SOLUTION INTRAVENOUS at 12:09

## 2024-04-26 RX ADMIN — Medication 200 MCG: at 15:49

## 2024-04-26 RX ADMIN — CEFEPIME HYDROCHLORIDE 2000 MG: 2 INJECTION, SOLUTION INTRAVENOUS at 05:04

## 2024-04-26 RX ADMIN — METRONIDAZOLE 500 MG: 500 TABLET ORAL at 13:31

## 2024-04-26 RX ADMIN — ONDANSETRON 4 MG: 2 INJECTION INTRAMUSCULAR; INTRAVENOUS at 15:50

## 2024-04-26 RX ADMIN — SODIUM CHLORIDE, SODIUM LACTATE, POTASSIUM CHLORIDE, AND CALCIUM CHLORIDE: .6; .31; .03; .02 INJECTION, SOLUTION INTRAVENOUS at 15:43

## 2024-04-26 RX ADMIN — FENTANYL CITRATE 50 MCG: 50 INJECTION, SOLUTION INTRAMUSCULAR; INTRAVENOUS at 15:46

## 2024-04-26 RX ADMIN — Medication 100 MCG: at 16:01

## 2024-04-26 RX ADMIN — METRONIDAZOLE 500 MG: 500 TABLET ORAL at 21:20

## 2024-04-26 RX ADMIN — DESMOPRESSIN ACETATE 80 MG: 0.2 TABLET ORAL at 17:56

## 2024-04-26 RX ADMIN — VANCOMYCIN HYDROCHLORIDE 1250 MG: 5 INJECTION, POWDER, LYOPHILIZED, FOR SOLUTION INTRAVENOUS at 23:23

## 2024-04-26 RX ADMIN — PROPOFOL 200 MG: 10 INJECTION, EMULSION INTRAVENOUS at 15:46

## 2024-04-26 RX ADMIN — CEFEPIME HYDROCHLORIDE 2000 MG: 2 INJECTION, SOLUTION INTRAVENOUS at 12:09

## 2024-04-26 RX ADMIN — INSULIN GLARGINE 30 UNITS: 100 INJECTION, SOLUTION SUBCUTANEOUS at 21:20

## 2024-04-26 RX ADMIN — LIDOCAINE HYDROCHLORIDE 50 MG: 10 INJECTION, SOLUTION EPIDURAL; INFILTRATION; INTRACAUDAL; PERINEURAL at 15:46

## 2024-04-26 RX ADMIN — LISINOPRIL 2.5 MG: 5 TABLET ORAL at 09:20

## 2024-04-26 RX ADMIN — Medication 100 MCG: at 15:46

## 2024-04-26 NOTE — ASSESSMENT & PLAN NOTE
Pt sent to ED from outpatient wound care d/t sent to ED severe ulceration and purulent drainage of the left foot   MRI left foot shows small 4 mm focus of confluent T1 marrow abnormality in the distal and plantar aspect of the stump of the first metatarsal, suspect small focus of osteomyelitis   Continue IV cefepime 2 g q8h + IV vancomycin 1.25 g q12h with PO metronidazole 500 mg q8h   Vascular surgery consult placed by podiatry, recommend proceeding with surgery  Podiatry consulted, will pursue TMA due to the severe soft tissue compromise wounds and deranged foot after 2:30 pm today  Wound care  Remain NPO for procedure

## 2024-04-26 NOTE — ASSESSMENT & PLAN NOTE
Patient seen by outpatient wound care on 4/23/24 for open wound of the left lower extremity  Subsequently sent to ED due to concern for left lower extremity cellulitis with erythema and ulceration noted as well as severe ulceration and purulent drainage of the left foot  On admission, WBC at 10.82, CRP 51.8, sed rate 77  Blood culture x2 show no growth to date  XR L foot + XR L tibia/fibula order show no acute osseous abnormality and no evidence of infection.   Vas arterial duplex ordered, showed no evidence of significant lower extremity arterial occlusive disease.   IV cefepime 2 g q8h + IV vancomycin 1.25 g q12h with PO metronidazole 500 mg q8h  Podiatry consult placed, appreciate recommendations  Wound care consult

## 2024-04-26 NOTE — ANESTHESIA PREPROCEDURE EVALUATION
Procedure:  AMPUTATION TRANSMETATARSAL (TMA) (Left: Foot)    Relevant Problems   ANESTHESIA (within normal limits)      CARDIO   (+) Hyperlipidemia   (+) Hypertension      NEURO/PSYCH   (+) Diabetic neuropathy (HCC)      Other   (+) Acute osteomyelitis of metatarsal bone of left foot (HCC)        Physical Exam    Airway    Mallampati score: III  TM Distance: <3 FB  Neck ROM: full     Dental       Cardiovascular  Rate: normal    Pulmonary  Pulmonary exam normal     Other Findings  Per pt denies anything remaining that is loose or removeable      Anesthesia Plan  ASA Score- 3     Anesthesia Type- general with ASA Monitors.         Additional Monitors:     Airway Plan: LMA.           Plan Factors-Exercise tolerance (METS): >4 METS.    Chart reviewed.    Patient summary reviewed.    Patient is not a current smoker.              Induction- intravenous.    Postoperative Plan-     Informed Consent- Anesthetic plan and risks discussed with patient.  I personally reviewed this patient with the CRNA. Discussed and agreed on the Anesthesia Plan with the CRNA..

## 2024-04-26 NOTE — PLAN OF CARE
Problem: PAIN - ADULT  Goal: Verbalizes/displays adequate comfort level or baseline comfort level  Description: Interventions:  - Encourage patient to monitor pain and request assistance  - Assess pain using appropriate pain scale  - Administer analgesics based on type and severity of pain and evaluate response  - Implement non-pharmacological measures as appropriate and evaluate response  - Consider cultural and social influences on pain and pain management  - Notify physician/advanced practitioner if interventions unsuccessful or patient reports new pain  Outcome: Progressing     Problem: INFECTION - ADULT  Goal: Absence or prevention of progression during hospitalization  Description: INTERVENTIONS:  - Assess and monitor for signs and symptoms of infection  - Monitor lab/diagnostic results  - Monitor all insertion sites, i.e. indwelling lines, tubes, and drains  - Monitor endotracheal if appropriate and nasal secretions for changes in amount and color  - [place] appropriate cooling/warming therapies per order  - Administer medications as ordered  - Instruct and encourage patient and family to use good hand hygiene technique  - Identify and instruct in appropriate isolation precautions for identified infection/condition  Outcome: Progressing     Problem: SAFETY ADULT  Goal: Patient will remain free of falls  Description: INTERVENTIONS:  - Educate patient/family on patient safety including physical limitations  - Instruct patient to call for assistance with activity   - Consult OT/PT to assist with strengthening/mobility   - Keep Call bell within reach  - Keep bed low and locked with side rails adjusted as appropriate  - Keep care items and personal belongings within reach  - Initiate and maintain comfort rounds  - Make Fall Risk Sign visible to staff  - Offer Toileting every 2 Hours, in advance of need  - Initiate/Maintain bed/chair alarm  - Obtain necessary fall risk management equipment: as needed  - Apply  yellow socks and bracelet for high fall risk patients  - Consider moving patient to room near nurses station  Outcome: Progressing     Problem: DISCHARGE PLANNING  Goal: Discharge to home or other facility with appropriate resources  Description: INTERVENTIONS:  - Identify barriers to discharge w/patient and caregiver  - Arrange for needed discharge resources and transportation as appropriate  - Identify discharge learning needs (meds, wound care, etc.)  - Arrange for interpretive services to assist at discharge as needed  - Refer to Case Management Department for coordinating discharge planning if the patient needs post-hospital services based on physician/advanced practitioner order or complex needs related to functional status, cognitive ability, or social support system  Outcome: Progressing     Problem: Knowledge Deficit  Goal: Patient/family/caregiver demonstrates understanding of disease process, treatment plan, medications, and discharge instructions  Description: Complete learning assessment and assess knowledge base.  Interventions:  - Provide teaching at level of understanding  - Provide teaching via preferred learning methods  Outcome: Progressing     Problem: METABOLIC, FLUID AND ELECTROLYTES - ADULT  Goal: Fluid balance maintained  Description: INTERVENTIONS:  - Monitor labs   - Monitor I/O and WT  - Instruct patient on fluid and nutrition as appropriate  - Assess for signs & symptoms of volume excess or deficit  Outcome: Progressing  Goal: Glucose maintained within target range  Description: INTERVENTIONS:  - Monitor Blood Glucose as ordered  - Assess for signs and symptoms of hyperglycemia and hypoglycemia  - Administer ordered medications to maintain glucose within target range  - Assess nutritional intake and initiate nutrition service referral as needed  Outcome: Progressing     Problem: SKIN/TISSUE INTEGRITY - ADULT  Goal: Incision(s), wounds(s) or drain site(s) healing without S/S of  infection  Description: INTERVENTIONS  - Assess and document dressing, incision, wound bed, drain sites and surrounding tissue  - Provide patient and family education  - Perform skin care/dressing changes as ordered  Outcome: Progressing     Problem: MUSCULOSKELETAL - ADULT  Goal: Maintain or return mobility to safest level of function  Description: INTERVENTIONS:  - Assess patient's ability to carry out ADLs; assess patient's baseline for ADL function and identify physical deficits which impact ability to perform ADLs (bathing, care of mouth/teeth, toileting, grooming, dressing, etc.)  - Assess/evaluate cause of self-care deficits   - Assess range of motion  - Assess patient's mobility  - Assess patient's need for assistive devices and provide as appropriate  - Encourage maximum independence but intervene and supervise when necessary  - Involve family in performance of ADLs  - Assess for home care needs following discharge   - Consider OT consult to assist with ADL evaluation and planning for discharge  - Provide patient education as appropriate  Outcome: Progressing     Problem: Prexisting or High Potential for Compromised Skin Integrity  Goal: Skin integrity is maintained or improved  Description: INTERVENTIONS:  - Identify patients at risk for skin breakdown  - Assess and monitor skin integrity  - Assess and monitor nutrition and hydration status  - Monitor labs   - Assess for incontinence   - Turn and reposition patient  - Assist with mobility/ambulation  - Relieve pressure over bony prominences  - Avoid friction and shearing  - Provide appropriate hygiene as needed including keeping skin clean and dry  - Evaluate need for skin moisturizer/barrier cream  - Collaborate with interdisciplinary team   - Patient/family teaching  - Consider wound care consult   Outcome: Progressing     Problem: Nutrition/Hydration-ADULT  Goal: Nutrient/Hydration intake appropriate for improving, restoring or maintaining nutritional  needs  Description: Monitor and assess patient's nutrition/hydration status for malnutrition. Collaborate with interdisciplinary team and initiate plan and interventions as ordered.  Monitor patient's weight and dietary intake as ordered or per policy. Utilize nutrition screening tool and intervene as necessary. Determine patient's food preferences and provide high-protein, high-caloric foods as appropriate.     INTERVENTIONS:  - Monitor oral intake, urinary output, labs, and treatment plans  - Assess nutrition and hydration status and recommend course of action  - Evaluate amount of meals eaten  - Assist patient with eating if necessary   - Allow adequate time for meals  - Recommend/ encourage appropriate diets, oral nutritional supplements, and vitamin/mineral supplements  - Order, calculate, and assess calorie counts as needed  - Recommend, monitor, and adjust tube feedings and TPN/PPN based on assessed needs  - Assess need for intravenous fluids  - Provide specific nutrition/hydration education as appropriate  - Include patient/family/caregiver in decisions related to nutrition  Outcome: Progressing     Problem: PAIN - ADULT  Goal: Verbalizes/displays adequate comfort level or baseline comfort level  Description: Interventions:  - Encourage patient to monitor pain and request assistance  - Assess pain using appropriate pain scale  - Administer analgesics based on type and severity of pain and evaluate response  - Implement non-pharmacological measures as appropriate and evaluate response  - Consider cultural and social influences on pain and pain management  - Notify physician/advanced practitioner if interventions unsuccessful or patient reports new pain  Outcome: Progressing     Problem: INFECTION - ADULT  Goal: Absence or prevention of progression during hospitalization  Description: INTERVENTIONS:  - Assess and monitor for signs and symptoms of infection  - Monitor lab/diagnostic results  - Monitor all  insertion sites, i.e. indwelling lines, tubes, and drains  - Monitor endotracheal if appropriate and nasal secretions for changes in amount and color  - [place] appropriate cooling/warming therapies per order  - Administer medications as ordered  - Instruct and encourage patient and family to use good hand hygiene technique  - Identify and instruct in appropriate isolation precautions for identified infection/condition  Outcome: Progressing     Problem: SAFETY ADULT  Goal: Patient will remain free of falls  Description: INTERVENTIONS:  - Educate patient/family on patient safety including physical limitations  - Instruct patient to call for assistance with activity   - Consult OT/PT to assist with strengthening/mobility   - Keep Call bell within reach  - Keep bed low and locked with side rails adjusted as appropriate  - Keep care items and personal belongings within reach  - Initiate and maintain comfort rounds  - Make Fall Risk Sign visible to staff  - Offer Toileting every 2 Hours, in advance of need  - Initiate/Maintain bed/chair alarm  - Obtain necessary fall risk management equipment: as needed  - Apply yellow socks and bracelet for high fall risk patients  - Consider moving patient to room near nurses station  Outcome: Progressing

## 2024-04-26 NOTE — ANESTHESIA POSTPROCEDURE EVALUATION
Post-Op Assessment Note    CV Status:  Stable    Pain management: satisfactory to patient       Mental Status:  Alert and awake   Hydration Status:  Euvolemic   PONV Controlled:  Controlled   Airway Patency:  Patent     Post Op Vitals Reviewed: Yes    No anethesia notable event occurred.    Staff: CRNA               BP   115/64   Temp   98.0   Pulse  80   Resp   16   SpO2   99

## 2024-04-26 NOTE — PROGRESS NOTES
Art Joseph is a 64 y.o. male who is currently ordered Vancomycin IV with management by the Pharmacy Consult service.  Relevant clinical data and objective / subjective history reviewed.  Vancomycin Assessment:  Indication and Goal AUC/Trough: Soft tissue (goal -600, trough >10)  Clinical Status: stable  Renal Function:  SCr: 0.76 mg/dL  CrCl: 113.8 mL/min  Renal replacement: Not on dialysis  Days of Therapy: 4  Current Dose: 1250mg Q12H  Vancomycin Plan:  New Dosing: same dose  Estimated AUC: 447 mcg*hr/mL  Estimated Trough: 12 mcg/mL  Next Level: 05/02/24 in the AM  Renal Function Monitoring: Daily BMP and UOP  Pharmacy will continue to follow closely for s/sx of nephrotoxicity, infusion reactions and appropriateness of therapy.  BMP and CBC will be ordered per protocol. We will continue to follow the patient’s culture results and clinical progress daily.    Reggie Saez, Pharmacist

## 2024-04-26 NOTE — OP NOTE
OPERATIVE REPORT - Podiatry  PATIENT NAME: Art Joseph    :  1960  MRN: [id number]  Pt Location: [facility] OR ROOM 02    SURGERY DATE: 2024    Surgeons and Role:     * Jennifer Rubalcava DPM - Primary    Pre-op Diagnosis:  Chronic osteomyelitis of left foot with draining sinus (HCC) [M86.472]    Post-Op Diagnosis Codes:     * Chronic osteomyelitis of left foot with draining sinus (HCC) [M86.472]    Procedure(s) (LRB):  LEFT FOOT PARTIAL FIRST RAY AMPUTATION (Left)    Specimen(s):  ID Type Source Tests Collected by Time Destination   1 : Left First Metatarsal Tissue Foot, Left TISSUE EXAM Jennifer Rubalcava, [facility] 2024 1614    2 : Left First Metatarsal Bone Clean Margins Tissue Foot, Left ANAEROBIC CULTURE AND GRAM STAIN, CULTURE, TISSUE AND GRAM STAIN, TISSUE EXAM EUGENE Boswell 2024 1618    A : Left Foot Wound Tissue Foot, Left ANAEROBIC CULTURE AND GRAM STAIN, CULTURE, TISSUE AND GRAM STAIN Jennifer Rubalcava [facility] 2024 1608        Estimated Blood Loss:   Minimal    Drains:  * No LDAs found *    Anesthesia Type:   Conscious Sedation  with 10 ml of 1% Lidocaine and 0.25% Bupivacaine in a 1:1 mixture    Hemostasis:  Electrocautery as needed  Pneumatic ankle tourniquet at 250 mmHg for 19 minutes  Materials:  * No implants in log *  2-0 Vicryl and 2-0 Nylon    Operative Findings:  Consistent with the diagnosis     Complications:   None    Procedure and Technique:     Under mild sedation, the patient was brought into the operating room and placed on the operating room table in the supine position. IV sedation was achieved by anesthesia team and a universal timeout was performed where all parties are in agreement of correct patient, correct procedure and correct site. A pneumatic tourniquet was then placed over the patient's left lower extremity with ample padding. A forefoot block was performed consisting of 10 ml of 1% Lidocaine and 0.25% Bupivacaine in a 1:1 mixture. The foot was then prepped and draped in the  usual aseptic manner. The pneumatic tourniquet was then inflated to 250mmHg.    Attention was directed to the left medial plantar foot where large wound was located, using a #15 the wound was entirely excised and incision was carried to the level of distal first metatarsal stump.  Utilizing a combination of #15 blade and heel elevator soft tissue structures from the first metatarsal stump was reflected off exposing the metatarsal bone.  At this level mild serosanguineous drainage was noted at the level of distal stump.  Aerobic and anaerobic wound cultures were obtained.  Nonviable, necrotic soft tissue including muscle tendon fat was removed from the wound using Metzenbaum scissors.  The area was then rinsed with copious amount of normal sterile saline.  Next utilizing a sagittal saw transmetatarsal osteotomy was made at the distal aspect of the first metatarsal stump in appropriate fashion.  While making an osteotomy the bone appeared fairly hard to touch with instrumentation clean and bleeding.  The transected distal first metatarsal was sent for tissue examination.  The incision site was then rinsed with copious amount of normal sterile saline.  Next utilizing clean instruments clean margins of the first metatarsal stump was obtained sent for aerobic anaerobic cultures as well as bone pathology further examination.  The wound was explored no purulence or discolored drainage or necrotic tissue observed at this time.  The tourniquet was then deflated and prompt hyperemic response was noted to the left foot.  Bleeders were cauterized as necessary.  The deeper subcutaneous tissue was then reapproximated utilizing 2-0 Vicryl.  Skin edges were then reapproximated utilizing 2-0 nylon in horizontal mattress fashion with minimal skin tension.  The incision was then dressed with Xeroform, 4 x 4 gauze, ABD cast padding Ace wrap and Coban.    The patient tolerated the procedure and anesthesia well without immediate  "complications and transferred to PACU with vital signs stable.     As with many limb salvage procedures, we contemplate the possibility of performing further stages to this procedure. Procedures may include debridements, delayed closure, plastic surgery techniques, or more proximal amputations. This procedure may be considered part of a multi-staged limb salvage treatment plan.     I was present for the entire procedure.    SIGNATURE: Jennifer Rubalcava DPM  DATE: April 26, 2024  TIME: 4:53 PM      Portions of the record may have been created with voice recognition software. Occasional wrong word or \"sound a like\" substitutions may have occurred due to the inherent limitations of voice recognition software. Read the chart carefully and recognize, using context, where substitutions have occurred.    "

## 2024-04-26 NOTE — ASSESSMENT & PLAN NOTE
Lab Results   Component Value Date    HGBA1C 14.1 (H) 10/27/2023       Recent Labs     04/25/24  1132 04/25/24  1602 04/25/24 2050 04/26/24  0503   POCGLU 156* 222* 260* 175*         Blood Sugar Average: Last 72 hrs: (P) [id number]  Hx of uncontrolled blood glucose levels  Algorithm 3 sliding scale before meals  PTA lantus 55 units BID decreased to lantus 30 units BID d/t NPO status for procedure  PTA humalog 15 units TID with meals decreased to humalog 8 units TID with meals  POCT before meals and at bedtime

## 2024-04-26 NOTE — PLAN OF CARE
Problem: PAIN - ADULT  Goal: Verbalizes/displays adequate comfort level or baseline comfort level  Description: Interventions:  - Encourage patient to monitor pain and request assistance  - Assess pain using appropriate pain scale  - Administer analgesics based on type and severity of pain and evaluate response  - Implement non-pharmacological measures as appropriate and evaluate response  - Consider cultural and social influences on pain and pain management  - Notify physician/advanced practitioner if interventions unsuccessful or patient reports new pain  Outcome: Progressing     Problem: INFECTION - ADULT  Goal: Absence or prevention of progression during hospitalization  Description: INTERVENTIONS:  - Assess and monitor for signs and symptoms of infection  - Monitor lab/diagnostic results  - Monitor all insertion sites, i.e. indwelling lines, tubes, and drains  - Monitor endotracheal if appropriate and nasal secretions for changes in amount and color  - [place] appropriate cooling/warming therapies per order  - Administer medications as ordered  - Instruct and encourage patient and family to use good hand hygiene technique  - Identify and instruct in appropriate isolation precautions for identified infection/condition  Outcome: Progressing     Problem: SAFETY ADULT  Goal: Patient will remain free of falls  Description: INTERVENTIONS:  - Educate patient/family on patient safety including physical limitations  - Instruct patient to call for assistance with activity   - Consult OT/PT to assist with strengthening/mobility   - Keep Call bell within reach  - Keep bed low and locked with side rails adjusted as appropriate  - Keep care items and personal belongings within reach  - Initiate and maintain comfort rounds  - Make Fall Risk Sign visible to staff  - Offer Toileting every 2 Hours, in advance of need  - Initiate/Maintain bed/chair alarm  - Obtain necessary fall risk management equipment: as needed  - Apply  yellow socks and bracelet for high fall risk patients  - Consider moving patient to room near nurses station  Outcome: Progressing     Problem: DISCHARGE PLANNING  Goal: Discharge to home or other facility with appropriate resources  Description: INTERVENTIONS:  - Identify barriers to discharge w/patient and caregiver  - Arrange for needed discharge resources and transportation as appropriate  - Identify discharge learning needs (meds, wound care, etc.)  - Arrange for interpretive services to assist at discharge as needed  - Refer to Case Management Department for coordinating discharge planning if the patient needs post-hospital services based on physician/advanced practitioner order or complex needs related to functional status, cognitive ability, or social support system  Outcome: Progressing     Problem: Knowledge Deficit  Goal: Patient/family/caregiver demonstrates understanding of disease process, treatment plan, medications, and discharge instructions  Description: Complete learning assessment and assess knowledge base.  Interventions:  - Provide teaching at level of understanding  - Provide teaching via preferred learning methods  Outcome: Progressing     Problem: METABOLIC, FLUID AND ELECTROLYTES - ADULT  Goal: Fluid balance maintained  Description: INTERVENTIONS:  - Monitor labs   - Monitor I/O and WT  - Instruct patient on fluid and nutrition as appropriate  - Assess for signs & symptoms of volume excess or deficit  Outcome: Progressing  Goal: Glucose maintained within target range  Description: INTERVENTIONS:  - Monitor Blood Glucose as ordered  - Assess for signs and symptoms of hyperglycemia and hypoglycemia  - Administer ordered medications to maintain glucose within target range  - Assess nutritional intake and initiate nutrition service referral as needed  Outcome: Progressing     Problem: SKIN/TISSUE INTEGRITY - ADULT  Goal: Incision(s), wounds(s) or drain site(s) healing without S/S of  infection  Description: INTERVENTIONS  - Assess and document dressing, incision, wound bed, drain sites and surrounding tissue  - Provide patient and family education  - Perform skin care/dressing changes as ordered  Outcome: Progressing     Problem: MUSCULOSKELETAL - ADULT  Goal: Maintain or return mobility to safest level of function  Description: INTERVENTIONS:  - Assess patient's ability to carry out ADLs; assess patient's baseline for ADL function and identify physical deficits which impact ability to perform ADLs (bathing, care of mouth/teeth, toileting, grooming, dressing, etc.)  - Assess/evaluate cause of self-care deficits   - Assess range of motion  - Assess patient's mobility  - Assess patient's need for assistive devices and provide as appropriate  - Encourage maximum independence but intervene and supervise when necessary  - Involve family in performance of ADLs  - Assess for home care needs following discharge   - Consider OT consult to assist with ADL evaluation and planning for discharge  - Provide patient education as appropriate  Outcome: Progressing     Problem: Prexisting or High Potential for Compromised Skin Integrity  Goal: Skin integrity is maintained or improved  Description: INTERVENTIONS:  - Identify patients at risk for skin breakdown  - Assess and monitor skin integrity  - Assess and monitor nutrition and hydration status  - Monitor labs   - Assess for incontinence   - Turn and reposition patient  - Assist with mobility/ambulation  - Relieve pressure over bony prominences  - Avoid friction and shearing  - Provide appropriate hygiene as needed including keeping skin clean and dry  - Evaluate need for skin moisturizer/barrier cream  - Collaborate with interdisciplinary team   - Patient/family teaching  - Consider wound care consult   Outcome: Progressing     Problem: Nutrition/Hydration-ADULT  Goal: Nutrient/Hydration intake appropriate for improving, restoring or maintaining nutritional  needs  Description: Monitor and assess patient's nutrition/hydration status for malnutrition. Collaborate with interdisciplinary team and initiate plan and interventions as ordered.  Monitor patient's weight and dietary intake as ordered or per policy. Utilize nutrition screening tool and intervene as necessary. Determine patient's food preferences and provide high-protein, high-caloric foods as appropriate.     INTERVENTIONS:  - Monitor oral intake, urinary output, labs, and treatment plans  - Assess nutrition and hydration status and recommend course of action  - Evaluate amount of meals eaten  - Assist patient with eating if necessary   - Allow adequate time for meals  - Recommend/ encourage appropriate diets, oral nutritional supplements, and vitamin/mineral supplements  - Order, calculate, and assess calorie counts as needed  - Recommend, monitor, and adjust tube feedings and TPN/PPN based on assessed needs  - Assess need for intravenous fluids  - Provide specific nutrition/hydration education as appropriate  - Include patient/family/caregiver in decisions related to nutrition  Outcome: Progressing

## 2024-04-26 NOTE — QUICK NOTE
- Status post left first transmetatarsal amputation date of surgery 4/26/2024  -Strict nonweightbearing on the left lower extremity  -Continue left lower extremity elevation with 2-3 pillows underneath  -Await IntraOp bone cultures, if bone cultures are negative patient will be stable from podiatric standpoint for discharge.  If bone cultures are positive I recommend ID consultation for further recommendation for tailoring antibiotics.  -Wound care orders in for nursing staff  -Rest of care per primary service

## 2024-04-26 NOTE — PLAN OF CARE
Problem: PAIN - ADULT  Goal: Verbalizes/displays adequate comfort level or baseline comfort level  Description: Interventions:  - Encourage patient to monitor pain and request assistance  - Assess pain using appropriate pain scale  - Administer analgesics based on type and severity of pain and evaluate response  - Implement non-pharmacological measures as appropriate and evaluate response  - Consider cultural and social influences on pain and pain management  - Notify physician/advanced practitioner if interventions unsuccessful or patient reports new pain  4/26/2024 1204 by Rosa Maria Byrnes RN  Outcome: Progressing  4/26/2024 1204 by Rosa Maria Byrnes RN  Outcome: Progressing     Problem: INFECTION - ADULT  Goal: Absence or prevention of progression during hospitalization  Description: INTERVENTIONS:  - Assess and monitor for signs and symptoms of infection  - Monitor lab/diagnostic results  - Monitor all insertion sites, i.e. indwelling lines, tubes, and drains  - Monitor endotracheal if appropriate and nasal secretions for changes in amount and color  - [place] appropriate cooling/warming therapies per order  - Administer medications as ordered  - Instruct and encourage patient and family to use good hand hygiene technique  - Identify and instruct in appropriate isolation precautions for identified infection/condition  4/26/2024 1204 by Rosa Maria Byrnes RN  Outcome: Progressing  4/26/2024 1204 by Rosa Maria Byrnes RN  Outcome: Progressing     Problem: SAFETY ADULT  Goal: Patient will remain free of falls  Description: INTERVENTIONS:  - Educate patient/family on patient safety including physical limitations  - Instruct patient to call for assistance with activity   - Consult OT/PT to assist with strengthening/mobility   - Keep Call bell within reach  - Keep bed low and locked with side rails adjusted as appropriate  - Keep care items and personal belongings within reach  - Initiate and maintain comfort rounds  -  Make Fall Risk Sign visible to staff  - Offer Toileting every 2 Hours, in advance of need  - Initiate/Maintain bed/chair alarm  - Obtain necessary fall risk management equipment: as needed  - Apply yellow socks and bracelet for high fall risk patients  - Consider moving patient to room near nurses station  4/26/2024 1204 by Rosa aMria Byrnes RN  Outcome: Progressing  4/26/2024 1204 by Rosa Maria Byrnes RN  Outcome: Progressing     Problem: DISCHARGE PLANNING  Goal: Discharge to home or other facility with appropriate resources  Description: INTERVENTIONS:  - Identify barriers to discharge w/patient and caregiver  - Arrange for needed discharge resources and transportation as appropriate  - Identify discharge learning needs (meds, wound care, etc.)  - Arrange for interpretive services to assist at discharge as needed  - Refer to Case Management Department for coordinating discharge planning if the patient needs post-hospital services based on physician/advanced practitioner order or complex needs related to functional status, cognitive ability, or social support system  4/26/2024 1204 by Rosa Maria Byrnes RN  Outcome: Progressing  4/26/2024 1204 by Rosa Maria Byrnes RN  Outcome: Progressing     Problem: Knowledge Deficit  Goal: Patient/family/caregiver demonstrates understanding of disease process, treatment plan, medications, and discharge instructions  Description: Complete learning assessment and assess knowledge base.  Interventions:  - Provide teaching at level of understanding  - Provide teaching via preferred learning methods  4/26/2024 1204 by Rosa Maria Byrnes RN  Outcome: Progressing  4/26/2024 1204 by Rosa Maria Byrnes RN  Outcome: Progressing     Problem: METABOLIC, FLUID AND ELECTROLYTES - ADULT  Goal: Fluid balance maintained  Description: INTERVENTIONS:  - Monitor labs   - Monitor I/O and WT  - Instruct patient on fluid and nutrition as appropriate  - Assess for signs & symptoms of volume excess or  deficit  4/26/2024 1204 by Rosa Maria Byrnes RN  Outcome: Progressing  4/26/2024 1204 by Rosa Maria Byrnes RN  Outcome: Progressing  Goal: Glucose maintained within target range  Description: INTERVENTIONS:  - Monitor Blood Glucose as ordered  - Assess for signs and symptoms of hyperglycemia and hypoglycemia  - Administer ordered medications to maintain glucose within target range  - Assess nutritional intake and initiate nutrition service referral as needed  4/26/2024 1204 by Rosa Maria Byrnes RN  Outcome: Progressing  4/26/2024 1204 by Rosa Maria Byrnes RN  Outcome: Progressing     Problem: SKIN/TISSUE INTEGRITY - ADULT  Goal: Incision(s), wounds(s) or drain site(s) healing without S/S of infection  Description: INTERVENTIONS  - Assess and document dressing, incision, wound bed, drain sites and surrounding tissue  - Provide patient and family education  - Perform skin care/dressing changes as ordered  4/26/2024 1204 by Rosa Maria Byrnes RN  Outcome: Progressing  4/26/2024 1204 by Rosa Maria Byrnes RN  Outcome: Progressing     Problem: MUSCULOSKELETAL - ADULT  Goal: Maintain or return mobility to safest level of function  Description: INTERVENTIONS:  - Assess patient's ability to carry out ADLs; assess patient's baseline for ADL function and identify physical deficits which impact ability to perform ADLs (bathing, care of mouth/teeth, toileting, grooming, dressing, etc.)  - Assess/evaluate cause of self-care deficits   - Assess range of motion  - Assess patient's mobility  - Assess patient's need for assistive devices and provide as appropriate  - Encourage maximum independence but intervene and supervise when necessary  - Involve family in performance of ADLs  - Assess for home care needs following discharge   - Consider OT consult to assist with ADL evaluation and planning for discharge  - Provide patient education as appropriate  4/26/2024 1204 by Rosa Maria Byrnes RN  Outcome: Progressing  4/26/2024 1204 by Rosa Maria  TEODORO Byrnes  Outcome: Progressing     Problem: Prexisting or High Potential for Compromised Skin Integrity  Goal: Skin integrity is maintained or improved  Description: INTERVENTIONS:  - Identify patients at risk for skin breakdown  - Assess and monitor skin integrity  - Assess and monitor nutrition and hydration status  - Monitor labs   - Assess for incontinence   - Turn and reposition patient  - Assist with mobility/ambulation  - Relieve pressure over bony prominences  - Avoid friction and shearing  - Provide appropriate hygiene as needed including keeping skin clean and dry  - Evaluate need for skin moisturizer/barrier cream  - Collaborate with interdisciplinary team   - Patient/family teaching  - Consider wound care consult   4/26/2024 1204 by Rosa Maria Byrnes RN  Outcome: Progressing  4/26/2024 1204 by Rosa Maria Byrnes RN  Outcome: Progressing     Problem: Nutrition/Hydration-ADULT  Goal: Nutrient/Hydration intake appropriate for improving, restoring or maintaining nutritional needs  Description: Monitor and assess patient's nutrition/hydration status for malnutrition. Collaborate with interdisciplinary team and initiate plan and interventions as ordered.  Monitor patient's weight and dietary intake as ordered or per policy. Utilize nutrition screening tool and intervene as necessary. Determine patient's food preferences and provide high-protein, high-caloric foods as appropriate.     INTERVENTIONS:  - Monitor oral intake, urinary output, labs, and treatment plans  - Assess nutrition and hydration status and recommend course of action  - Evaluate amount of meals eaten  - Assist patient with eating if necessary   - Allow adequate time for meals  - Recommend/ encourage appropriate diets, oral nutritional supplements, and vitamin/mineral supplements  - Order, calculate, and assess calorie counts as needed  - Recommend, monitor, and adjust tube feedings and TPN/PPN based on assessed needs  - Assess need for  intravenous fluids  - Provide specific nutrition/hydration education as appropriate  - Include patient/family/caregiver in decisions related to nutrition  4/26/2024 1204 by Rosa Maria Byrnes RN  Outcome: Progressing  4/26/2024 1204 by Rosa Maria Byrnes, RN  Outcome: Progressing

## 2024-04-26 NOTE — PROGRESS NOTES
[facility]  Progress Note  Name: Art Joseph I  MRN: [id number]  Unit/Bed#: 410-01 I Date of Admission: 4/23/2024   Date of Service: 4/26/2024 I Hospital Day: 3    Assessment/Plan   * Acute osteomyelitis of metatarsal bone of left foot (HCC)  Assessment & Plan  Pt sent to ED from outpatient wound care d/t sent to ED severe ulceration and purulent drainage of the left foot   MRI left foot shows small 4 mm focus of confluent T1 marrow abnormality in the distal and plantar aspect of the stump of the first metatarsal, suspect small focus of osteomyelitis   Continue IV cefepime 2 g q8h + IV vancomycin 1.25 g q12h with PO metronidazole 500 mg q8h   Vascular surgery consult placed by podiatry, recommend proceeding with surgery  Podiatry consulted, will pursue TMA due to the severe soft tissue compromise wounds and deranged foot after 2:30 pm today  Wound care  Remain NPO for procedure    Cellulitis of left lower extremity  Assessment & Plan  Patient seen by outpatient wound care on 4/23/24 for open wound of the left lower extremity  Subsequently sent to ED due to concern for left lower extremity cellulitis with erythema and ulceration noted as well as severe ulceration and purulent drainage of the left foot  On admission, WBC at 10.82, CRP 51.8, sed rate 77  Blood culture x2 show no growth to date  XR L foot + XR L tibia/fibula order show no acute osseous abnormality and no evidence of infection.   Vas arterial duplex ordered, showed no evidence of significant lower extremity arterial occlusive disease.   IV cefepime 2 g q8h + IV vancomycin 1.25 g q12h with PO metronidazole 500 mg q8h  Podiatry consult placed, appreciate recommendations  Wound care consult     Hyperlipidemia  Assessment & Plan  Continue PTA lipitor 80 mg QD    Hypertension  Assessment & Plan  BPs 150s/60-80s on admission, now 130s/80s  Continue PTA lisinopril   Monitor vitals    Diabetes mellitus (HCC)  Assessment & Plan  Lab  Results   Component Value Date    HGBA1C 14.1 (H) 10/27/2023       Recent Labs     24  1132 24  1602 24  0503   POCGLU 156* 222* 260* 175*         Blood Sugar Average: Last 72 hrs: (P) [id number]  Hx of uncontrolled blood glucose levels  Algorithm 3 sliding scale before meals  PTA lantus 55 units BID decreased to lantus 30 units BID d/t NPO status for procedure  PTA humalog 15 units TID with meals decreased to humalog 8 units TID with meals  POCT before meals and at bedtime               VTE Pharmacologic Prophylaxis:   Pharmacologic: Enoxaparin (Lovenox)  Mechanical VTE Prophylaxis in Place: No      Discussions with Specialists or Other Care Team Provider: yes, with supervising attending    Education and Discussions with Family / Patient: yes, with patient    Time Spent for Care: 45 minutes.  More than 50% of total time spent on counseling and coordination of care as described above.    Current Length of Stay: 3 day(s)    Current Patient Status: Inpatient   Certification Statement: The patient will continue to require additional inpatient hospital stay due to osteomyelitis of left foot, IV antibiotics, TMA procedure today    Discharge Plan: home with home health    Code Status: Level 1 - Full Code      Subjective:   Patient seen and examined at bedside this morning. No acute events overnight. NPO for procedure later this afternoon. Denies chest pain, shortness of breath, abdominal pain, nausea, and changes in bowel movements.     Objective:     Vitals:   Temp (24hrs), Av.1 °F (36.7 °C), Min:97.6 °F (36.4 °C), Max:98.6 °F (37 °C)    Temp:  [97.6 °F (36.4 °C)-98.6 °F (37 °C)] 97.9 °F (36.6 °C)  HR:  [76-84] 83  Resp:  [16-19] 17  BP: (119-151)/(48-81) 139/74  SpO2:  [95 %-97 %] 95 %  Body mass index is 31.03 kg/m².     Input and Output Summary (last 24 hours):       Intake/Output Summary (Last 24 hours) at 2024 0718  Last data filed at 2024 0703  Gross per 24  hour   Intake 2030 ml   Output 2350 ml   Net -320 ml       Physical Exam:     Physical Exam  Constitutional:       General: He is not in acute distress.     Appearance: Normal appearance.   HENT:      Head: Normocephalic and atraumatic.   Eyes:      Pupils: Pupils are equal, round, and reactive to light.   Cardiovascular:      Rate and Rhythm: Normal rate and regular rhythm.      Pulses: Normal pulses.      Heart sounds: Normal heart sounds.   Pulmonary:      Effort: Pulmonary effort is normal.      Breath sounds: Normal breath sounds.   Abdominal:      Tenderness: There is no abdominal tenderness. There is no guarding.   Musculoskeletal:      Cervical back: Normal range of motion and neck supple.   Skin:     General: Skin is warm and dry.      Comments: Wound dressing C/D/I   Neurological:      General: No focal deficit present.      Mental Status: He is alert and oriented to person, place, and time.           Additional Data:     Labs:    Results from last 7 days   Lab Units 04/26/24  0514   WBC Thousand/uL 8.32   HEMOGLOBIN g/dL 13.7   HEMATOCRIT % 42.4   PLATELETS Thousands/uL 361   SEGS PCT % 62   LYMPHO PCT % 25   MONO PCT % 8   EOS PCT % 4     Results from last 7 days   Lab Units 04/26/24  0514 04/25/24  0456 04/24/24  0636   SODIUM mmol/L 137   < > 138   POTASSIUM mmol/L 3.8   < > 4.4   CHLORIDE mmol/L 105   < > 106   CO2 mmol/L 24   < > 24   BUN mg/dL 16   < > 13   CREATININE mg/dL 0.76   < > 0.84   ANION GAP mmol/L 8   < > 8   CALCIUM mg/dL 8.8   < > 9.0   ALBUMIN g/dL  --   --  3.1*   TOTAL BILIRUBIN mg/dL  --   --  0.42   ALK PHOS U/L  --   --  117*   ALT U/L  --   --  7   AST U/L  --   --  10*   GLUCOSE RANDOM mg/dL 158*   < > 124    < > = values in this interval not displayed.         Results from last 7 days   Lab Units 04/26/24  0503 04/25/24  2050 04/25/24  1602 04/25/24  1132 04/25/24  0800 04/25/24  0445 04/24/24  2303 04/24/24  2004 04/24/24  1631 04/24/24  1103 04/24/24  0548 04/24/24  0000    POC GLUCOSE mg/dl 175* 260* 222* 156* 178* 173* 205* 189* 227* 138 110 100         Results from last 7 days   Lab Units 04/23/24  1127 04/23/24  1102   LACTIC ACID mmol/L 1.8  --    PROCALCITONIN ng/ml  --  <0.05           * I Have Reviewed All Lab Data Listed Above.  * Additional Pertinent Lab Tests Reviewed: All Labs For Current Hospital Admission Reviewed    Imaging:    Imaging Reports Reviewed Today Include:   VAS ARTERIAL DUPLEX-LOWER LIMB UNILATERAL   Final Result      MRI foot/forefoot toes left wo contrast   Final Result      Small 4 mm focus of  confluent T1 marrow abnormality in the distal and plantar aspect of the stump of the first metatarsal, with possible small sinus tract to the area of overlying ulcer (image 41 series 7) suspect small focus of  osteomyelitis         Mild T2 hyperintensity seen in the stump of the second proximal phalanx and in the third middle and distal changes probably due to inhomogeneous fat suppression      Arthritic changes at the first tarsometatarsal joint      The study was marked in EPIC for significant notification.               Workstation performed: [id number]         XR foot 3+ views LEFT   Final Result      No radiographic evidence of osteomyelitis.      Workstation performed: [id number]         XR tibia fibula 2 views LEFT   Final Result      No acute osseous abnormality. No evidence of infection.            Workstation performed: [id number]             Imaging Personally Reviewed by Myself Includes:    VAS ARTERIAL DUPLEX-LOWER LIMB UNILATERAL   Final Result      MRI foot/forefoot toes left wo contrast   Final Result      Small 4 mm focus of  confluent T1 marrow abnormality in the distal and plantar aspect of the stump of the first metatarsal, with possible small sinus tract to the area of overlying ulcer (image 41 series 7) suspect small focus of  osteomyelitis         Mild T2 hyperintensity seen in the stump of the second proximal phalanx and in the third middle  and distal changes probably due to inhomogeneous fat suppression      Arthritic changes at the first tarsometatarsal joint      The study was marked in EPIC for significant notification.               Workstation performed: [id number]         XR foot 3+ views LEFT   Final Result      No radiographic evidence of osteomyelitis.      Workstation performed: [id number]         XR tibia fibula 2 views LEFT   Final Result      No acute osseous abnormality. No evidence of infection.            Workstation performed: [id number]               Recent Cultures (last 7 days):     Results from last 7 days   Lab Units 04/23/24  1106 04/23/24  1102   BLOOD CULTURE  No Growth at 48 hrs. No Growth at 48 hrs.       Last 24 Hours Medication List:   Current Facility-Administered Medications   Medication Dose Route Frequency Provider Last Rate    aspirin  81 mg Oral Daily Marilyn Velazquez MD      atorvastatin  80 mg Oral Daily Marilyn Velazquez MD      cefepime  2,000 mg Intravenous Q8H Bella Nj MD 2,000 mg (04/26/24 0504)    Cholecalciferol  1,000 Units Oral Daily Marilyn Velazquez MD      enoxaparin  40 mg Subcutaneous Daily Bella Nj MD      insulin glargine  30 Units Subcutaneous Q12H [facility] Bella Nj MD      insulin lispro  1-6 Units Subcutaneous Q6H Rashid Bennett Rubalcava MD      insulin lispro  8 Units Subcutaneous TID With Meals Bella Nj MD      lisinopril  2.5 mg Oral Daily Marilyn Velazquez MD      metroNIDAZOLE  500 mg Oral Q8H [facility] Bella Nj MD      vancomycin  12.5 mg/kg Intravenous Q12H Bella Nj MD 1,250 mg (04/25/24 2314)        Today, Patient Was Seen By: Marilyn Velazquez MD    ** Please Note: Dictation voice to text software may have been used in the creation of this document. **

## 2024-04-27 PROBLEM — Z79.4 TYPE 2 DIABETES MELLITUS WITH FOOT ULCER, WITH LONG-TERM CURRENT USE OF INSULIN (HCC): Status: ACTIVE | Noted: 2017-01-27

## 2024-04-27 PROBLEM — E11.621 TYPE 2 DIABETES MELLITUS WITH FOOT ULCER, WITH LONG-TERM CURRENT USE OF INSULIN (HCC): Status: ACTIVE | Noted: 2017-01-27

## 2024-04-27 PROBLEM — L97.509 TYPE 2 DIABETES MELLITUS WITH FOOT ULCER, WITH LONG-TERM CURRENT USE OF INSULIN (HCC): Status: ACTIVE | Noted: 2017-01-27

## 2024-04-27 LAB
ANION GAP SERPL CALCULATED.3IONS-SCNC: 7 MMOL/L (ref 4–13)
BASOPHILS # BLD AUTO: 0.07 THOUSANDS/ÂΜL (ref 0–0.1)
BASOPHILS NFR BLD AUTO: 1 % (ref 0–1)
BUN SERPL-MCNC: 15 MG/DL (ref 5–25)
CALCIUM SERPL-MCNC: 8.8 MG/DL (ref 8.4–10.2)
CHLORIDE SERPL-SCNC: 104 MMOL/L (ref 96–108)
CO2 SERPL-SCNC: 26 MMOL/L (ref 21–32)
CREAT SERPL-MCNC: 0.81 MG/DL (ref 0.6–1.3)
EOSINOPHIL # BLD AUTO: 0.25 THOUSAND/ÂΜL (ref 0–0.61)
EOSINOPHIL NFR BLD AUTO: 2 % (ref 0–6)
ERYTHROCYTE [DISTWIDTH] IN BLOOD BY AUTOMATED COUNT: 12.7 % (ref 11.6–15.1)
GFR SERPL CREATININE-BSD FRML MDRD: 93 ML/MIN/1.73SQ M
GLUCOSE SERPL-MCNC: 103 MG/DL (ref 65–140)
GLUCOSE SERPL-MCNC: 169 MG/DL (ref 65–140)
GLUCOSE SERPL-MCNC: 176 MG/DL (ref 65–140)
GLUCOSE SERPL-MCNC: 238 MG/DL (ref 65–140)
GLUCOSE SERPL-MCNC: 246 MG/DL (ref 65–140)
GLUCOSE SERPL-MCNC: 87 MG/DL (ref 65–140)
HCT VFR BLD AUTO: 42.7 % (ref 36.5–49.3)
HGB BLD-MCNC: 13.9 G/DL (ref 12–17)
IMM GRANULOCYTES # BLD AUTO: 0.04 THOUSAND/UL (ref 0–0.2)
IMM GRANULOCYTES NFR BLD AUTO: 0 % (ref 0–2)
LYMPHOCYTES # BLD AUTO: 1.79 THOUSANDS/ÂΜL (ref 0.6–4.47)
LYMPHOCYTES NFR BLD AUTO: 17 % (ref 14–44)
MCH RBC QN AUTO: 28.4 PG (ref 26.8–34.3)
MCHC RBC AUTO-ENTMCNC: 32.6 G/DL (ref 31.4–37.4)
MCV RBC AUTO: 87 FL (ref 82–98)
MONOCYTES # BLD AUTO: 0.78 THOUSAND/ÂΜL (ref 0.17–1.22)
MONOCYTES NFR BLD AUTO: 7 % (ref 4–12)
NEUTROPHILS # BLD AUTO: 7.85 THOUSANDS/ÂΜL (ref 1.85–7.62)
NEUTS SEG NFR BLD AUTO: 73 % (ref 43–75)
NRBC BLD AUTO-RTO: 0 /100 WBCS
PLATELET # BLD AUTO: 333 THOUSANDS/UL (ref 149–390)
PMV BLD AUTO: 9.2 FL (ref 8.9–12.7)
POTASSIUM SERPL-SCNC: 3.9 MMOL/L (ref 3.5–5.3)
RBC # BLD AUTO: 4.89 MILLION/UL (ref 3.88–5.62)
SODIUM SERPL-SCNC: 137 MMOL/L (ref 135–147)
WBC # BLD AUTO: 10.78 THOUSAND/UL (ref 4.31–10.16)

## 2024-04-27 PROCEDURE — 82948 REAGENT STRIP/BLOOD GLUCOSE: CPT

## 2024-04-27 PROCEDURE — 80048 BASIC METABOLIC PNL TOTAL CA: CPT | Performed by: STUDENT IN AN ORGANIZED HEALTH CARE EDUCATION/TRAINING PROGRAM

## 2024-04-27 PROCEDURE — 99232 SBSQ HOSP IP/OBS MODERATE 35: CPT | Performed by: FAMILY MEDICINE

## 2024-04-27 PROCEDURE — 85025 COMPLETE CBC W/AUTO DIFF WBC: CPT | Performed by: STUDENT IN AN ORGANIZED HEALTH CARE EDUCATION/TRAINING PROGRAM

## 2024-04-27 RX ORDER — INSULIN LISPRO 100 [IU]/ML
6 INJECTION, SOLUTION INTRAVENOUS; SUBCUTANEOUS
Status: ACTIVE | OUTPATIENT
Start: 2024-04-27

## 2024-04-27 RX ORDER — INSULIN LISPRO 100 [IU]/ML
1-6 INJECTION, SOLUTION INTRAVENOUS; SUBCUTANEOUS
Status: ACTIVE | OUTPATIENT
Start: 2024-04-28

## 2024-04-27 RX ORDER — INSULIN GLARGINE 100 [IU]/ML
35 INJECTION, SOLUTION SUBCUTANEOUS EVERY 12 HOURS SCHEDULED
Status: DISPENSED | OUTPATIENT
Start: 2024-04-27

## 2024-04-27 RX ADMIN — VANCOMYCIN HYDROCHLORIDE 1250 MG: 5 INJECTION, POWDER, LYOPHILIZED, FOR SOLUTION INTRAVENOUS at 22:06

## 2024-04-27 RX ADMIN — LISINOPRIL 2.5 MG: 5 TABLET ORAL at 08:48

## 2024-04-27 RX ADMIN — ASPIRIN 81 MG 81 MG: 81 TABLET ORAL at 08:48

## 2024-04-27 RX ADMIN — INSULIN LISPRO 6 UNITS: 100 INJECTION, SOLUTION INTRAVENOUS; SUBCUTANEOUS at 17:02

## 2024-04-27 RX ADMIN — INSULIN LISPRO 3 UNITS: 100 INJECTION, SOLUTION INTRAVENOUS; SUBCUTANEOUS at 13:25

## 2024-04-27 RX ADMIN — INSULIN GLARGINE 35 UNITS: 100 INJECTION, SOLUTION SUBCUTANEOUS at 21:41

## 2024-04-27 RX ADMIN — DESMOPRESSIN ACETATE 80 MG: 0.2 TABLET ORAL at 17:01

## 2024-04-27 RX ADMIN — CEFEPIME HYDROCHLORIDE 2000 MG: 2 INJECTION, SOLUTION INTRAVENOUS at 20:00

## 2024-04-27 RX ADMIN — CEFEPIME HYDROCHLORIDE 2000 MG: 2 INJECTION, SOLUTION INTRAVENOUS at 04:42

## 2024-04-27 RX ADMIN — CHOLECALCIFEROL TAB 25 MCG (1000 UNIT) 1000 UNITS: 25 TAB at 08:48

## 2024-04-27 RX ADMIN — VANCOMYCIN HYDROCHLORIDE 1250 MG: 5 INJECTION, POWDER, LYOPHILIZED, FOR SOLUTION INTRAVENOUS at 13:26

## 2024-04-27 RX ADMIN — INSULIN LISPRO 1 UNITS: 100 INJECTION, SOLUTION INTRAVENOUS; SUBCUTANEOUS at 17:02

## 2024-04-27 RX ADMIN — METRONIDAZOLE 500 MG: 500 TABLET ORAL at 05:08

## 2024-04-27 RX ADMIN — ENOXAPARIN SODIUM 40 MG: 40 INJECTION SUBCUTANEOUS at 08:48

## 2024-04-27 RX ADMIN — CEFEPIME HYDROCHLORIDE 2000 MG: 2 INJECTION, SOLUTION INTRAVENOUS at 11:52

## 2024-04-27 RX ADMIN — METRONIDAZOLE 500 MG: 500 TABLET ORAL at 21:41

## 2024-04-27 RX ADMIN — INSULIN GLARGINE 30 UNITS: 100 INJECTION, SOLUTION SUBCUTANEOUS at 08:48

## 2024-04-27 RX ADMIN — METRONIDAZOLE 500 MG: 500 TABLET ORAL at 13:26

## 2024-04-27 NOTE — PLAN OF CARE
Problem: PAIN - ADULT  Goal: Verbalizes/displays adequate comfort level or baseline comfort level  Description: Interventions:  - Encourage patient to monitor pain and request assistance  - Assess pain using appropriate pain scale  - Administer analgesics based on type and severity of pain and evaluate response  - Implement non-pharmacological measures as appropriate and evaluate response  - Consider cultural and social influences on pain and pain management  - Notify physician/advanced practitioner if interventions unsuccessful or patient reports new pain  Outcome: Progressing     Problem: INFECTION - ADULT  Goal: Absence or prevention of progression during hospitalization  Description: INTERVENTIONS:  - Assess and monitor for signs and symptoms of infection  - Monitor lab/diagnostic results  - Monitor all insertion sites, i.e. indwelling lines, tubes, and drains  - Monitor endotracheal if appropriate and nasal secretions for changes in amount and color  - [place] appropriate cooling/warming therapies per order  - Administer medications as ordered  - Instruct and encourage patient and family to use good hand hygiene technique  - Identify and instruct in appropriate isolation precautions for identified infection/condition  Outcome: Progressing     Problem: SAFETY ADULT  Goal: Patient will remain free of falls  Description: INTERVENTIONS:  - Educate patient/family on patient safety including physical limitations  - Instruct patient to call for assistance with activity   - Consult OT/PT to assist with strengthening/mobility   - Keep Call bell within reach  - Keep bed low and locked with side rails adjusted as appropriate  - Keep care items and personal belongings within reach  - Initiate and maintain comfort rounds  - Make Fall Risk Sign visible to staff  - Offer Toileting every 2 Hours, in advance of need  - Initiate/Maintain bed/chair alarm  - Obtain necessary fall risk management equipment: as needed  - Apply  yellow socks and bracelet for high fall risk patients  - Consider moving patient to room near nurses station  Outcome: Progressing     Problem: DISCHARGE PLANNING  Goal: Discharge to home or other facility with appropriate resources  Description: INTERVENTIONS:  - Identify barriers to discharge w/patient and caregiver  - Arrange for needed discharge resources and transportation as appropriate  - Identify discharge learning needs (meds, wound care, etc.)  - Arrange for interpretive services to assist at discharge as needed  - Refer to Case Management Department for coordinating discharge planning if the patient needs post-hospital services based on physician/advanced practitioner order or complex needs related to functional status, cognitive ability, or social support system  Outcome: Progressing     Problem: Knowledge Deficit  Goal: Patient/family/caregiver demonstrates understanding of disease process, treatment plan, medications, and discharge instructions  Description: Complete learning assessment and assess knowledge base.  Interventions:  - Provide teaching at level of understanding  - Provide teaching via preferred learning methods  Outcome: Progressing     Problem: METABOLIC, FLUID AND ELECTROLYTES - ADULT  Goal: Fluid balance maintained  Description: INTERVENTIONS:  - Monitor labs   - Monitor I/O and WT  - Instruct patient on fluid and nutrition as appropriate  - Assess for signs & symptoms of volume excess or deficit  Outcome: Progressing  Goal: Glucose maintained within target range  Description: INTERVENTIONS:  - Monitor Blood Glucose as ordered  - Assess for signs and symptoms of hyperglycemia and hypoglycemia  - Administer ordered medications to maintain glucose within target range  - Assess nutritional intake and initiate nutrition service referral as needed  Outcome: Progressing     Problem: SKIN/TISSUE INTEGRITY - ADULT  Goal: Incision(s), wounds(s) or drain site(s) healing without S/S of  infection  Description: INTERVENTIONS  - Assess and document dressing, incision, wound bed, drain sites and surrounding tissue  - Provide patient and family education  - Perform skin care/dressing changes as ordered  Outcome: Progressing     Problem: MUSCULOSKELETAL - ADULT  Goal: Maintain or return mobility to safest level of function  Description: INTERVENTIONS:  - Assess patient's ability to carry out ADLs; assess patient's baseline for ADL function and identify physical deficits which impact ability to perform ADLs (bathing, care of mouth/teeth, toileting, grooming, dressing, etc.)  - Assess/evaluate cause of self-care deficits   - Assess range of motion  - Assess patient's mobility  - Assess patient's need for assistive devices and provide as appropriate  - Encourage maximum independence but intervene and supervise when necessary  - Involve family in performance of ADLs  - Assess for home care needs following discharge   - Consider OT consult to assist with ADL evaluation and planning for discharge  - Provide patient education as appropriate  Outcome: Progressing     Problem: Prexisting or High Potential for Compromised Skin Integrity  Goal: Skin integrity is maintained or improved  Description: INTERVENTIONS:  - Identify patients at risk for skin breakdown  - Assess and monitor skin integrity  - Assess and monitor nutrition and hydration status  - Monitor labs   - Assess for incontinence   - Turn and reposition patient  - Assist with mobility/ambulation  - Relieve pressure over bony prominences  - Avoid friction and shearing  - Provide appropriate hygiene as needed including keeping skin clean and dry  - Evaluate need for skin moisturizer/barrier cream  - Collaborate with interdisciplinary team   - Patient/family teaching  - Consider wound care consult   Outcome: Progressing     Problem: Nutrition/Hydration-ADULT  Goal: Nutrient/Hydration intake appropriate for improving, restoring or maintaining nutritional  needs  Description: Monitor and assess patient's nutrition/hydration status for malnutrition. Collaborate with interdisciplinary team and initiate plan and interventions as ordered.  Monitor patient's weight and dietary intake as ordered or per policy. Utilize nutrition screening tool and intervene as necessary. Determine patient's food preferences and provide high-protein, high-caloric foods as appropriate.     INTERVENTIONS:  - Monitor oral intake, urinary output, labs, and treatment plans  - Assess nutrition and hydration status and recommend course of action  - Evaluate amount of meals eaten  - Assist patient with eating if necessary   - Allow adequate time for meals  - Recommend/ encourage appropriate diets, oral nutritional supplements, and vitamin/mineral supplements  - Order, calculate, and assess calorie counts as needed  - Recommend, monitor, and adjust tube feedings and TPN/PPN based on assessed needs  - Assess need for intravenous fluids  - Provide specific nutrition/hydration education as appropriate  - Include patient/family/caregiver in decisions related to nutrition  Outcome: Progressing

## 2024-04-27 NOTE — PROGRESS NOTES
Art Joseph is a 64 y.o. male who is currently ordered Vancomycin IV with management by the Pharmacy Consult service.  Relevant clinical data and objective / subjective history reviewed.  Vancomycin Assessment:  Indication and Goal AUC/Trough: Soft tissue (goal -600, trough >10)  Clinical Status: stable  Micro:      Renal Function:  SCr: 0.81 mg/dL  CrCl: 85.5 mL/min  Renal replacement: Not on dialysis  Days of Therapy: 5  Current Dose: 1250mg IV q12h  Vancomycin Plan:  New Dosing: same  Estimated AUC: 478 mcg*hr/mL  Estimated Trough: 13.2 mcg/mL  Next Level: 5/2/24 with AM labs  Renal Function Monitoring: Daily BMP and UOP  Pharmacy will continue to follow closely for s/sx of nephrotoxicity, infusion reactions and appropriateness of therapy.  BMP and CBC will be ordered per protocol. We will continue to follow the patient’s culture results and clinical progress daily.    Christopher Guardado, Pharmacist

## 2024-04-27 NOTE — OCCUPATIONAL THERAPY NOTE
Occupational Therapy         Patient Name: Art Joseph  Today's Date: 4/27/2024 04/27/24 1115   OT Last Visit   OT Visit Date 04/27/24   Note Type   Note type Cancelled Session   Cancel Reasons Other   Additional Comments Order received and chart review performed. Eval attempted, however, patient's RLE prosthesis was not in patient room. Per nursing, patient's belongings and prosthesis are currently in decontamination d/t bed bugs. Patient is currently NWB on LLE. Sliding board transfer not appropriate based upon current room set-up. Will continue to follow and attempt eval as able/appropriate. Patient will require prosthesis in order to accurately determine ability to complete transfers / mobility while maintaining NWB status.

## 2024-04-27 NOTE — ASSESSMENT & PLAN NOTE
Lab Results   Component Value Date    HGBA1C 14.1 (H) 10/27/2023       Recent Labs     04/26/24  2123 04/27/24  0736 04/27/24  1100 04/27/24  1125   POCGLU 205* 87 246* 238*         Blood Sugar Average: Last 72 hrs: (P) 168.1  Hx of uncontrolled blood glucose levels  Algorithm 3 sliding scale before meals  PTA lantus 55 units BID, Humalog 15 units 3 times daily  Patient's blood glucose was borderline low normal, his insulin was decreased to Lantus 30 units twice daily  Will now increase to Lantus 35 units twice daily, Humalog 6 units 3 times daily

## 2024-04-27 NOTE — ASSESSMENT & PLAN NOTE
This is a 64-year-old male patient with history of PAD, status post right BKA, diabetes, hyperlipidemia, hypertension who was sent from wound care clinic to [facility] ED due to left lower extremity ulceration with purulent drainage concerning for deep infection  MRI left foot shows small 4 mm focus of confluent T1 marrow abnormality in the distal and plantar aspect of the stump of the first metatarsal, suspect small focus of osteomyelitis   Arterial duplex left lower extremity nondiagnostic, patient with palpable pulses, per vascular surgery was cleared for procedure  Patient was placed on broad-spectrum antibiotics with IV cefepime 2 g q8h + IV vancomycin 1.25 g q12h with PO metronidazole 500 mg q8h on admission  Appreciate podiatry input.  Patient is status post LEFT FOOT PARTIAL FIRST RAY AMPUTATION on 4/26/24 by Dr. Jennifer Rubalcava  Continue current antibiotics while awaiting perioperative cultures  Patient is doing well postoperatively, continue postop care  PT/OT consulted

## 2024-04-27 NOTE — PROGRESS NOTES
[facility]  Progress Note  Name: Art Joseph I  MRN: [id number]  Unit/Bed#: 410-01 I Date of Admission: 4/23/2024   Date of Service: 4/27/2024 I Hospital Day: 4    Assessment/Plan   * Acute osteomyelitis of metatarsal bone of left foot (HCC)  Assessment & Plan  This is a 64-year-old male patient with history of PAD, status post right BKA, diabetes, hyperlipidemia, hypertension who was sent from wound care clinic to [facility] ED due to left lower extremity ulceration with purulent drainage concerning for deep infection  MRI left foot shows small 4 mm focus of confluent T1 marrow abnormality in the distal and plantar aspect of the stump of the first metatarsal, suspect small focus of osteomyelitis   Arterial duplex left lower extremity nondiagnostic, patient with palpable pulses, per vascular surgery was cleared for procedure  Patient was placed on broad-spectrum antibiotics with IV cefepime 2 g q8h + IV vancomycin 1.25 g q12h with PO metronidazole 500 mg q8h on admission  Appreciate podiatry input.  Patient is status post LEFT FOOT PARTIAL FIRST RAY AMPUTATION on 4/26/24 by Dr. Jennifer Rubalcava  Continue current antibiotics while awaiting perioperative cultures  Patient is doing well postoperatively, continue postop care  PT/OT consulted    Cellulitis of left lower extremity  Assessment & Plan  Patient presents with left lower extremity wounds with surrounding cellulitis  Please refer to above under acute osteomyelitis    Hyperlipidemia  Assessment & Plan  Continue home Lipitor 80 mg QD    Primary hypertension  Assessment & Plan  Adequate control, continue home regimen    Hx of right BKA (HCC)  Assessment & Plan  History noted  PT/OT prior to discharge    Type 2 diabetes mellitus with foot ulcer, with long-term current use of insulin ([facility])  Assessment & Plan  Lab Results   Component Value Date    HGBA1C 14.1 (H) 10/27/2023       Recent Labs     04/26/24 2123 04/27/24  0736  04/27/24  1100 04/27/24  1125   POCGLU 205* 87 246* 238*         Blood Sugar Average: Last 72 hrs: (P) 168.1  Hx of uncontrolled blood glucose levels  Algorithm 3 sliding scale before meals  PTA lantus 55 units BID, Humalog 15 units 3 times daily  Patient's blood glucose was borderline low normal, his insulin was decreased to Lantus 30 units twice daily  Will now increase to Lantus 35 units twice daily, Humalog 6 units 3 times daily                 VTE Pharmacologic Prophylaxis: VTE Score: 4 Moderate Risk (Score 3-4) - Pharmacological DVT Prophylaxis Ordered: enoxaparin (Lovenox).    Mobility:   Basic Mobility Inpatient Raw Score: 13  JH-HLM Goal: 4: Move to chair/commode  JH-HLM Achieved: 1: Laying in bed  JH-HLM Goal NOT achieved. Continue with multidisciplinary rounding and encourage appropriate mobility to improve upon -HLM goals.    Patient Centered Rounds: I performed bedside rounds with nursing staff today.   Discussions with Specialists or Other Care Team Provider: CM    Education and Discussions with Family / Patient:  Podiatry.     Total Time Spent on Date of Encounter in care of patient: 35 mins. This time was spent on one or more of the following: performing physical exam; counseling and coordination of care; obtaining or reviewing history; documenting in the medical record; reviewing/ordering tests, medications or procedures; communicating with other healthcare professionals and discussing with patient's family/caregivers.    Current Length of Stay: 4 day(s)  Current Patient Status: Inpatient   Certification Statement: The patient will continue to require additional inpatient hospital stay due to close monitoring, IV Rx  Discharge Plan: Anticipate discharge in 48-72 hrs to discharge location to be determined pending rehab evaluations.    Code Status: Level 1 - Full Code    Subjective:   Patient voices no acute complaints, denies significant pain postoperatively and reports feeling well.    Objective:      Vitals:   Temp (24hrs), Av.2 °F (36.8 °C), Min:97.9 °F (36.6 °C), Max:98.7 °F (37.1 °C)    Temp:  [97.9 °F (36.6 °C)-98.7 °F (37.1 °C)] 98.7 °F (37.1 °C)  HR:  [73-86] 79  Resp:  [16-32] 18  BP: (108-126)/(52-66) 126/66  SpO2:  [94 %-100 %] 96 %  Body mass index is 31.03 kg/m².     Input and Output Summary (last 24 hours):     Intake/Output Summary (Last 24 hours) at 2024 1511  Last data filed at 2024 1321  Gross per 24 hour   Intake 1020 ml   Output 1100 ml   Net -80 ml       Physical Exam:   Physical Exam  Constitutional:       General: He is not in acute distress.  HENT:      Head: Normocephalic and atraumatic.   Eyes:      Conjunctiva/sclera: Conjunctivae normal.   Cardiovascular:      Rate and Rhythm: Normal rate and regular rhythm.      Heart sounds: No murmur heard.  Pulmonary:      Effort: No respiratory distress.      Breath sounds: No wheezing or rales.   Abdominal:      General: There is no distension.      Tenderness: There is no abdominal tenderness. There is no guarding.   Musculoskeletal:         General: Deformity (R BKA) present.   Skin:     Comments: LLE dressing intact   Neurological:      Mental Status: He is oriented to person, place, and time.   Psychiatric:         Mood and Affect: Mood normal.          Additional Data:     Labs:  Results from last 7 days   Lab Units 24  0533   WBC Thousand/uL 10.78*   HEMOGLOBIN g/dL 13.9   HEMATOCRIT % 42.7   PLATELETS Thousands/uL 333   SEGS PCT % 73   LYMPHO PCT % 17   MONO PCT % 7   EOS PCT % 2     Results from last 7 days   Lab Units 24  0533 24  0456 24  0636   SODIUM mmol/L 137   < > 138   POTASSIUM mmol/L 3.9   < > 4.4   CHLORIDE mmol/L 104   < > 106   CO2 mmol/L 26   < > 24   BUN mg/dL 15   < > 13   CREATININE mg/dL 0.81   < > 0.84   ANION GAP mmol/L 7   < > 8   CALCIUM mg/dL 8.8   < > 9.0   ALBUMIN g/dL  --   --  3.1*   TOTAL BILIRUBIN mg/dL  --   --  0.42   ALK PHOS U/L  --   --  117*   ALT U/L  --   --  7    AST U/L  --   --  10*   GLUCOSE RANDOM mg/dL 103   < > 124    < > = values in this interval not displayed.         Results from last 7 days   Lab Units 04/27/24  1125 04/27/24  1100 04/27/24  0736 04/26/24  2123 04/26/24  1759 04/26/24  1655 04/26/24  1105 04/26/24  0702 04/26/24  0503 04/25/24  2050 04/25/24  1602 04/25/24  1132   POC GLUCOSE mg/dl 238* 246* 87 205* 97 91 132 133 175* 260* 222* 156*         Results from last 7 days   Lab Units 04/23/24  1127 04/23/24  1102   LACTIC ACID mmol/L 1.8  --    PROCALCITONIN ng/ml  --  <0.05       Lines/Drains:  Invasive Devices       Peripheral Intravenous Line  Duration             Peripheral IV 04/26/24 Left;Proximal;Ventral (anterior) Forearm 1 day                      Imaging: no new    Recent Cultures (last 7 days):   Results from last 7 days   Lab Units 04/26/24  1618 04/26/24  1608 04/23/24  1106 04/23/24  1102   BLOOD CULTURE   --   --  No Growth After 4 Days. No Growth After 4 Days.   GRAM STAIN RESULT  No Polys or Bacteria seen No Polys or Bacteria seen  --   --        Last 24 Hours Medication List:   Current Facility-Administered Medications   Medication Dose Route Frequency Provider Last Rate    aspirin  81 mg Oral Daily Jennifer Rubalcava, DPM      atorvastatin  80 mg Oral Daily Jennifer Rubalcava, DPM      cefepime  2,000 mg Intravenous Q8H Jennifer Rubalcava, DPM Stopped (04/27/24 1326)    Cholecalciferol  1,000 Units Oral Daily Jennifer Rbualcava, DPM      enoxaparin  40 mg Subcutaneous Daily Jennifer Rubalcava, DPM      insulin glargine  35 Units Subcutaneous Q12H [facility] Bella Nj MD      insulin lispro  1-6 Units Subcutaneous Q6H Jennifer Rubalcava, DPM      insulin lispro  6 Units Subcutaneous TID With Meals Bella Nj MD      lisinopril  2.5 mg Oral Daily Jennifer Rubalcava, DPM      metroNIDAZOLE  500 mg Oral Q8H JASPER Jennifer Rubalcava, DPM      vancomycin  12.5 mg/kg Intravenous Q12H Jennifer Rubalcava, DPM 1,250 mg (04/27/24 6545)        Today, Patient Was Seen By: Bella Nj,  MD    **Please Note: This note may have been constructed using a voice recognition system.**

## 2024-04-27 NOTE — ASSESSMENT & PLAN NOTE
Patient presents with left lower extremity wounds with surrounding cellulitis  Please refer to above under acute osteomyelitis

## 2024-04-28 PROBLEM — B37.2 CANDIDIASIS, INTERTRIGO: Status: ACTIVE | Noted: 2024-04-28

## 2024-04-28 LAB
ANION GAP SERPL CALCULATED.3IONS-SCNC: 7 MMOL/L (ref 4–13)
BACTERIA BLD CULT: NORMAL
BACTERIA BLD CULT: NORMAL
BACTERIA SPEC ANAEROBE CULT: NO GROWTH
BACTERIA SPEC ANAEROBE CULT: NO GROWTH
BACTERIA TISS AEROBE CULT: ABNORMAL
BASOPHILS # BLD AUTO: 0.07 THOUSANDS/ÂΜL (ref 0–0.1)
BASOPHILS NFR BLD AUTO: 1 % (ref 0–1)
BUN SERPL-MCNC: 16 MG/DL (ref 5–25)
CALCIUM SERPL-MCNC: 9.1 MG/DL (ref 8.4–10.2)
CHLORIDE SERPL-SCNC: 104 MMOL/L (ref 96–108)
CO2 SERPL-SCNC: 26 MMOL/L (ref 21–32)
CREAT SERPL-MCNC: 0.76 MG/DL (ref 0.6–1.3)
EOSINOPHIL # BLD AUTO: 0.36 THOUSAND/ÂΜL (ref 0–0.61)
EOSINOPHIL NFR BLD AUTO: 4 % (ref 0–6)
ERYTHROCYTE [DISTWIDTH] IN BLOOD BY AUTOMATED COUNT: 12.7 % (ref 11.6–15.1)
EST. AVERAGE GLUCOSE BLD GHB EST-MCNC: 263 MG/DL
GFR SERPL CREATININE-BSD FRML MDRD: 95 ML/MIN/1.73SQ M
GLUCOSE SERPL-MCNC: 148 MG/DL (ref 65–140)
GLUCOSE SERPL-MCNC: 157 MG/DL (ref 65–140)
GLUCOSE SERPL-MCNC: 200 MG/DL (ref 65–140)
GLUCOSE SERPL-MCNC: 74 MG/DL (ref 65–140)
GLUCOSE SERPL-MCNC: 75 MG/DL (ref 65–140)
GRAM STN SPEC: ABNORMAL
GRAM STN SPEC: ABNORMAL
HBA1C MFR BLD: 10.8 %
HCT VFR BLD AUTO: 42.8 % (ref 36.5–49.3)
HGB BLD-MCNC: 13.7 G/DL (ref 12–17)
IMM GRANULOCYTES # BLD AUTO: 0.04 THOUSAND/UL (ref 0–0.2)
IMM GRANULOCYTES NFR BLD AUTO: 0 % (ref 0–2)
LYMPHOCYTES # BLD AUTO: 2.13 THOUSANDS/ÂΜL (ref 0.6–4.47)
LYMPHOCYTES NFR BLD AUTO: 21 % (ref 14–44)
MCH RBC QN AUTO: 28.4 PG (ref 26.8–34.3)
MCHC RBC AUTO-ENTMCNC: 32 G/DL (ref 31.4–37.4)
MCV RBC AUTO: 89 FL (ref 82–98)
MONOCYTES # BLD AUTO: 0.9 THOUSAND/ÂΜL (ref 0.17–1.22)
MONOCYTES NFR BLD AUTO: 9 % (ref 4–12)
NEUTROPHILS # BLD AUTO: 6.54 THOUSANDS/ÂΜL (ref 1.85–7.62)
NEUTS SEG NFR BLD AUTO: 65 % (ref 43–75)
NRBC BLD AUTO-RTO: 0 /100 WBCS
PLATELET # BLD AUTO: 351 THOUSANDS/UL (ref 149–390)
PMV BLD AUTO: 9.1 FL (ref 8.9–12.7)
POTASSIUM SERPL-SCNC: 4.5 MMOL/L (ref 3.5–5.3)
RBC # BLD AUTO: 4.83 MILLION/UL (ref 3.88–5.62)
SODIUM SERPL-SCNC: 137 MMOL/L (ref 135–147)
WBC # BLD AUTO: 10.04 THOUSAND/UL (ref 4.31–10.16)

## 2024-04-28 PROCEDURE — 83036 HEMOGLOBIN GLYCOSYLATED A1C: CPT | Performed by: FAMILY MEDICINE

## 2024-04-28 PROCEDURE — 82948 REAGENT STRIP/BLOOD GLUCOSE: CPT

## 2024-04-28 PROCEDURE — 85025 COMPLETE CBC W/AUTO DIFF WBC: CPT | Performed by: FAMILY MEDICINE

## 2024-04-28 PROCEDURE — 80048 BASIC METABOLIC PNL TOTAL CA: CPT | Performed by: FAMILY MEDICINE

## 2024-04-28 PROCEDURE — 99232 SBSQ HOSP IP/OBS MODERATE 35: CPT | Performed by: FAMILY MEDICINE

## 2024-04-28 RX ORDER — POLYETHYLENE GLYCOL 3350 17 G/17G
17 POWDER, FOR SOLUTION ORAL ONCE
Status: COMPLETED | OUTPATIENT
Start: 2024-04-28 | End: 2024-04-28

## 2024-04-28 RX ORDER — NYSTATIN 100000 U/G
CREAM TOPICAL EVERY 12 HOURS SCHEDULED
Status: DISPENSED | OUTPATIENT
Start: 2024-04-28

## 2024-04-28 RX ADMIN — INSULIN LISPRO 6 UNITS: 100 INJECTION, SOLUTION INTRAVENOUS; SUBCUTANEOUS at 08:36

## 2024-04-28 RX ADMIN — INSULIN LISPRO 6 UNITS: 100 INJECTION, SOLUTION INTRAVENOUS; SUBCUTANEOUS at 17:34

## 2024-04-28 RX ADMIN — ENOXAPARIN SODIUM 40 MG: 40 INJECTION SUBCUTANEOUS at 08:36

## 2024-04-28 RX ADMIN — LISINOPRIL 2.5 MG: 5 TABLET ORAL at 08:36

## 2024-04-28 RX ADMIN — INSULIN LISPRO 2 UNITS: 100 INJECTION, SOLUTION INTRAVENOUS; SUBCUTANEOUS at 11:30

## 2024-04-28 RX ADMIN — METRONIDAZOLE 500 MG: 500 TABLET ORAL at 05:16

## 2024-04-28 RX ADMIN — CEFEPIME HYDROCHLORIDE 2000 MG: 2 INJECTION, SOLUTION INTRAVENOUS at 04:06

## 2024-04-28 RX ADMIN — CEFEPIME HYDROCHLORIDE 2000 MG: 2 INJECTION, SOLUTION INTRAVENOUS at 19:44

## 2024-04-28 RX ADMIN — INSULIN GLARGINE 35 UNITS: 100 INJECTION, SOLUTION SUBCUTANEOUS at 21:38

## 2024-04-28 RX ADMIN — METRONIDAZOLE 500 MG: 500 TABLET ORAL at 13:47

## 2024-04-28 RX ADMIN — VANCOMYCIN HYDROCHLORIDE 1250 MG: 5 INJECTION, POWDER, LYOPHILIZED, FOR SOLUTION INTRAVENOUS at 11:30

## 2024-04-28 RX ADMIN — VANCOMYCIN HYDROCHLORIDE 1250 MG: 5 INJECTION, POWDER, LYOPHILIZED, FOR SOLUTION INTRAVENOUS at 22:14

## 2024-04-28 RX ADMIN — METRONIDAZOLE 500 MG: 500 TABLET ORAL at 21:38

## 2024-04-28 RX ADMIN — POLYETHYLENE GLYCOL 3350 17 G: 17 POWDER, FOR SOLUTION ORAL at 21:42

## 2024-04-28 RX ADMIN — CEFEPIME HYDROCHLORIDE 2000 MG: 2 INJECTION, SOLUTION INTRAVENOUS at 13:47

## 2024-04-28 RX ADMIN — DESMOPRESSIN ACETATE 80 MG: 0.2 TABLET ORAL at 17:33

## 2024-04-28 RX ADMIN — NYSTATIN 1 APPLICATION: 100000 CREAM TOPICAL at 11:29

## 2024-04-28 RX ADMIN — ASPIRIN 81 MG 81 MG: 81 TABLET ORAL at 08:35

## 2024-04-28 RX ADMIN — NYSTATIN: 100000 CREAM TOPICAL at 21:42

## 2024-04-28 RX ADMIN — CHOLECALCIFEROL TAB 25 MCG (1000 UNIT) 1000 UNITS: 25 TAB at 08:36

## 2024-04-28 RX ADMIN — INSULIN LISPRO 6 UNITS: 100 INJECTION, SOLUTION INTRAVENOUS; SUBCUTANEOUS at 11:30

## 2024-04-28 RX ADMIN — INSULIN GLARGINE 35 UNITS: 100 INJECTION, SOLUTION SUBCUTANEOUS at 08:36

## 2024-04-28 NOTE — ASSESSMENT & PLAN NOTE
Lab Results   Component Value Date    HGBA1C 14.1 (H) 10/27/2023       Recent Labs     04/27/24  1609 04/27/24  2120 04/28/24  0730 04/28/24  1120   POCGLU 169* 176* 75 200*         Blood Sugar Average: Last 72 hrs: (P) 167.1380085839011040  Hx of uncontrolled blood glucose levels  Algorithm 3 sliding scale before meals  PTA lantus 55 units BID, Humalog 15 units 3 times daily  Current blood glucose is in acceptable range, continue on Lantus 35 units twice daily, Humalog 6 units 3 times daily

## 2024-04-28 NOTE — PLAN OF CARE
Problem: PAIN - ADULT  Goal: Verbalizes/displays adequate comfort level or baseline comfort level  Description: Interventions:  - Encourage patient to monitor pain and request assistance  - Assess pain using appropriate pain scale  - Administer analgesics based on type and severity of pain and evaluate response  - Implement non-pharmacological measures as appropriate and evaluate response  - Consider cultural and social influences on pain and pain management  - Notify physician/advanced practitioner if interventions unsuccessful or patient reports new pain  Outcome: Progressing     Problem: INFECTION - ADULT  Goal: Absence or prevention of progression during hospitalization  Description: INTERVENTIONS:  - Assess and monitor for signs and symptoms of infection  - Monitor lab/diagnostic results  - Monitor all insertion sites, i.e. indwelling lines, tubes, and drains  - Monitor endotracheal if appropriate and nasal secretions for changes in amount and color  - [place] appropriate cooling/warming therapies per order  - Administer medications as ordered  - Instruct and encourage patient and family to use good hand hygiene technique  - Identify and instruct in appropriate isolation precautions for identified infection/condition  Outcome: Progressing     Problem: SAFETY ADULT  Goal: Patient will remain free of falls  Description: INTERVENTIONS:  - Educate patient/family on patient safety including physical limitations  - Instruct patient to call for assistance with activity   - Consult OT/PT to assist with strengthening/mobility   - Keep Call bell within reach  - Keep bed low and locked with side rails adjusted as appropriate  - Keep care items and personal belongings within reach  - Initiate and maintain comfort rounds  - Make Fall Risk Sign visible to staff  - Offer Toileting every 2 Hours, in advance of need  - Initiate/Maintain bed/chair alarm  - Obtain necessary fall risk management equipment: as needed  - Apply  yellow socks and bracelet for high fall risk patients  - Consider moving patient to room near nurses station  Outcome: Progressing     Problem: DISCHARGE PLANNING  Goal: Discharge to home or other facility with appropriate resources  Description: INTERVENTIONS:  - Identify barriers to discharge w/patient and caregiver  - Arrange for needed discharge resources and transportation as appropriate  - Identify discharge learning needs (meds, wound care, etc.)  - Arrange for interpretive services to assist at discharge as needed  - Refer to Case Management Department for coordinating discharge planning if the patient needs post-hospital services based on physician/advanced practitioner order or complex needs related to functional status, cognitive ability, or social support system  Outcome: Progressing     Problem: Knowledge Deficit  Goal: Patient/family/caregiver demonstrates understanding of disease process, treatment plan, medications, and discharge instructions  Description: Complete learning assessment and assess knowledge base.  Interventions:  - Provide teaching at level of understanding  - Provide teaching via preferred learning methods  Outcome: Progressing     Problem: METABOLIC, FLUID AND ELECTROLYTES - ADULT  Goal: Fluid balance maintained  Description: INTERVENTIONS:  - Monitor labs   - Monitor I/O and WT  - Instruct patient on fluid and nutrition as appropriate  - Assess for signs & symptoms of volume excess or deficit  Outcome: Progressing  Goal: Glucose maintained within target range  Description: INTERVENTIONS:  - Monitor Blood Glucose as ordered  - Assess for signs and symptoms of hyperglycemia and hypoglycemia  - Administer ordered medications to maintain glucose within target range  - Assess nutritional intake and initiate nutrition service referral as needed  Outcome: Progressing     Problem: SKIN/TISSUE INTEGRITY - ADULT  Goal: Incision(s), wounds(s) or drain site(s) healing without S/S of  infection  Description: INTERVENTIONS  - Assess and document dressing, incision, wound bed, drain sites and surrounding tissue  - Provide patient and family education  - Perform skin care/dressing changes as ordered  Outcome: Progressing     Problem: MUSCULOSKELETAL - ADULT  Goal: Maintain or return mobility to safest level of function  Description: INTERVENTIONS:  - Assess patient's ability to carry out ADLs; assess patient's baseline for ADL function and identify physical deficits which impact ability to perform ADLs (bathing, care of mouth/teeth, toileting, grooming, dressing, etc.)  - Assess/evaluate cause of self-care deficits   - Assess range of motion  - Assess patient's mobility  - Assess patient's need for assistive devices and provide as appropriate  - Encourage maximum independence but intervene and supervise when necessary  - Involve family in performance of ADLs  - Assess for home care needs following discharge   - Consider OT consult to assist with ADL evaluation and planning for discharge  - Provide patient education as appropriate  Outcome: Progressing     Problem: Prexisting or High Potential for Compromised Skin Integrity  Goal: Skin integrity is maintained or improved  Description: INTERVENTIONS:  - Identify patients at risk for skin breakdown  - Assess and monitor skin integrity  - Assess and monitor nutrition and hydration status  - Monitor labs   - Assess for incontinence   - Turn and reposition patient  - Assist with mobility/ambulation  - Relieve pressure over bony prominences  - Avoid friction and shearing  - Provide appropriate hygiene as needed including keeping skin clean and dry  - Evaluate need for skin moisturizer/barrier cream  - Collaborate with interdisciplinary team   - Patient/family teaching  - Consider wound care consult   Outcome: Progressing     Problem: Nutrition/Hydration-ADULT  Goal: Nutrient/Hydration intake appropriate for improving, restoring or maintaining nutritional  needs  Description: Monitor and assess patient's nutrition/hydration status for malnutrition. Collaborate with interdisciplinary team and initiate plan and interventions as ordered.  Monitor patient's weight and dietary intake as ordered or per policy. Utilize nutrition screening tool and intervene as necessary. Determine patient's food preferences and provide high-protein, high-caloric foods as appropriate.     INTERVENTIONS:  - Monitor oral intake, urinary output, labs, and treatment plans  - Assess nutrition and hydration status and recommend course of action  - Evaluate amount of meals eaten  - Assist patient with eating if necessary   - Allow adequate time for meals  - Recommend/ encourage appropriate diets, oral nutritional supplements, and vitamin/mineral supplements  - Order, calculate, and assess calorie counts as needed  - Recommend, monitor, and adjust tube feedings and TPN/PPN based on assessed needs  - Assess need for intravenous fluids  - Provide specific nutrition/hydration education as appropriate  - Include patient/family/caregiver in decisions related to nutrition  Outcome: Progressing

## 2024-04-28 NOTE — PROGRESS NOTES
[facility]  Progress Note  Name: Art Joseph I  MRN: [id number]  Unit/Bed#: 410-01 I Date of Admission: 4/23/2024   Date of Service: 4/28/2024 I Hospital Day: 5    Assessment/Plan   * Acute osteomyelitis of metatarsal bone of left foot (HCC)  Assessment & Plan  This is a 64-year-old male patient with history of PAD, status post right BKA, diabetes, hyperlipidemia, hypertension who was sent from wound care clinic to [facility] ED due to left lower extremity ulceration with purulent drainage concerning for deep infection  MRI left foot shows small 4 mm focus of confluent T1 marrow abnormality in the distal and plantar aspect of the stump of the first metatarsal, suspect small focus of osteomyelitis   Arterial duplex left lower extremity nondiagnostic, patient with palpable pulses, per vascular surgery was cleared for procedure  Patient was placed on broad-spectrum antibiotics with IV cefepime 2 g q8h + IV vancomycin 1.25 g q12h with PO metronidazole 500 mg q8h on admission  Appreciate podiatry input.  Patient is status post LEFT FOOT PARTIAL FIRST RAY AMPUTATION on 4/26/24 by Dr. Jennifer Rubalcava  Continue current antibiotics while awaiting perioperative cultures - can likely simplify to complete course with Keflex given no growth thus far  Patient is doing well postoperatively, continue postop care  PT/OT consulted    Cellulitis of left lower extremity  Assessment & Plan  Patient presents with left lower extremity wounds with surrounding cellulitis  Please refer to above under acute osteomyelitis    Candidiasis, intertrigo  Assessment & Plan  Around scrotal area  Nystatin cream ordered    Hyperlipidemia  Assessment & Plan  Continue home Lipitor 80 mg QD    Primary hypertension  Assessment & Plan  Adequate control, continue home regimen    Hx of right BKA (HCC)  Assessment & Plan  History noted  PT/OT prior to discharge    Type 2 diabetes mellitus with foot ulcer, with long-term current  use of insulin (HCC)  Assessment & Plan  Lab Results   Component Value Date    HGBA1C 14.1 (H) 10/27/2023       Recent Labs     04/27/24  1609 04/27/24  2120 04/28/24  0730 04/28/24  1120   POCGLU 169* 176* 75 200*         Blood Sugar Average: Last 72 hrs: (P) 167.1151518942081855  Hx of uncontrolled blood glucose levels  Algorithm 3 sliding scale before meals  PTA lantus 55 units BID, Humalog 15 units 3 times daily  Current blood glucose is in acceptable range, continue on Lantus 35 units twice daily, Humalog 6 units 3 times daily                 VTE Pharmacologic Prophylaxis: VTE Score: 4 Moderate Risk (Score 3-4) - Pharmacological DVT Prophylaxis Ordered: enoxaparin (Lovenox).    Mobility:   Basic Mobility Inpatient Raw Score: 12  -HLM Goal: 4: Move to chair/commode  JH-HLM Achieved: 2: Bed activities/Dependent transfer  JH-HLM Goal NOT achieved. Continue with multidisciplinary rounding and encourage appropriate mobility to improve upon -HLM goals.    Patient Centered Rounds: I performed bedside rounds with nursing staff today.   Discussions with Specialists or Other Care Team Provider: CM    Education and Discussions with Family / Patient:  patient.     Total Time Spent on Date of Encounter in care of patient: 35 mins. This time was spent on one or more of the following: performing physical exam; counseling and coordination of care; obtaining or reviewing history; documenting in the medical record; reviewing/ordering tests, medications or procedures; communicating with other healthcare professionals and discussing with patient's family/caregivers.    Current Length of Stay: 5 day(s)  Current Patient Status: Inpatient   Certification Statement: The patient will continue to require additional inpatient hospital stay due to IV Abx, close monitoring, safe discharge planning  Discharge Plan: Anticipate discharge in 24-48 hrs to home with home services.    Code Status: Level 1 - Full Code    Subjective:   Patient  reports feeling improved.  No acute complaints overnight events reported.    Objective:     Vitals:   Temp (24hrs), Av.4 °F (36.9 °C), Min:98.1 °F (36.7 °C), Max:98.7 °F (37.1 °C)    Temp:  [98.1 °F (36.7 °C)-98.7 °F (37.1 °C)] 98.1 °F (36.7 °C)  HR:  [73-86] 83  Resp:  [16-18] 16  BP: (126-152)/(66-79) 144/79  SpO2:  [93 %-97 %] 96 %  Body mass index is 31.03 kg/m².     Input and Output Summary (last 24 hours):     Intake/Output Summary (Last 24 hours) at 2024 1128  Last data filed at 2024 0824  Gross per 24 hour   Intake 1020 ml   Output 2600 ml   Net -1580 ml       Physical Exam:   Physical Exam  Constitutional:       General: He is not in acute distress.  HENT:      Head: Normocephalic and atraumatic.   Eyes:      Conjunctiva/sclera: Conjunctivae normal.   Cardiovascular:      Rate and Rhythm: Normal rate and regular rhythm.      Heart sounds: No murmur heard.  Pulmonary:      Effort: No respiratory distress.      Breath sounds: No wheezing or rales.   Abdominal:      General: There is no distension.      Tenderness: There is no abdominal tenderness. There is no guarding.   Musculoskeletal:         General: Deformity (R BKA) present.   Skin:     Comments: LLE dressing intact   Neurological:      Mental Status: He is oriented to person, place, and time.          Additional Data:     Labs:  Results from last 7 days   Lab Units 24  0413   WBC Thousand/uL 10.04   HEMOGLOBIN g/dL 13.7   HEMATOCRIT % 42.8   PLATELETS Thousands/uL 351   SEGS PCT % 65   LYMPHO PCT % 21   MONO PCT % 9   EOS PCT % 4     Results from last 7 days   Lab Units 24  0413 24  0456 24  0636   SODIUM mmol/L 137   < > 138   POTASSIUM mmol/L 4.5   < > 4.4   CHLORIDE mmol/L 104   < > 106   CO2 mmol/L 26   < > 24   BUN mg/dL 16   < > 13   CREATININE mg/dL 0.76   < > 0.84   ANION GAP mmol/L 7   < > 8   CALCIUM mg/dL 9.1   < > 9.0   ALBUMIN g/dL  --   --  3.1*   TOTAL BILIRUBIN mg/dL  --   --  0.42   ALK PHOS U/L   --   --  117*   ALT U/L  --   --  7   AST U/L  --   --  10*   GLUCOSE RANDOM mg/dL 74   < > 124    < > = values in this interval not displayed.         Results from last 7 days   Lab Units 04/28/24  1120 04/28/24  0730 04/27/24  2120 04/27/24  1609 04/27/24  1125 04/27/24  1100 04/27/24  0736 04/26/24  2123 04/26/24  1759 04/26/24  1655 04/26/24  1105 04/26/24  0702   POC GLUCOSE mg/dl 200* 75 176* 169* 238* 246* 87 205* 97 91 132 133         Results from last 7 days   Lab Units 04/23/24  1127 04/23/24  1102   LACTIC ACID mmol/L 1.8  --    PROCALCITONIN ng/ml  --  <0.05       Lines/Drains:  Invasive Devices       Peripheral Intravenous Line  Duration             Peripheral IV 04/26/24 Left;Proximal;Ventral (anterior) Forearm 1 day                      Imaging: no new    Recent Cultures (last 7 days):   Results from last 7 days   Lab Units 04/26/24  1618 04/26/24  1608 04/23/24  1106 04/23/24  1102   BLOOD CULTURE   --   --  No Growth After 4 Days. No Growth After 4 Days.   GRAM STAIN RESULT  No Polys or Bacteria seen No Polys or Bacteria seen  --   --        Last 24 Hours Medication List:   Current Facility-Administered Medications   Medication Dose Route Frequency Provider Last Rate    aspirin  81 mg Oral Daily Jennifer Rubalcava, DPM      atorvastatin  80 mg Oral Daily Jennifer Rubalcava, DPM      cefepime  2,000 mg Intravenous Q8H Jennifer Rubalcava, DPM 2,000 mg (04/28/24 0406)    Cholecalciferol  1,000 Units Oral Daily Jennifer Rubalcava, DPM      enoxaparin  40 mg Subcutaneous Daily Jennifer Rubalcava, DPM      insulin glargine  35 Units Subcutaneous Q12H [facility] Bella Nj MD      insulin lispro  1-6 Units Subcutaneous TID AC Vic Rousseau PA-C      insulin lispro  6 Units Subcutaneous TID With Meals Bella Nj MD      lisinopril  2.5 mg Oral Daily Jennifer Rubalcava, DPM      metroNIDAZOLE  500 mg Oral Q8H JASPER Rubalcava DPM      nystatin   Topical Q12H JASPER Nj MD      vancomycin  12.5 mg/kg Intravenous Q12H Jennifer  BERNY Rubalcava 1,250 mg (04/27/24 1043)        Today, Patient Was Seen By: Bella Nj MD    **Please Note: This note may have been constructed using a voice recognition system.**

## 2024-04-28 NOTE — PROGRESS NOTES
Art Joseph is a 64 y.o. male who is currently ordered Vancomycin IV with management by the Pharmacy Consult service.  Relevant clinical data and objective / subjective history reviewed.  Vancomycin Assessment:  Indication and Goal AUC/Trough: Soft tissue (goal -600, trough >10); Bone/joint infection (goal -600, trough >10), -600, trough >10  Clinical Status: stable  Micro:     Renal Function:  SCr: 0.76 mg/dL  CrCl: 113.8 mL/min  Renal replacement: Not on dialysis  Days of Therapy: 6  Current Dose: 1250mg q12h  Vancomycin Plan:  New Dosing: same  Estimated AUC: 454 mcg*hr/mL  Estimated Trough: 12.3 mcg/mL  Next Level: 0600 on 5/2/24  Renal Function Monitoring: Daily BMP and UOP  Pharmacy will continue to follow closely for s/sx of nephrotoxicity, infusion reactions and appropriateness of therapy.  BMP and CBC will be ordered per protocol. We will continue to follow the patient’s culture results and clinical progress daily.    Jeff Granados, Pharmacist

## 2024-04-28 NOTE — PLAN OF CARE
Problem: PAIN - ADULT  Goal: Verbalizes/displays adequate comfort level or baseline comfort level  Description: Interventions:  - Encourage patient to monitor pain and request assistance  - Assess pain using appropriate pain scale  - Administer analgesics based on type and severity of pain and evaluate response  - Implement non-pharmacological measures as appropriate and evaluate response  - Consider cultural and social influences on pain and pain management  - Notify physician/advanced practitioner if interventions unsuccessful or patient reports new pain  Outcome: Progressing     Problem: INFECTION - ADULT  Goal: Absence or prevention of progression during hospitalization  Description: INTERVENTIONS:  - Assess and monitor for signs and symptoms of infection  - Monitor lab/diagnostic results  - Monitor all insertion sites, i.e. indwelling lines, tubes, and drains  - Monitor endotracheal if appropriate and nasal secretions for changes in amount and color  - [place] appropriate cooling/warming therapies per order  - Administer medications as ordered  - Instruct and encourage patient and family to use good hand hygiene technique  - Identify and instruct in appropriate isolation precautions for identified infection/condition  Outcome: Progressing     Problem: SAFETY ADULT  Goal: Patient will remain free of falls  Description: INTERVENTIONS:  - Educate patient/family on patient safety including physical limitations  - Instruct patient to call for assistance with activity   - Consult OT/PT to assist with strengthening/mobility   - Keep Call bell within reach  - Keep bed low and locked with side rails adjusted as appropriate  - Keep care items and personal belongings within reach  - Initiate and maintain comfort rounds  - Make Fall Risk Sign visible to staff  - Offer Toileting every 2 Hours, in advance of need  - Initiate/Maintain bed/chair alarm  - Obtain necessary fall risk management equipment: as needed  - Apply  yellow socks and bracelet for high fall risk patients  - Consider moving patient to room near nurses station  Outcome: Progressing     Problem: DISCHARGE PLANNING  Goal: Discharge to home or other facility with appropriate resources  Description: INTERVENTIONS:  - Identify barriers to discharge w/patient and caregiver  - Arrange for needed discharge resources and transportation as appropriate  - Identify discharge learning needs (meds, wound care, etc.)  - Arrange for interpretive services to assist at discharge as needed  - Refer to Case Management Department for coordinating discharge planning if the patient needs post-hospital services based on physician/advanced practitioner order or complex needs related to functional status, cognitive ability, or social support system  Outcome: Progressing     Problem: Knowledge Deficit  Goal: Patient/family/caregiver demonstrates understanding of disease process, treatment plan, medications, and discharge instructions  Description: Complete learning assessment and assess knowledge base.  Interventions:  - Provide teaching at level of understanding  - Provide teaching via preferred learning methods  Outcome: Progressing     Problem: METABOLIC, FLUID AND ELECTROLYTES - ADULT  Goal: Fluid balance maintained  Description: INTERVENTIONS:  - Monitor labs   - Monitor I/O and WT  - Instruct patient on fluid and nutrition as appropriate  - Assess for signs & symptoms of volume excess or deficit  Outcome: Progressing  Goal: Glucose maintained within target range  Description: INTERVENTIONS:  - Monitor Blood Glucose as ordered  - Assess for signs and symptoms of hyperglycemia and hypoglycemia  - Administer ordered medications to maintain glucose within target range  - Assess nutritional intake and initiate nutrition service referral as needed  Outcome: Progressing     Problem: SKIN/TISSUE INTEGRITY - ADULT  Goal: Incision(s), wounds(s) or drain site(s) healing without S/S of  infection  Description: INTERVENTIONS  - Assess and document dressing, incision, wound bed, drain sites and surrounding tissue  - Provide patient and family education  - Perform skin care/dressing changes as ordered  Outcome: Progressing     Problem: MUSCULOSKELETAL - ADULT  Goal: Maintain or return mobility to safest level of function  Description: INTERVENTIONS:  - Assess patient's ability to carry out ADLs; assess patient's baseline for ADL function and identify physical deficits which impact ability to perform ADLs (bathing, care of mouth/teeth, toileting, grooming, dressing, etc.)  - Assess/evaluate cause of self-care deficits   - Assess range of motion  - Assess patient's mobility  - Assess patient's need for assistive devices and provide as appropriate  - Encourage maximum independence but intervene and supervise when necessary  - Involve family in performance of ADLs  - Assess for home care needs following discharge   - Consider OT consult to assist with ADL evaluation and planning for discharge  - Provide patient education as appropriate  Outcome: Progressing     Problem: Prexisting or High Potential for Compromised Skin Integrity  Goal: Skin integrity is maintained or improved  Description: INTERVENTIONS:  - Identify patients at risk for skin breakdown  - Assess and monitor skin integrity  - Assess and monitor nutrition and hydration status  - Monitor labs   - Assess for incontinence   - Turn and reposition patient  - Assist with mobility/ambulation  - Relieve pressure over bony prominences  - Avoid friction and shearing  - Provide appropriate hygiene as needed including keeping skin clean and dry  - Evaluate need for skin moisturizer/barrier cream  - Collaborate with interdisciplinary team   - Patient/family teaching  - Consider wound care consult   Outcome: Progressing     Problem: Nutrition/Hydration-ADULT  Goal: Nutrient/Hydration intake appropriate for improving, restoring or maintaining nutritional  needs  Description: Monitor and assess patient's nutrition/hydration status for malnutrition. Collaborate with interdisciplinary team and initiate plan and interventions as ordered.  Monitor patient's weight and dietary intake as ordered or per policy. Utilize nutrition screening tool and intervene as necessary. Determine patient's food preferences and provide high-protein, high-caloric foods as appropriate.     INTERVENTIONS:  - Monitor oral intake, urinary output, labs, and treatment plans  - Assess nutrition and hydration status and recommend course of action  - Evaluate amount of meals eaten  - Assist patient with eating if necessary   - Allow adequate time for meals  - Recommend/ encourage appropriate diets, oral nutritional supplements, and vitamin/mineral supplements  - Order, calculate, and assess calorie counts as needed  - Recommend, monitor, and adjust tube feedings and TPN/PPN based on assessed needs  - Assess need for intravenous fluids  - Provide specific nutrition/hydration education as appropriate  - Include patient/family/caregiver in decisions related to nutrition  Outcome: Progressing

## 2024-04-28 NOTE — ASSESSMENT & PLAN NOTE
This is a 64-year-old male patient with history of PAD, status post right BKA, diabetes, hyperlipidemia, hypertension who was sent from wound care clinic to [facility] ED due to left lower extremity ulceration with purulent drainage concerning for deep infection  MRI left foot shows small 4 mm focus of confluent T1 marrow abnormality in the distal and plantar aspect of the stump of the first metatarsal, suspect small focus of osteomyelitis   Arterial duplex left lower extremity nondiagnostic, patient with palpable pulses, per vascular surgery was cleared for procedure  Patient was placed on broad-spectrum antibiotics with IV cefepime 2 g q8h + IV vancomycin 1.25 g q12h with PO metronidazole 500 mg q8h on admission  Appreciate podiatry input.  Patient is status post LEFT FOOT PARTIAL FIRST RAY AMPUTATION on 4/26/24 by Dr. Jennifer Rubalcava  Continue current antibiotics while awaiting perioperative cultures - can likely simplify to complete course with Keflex given no growth thus far  Patient is doing well postoperatively, continue postop care  PT/OT consulted

## 2024-04-29 VITALS
DIASTOLIC BLOOD PRESSURE: 61 MMHG | OXYGEN SATURATION: 96 % | BODY MASS INDEX: 30.96 KG/M2 | SYSTOLIC BLOOD PRESSURE: 127 MMHG | HEIGHT: 70 IN | HEART RATE: 82 BPM | WEIGHT: 216.27 LBS | RESPIRATION RATE: 19 BRPM | TEMPERATURE: 98.2 F

## 2024-04-29 PROBLEM — R79.89 ELEVATED PLATELET COUNT: Status: ACTIVE | Noted: 2024-04-29

## 2024-04-29 PROBLEM — K59.00 CONSTIPATION: Status: ACTIVE | Noted: 2024-04-29

## 2024-04-29 LAB
ANION GAP SERPL CALCULATED.3IONS-SCNC: 8 MMOL/L (ref 4–13)
BACTERIA SPEC ANAEROBE CULT: NO GROWTH
BACTERIA SPEC ANAEROBE CULT: NORMAL
BASOPHILS # BLD AUTO: 0.08 THOUSANDS/ÂΜL (ref 0–0.1)
BASOPHILS NFR BLD AUTO: 1 % (ref 0–1)
BUN SERPL-MCNC: 18 MG/DL (ref 5–25)
CALCIUM SERPL-MCNC: 9.2 MG/DL (ref 8.4–10.2)
CHLORIDE SERPL-SCNC: 105 MMOL/L (ref 96–108)
CO2 SERPL-SCNC: 27 MMOL/L (ref 21–32)
CREAT SERPL-MCNC: 0.79 MG/DL (ref 0.6–1.3)
EOSINOPHIL # BLD AUTO: 0.4 THOUSAND/ÂΜL (ref 0–0.61)
EOSINOPHIL NFR BLD AUTO: 4 % (ref 0–6)
ERYTHROCYTE [DISTWIDTH] IN BLOOD BY AUTOMATED COUNT: 12.7 % (ref 11.6–15.1)
GFR SERPL CREATININE-BSD FRML MDRD: 94 ML/MIN/1.73SQ M
GLUCOSE SERPL-MCNC: 173 MG/DL (ref 65–140)
GLUCOSE SERPL-MCNC: 207 MG/DL (ref 65–140)
GLUCOSE SERPL-MCNC: 253 MG/DL (ref 65–140)
GLUCOSE SERPL-MCNC: 67 MG/DL (ref 65–140)
GLUCOSE SERPL-MCNC: 84 MG/DL (ref 65–140)
HCT VFR BLD AUTO: 44.4 % (ref 36.5–49.3)
HGB BLD-MCNC: 13.6 G/DL (ref 12–17)
IMM GRANULOCYTES # BLD AUTO: 0.07 THOUSAND/UL (ref 0–0.2)
IMM GRANULOCYTES NFR BLD AUTO: 1 % (ref 0–2)
LYMPHOCYTES # BLD AUTO: 2.79 THOUSANDS/ÂΜL (ref 0.6–4.47)
LYMPHOCYTES NFR BLD AUTO: 29 % (ref 14–44)
MCH RBC QN AUTO: 27.5 PG (ref 26.8–34.3)
MCHC RBC AUTO-ENTMCNC: 30.6 G/DL (ref 31.4–37.4)
MCV RBC AUTO: 90 FL (ref 82–98)
MONOCYTES # BLD AUTO: 0.87 THOUSAND/ÂΜL (ref 0.17–1.22)
MONOCYTES NFR BLD AUTO: 9 % (ref 4–12)
NEUTROPHILS # BLD AUTO: 5.41 THOUSANDS/ÂΜL (ref 1.85–7.62)
NEUTS SEG NFR BLD AUTO: 56 % (ref 43–75)
NRBC BLD AUTO-RTO: 0 /100 WBCS
PLATELET # BLD AUTO: 401 THOUSANDS/UL (ref 149–390)
PMV BLD AUTO: 9.1 FL (ref 8.9–12.7)
POTASSIUM SERPL-SCNC: 4.2 MMOL/L (ref 3.5–5.3)
RBC # BLD AUTO: 4.94 MILLION/UL (ref 3.88–5.62)
SODIUM SERPL-SCNC: 140 MMOL/L (ref 135–147)
WBC # BLD AUTO: 9.62 THOUSAND/UL (ref 4.31–10.16)

## 2024-04-29 PROCEDURE — 82948 REAGENT STRIP/BLOOD GLUCOSE: CPT

## 2024-04-29 PROCEDURE — 99232 SBSQ HOSP IP/OBS MODERATE 35: CPT | Performed by: INTERNAL MEDICINE

## 2024-04-29 PROCEDURE — 97167 OT EVAL HIGH COMPLEX 60 MIN: CPT

## 2024-04-29 PROCEDURE — 80048 BASIC METABOLIC PNL TOTAL CA: CPT | Performed by: FAMILY MEDICINE

## 2024-04-29 PROCEDURE — 99024 POSTOP FOLLOW-UP VISIT: CPT | Performed by: STUDENT IN AN ORGANIZED HEALTH CARE EDUCATION/TRAINING PROGRAM

## 2024-04-29 PROCEDURE — 97163 PT EVAL HIGH COMPLEX 45 MIN: CPT

## 2024-04-29 PROCEDURE — 85025 COMPLETE CBC W/AUTO DIFF WBC: CPT | Performed by: FAMILY MEDICINE

## 2024-04-29 RX ORDER — POLYETHYLENE GLYCOL 3350 17 G/17G
17 POWDER, FOR SOLUTION ORAL ONCE
Status: COMPLETED | OUTPATIENT
Start: 2024-04-29 | End: 2024-04-29

## 2024-04-29 RX ORDER — DOCUSATE SODIUM 100 MG/1
100 CAPSULE, LIQUID FILLED ORAL 2 TIMES DAILY
Status: DISCONTINUED | OUTPATIENT
Start: 2024-04-29 | End: 2024-05-01 | Stop reason: HOSPADM

## 2024-04-29 RX ADMIN — INSULIN LISPRO 1 UNITS: 100 INJECTION, SOLUTION INTRAVENOUS; SUBCUTANEOUS at 17:04

## 2024-04-29 RX ADMIN — INSULIN LISPRO 3 UNITS: 100 INJECTION, SOLUTION INTRAVENOUS; SUBCUTANEOUS at 12:06

## 2024-04-29 RX ADMIN — VANCOMYCIN HYDROCHLORIDE 1250 MG: 5 INJECTION, POWDER, LYOPHILIZED, FOR SOLUTION INTRAVENOUS at 12:41

## 2024-04-29 RX ADMIN — INSULIN GLARGINE 35 UNITS: 100 INJECTION, SOLUTION SUBCUTANEOUS at 22:10

## 2024-04-29 RX ADMIN — ENOXAPARIN SODIUM 40 MG: 40 INJECTION SUBCUTANEOUS at 09:40

## 2024-04-29 RX ADMIN — INSULIN LISPRO 6 UNITS: 100 INJECTION, SOLUTION INTRAVENOUS; SUBCUTANEOUS at 17:04

## 2024-04-29 RX ADMIN — POLYETHYLENE GLYCOL 3350 17 G: 17 POWDER, FOR SOLUTION ORAL at 12:09

## 2024-04-29 RX ADMIN — DOCUSATE SODIUM 100 MG: 100 CAPSULE, LIQUID FILLED ORAL at 17:04

## 2024-04-29 RX ADMIN — DESMOPRESSIN ACETATE 80 MG: 0.2 TABLET ORAL at 17:04

## 2024-04-29 RX ADMIN — CEFEPIME HYDROCHLORIDE 2000 MG: 2 INJECTION, SOLUTION INTRAVENOUS at 20:16

## 2024-04-29 RX ADMIN — ASPIRIN 81 MG 81 MG: 81 TABLET ORAL at 09:40

## 2024-04-29 RX ADMIN — INSULIN LISPRO 6 UNITS: 100 INJECTION, SOLUTION INTRAVENOUS; SUBCUTANEOUS at 09:39

## 2024-04-29 RX ADMIN — INSULIN LISPRO 6 UNITS: 100 INJECTION, SOLUTION INTRAVENOUS; SUBCUTANEOUS at 12:06

## 2024-04-29 RX ADMIN — DOCUSATE SODIUM 100 MG: 100 CAPSULE, LIQUID FILLED ORAL at 12:09

## 2024-04-29 RX ADMIN — NYSTATIN: 100000 CREAM TOPICAL at 09:41

## 2024-04-29 RX ADMIN — INSULIN GLARGINE 35 UNITS: 100 INJECTION, SOLUTION SUBCUTANEOUS at 09:39

## 2024-04-29 RX ADMIN — VANCOMYCIN HYDROCHLORIDE 1250 MG: 5 INJECTION, POWDER, LYOPHILIZED, FOR SOLUTION INTRAVENOUS at 22:10

## 2024-04-29 RX ADMIN — METRONIDAZOLE 500 MG: 500 TABLET ORAL at 05:52

## 2024-04-29 RX ADMIN — LISINOPRIL 2.5 MG: 5 TABLET ORAL at 09:40

## 2024-04-29 RX ADMIN — CEFEPIME HYDROCHLORIDE 2000 MG: 2 INJECTION, SOLUTION INTRAVENOUS at 12:09

## 2024-04-29 RX ADMIN — NYSTATIN: 100000 CREAM TOPICAL at 22:10

## 2024-04-29 RX ADMIN — CHOLECALCIFEROL TAB 25 MCG (1000 UNIT) 1000 UNITS: 25 TAB at 09:40

## 2024-04-29 RX ADMIN — CEFEPIME HYDROCHLORIDE 2000 MG: 2 INJECTION, SOLUTION INTRAVENOUS at 04:03

## 2024-04-29 NOTE — PROGRESS NOTES
"Progress Note - Podiatry  Art Joseph 64 y.o. male MRN: [id number]  Unit/Bed#: 410-01 Encounter: [id number]    Assessment:  Status post left partial first ray amputation date of surgery 4/26/2024  Left lower extremity venous stasis ulcers  Right below the knee amputation  Diabetic neuropathy    Plan:    -Continue close monitoring of the left foot incision site with local wound care as detailed in the instructions for nursing.  -Nonweightbearing to the left lower extremity  -Clean margin first metatarsal bone cultures positive for Staph coagulation negative.  At this time I recommend infectious disease consultation for antibiotic management  -Rest of care per primary service  -Patient is stable from podiatric standpoint for discharge when medically cleared  -Addressed all questions and concerns.    Subjective/Objective   Chief Complaint:   Chief Complaint   Patient presents with    Wound Check     PT SENT FROM WOUND CARE FOR ULCERS ON LEFT LOWER LEG AND FOR ADMISSION       Subjective: 64 y.o. y/o male was seen and evaluated at bedside.  Patient resting in bed comfortably.  No complaints today.    Blood pressure 127/61, pulse 82, temperature 98.2 °F (36.8 °C), resp. rate 19, height 5' 10\" (1.778 m), weight 98.1 kg (216 lb 4.3 oz), SpO2 96%.,Body mass index is 31.03 kg/m².    Invasive Devices       Peripheral Intravenous Line  Duration             Peripheral IV 04/26/24 Left;Proximal;Ventral (anterior) Forearm 3 days                    Physical Exam:   General: Alert, cooperative and no distress  Lungs: Non labored breathing  Heart: Positive S1, S2  Abdomen: Soft, non-tender.  Extremity:   Left foot dressing Dry clean and intact.      Lab, Imaging and other studies:   CBC:   Lab Results   Component Value Date    WBC 9.62 04/29/2024    HGB 13.6 04/29/2024    HCT 44.4 04/29/2024    MCV 90 04/29/2024     (H) 04/29/2024    RBC 4.94 04/29/2024    MCH 27.5 04/29/2024    MCHC 30.6 (L) 04/29/2024    RDW 12.7 " 04/29/2024    MPV 9.1 04/29/2024    NRBC 0 04/29/2024   , CMP:   Lab Results   Component Value Date    SODIUM 140 04/29/2024    K 4.2 04/29/2024     04/29/2024    CO2 27 04/29/2024    BUN 18 04/29/2024    CREATININE 0.79 04/29/2024    CALCIUM 9.2 04/29/2024    EGFR 94 04/29/2024       Imaging: I have personally reviewed pertinent films in PACS  EKG, Pathology, and Other Studies: I have personally reviewed pertinent reports.  VTE Pharmacologic Prophylaxis: Sequential compression device (Venodyne)

## 2024-04-29 NOTE — PROGRESS NOTES
Art Joseph is a 64 y.o. male who is currently ordered Vancomycin IV with management by the Pharmacy Consult service.  Relevant clinical data and objective / subjective history reviewed.  Vancomycin Assessment:  Indication and Goal AUC/Trough: Soft tissue (goal -600, trough >10), -600, trough >10  Clinical Status: stable  Micro:     Renal Function:  SCr: 0.79 mg/dL  CrCl: 88 mL/min  Renal replacement: Not on dialysis  Days of Therapy: 7  Current Dose: 1250mg IV q12h  Vancomycin Plan:  New Dosing: same  Estimated AUC: 471 mcg*hr/mL  Estimated Trough: 12.9 mcg/mL  Next Level: 5/2/24 with AM labs  Renal Function Monitoring: Daily BMP and UOP  Pharmacy will continue to follow closely for s/sx of nephrotoxicity, infusion reactions and appropriateness of therapy.  BMP and CBC will be ordered per protocol. We will continue to follow the patient’s culture results and clinical progress daily.    Christopher Guardado, Pharmacist

## 2024-04-29 NOTE — OCCUPATIONAL THERAPY NOTE
Occupational Therapy Evaluation     Patient Name: Art Joseph  Today's Date: 4/29/2024  Problem List  Principal Problem:    Acute osteomyelitis of metatarsal bone of left foot (HCC)  Active Problems:    Type 2 diabetes mellitus with foot ulcer, with long-term current use of insulin (HCC)    Hx of right BKA (HCC)    Primary hypertension    Hyperlipidemia    Cellulitis of left lower extremity    Candidiasis, intertrigo    Past Medical History  Past Medical History:   Diagnosis Date    Diabetes mellitus (HCC)     Hypertension      Past Surgical History  Past Surgical History:   Procedure Laterality Date    LEG AMPUTATION THROUGH LOWER TIBIA AND FIBULA Right 7/25/2017    Procedure: AMPUTATION BELOW KNEE (BKA);  Surgeon: Mynor Sanchez MD;  Location: [facility];  Service: General    ID AMPUTATION FOOT TRANSMETARSAL Right 1/26/2017    Procedure: AMPUTATION TRANSMETATARSAL (TMA); OPEN;  Surgeon: Leonard Lomeli DPM;  Location: [facility];  Service: Podiatry    ID AMPUTATION METATARSAL W/TOE SINGLE Left 1/30/2017    Procedure: Left partial 1st ray amputation;  Surgeon: Leonard Lomeli DPM;  Location: [facility];  Service: Podiatry    ID COLONOSCOPY FLX DX W/COLLJ SPEC WHEN PFRMD N/A 11/28/2018    Procedure: COLONOSCOPY;  Surgeon: González Napier MD;  Location: [facility];  Service: Gastroenterology    WOUND DEBRIDEMENT Right 1/30/2017    Procedure: DEBRIDEMENT FOOT/TOE (WASH OUT) delayed closure, LINDA;  Surgeon: Leonard Lomeli DPM;  Location: [facility];  Service:              04/29/24 0856   OT Last Visit   OT Visit Date 04/29/24   Note Type   Note type Evaluation   Pain Assessment   Pain Score No Pain   Restrictions/Precautions   Weight Bearing Precautions Per Order Yes   RLE Weight Bearing Per Order   (BKA-prosthetic)   LLE Weight Bearing Per Order NWB   Braces or Orthoses Other (Comment)  (supplied surgical shoe for protection to  L LE; prosthetic to R LE)   Other Precautions Chair Alarm;Bed Alarm;Fall Risk   Home  "Living   Type of Home Apartment   Home Layout One level;Performs ADLs on one level;Able to live on main level with bedroom/bathroom;Other (Comment)  (2 step within apartment connecting rooms)   Bathroom Shower/Tub Tub/shower unit   Bathroom Toilet Standard   Bathroom Equipment Shower chair   Bathroom Accessibility Accessible   Home Equipment Walker;Quad cane;Wheelchair-manual   Additional Comments pt reports use of quad cane during functional mobility at baseline   Prior Function   Level of [place] Independent with ADLs;Independent with functional mobility;Needs assistance with IADLS   Lives With Alone   Receives Help From Friend(s)   IADLs Family/Friend/Other provides transportation  (utilizes [employer] for transportation)   Falls in the last 6 months 0   Vocational On disability   Comments (S)  pt reports x2 good friends who (A) with IADLs at times; pt presents to hospital and found to have bed bugs; requires decontamination, which includes prosthetic leg to R BKA; per security, pt is unable to obtain porsthetic due to infestation of bed bugs; pt reports x2 treatments to home for bedbugs since admission   Subjective   Subjective \"well, what can ya do?\"   ADL   Where Assessed Edge of bed   LB Dressing Assistance 5  Supervision/Setup   LB Dressing Deficit Other (Comment)  (pt dons underwear with (S) level at EOB; pt does not perform functional transfer to pull over hips, however lays supine to bring underwear over hips)   Toileting Assistance  5  Supervision/Setup   Toileting Deficit Other (Comment)  (utilizing urinal, however with moderate amount of urine in bed)   Bed Mobility   Supine to Sit 5  Supervision   Additional items Bedrails;Verbal cues   Sit to Supine 5  Supervision   Additional items Verbal cues;Bedrails   Additional Comments pt on RA during session; Spo2 WFL with no complaints of SOB   Transfers   Sit pivot 5  Supervision   Additional items Increased time required;Verbal cues  (pt performs sit pivot " transfer with stump to chair and pivot pressure to stump and mildly to L LE heel in surgical shoe)   Additional Comments pt performs sit pivot as stated above and education provided on NWB status to L LE and transfer techniques to and from bed; pt understands and is compliant with recommendations with safety reminders   Functional Mobility   Additional Comments pt does not perform functional mobility this session due to no presence of R LE prosthetic in room   Balance   Static Sitting Good   Dynamic Sitting Good   Activity Tolerance   Activity Tolerance Patient limited by fatigue   RUE Assessment   RUE Assessment WFL   LUE Assessment   LUE Assessment WFL   Hand Function   Gross Motor Coordination Functional   Fine Motor Coordination Functional   Sensation   Light Touch No apparent deficits   Sharp/Dull No apparent deficits   Psychosocial   Psychosocial (WDL) WDL   Cognition   Overall Cognitive Status WFL   Arousal/Participation Alert   Attention Within functional limits   Orientation Level Oriented X4   Memory Within functional limits   Following Commands Follows all commands and directions without difficulty   Assessment   Limitation Decreased ADL status;Decreased UE strength;Decreased Safe judgement during ADL;Decreased endurance;Decreased self-care trans;Decreased high-level ADLs   Assessment Pt is a 64 y.o. male seen for OT evaluation s/p admit to [facility] on 4/23/2024 w/ Acute osteomyelitis of metatarsal bone of left foot (HCC).  Comorbidities affecting pt's functional performance at time of assessment include:  DM, osteomyelitis of metatarsal bone of L LE, R BKA, HTN, HLD, cellulitis of L LE, candidiasis intertrigo, neuropathy, obesity . Personal factors affecting pt at time of IE include:limited home support, difficulty performing ADLS, difficulty performing IADLS , limited insight into deficits, decreased initiation and engagement , and health management . Prior to admission, pt was (I) with ADLs and (A) with  IADLs with use of quad cane during mobility. Upon evaluation: Pt requires (S) level with use of sit pivot transfers technique 2* the following deficits impacting occupational performance: weakness, decreased strength, decreased balance, decreased tolerance, impaired initiation, impaired problem solving, decreased safety awareness, and orthopedic restrictions. Pt to benefit from continued skilled OT tx while in the hospital to address deficits as defined above and maximize level of functional independence w ADL's and functional mobility. Occupational Performance areas to address include: grooming, bathing/shower, toilet hygiene, dressing, functional mobility, community mobility, and clothing management. The patient's raw score on the -PAC Daily Activity Inpatient Short Form is 21. A raw score of greater than or equal to 19 suggests the patient may benefit from discharge to home. Discharge recommendation at this time is level III minimum resource intensity.  Pt benefited from co-evaluation of skilled OT and PT therapists in order to most appropriately address functional deficits d/t extensive assistance required for safe functional mobility, decreased activity tolerance, and regression from functioning level prior to admission and/or onset of present illness. OT/PT objectives were addressed separately; please see PT note for specific goal areas targeted.   Goals   Patient Goals to go home   Short Term Goal  pt will perform UE strengthening exercises   Long Term Goal #1 pt will perform sit pivot transfers with mod (I) level   Long Term Goal #2 pt will perform UB/LB bathing and grooming tasks with (I) level   Plan   Treatment Interventions ADL retraining;Functional transfer training;Endurance training;UE strengthening/ROM;Patient/family training;Equipment evaluation/education;Activityengagement;Compensatory technique education   Goal Expiration Date 05/09/24   OT Frequency 3-5x/wk   Discharge Recommendation   Rehab  Resource Intensity Level, OT III (Minimum Resource Intensity)   AM-PAC Daily Activity Inpatient   Lower Body Dressing 3   Bathing 3   Toileting 3   Upper Body Dressing 4   Grooming 4   Eating 4   Daily Activity Raw Score 21   Daily Activity Standardized Score (Calc for Raw Score >=11) 44.27   AM-PAC Applied Cognition Inpatient   Following a Speech/Presentation 4   Understanding Ordinary Conversation 4   Taking Medications 4   Remembering Where Things Are Placed or Put Away 4   Remembering List of 4-5 Errands 3   Taking Care of Complicated Tasks 3   Applied Cognition Raw Score 22   Applied Cognition Standardized Score 47.83

## 2024-04-29 NOTE — PLAN OF CARE
Problem: PAIN - ADULT  Goal: Verbalizes/displays adequate comfort level or baseline comfort level  Description: Interventions:  - Encourage patient to monitor pain and request assistance  - Assess pain using appropriate pain scale  - Administer analgesics based on type and severity of pain and evaluate response  - Implement non-pharmacological measures as appropriate and evaluate response  - Consider cultural and social influences on pain and pain management  - Notify physician/advanced practitioner if interventions unsuccessful or patient reports new pain  Outcome: Progressing     Problem: INFECTION - ADULT  Goal: Absence or prevention of progression during hospitalization  Description: INTERVENTIONS:  - Assess and monitor for signs and symptoms of infection  - Monitor lab/diagnostic results  - Monitor all insertion sites, i.e. indwelling lines, tubes, and drains  - Monitor endotracheal if appropriate and nasal secretions for changes in amount and color  - [place] appropriate cooling/warming therapies per order  - Administer medications as ordered  - Instruct and encourage patient and family to use good hand hygiene technique  - Identify and instruct in appropriate isolation precautions for identified infection/condition  Outcome: Progressing     Problem: SAFETY ADULT  Goal: Patient will remain free of falls  Description: INTERVENTIONS:  - Educate patient/family on patient safety including physical limitations  - Instruct patient to call for assistance with activity   - Consult OT/PT to assist with strengthening/mobility   - Keep Call bell within reach  - Keep bed low and locked with side rails adjusted as appropriate  - Keep care items and personal belongings within reach  - Initiate and maintain comfort rounds  - Make Fall Risk Sign visible to staff  - Offer Toileting every 2 Hours, in advance of need  - Initiate/Maintain bed/chair alarm  - Obtain necessary fall risk management equipment: as needed  - Apply  yellow socks and bracelet for high fall risk patients  - Consider moving patient to room near nurses station  Outcome: Progressing     Problem: DISCHARGE PLANNING  Goal: Discharge to home or other facility with appropriate resources  Description: INTERVENTIONS:  - Identify barriers to discharge w/patient and caregiver  - Arrange for needed discharge resources and transportation as appropriate  - Identify discharge learning needs (meds, wound care, etc.)  - Arrange for interpretive services to assist at discharge as needed  - Refer to Case Management Department for coordinating discharge planning if the patient needs post-hospital services based on physician/advanced practitioner order or complex needs related to functional status, cognitive ability, or social support system  Outcome: Progressing     Problem: Knowledge Deficit  Goal: Patient/family/caregiver demonstrates understanding of disease process, treatment plan, medications, and discharge instructions  Description: Complete learning assessment and assess knowledge base.  Interventions:  - Provide teaching at level of understanding  - Provide teaching via preferred learning methods  Outcome: Progressing     Problem: METABOLIC, FLUID AND ELECTROLYTES - ADULT  Goal: Fluid balance maintained  Description: INTERVENTIONS:  - Monitor labs   - Monitor I/O and WT  - Instruct patient on fluid and nutrition as appropriate  - Assess for signs & symptoms of volume excess or deficit  Outcome: Progressing  Goal: Glucose maintained within target range  Description: INTERVENTIONS:  - Monitor Blood Glucose as ordered  - Assess for signs and symptoms of hyperglycemia and hypoglycemia  - Administer ordered medications to maintain glucose within target range  - Assess nutritional intake and initiate nutrition service referral as needed  Outcome: Progressing     Problem: SKIN/TISSUE INTEGRITY - ADULT  Goal: Incision(s), wounds(s) or drain site(s) healing without S/S of  infection  Description: INTERVENTIONS  - Assess and document dressing, incision, wound bed, drain sites and surrounding tissue  - Provide patient and family education  - Perform skin care/dressing changes as ordered  Outcome: Progressing     Problem: MUSCULOSKELETAL - ADULT  Goal: Maintain or return mobility to safest level of function  Description: INTERVENTIONS:  - Assess patient's ability to carry out ADLs; assess patient's baseline for ADL function and identify physical deficits which impact ability to perform ADLs (bathing, care of mouth/teeth, toileting, grooming, dressing, etc.)  - Assess/evaluate cause of self-care deficits   - Assess range of motion  - Assess patient's mobility  - Assess patient's need for assistive devices and provide as appropriate  - Encourage maximum independence but intervene and supervise when necessary  - Involve family in performance of ADLs  - Assess for home care needs following discharge   - Consider OT consult to assist with ADL evaluation and planning for discharge  - Provide patient education as appropriate  Outcome: Progressing     Problem: Prexisting or High Potential for Compromised Skin Integrity  Goal: Skin integrity is maintained or improved  Description: INTERVENTIONS:  - Identify patients at risk for skin breakdown  - Assess and monitor skin integrity  - Assess and monitor nutrition and hydration status  - Monitor labs   - Assess for incontinence   - Turn and reposition patient  - Assist with mobility/ambulation  - Relieve pressure over bony prominences  - Avoid friction and shearing  - Provide appropriate hygiene as needed including keeping skin clean and dry  - Evaluate need for skin moisturizer/barrier cream  - Collaborate with interdisciplinary team   - Patient/family teaching  - Consider wound care consult   Outcome: Progressing     Problem: Nutrition/Hydration-ADULT  Goal: Nutrient/Hydration intake appropriate for improving, restoring or maintaining nutritional  needs  Description: Monitor and assess patient's nutrition/hydration status for malnutrition. Collaborate with interdisciplinary team and initiate plan and interventions as ordered.  Monitor patient's weight and dietary intake as ordered or per policy. Utilize nutrition screening tool and intervene as necessary. Determine patient's food preferences and provide high-protein, high-caloric foods as appropriate.     INTERVENTIONS:  - Monitor oral intake, urinary output, labs, and treatment plans  - Assess nutrition and hydration status and recommend course of action  - Evaluate amount of meals eaten  - Assist patient with eating if necessary   - Allow adequate time for meals  - Recommend/ encourage appropriate diets, oral nutritional supplements, and vitamin/mineral supplements  - Order, calculate, and assess calorie counts as needed  - Recommend, monitor, and adjust tube feedings and TPN/PPN based on assessed needs  - Assess need for intravenous fluids  - Provide specific nutrition/hydration education as appropriate  - Include patient/family/caregiver in decisions related to nutrition  Outcome: Progressing

## 2024-04-29 NOTE — ASSESSMENT & PLAN NOTE
Pt sent from wound care clinic to [facility] ED due to left lower extremity ulceration with purulent drainage concerning for deep infection  MRI left foot shows small 4 mm focus of confluent T1 marrow abnormality in the distal and plantar aspect of the stump of the first metatarsal, suspect small focus of osteomyelitis   Arterial duplex left lower extremity nondiagnostic, patient with palpable pulses, per vascular surgery was cleared for procedure  Patient was placed on broad-spectrum antibiotics with IV cefepime 2 g q8h + IV vancomycin 1.25 g q12h with PO metronidazole 500 mg q8h on admission  Appreciate podiatry input.  Patient is status post LEFT FOOT PARTIAL FIRST RAY AMPUTATION on 4/26/24 by Dr. Jennifer Rubalcava  Continue current antibiotics while awaiting culture results, findings show coagulase negative Staph  Infectious disease consult placed, appreciate recommendations   Patient is doing well postoperatively, continue postop care  PT/OT consulted

## 2024-04-29 NOTE — PLAN OF CARE
Problem: PHYSICAL THERAPY ADULT  Goal: Performs mobility at highest level of function for planned discharge setting.  See evaluation for individualized goals.  Description: Treatment/Interventions: Functional transfer training, LE strengthening/ROM, Therapeutic exercise, Endurance training, Patient/family training, Equipment eval/education, Bed mobility, Gait training, Compensatory technique education, Spoke to nursing, OT  Equipment Recommended:  (pt owns W/C and RW)       See flowsheet documentation for full assessment, interventions and recommendations.  Note: Prognosis: Good  Problem List: Decreased strength, Decreased endurance, Impaired balance, Decreased mobility, Decreased safety awareness, Obesity, Decreased skin integrity, Orthopedic restrictions  Assessment: Pt is a 64 y.o. male seen for PT evaluation s/p admission to [facility] on 4/23/2024 with Acute osteomyelitis of metatarsal bone of left foot (HCC).  Order placed for PT services.  Upon evaluation: Pt is presenting with impaired functional mobility due to decreased strength, decreased endurance, impaired balance, gait deviations, decreased safety awareness, impaired judgment, fall risk, and impaired skin integrity requiring  supervision assistance for bed mobility and transfers. Pt's clinical presentation is currently unstable/unpredictable given the functional mobility deficits above coupled with fall risks as indicated by AM-PAC 6-Clicks: 16/24 as well as impaired balance, polypharmacy, decreased safety awareness, and obesity and combined with medical complications of hypertension , abnormal CBC, and need for input for mobility technique/safety.  Pt's PMHx and comorbidities that may affect physical performance and progress include: DM, HTN, and PAD. Personal factors affecting pt at time of IE include: inability to perform IADLs, inability to navigate level surfaces without external assistance, inability to ambulate household  distances, and inability to navigate community distances. Pt will benefit from continued skilled PT services to address deficits as defined above and to maximize level of functional mobility to facilitate return toward PLOF and improved QOL. From PT/mobility standpoint, recommendation at time of d/c would be Level III (Minimum Resource Intensity) pending progress in order to reduce fall risk and maximize pt's functional independence and consistency with mobility in order to facilitate return to PLOF.  Recommend trial with walker next 1-2 sessions and ther ex next 1-2 sessions.  Barriers to Discharge: Decreased caregiver support  Barriers to Discharge Comments: Pt lives alone, may benefit from increased support PRN  Rehab Resource Intensity Level, PT: III (Minimum Resource Intensity)    See flowsheet documentation for full assessment.

## 2024-04-29 NOTE — ASSESSMENT & PLAN NOTE
Lab Results   Component Value Date    HGBA1C 10.8 (H) 04/28/2024       Recent Labs     04/28/24  1622 04/28/24 2059 04/29/24  0725 04/29/24  1121   POCGLU 148* 157* 84 253*       Blood Sugar Average: Last 72 hrs: (P) [id number]  Hx of uncontrolled blood glucose levels  Algorithm 3 sliding scale before meals  PTA lantus 55 units BID, Humalog 15 units 3 times daily  Current blood glucose is in acceptable range, continue on Lantus 35 units twice daily, Humalog 6 units 3 times daily

## 2024-04-29 NOTE — PLAN OF CARE
Problem: OCCUPATIONAL THERAPY ADULT  Goal: Performs self-care activities at highest level of function for planned discharge setting.  See evaluation for individualized goals.  Description: Treatment Interventions: ADL retraining, Functional transfer training, Endurance training, UE strengthening/ROM, Patient/family training, Equipment evaluation/education, Activityengagement, Compensatory technique education          See flowsheet documentation for full assessment, interventions and recommendations.   Note: Limitation: Decreased ADL status, Decreased UE strength, Decreased Safe judgement during ADL, Decreased endurance, Decreased self-care trans, Decreased high-level ADLs     Assessment: Pt is a 64 y.o. male seen for OT evaluation s/p admit to [facility] on 4/23/2024 w/ Acute osteomyelitis of metatarsal bone of left foot (HCC).  Comorbidities affecting pt's functional performance at time of assessment include:  DM, osteomyelitis of metatarsal bone of L LE, R BKA, HTN, HLD, cellulitis of L LE, candidiasis intertrigo, neuropathy, obesity . Personal factors affecting pt at time of IE include:limited home support, difficulty performing ADLS, difficulty performing IADLS , limited insight into deficits, decreased initiation and engagement , and health management . Prior to admission, pt was (I) with ADLs and (A) with IADLs with use of quad cane during mobility. Upon evaluation: Pt requires (S) level with use of sit pivot transfers technique 2* the following deficits impacting occupational performance: weakness, decreased strength, decreased balance, decreased tolerance, impaired initiation, impaired problem solving, decreased safety awareness, and orthopedic restrictions. Pt to benefit from continued skilled OT tx while in the hospital to address deficits as defined above and maximize level of functional independence w ADL's and functional mobility. Occupational Performance areas to address include: grooming, bathing/shower,  toilet hygiene, dressing, functional mobility, community mobility, and clothing management. The patient's raw score on the -PAC Daily Activity Inpatient Short Form is 21. A raw score of greater than or equal to 19 suggests the patient may benefit from discharge to home. Discharge recommendation at this time is level III minimum resource intensity.  Pt benefited from co-evaluation of skilled OT and PT therapists in order to most appropriately address functional deficits d/t extensive assistance required for safe functional mobility, decreased activity tolerance, and regression from functioning level prior to admission and/or onset of present illness. OT/PT objectives were addressed separately; please see PT note for specific goal areas targeted.     Rehab Resource Intensity Level, OT: III (Minimum Resource Intensity)

## 2024-04-29 NOTE — PHYSICAL THERAPY NOTE
PHYSICAL THERAPY EVALUATION  NAME:  Art Joseph  DATE: 04/29/24    AGE:   64 y.o.  Mrn:   [id number]  ADMIT DX:  Cellulitis [L03.90]  Diabetes (HCC) [E11.9]  Visit for wound check [Z51.89]  Open wound of foot, initial encounter [S91.309A]    Past Medical History:   Diagnosis Date    Diabetes mellitus (HCC)     Hypertension      Length Of Stay: 6  Performed at least 2 patient identifiers during session: Name and Birthday  PHYSICAL THERAPY EVALUATION :        04/29/24 0838   Note Type   Note type Evaluation   Pain Assessment   Pain Assessment Tool 0-10   Pain Score No Pain   Restrictions/Precautions   Weight Bearing Precautions Per Order Yes   LLE Weight Bearing Per Order NWB   Braces or Orthoses Other (Comment)  (supplied surgical shoe for protection to  L LE)   Other Precautions Chair Alarm;Bed Alarm;WBS;Fall Risk   Home Living   Type of Home Apartment   Home Layout One level;Performs ADLs on one level;Able to live on main level with bedroom/bathroom   Bathroom Shower/Tub Tub/shower unit   Bathroom Toilet Standard   Bathroom Equipment Shower chair   Home Equipment Walker;Wheelchair-manual;Quad cane   Additional Comments Pt reports living alone in 1 story apartment, quad cane at baseline   Prior Function   Level of [place] Independent with ADLs;Independent with functional mobility;Needs assistance with IADLS   Lives With Alone   Receives Help From Friend(s)   IADLs Family/Friend/Other provides transportation  (utilizes [employer] for transportation)   Falls in the last 6 months 0   Vocational On disability   Comments Pt reports completing ADLs and mobility with quad cane at mod I. Has 2 friends who assist with IADLs, uses public transportation. Pt presenting to hospital with bed bugs, prosthetic for R BKA currently quarantined 2/2 to bedbugs, unable to use   Cognition   Overall Cognitive Status WFL   Orientation Level Oriented X4   Following Commands Follows one step commands without difficulty   RLE Assessment    RLE Assessment   (R BKA, hip/knee 4/5 strength)   LLE Assessment   LLE Assessment   (knee/ankle 4/5 strength, ankle not assessed 2/2 wounds)   Light Touch   RLE Light Touch Grossly intact   LLE Light Touch Grossly intact   Bed Mobility   Supine to Sit 5  Supervision   Additional items Bedrails;HOB elevated;Verbal cues   Additional Comments HOB elevated, bedrails and supervision with VC for technique and safety. Educated pt on WBS prior to mobility, pt verbalized understanding   Transfers   Sit pivot 5  Supervision   Additional items Increased time required;Verbal cues   Additional Comments Sit pivot completed without AD as pt unable to use R prosthesis, NWB on LLE. Pt placing residual limb of RLE on recliner placed in front of pt, slight WB to L heel to complete pivot   Ambulation/Elevation   Gait pattern Not appropriate   Balance   Static Sitting Normal   Dynamic Sitting Good   Endurance Deficit   Endurance Deficit Yes   Endurance Deficit Description fatigue   Activity Tolerance   Activity Tolerance Patient limited by fatigue   Medical Staff Made Aware Roshan YUSUF   Nurse Made Aware RNAnca, PARADISE Esme   Assessment   Prognosis Good   Problem List Decreased strength;Decreased endurance;Impaired balance;Decreased mobility;Decreased safety awareness;Obesity;Decreased skin integrity;Orthopedic restrictions   Barriers to Discharge Decreased caregiver support   Barriers to Discharge Comments Pt lives alone, may benefit from increased support PRN   Goals   Patient Goals Go home   STG Expiration Date 05/13/24   Plan   Treatment/Interventions Functional transfer training;LE strengthening/ROM;Therapeutic exercise;Endurance training;Patient/family training;Equipment eval/education;Bed mobility;Gait training;Compensatory technique education;Spoke to nursing;OT   PT Frequency 3-5x/wk   Discharge Recommendation   Rehab Resource Intensity Level, PT III (Minimum Resource Intensity)   Equipment Recommended   (pt owns W/C and RW)  Recommend pt return home at W/C level until gait with prosthetic and WBS can be assessed   AM-PAC Basic Mobility Inpatient   Turning in Flat Bed Without Bedrails 4   Lying on Back to Sitting on Edge of Flat Bed Without Bedrails 3   Moving Bed to Chair 3   Standing Up From Chair Using Arms 3   Walk in Room 2   Climb 3-5 Stairs With Railing 1   Basic Mobility Inpatient Raw Score 16   Basic Mobility Standardized Score 38.32   [facility] Highest Level Of Mobility   -HL Goal 5: Stand one or more mins   -[facility] Achieved 4: Move to chair/commode   End of Consult   Patient Position at End of Consult Bedside chair;Bed/Chair alarm activated;All needs within reach     The patient's AM-PAC Basic Mobility Inpatient Short Form Raw Score is 16  . A Raw score of less than or equal to 16 suggests the patient may benefit from discharge to post-acute rehabilitation services. Please also refer to the recommendation of the Physical Therapist for safe discharge planning.    (Please find full objective findings from PT assessment regarding body systems outlined above).     Assessment: Pt is a 64 y.o. male seen for PT evaluation s/p admission to [facility] on 4/23/2024 with Acute osteomyelitis of metatarsal bone of left foot (HCC).  Order placed for PT services.  Upon evaluation: Pt is presenting with impaired functional mobility due to decreased strength, decreased endurance, impaired balance, gait deviations, decreased safety awareness, impaired judgment, fall risk, and impaired skin integrity requiring  supervision assistance for bed mobility and transfers. Pt's clinical presentation is currently unstable/unpredictable given the functional mobility deficits above coupled with fall risks as indicated by AM-PAC 6-Clicks: 16/24 as well as impaired balance, polypharmacy, decreased safety awareness, and obesity and combined with medical complications of hypertension , abnormal CBC, and need for input for mobility  technique/safety.  Pt's PMHx and comorbidities that may affect physical performance and progress include: DM, HTN, and PAD. Personal factors affecting pt at time of IE include: inability to perform IADLs, inability to navigate level surfaces without external assistance, inability to ambulate household distances, and inability to navigate community distances. Pt will benefit from continued skilled PT services to address deficits as defined above and to maximize level of functional mobility to facilitate return toward PLOF and improved QOL. From PT/mobility standpoint, recommendation at time of d/c would be Level III (Minimum Resource Intensity) pending progress in order to reduce fall risk and maximize pt's functional independence and consistency with mobility in order to facilitate return to PLOF.  Recommend trial with walker next 1-2 sessions and ther ex next 1-2 sessions.      Goals: Pt will: Perform bed mobility tasks with modified I to reposition in bed and prepare for transfers. Pt will perform transfers with modified I to increase Indep in home alone environment and prepare for ambulation. Pt will ambulate with prosthetic and RW for >/= 10' with  Supervision  to demonstrate compliance with WB limitations and improve gait quality and to access home environment. Increase bilateral LE strength 1/2 grade to facilitate independent mobility and Increase all balance 1/2 grade to decrease risk for falls.        Ama Arizmendi, PT,DPT

## 2024-04-29 NOTE — PLAN OF CARE
Problem: PAIN - ADULT  Goal: Verbalizes/displays adequate comfort level or baseline comfort level  Description: Interventions:  - Encourage patient to monitor pain and request assistance  - Assess pain using appropriate pain scale  - Administer analgesics based on type and severity of pain and evaluate response  - Implement non-pharmacological measures as appropriate and evaluate response  - Consider cultural and social influences on pain and pain management  - Notify physician/advanced practitioner if interventions unsuccessful or patient reports new pain  Outcome: Progressing     Problem: INFECTION - ADULT  Goal: Absence or prevention of progression during hospitalization  Description: INTERVENTIONS:  - Assess and monitor for signs and symptoms of infection  - Monitor lab/diagnostic results  - Monitor all insertion sites, i.e. indwelling lines, tubes, and drains  - Monitor endotracheal if appropriate and nasal secretions for changes in amount and color  - [place] appropriate cooling/warming therapies per order  - Administer medications as ordered  - Instruct and encourage patient and family to use good hand hygiene technique  - Identify and instruct in appropriate isolation precautions for identified infection/condition  Outcome: Progressing     Problem: SAFETY ADULT  Goal: Patient will remain free of falls  Description: INTERVENTIONS:  - Educate patient/family on patient safety including physical limitations  - Instruct patient to call for assistance with activity   - Consult OT/PT to assist with strengthening/mobility   - Keep Call bell within reach  - Keep bed low and locked with side rails adjusted as appropriate  - Keep care items and personal belongings within reach  - Initiate and maintain comfort rounds  - Make Fall Risk Sign visible to staff  - Offer Toileting every 2 Hours, in advance of need  - Initiate/Maintain bed/chair alarm  - Obtain necessary fall risk management equipment: as needed  - Apply  yellow socks and bracelet for high fall risk patients  - Consider moving patient to room near nurses station  Outcome: Progressing     Problem: DISCHARGE PLANNING  Goal: Discharge to home or other facility with appropriate resources  Description: INTERVENTIONS:  - Identify barriers to discharge w/patient and caregiver  - Arrange for needed discharge resources and transportation as appropriate  - Identify discharge learning needs (meds, wound care, etc.)  - Arrange for interpretive services to assist at discharge as needed  - Refer to Case Management Department for coordinating discharge planning if the patient needs post-hospital services based on physician/advanced practitioner order or complex needs related to functional status, cognitive ability, or social support system  Outcome: Progressing     Problem: Knowledge Deficit  Goal: Patient/family/caregiver demonstrates understanding of disease process, treatment plan, medications, and discharge instructions  Description: Complete learning assessment and assess knowledge base.  Interventions:  - Provide teaching at level of understanding  - Provide teaching via preferred learning methods  Outcome: Progressing     Problem: METABOLIC, FLUID AND ELECTROLYTES - ADULT  Goal: Fluid balance maintained  Description: INTERVENTIONS:  - Monitor labs   - Monitor I/O and WT  - Instruct patient on fluid and nutrition as appropriate  - Assess for signs & symptoms of volume excess or deficit  Outcome: Progressing  Goal: Glucose maintained within target range  Description: INTERVENTIONS:  - Monitor Blood Glucose as ordered  - Assess for signs and symptoms of hyperglycemia and hypoglycemia  - Administer ordered medications to maintain glucose within target range  - Assess nutritional intake and initiate nutrition service referral as needed  Outcome: Progressing     Problem: SKIN/TISSUE INTEGRITY - ADULT  Goal: Incision(s), wounds(s) or drain site(s) healing without S/S of  infection  Description: INTERVENTIONS  - Assess and document dressing, incision, wound bed, drain sites and surrounding tissue  - Provide patient and family education  - Perform skin care/dressing changes as ordered  Outcome: Progressing     Problem: MUSCULOSKELETAL - ADULT  Goal: Maintain or return mobility to safest level of function  Description: INTERVENTIONS:  - Assess patient's ability to carry out ADLs; assess patient's baseline for ADL function and identify physical deficits which impact ability to perform ADLs (bathing, care of mouth/teeth, toileting, grooming, dressing, etc.)  - Assess/evaluate cause of self-care deficits   - Assess range of motion  - Assess patient's mobility  - Assess patient's need for assistive devices and provide as appropriate  - Encourage maximum independence but intervene and supervise when necessary  - Involve family in performance of ADLs  - Assess for home care needs following discharge   - Consider OT consult to assist with ADL evaluation and planning for discharge  - Provide patient education as appropriate  Outcome: Progressing     Problem: Prexisting or High Potential for Compromised Skin Integrity  Goal: Skin integrity is maintained or improved  Description: INTERVENTIONS:  - Identify patients at risk for skin breakdown  - Assess and monitor skin integrity  - Assess and monitor nutrition and hydration status  - Monitor labs   - Assess for incontinence   - Turn and reposition patient  - Assist with mobility/ambulation  - Relieve pressure over bony prominences  - Avoid friction and shearing  - Provide appropriate hygiene as needed including keeping skin clean and dry  - Evaluate need for skin moisturizer/barrier cream  - Collaborate with interdisciplinary team   - Patient/family teaching  - Consider wound care consult   Outcome: Progressing     Problem: Nutrition/Hydration-ADULT  Goal: Nutrient/Hydration intake appropriate for improving, restoring or maintaining nutritional  needs  Description: Monitor and assess patient's nutrition/hydration status for malnutrition. Collaborate with interdisciplinary team and initiate plan and interventions as ordered.  Monitor patient's weight and dietary intake as ordered or per policy. Utilize nutrition screening tool and intervene as necessary. Determine patient's food preferences and provide high-protein, high-caloric foods as appropriate.     INTERVENTIONS:  - Monitor oral intake, urinary output, labs, and treatment plans  - Assess nutrition and hydration status and recommend course of action  - Evaluate amount of meals eaten  - Assist patient with eating if necessary   - Allow adequate time for meals  - Recommend/ encourage appropriate diets, oral nutritional supplements, and vitamin/mineral supplements  - Order, calculate, and assess calorie counts as needed  - Recommend, monitor, and adjust tube feedings and TPN/PPN based on assessed needs  - Assess need for intravenous fluids  - Provide specific nutrition/hydration education as appropriate  - Include patient/family/caregiver in decisions related to nutrition  Outcome: Progressing

## 2024-04-29 NOTE — PROGRESS NOTES
[facility]  Progress Note  Name: Art Joseph I  MRN: [id number]  Unit/Bed#: 410-01 I Date of Admission: 4/23/2024   Date of Service: 4/29/2024 I Hospital Day: 6    Assessment/Plan   * Acute osteomyelitis of metatarsal bone of left foot (HCC)  Assessment & Plan  Pt sent from wound care clinic to [facility] ED due to left lower extremity ulceration with purulent drainage concerning for deep infection  MRI left foot shows small 4 mm focus of confluent T1 marrow abnormality in the distal and plantar aspect of the stump of the first metatarsal, suspect small focus of osteomyelitis   Arterial duplex left lower extremity nondiagnostic, patient with palpable pulses, per vascular surgery was cleared for procedure  Patient was placed on broad-spectrum antibiotics with IV cefepime 2 g q8h + IV vancomycin 1.25 g q12h with PO metronidazole 500 mg q8h on admission  Appreciate podiatry input.  Patient is status post LEFT FOOT PARTIAL FIRST RAY AMPUTATION on 4/26/24 by Dr. Jennifer Rubalcava  Continue current antibiotics while awaiting culture results, findings show coagulase negative Staph  Infectious disease consult placed, appreciate recommendations   Patient is doing well postoperatively, continue postop care  PT/OT consulted    Constipation  Assessment & Plan  No recorded bowl movement since 4/23  Colace 100 mg BID  Miralax 17 g once    Candidiasis, intertrigo  Assessment & Plan  Around scrotal area  Nystatin cream ordered    Cellulitis of left lower extremity  Assessment & Plan  Patient presents with left lower extremity wounds with surrounding cellulitis  Please refer to above under acute osteomyelitis    Hyperlipidemia  Assessment & Plan  Continue home Lipitor 80 mg QD    Primary hypertension  Assessment & Plan  Adequate control, continue home regimen    Hx of right BKA (HCC)  Assessment & Plan  History noted  PT/OT evaluation recommends discharge to home with home health    Type 2 diabetes  mellitus with foot ulcer, with long-term current use of insulin ([facility])  Assessment & Plan  Lab Results   Component Value Date    HGBA1C 10.8 (H) 2024       Recent Labs     24  1622 249 24  0725 24  1121   POCGLU 148* 157* 84 253*       Blood Sugar Average: Last 72 hrs: (P) [id number]  Hx of uncontrolled blood glucose levels  Algorithm 3 sliding scale before meals  PTA lantus 55 units BID, Humalog 15 units 3 times daily  Current blood glucose is in acceptable range, continue on Lantus 35 units twice daily, Humalog 6 units 3 times daily             VTE Pharmacologic Prophylaxis:   Pharmacologic: Enoxaparin (Lovenox)  Mechanical VTE Prophylaxis in Place: No      Discussions with Specialists or Other Care Team Provider: yes, with supervising attending    Education and Discussions with Family / Patient: yes, with patient    Time Spent for Care: 45 minutes.  More than 50% of total time spent on counseling and coordination of care as described above.    Current Length of Stay: 6 day(s)    Current Patient Status: Inpatient   Certification Statement: The patient will continue to require additional inpatient hospital stay due to IV antibiotics, awaiting cultures    Discharge Plan: home with home health    Code Status: Level 1 - Full Code      Subjective:   Patient seen and examined at bedside this morning. No acute events overnight. Reports no post-op pain, noted lack of bowel movement since . Denies shortness of breath, chest pain, nausea, and vomiting.    Objective:     Vitals:   Temp (24hrs), Av.2 °F (36.8 °C), Min:97.5 °F (36.4 °C), Max:99.4 °F (37.4 °C)    Temp:  [97.5 °F (36.4 °C)-99.4 °F (37.4 °C)] 97.9 °F (36.6 °C)  HR:  [] 79  Resp:  [16-19] 19  BP: (132-142)/(66-85) 142/85  SpO2:  [94 %-96 %] 96 %  Body mass index is 31.03 kg/m².     Input and Output Summary (last 24 hours):       Intake/Output Summary (Last 24 hours) at 2024 1202  Last data filed at  4/29/2024 0859  Gross per 24 hour   Intake 1040 ml   Output 1975 ml   Net -935 ml       Physical Exam:     Physical Exam  Constitutional:       General: He is not in acute distress.     Appearance: Normal appearance.   HENT:      Head: Normocephalic and atraumatic.   Cardiovascular:      Rate and Rhythm: Normal rate and regular rhythm.      Pulses: Normal pulses.      Heart sounds: Normal heart sounds.   Pulmonary:      Effort: Pulmonary effort is normal.      Breath sounds: Normal breath sounds.   Musculoskeletal:      Cervical back: Normal range of motion and neck supple.   Skin:     General: Skin is warm and dry.      Comments: Surgical dressing C/D/I   Neurological:      General: No focal deficit present.      Mental Status: He is alert and oriented to person, place, and time.         Additional Data:     Labs:    Results from last 7 days   Lab Units 04/29/24  0411   WBC Thousand/uL 9.62   HEMOGLOBIN g/dL 13.6   HEMATOCRIT % 44.4   PLATELETS Thousands/uL 401*   SEGS PCT % 56   LYMPHO PCT % 29   MONO PCT % 9   EOS PCT % 4     Results from last 7 days   Lab Units 04/29/24  0411 04/25/24  0456 04/24/24  0636   SODIUM mmol/L 140   < > 138   POTASSIUM mmol/L 4.2   < > 4.4   CHLORIDE mmol/L 105   < > 106   CO2 mmol/L 27   < > 24   BUN mg/dL 18   < > 13   CREATININE mg/dL 0.79   < > 0.84   ANION GAP mmol/L 8   < > 8   CALCIUM mg/dL 9.2   < > 9.0   ALBUMIN g/dL  --   --  3.1*   TOTAL BILIRUBIN mg/dL  --   --  0.42   ALK PHOS U/L  --   --  117*   ALT U/L  --   --  7   AST U/L  --   --  10*   GLUCOSE RANDOM mg/dL 67   < > 124    < > = values in this interval not displayed.         Results from last 7 days   Lab Units 04/29/24  1121 04/29/24  0725 04/28/24  2059 04/28/24  1622 04/28/24  1120 04/28/24  0730 04/27/24  2120 04/27/24  1609 04/27/24  1125 04/27/24  1100 04/27/24  0736 04/26/24 2123   POC GLUCOSE mg/dl 253* 84 157* 148* 200* 75 176* 169* 238* 246* 87 205*     Results from last 7 days   Lab Units 04/28/24  0413    HEMOGLOBIN A1C % 10.8*     Results from last 7 days   Lab Units 04/23/24  1127 04/23/24  1102   LACTIC ACID mmol/L 1.8  --    PROCALCITONIN ng/ml  --  <0.05           * I Have Reviewed All Lab Data Listed Above.  * Additional Pertinent Lab Tests Reviewed: All Labs For Current Hospital Admission Reviewed    Imaging:    Imaging Reports Reviewed Today Include:   XR foot 3+ vw left   Final Result      Interval first transmetatarsal resection to the level of the proximal transmetatarsal.         I have personally reviewed this study including all images.  / SUNDAR            Resident: PAUL ROGER I, the attending radiologist, have reviewed the images and agree with the final report above.      Workstation performed: [id number]         VAS ARTERIAL DUPLEX-LOWER LIMB UNILATERAL   Final Result      MRI foot/forefoot toes left wo contrast   Final Result      Small 4 mm focus of  confluent T1 marrow abnormality in the distal and plantar aspect of the stump of the first metatarsal, with possible small sinus tract to the area of overlying ulcer (image 41 series 7) suspect small focus of  osteomyelitis         Mild T2 hyperintensity seen in the stump of the second proximal phalanx and in the third middle and distal changes probably due to inhomogeneous fat suppression      Arthritic changes at the first tarsometatarsal joint      The study was marked in EPIC for significant notification.               Workstation performed: [id number]         XR foot 3+ views LEFT   Final Result      No radiographic evidence of osteomyelitis.      Workstation performed: [id number]         XR tibia fibula 2 views LEFT   Final Result      No acute osseous abnormality. No evidence of infection.            Workstation performed: [id number]             Imaging Personally Reviewed by Myself Includes:    XR foot 3+ vw left   Final Result      Interval first transmetatarsal resection to the level of the proximal transmetatarsal.         I have  personally reviewed this study including all images.  / SUNDAR            Resident: PAUL ROGER I, the attending radiologist, have reviewed the images and agree with the final report above.      Workstation performed: [id number]         VAS ARTERIAL DUPLEX-LOWER LIMB UNILATERAL   Final Result      MRI foot/forefoot toes left wo contrast   Final Result      Small 4 mm focus of  confluent T1 marrow abnormality in the distal and plantar aspect of the stump of the first metatarsal, with possible small sinus tract to the area of overlying ulcer (image 41 series 7) suspect small focus of  osteomyelitis         Mild T2 hyperintensity seen in the stump of the second proximal phalanx and in the third middle and distal changes probably due to inhomogeneous fat suppression      Arthritic changes at the first tarsometatarsal joint      The study was marked in EPIC for significant notification.               Workstation performed: [id number]         XR foot 3+ views LEFT   Final Result      No radiographic evidence of osteomyelitis.      Workstation performed: [id number]         XR tibia fibula 2 views LEFT   Final Result      No acute osseous abnormality. No evidence of infection.            Workstation performed: [id number]               Recent Cultures (last 7 days):     Results from last 7 days   Lab Units 04/26/24  1618 04/26/24  1608 04/23/24  1106 04/23/24  1102   BLOOD CULTURE   --   --  No Growth After 5 Days. No Growth After 5 Days.   GRAM STAIN RESULT  No Polys or Bacteria seen No Polys or Bacteria seen  --   --        Last 24 Hours Medication List:   Current Facility-Administered Medications   Medication Dose Route Frequency Provider Last Rate    aspirin  81 mg Oral Daily Jennifer Rubalcava, DPM      atorvastatin  80 mg Oral Daily Jennifer Rubalcava, DPM      cefepime  2,000 mg Intravenous Q8H Jennifer Rubalcava, DPM 2,000 mg (04/29/24 0403)    Cholecalciferol  1,000 Units Oral Daily Jennifer Rubalcava, DPM      docusate sodium  100 mg Oral  BID Jerome Gutierrez DO      enoxaparin  40 mg Subcutaneous Daily Jennifer Rubalcava, EUGENEM      insulin glargine  35 Units Subcutaneous Q12H [facility] Bella Nj MD      insulin lispro  1-6 Units Subcutaneous TID  Vic Rousseau PA-C      insulin lispro  6 Units Subcutaneous TID With Meals Bella Nj MD      lisinopril  2.5 mg Oral Daily Jennifer Rubalcava, EUGENEM      metroNIDAZOLE  500 mg Oral Q8H [facility] Jennifer Rubalcava DPM      nystatin   Topical Q12H [facility] Bella Nj MD      polyethylene glycol  17 g Oral Once Jerome Gutierrez DO      vancomycin  12.5 mg/kg Intravenous Q12H EUGENE BoswellM 1,250 mg (04/28/24 2214)        Today, Patient Was Seen By: Marilyn Velazquez MD    ** Please Note: Dictation voice to text software may have been used in the creation of this document. **

## 2024-04-30 LAB
25(OH)D3 SERPL-MCNC: 16 NG/ML (ref 30–100)
ALBUMIN SERPL BCP-MCNC: 3.1 G/DL (ref 3.5–5)
ALP SERPL-CCNC: 94 U/L (ref 34–104)
ALT SERPL W P-5'-P-CCNC: 23 U/L (ref 7–52)
ANION GAP SERPL CALCULATED.3IONS-SCNC: 8 MMOL/L (ref 4–13)
AST SERPL W P-5'-P-CCNC: 19 U/L (ref 13–39)
BASOPHILS # BLD AUTO: 0.08 THOUSANDS/ÂΜL (ref 0–0.1)
BASOPHILS NFR BLD AUTO: 1 % (ref 0–1)
BILIRUB SERPL-MCNC: 0.29 MG/DL (ref 0.2–1)
BUN SERPL-MCNC: 25 MG/DL (ref 5–25)
CALCIUM ALBUM COR SERPL-MCNC: 9.9 MG/DL (ref 8.3–10.1)
CALCIUM SERPL-MCNC: 9.2 MG/DL (ref 8.4–10.2)
CHLORIDE SERPL-SCNC: 105 MMOL/L (ref 96–108)
CO2 SERPL-SCNC: 25 MMOL/L (ref 21–32)
CREAT SERPL-MCNC: 0.77 MG/DL (ref 0.6–1.3)
EOSINOPHIL # BLD AUTO: 0.31 THOUSAND/ÂΜL (ref 0–0.61)
EOSINOPHIL NFR BLD AUTO: 3 % (ref 0–6)
ERYTHROCYTE [DISTWIDTH] IN BLOOD BY AUTOMATED COUNT: 12.8 % (ref 11.6–15.1)
GFR SERPL CREATININE-BSD FRML MDRD: 95 ML/MIN/1.73SQ M
GLUCOSE SERPL-MCNC: 150 MG/DL (ref 65–140)
GLUCOSE SERPL-MCNC: 158 MG/DL (ref 65–140)
GLUCOSE SERPL-MCNC: 212 MG/DL (ref 65–140)
GLUCOSE SERPL-MCNC: 58 MG/DL (ref 65–140)
GLUCOSE SERPL-MCNC: 70 MG/DL (ref 65–140)
HAV IGM SER QL: NORMAL
HBV CORE IGM SER QL: NORMAL
HBV SURFACE AG SER QL: NORMAL
HCT VFR BLD AUTO: 41.5 % (ref 36.5–49.3)
HCV AB SER QL: NORMAL
HGB BLD-MCNC: 13.3 G/DL (ref 12–17)
IMM GRANULOCYTES # BLD AUTO: 0.06 THOUSAND/UL (ref 0–0.2)
IMM GRANULOCYTES NFR BLD AUTO: 1 % (ref 0–2)
LYMPHOCYTES # BLD AUTO: 3.04 THOUSANDS/ÂΜL (ref 0.6–4.47)
LYMPHOCYTES NFR BLD AUTO: 27 % (ref 14–44)
MAGNESIUM SERPL-MCNC: 2.2 MG/DL (ref 1.9–2.7)
MCH RBC QN AUTO: 28.1 PG (ref 26.8–34.3)
MCHC RBC AUTO-ENTMCNC: 32 G/DL (ref 31.4–37.4)
MCV RBC AUTO: 88 FL (ref 82–98)
MONOCYTES # BLD AUTO: 0.99 THOUSAND/ÂΜL (ref 0.17–1.22)
MONOCYTES NFR BLD AUTO: 9 % (ref 4–12)
NEUTROPHILS # BLD AUTO: 6.67 THOUSANDS/ÂΜL (ref 1.85–7.62)
NEUTS SEG NFR BLD AUTO: 59 % (ref 43–75)
NRBC BLD AUTO-RTO: 0 /100 WBCS
PHOSPHATE SERPL-MCNC: 3.5 MG/DL (ref 2.3–4.1)
PLATELET # BLD AUTO: 379 THOUSANDS/UL (ref 149–390)
PMV BLD AUTO: 9.1 FL (ref 8.9–12.7)
POTASSIUM SERPL-SCNC: 3.7 MMOL/L (ref 3.5–5.3)
PROCALCITONIN SERPL-MCNC: 0.07 NG/ML
PROT SERPL-MCNC: 6.7 G/DL (ref 6.4–8.4)
RBC # BLD AUTO: 4.73 MILLION/UL (ref 3.88–5.62)
SODIUM SERPL-SCNC: 138 MMOL/L (ref 135–147)
WBC # BLD AUTO: 11.15 THOUSAND/UL (ref 4.31–10.16)

## 2024-04-30 PROCEDURE — 80074 ACUTE HEPATITIS PANEL: CPT | Performed by: INTERNAL MEDICINE

## 2024-04-30 PROCEDURE — 82306 VITAMIN D 25 HYDROXY: CPT | Performed by: INTERNAL MEDICINE

## 2024-04-30 PROCEDURE — 85025 COMPLETE CBC W/AUTO DIFF WBC: CPT | Performed by: INTERNAL MEDICINE

## 2024-04-30 PROCEDURE — 97110 THERAPEUTIC EXERCISES: CPT

## 2024-04-30 PROCEDURE — 99232 SBSQ HOSP IP/OBS MODERATE 35: CPT | Performed by: INTERNAL MEDICINE

## 2024-04-30 PROCEDURE — 84145 PROCALCITONIN (PCT): CPT | Performed by: INTERNAL MEDICINE

## 2024-04-30 PROCEDURE — 83735 ASSAY OF MAGNESIUM: CPT | Performed by: INTERNAL MEDICINE

## 2024-04-30 PROCEDURE — 82948 REAGENT STRIP/BLOOD GLUCOSE: CPT

## 2024-04-30 PROCEDURE — 80053 COMPREHEN METABOLIC PANEL: CPT | Performed by: INTERNAL MEDICINE

## 2024-04-30 PROCEDURE — 84100 ASSAY OF PHOSPHORUS: CPT | Performed by: INTERNAL MEDICINE

## 2024-04-30 PROCEDURE — 99223 1ST HOSP IP/OBS HIGH 75: CPT | Performed by: INTERNAL MEDICINE

## 2024-04-30 RX ORDER — INSULIN GLARGINE 100 [IU]/ML
33 INJECTION, SOLUTION SUBCUTANEOUS EVERY 12 HOURS SCHEDULED
Status: DISCONTINUED | OUTPATIENT
Start: 2024-04-30 | End: 2024-05-01 | Stop reason: HOSPADM

## 2024-04-30 RX ADMIN — INSULIN GLARGINE 35 UNITS: 100 INJECTION, SOLUTION SUBCUTANEOUS at 10:01

## 2024-04-30 RX ADMIN — INSULIN GLARGINE 33 UNITS: 100 INJECTION, SOLUTION SUBCUTANEOUS at 22:07

## 2024-04-30 RX ADMIN — DOCUSATE SODIUM 100 MG: 100 CAPSULE, LIQUID FILLED ORAL at 10:01

## 2024-04-30 RX ADMIN — NYSTATIN: 100000 CREAM TOPICAL at 22:07

## 2024-04-30 RX ADMIN — ENOXAPARIN SODIUM 40 MG: 40 INJECTION SUBCUTANEOUS at 10:01

## 2024-04-30 RX ADMIN — INSULIN LISPRO 1 UNITS: 100 INJECTION, SOLUTION INTRAVENOUS; SUBCUTANEOUS at 12:05

## 2024-04-30 RX ADMIN — DOCUSATE SODIUM 100 MG: 100 CAPSULE, LIQUID FILLED ORAL at 17:28

## 2024-04-30 RX ADMIN — INSULIN LISPRO 6 UNITS: 100 INJECTION, SOLUTION INTRAVENOUS; SUBCUTANEOUS at 17:28

## 2024-04-30 RX ADMIN — LISINOPRIL 2.5 MG: 5 TABLET ORAL at 10:01

## 2024-04-30 RX ADMIN — ASPIRIN 81 MG 81 MG: 81 TABLET ORAL at 10:01

## 2024-04-30 RX ADMIN — DESMOPRESSIN ACETATE 80 MG: 0.2 TABLET ORAL at 17:28

## 2024-04-30 RX ADMIN — INSULIN LISPRO 6 UNITS: 100 INJECTION, SOLUTION INTRAVENOUS; SUBCUTANEOUS at 12:05

## 2024-04-30 RX ADMIN — VANCOMYCIN HYDROCHLORIDE 1250 MG: 5 INJECTION, POWDER, LYOPHILIZED, FOR SOLUTION INTRAVENOUS at 22:08

## 2024-04-30 RX ADMIN — VANCOMYCIN HYDROCHLORIDE 1250 MG: 5 INJECTION, POWDER, LYOPHILIZED, FOR SOLUTION INTRAVENOUS at 12:05

## 2024-04-30 RX ADMIN — CEFEPIME HYDROCHLORIDE 2000 MG: 2 INJECTION, SOLUTION INTRAVENOUS at 04:11

## 2024-04-30 RX ADMIN — NYSTATIN: 100000 CREAM TOPICAL at 10:04

## 2024-04-30 RX ADMIN — INSULIN LISPRO 1 UNITS: 100 INJECTION, SOLUTION INTRAVENOUS; SUBCUTANEOUS at 17:29

## 2024-04-30 RX ADMIN — CHOLECALCIFEROL TAB 25 MCG (1000 UNIT) 1000 UNITS: 25 TAB at 10:01

## 2024-04-30 RX ADMIN — INSULIN LISPRO 6 UNITS: 100 INJECTION, SOLUTION INTRAVENOUS; SUBCUTANEOUS at 08:32

## 2024-04-30 NOTE — ASSESSMENT & PLAN NOTE
Pt sent from wound care clinic to [facility] ED due to left lower extremity ulceration with purulent drainage concerning for deep infection  MRI left foot shows small 4 mm focus of confluent T1 marrow abnormality in the distal and plantar aspect of the stump of the first metatarsal, suspect small focus of osteomyelitis   Arterial duplex left lower extremity nondiagnostic, patient with palpable pulses, per vascular surgery was cleared for procedure  Patient was placed on broad-spectrum antibiotics with IV cefepime 2 g q8h + IV vancomycin 1.25 g q12h with PO metronidazole 500 mg q8h on admission  IV cefepime and PO metronidazole discontinued  Appreciate podiatry input.  Patient is status post LEFT FOOT PARTIAL FIRST RAY AMPUTATION on 4/26/24 by Dr. Jennifer Rubalcava  Continue current antibiotics while awaiting culture results, findings show coagulase negative Staph  Infectious disease consult placed, recommend continuation of IV vancomycin and d/c IV cefepime until bone margin pathology is resulted  Patient is doing well postoperatively, continue postop care  PT/OT consulted

## 2024-04-30 NOTE — PHYSICAL THERAPY NOTE
Physical Therapy Therapy Note    Patient Name: Art Joseph    Today's Date: 4/30/2024     Problem List  Principal Problem:    Acute osteomyelitis of metatarsal bone of left foot (HCC)  Active Problems:    Type 2 diabetes mellitus with foot ulcer, with long-term current use of insulin (HCC)    Hx of right BKA (HCC)    Primary hypertension    Hyperlipidemia    Cellulitis of left lower extremity    Candidiasis, intertrigo    Constipation    Elevated platelet count       Past Medical History  Past Medical History:   Diagnosis Date    Diabetes mellitus (HCC)     Hypertension         Past Surgical History  Past Surgical History:   Procedure Laterality Date    LEG AMPUTATION THROUGH LOWER TIBIA AND FIBULA Right 7/25/2017    Procedure: AMPUTATION BELOW KNEE (BKA);  Surgeon: Mynor Sanchez MD;  Location: [facility];  Service: General    AZ AMPUTATION FOOT TRANSMETARSAL Right 1/26/2017    Procedure: AMPUTATION TRANSMETATARSAL (TMA); OPEN;  Surgeon: Leonard Lomeli DPM;  Location: [facility];  Service: Podiatry    AZ AMPUTATION METATARSAL W/TOE SINGLE Left 1/30/2017    Procedure: Left partial 1st ray amputation;  Surgeon: Leonard Lomeli DPM;  Location: [facility];  Service: Podiatry    AZ COLONOSCOPY FLX DX W/COLLJ SPEC WHEN PFRMD N/A 11/28/2018    Procedure: COLONOSCOPY;  Surgeon: González Napier MD;  Location: [facility];  Service: Gastroenterology    WOUND DEBRIDEMENT Right 1/30/2017    Procedure: DEBRIDEMENT FOOT/TOE (WASH OUT) delayed closure, LINDA;  Surgeon: Leonard Lomeli DPM;  Location: [facility];  Service:            04/30/24 1413   PT Last Visit   PT Visit Date 04/30/24   Note Type   Note Type Treatment   Pain Assessment   Pain Assessment Tool 0-10   Pain Score No Pain   Restrictions/Precautions   LLE Weight Bearing Per Order NWB   Other Precautions Fall Risk;WBS;Chair Alarm  (NWB L LE, R LE prosthesis (unavailable at time of tx))   General   Chart Reviewed Yes  "  Family/Caregiver Present No   Cognition   Overall Cognitive Status WFL   Arousal/Participation Alert   Attention Within functional limits   Orientation Level Oriented X4   Memory Within functional limits   Following Commands Follows all commands and directions without difficulty   Subjective   Subjective \"They haven't given me my prosthesis back yet.\"   Bed Mobility   Additional Comments Pt OOB in chair.   Transfers   Additional Comments Pt OOB in recliner.  Due to NWB L LE, and no prosthesis for R LE, transfers not completed.   Balance   Static Sitting Fair +   Dynamic Sitting Fair +   Activity Tolerance   Activity Tolerance Patient tolerated treatment well   Nurse Made Aware Nurse Anca aware   Exercises   Hamstring Sets Sitting;25 reps;AROM;Bilateral  (Hamstring curls with manual resistance.)   Hip Flexion Sitting;25 reps;AROM;Bilateral  (Manual resistance applied)   Hip Abduction Sitting;25 reps;AROM;Bilateral  (Manual resistance applied)   Knee AROM Long Arc Quad Sitting;25 reps;AROM;Bilateral  (Manual resistance applied)   Assessment   Prognosis Good   Problem List Decreased strength;Decreased endurance;Impaired balance;Decreased mobility;Decreased safety awareness;Orthopedic restrictions   Assessment Pt motivated to participate. Spoke with nsg who reported that pt's R LE prosthesis is still being quarantined due to bed bug infestation.  Due to prosthesis being unavailable, and pt already in chair, LE strengthening exercises completed. Pt with good tolerance for seated LE exercise with resistance. He was also able to complete armchair push ups x 20.  Pt will benefit from continued PT to progress mobility and transfers (when prosthesis is available), w/c mobility, strengthening, and safety in order to maximize function and prepare pt for d/c to home. If pt continues to make progress, then recommend minimum resource intensity therapy at d/c.  However, it prosthesis continues to be unavailable, pt may require " moderate resource intensity therapy at d/c. The patient's AM-PAC Basic Mobility Inpatient Short Form Raw Score is 14. A Raw score of less than or equal to 16 suggests the patient may benefit from discharge to post-acute rehabilitation services. Please also refer to the recommendation of the Physical Therapist for safe discharge planning.   Plan   Treatment/Interventions Functional transfer training;LE strengthening/ROM;Therapeutic exercise;Endurance training;Patient/family training;Bed mobility   Progress   (Slow progress due to pt's prosthesis being unavailable.)   Discharge Recommendation   Rehab Resource Intensity Level, PT III (Minimum Resource Intensity)  (As long as pt continues to make progress, otherwise mod resource intensity at d/c.)   AM-PAC Basic Mobility Inpatient   Turning in Flat Bed Without Bedrails 4   Lying on Back to Sitting on Edge of Flat Bed Without Bedrails 4   Moving Bed to Chair 2   Standing Up From Chair Using Arms 2   Walk in Room 1   Climb 3-5 Stairs With Railing 1   Basic Mobility Inpatient Raw Score 14   Basic Mobility Standardized Score 35.55   [facility] Highest Level Of Mobility   -[facility] Goal 4: Move to chair/commode   -[facility] Achieved 4: Move to chair/commode   End of Consult   Patient Position at End of Consult Seated edge of bed;All needs within reach;Bed/Chair alarm activated

## 2024-04-30 NOTE — PLAN OF CARE
Problem: PHYSICAL THERAPY ADULT  Goal: Performs mobility at highest level of function for planned discharge setting.  See evaluation for individualized goals.  Description: Treatment/Interventions: Functional transfer training, LE strengthening/ROM, Therapeutic exercise, Endurance training, Patient/family training, Equipment eval/education, Bed mobility, Gait training, Compensatory technique education, Spoke to nursing, OT  Equipment Recommended:  (pt owns W/C and RW)       See flowsheet documentation for full assessment, interventions and recommendations.  Outcome: Progressing  Note: Prognosis: Good  Problem List: Decreased strength, Decreased endurance, Impaired balance, Decreased mobility, Decreased safety awareness, Orthopedic restrictions  Assessment: Pt motivated to participate. Spoke with nsg who reported that pt's R LE prosthesis is still being quarantined due to bed bug infestation.  Due to prosthesis being unavailable, and pt already in chair, LE strengthening exercises completed. Pt with good tolerance for seated LE exercise with resistance. He was also able to complete armchair push ups x 20.  Pt will benefit from continued PT to progress mobility and transfers (when prosthesis is available), w/c mobility, strengthening, and safety in order to maximize function and prepare pt for d/c to home. If pt continues to make progress, then recommend minimum resource intensity therapy at d/c.  However, it prosthesis continues to be unavailable, pt may require moderate resource intensity therapy at d/c. The patient's AM-PAC Basic Mobility Inpatient Short Form Raw Score is 14. A Raw score of less than or equal to 16 suggests the patient may benefit from discharge to post-acute rehabilitation services. Please also refer to the recommendation of the Physical Therapist for safe discharge planning.  Barriers to Discharge: Decreased caregiver support  Barriers to Discharge Comments: Pt lives alone, may benefit from  increased support PRN  Rehab Resource Intensity Level, PT: III (Minimum Resource Intensity) (As long as pt continues to make progress, otherwise mod resource intensity at d/c.)    See flowsheet documentation for full assessment.

## 2024-04-30 NOTE — ASSESSMENT & PLAN NOTE
Lab Results   Component Value Date    HGBA1C 10.8 (H) 04/28/2024       Recent Labs     04/29/24  1556 04/29/24  2119 04/30/24  0711 04/30/24  1038   POCGLU 173* 207* 70 158*       Blood Sugar Average: Last 72 hrs: (P) [id number]  Hx of uncontrolled blood glucose levels  Algorithm 3 sliding scale before meals  PTA lantus 55 units BID, Humalog 15 units 3 times daily  Current blood glucose is in acceptable range, continue on Lantus 35 units twice daily, Humalog 6 units 3 times daily

## 2024-04-30 NOTE — PLAN OF CARE
Problem: PAIN - ADULT  Goal: Verbalizes/displays adequate comfort level or baseline comfort level  Description: Interventions:  - Encourage patient to monitor pain and request assistance  - Assess pain using appropriate pain scale  - Administer analgesics based on type and severity of pain and evaluate response  - Implement non-pharmacological measures as appropriate and evaluate response  - Consider cultural and social influences on pain and pain management  - Notify physician/advanced practitioner if interventions unsuccessful or patient reports new pain  Outcome: Progressing     Problem: INFECTION - ADULT  Goal: Absence or prevention of progression during hospitalization  Description: INTERVENTIONS:  - Assess and monitor for signs and symptoms of infection  - Monitor lab/diagnostic results  - Monitor all insertion sites, i.e. indwelling lines, tubes, and drains  - Monitor endotracheal if appropriate and nasal secretions for changes in amount and color  - [place] appropriate cooling/warming therapies per order  - Administer medications as ordered  - Instruct and encourage patient and family to use good hand hygiene technique  - Identify and instruct in appropriate isolation precautions for identified infection/condition  Outcome: Progressing     Problem: SAFETY ADULT  Goal: Patient will remain free of falls  Description: INTERVENTIONS:  - Educate patient/family on patient safety including physical limitations  - Instruct patient to call for assistance with activity   - Consult OT/PT to assist with strengthening/mobility   - Keep Call bell within reach  - Keep bed low and locked with side rails adjusted as appropriate  - Keep care items and personal belongings within reach  - Initiate and maintain comfort rounds  - Make Fall Risk Sign visible to staff  - Offer Toileting every 2 Hours, in advance of need  - Initiate/Maintain bed/chair alarm  - Obtain necessary fall risk management equipment: as needed  - Apply  yellow socks and bracelet for high fall risk patients  - Consider moving patient to room near nurses station  Outcome: Progressing     Problem: DISCHARGE PLANNING  Goal: Discharge to home or other facility with appropriate resources  Description: INTERVENTIONS:  - Identify barriers to discharge w/patient and caregiver  - Arrange for needed discharge resources and transportation as appropriate  - Identify discharge learning needs (meds, wound care, etc.)  - Arrange for interpretive services to assist at discharge as needed  - Refer to Case Management Department for coordinating discharge planning if the patient needs post-hospital services based on physician/advanced practitioner order or complex needs related to functional status, cognitive ability, or social support system  Outcome: Progressing     Problem: Knowledge Deficit  Goal: Patient/family/caregiver demonstrates understanding of disease process, treatment plan, medications, and discharge instructions  Description: Complete learning assessment and assess knowledge base.  Interventions:  - Provide teaching at level of understanding  - Provide teaching via preferred learning methods  Outcome: Progressing     Problem: METABOLIC, FLUID AND ELECTROLYTES - ADULT  Goal: Fluid balance maintained  Description: INTERVENTIONS:  - Monitor labs   - Monitor I/O and WT  - Instruct patient on fluid and nutrition as appropriate  - Assess for signs & symptoms of volume excess or deficit  Outcome: Progressing  Goal: Glucose maintained within target range  Description: INTERVENTIONS:  - Monitor Blood Glucose as ordered  - Assess for signs and symptoms of hyperglycemia and hypoglycemia  - Administer ordered medications to maintain glucose within target range  - Assess nutritional intake and initiate nutrition service referral as needed  Outcome: Progressing     Problem: SKIN/TISSUE INTEGRITY - ADULT  Goal: Incision(s), wounds(s) or drain site(s) healing without S/S of  infection  Description: INTERVENTIONS  - Assess and document dressing, incision, wound bed, drain sites and surrounding tissue  - Provide patient and family education  - Perform skin care/dressing changes as ordered  Outcome: Progressing     Problem: MUSCULOSKELETAL - ADULT  Goal: Maintain or return mobility to safest level of function  Description: INTERVENTIONS:  - Assess patient's ability to carry out ADLs; assess patient's baseline for ADL function and identify physical deficits which impact ability to perform ADLs (bathing, care of mouth/teeth, toileting, grooming, dressing, etc.)  - Assess/evaluate cause of self-care deficits   - Assess range of motion  - Assess patient's mobility  - Assess patient's need for assistive devices and provide as appropriate  - Encourage maximum independence but intervene and supervise when necessary  - Involve family in performance of ADLs  - Assess for home care needs following discharge   - Consider OT consult to assist with ADL evaluation and planning for discharge  - Provide patient education as appropriate  Outcome: Progressing     Problem: Prexisting or High Potential for Compromised Skin Integrity  Goal: Skin integrity is maintained or improved  Description: INTERVENTIONS:  - Identify patients at risk for skin breakdown  - Assess and monitor skin integrity  - Assess and monitor nutrition and hydration status  - Monitor labs   - Assess for incontinence   - Turn and reposition patient  - Assist with mobility/ambulation  - Relieve pressure over bony prominences  - Avoid friction and shearing  - Provide appropriate hygiene as needed including keeping skin clean and dry  - Evaluate need for skin moisturizer/barrier cream  - Collaborate with interdisciplinary team   - Patient/family teaching  - Consider wound care consult   Outcome: Progressing     Problem: Nutrition/Hydration-ADULT  Goal: Nutrient/Hydration intake appropriate for improving, restoring or maintaining nutritional  needs  Description: Monitor and assess patient's nutrition/hydration status for malnutrition. Collaborate with interdisciplinary team and initiate plan and interventions as ordered.  Monitor patient's weight and dietary intake as ordered or per policy. Utilize nutrition screening tool and intervene as necessary. Determine patient's food preferences and provide high-protein, high-caloric foods as appropriate.     INTERVENTIONS:  - Monitor oral intake, urinary output, labs, and treatment plans  - Assess nutrition and hydration status and recommend course of action  - Evaluate amount of meals eaten  - Assist patient with eating if necessary   - Allow adequate time for meals  - Recommend/ encourage appropriate diets, oral nutritional supplements, and vitamin/mineral supplements  - Order, calculate, and assess calorie counts as needed  - Recommend, monitor, and adjust tube feedings and TPN/PPN based on assessed needs  - Assess need for intravenous fluids  - Provide specific nutrition/hydration education as appropriate  - Include patient/family/caregiver in decisions related to nutrition  Outcome: Progressing

## 2024-04-30 NOTE — CONSULTS
Consultation - Infectious Disease   Art Joseph 64 y.o. male MRN: [id number]  Unit/Bed#: 410-01 Encounter: [id number]      IMPRESSION & RECOMMENDATIONS:   Impression/Recommendations:  This is a 64 y.o. male, with multiple medical problems including poorly controlled DM and PVD, admitted on 4/23 with left foot cellulitis and osteomyelitis of first metatarsal head.  Patient is status post left first ray resection.  Operative culture with growth of coagulase-negative Staphylococcus.    1.  Left foot cellulitis.  Source is most likely left foot wound.  Patient is status post I&D and ray resection.  He is clinically improved.  Patient remains clinically and systemically well, without sepsis or systemic toxicity.  Admission blood cultures had no growth.  For now, can continue IV vancomycin but no further need for cefepime.  Plan is to continue antibiotic x 7 days after I&D, as long as there is no residual osteomyelitis.  Continue IV vancomycin.  No further need for cefepime.  Monitor temperature/WBC.  Serial foot exams.  Transition to p.o. Bactrim if bone margin pathology is no growth.  Treat x 7 days post I&D, through 5/3.    2.  Left first metatarsal head osteomyelitis.  Patient is status post right first ray resection.  I discussed with Dr. Rubalcava from podiatry, who did the surgery.  She is not certain that there was a surgical cure.  Bone margin pathology is pending.  Antibiotic plan as seen above.  Follow-up on bone margin pathology.  If pathology shows no residual osteomyelitis, no further need for antibiotic.  Podiatry follow-up.    3.  Venous stasis, with leg edema.    4.  PVD.  Patient at least with PVD within healing range.    5.  DM, poorly controlled, with hyperglycemia and elevated hemoglobin A1c.  This is risk factor for poor wound healing and infection above.    Position records reviewed in detail.  Discussed with patient in detail regarding the above plan.  Discussed with Dr. Rubalcava from podiatry service  regarding findings in OR.  She is not certain that there was surgical cure.  Discussed with Dr. Ziegler from [facility] service regarding antibiotic plan above, with duration of antibiotic depending on bone margin pathology.  He is in agreement.    Thank you for this consultation.  We will follow along with you.    HISTORY OF PRESENT ILLNESS:  Reason for Consult: Left foot osteomyelitis.    HPI: Art Joseph is a 64 y.o. male, with multiple medical problems including DM, PVD, right BKA, developed left foot wound/ulcer 1 week prior to presentation.  He was seen at wound center on 4/23 and was referred to ER for right foot cellulitis with concern for osteomyelitis.  On presentation, patient had mild leukocytosis but no fever.  He was admitted and started on vancomycin/cefepime for left foot cellulitis.  MRI showed osteomyelitis of the first MT head.  LEADS had PVD but within healing range.  On 4/26, patient was taken the OR to undergo first rib resection.  Operative culture has growth of coagulase-negative Staphylococcus.  We are asked to evaluate the patient.      At present, patient feels well.  He has minimal pain in his left foot.  Patient has no chills.  He is tolerating antibiotics well.  No nausea, vomiting or diarrhea.    REVIEW OF SYSTEMS:  A complete system-based review was done.  Except for what is noted in HPI above, ROS of systems is otherwise negative.    PAST MEDICAL HISTORY:  Past Medical History:   Diagnosis Date    Diabetes mellitus (HCC)     Hypertension      Past Surgical History:   Procedure Laterality Date    LEG AMPUTATION THROUGH LOWER TIBIA AND FIBULA Right 7/25/2017    Procedure: AMPUTATION BELOW KNEE (BKA);  Surgeon: Mynor Sanchez MD;  Location: [facility];  Service: General    IL AMPUTATION FOOT TRANSMETARSAL Right 1/26/2017    Procedure: AMPUTATION TRANSMETATARSAL (TMA); OPEN;  Surgeon: Leonard Lomeli DPM;  Location: [facility];  Service: Podiatry    IL AMPUTATION METATARSAL W/TOE SINGLE  Left 2017    Procedure: Left partial 1st ray amputation;  Surgeon: Leonard Lomeli DPM;  Location:  MAIN OR;  Service: Podiatry    OH COLONOSCOPY FLX DX W/COLLJ SPEC WHEN PFRMD N/A 2018    Procedure: COLONOSCOPY;  Surgeon: González Napier MD;  Location: [facility];  Service: Gastroenterology    WOUND DEBRIDEMENT Right 2017    Procedure: DEBRIDEMENT FOOT/TOE (WASH OUT) delayed closure, LINDA;  Surgeon: Leonard Lomeli DPM;  Location:  MAIN OR;  Service:      Problem list reviewed.    FAMILY HISTORY:  Non-contributory    SOCIAL HISTORY:  Social History     Substance and Sexual Activity   Alcohol Use Yes    Alcohol/week: 11.0 standard drinks of alcohol    Types: 6 Cans of beer, 5 Shots of liquor per week    Comment: social ; occasional use as per [employer]     Social History     Substance and Sexual Activity   Drug Use No     Social History     Tobacco Use   Smoking Status Former    Types: Cigarettes   Smokeless Tobacco Never   Tobacco Comments    quit 9 years ago       ALLERGIES:  No Known Allergies    MEDICATIONS:  All current active medications have been reviewed.  Early on vancomycin/cefepime.    PHYSICAL EXAM:  Vitals:  Temp:  [98 °F (36.7 °C)-98.3 °F (36.8 °C)] 98 °F (36.7 °C)  HR:  [71-82] 75  Resp:  [16-19] 19  BP: (105-128)/(45-72) 127/72  SpO2:  [94 %-96 %] 95 %  Temp (24hrs), Av.2 °F (36.8 °C), Min:98 °F (36.7 °C), Max:98.3 °F (36.8 °C)  Current: Temperature: 98 °F (36.7 °C)     Physical Exam:  General:  Well-nourished, well-developed, in no acute distress. Awake, alert and oriented x 3.  Eyes:  Conjunctive clear with no hemorrhages or effusions  Oropharynx:  No ulcers, no lesions, pharynx benign, no tonsillitis  Neck:  Supple, no lymphadenopathy, no mass, nontender  Lungs:  Expansion symmetric, no rales, no wheezing, no accessory muscle use  Cardiac:  Regular rate and rhythm, normal S1, normal S2, no murmurs  Abdomen:  Soft, nondistended, non-tender, no HSM  Extremities: 1-2+ leg edema.   Moderate venous stasis changes.  Left foot with dressing in place.  Dressing is dry.  No erythema/warm beyond dressing.  Minimal tenderness.  Skin:  No rashes, no ulcers  Neurological:  Moves all four extremities spontaneously, sensation grossly intact    LABS, IMAGING, & OTHER STUDIES:  Lab Results:  I have personally reviewed pertinent labs.  Results from last 7 days   Lab Units 04/30/24 0417 04/29/24  0411 04/28/24  0413 04/25/24  0456 04/24/24  0636 04/23/24  1102   POTASSIUM mmol/L 3.7 4.2 4.5   < > 4.4 4.5   CHLORIDE mmol/L 105 105 104   < > 106 99   CO2 mmol/L 25 27 26   < > 24 22   BUN mg/dL 25 18 16   < > 13 17   CREATININE mg/dL 0.77 0.79 0.76   < > 0.84 0.92   EGFR ml/min/1.73sq m 95 94 95   < > 91 86   CALCIUM mg/dL 9.2 9.2 9.1   < > 9.0 9.2   AST U/L 19  --   --   --  10* 17   ALT U/L 23  --   --   --  7 6*   ALK PHOS U/L 94  --   --   --  117* 140*    < > = values in this interval not displayed.     Results from last 7 days   Lab Units 04/30/24 0417 04/29/24 0411 04/28/24  0413   WBC Thousand/uL 11.15* 9.62 10.04   HEMOGLOBIN g/dL 13.3 13.6 13.7   PLATELETS Thousands/uL 379 401* 351     Results from last 7 days   Lab Units 04/26/24  1618 04/26/24  1608 04/23/24  1106 04/23/24  1102   BLOOD CULTURE   --   --  No Growth After 5 Days. No Growth After 5 Days.   GRAM STAIN RESULT  No Polys or Bacteria seen No Polys or Bacteria seen  --   --        Imaging Studies:   I have personally reviewed pertinent imaging study reports and images in PACS.  Left foot x-ray reviewed personally.  No bony normalities.  Left foot MRI reviewed personally.  Osteomyelitis of first metatarsal.    EKG, Pathology, and Other Studies:   I have personally reviewed pertinent reports.

## 2024-04-30 NOTE — PROGRESS NOTES
Art Joseph is a 64 y.o. male who is currently ordered Vancomycin IV with management by the Pharmacy Consult service.  Relevant clinical data and objective / subjective history reviewed.  Vancomycin Assessment:  Indication and Goal AUC/Trough: Soft tissue (goal -600, trough >10); Bone/joint infection (goal -600, trough >10), -600, trough >10  Clinical Status: stable  Micro:     Renal Function:  SCr: 0.77 mg/dL  CrCl: 112.3 mL/min  Renal replacement: Not on dialysis  Days of Therapy: 8  Current Dose: 1250mg q12h  Vancomycin Plan:  New Dosing: same  Estimated AUC: 462 mcg*hr/mL  Estimated Trough: 12.6 mcg/mL  Next Level: 0600 on 5/2/24  Renal Function Monitoring: Daily BMP and UOP  Pharmacy will continue to follow closely for s/sx of nephrotoxicity, infusion reactions and appropriateness of therapy.  BMP and CBC will be ordered per protocol. We will continue to follow the patient’s culture results and clinical progress daily.    Jeff Granados, Pharmacist

## 2024-04-30 NOTE — PROGRESS NOTES
[facility]  Progress Note  Name: Art Joseph I  MRN: [id number]  Unit/Bed#: 410-01 I Date of Admission: 4/23/2024   Date of Service: 4/30/2024 I Hospital Day: 7    Assessment/Plan   * Acute osteomyelitis of metatarsal bone of left foot (HCC)  Assessment & Plan  Pt sent from wound care clinic to [facility] ED due to left lower extremity ulceration with purulent drainage concerning for deep infection  MRI left foot shows small 4 mm focus of confluent T1 marrow abnormality in the distal and plantar aspect of the stump of the first metatarsal, suspect small focus of osteomyelitis   Arterial duplex left lower extremity nondiagnostic, patient with palpable pulses, per vascular surgery was cleared for procedure  Patient was placed on broad-spectrum antibiotics with IV cefepime 2 g q8h + IV vancomycin 1.25 g q12h with PO metronidazole 500 mg q8h on admission  IV cefepime and PO metronidazole discontinued  Appreciate podiatry input.  Patient is status post LEFT FOOT PARTIAL FIRST RAY AMPUTATION on 4/26/24 by Dr. Jennifer Rubalcava  Continue current antibiotics while awaiting culture results, findings show coagulase negative Staph  Infectious disease consult placed, recommend continuation of IV vancomycin and d/c IV cefepime until bone margin pathology is resulted  Patient is doing well postoperatively, continue postop care  PT/OT consulted    Elevated platelet count  Assessment & Plan  Likely reactive in nature in the setting of acute infection  Follow the platelet count    Constipation  Assessment & Plan  Bowel movement yesterday evening, prior had not had bowel movement since 4/23/24  Colace 100 mg BID  Given Miralax 17 g once 4/29/24    Candidiasis, intertrigo  Assessment & Plan  Around scrotal area  Nystatin cream ordered    Cellulitis of left lower extremity  Assessment & Plan  Patient presents with left lower extremity wounds with surrounding cellulitis  Please refer to above under acute  Vascular Surgery Cath Lab Operative Note  Patients Name:  Mary Anne Paulson  Operating Physician: Mir Soto MD  Location:  Cath Lab  MRN:   [id number]                                            YOB: 1941    Operation Date:   05/05/22        Pre-Operative Diagnosis:   Atherosclerosis with bilateral lower extremity chronic limb threatening ischemia    Post-Operative Diagnosis:   Same     Procedure Performed:   1. Ultrasound guided access of the right common femoral artery  2. Right lower extremity 1st order angiogram  3. Left lower extremity 2nd order angiogram   4. Radiographic supervision and interpretation    Anesthesia: None     Start Time: [address] Time: 5180     EBL: Minimal     Complications: None apparent     Findings: Severely calcified aortoiliac segment, unable to get any wire or catheter larger than 018 across the bifurcation. Very tortuous iliac arteries. Right leg with 2 vessel runoff to the foot and minimal flow below the ankle. SFA stenosis on the right. Left leg with severely calcified common femoral artery. SFA occluded in its entirety with reconstitution of the below knee popliteal artery. 3 vessel runoff to the foot on the left with minimal flow below the ankle     Indication for Procedure: Konstantin Mcdaniel is an 39-year-old male who presents with bilateral lower extremity limb threatening ischemia. The left is worse than the right. He underwent an arterial duplex which showed patent femoral-popliteal segments bilaterally with tibial disease. He presents today for bilateral lower extremity angiogram with intent to intervene on the left. Risks and benefits of the procedure were explained to the patient and he elected to proceed. Description of Procedure: The patient was brought to the operating room, he was placed supine on the operating table. No sedation was given as the patient had had morphine prior to coming down.   He was monitored by an independent practitioner under my "osteomyelitis    Hyperlipidemia  Assessment & Plan  Continue home Lipitor 80 mg QD    Primary hypertension  Assessment & Plan  Adequate control, continue home regimen    Hx of right BKA (HCC)  Assessment & Plan  History noted  PT/OT evaluation recommends discharge to home with home health    Type 2 diabetes mellitus with foot ulcer, with long-term current use of insulin (HCC)  Assessment & Plan  Lab Results   Component Value Date    HGBA1C 10.8 (H) 04/28/2024       Recent Labs     04/29/24  1556 04/29/24  2119 04/30/24  0711 04/30/24  1038   POCGLU 173* 207* 70 158*       Blood Sugar Average: Last 72 hrs: (P) [id number]  Hx of uncontrolled blood glucose levels  Algorithm 3 sliding scale before meals  PTA lantus 55 units BID, Humalog 15 units 3 times daily  Current blood glucose is in acceptable range, continue on Lantus 35 units twice daily, Humalog 6 units 3 times daily             VTE Pharmacologic Prophylaxis:   Pharmacologic: Enoxaparin (Lovenox)  Mechanical VTE Prophylaxis in Place: Yes      Discussions with Specialists or Other Care Team Provider: yes, with supervising attending    Education and Discussions with Family / Patient: patient declined    Time Spent for Care: 45 minutes.  More than 50% of total time spent on counseling and coordination of care as described above.    Current Length of Stay: 7 day(s)    Current Patient Status: Inpatient   Certification Statement: The patient will continue to require additional inpatient hospital stay due to IV antibiotics, bone margin pathology results, close observation    Discharge Plan: home with home health    Code Status: Level 1 - Full Code      Subjective:   Patient seen and examined at bedside this morning. No acute events overnight. Denies any acute symptoms, states he feels \"fine\". Reports bowel movement yesterday evening; had been constipated prior to this. Denies chest pain, shortness of breath, nausea, vomiting, and generalized " supervision. His bilateral groins were prepped and draped in the usual sterile fashion. Under ultrasound guidance 1% lidocaine was infiltrated above the right common femoral artery. The right common femoral artery was then accessed using a micropuncture needle, wire and sheath. This was exchanged over an 035 braided guidewire for a 5 [place] sheath. A right lower extremity diagnostic angiogram was performed which showed the findings above. A glide advantage wire and Omni Flush catheter were inserted to the infrarenal abdominal aorta. Given the tortuosity of the aortoiliac segment it was extremely difficult to cross the bifurcation. The wire was advanced to the level of the iliac bifurcation. The catheter was removed and a quick cross catheter was placed. The quick cross catheter would not cross past the common iliac artery on the left given the severity of calcification. I then placed a 018 glide advantage wire which was able to pass and into the proximal SFA. An 018 quick cross catheter was placed over this. The catheter was able to advance to about the level of the proximal external iliac artery. The wire was removed and an 018 command wire was placed. The catheter was then advanced over the command wire into the common femoral artery. A left lower extremity angiogram was then performed which showed the findings above. The catheter was then removed and I attempted to pass an 035 quick cross catheter over the 018 wire. This immediately flipped back into the aorta. Given these findings and the severity of the calcification and not only the SFA but also the iliac segment and common femoral artery I elected to conclude the procedure. All wires and catheters were removed. The 5 [place] sheath was pulled and manual pressure was held on the groin. The patient tolerated the procedure well there are no immediate complications. He was taken to the postanesthesia care unit in good condition.     Plan: Bedrest x6 hours   Will need CTA with runoff - will discuss with wife following CT scan whether he is a candidate for open revascularization. Remains high risk for limb loss even with revascularization.         Pancho Mckeon MD  05/05/22  5:19 PM weakness.    Objective:     Vitals:   Temp (24hrs), Av.2 °F (36.8 °C), Min:98 °F (36.7 °C), Max:98.3 °F (36.8 °C)    Temp:  [98 °F (36.7 °C)-98.3 °F (36.8 °C)] 98 °F (36.7 °C)  HR:  [71-82] 75  Resp:  [16-19] 19  BP: (105-128)/(45-72) 127/72  SpO2:  [94 %-96 %] 95 %  Body mass index is 31.03 kg/m².     Input and Output Summary (last 24 hours):       Intake/Output Summary (Last 24 hours) at 2024 1114  Last data filed at 2024 0848  Gross per 24 hour   Intake 480 ml   Output 1700 ml   Net -1220 ml       Physical Exam:     Physical Exam  Constitutional:       General: He is not in acute distress.     Appearance: Normal appearance.   HENT:      Head: Normocephalic and atraumatic.   Cardiovascular:      Rate and Rhythm: Normal rate and regular rhythm.      Pulses: Normal pulses.      Heart sounds: Normal heart sounds.   Pulmonary:      Effort: Pulmonary effort is normal.      Breath sounds: Normal breath sounds.   Abdominal:      Tenderness: There is no abdominal tenderness. There is no guarding.   Musculoskeletal:      Cervical back: Normal range of motion and neck supple.   Skin:     General: Skin is warm and dry.      Comments: Surgical dressing C/D/I   Neurological:      General: No focal deficit present.      Mental Status: He is alert and oriented to person, place, and time.         Additional Data:     Labs:    Results from last 7 days   Lab Units 24  0417   WBC Thousand/uL 11.15*   HEMOGLOBIN g/dL 13.3   HEMATOCRIT % 41.5   PLATELETS Thousands/uL 379   SEGS PCT % 59   LYMPHO PCT % 27   MONO PCT % 9   EOS PCT % 3     Results from last 7 days   Lab Units 24  0417   SODIUM mmol/L 138   POTASSIUM mmol/L 3.7   CHLORIDE mmol/L 105   CO2 mmol/L 25   BUN mg/dL 25   CREATININE mg/dL 0.77   ANION GAP mmol/L 8   CALCIUM mg/dL 9.2   ALBUMIN g/dL 3.1*   TOTAL BILIRUBIN mg/dL 0.29   ALK PHOS U/L 94   ALT U/L 23   AST U/L 19   GLUCOSE RANDOM mg/dL 58*         Results from last 7 days   Lab Units  04/30/24  1038 04/30/24  0711 04/29/24  2119 04/29/24  1556 04/29/24  1121 04/29/24  0725 04/28/24  2059 04/28/24  1622 04/28/24  1120 04/28/24  0730 04/27/24  2120 04/27/24  1609   POC GLUCOSE mg/dl 158* 70 207* 173* 253* 84 157* 148* 200* 75 176* 169*     Results from last 7 days   Lab Units 04/28/24  0413   HEMOGLOBIN A1C % 10.8*     Results from last 7 days   Lab Units 04/30/24  0421 04/23/24  1127   LACTIC ACID mmol/L  --  1.8   PROCALCITONIN ng/ml 0.07  --            * I Have Reviewed All Lab Data Listed Above.  * Additional Pertinent Lab Tests Reviewed: All Labs For Current Hospital Admission Reviewed    Imaging:    Imaging Reports Reviewed Today Include:   XR foot 3+ vw left   Final Result      Interval first transmetatarsal resection to the level of the proximal transmetatarsal.         I have personally reviewed this study including all images.  / CHARANJIT.            Resident: PAUL ROGER I, the attending radiologist, have reviewed the images and agree with the final report above.      Workstation performed: [id number]         VAS ARTERIAL DUPLEX-LOWER LIMB UNILATERAL   Final Result      MRI foot/forefoot toes left wo contrast   Final Result      Small 4 mm focus of  confluent T1 marrow abnormality in the distal and plantar aspect of the stump of the first metatarsal, with possible small sinus tract to the area of overlying ulcer (image 41 series 7) suspect small focus of  osteomyelitis         Mild T2 hyperintensity seen in the stump of the second proximal phalanx and in the third middle and distal changes probably due to inhomogeneous fat suppression      Arthritic changes at the first tarsometatarsal joint      The study was marked in EPIC for significant notification.               Workstation performed: [id number]         XR foot 3+ views LEFT   Final Result      No radiographic evidence of osteomyelitis.      Workstation performed: [id number]         XR tibia fibula 2 views LEFT   Final Result       No acute osseous abnormality. No evidence of infection.            Workstation performed: [id number]             Imaging Personally Reviewed by Myself Includes:    XR foot 3+ vw left   Final Result      Interval first transmetatarsal resection to the level of the proximal transmetatarsal.         I have personally reviewed this study including all images.  / SUNDAR            Resident: PAUL ROGER I, the attending radiologist, have reviewed the images and agree with the final report above.      Workstation performed: [id number]         VAS ARTERIAL DUPLEX-LOWER LIMB UNILATERAL   Final Result      MRI foot/forefoot toes left wo contrast   Final Result      Small 4 mm focus of  confluent T1 marrow abnormality in the distal and plantar aspect of the stump of the first metatarsal, with possible small sinus tract to the area of overlying ulcer (image 41 series 7) suspect small focus of  osteomyelitis         Mild T2 hyperintensity seen in the stump of the second proximal phalanx and in the third middle and distal changes probably due to inhomogeneous fat suppression      Arthritic changes at the first tarsometatarsal joint      The study was marked in EPIC for significant notification.               Workstation performed: [id number]         XR foot 3+ views LEFT   Final Result      No radiographic evidence of osteomyelitis.      Workstation performed: [id number]         XR tibia fibula 2 views LEFT   Final Result      No acute osseous abnormality. No evidence of infection.            Workstation performed: [id number]               Recent Cultures (last 7 days):     Results from last 7 days   Lab Units 04/26/24  1618 04/26/24  1608   GRAM STAIN RESULT  No Polys or Bacteria seen No Polys or Bacteria seen       Last 24 Hours Medication List:   Current Facility-Administered Medications   Medication Dose Route Frequency Provider Last Rate    aspirin  81 mg Oral Daily Jennifer Rubalcava DPM      atorvastatin  80 mg Oral Daily  Jennifer Rubalcava, DPM      Cholecalciferol  1,000 Units Oral Daily Jennifer Rubalcava, DPM      docusate sodium  100 mg Oral BID Jerome Gutierrez DO      enoxaparin  40 mg Subcutaneous Daily Jenniferponcho Rubalcava, DPM      insulin glargine  35 Units Subcutaneous Q12H [facility] Bella Nj MD      insulin lispro  1-6 Units Subcutaneous TID AC Vic Rousseau PA-C      insulin lispro  6 Units Subcutaneous TID With Meals Bella Nj MD      lisinopril  2.5 mg Oral Daily Jennifer Rubalcava, DPM      nystatin   Topical Q12H [facility] Bella Nj MD      vancomycin  12.5 mg/kg Intravenous Q12H EUGENE BoswellM 1,250 mg (04/29/24 2210)        Today, Patient Was Seen By: Marilyn Velazquez MD    ** Please Note: Dictation voice to text software may have been used in the creation of this document. **

## 2024-04-30 NOTE — PLAN OF CARE
Problem: PAIN - ADULT  Goal: Verbalizes/displays adequate comfort level or baseline comfort level  Description: Interventions:  - Encourage patient to monitor pain and request assistance  - Assess pain using appropriate pain scale  - Administer analgesics based on type and severity of pain and evaluate response  - Implement non-pharmacological measures as appropriate and evaluate response  - Consider cultural and social influences on pain and pain management  - Notify physician/advanced practitioner if interventions unsuccessful or patient reports new pain  Outcome: Progressing     Problem: INFECTION - ADULT  Goal: Absence or prevention of progression during hospitalization  Description: INTERVENTIONS:  - Assess and monitor for signs and symptoms of infection  - Monitor lab/diagnostic results  - Monitor all insertion sites, i.e. indwelling lines, tubes, and drains  - Monitor endotracheal if appropriate and nasal secretions for changes in amount and color  - [place] appropriate cooling/warming therapies per order  - Administer medications as ordered  - Instruct and encourage patient and family to use good hand hygiene technique  - Identify and instruct in appropriate isolation precautions for identified infection/condition  Outcome: Progressing     Problem: SAFETY ADULT  Goal: Patient will remain free of falls  Description: INTERVENTIONS:  - Educate patient/family on patient safety including physical limitations  - Instruct patient to call for assistance with activity   - Consult OT/PT to assist with strengthening/mobility   - Keep Call bell within reach  - Keep bed low and locked with side rails adjusted as appropriate  - Keep care items and personal belongings within reach  - Initiate and maintain comfort rounds  - Make Fall Risk Sign visible to staff  - Offer Toileting every 2 Hours, in advance of need  - Initiate/Maintain bed/chair alarm  - Obtain necessary fall risk management equipment: as needed  - Apply  yellow socks and bracelet for high fall risk patients  - Consider moving patient to room near nurses station  Outcome: Progressing     Problem: DISCHARGE PLANNING  Goal: Discharge to home or other facility with appropriate resources  Description: INTERVENTIONS:  - Identify barriers to discharge w/patient and caregiver  - Arrange for needed discharge resources and transportation as appropriate  - Identify discharge learning needs (meds, wound care, etc.)  - Arrange for interpretive services to assist at discharge as needed  - Refer to Case Management Department for coordinating discharge planning if the patient needs post-hospital services based on physician/advanced practitioner order or complex needs related to functional status, cognitive ability, or social support system  Outcome: Progressing     Problem: Knowledge Deficit  Goal: Patient/family/caregiver demonstrates understanding of disease process, treatment plan, medications, and discharge instructions  Description: Complete learning assessment and assess knowledge base.  Interventions:  - Provide teaching at level of understanding  - Provide teaching via preferred learning methods  Outcome: Progressing     Problem: METABOLIC, FLUID AND ELECTROLYTES - ADULT  Goal: Fluid balance maintained  Description: INTERVENTIONS:  - Monitor labs   - Monitor I/O and WT  - Instruct patient on fluid and nutrition as appropriate  - Assess for signs & symptoms of volume excess or deficit  Outcome: Progressing  Goal: Glucose maintained within target range  Description: INTERVENTIONS:  - Monitor Blood Glucose as ordered  - Assess for signs and symptoms of hyperglycemia and hypoglycemia  - Administer ordered medications to maintain glucose within target range  - Assess nutritional intake and initiate nutrition service referral as needed  Outcome: Progressing     Problem: SKIN/TISSUE INTEGRITY - ADULT  Goal: Incision(s), wounds(s) or drain site(s) healing without S/S of  infection  Description: INTERVENTIONS  - Assess and document dressing, incision, wound bed, drain sites and surrounding tissue  - Provide patient and family education  - Perform skin care/dressing changes as ordered  Outcome: Progressing     Problem: MUSCULOSKELETAL - ADULT  Goal: Maintain or return mobility to safest level of function  Description: INTERVENTIONS:  - Assess patient's ability to carry out ADLs; assess patient's baseline for ADL function and identify physical deficits which impact ability to perform ADLs (bathing, care of mouth/teeth, toileting, grooming, dressing, etc.)  - Assess/evaluate cause of self-care deficits   - Assess range of motion  - Assess patient's mobility  - Assess patient's need for assistive devices and provide as appropriate  - Encourage maximum independence but intervene and supervise when necessary  - Involve family in performance of ADLs  - Assess for home care needs following discharge   - Consider OT consult to assist with ADL evaluation and planning for discharge  - Provide patient education as appropriate  Outcome: Progressing     Problem: Prexisting or High Potential for Compromised Skin Integrity  Goal: Skin integrity is maintained or improved  Description: INTERVENTIONS:  - Identify patients at risk for skin breakdown  - Assess and monitor skin integrity  - Assess and monitor nutrition and hydration status  - Monitor labs   - Assess for incontinence   - Turn and reposition patient  - Assist with mobility/ambulation  - Relieve pressure over bony prominences  - Avoid friction and shearing  - Provide appropriate hygiene as needed including keeping skin clean and dry  - Evaluate need for skin moisturizer/barrier cream  - Collaborate with interdisciplinary team   - Patient/family teaching  - Consider wound care consult   Outcome: Progressing     Problem: Nutrition/Hydration-ADULT  Goal: Nutrient/Hydration intake appropriate for improving, restoring or maintaining nutritional  needs  Description: Monitor and assess patient's nutrition/hydration status for malnutrition. Collaborate with interdisciplinary team and initiate plan and interventions as ordered.  Monitor patient's weight and dietary intake as ordered or per policy. Utilize nutrition screening tool and intervene as necessary. Determine patient's food preferences and provide high-protein, high-caloric foods as appropriate.     INTERVENTIONS:  - Monitor oral intake, urinary output, labs, and treatment plans  - Assess nutrition and hydration status and recommend course of action  - Evaluate amount of meals eaten  - Assist patient with eating if necessary   - Allow adequate time for meals  - Recommend/ encourage appropriate diets, oral nutritional supplements, and vitamin/mineral supplements  - Order, calculate, and assess calorie counts as needed  - Recommend, monitor, and adjust tube feedings and TPN/PPN based on assessed needs  - Assess need for intravenous fluids  - Provide specific nutrition/hydration education as appropriate  - Include patient/family/caregiver in decisions related to nutrition  Outcome: Progressing

## 2024-04-30 NOTE — ASSESSMENT & PLAN NOTE
Bowel movement yesterday evening, prior had not had bowel movement since 4/23/24  Colace 100 mg BID  Given Miralax 17 g once 4/29/24

## 2024-05-01 VITALS
RESPIRATION RATE: 18 BRPM | TEMPERATURE: 97.8 F | HEIGHT: 70 IN | HEART RATE: 71 BPM | OXYGEN SATURATION: 95 % | SYSTOLIC BLOOD PRESSURE: 135 MMHG | BODY MASS INDEX: 30.96 KG/M2 | WEIGHT: 216.27 LBS | DIASTOLIC BLOOD PRESSURE: 69 MMHG

## 2024-05-01 PROBLEM — K59.00 CONSTIPATION: Status: RESOLVED | Noted: 2024-04-29 | Resolved: 2024-05-01

## 2024-05-01 LAB
BASOPHILS # BLD AUTO: 0.09 THOUSANDS/ÂΜL (ref 0–0.1)
BASOPHILS NFR BLD AUTO: 1 % (ref 0–1)
EOSINOPHIL # BLD AUTO: 0.37 THOUSAND/ÂΜL (ref 0–0.61)
EOSINOPHIL NFR BLD AUTO: 4 % (ref 0–6)
ERYTHROCYTE [DISTWIDTH] IN BLOOD BY AUTOMATED COUNT: 12.8 % (ref 11.6–15.1)
GLUCOSE SERPL-MCNC: 247 MG/DL (ref 65–140)
GLUCOSE SERPL-MCNC: 73 MG/DL (ref 65–140)
HCT VFR BLD AUTO: 39.7 % (ref 36.5–49.3)
HGB BLD-MCNC: 13 G/DL (ref 12–17)
IMM GRANULOCYTES # BLD AUTO: 0.04 THOUSAND/UL (ref 0–0.2)
IMM GRANULOCYTES NFR BLD AUTO: 0 % (ref 0–2)
LYMPHOCYTES # BLD AUTO: 3.15 THOUSANDS/ÂΜL (ref 0.6–4.47)
LYMPHOCYTES NFR BLD AUTO: 35 % (ref 14–44)
MAGNESIUM SERPL-MCNC: 2.1 MG/DL (ref 1.9–2.7)
MCH RBC QN AUTO: 28.6 PG (ref 26.8–34.3)
MCHC RBC AUTO-ENTMCNC: 32.7 G/DL (ref 31.4–37.4)
MCV RBC AUTO: 87 FL (ref 82–98)
MONOCYTES # BLD AUTO: 0.74 THOUSAND/ÂΜL (ref 0.17–1.22)
MONOCYTES NFR BLD AUTO: 8 % (ref 4–12)
NEUTROPHILS # BLD AUTO: 4.62 THOUSANDS/ÂΜL (ref 1.85–7.62)
NEUTS SEG NFR BLD AUTO: 52 % (ref 43–75)
NRBC BLD AUTO-RTO: 0 /100 WBCS
PHOSPHATE SERPL-MCNC: 3.2 MG/DL (ref 2.3–4.1)
PLATELET # BLD AUTO: 374 THOUSANDS/UL (ref 149–390)
PMV BLD AUTO: 8.9 FL (ref 8.9–12.7)
PROCALCITONIN SERPL-MCNC: 0.06 NG/ML
RBC # BLD AUTO: 4.55 MILLION/UL (ref 3.88–5.62)
WBC # BLD AUTO: 9.01 THOUSAND/UL (ref 4.31–10.16)

## 2024-05-01 PROCEDURE — 88305 TISSUE EXAM BY PATHOLOGIST: CPT | Performed by: PATHOLOGY

## 2024-05-01 PROCEDURE — 99233 SBSQ HOSP IP/OBS HIGH 50: CPT | Performed by: INTERNAL MEDICINE

## 2024-05-01 PROCEDURE — 84145 PROCALCITONIN (PCT): CPT | Performed by: INTERNAL MEDICINE

## 2024-05-01 PROCEDURE — 85025 COMPLETE CBC W/AUTO DIFF WBC: CPT

## 2024-05-01 PROCEDURE — 82948 REAGENT STRIP/BLOOD GLUCOSE: CPT

## 2024-05-01 PROCEDURE — 88311 DECALCIFY TISSUE: CPT | Performed by: PATHOLOGY

## 2024-05-01 PROCEDURE — 97110 THERAPEUTIC EXERCISES: CPT

## 2024-05-01 PROCEDURE — 84100 ASSAY OF PHOSPHORUS: CPT | Performed by: INTERNAL MEDICINE

## 2024-05-01 PROCEDURE — 80053 COMPREHEN METABOLIC PANEL: CPT | Performed by: INTERNAL MEDICINE

## 2024-05-01 PROCEDURE — 83735 ASSAY OF MAGNESIUM: CPT | Performed by: INTERNAL MEDICINE

## 2024-05-01 PROCEDURE — 99239 HOSP IP/OBS DSCHRG MGMT >30: CPT | Performed by: INTERNAL MEDICINE

## 2024-05-01 RX ORDER — MICONAZOLE NITRATE 20 MG/G
CREAM TOPICAL 2 TIMES DAILY
Status: DISCONTINUED | OUTPATIENT
Start: 2024-05-01 | End: 2024-05-01 | Stop reason: HOSPADM

## 2024-05-01 RX ORDER — POTASSIUM CHLORIDE 20 MEQ/1
20 TABLET, EXTENDED RELEASE ORAL ONCE
Status: COMPLETED | OUTPATIENT
Start: 2024-05-01 | End: 2024-05-01

## 2024-05-01 RX ORDER — INSULIN ASPART 100 [IU]/ML
10 INJECTION, SOLUTION INTRAVENOUS; SUBCUTANEOUS
Start: 2024-05-01

## 2024-05-01 RX ORDER — SULFAMETHOXAZOLE AND TRIMETHOPRIM 800; 160 MG/1; MG/1
1 TABLET ORAL EVERY 12 HOURS SCHEDULED
Qty: 5 TABLET | Refills: 0 | Status: SHIPPED | OUTPATIENT
Start: 2024-05-01 | End: 2024-05-04

## 2024-05-01 RX ORDER — INSULIN GLARGINE 100 [IU]/ML
33 INJECTION, SOLUTION SUBCUTANEOUS EVERY 12 HOURS SCHEDULED
Start: 2024-05-01

## 2024-05-01 RX ADMIN — POTASSIUM CHLORIDE 20 MEQ: 1500 TABLET, EXTENDED RELEASE ORAL at 09:13

## 2024-05-01 RX ADMIN — INSULIN GLARGINE 33 UNITS: 100 INJECTION, SOLUTION SUBCUTANEOUS at 09:11

## 2024-05-01 RX ADMIN — INSULIN LISPRO 6 UNITS: 100 INJECTION, SOLUTION INTRAVENOUS; SUBCUTANEOUS at 12:05

## 2024-05-01 RX ADMIN — VANCOMYCIN HYDROCHLORIDE 1250 MG: 5 INJECTION, POWDER, LYOPHILIZED, FOR SOLUTION INTRAVENOUS at 12:05

## 2024-05-01 RX ADMIN — LISINOPRIL 2.5 MG: 5 TABLET ORAL at 09:11

## 2024-05-01 RX ADMIN — INSULIN LISPRO 3 UNITS: 100 INJECTION, SOLUTION INTRAVENOUS; SUBCUTANEOUS at 12:05

## 2024-05-01 RX ADMIN — MICONAZOLE NITRATE: 20 CREAM TOPICAL at 12:06

## 2024-05-01 RX ADMIN — CHOLECALCIFEROL TAB 25 MCG (1000 UNIT) 1000 UNITS: 25 TAB at 09:11

## 2024-05-01 RX ADMIN — ASPIRIN 81 MG 81 MG: 81 TABLET ORAL at 09:11

## 2024-05-01 RX ADMIN — ENOXAPARIN SODIUM 40 MG: 40 INJECTION SUBCUTANEOUS at 09:11

## 2024-05-01 RX ADMIN — DOCUSATE SODIUM 100 MG: 100 CAPSULE, LIQUID FILLED ORAL at 09:11

## 2024-05-01 NOTE — QUICK NOTE
Bone margin pathology is without residual osteomyelitis.    Patient can be transitioned to p.o. Bactrim, to complete 7-day antibiotic course post I&D, through 5/3.  Okay for discharge from ID viewpoint.    Discussed with Dr. Hillman from [facility] service.

## 2024-05-01 NOTE — ASSESSMENT & PLAN NOTE
Pt sent from wound care clinic to [facility] ED due to left lower extremity ulceration with purulent drainage concerning for deep infection  MRI left foot shows small 4 mm focus of confluent T1 marrow abnormality in the distal and plantar aspect of the stump of the first metatarsal, suspect small focus of osteomyelitis   Arterial duplex left lower extremity nondiagnostic, patient with palpable pulses, per vascular surgery was cleared for procedure  Patient was placed on broad-spectrum antibiotics with IV cefepime 2 g q8h + IV vancomycin 1.25 g q12h with PO metronidazole 500 mg q8h on admission  IV cefepime, IV vancomycin, and PO metronidazole discontinued per ID recommendations  Appreciate podiatry input.  Patient is status post LEFT FOOT PARTIAL FIRST RAY AMPUTATION on 4/26/24 by Dr. Jennifer Rubalcava  Tissue culture shows coagulase negative Staph  Bone margin pathology is without residual osteomyelitis.   Infectious disease consult placed, recommend PO Bactrim for additional two days of antibiotics  Patient is doing well postoperatively, continue postop care  Outpatient follow-up with podiatry  Outpatient follow-up with wound care and physical therapy

## 2024-05-01 NOTE — DISCHARGE INSTRUCTIONS
PLEASE DISPOSE OF YOUR INSULIN NEEDLES, SYRINGES, TEST STRIPS, AND LANCETS USED TO CHECK YOUR BLOOD SUGAR IN A PROPER NEEDLE/SHARPS CONTAINER.  ALTERNATIVELY, YOU CAN USE AN EMPTY LAUNDRY DETERGENT BOTTLE.  WHEN THE BOTTLE IS FULL, SEAL IT WITH THE LID, AND WRAP IN COMPLETELY IN DUCT TAPE PRIOR TO THROWING IT IN THE GARBAGE.

## 2024-05-01 NOTE — PLAN OF CARE
Problem: PAIN - ADULT  Goal: Verbalizes/displays adequate comfort level or baseline comfort level  Description: Interventions:  - Encourage patient to monitor pain and request assistance  - Assess pain using appropriate pain scale  - Administer analgesics based on type and severity of pain and evaluate response  - Implement non-pharmacological measures as appropriate and evaluate response  - Consider cultural and social influences on pain and pain management  - Notify physician/advanced practitioner if interventions unsuccessful or patient reports new pain  Outcome: Progressing     Problem: INFECTION - ADULT  Goal: Absence or prevention of progression during hospitalization  Description: INTERVENTIONS:  - Assess and monitor for signs and symptoms of infection  - Monitor lab/diagnostic results  - Monitor all insertion sites, i.e. indwelling lines, tubes, and drains  - Monitor endotracheal if appropriate and nasal secretions for changes in amount and color  - [place] appropriate cooling/warming therapies per order  - Administer medications as ordered  - Instruct and encourage patient and family to use good hand hygiene technique  - Identify and instruct in appropriate isolation precautions for identified infection/condition  Outcome: Progressing     Problem: SAFETY ADULT  Goal: Patient will remain free of falls  Description: INTERVENTIONS:  - Educate patient/family on patient safety including physical limitations  - Instruct patient to call for assistance with activity   - Consult OT/PT to assist with strengthening/mobility   - Keep Call bell within reach  - Keep bed low and locked with side rails adjusted as appropriate  - Keep care items and personal belongings within reach  - Initiate and maintain comfort rounds  - Make Fall Risk Sign visible to staff  - Offer Toileting every 2 Hours, in advance of need  - Initiate/Maintain bed/chair alarm  - Obtain necessary fall risk management equipment: as needed  - Apply  yellow socks and bracelet for high fall risk patients  - Consider moving patient to room near nurses station  Outcome: Progressing     Problem: DISCHARGE PLANNING  Goal: Discharge to home or other facility with appropriate resources  Description: INTERVENTIONS:  - Identify barriers to discharge w/patient and caregiver  - Arrange for needed discharge resources and transportation as appropriate  - Identify discharge learning needs (meds, wound care, etc.)  - Arrange for interpretive services to assist at discharge as needed  - Refer to Case Management Department for coordinating discharge planning if the patient needs post-hospital services based on physician/advanced practitioner order or complex needs related to functional status, cognitive ability, or social support system  Outcome: Progressing     Problem: Knowledge Deficit  Goal: Patient/family/caregiver demonstrates understanding of disease process, treatment plan, medications, and discharge instructions  Description: Complete learning assessment and assess knowledge base.  Interventions:  - Provide teaching at level of understanding  - Provide teaching via preferred learning methods  Outcome: Progressing     Problem: METABOLIC, FLUID AND ELECTROLYTES - ADULT  Goal: Fluid balance maintained  Description: INTERVENTIONS:  - Monitor labs   - Monitor I/O and WT  - Instruct patient on fluid and nutrition as appropriate  - Assess for signs & symptoms of volume excess or deficit  Outcome: Progressing  Goal: Glucose maintained within target range  Description: INTERVENTIONS:  - Monitor Blood Glucose as ordered  - Assess for signs and symptoms of hyperglycemia and hypoglycemia  - Administer ordered medications to maintain glucose within target range  - Assess nutritional intake and initiate nutrition service referral as needed  Outcome: Progressing     Problem: SKIN/TISSUE INTEGRITY - ADULT  Goal: Incision(s), wounds(s) or drain site(s) healing without S/S of  infection  Description: INTERVENTIONS  - Assess and document dressing, incision, wound bed, drain sites and surrounding tissue  - Provide patient and family education  - Perform skin care/dressing changes as ordered  Outcome: Progressing     Problem: MUSCULOSKELETAL - ADULT  Goal: Maintain or return mobility to safest level of function  Description: INTERVENTIONS:  - Assess patient's ability to carry out ADLs; assess patient's baseline for ADL function and identify physical deficits which impact ability to perform ADLs (bathing, care of mouth/teeth, toileting, grooming, dressing, etc.)  - Assess/evaluate cause of self-care deficits   - Assess range of motion  - Assess patient's mobility  - Assess patient's need for assistive devices and provide as appropriate  - Encourage maximum independence but intervene and supervise when necessary  - Involve family in performance of ADLs  - Assess for home care needs following discharge   - Consider OT consult to assist with ADL evaluation and planning for discharge  - Provide patient education as appropriate  Outcome: Progressing     Problem: Prexisting or High Potential for Compromised Skin Integrity  Goal: Skin integrity is maintained or improved  Description: INTERVENTIONS:  - Identify patients at risk for skin breakdown  - Assess and monitor skin integrity  - Assess and monitor nutrition and hydration status  - Monitor labs   - Assess for incontinence   - Turn and reposition patient  - Assist with mobility/ambulation  - Relieve pressure over bony prominences  - Avoid friction and shearing  - Provide appropriate hygiene as needed including keeping skin clean and dry  - Evaluate need for skin moisturizer/barrier cream  - Collaborate with interdisciplinary team   - Patient/family teaching  - Consider wound care consult   Outcome: Progressing     Problem: Nutrition/Hydration-ADULT  Goal: Nutrient/Hydration intake appropriate for improving, restoring or maintaining nutritional  needs  Description: Monitor and assess patient's nutrition/hydration status for malnutrition. Collaborate with interdisciplinary team and initiate plan and interventions as ordered.  Monitor patient's weight and dietary intake as ordered or per policy. Utilize nutrition screening tool and intervene as necessary. Determine patient's food preferences and provide high-protein, high-caloric foods as appropriate.     INTERVENTIONS:  - Monitor oral intake, urinary output, labs, and treatment plans  - Assess nutrition and hydration status and recommend course of action  - Evaluate amount of meals eaten  - Assist patient with eating if necessary   - Allow adequate time for meals  - Recommend/ encourage appropriate diets, oral nutritional supplements, and vitamin/mineral supplements  - Order, calculate, and assess calorie counts as needed  - Recommend, monitor, and adjust tube feedings and TPN/PPN based on assessed needs  - Assess need for intravenous fluids  - Provide specific nutrition/hydration education as appropriate  - Include patient/family/caregiver in decisions related to nutrition  Outcome: Progressing

## 2024-05-01 NOTE — ASSESSMENT & PLAN NOTE
Lab Results   Component Value Date    HGBA1C 10.8 (H) 04/28/2024       Recent Labs     04/30/24  1549 04/30/24 2010 05/01/24  0716 05/01/24  1053   POCGLU 150* 212* 73 247*       Blood Sugar Average: Last 72 hrs: (P) [id number]  Hx of uncontrolled blood glucose levels  Algorithm 3 sliding scale before meals  PTA lantus 55 units BID, Humalog 15 units 3 times daily  Current blood glucose is in acceptable range, continue on Lantus 33 units twice daily, Humalog 6 units 3 times daily  Outpatient follow-up with endocrinology

## 2024-05-01 NOTE — CASE MANAGEMENT
Case Management Discharge Planning Note    Patient name Art Joseph  Location /410-01 MRN [id number]  : 1960 Date 2024       Current Admission Date: 2024  Current Admission Diagnosis:Acute osteomyelitis of metatarsal bone of left foot (HCC)   Patient Active Problem List    Diagnosis Date Noted    Elevated platelet count 2024    Candidiasis, intertrigo 2024    Cellulitis of left lower extremity 2024    Severe obesity (BMI 35.0-39.9) with comorbidity ([facility]) 2021    Diabetic peripheral angiopathy ([facility]) 2020    Vitamin D deficiency 2019    Ambulatory dysfunction 10/17/2017    Hx of right BKA ([facility]) 2017    Primary hypertension 2017    Hyperlipidemia 2017    Acute osteomyelitis of metatarsal bone of left foot ([facility]) 2017    Type 2 diabetes mellitus with foot ulcer, with long-term current use of insulin ([facility]) 2017    Diabetic neuropathy ([facility]) 2017      LOS (days): 8  Geometric Mean LOS (GMLOS) (days): 3.2  Days to GMLOS:-4.8     OBJECTIVE:  Risk of Unplanned Readmission Score: 9.33         Current admission status: Inpatient   Preferred Pharmacy:   [facility] [place] - [address]  [address] [facility] [address]  Phone: [phone] Fax: [phone]    [facility] [place] - [address] [facility]  [address] [facility]  [place] [address]  Phone: [phone] Fax: [phone]    [facility] #[id number] - [place] - [address]  [address] [facility] [address]  Phone: [phone] Fax: [phone]    Primary Care Provider: Kerry Christine MD    Primary Insurance: AYLA MOTLEY  Secondary Insurance:     DISCHARGE DETAILS:    Requested Home Health Care         Home Health Agency Name:: [facility]      Discharge Destination Plan:: Home with Home Health Care ([facility])                Patient Stable for  discharge home with [email or web address].    [facility] updated in aidin of discharge and dc info uploaded.    CM spoke to patient at the bedside, reviewed DC IMM with patient and informed that patient can stay an additional 4 hours for reconsidering appealing the discharge as the medicare rights were review on the day of discharge. Pt verbalized understanding and feels ready to go home and does not intend to stay 4 hours to reconsider.   Copy placed in bin to be scanned to system.patient provided medicare number at bedside.      Family to transport patient home.

## 2024-05-01 NOTE — PLAN OF CARE
Problem: PHYSICAL THERAPY ADULT  Goal: Performs mobility at highest level of function for planned discharge setting.  See evaluation for individualized goals.  Description: Treatment/Interventions: Functional transfer training, LE strengthening/ROM, Therapeutic exercise, Endurance training, Patient/family training, Equipment eval/education, Bed mobility, Gait training, Compensatory technique education, Spoke to nursing, OT  Equipment Recommended:  (pt owns W/C and RW)       See flowsheet documentation for full assessment, interventions and recommendations.  5/1/2024 1301 by Marianela Cano PTA  Outcome: Progressing  Note: Prognosis: Good  Problem List:  (Decreased strength; Decreased endurance; Impaired balance; Decreased mobility; Decreased safety awareness; Orthopedic restrictions)  Assessment: Pt. seen for PT treatment session this date with interventions consisting of  therapeutic exercises. Performed with good tolerance.  In comparison to previous session, Pt. With improvements in activity tolerance.   Pt is in need of continued activity in PT to improve strength balance endurance mobility transfers and ambulation with return to maximize LOF. From PT/mobility standpoint, recommendation at time of d/c would be level II: min resource intensity in order to promote return to PLOF and independence.   The patient's -[facility] Basic Mobility Inpatient Short Form Raw Score is 16. A Raw score of less than or equal to 16 suggests the patient may benefit from discharge to post-acute rehabilitation services.  Please also refer to physical therapy recommendation for safe DC planning.  Barriers to Discharge: Decreased caregiver support  Barriers to Discharge Comments: Pt lives alone, may benefit from increased support PRN  Rehab Resource Intensity Level, PT: III (Minimum Resource Intensity)    See flowsheet documentation for full assessment.     5/1/2024 1301 by Marianela Cano PTA  Outcome: Progressing  Note: Prognosis:  Good  Problem List:  (Decreased strength; Decreased endurance; Impaired balance; Decreased mobility; Decreased safety awareness; Orthopedic restrictions)  Assessment: Pt. seen for PT treatment session this date with interventions consisting of  therapeutic exercises. Performed with good tolerance.  In comparison to previous session, Pt. With improvements in activity tolerance.   Pt is in need of continued activity in PT to improve strength balance endurance mobility transfers and ambulation with return to maximize LOF. From PT/mobility standpoint, recommendation at time of d/c would be level II: min resource intensity in order to promote return to PLOF and independence.   The patient's AM-PAC Basic Mobility Inpatient Short Form Raw Score is 16. A Raw score of less than or equal to 16 suggests the patient may benefit from discharge to post-acute rehabilitation services.  Please also refer to physical therapy recommendation for safe DC planning.  Barriers to Discharge: Decreased caregiver support  Barriers to Discharge Comments: Pt lives alone, may benefit from increased support PRN  Rehab Resource Intensity Level, PT: III (Minimum Resource Intensity)    See flowsheet documentation for full assessment.

## 2024-05-01 NOTE — ASSESSMENT & PLAN NOTE
Lab Results   Component Value Date    HGBA1C 10.8 (H) 04/28/2024       Recent Labs     04/30/24  1038 04/30/24  1549 04/30/24 2010 05/01/24  0716   POCGLU 158* 150* 212* 73       Blood Sugar Average: Last 72 hrs: (P) [id number]  Hx of uncontrolled blood glucose levels  Algorithm 3 sliding scale before meals  PTA lantus 55 units BID, Humalog 15 units 3 times daily  Current blood glucose is in acceptable range, continue on Lantus 33 units twice daily, Humalog 6 units 3 times daily

## 2024-05-01 NOTE — PHYSICAL THERAPY NOTE
PHYSICAL THERAPY NOTE          Patient Name: Art Joseph  Today's Date: 5/1/2024 05/01/24 0912   PT Last Visit   PT Visit Date 05/01/24   Note Type   Note Type Treatment   Pain Assessment   Pain Assessment Tool 0-10   Pain Score No Pain   Bed Mobility   Additional Comments OOB in chair start/end PT session   Ambulation/Elevation   Gait pattern Not appropriate   Balance   Static Sitting Fair +   Dynamic Sitting Fair +   Endurance Deficit   Endurance Deficit Yes   Activity Tolerance   Activity Tolerance Patient tolerated treatment well   Exercises   Quad Sets Sitting;20 reps;AROM;Bilateral   Glute Sets Sitting;20 reps;AROM;Bilateral   Hip Flexion Sitting;20 reps;AROM;Bilateral   Hip Abduction Sitting;20 reps;AROM;Bilateral   Hip Adduction Sitting;20 reps;AROM;Bilateral  (+ add sets)   Knee AROM Short Arc Quad Sitting;20 reps;AROM;Bilateral   Knee AROM Long Arc Quad Sitting;20 reps;AROM;Bilateral   Assessment   Prognosis Good   Problem List   (Decreased strength; Decreased endurance; Impaired balance; Decreased mobility; Decreased safety awareness; Orthopedic restrictions)   Assessment Pt. seen for PT treatment session this date with interventions consisting of  therapeutic exercises. Performed with good tolerance.  In comparison to previous session, Pt. With improvements in activity tolerance.   Pt is in need of continued activity in PT to improve strength balance endurance mobility transfers and ambulation with return to maximize LOF. From PT/mobility standpoint, recommendation at time of d/c would be level II: min resource intensity in order to promote return to PLOF and independence.   The patient's AM-PAC Basic Mobility Inpatient Short Form Raw Score is 16. A Raw score of less than or equal to 16 suggests the patient may benefit from discharge to post-acute rehabilitation services.  Please also refer to physical therapy  recommendation for safe DC planning.   Goals   STG Expiration Date 05/13/24   PT Treatment Day 2   Plan   Treatment/Interventions Functional transfer training;LE strengthening/ROM;Therapeutic exercise;Bed mobility   Progress   (Slow progress due to pt's prosthesis being unavailable.)   PT Frequency 3-5x/wk   Discharge Recommendation   Rehab Resource Intensity Level, PT III (Minimum Resource Intensity)   AM-PAC Basic Mobility Inpatient   Turning in Flat Bed Without Bedrails 4   Lying on Back to Sitting on Edge of Flat Bed Without Bedrails 4   Moving Bed to Chair 3   Standing Up From Chair Using Arms 3   Walk in Room 1   Climb 3-5 Stairs With Railing 1   Basic Mobility Inpatient Raw Score 16   Basic Mobility Standardized Score 38.32   [facility] Highest Level Of Mobility   -[facility] Goal 5: Stand one or more mins   -[facility] Achieved 4: Move to chair/commode   Education   Education Provided Home exercise program   Patient Demonstrates acceptance/verbal understanding   End of Consult   Patient Position at End of Consult Bedside chair;Bed/Chair alarm activated;All needs within reach   End of Consult Comments discussed POC with PT

## 2024-05-01 NOTE — NURSING NOTE
AVS printed and reviewed with patient.  Patient verbalized understanding.  Home care ordered on d/c

## 2024-05-01 NOTE — PLAN OF CARE
Problem: PAIN - ADULT  Goal: Verbalizes/displays adequate comfort level or baseline comfort level  Description: Interventions:  - Encourage patient to monitor pain and request assistance  - Assess pain using appropriate pain scale  - Administer analgesics based on type and severity of pain and evaluate response  - Implement non-pharmacological measures as appropriate and evaluate response  - Consider cultural and social influences on pain and pain management  - Notify physician/advanced practitioner if interventions unsuccessful or patient reports new pain  Outcome: Progressing     Problem: INFECTION - ADULT  Goal: Absence or prevention of progression during hospitalization  Description: INTERVENTIONS:  - Assess and monitor for signs and symptoms of infection  - Monitor lab/diagnostic results  - Monitor all insertion sites, i.e. indwelling lines, tubes, and drains  - Monitor endotracheal if appropriate and nasal secretions for changes in amount and color  - [place] appropriate cooling/warming therapies per order  - Administer medications as ordered  - Instruct and encourage patient and family to use good hand hygiene technique  - Identify and instruct in appropriate isolation precautions for identified infection/condition  Outcome: Progressing     Problem: SAFETY ADULT  Goal: Patient will remain free of falls  Description: INTERVENTIONS:  - Educate patient/family on patient safety including physical limitations  - Instruct patient to call for assistance with activity   - Consult OT/PT to assist with strengthening/mobility   - Keep Call bell within reach  - Keep bed low and locked with side rails adjusted as appropriate  - Keep care items and personal belongings within reach  - Initiate and maintain comfort rounds  - Make Fall Risk Sign visible to staff  - Offer Toileting every 2 Hours, in advance of need  - Initiate/Maintain bed/chair alarm  - Obtain necessary fall risk management equipment: as needed  - Apply  yellow socks and bracelet for high fall risk patients  - Consider moving patient to room near nurses station  Outcome: Progressing     Problem: DISCHARGE PLANNING  Goal: Discharge to home or other facility with appropriate resources  Description: INTERVENTIONS:  - Identify barriers to discharge w/patient and caregiver  - Arrange for needed discharge resources and transportation as appropriate  - Identify discharge learning needs (meds, wound care, etc.)  - Arrange for interpretive services to assist at discharge as needed  - Refer to Case Management Department for coordinating discharge planning if the patient needs post-hospital services based on physician/advanced practitioner order or complex needs related to functional status, cognitive ability, or social support system  Outcome: Progressing     Problem: Knowledge Deficit  Goal: Patient/family/caregiver demonstrates understanding of disease process, treatment plan, medications, and discharge instructions  Description: Complete learning assessment and assess knowledge base.  Interventions:  - Provide teaching at level of understanding  - Provide teaching via preferred learning methods  Outcome: Progressing     Problem: METABOLIC, FLUID AND ELECTROLYTES - ADULT  Goal: Fluid balance maintained  Description: INTERVENTIONS:  - Monitor labs   - Monitor I/O and WT  - Instruct patient on fluid and nutrition as appropriate  - Assess for signs & symptoms of volume excess or deficit  Outcome: Progressing  Goal: Glucose maintained within target range  Description: INTERVENTIONS:  - Monitor Blood Glucose as ordered  - Assess for signs and symptoms of hyperglycemia and hypoglycemia  - Administer ordered medications to maintain glucose within target range  - Assess nutritional intake and initiate nutrition service referral as needed  Outcome: Progressing     Problem: SKIN/TISSUE INTEGRITY - ADULT  Goal: Incision(s), wounds(s) or drain site(s) healing without S/S of  infection  Description: INTERVENTIONS  - Assess and document dressing, incision, wound bed, drain sites and surrounding tissue  - Provide patient and family education  - Perform skin care/dressing changes as ordered  Outcome: Progressing     Problem: MUSCULOSKELETAL - ADULT  Goal: Maintain or return mobility to safest level of function  Description: INTERVENTIONS:  - Assess patient's ability to carry out ADLs; assess patient's baseline for ADL function and identify physical deficits which impact ability to perform ADLs (bathing, care of mouth/teeth, toileting, grooming, dressing, etc.)  - Assess/evaluate cause of self-care deficits   - Assess range of motion  - Assess patient's mobility  - Assess patient's need for assistive devices and provide as appropriate  - Encourage maximum independence but intervene and supervise when necessary  - Involve family in performance of ADLs  - Assess for home care needs following discharge   - Consider OT consult to assist with ADL evaluation and planning for discharge  - Provide patient education as appropriate  Outcome: Progressing     Problem: Prexisting or High Potential for Compromised Skin Integrity  Goal: Skin integrity is maintained or improved  Description: INTERVENTIONS:  - Identify patients at risk for skin breakdown  - Assess and monitor skin integrity  - Assess and monitor nutrition and hydration status  - Monitor labs   - Assess for incontinence   - Turn and reposition patient  - Assist with mobility/ambulation  - Relieve pressure over bony prominences  - Avoid friction and shearing  - Provide appropriate hygiene as needed including keeping skin clean and dry  - Evaluate need for skin moisturizer/barrier cream  - Collaborate with interdisciplinary team   - Patient/family teaching  - Consider wound care consult   Outcome: Progressing     Problem: Nutrition/Hydration-ADULT  Goal: Nutrient/Hydration intake appropriate for improving, restoring or maintaining nutritional  needs  Description: Monitor and assess patient's nutrition/hydration status for malnutrition. Collaborate with interdisciplinary team and initiate plan and interventions as ordered.  Monitor patient's weight and dietary intake as ordered or per policy. Utilize nutrition screening tool and intervene as necessary. Determine patient's food preferences and provide high-protein, high-caloric foods as appropriate.     INTERVENTIONS:  - Monitor oral intake, urinary output, labs, and treatment plans  - Assess nutrition and hydration status and recommend course of action  - Evaluate amount of meals eaten  - Assist patient with eating if necessary   - Allow adequate time for meals  - Recommend/ encourage appropriate diets, oral nutritional supplements, and vitamin/mineral supplements  - Order, calculate, and assess calorie counts as needed  - Recommend, monitor, and adjust tube feedings and TPN/PPN based on assessed needs  - Assess need for intravenous fluids  - Provide specific nutrition/hydration education as appropriate  - Include patient/family/caregiver in decisions related to nutrition  Outcome: Progressing

## 2024-05-01 NOTE — PROGRESS NOTES
Art Joseph is a 64 y.o. male who is currently ordered Vancomycin IV with management by the Pharmacy Consult service.  Relevant clinical data and objective / subjective history reviewed.  Vancomycin Assessment:  Indication and Goal AUC/Trough: Soft tissue (goal -600, trough >10), -600, trough >10  Clinical Status: stable  Micro:     Renal Function:  SCr: 0.69 mg/dL  CrCl: 100 mL/min  Renal replacement: Not on dialysis  Days of Therapy: 9  Current Dose: 1250mg IV q12h  Vancomycin Plan:  New Dosing: same  Estimated AUC: 415 mcg*hr/mL  Estimated Trough: 10.7 mcg/mL  Next Level: 5/2/24 with AM labs  Renal Function Monitoring: Daily BMP and UOP  Pharmacy will continue to follow closely for s/sx of nephrotoxicity, infusion reactions and appropriateness of therapy.  BMP and CBC will be ordered per protocol. We will continue to follow the patient’s culture results and clinical progress daily.    Christopher Guardado, Pharmacist

## 2024-05-01 NOTE — PROGRESS NOTES
Progress Note - Infectious Disease   Art Joseph 64 y.o. male MRN: [id number]  Unit/Bed#: 410-01 Encounter: [id number]      Impression/Recommendations:  1.  Left foot cellulitis.  Source is most likely left foot wound.  Patient is status post I&D and ray resection.  Operative culture with only light growth of coagulase-negative Staphylococcus but patient did receive antibiotic before surgery.  He is clinically improved.  Patient remains clinically and systemically well, without sepsis or systemic toxicity.  Admission blood cultures had no growth.  For now, can continue IV vancomycin but no further need for cefepime.  Plan is to continue antibiotic x 7 days after I&D, as long as there is no residual osteomyelitis.  In the event patient needs long-term IV antibiotic (see below), will ask microbiology to workup the coagulase-negative Staphylococcus in operative culture.  Continue IV vancomycin.  Monitor temperature/WBC.  Serial foot exams.  Transition to p.o. Bactrim if bone margin pathology is no growth.  Treat x 7 days post I&D, through 5/3.     2.  Left first metatarsal head osteomyelitis.  Patient is status post right first ray resection.  I discussed with Dr. Rubalcava from podiatry, who did the surgery.  She is not certain that there was a surgical cure.  Bone margin pathology is pending.  Bone culture with light growth of coagulase-negative Staphylococcus.  Antibiotic plan as in  above.  Follow-up on susceptibilities of coagulase-negative Staphylococcus in operative culture.  Follow-up on bone margin pathology.  If pathology shows no residual osteomyelitis, no further need for antibiotic.  Podiatry follow-up.     3.  Venous stasis, with leg edema.     4.  PVD.  Patient at least with PVD within healing range.     5.  DM, poorly controlled, with hyperglycemia and elevated hemoglobin A1c.  This is risk factor for poor wound healing and infection above.     Discussed with patient in detail regarding the above  plan.    Antibiotics:  Vancomycin  POD # 5    Subjective:  Patient feels well.  Pain in foot minimal.  Temperature stays down.  No chills.  He is tolerating antibiotic well.  No nausea, vomiting or diarrhea.    Objective:  Vitals:  Temp:  [97.8 °F (36.6 °C)-98.4 °F (36.9 °C)] 97.8 °F (36.6 °C)  HR:  [71-83] 71  Resp:  [16-19] 18  BP: (105-153)/(60-72) 135/69  SpO2:  [95 %-96 %] 95 %  Temp (24hrs), Av °F (36.7 °C), Min:97.8 °F (36.6 °C), Max:98.4 °F (36.9 °C)  Current: Temperature: 97.8 °F (36.6 °C)    Physical Exam:     General: Awake, alert, cooperative, no distress.   Neck:  Supple. No mass.  No lymphadenopathy.   Lungs: Expansion symmetric, no rales, no wheezing, respirations unlabored.   Heart:  Regular rate and rhythm, S1 and S2 normal, no murmur.   Abdomen: Soft, nondistended, non-tender, bowel sounds active all four quadrants, no masses, no organomegaly.   Extremities: Stable leg edema.  Chronic venous stasis changes.  Left foot with dressing in place.  Dressing is dry.  No erythema/warm beyond dressing.  Minimal tenderness.   Skin:  No rash.   Neuro: Moves all extremities.     Invasive Devices       Peripheral Intravenous Line  Duration             Peripheral IV 24 Dorsal (posterior);Left Forearm 1 day                    Labs studies:   I have personally reviewed pertinent labs.  Results from last 7 days   Lab Units 24   POTASSIUM mmol/L 3.8 3.7 4.2   CHLORIDE mmol/L 107 105 105   CO2 mmol/L 26 25 27   BUN mg/dL 22 25 18   CREATININE mg/dL 0.69 0.77 0.79   EGFR ml/min/1.73sq m 99 95 94   CALCIUM mg/dL 9.1 9.2 9.2   AST U/L 18 19  --    ALT U/L 21 23  --    ALK PHOS U/L 96 94  --      Results from last 7 days   Lab Units 240 24  0411   WBC Thousand/uL 9.01 11.15* 9.62   HEMOGLOBIN g/dL 13.0 13.3 13.6   PLATELETS Thousands/uL 374 379 401*     Results from last 7 days   Lab Units 24  1618 24  1608   GRAM STAIN RESULT   No Polys or Bacteria seen No Polys or Bacteria seen       Imaging Studies:   I have personally reviewed pertinent imaging study reports and images in PACS.    EKG, Pathology, and Other Studies:   I have personally reviewed pertinent reports.

## 2024-05-01 NOTE — PROGRESS NOTES
[facility]  Progress Note  Name: Art Joseph I  MRN: [id number]  Unit/Bed#: 410-01 I Date of Admission: 4/23/2024   Date of Service: 5/1/2024 I Hospital Day: 8    Assessment/Plan   * Acute osteomyelitis of metatarsal bone of left foot (HCC)  Assessment & Plan  Pt sent from wound care clinic to [facility] ED due to left lower extremity ulceration with purulent drainage concerning for deep infection  MRI left foot shows small 4 mm focus of confluent T1 marrow abnormality in the distal and plantar aspect of the stump of the first metatarsal, suspect small focus of osteomyelitis   Arterial duplex left lower extremity nondiagnostic, patient with palpable pulses, per vascular surgery was cleared for procedure  Patient was placed on broad-spectrum antibiotics with IV cefepime 2 g q8h + IV vancomycin 1.25 g q12h with PO metronidazole 500 mg q8h on admission  IV cefepime and PO metronidazole discontinued per ID recommendations  Appreciate podiatry input.  Patient is status post LEFT FOOT PARTIAL FIRST RAY AMPUTATION on 4/26/24 by Dr. Jennifer Rubalcava  Tissue culture shows coagulase negative Staph  Surgical bone pathology pending  Infectious disease consult placed, recommend continuation of IV vancomycin and d/c IV cefepime until bone margin pathology is resulted  Patient is doing well postoperatively, continue postop care  PT/OT consulted    Elevated platelet count  Assessment & Plan  Likely reactive in nature in the setting of acute infection  Follow the platelet count    Candidiasis, intertrigo  Assessment & Plan  Around scrotal area  Nystatin cream ordered    Cellulitis of left lower extremity  Assessment & Plan  Patient presents with left lower extremity wounds with surrounding cellulitis  Please refer to above under acute osteomyelitis    Hyperlipidemia  Assessment & Plan  Continue home Lipitor 80 mg QD    Primary hypertension  Assessment & Plan  Adequate control, continue home  regimen    Hx of right BKA ([facility])  Assessment & Plan  History noted  PT/OT evaluation recommends discharge to home with home health    Type 2 diabetes mellitus with foot ulcer, with long-term current use of insulin ([facility])  Assessment & Plan  Lab Results   Component Value Date    HGBA1C 10.8 (H) 04/28/2024       Recent Labs     04/30/24  1038 04/30/24  1549 04/30/24 2010 05/01/24  0716   POCGLU 158* 150* 212* 73       Blood Sugar Average: Last 72 hrs: (P) [id number]  Hx of uncontrolled blood glucose levels  Algorithm 3 sliding scale before meals  PTA lantus 55 units BID, Humalog 15 units 3 times daily  Current blood glucose is in acceptable range, continue on Lantus 33 units twice daily, Humalog 6 units 3 times daily    Constipation-resolved as of 5/1/2024  Assessment & Plan  Bowel movement yesterday evening, prior had not had bowel movement since 4/23/24  Colace 100 mg BID  Given Miralax 17 g once 4/29/24             VTE Pharmacologic Prophylaxis:   Pharmacologic: Enoxaparin (Lovenox)  Mechanical VTE Prophylaxis in Place: Yes      Discussions with Specialists or Other Care Team Provider: yes, with supervising attending    Education and Discussions with Family / Patient: yes, with patient    Time Spent for Care: 45 minutes.  More than 50% of total time spent on counseling and coordination of care as described above.    Current Length of Stay: 8 day(s)    Current Patient Status: Inpatient   Certification Statement: The patient will continue to require additional inpatient hospital stay due to bone tissue pathology results, IV antibiotics    Discharge Plan: home with home cindy    Code Status: Level 1 - Full Code      Subjective:   Patient seen and examined at bedside this morning. No acute events overnight. Reports that he is feeling well with no acute symptoms, interested in outpatient PT at discharge. Denies shortness of breath, chest pain, nausea, vomiting, and changes in bowel movements.     Objective:      Vitals:   Temp (24hrs), Av °F (36.7 °C), Min:97.8 °F (36.6 °C), Max:98.4 °F (36.9 °C)    Temp:  [97.8 °F (36.6 °C)-98.4 °F (36.9 °C)] 97.8 °F (36.6 °C)  HR:  [71-83] 71  Resp:  [16-19] 18  BP: (105-153)/(60-72) 135/69  SpO2:  [95 %-96 %] 95 %  Body mass index is 31.03 kg/m².     Input and Output Summary (last 24 hours):       Intake/Output Summary (Last 24 hours) at 2024 0916  Last data filed at 2024 0817  Gross per 24 hour   Intake 480 ml   Output 1800 ml   Net -1320 ml       Physical Exam:     Physical Exam  Constitutional:       General: He is not in acute distress.     Appearance: Normal appearance.   HENT:      Head: Normocephalic and atraumatic.   Cardiovascular:      Rate and Rhythm: Normal rate and regular rhythm.      Pulses: Normal pulses.      Heart sounds: Normal heart sounds.   Pulmonary:      Effort: Pulmonary effort is normal.      Breath sounds: Normal breath sounds.   Abdominal:      Tenderness: There is no abdominal tenderness. There is no guarding.   Skin:     General: Skin is warm and dry.      Comments: Surgical dressing c/d/i   Neurological:      General: No focal deficit present.      Mental Status: He is alert and oriented to person, place, and time.           Additional Data:     Labs:    Results from last 7 days   Lab Units 24  0410   WBC Thousand/uL 9.01   HEMOGLOBIN g/dL 13.0   HEMATOCRIT % 39.7   PLATELETS Thousands/uL 374   SEGS PCT % 52   LYMPHO PCT % 35   MONO PCT % 8   EOS PCT % 4     Results from last 7 days   Lab Units 24  0410   SODIUM mmol/L 140   POTASSIUM mmol/L 3.8   CHLORIDE mmol/L 107   CO2 mmol/L 26   BUN mg/dL 22   CREATININE mg/dL 0.69   ANION GAP mmol/L 7   CALCIUM mg/dL 9.1   ALBUMIN g/dL 3.2*   TOTAL BILIRUBIN mg/dL 0.26   ALK PHOS U/L 96   ALT U/L 21   AST U/L 18   GLUCOSE RANDOM mg/dL 57*         Results from last 7 days   Lab Units 24  0716 24  1549 24  1038 24  0711 24  2119 24  1556  04/29/24  1121 04/29/24  0725 04/28/24  2059 04/28/24  1622 04/28/24  1120   POC GLUCOSE mg/dl 73 212* 150* 158* 70 207* 173* 253* 84 157* 148* 200*     Results from last 7 days   Lab Units 04/28/24  0413   HEMOGLOBIN A1C % 10.8*     Results from last 7 days   Lab Units 05/01/24  0410 04/30/24  0421   PROCALCITONIN ng/ml 0.06 0.07           * I Have Reviewed All Lab Data Listed Above.  * Additional Pertinent Lab Tests Reviewed: All Labs For Current Hospital Admission Reviewed    Imaging:    Imaging Reports Reviewed Today Include:   XR foot 3+ vw left   Final Result      Interval first transmetatarsal resection to the level of the proximal transmetatarsal.         I have personally reviewed this study including all images.  / SUNDAR            Resident: PAUL ROGER I, the attending radiologist, have reviewed the images and agree with the final report above.      Workstation performed: [id number]         VAS ARTERIAL DUPLEX-LOWER LIMB UNILATERAL   Final Result      MRI foot/forefoot toes left wo contrast   Final Result      Small 4 mm focus of  confluent T1 marrow abnormality in the distal and plantar aspect of the stump of the first metatarsal, with possible small sinus tract to the area of overlying ulcer (image 41 series 7) suspect small focus of  osteomyelitis         Mild T2 hyperintensity seen in the stump of the second proximal phalanx and in the third middle and distal changes probably due to inhomogeneous fat suppression      Arthritic changes at the first tarsometatarsal joint      The study was marked in EPIC for significant notification.               Workstation performed: [id number]         XR foot 3+ views LEFT   Final Result      No radiographic evidence of osteomyelitis.      Workstation performed: [id number]         XR tibia fibula 2 views LEFT   Final Result      No acute osseous abnormality. No evidence of infection.            Workstation performed: [id number]             Imaging Personally  Reviewed by Myself Includes:    XR foot 3+ vw left   Final Result      Interval first transmetatarsal resection to the level of the proximal transmetatarsal.         I have personally reviewed this study including all images.  / SUNDAR            Resident: PAUL ROGER I, the attending radiologist, have reviewed the images and agree with the final report above.      Workstation performed: [id number]         VAS ARTERIAL DUPLEX-LOWER LIMB UNILATERAL   Final Result      MRI foot/forefoot toes left wo contrast   Final Result      Small 4 mm focus of  confluent T1 marrow abnormality in the distal and plantar aspect of the stump of the first metatarsal, with possible small sinus tract to the area of overlying ulcer (image 41 series 7) suspect small focus of  osteomyelitis         Mild T2 hyperintensity seen in the stump of the second proximal phalanx and in the third middle and distal changes probably due to inhomogeneous fat suppression      Arthritic changes at the first tarsometatarsal joint      The study was marked in EPIC for significant notification.               Workstation performed: [id number]         XR foot 3+ views LEFT   Final Result      No radiographic evidence of osteomyelitis.      Workstation performed: [id number]         XR tibia fibula 2 views LEFT   Final Result      No acute osseous abnormality. No evidence of infection.            Workstation performed: [id number]               Recent Cultures (last 7 days):     Results from last 7 days   Lab Units 04/26/24  1618 04/26/24  1608   GRAM STAIN RESULT  No Polys or Bacteria seen No Polys or Bacteria seen       Last 24 Hours Medication List:   Current Facility-Administered Medications   Medication Dose Route Frequency Provider Last Rate    aspirin  81 mg Oral Daily Jennifer Rubalcava, DPM      atorvastatin  80 mg Oral Daily Jennifer Rubalcava, DPM      Cholecalciferol  1,000 Units Oral Daily Jennifer Rubalcava, DPM      docusate sodium  100 mg Oral BID Jerome HOLLY  DO Matt      enoxaparin  40 mg Subcutaneous Daily Jennifer Rubalcava DPM      insulin glargine  33 Units Subcutaneous Q12H [facility] Jerome Gutierrez DO      insulin lispro  1-6 Units Subcutaneous TID AVNI Rousseau PA-C      insulin lispro  6 Units Subcutaneous TID With Meals Bella Nj MD      lisinopril  2.5 mg Oral Daily Jennifer Rubalcava, BERNY      nystatin   Topical Q12H [facility] eBlla Nj MD      vancomycin  12.5 mg/kg Intravenous Q12H Jennifer Rubalcava DPM 1,250 mg (04/30/24 9596)        Today, Patient Was Seen By: Marilyn Velazquez MD    ** Please Note: Dictation voice to text software may have been used in the creation of this document. **

## 2024-05-01 NOTE — DISCHARGE SUMMARY
General acute hospital  Discharge- Art Joseph 1960, 64 y.o. male MRN: [id number]  Unit/Bed#: 410-01 Encounter: [id number]  Primary Care Provider: Kerry Christine MD   Date and time admitted to hospital: 4/23/2024 10:16 AM    * Acute osteomyelitis of metatarsal bone of left foot (HCC)  Assessment & Plan  Pt sent from wound care clinic to [facility] ED due to left lower extremity ulceration with purulent drainage concerning for deep infection  MRI left foot shows small 4 mm focus of confluent T1 marrow abnormality in the distal and plantar aspect of the stump of the first metatarsal, suspect small focus of osteomyelitis   Arterial duplex left lower extremity nondiagnostic, patient with palpable pulses, per vascular surgery was cleared for procedure  Patient was placed on broad-spectrum antibiotics with IV cefepime 2 g q8h + IV vancomycin 1.25 g q12h with PO metronidazole 500 mg q8h on admission  IV cefepime, IV vancomycin, and PO metronidazole discontinued per ID recommendations  Appreciate podiatry input.  Patient is status post LEFT FOOT PARTIAL FIRST RAY AMPUTATION on 4/26/24 by Dr. Jennifer Rubalcava  Tissue culture shows coagulase negative Staph  Bone margin pathology is without residual osteomyelitis.   Infectious disease consult placed, recommend PO Bactrim for additional two days of antibiotics  Patient is doing well postoperatively, continue postop care  Outpatient follow-up with podiatry  Outpatient follow-up with wound care and physical therapy    Elevated platelet count  Assessment & Plan  Likely reactive in nature in the setting of acute infection  Now resolved    Candidiasis, intertrigo  Assessment & Plan  Around scrotal area  Nystatin cream ordered    Cellulitis of left lower extremity  Assessment & Plan  Patient presents with left lower extremity wounds with surrounding cellulitis  Please refer to above under acute osteomyelitis    Hyperlipidemia  Assessment & Plan  Continue  home Lipitor 80 mg QD    Primary hypertension  Assessment & Plan  Adequate control, continue home regimen    Hx of right BKA (HCC)  Assessment & Plan  History noted  PT/OT evaluation recommends discharge to home with home health    Type 2 diabetes mellitus with foot ulcer, with long-term current use of insulin (HCC)  Assessment & Plan  Lab Results   Component Value Date    HGBA1C 10.8 (H) 04/28/2024       Recent Labs     04/30/24  1549 04/30/24 2010 05/01/24  0716 05/01/24  1053   POCGLU 150* 212* 73 247*       Blood Sugar Average: Last 72 hrs: (P) [id number]  Hx of uncontrolled blood glucose levels  Algorithm 3 sliding scale before meals  PTA lantus 55 units BID, Humalog 15 units 3 times daily  Current blood glucose is in acceptable range, continue on Lantus 33 units twice daily, Humalog 6 units 3 times daily  Outpatient follow-up with endocrinology        Discharging Physician / Practitioner: Marilyn Velazquez MD  PCP: Kerry Christine MD  Admission Date:   Admission Orders (From admission, onward)       Ordered        04/23/24 1148  INPATIENT ADMISSION  Once                          Discharge Date: 05/01/24    Medical Problems       Resolved Problems  Date Reviewed: 4/28/2024            Resolved    Constipation 5/1/2024     Resolved by  Marilyn Velazquez MD          Consultations During Hospital Stay:  Podiatry  Infectious disease    Procedures Performed:     XR foot 3+ vw left   Final Result      Interval first transmetatarsal resection to the level of the proximal transmetatarsal.         I have personally reviewed this study including all images.  / R.J.F.            Resident: PAUL ROGER I, the attending radiologist, have reviewed the images and agree with the final report above.      Workstation performed: [id number]         VAS ARTERIAL DUPLEX-LOWER LIMB UNILATERAL   Final Result      MRI foot/forefoot toes left wo contrast   Final Result      Small 4 mm focus of  confluent T1 marrow  abnormality in the distal and plantar aspect of the stump of the first metatarsal, with possible small sinus tract to the area of overlying ulcer (image 41 series 7) suspect small focus of  osteomyelitis         Mild T2 hyperintensity seen in the stump of the second proximal phalanx and in the third middle and distal changes probably due to inhomogeneous fat suppression      Arthritic changes at the first tarsometatarsal joint      The study was marked in EPIC for significant notification.               Workstation performed: [id number]         XR foot 3+ views LEFT   Final Result      No radiographic evidence of osteomyelitis.      Workstation performed: [id number]         XR tibia fibula 2 views LEFT   Final Result      No acute osseous abnormality. No evidence of infection.            Workstation performed: [id number]               Significant Findings / Test Results:   Results for orders placed or performed during the hospital encounter of 04/23/24   Blood culture #1    Specimen: Arm, Right; Blood   Result Value Ref Range    Blood Culture No Growth After 5 Days.    Blood culture #2    Specimen: Arm, Left; Blood   Result Value Ref Range    Blood Culture No Growth After 5 Days.    Anaerobic culture and Gram stain    Specimen: Foot, Left; Tissue   Result Value Ref Range    Anaerobic Culture No anaerobes isolated    Anaerobic culture and Gram stain    Specimen: Foot, Left; Tissue   Result Value Ref Range    Anaerobic Culture No growth    Culture, tissue and Gram stain    Specimen: Foot, Left; Tissue   Result Value Ref Range    Tissue Culture 1+ Growth of Diphtheroids     Tissue Culture 1 colony Staphylococcus simulans (A)     Gram Stain Result No Polys or Bacteria seen    Culture, tissue and Gram stain    Specimen: Foot, Left; Tissue   Result Value Ref Range    Tissue Culture Few Colonies of Diphtheroids     Tissue Culture Few Colonies of Staphylococcus simulans (A)     Gram Stain Result No Polys or Bacteria seen     CBC and differential   Result Value Ref Range    WBC 10.82 (H) 4.31 - 10.16 Thousand/uL    RBC 5.09 3.88 - 5.62 Million/uL    Hemoglobin 14.5 12.0 - 17.0 g/dL    Hematocrit 45.2 36.5 - 49.3 %    MCV 89 82 - 98 fL    MCH 28.5 26.8 - 34.3 pg    MCHC 32.1 31.4 - 37.4 g/dL    RDW 12.5 11.6 - 15.1 %    MPV 9.1 8.9 - 12.7 fL    Platelets 367 149 - 390 Thousands/uL    nRBC 0 /100 WBCs    Segmented % 71 43 - 75 %    Immature Grans % 0 0 - 2 %    Lymphocytes % 19 14 - 44 %    Monocytes % 7 4 - 12 %    Eosinophils Relative 2 0 - 6 %    Basophils Relative 1 0 - 1 %    Absolute Neutrophils 7.63 (H) 1.85 - 7.62 Thousands/µL    Absolute Immature Grans 0.04 0.00 - 0.20 Thousand/uL    Absolute Lymphocytes 2.10 0.60 - 4.47 Thousands/µL    Absolute Monocytes 0.78 0.17 - 1.22 Thousand/µL    Eosinophils Absolute 0.21 0.00 - 0.61 Thousand/µL    Basophils Absolute 0.06 0.00 - 0.10 Thousands/µL   Comprehensive metabolic panel   Result Value Ref Range    Sodium 134 (L) 135 - 147 mmol/L    Potassium 4.5 3.5 - 5.3 mmol/L    Chloride 99 96 - 108 mmol/L    CO2 22 21 - 32 mmol/L    ANION GAP 13 4 - 13 mmol/L    BUN 17 5 - 25 mg/dL    Creatinine 0.92 0.60 - 1.30 mg/dL    Glucose 221 (H) 65 - 140 mg/dL    Calcium 9.2 8.4 - 10.2 mg/dL    AST 17 13 - 39 U/L    ALT 6 (L) 7 - 52 U/L    Alkaline Phosphatase 140 (H) 34 - 104 U/L    Total Protein 8.0 6.4 - 8.4 g/dL    Albumin 3.7 3.5 - 5.0 g/dL    Total Bilirubin 0.51 0.20 - 1.00 mg/dL    eGFR 86 ml/min/1.73sq m   Lactic acid, plasma (w/reflex if result > 2.0)   Result Value Ref Range    LACTIC ACID 1.8 0.5 - 2.0 mmol/L   Procalcitonin   Result Value Ref Range    Procalcitonin <0.05 <=0.25 ng/ml   C-reactive protein   Result Value Ref Range    CRP 51.8 (H) <3.0 mg/L   Sedimentation rate, automated   Result Value Ref Range    Sed Rate 77 (H) 0 - 19 mm/hour   Comprehensive metabolic panel   Result Value Ref Range    Sodium 138 135 - 147 mmol/L    Potassium 4.4 3.5 - 5.3 mmol/L    Chloride 106 96 - 108  mmol/L    CO2 24 21 - 32 mmol/L    ANION GAP 8 4 - 13 mmol/L    BUN 13 5 - 25 mg/dL    Creatinine 0.84 0.60 - 1.30 mg/dL    Glucose 124 65 - 140 mg/dL    Calcium 9.0 8.4 - 10.2 mg/dL    Corrected Calcium 9.7 8.3 - 10.1 mg/dL    AST 10 (L) 13 - 39 U/L    ALT 7 7 - 52 U/L    Alkaline Phosphatase 117 (H) 34 - 104 U/L    Total Protein 6.8 6.4 - 8.4 g/dL    Albumin 3.1 (L) 3.5 - 5.0 g/dL    Total Bilirubin 0.42 0.20 - 1.00 mg/dL    eGFR 91 ml/min/1.73sq m   Magnesium   Result Value Ref Range    Magnesium 2.0 1.9 - 2.7 mg/dL   Phosphorus   Result Value Ref Range    Phosphorus 3.4 2.3 - 4.1 mg/dL   CBC and differential   Result Value Ref Range    WBC 7.81 4.31 - 10.16 Thousand/uL    RBC 4.63 3.88 - 5.62 Million/uL    Hemoglobin 13.1 12.0 - 17.0 g/dL    Hematocrit 40.3 36.5 - 49.3 %    MCV 87 82 - 98 fL    MCH 28.3 26.8 - 34.3 pg    MCHC 32.5 31.4 - 37.4 g/dL    RDW 12.6 11.6 - 15.1 %    MPV 8.8 (L) 8.9 - 12.7 fL    Platelets 329 149 - 390 Thousands/uL    nRBC 0 /100 WBCs    Segmented % 60 43 - 75 %    Immature Grans % 0 0 - 2 %    Lymphocytes % 27 14 - 44 %    Monocytes % 9 4 - 12 %    Eosinophils Relative 3 0 - 6 %    Basophils Relative 1 0 - 1 %    Absolute Neutrophils 4.71 1.85 - 7.62 Thousands/µL    Absolute Immature Grans 0.02 0.00 - 0.20 Thousand/uL    Absolute Lymphocytes 2.07 0.60 - 4.47 Thousands/µL    Absolute Monocytes 0.68 0.17 - 1.22 Thousand/µL    Eosinophils Absolute 0.26 0.00 - 0.61 Thousand/µL    Basophils Absolute 0.07 0.00 - 0.10 Thousands/µL   Vancomycin, random   Result Value Ref Range    Vancomycin Rm 17.7 10.0 - 20.0 ug/mL   CBC and differential   Result Value Ref Range    WBC 8.80 4.31 - 10.16 Thousand/uL    RBC 4.73 3.88 - 5.62 Million/uL    Hemoglobin 13.4 12.0 - 17.0 g/dL    Hematocrit 41.4 36.5 - 49.3 %    MCV 88 82 - 98 fL    MCH 28.3 26.8 - 34.3 pg    MCHC 32.4 31.4 - 37.4 g/dL    RDW 12.4 11.6 - 15.1 %    MPV 8.9 8.9 - 12.7 fL    Platelets 354 149 - 390 Thousands/uL    nRBC 0 /100 WBCs     Segmented % 64 43 - 75 %    Immature Grans % 0 0 - 2 %    Lymphocytes % 24 14 - 44 %    Monocytes % 8 4 - 12 %    Eosinophils Relative 3 0 - 6 %    Basophils Relative 1 0 - 1 %    Absolute Neutrophils 5.61 1.85 - 7.62 Thousands/µL    Absolute Immature Grans 0.03 0.00 - 0.20 Thousand/uL    Absolute Lymphocytes 2.11 0.60 - 4.47 Thousands/µL    Absolute Monocytes 0.71 0.17 - 1.22 Thousand/µL    Eosinophils Absolute 0.27 0.00 - 0.61 Thousand/µL    Basophils Absolute 0.07 0.00 - 0.10 Thousands/µL   Basic metabolic panel   Result Value Ref Range    Sodium 137 135 - 147 mmol/L    Potassium 3.9 3.5 - 5.3 mmol/L    Chloride 105 96 - 108 mmol/L    CO2 25 21 - 32 mmol/L    ANION GAP 7 4 - 13 mmol/L    BUN 14 5 - 25 mg/dL    Creatinine 0.80 0.60 - 1.30 mg/dL    Glucose 178 (H) 65 - 140 mg/dL    Calcium 8.8 8.4 - 10.2 mg/dL    eGFR 93 ml/min/1.73sq m   CBC and differential   Result Value Ref Range    WBC 8.32 4.31 - 10.16 Thousand/uL    RBC 4.86 3.88 - 5.62 Million/uL    Hemoglobin 13.7 12.0 - 17.0 g/dL    Hematocrit 42.4 36.5 - 49.3 %    MCV 87 82 - 98 fL    MCH 28.2 26.8 - 34.3 pg    MCHC 32.3 31.4 - 37.4 g/dL    RDW 12.5 11.6 - 15.1 %    MPV 9.1 8.9 - 12.7 fL    Platelets 361 149 - 390 Thousands/uL    nRBC 0 /100 WBCs    Segmented % 62 43 - 75 %    Immature Grans % 0 0 - 2 %    Lymphocytes % 25 14 - 44 %    Monocytes % 8 4 - 12 %    Eosinophils Relative 4 0 - 6 %    Basophils Relative 1 0 - 1 %    Absolute Neutrophils 5.20 1.85 - 7.62 Thousands/µL    Absolute Immature Grans 0.02 0.00 - 0.20 Thousand/uL    Absolute Lymphocytes 2.08 0.60 - 4.47 Thousands/µL    Absolute Monocytes 0.66 0.17 - 1.22 Thousand/µL    Eosinophils Absolute 0.30 0.00 - 0.61 Thousand/µL    Basophils Absolute 0.06 0.00 - 0.10 Thousands/µL   Basic metabolic panel   Result Value Ref Range    Sodium 137 135 - 147 mmol/L    Potassium 3.8 3.5 - 5.3 mmol/L    Chloride 105 96 - 108 mmol/L    CO2 24 21 - 32 mmol/L    ANION GAP 8 4 - 13 mmol/L    BUN 16 5 - 25  mg/dL    Creatinine 0.76 0.60 - 1.30 mg/dL    Glucose 158 (H) 65 - 140 mg/dL    Calcium 8.8 8.4 - 10.2 mg/dL    eGFR 95 ml/min/1.73sq m   CBC and differential   Result Value Ref Range    WBC 10.78 (H) 4.31 - 10.16 Thousand/uL    RBC 4.89 3.88 - 5.62 Million/uL    Hemoglobin 13.9 12.0 - 17.0 g/dL    Hematocrit 42.7 36.5 - 49.3 %    MCV 87 82 - 98 fL    MCH 28.4 26.8 - 34.3 pg    MCHC 32.6 31.4 - 37.4 g/dL    RDW 12.7 11.6 - 15.1 %    MPV 9.2 8.9 - 12.7 fL    Platelets 333 149 - 390 Thousands/uL    nRBC 0 /100 WBCs    Segmented % 73 43 - 75 %    Immature Grans % 0 0 - 2 %    Lymphocytes % 17 14 - 44 %    Monocytes % 7 4 - 12 %    Eosinophils Relative 2 0 - 6 %    Basophils Relative 1 0 - 1 %    Absolute Neutrophils 7.85 (H) 1.85 - 7.62 Thousands/µL    Absolute Immature Grans 0.04 0.00 - 0.20 Thousand/uL    Absolute Lymphocytes 1.79 0.60 - 4.47 Thousands/µL    Absolute Monocytes 0.78 0.17 - 1.22 Thousand/µL    Eosinophils Absolute 0.25 0.00 - 0.61 Thousand/µL    Basophils Absolute 0.07 0.00 - 0.10 Thousands/µL   Basic metabolic panel   Result Value Ref Range    Sodium 137 135 - 147 mmol/L    Potassium 3.9 3.5 - 5.3 mmol/L    Chloride 104 96 - 108 mmol/L    CO2 26 21 - 32 mmol/L    ANION GAP 7 4 - 13 mmol/L    BUN 15 5 - 25 mg/dL    Creatinine 0.81 0.60 - 1.30 mg/dL    Glucose 103 65 - 140 mg/dL    Calcium 8.8 8.4 - 10.2 mg/dL    eGFR 93 ml/min/1.73sq m   Hemoglobin A1C w/ EAG Estimation   Result Value Ref Range    Hemoglobin A1C 10.8 (H) Normal 4.0-5.6%; PreDiabetic 5.7-6.4%; Diabetic >=6.5%; Glycemic control for adults with diabetes <7.0% %     mg/dl   CBC and differential   Result Value Ref Range    WBC 10.04 4.31 - 10.16 Thousand/uL    RBC 4.83 3.88 - 5.62 Million/uL    Hemoglobin 13.7 12.0 - 17.0 g/dL    Hematocrit 42.8 36.5 - 49.3 %    MCV 89 82 - 98 fL    MCH 28.4 26.8 - 34.3 pg    MCHC 32.0 31.4 - 37.4 g/dL    RDW 12.7 11.6 - 15.1 %    MPV 9.1 8.9 - 12.7 fL    Platelets 351 149 - 390 Thousands/uL    nRBC  0 /100 WBCs    Segmented % 65 43 - 75 %    Immature Grans % 0 0 - 2 %    Lymphocytes % 21 14 - 44 %    Monocytes % 9 4 - 12 %    Eosinophils Relative 4 0 - 6 %    Basophils Relative 1 0 - 1 %    Absolute Neutrophils 6.54 1.85 - 7.62 Thousands/µL    Absolute Immature Grans 0.04 0.00 - 0.20 Thousand/uL    Absolute Lymphocytes 2.13 0.60 - 4.47 Thousands/µL    Absolute Monocytes 0.90 0.17 - 1.22 Thousand/µL    Eosinophils Absolute 0.36 0.00 - 0.61 Thousand/µL    Basophils Absolute 0.07 0.00 - 0.10 Thousands/µL   Basic metabolic panel   Result Value Ref Range    Sodium 137 135 - 147 mmol/L    Potassium 4.5 3.5 - 5.3 mmol/L    Chloride 104 96 - 108 mmol/L    CO2 26 21 - 32 mmol/L    ANION GAP 7 4 - 13 mmol/L    BUN 16 5 - 25 mg/dL    Creatinine 0.76 0.60 - 1.30 mg/dL    Glucose 74 65 - 140 mg/dL    Calcium 9.1 8.4 - 10.2 mg/dL    eGFR 95 ml/min/1.73sq m   CBC and differential   Result Value Ref Range    WBC 9.62 4.31 - 10.16 Thousand/uL    RBC 4.94 3.88 - 5.62 Million/uL    Hemoglobin 13.6 12.0 - 17.0 g/dL    Hematocrit 44.4 36.5 - 49.3 %    MCV 90 82 - 98 fL    MCH 27.5 26.8 - 34.3 pg    MCHC 30.6 (L) 31.4 - 37.4 g/dL    RDW 12.7 11.6 - 15.1 %    MPV 9.1 8.9 - 12.7 fL    Platelets 401 (H) 149 - 390 Thousands/uL    nRBC 0 /100 WBCs    Segmented % 56 43 - 75 %    Immature Grans % 1 0 - 2 %    Lymphocytes % 29 14 - 44 %    Monocytes % 9 4 - 12 %    Eosinophils Relative 4 0 - 6 %    Basophils Relative 1 0 - 1 %    Absolute Neutrophils 5.41 1.85 - 7.62 Thousands/µL    Absolute Immature Grans 0.07 0.00 - 0.20 Thousand/uL    Absolute Lymphocytes 2.79 0.60 - 4.47 Thousands/µL    Absolute Monocytes 0.87 0.17 - 1.22 Thousand/µL    Eosinophils Absolute 0.40 0.00 - 0.61 Thousand/µL    Basophils Absolute 0.08 0.00 - 0.10 Thousands/µL   Basic metabolic panel   Result Value Ref Range    Sodium 140 135 - 147 mmol/L    Potassium 4.2 3.5 - 5.3 mmol/L    Chloride 105 96 - 108 mmol/L    CO2 27 21 - 32 mmol/L    ANION GAP 8 4 - 13 mmol/L     BUN 18 5 - 25 mg/dL    Creatinine 0.79 0.60 - 1.30 mg/dL    Glucose 67 65 - 140 mg/dL    Calcium 9.2 8.4 - 10.2 mg/dL    eGFR 94 ml/min/1.73sq m   CBC and differential   Result Value Ref Range    WBC 11.15 (H) 4.31 - 10.16 Thousand/uL    RBC 4.73 3.88 - 5.62 Million/uL    Hemoglobin 13.3 12.0 - 17.0 g/dL    Hematocrit 41.5 36.5 - 49.3 %    MCV 88 82 - 98 fL    MCH 28.1 26.8 - 34.3 pg    MCHC 32.0 31.4 - 37.4 g/dL    RDW 12.8 11.6 - 15.1 %    MPV 9.1 8.9 - 12.7 fL    Platelets 379 149 - 390 Thousands/uL    nRBC 0 /100 WBCs    Segmented % 59 43 - 75 %    Immature Grans % 1 0 - 2 %    Lymphocytes % 27 14 - 44 %    Monocytes % 9 4 - 12 %    Eosinophils Relative 3 0 - 6 %    Basophils Relative 1 0 - 1 %    Absolute Neutrophils 6.67 1.85 - 7.62 Thousands/µL    Absolute Immature Grans 0.06 0.00 - 0.20 Thousand/uL    Absolute Lymphocytes 3.04 0.60 - 4.47 Thousands/µL    Absolute Monocytes 0.99 0.17 - 1.22 Thousand/µL    Eosinophils Absolute 0.31 0.00 - 0.61 Thousand/µL    Basophils Absolute 0.08 0.00 - 0.10 Thousands/µL   Comprehensive metabolic panel   Result Value Ref Range    Sodium 138 135 - 147 mmol/L    Potassium 3.7 3.5 - 5.3 mmol/L    Chloride 105 96 - 108 mmol/L    CO2 25 21 - 32 mmol/L    ANION GAP 8 4 - 13 mmol/L    BUN 25 5 - 25 mg/dL    Creatinine 0.77 0.60 - 1.30 mg/dL    Glucose 58 (L) 65 - 140 mg/dL    Calcium 9.2 8.4 - 10.2 mg/dL    Corrected Calcium 9.9 8.3 - 10.1 mg/dL    AST 19 13 - 39 U/L    ALT 23 7 - 52 U/L    Alkaline Phosphatase 94 34 - 104 U/L    Total Protein 6.7 6.4 - 8.4 g/dL    Albumin 3.1 (L) 3.5 - 5.0 g/dL    Total Bilirubin 0.29 0.20 - 1.00 mg/dL    eGFR 95 ml/min/1.73sq m   Magnesium   Result Value Ref Range    Magnesium 2.2 1.9 - 2.7 mg/dL   Phosphorus   Result Value Ref Range    Phosphorus 3.5 2.3 - 4.1 mg/dL   Procalcitonin   Result Value Ref Range    Procalcitonin 0.07 <=0.25 ng/ml   Hepatitis panel, acute   Result Value Ref Range    Hepatitis B Surface Ag Non-reactive Non-Reactive     Hep A IgM Non-reactive Non-Reactive    Hepatitis C Ab Non-reactive Non-Reactive    Hep B C IgM Non-reactive Non-Reactive   Vitamin D 25 hydroxy   Result Value Ref Range    Vit D, 25-Hydroxy 16.0 (L) 30.0 - 100.0 ng/mL   CBC and differential   Result Value Ref Range    WBC 9.01 4.31 - 10.16 Thousand/uL    RBC 4.55 3.88 - 5.62 Million/uL    Hemoglobin 13.0 12.0 - 17.0 g/dL    Hematocrit 39.7 36.5 - 49.3 %    MCV 87 82 - 98 fL    MCH 28.6 26.8 - 34.3 pg    MCHC 32.7 31.4 - 37.4 g/dL    RDW 12.8 11.6 - 15.1 %    MPV 8.9 8.9 - 12.7 fL    Platelets 374 149 - 390 Thousands/uL    nRBC 0 /100 WBCs    Segmented % 52 43 - 75 %    Immature Grans % 0 0 - 2 %    Lymphocytes % 35 14 - 44 %    Monocytes % 8 4 - 12 %    Eosinophils Relative 4 0 - 6 %    Basophils Relative 1 0 - 1 %    Absolute Neutrophils 4.62 1.85 - 7.62 Thousands/µL    Absolute Immature Grans 0.04 0.00 - 0.20 Thousand/uL    Absolute Lymphocytes 3.15 0.60 - 4.47 Thousands/µL    Absolute Monocytes 0.74 0.17 - 1.22 Thousand/µL    Eosinophils Absolute 0.37 0.00 - 0.61 Thousand/µL    Basophils Absolute 0.09 0.00 - 0.10 Thousands/µL   Comprehensive metabolic panel   Result Value Ref Range    Sodium 140 135 - 147 mmol/L    Potassium 3.8 3.5 - 5.3 mmol/L    Chloride 107 96 - 108 mmol/L    CO2 26 21 - 32 mmol/L    ANION GAP 7 4 - 13 mmol/L    BUN 22 5 - 25 mg/dL    Creatinine 0.69 0.60 - 1.30 mg/dL    Glucose 57 (L) 65 - 140 mg/dL    Calcium 9.1 8.4 - 10.2 mg/dL    Corrected Calcium 9.7 8.3 - 10.1 mg/dL    AST 18 13 - 39 U/L    ALT 21 7 - 52 U/L    Alkaline Phosphatase 96 34 - 104 U/L    Total Protein 6.8 6.4 - 8.4 g/dL    Albumin 3.2 (L) 3.5 - 5.0 g/dL    Total Bilirubin 0.26 mg/dL    eGFR 99 ml/min/1.73sq m   Magnesium   Result Value Ref Range    Magnesium 2.1 1.9 - 2.7 mg/dL   Phosphorus   Result Value Ref Range    Phosphorus 3.2 2.3 - 4.1 mg/dL   Procalcitonin   Result Value Ref Range    Procalcitonin 0.06 <=0.25 ng/ml   Tissue Exam   Result Value Ref Range    Case  Report       Surgical Pathology Report                         Case: [id number]                                  Authorizing Provider:  Jennifer Rubalcava DPM           Collected:           04/26/2024 1614              Ordering Location:     [facility] Received:            04/26/2024 1700                                     Operating Room                                                               Pathologist:           Emelina Vasquez DO                                                     Specimens:   A) - Bone, Left First Metatarsal                                                                    B) - Bone, Left First Metatarsal Bone Clean Margins                                        Final Diagnosis       A. Bone, Left First Metatarsal, Resection:  - Benign bone and surrounding soft tissue with mild chronic inflammation and reactive changes.    B. Bone, Left First Metatarsal (Clean Margins), Resection:  - Benign bone with reactive changes; negative for inflammation.      Additional Information       All reported additional testing was performed with appropriately reactive controls.  These tests were developed and their performance characteristics determined by [facility] or appropriate performing facility, though some tests may be performed on tissues which have not been validated for performance characteristics (such as staining performed on alcohol exposed cell blocks and decalcified tissues).  Results should be interpreted with caution and in the context of the patients’ clinical condition. These tests may not be cleared or approved by the U.S. Food and Drug Administration, though the FDA has determined that such clearance or approval is not necessary. These tests are used for clinical purposes and they should not be regarded as investigational or for research. This laboratory has been approved by CLIA 88, designated as a high-complexity laboratory and is qualified to  "perform these tests.      Interpretation performed at [facility], [address] [place] [address], [address]      Gross Description       A. The specimen is received in formalin, labeled with the patient's name and hospital number, and is designated \" left first metatarsal.\"  It consists of an irregular 3.1 x 2.4 x 1.9 cm piece of bone with a flat, smooth resection margin.  Opposite the resection margin, there is an area of attached shaggy yellow to white soft tissue.  Sectioning reveals yellow firm to brittle bone.  A representative section is submitted in 1 cassette, following decalcification in decal 2.  B. The specimen is received in formalin, labeled with the patient's name and hospital number, and is designated \" left first metatarsal bone clean margins.\"  It consists of a 1.7 x 1.0 x 0.1 cm fragment of firm tan-yellow bone.  The specimen is submitted in toto in 1 cassette, following decalcification in decal 2.    Note: The estimated total formalin fixation time based upon information provided by the submitting clinician and the standard processing schedule is over 72 hours.    MScheib     Fingerstick Glucose (POCT)   Result Value Ref Range    POC Glucose 268 (H) 65 - 140 mg/dl   Fingerstick Glucose (POCT)   Result Value Ref Range    POC Glucose 86 65 - 140 mg/dl   Fingerstick Glucose (POCT)   Result Value Ref Range    POC Glucose 100 65 - 140 mg/dl   Fingerstick Glucose (POCT)   Result Value Ref Range    POC Glucose 110 65 - 140 mg/dl   Fingerstick Glucose (POCT)   Result Value Ref Range    POC Glucose 138 65 - 140 mg/dl   Fingerstick Glucose (POCT)   Result Value Ref Range    POC Glucose 227 (H) 65 - 140 mg/dl   Fingerstick Glucose (POCT)   Result Value Ref Range    POC Glucose 189 (H) 65 - 140 mg/dl   Fingerstick Glucose (POCT)   Result Value Ref Range    POC Glucose 205 (H) 65 - 140 mg/dl   Fingerstick Glucose (POCT)   Result Value Ref Range    POC Glucose 173 (H) 65 - 140 mg/dl   Fingerstick Glucose " (POCT)   Result Value Ref Range    POC Glucose 178 (H) 65 - 140 mg/dl   Fingerstick Glucose (POCT)   Result Value Ref Range    POC Glucose 156 (H) 65 - 140 mg/dl   Fingerstick Glucose (POCT)   Result Value Ref Range    POC Glucose 222 (H) 65 - 140 mg/dl   Fingerstick Glucose (POCT)   Result Value Ref Range    POC Glucose 260 (H) 65 - 140 mg/dl   Fingerstick Glucose (POCT)   Result Value Ref Range    POC Glucose 175 (H) 65 - 140 mg/dl   Fingerstick Glucose (POCT)   Result Value Ref Range    POC Glucose 133 65 - 140 mg/dl   Fingerstick Glucose (POCT)   Result Value Ref Range    POC Glucose 132 65 - 140 mg/dl   Fingerstick Glucose (POCT)   Result Value Ref Range    POC Glucose 91 65 - 140 mg/dl   Fingerstick Glucose (POCT)   Result Value Ref Range    POC Glucose 97 65 - 140 mg/dl   Fingerstick Glucose (POCT)   Result Value Ref Range    POC Glucose 205 (H) 65 - 140 mg/dl   Fingerstick Glucose (POCT)   Result Value Ref Range    POC Glucose 87 65 - 140 mg/dl   Fingerstick Glucose (POCT)   Result Value Ref Range    POC Glucose 246 (H) 65 - 140 mg/dl   Fingerstick Glucose (POCT)   Result Value Ref Range    POC Glucose 238 (H) 65 - 140 mg/dl   Fingerstick Glucose (POCT)   Result Value Ref Range    POC Glucose 169 (H) 65 - 140 mg/dl   Fingerstick Glucose (POCT)   Result Value Ref Range    POC Glucose 176 (H) 65 - 140 mg/dl   Fingerstick Glucose (POCT)   Result Value Ref Range    POC Glucose 75 65 - 140 mg/dl   Fingerstick Glucose (POCT)   Result Value Ref Range    POC Glucose 200 (H) 65 - 140 mg/dl   Fingerstick Glucose (POCT)   Result Value Ref Range    POC Glucose 148 (H) 65 - 140 mg/dl   Fingerstick Glucose (POCT)   Result Value Ref Range    POC Glucose 157 (H) 65 - 140 mg/dl   Fingerstick Glucose (POCT)   Result Value Ref Range    POC Glucose 84 65 - 140 mg/dl   Fingerstick Glucose (POCT)   Result Value Ref Range    POC Glucose 253 (H) 65 - 140 mg/dl   Fingerstick Glucose (POCT)   Result Value Ref Range    POC Glucose 173  (H) 65 - 140 mg/dl   Fingerstick Glucose (POCT)   Result Value Ref Range    POC Glucose 207 (H) 65 - 140 mg/dl   Fingerstick Glucose (POCT)   Result Value Ref Range    POC Glucose 70 65 - 140 mg/dl   Fingerstick Glucose (POCT)   Result Value Ref Range    POC Glucose 158 (H) 65 - 140 mg/dl   Fingerstick Glucose (POCT)   Result Value Ref Range    POC Glucose 150 (H) 65 - 140 mg/dl   Fingerstick Glucose (POCT)   Result Value Ref Range    POC Glucose 212 (H) 65 - 140 mg/dl   Fingerstick Glucose (POCT)   Result Value Ref Range    POC Glucose 73 65 - 140 mg/dl   Fingerstick Glucose (POCT)   Result Value Ref Range    POC Glucose 247 (H) 65 - 140 mg/dl         Incidental Findings:   None     Test Results Pending at Discharge (will require follow up):   None     Outpatient Tests Requested:  None    Complications:  None    Reason for Admission: left lower extremity wound    Hospital Course:     Art Joseph is a 64 y.o. male patient who originally presented to the hospital on 4/23/2024 for evaluation of left lower extremity wound. He was directed to come to the ED from his outpatient wound care follow-up d/t concern for cellulitis. Upon evaluation, MRI of the left foot showed small focus of osteomyelitis and patient was initiated on cefepime, vancomycin, and metronidazole. Podiatry was consulted and confirmed need for left partial ray resection and the patient underwent surgery on 4/26/24 without complication. Patient was continued on antibiotic treatment while awaiting tissue culture and bone pathology reports. Tissue culture was positive for coagulase negative staph and bone margin pathology showed no residual osteomyelitis. Art was discharged in stable condition and will follow-up on an outpatient basis with podiatry and wound care.        Please see above list of diagnoses and related plan for additional information.     HPI per Admission:     Art Joseph is a 64 y.o. male with past medical history of diabetes  "mellitus type II with insulin dependence, hypertension, peripheral artery disease, s/p right lower limb amputation (2017) who presents for evaluation of left lower extremity wound. The patient reports that he hit his left leg with the prostatic he wears on his right leg, causing an open wound approximately a week and a half ago. He was initially seen at urgent care on 4/12/2024 and prescribed Keflex for 7 days without improvement of symptoms. He then followed up with wound care earlier today and was sent to the ED due to concern for cellulitis. Assessment in the ED revealed elevated CRP of 51.8, sed rate of 77, elevated white blood cells at 10.82, and physical exam positive for increased erythema and warmth over the left lower extremity with ulceration and malodorous drainage from the left foot. The patient was subsequently admitted to inpatient [facility] of care for IV antibiotic treatment and further assessment of his cellulitis. On admission, he denies shortness of breath, chest pain, nausea, vomiting, and changes in bowel movements.     Condition at Discharge: stable     Discharge Day Visit / Exam:     Subjective:  Patient seen and examined at bedside this morning. No acute events overnight. No new symptoms reported. Feels ready for discharge upon receiving pathology results s/p left partial ray amputation 4/26/24.     Vitals: Blood Pressure: 135/69 (05/01/24 0637)  Pulse: 71 (05/01/24 0637)  Temperature: 97.8 °F (36.6 °C) (05/01/24 0637)  Temp Source: Oral (05/01/24 0637)  Respirations: 18 (05/01/24 0637)  Height: 5' 10\" (177.8 cm) (04/23/24 1539)  Weight - Scale: 98.1 kg (216 lb 4.3 oz) (04/23/24 1539)  SpO2: 95 % (05/01/24 0637)    Exam:   Physical Exam  Constitutional:       General: He is not in acute distress.     Appearance: Normal appearance.   HENT:      Head: Normocephalic and atraumatic.   Cardiovascular:      Rate and Rhythm: Normal rate and regular rhythm.      Pulses: Normal pulses.      Heart " sounds: Normal heart sounds.   Pulmonary:      Effort: Pulmonary effort is normal.      Breath sounds: Normal breath sounds.   Musculoskeletal:      Cervical back: Normal range of motion and neck supple.   Skin:     General: Skin is warm and dry.      Comments: Surgical dressing C/D/I   Neurological:      General: No focal deficit present.      Mental Status: He is alert and oriented to person, place, and time.           Discharge instructions/Information to patient and family:   See after visit summary for information provided to patient and family.      Provisions for Follow-Up Care:  See after visit summary for information related to follow-up care and any pertinent home health orders.      Disposition:     Home with home health      Planned Readmission: no     Discharge Statement:  I spent 90 minutes discharging the patient. This time was spent on the day of discharge. I had direct contact with the patient on the day of discharge. Greater than 50% of the total time was spent examining patient, answering all patient questions, arranging and discussing plan of care with patient as well as directly providing post-discharge instructions.  Additional time then spent on discharge activities.    Discharge Medications:  See after visit summary for reconciled discharge medications provided to patient and family.      ** Please Note: This note has been constructed using a voice recognition system **

## 2024-05-01 NOTE — WOUND OSTOMY CARE
Progress  Note - Wound   Art Joseph 64 y.o. male MRN: [id number]  Unit/Bed#: 410-01 Encounter: [id number]      Assessment Findings:   Wound care weekly follow up for patient admitted with acute osteomyelitis of left foot. Patient had partial amputation of first ray on 04/26/2024. Patient OOB in the recliner, he is awake and alert, cooperative with assessment and wound treatments. He is not incontinent, is able to turn onto his side without assistance, and is a moderate assist with hygiene care. He has nutritional supplements.     1- Left foot surgical incision with sutures, dried bloody drainage on the incision line, small amount of bloody drainage on dressing when removed. Erythema noted to the dorsal foot, no increased warmth. Imaged, cleansed and dressed per Podiatry orders.   2- POA-scattered dry ulcerations to the left anterior lower leg, beefy red wound beds, periwound erythema, distal area with adherent brown-yellow eschar, no drainage. Cleansed and dressed per Podiatry order.  3- Bilateral groin folds beefy red with satellite lesions to the edges, scant weeping drainage, cleansed and Xi cream order requested from provider.   4- Bilateral lower inner buttocks MASD/fungal rash. Satellite lesions to the periwound, Xi order requested from provider.    Images taken of surgical wound per podiatry request, Dr. Rubalcava aware images in media.      Skin and Wound Care Plan:   1- Ehob offloading cushion to chair when OOB  2- Elevate heels off the bed and while in the recliner with pillows to offload heels  3- Hydraguard to bilateral heels BID and PRN  4- Skin nourishing cream daily  5- Apply Xi cream to bilateral groin folds. Lower buttocks and perineum erythema BID and PRN  6- Per Podiatry order- Apply Xeroform to the left foot incision site and all the wounds on the left lower extremity, cover with 4 x 4 gauze, ABD, Curlex, and Ace wrap, wrap from toes to tibial tuberosity. Dressing changes need to be done  Monday Wednesday Friday, take pictures for chart with each dressing change.  7- Place Allevyn/Mepilex bordered dressing to the sacrum, russell with P, date, peel back daily for skin assessment, reapply and change every 3 days and PRN    Wound:  Wound 04/23/24 Venous Ulcer Leg Left (Active)   Wound Image   05/01/24 1038   Wound Description Beefy red 05/01/24 1038   Kelly-wound Assessment Erythema 05/01/24 1038   Wound Length (cm) 21 cm 05/01/24 1039   Wound Width (cm) 7 cm 05/01/24 1039   Wound Depth (cm) 0.1 cm 05/01/24 1039   Wound Surface Area (cm^2) 147 cm^2 05/01/24 1039   Wound Volume (cm^3) 14.7 cm^3 05/01/24 1039   Calculated Wound Volume (cm^3) 14.7 cm^3 05/01/24 1039   Change in Wound Size % -63.33 05/01/24 1039   Drainage Amount None 05/01/24 1038   Drainage Description Serosanguineous 04/24/24 1107   Non-staged Wound Description Partial thickness 05/01/24 1038   Treatments Cleansed 05/01/24 1038   Dressing Xeroform;Dry dressing;ABD;Gauze 05/01/24 1038   Wound packed? No 04/24/24 1107   Dressing Changed New 05/01/24 1038   Patient Tolerance Tolerated well 05/01/24 1038   Dressing Status Clean;Dry;Intact 04/30/24 0941       Wound 04/23/24 Diabetic Ulcer Foot Left (Active)   Wound Image   04/24/24 1114   Wound Description GORDON 04/30/24 0941   Kelly-wound Assessment GORDON 04/30/24 0941   Wound Length (cm) 11 cm 04/24/24 1108   Wound Width (cm) 6 cm 04/24/24 1108   Wound Depth (cm) 0.1 cm 04/23/24 1018   Wound Surface Area (cm^2) 66 cm^2 04/24/24 1108   Wound Volume (cm^3) 8.8 cm^3 04/23/24 1018   Calculated Wound Volume (cm^3) 8.8 cm^3 04/23/24 1018   Drainage Amount Scant 04/24/24 1108   Drainage Description Foul smelling;Yellow;Purulent 04/24/24 1108   Non-staged Wound Description Full thickness 04/24/24 1108   Treatments Cleansed 04/24/24 1108   Dressing Dry dressing;ABD 04/28/24 0835   Dressing Changed New 04/24/24 1108   Patient Tolerance Tolerated well 04/24/24 1108   Dressing Status Clean;Dry;Intact 04/30/24  0941       Wound 04/23/24 Tibial Left;Posterior (Active)   Wound Image   04/24/24 1114   Wound Description Dry 05/01/24 1037   Kelly-wound Assessment GORDON 04/30/24 0941   Wound Length (cm) 2.5 cm 04/24/24 1114   Wound Width (cm) 3 cm 04/24/24 1114   Wound Surface Area (cm^2) 7.5 cm^2 04/24/24 1114   Drainage Amount None 04/24/24 1114   Non-staged Wound Description Partial thickness 04/24/24 1114   Treatments Cleansed 05/01/24 1037   Dressing Open to air 04/28/24 1942   Wound packed? No 04/24/24 1114   Dressing Changed New 04/24/24 1114   Patient Tolerance Tolerated well 05/01/24 1037   Dressing Status Clean;Dry;Intact 04/30/24 2002       Wound 04/26/24 Surgical Foot Left (Active)   Wound Image     05/01/24 1037   Wound Description Beefy red;Dry 05/01/24 1037   Kelly-wound Assessment Dry;Scaly 05/01/24 1037   Drainage Amount Small 05/01/24 1037   Drainage Description Serosanguineous;Bloody 05/01/24 1037   Non-staged Wound Description Full thickness 05/01/24 1037   Treatments Cleansed 05/01/24 1037   Dressing Xeroform;Dry dressing;ABD;Gauze;Other (Comment) 05/01/24 1037   Dressing Changed Changed 05/01/24 1037   Patient Tolerance Tolerated well 05/01/24 1037   Dressing Status Clean;Dry;Intact 04/30/24 0941       Wound 04/28/24 MASD Groin Anterior;Proximal;Right (Active)   Wound Image   04/28/24 1757   Wound Description Beefy red;Fragile;Pink 04/30/24 0941   Kelly-wound Assessment Excoriated;Pink;White 04/30/24 0941   Treatments Cleansed;Other (Comment) 04/30/24 0941   Dressing Open to air 04/30/24 2002   Patient Tolerance Tolerated well 04/29/24 0838       Wound 04/28/24 MASD Thigh Anterior;Left;Proximal (Active)   Wound Image    05/01/24 1047   Wound Description Beefy red;Dry 05/01/24 1047   Kelly-wound Assessment Pink;White;Excoriated 04/30/24 0941   Treatments Cleansed 05/01/24 1047   Dressing Protective barrier 05/01/24 1047   Patient Tolerance Tolerated well 05/01/24 1047       Reviewed plan of care with primary RN  Adrián  Recommendations written as orders  Wound care team to follow weekly while admitted  Questions or concerns [facility] Wound Care Nurse    Libertad CABRALN, RN, CWON

## 2024-05-01 NOTE — ASSESSMENT & PLAN NOTE
Pt sent from wound care clinic to [facility] ED due to left lower extremity ulceration with purulent drainage concerning for deep infection  MRI left foot shows small 4 mm focus of confluent T1 marrow abnormality in the distal and plantar aspect of the stump of the first metatarsal, suspect small focus of osteomyelitis   Arterial duplex left lower extremity nondiagnostic, patient with palpable pulses, per vascular surgery was cleared for procedure  Patient was placed on broad-spectrum antibiotics with IV cefepime 2 g q8h + IV vancomycin 1.25 g q12h with PO metronidazole 500 mg q8h on admission  IV cefepime and PO metronidazole discontinued per ID recommendations  Appreciate podiatry input.  Patient is status post LEFT FOOT PARTIAL FIRST RAY AMPUTATION on 4/26/24 by Dr. Jennifer Rubalcava  Tissue culture shows coagulase negative Staph  Infectious disease consult placed, recommend continuation of IV vancomycin and d/c IV cefepime until bone margin pathology is resulted  Patient is doing well postoperatively, continue postop care  PT/OT consulted

## 2024-05-02 ENCOUNTER — TELEPHONE (OUTPATIENT)
Age: 64
End: 2024-05-02

## 2024-05-02 ENCOUNTER — TRANSITIONAL CARE MANAGEMENT (OUTPATIENT)
Dept: INTERNAL MEDICINE CLINIC | Facility: CLINIC | Age: 64
End: 2024-05-02

## 2024-05-02 LAB
BACTERIA TISS AEROBE CULT: ABNORMAL
GRAM STN SPEC: ABNORMAL
GRAM STN SPEC: ABNORMAL

## 2024-05-02 NOTE — TELEPHONE ENCOUNTER
Pt was recently in the hospital and called to see if there was a sooner appt avaialble with Dr. Christina for a hospital follow up. Pt was already scheduled before being admitted into hospital. Would there be a chance for overbooking pt sooner?    Please contact pt and advise [phone]

## 2024-05-02 NOTE — TELEPHONE ENCOUNTER
Spoke with PT 5/2 3:30pm- he did not want a sooner appointment, he is okay sticking with the 5/31.

## 2024-05-02 NOTE — TELEPHONE ENCOUNTER
Caller: Art Joseph    Doctor and/or Office: Dr. Bhakta    #: [phone]    Escalation: Surgery Patient needs 1 week post op scheduled. Please return call. Thank you

## 2024-05-07 ENCOUNTER — OFFICE VISIT (OUTPATIENT)
Dept: WOUND CARE | Facility: CLINIC | Age: 64
End: 2024-05-07
Payer: COMMERCIAL

## 2024-05-07 VITALS
RESPIRATION RATE: 16 BRPM | HEART RATE: 70 BPM | TEMPERATURE: 96.8 F | SYSTOLIC BLOOD PRESSURE: 128 MMHG | DIASTOLIC BLOOD PRESSURE: 60 MMHG

## 2024-05-07 DIAGNOSIS — Z79.4 TYPE 2 DIABETES MELLITUS WITH FOOT ULCER, WITH LONG-TERM CURRENT USE OF INSULIN (HCC): ICD-10-CM

## 2024-05-07 DIAGNOSIS — L03.116 CELLULITIS OF LEFT LOWER EXTREMITY: ICD-10-CM

## 2024-05-07 DIAGNOSIS — L97.509 TYPE 2 DIABETES MELLITUS WITH FOOT ULCER, WITH LONG-TERM CURRENT USE OF INSULIN (HCC): ICD-10-CM

## 2024-05-07 DIAGNOSIS — T81.30XA DEHISCENCE OF WOUND: Primary | ICD-10-CM

## 2024-05-07 DIAGNOSIS — E11.621 TYPE 2 DIABETES MELLITUS WITH FOOT ULCER, WITH LONG-TERM CURRENT USE OF INSULIN (HCC): ICD-10-CM

## 2024-05-07 PROCEDURE — 99213 OFFICE O/P EST LOW 20 MIN: CPT | Performed by: PODIATRIST

## 2024-05-07 PROCEDURE — 99024 POSTOP FOLLOW-UP VISIT: CPT | Performed by: PODIATRIST

## 2024-05-07 NOTE — LETTER
[facility]  [address], [place] [address]  Phone#  [phone]  Fax#  [phone]    Patient:  Art Joseph  YOB: 1960  Phone:  [phone]  Date of Visit:  5/7/2024    Orders Placed This Encounter   Procedures   • Wound cleansing and dressings     Left lower leg healed  Wash your hands with soap and water.  Remove old dressing, discard into plastic bag and place in trash.  Cleanse the wound with nss or soap and water prior to applying a clean dressing. Do not use tissue or cotton balls. Do not scrub the wound. Pat dry using gauze.  Shower no   Apply moisturizing lotion to skin surrounding wound  Apply adaptic then calcium alginate  to the left foot  wound.  Cover with gauze  Secure with tristen and tape  Change dressing 3 times a week  Home health to complete wound care  Follow up in 1 week     Standing Status:   Future     Standing Expiration Date:   5/14/2024   • Wound Procedure Treatment Surgical Left Foot     This order was created via procedure documentation         Electronically signed by Myron Bhakta DPM

## 2024-05-07 NOTE — PATIENT INSTRUCTIONS
Orders Placed This Encounter   Procedures    Wound cleansing and dressings     Left lower leg healed  Wash your hands with soap and water.  Remove old dressing, discard into plastic bag and place in trash.  Cleanse the wound with nss or soap and water prior to applying a clean dressing. Do not use tissue or cotton balls. Do not scrub the wound. Pat dry using gauze.  Shower no   Apply moisturizing lotion to skin surrounding wound  Apply adaptic then calcium alginate  to the left foot  wound.  Cover with gauze  Secure with trisetn and tape  Change dressing 3 times a week  Home health to complete wound care  Follow up in 1 week     Standing Status:   Future     Standing Expiration Date:   5/14/2024    Wound Procedure Treatment Surgical Left Foot     This order was created via procedure documentation

## 2024-05-07 NOTE — PROGRESS NOTES
Wound Procedure Treatment Surgical Left Foot    Performed by: Marianela Crawford RN  Authorized by: Myron Bhakta DPM  Associated wounds:   Wound 04/26/24 Surgical Foot Left  Wound cleansed with:  NSS  Applied to periwound:  Moisture lotion  Applied primary dressing:  Calcium alginate  Applied secondary dressing:  Gauze  Wound secured with:  Johan and Tape

## 2024-05-07 NOTE — PROGRESS NOTES
Patient ID: Art Joseph is a 64 y.o. male Date of Birth 1960       Chief Complaint   Patient presents with    Follow Up Wound Care Visit     Surgical incision left foot post op visit       Allergies:  Patient has no known allergies.    Diagnosis:  1. Dehiscence of wound    2. Cellulitis of left lower extremity    3. Type 2 diabetes mellitus with foot ulcer, with long-term current use of insulin ([facility])       Diagnosis ICD-10-CM Associated Orders   1. Dehiscence of wound  T81.30XA       2. Cellulitis of left lower extremity  L03.116       3. Type 2 diabetes mellitus with foot ulcer, with long-term current use of insulin ([facility])  E11.621     L97.509     Z79.4            Assessment & Plan:  See wound orders.   - Margins are positive and growing S. Simulans, discussed case in detail with ID and recommend ***  - WBAt to the left foot in sx shoe  - Changes with adaptic, maxsorb, dsd  - return 1 week for evaluation  - leg is healed, discussed lifestyle modifications as below.     Subjective:   Patient is here for f/u on the left leg and also the left foot. Had repeat partial first ray and was discharged on 1 week of oral abx therapy. Has VNA coming 3x weekly, no new pedal complaints.         The following portions of the patient's history were reviewed and updated as appropriate:   Patient Active Problem List   Diagnosis    Type 2 diabetes mellitus with foot ulcer, with long-term current use of insulin ([facility])    Diabetic neuropathy (HCC)    Acute osteomyelitis of metatarsal bone of left foot (HCC)    Hx of right BKA ([facility])    Ambulatory dysfunction    Primary hypertension    Hyperlipidemia    Vitamin D deficiency    Diabetic peripheral angiopathy (HCC)    Severe obesity (BMI 35.0-39.9) with comorbidity (HCC)    Cellulitis of left lower extremity    Candidiasis, intertrigo    Elevated platelet count     Past Medical History:   Diagnosis Date    Diabetes mellitus (HCC)     Hypertension      Past Surgical History:    Procedure Laterality Date    LEG AMPUTATION THROUGH LOWER TIBIA AND FIBULA Right 7/25/2017    Procedure: AMPUTATION BELOW KNEE (BKA);  Surgeon: Mynor Sanchez MD;  [facility];  Service: General    WV AMPUTATION FOOT TRANSMETARSAL Right 1/26/2017    Procedure: AMPUTATION TRANSMETATARSAL (TMA); OPEN;  Surgeon: Leonard Lomeli DPM;  [facility];  Service: Podiatry    WV AMPUTATION FOOT TRANSMETARSAL Left 4/26/2024    Procedure: LEFT FOOT PARTIAL FIRST RAY AMPUTATION;  Surgeon: Jennifer Rubalcava DPM;  Location: [facility];  Service: Podiatry    WV AMPUTATION METATARSAL W/TOE SINGLE Left 1/30/2017    Procedure: Left partial 1st ray amputation;  Surgeon: Leonard Lomeli DPM;  [facility];  Service: Podiatry    WV COLONOSCOPY FLX DX W/COLLJ SPEC WHEN PFRMD N/A 11/28/2018    Procedure: COLONOSCOPY;  Surgeon: González Napier MD;  Location: [facility];  Service: Gastroenterology    WOUND DEBRIDEMENT Right 1/30/2017    Procedure: DEBRIDEMENT FOOT/TOE (WASH OUT) delayed closure, LINDA;  Surgeon: Leonard Lomeli DPM;  [facility];  Service:      Social History     Socioeconomic History    Marital status: Single     Spouse name: Not on file    Number of children: Not on file    Years of education: Not on file    Highest education level: Not on file   Occupational History    Occupation: retired   Tobacco Use    Smoking status: Former     Types: Cigarettes    Smokeless tobacco: Never    Tobacco comments:     quit 9 years ago   Vaping Use    Vaping status: Never Used   Substance and Sexual Activity    Alcohol use: Yes     Alcohol/week: 11.0 standard drinks of alcohol     Types: 6 Cans of beer, 5 Shots of liquor per week     Comment: social ; occasional use as per [employer]    Drug use: No    Sexual activity: Not on file   Other Topics Concern    Not on file   Social History Narrative    Always uses seat belt    Caffeine use    Dental care regularly     Social Determinants of Health     Financial Resource  Strain: Medium Risk (4/25/2023)    Overall Financial Resource Strain (CARDIA)     Difficulty of Paying Living Expenses: Somewhat hard   Food Insecurity: No Food Insecurity (4/24/2024)    Hunger Vital Sign     Worried About Running Out of Food in the Last Year: Never true     Ran Out of Food in the Last Year: Never true   Transportation Needs: No Transportation Needs (4/24/2024)    PRAPARE - Transportation     Lack of Transportation (Medical): No     Lack of Transportation (Non-Medical): No   Physical Activity: Not on file   Stress: Not on file   Social Connections: Not on file   Intimate Partner Violence: Not on file   Housing Stability: Low Risk  (4/24/2024)    Housing Stability Vital Sign     Unable to Pay for Housing in the Last Year: No     Number of Places Lived in the Last Year: 1     Unstable Housing in the Last Year: No        Current Outpatient Medications:     aspirin (ECOTRIN LOW STRENGTH) 81 mg EC tablet, Take 81 mg by mouth daily Resume on 8/11/17 , Disp: , Rfl:     atorvastatin (LIPITOR) 80 mg tablet, Take 1 tablet (80 mg total) by mouth daily, Disp: 90 tablet, Rfl: 3    Cholecalciferol (VITAMIN D3) 1,000 units tablet, Take 2 tablets (2,000 Units total) by mouth daily For vitamin D deficiency, Disp: 60 tablet, Rfl: 0    glucose blood test strip, Test blood sugars 4 times a day, Disp: 400 each, Rfl: 3    insulin aspart (NovoLOG FlexPen) 100 UNIT/ML injection pen, Inject 10 Units under the skin 3 (three) times a day with meals You must eat a full meal with this insulin dose., Disp: , Rfl:     Insulin Glargine Solostar (Basaglar KwikPen) 100 UNIT/ML SOPN, Inject 0.33 mL (33 Units total) under the skin every 12 (twelve) hours, Disp: , Rfl:     Insulin Pen Needle 30G X 8 MM MISC, Inject under the skin daily at bedtime, Disp: 100 each, Rfl: 5    lisinopril (ZESTRIL) 2.5 mg tablet, Take 1 tablet (2.5 mg total) by mouth daily, Disp: 90 tablet, Rfl: 3    miconazole 2 % cream, Apply topically 2 (two) times a day  "Apply to the groin region/bilateral inguinal region and buttock/sacral region., Disp: 56 g, Rfl: 0    Multiple Vitamin (MULTI-VITAMIN DAILY PO), Take 1 tablet by mouth daily, Disp: , Rfl:   Family History   Problem Relation Age of Onset    Diabetes Mother       Review of Systems   Constitutional:  Negative for chills and fever.   HENT:  Negative for ear pain and sore throat.    Eyes:  Negative for pain and visual disturbance.   Respiratory:  Negative for cough and shortness of breath.    Cardiovascular:  Negative for chest pain and palpitations.   Gastrointestinal:  Negative for abdominal pain and vomiting.   Genitourinary:  Negative for dysuria and hematuria.   Musculoskeletal:  Negative for arthralgias and back pain.   Skin:  Negative for color change and rash.   Neurological:  Negative for seizures and syncope.   All other systems reviewed and are negative.        Objective:  There were no vitals taken for this visit.    Physical Exam  Vitals reviewed.   Constitutional:       Appearance: Normal appearance. He is obese.   HENT:      Head: Normocephalic and atraumatic.      Nose: Nose normal.      Mouth/Throat:      Mouth: Mucous membranes are moist.   Musculoskeletal:      Comments: Slight erythema, cellulitis is resolved central dehiscence noted. Moderate drainage, erythema and swelling is consistent with post op course   Neurological:      General: No focal deficit present.      Mental Status: He is alert.             Wound 04/23/24 Venous Ulcer Leg Left (Active)       Wound 04/23/24 Diabetic Ulcer Foot Left (Active)       Wound 04/26/24 Surgical Foot Left (Active)   Wound Image   05/07/24 1534                         Procedures             Wound Instructions:  No orders of the defined types were placed in this encounter.        Myron Bhakta DPM      Portions of the record may have been created with voice recognition software. Occasional wrong word or \"sound a like\" substitutions may have occurred " due to the inherent limitations of voice recognition software. Read the chart carefully and recognize, using context, where substitutions have occurred.

## 2024-05-08 LAB
ALBUMIN SERPL BCP-MCNC: 3.2 G/DL (ref 3.5–5)
ALP SERPL-CCNC: 96 U/L (ref 34–104)
ALT SERPL W P-5'-P-CCNC: 21 U/L (ref 7–52)
ANION GAP SERPL CALCULATED.3IONS-SCNC: 7 MMOL/L (ref 4–13)
AST SERPL W P-5'-P-CCNC: 18 U/L (ref 13–39)
BILIRUB SERPL-MCNC: 0.26 MG/DL (ref 0.2–1)
BUN SERPL-MCNC: 22 MG/DL (ref 5–25)
CALCIUM ALBUM COR SERPL-MCNC: 9.7 MG/DL (ref 8.3–10.1)
CALCIUM SERPL-MCNC: 9.1 MG/DL (ref 8.4–10.2)
CHLORIDE SERPL-SCNC: 107 MMOL/L (ref 96–108)
CO2 SERPL-SCNC: 26 MMOL/L (ref 21–32)
CREAT SERPL-MCNC: 0.69 MG/DL (ref 0.6–1.3)
GFR SERPL CREATININE-BSD FRML MDRD: 99 ML/MIN/1.73SQ M
GLUCOSE SERPL-MCNC: 57 MG/DL (ref 65–140)
POTASSIUM SERPL-SCNC: 3.8 MMOL/L (ref 3.5–5.3)
PROT SERPL-MCNC: 6.8 G/DL (ref 6.4–8.4)
SODIUM SERPL-SCNC: 140 MMOL/L (ref 135–147)

## 2024-05-09 ENCOUNTER — TELEMEDICINE (OUTPATIENT)
Dept: INTERNAL MEDICINE CLINIC | Facility: CLINIC | Age: 64
End: 2024-05-09
Payer: COMMERCIAL

## 2024-05-09 VITALS
OXYGEN SATURATION: 97 % | HEART RATE: 80 BPM | DIASTOLIC BLOOD PRESSURE: 60 MMHG | TEMPERATURE: 97.6 F | SYSTOLIC BLOOD PRESSURE: 130 MMHG

## 2024-05-09 DIAGNOSIS — Z89.511 HX OF RIGHT BKA (HCC): ICD-10-CM

## 2024-05-09 DIAGNOSIS — R79.89 ELEVATED PLATELET COUNT: ICD-10-CM

## 2024-05-09 DIAGNOSIS — M86.172 ACUTE OSTEOMYELITIS OF METATARSAL BONE OF LEFT FOOT (HCC): ICD-10-CM

## 2024-05-09 DIAGNOSIS — E11.621 TYPE 2 DIABETES MELLITUS WITH FOOT ULCER, WITH LONG-TERM CURRENT USE OF INSULIN (HCC): Primary | ICD-10-CM

## 2024-05-09 DIAGNOSIS — L97.509 TYPE 2 DIABETES MELLITUS WITH FOOT ULCER, WITH LONG-TERM CURRENT USE OF INSULIN (HCC): Primary | ICD-10-CM

## 2024-05-09 DIAGNOSIS — L03.116 CELLULITIS OF LEFT LOWER EXTREMITY: ICD-10-CM

## 2024-05-09 DIAGNOSIS — Z79.4 TYPE 2 DIABETES MELLITUS WITH FOOT ULCER, WITH LONG-TERM CURRENT USE OF INSULIN (HCC): Primary | ICD-10-CM

## 2024-05-09 PROCEDURE — 99496 TRANSJ CARE MGMT HIGH F2F 7D: CPT | Performed by: NURSE PRACTITIONER

## 2024-05-09 NOTE — PROGRESS NOTES
Virtual TCM Visit:    Verification of patient location:    Patient is located at Home in the following state in which I hold an active license [place]    Assessment/Plan:      Acute osteomyelitis of metatarsal bone of left foot (HCC)  Assessment & Plan  Pt sent from wound care clinic to [facility] ED due to left lower extremity ulceration with purulent drainage concerning for deep infection  MRI left foot shows small 4 mm focus of confluent T1 marrow abnormality in the distal and plantar aspect of the stump of the first metatarsal, suspect small focus of osteomyelitis   Arterial duplex left lower extremity nondiagnostic, patient with palpable pulses, per vascular surgery was cleared for procedure  Patient was placed on broad-spectrum antibiotics with IV cefepime 2 g q8h + IV vancomycin 1.25 g q12h with PO metronidazole 500 mg q8h on admission  IV cefepime, IV vancomycin, and PO metronidazole discontinued per ID recommendations  Appreciate podiatry input.  Patient is status post LEFT FOOT PARTIAL FIRST RAY AMPUTATION on 4/26/24 by Dr. Jennifer Rubalcava  Tissue culture shows coagulase negative Staph  Bone margin pathology is without residual osteomyelitis.   Infectious disease consult placed, recommend PO Bactrim for additional two days of antibiotics  Patient is doing well postoperatively, continue postop care  follow-up with podiatry       Elevated platelet count  Assessment & Plan  Likely reactive in nature in the setting of acute infection  Now resolved     Candidiasis, intertrigo  Assessment & Plan  Around scrotal area  Nystatin cream ordered     Cellulitis of left lower extremity  Assessment & Plan  Patient presents with left lower extremity wounds with surrounding cellulitis  Please refer to above under acute osteomyelitis     Hyperlipidemia  Assessment & Plan  Continue home Lipitor 80 mg QD     Primary hypertension  Assessment & Plan  Adequate control, continue home regimen     Hx of right BKA (HCC)  Assessment &  Plan  History noted  PT/OT evaluation recommends discharge to home with home health     Type 2 diabetes mellitus with foot ulcer, with long-term current use of insulin (HCC)  Assessment & Plan        Lab Results   Component Value Date     HGBA1C 10.8 (H) 04/28/2024                Recent Labs     04/30/24  1549 04/30/24 2010 05/01/24  0716 05/01/24  1053   POCGLU 150* 212* 73 247*         Blood Sugar Average: Last 72 hrs: (P) [id number]  Hx of uncontrolled blood glucose levels  Algorithm 3 sliding scale before meals  PTA lantus 55 units BID, Humalog 15 units 3 times daily  Current blood glucose is in acceptable range, continue on Lantus 33 units twice daily, Humalog 6 units 3 times daily  follow-up with endocrinology               Problem List Items Addressed This Visit       Type 2 diabetes mellitus with foot ulcer, with long-term current use of insulin (HCC) - Primary    Acute osteomyelitis of metatarsal bone of left foot ([facility])    Hx of right BKA ([facility])    Cellulitis of left lower extremity    Elevated platelet count        Reason for visit is LIZ    Encounter provider ALFONSO Newton     Provider located at [address] [facility]  [address] [facility] [address]    Recent Visits  No visits were found meeting these conditions.  Showing recent visits within past 7 days and meeting all other requirements  Today's Visits  Date Type Provider Dept   05/09/24 Telemedicine ALFONSO Newton [facility]   Showing today's visits and meeting all other requirements  Future Appointments  No visits were found meeting these conditions.  Showing future appointments within next 150 days and meeting all other requirements       After connecting through [employer], the patient was identified by name and date of birth. Art Joseph was informed that this is a telemedicine visit and that the visit is being conducted through Telephone.  My  office door was closed. No one else was in the room.  He acknowledged consent and understanding of privacy and security of the video platform. The patient has agreed to participate and understands they can discontinue the visit at any time.    Patient is aware this is a billable service.    Transitional Care Management Review:  Art Joseph is a 64 y.o. male here for TCM follow up.     During the TCM phone call patient stated:    TCM Call       Date and time call was made  5/2/2024  1:05 PM    Hospital care reviewed  Records reviewed    Patient was hospitialized at  [facility]    Date of Admission  04/23/24    Date of discharge  05/01/24    Diagnosis  Acute osteomyelitis of metatarsal bone of left foot    Disposition  Home    Were the patients medications reviewed and updated  Yes    Current Symptoms  None          TCM Call       Post hospital issues  None    Should patient be enrolled in anticoag monitoring?  No    Scheduled for follow up?  Yes    Did you obtain your prescribed medications  Yes    Do you need help managing your prescriptions or medications  No    Is transportation to your appointment needed  Yes    Specify why  Takes  [facility]    I have advised the patient to call PCP with any new or worsening symptoms  Salma Garcia Lead    Living Arrangements  Alone    Support System  Home care staff    The type of support provided  Emotional    Do you have social support  Yes, as much as I need    Are you recieving any outpatient services  No    Are you recieving home care services  Yes    Types of home care services  Nurse visit; Other (comment)    Comment  [facility]    Are you using any community resources  No    Current waiver services  No    Have you fallen in the last 12 months  No    Interperter language line needed  No    Counseling  Patient    Counseling topics  Activities of daily living    Comments  Pt stated he does not have any pain.  Stated he does have a boot on his L foot.  Stated  he can ambulate, but must walk on his L heel.          Subjective:     Patient ID: Art Joseph is a 64 y.o. male.    Art is for a LIZ visit. He was admitted for foot ulcer. Per ERS notes 64-year-old male with past medical history of peripheral artery disease, diabetes, hypertension who presents for evaluation of left leg wound.  Patient was sent from wound care.  Patient has had a wound on his left leg for the past few weeks.  He was treated with Keflex without improvement in symptoms.  He was seen at wound care today and they were concerned about worsening cellulitis of sentiment to the emergency department for admission for IV antibiotics.  Patient denies fevers.  Denies chest pain or shortness of breath.  Denies abdominal pain, nausea, vomiting, or diarrhea. There was also concerns about bed bugs, patient was decontaminated prior to being roomed in ER. He is doing well since coming home. He did see Podiatry on the 7th and does go back on the 14th. He is not having any pain or any issues today.       Review of Systems   Skin:  Positive for wound.   All other systems reviewed and are negative.        Below is the patient's most recent value for Albumin, ALT, AST, BUN, Calcium, Chloride, Cholesterol, CO2, Creatinine, GFR, Glucose, HDL, Hematocrit, Hemoglobin, Hemoglobin A1C, LDL, Magnesium, Phosphorus, Platelets, Potassium, PSA, Sodium, Triglycerides, and WBC.   Lab Results   Component Value Date    ALT 21 05/01/2024    AST 18 05/01/2024    BUN 22 05/01/2024    CALCIUM 9.1 05/01/2024     05/01/2024    CO2 26 05/01/2024    CREATININE 0.69 05/01/2024    HDL 33 (L) 10/27/2023    HCT 39.7 05/01/2024    HGB 13.0 05/01/2024    HGBA1C 10.8 (H) 04/28/2024    MG 2.1 05/01/2024    PHOS 3.2 05/01/2024     05/01/2024    K 3.8 05/01/2024    PSA 1.9 04/24/2023     01/06/2014    TRIG 187 (H) 10/27/2023    WBC 9.01 05/01/2024     Note: for a comprehensive list of the patient's lab results, access the  Results Review activity.  Objective:    Vitals:    05/09/24 1306   BP: 130/60   Pulse: 80   Temp: 97.6 °F (36.4 °C)   SpO2: 97%       Physical Exam  Neurological:      Mental Status: He is alert.   Psychiatric:         Mood and Affect: Mood normal.         Behavior: Behavior normal.         Thought Content: Thought content normal.         Judgment: Judgment normal.         Medications have been reviewed by provider in current encounter      Current Outpatient Medications:     aspirin (ECOTRIN LOW STRENGTH) 81 mg EC tablet, Take 81 mg by mouth daily Resume on 8/11/17 , Disp: , Rfl:     atorvastatin (LIPITOR) 80 mg tablet, Take 1 tablet (80 mg total) by mouth daily, Disp: 90 tablet, Rfl: 3    Cholecalciferol (VITAMIN D3) 1,000 units tablet, Take 2 tablets (2,000 Units total) by mouth daily For vitamin D deficiency, Disp: 60 tablet, Rfl: 0    glucose blood test strip, Test blood sugars 4 times a day, Disp: 400 each, Rfl: 3    insulin aspart (NovoLOG FlexPen) 100 UNIT/ML injection pen, Inject 10 Units under the skin 3 (three) times a day with meals You must eat a full meal with this insulin dose., Disp: , Rfl:     Insulin Glargine Solostar (Basaglar KwikPen) 100 UNIT/ML SOPN, Inject 0.33 mL (33 Units total) under the skin every 12 (twelve) hours, Disp: , Rfl:     Insulin Pen Needle 30G X 8 MM MISC, Inject under the skin daily at bedtime, Disp: 100 each, Rfl: 5    lisinopril (ZESTRIL) 2.5 mg tablet, Take 1 tablet (2.5 mg total) by mouth daily, Disp: 90 tablet, Rfl: 3    miconazole 2 % cream, Apply topically 2 (two) times a day Apply to the groin region/bilateral inguinal region and buttock/sacral region., Disp: 56 g, Rfl: 0    Multiple Vitamin (MULTI-VITAMIN DAILY PO), Take 1 tablet by mouth daily, Disp: , Rfl:    I spent 10 minutes with the patient during this visit.    ALFONSO Newton      VIRTUAL VISIT DISCLAIMER    Art Joseph verbally agrees to participate in Virtual Care Services. Pt is aware that  Virtual Care Services could be limited without vital signs or the ability to perform a full hands-on physical exam. Art Joseph understands he or the provider may request at any time to terminate the video visit and request the patient to seek care or treatment in person.

## 2024-05-13 ENCOUNTER — TELEPHONE (OUTPATIENT)
Age: 64
End: 2024-05-13

## 2024-05-13 NOTE — TELEPHONE ENCOUNTER
Patient called the RX Refill Line. Message is being forwarded to the office.     Patient needs a new prosthetic liner for right prosthetic leg . Needs that faxed over to   [facility]  [phone]    Please contact patient at [phone] if any issues

## 2024-05-14 ENCOUNTER — OFFICE VISIT (OUTPATIENT)
Dept: WOUND CARE | Facility: CLINIC | Age: 64
End: 2024-05-14
Payer: COMMERCIAL

## 2024-05-14 VITALS
DIASTOLIC BLOOD PRESSURE: 68 MMHG | HEART RATE: 80 BPM | RESPIRATION RATE: 18 BRPM | TEMPERATURE: 97.3 F | SYSTOLIC BLOOD PRESSURE: 122 MMHG

## 2024-05-14 DIAGNOSIS — L97.921 ULCER OF LEG, CHRONIC, LEFT, LIMITED TO BREAKDOWN OF SKIN (HCC): Primary | ICD-10-CM

## 2024-05-14 DIAGNOSIS — T81.30XA DEHISCENCE OF WOUND: ICD-10-CM

## 2024-05-14 DIAGNOSIS — L97.522 ULCER OF LEFT FOOT WITH FAT LAYER EXPOSED (HCC): ICD-10-CM

## 2024-05-14 PROCEDURE — 11042 DBRDMT SUBQ TIS 1ST 20SQCM/<: CPT | Performed by: PODIATRIST

## 2024-05-14 PROCEDURE — 29581 APPL MULTLAYER CMPRN SYS LEG: CPT

## 2024-05-14 PROCEDURE — 11045 DBRDMT SUBQ TISS EACH ADDL: CPT | Performed by: PODIATRIST

## 2024-05-14 NOTE — PROGRESS NOTES
Wound Procedure Treatment Surgical Left Foot    Performed by: Bette Otto RN  Authorized by: Myron Bhakta DPM  Associated wounds:   Wound 04/26/24 Surgical Foot Left  Wound cleansed with:  NSS  Applied primary dressing:  Acticoat  Applied secondary dressing:  ABD  Wound secured with:  Johan  Offloading device appllied:  Surgical shoe  Wound Procedure Treatment Venous Ulcer Left Leg    Performed by: Bette Otto RN  Authorized by: Myron Bhakta DPM  Associated wounds:   Wound 04/23/24 Venous Ulcer Leg Left  Wound cleansed with:  Soap and water  Applied primary dressing:  Non adherent contact layer and Calcium alginate  Applied secondary dressing:  ABD  Wound secured with:  Johan

## 2024-05-14 NOTE — PROGRESS NOTES
"Cast Application    Date/Time: 5/14/2024 2:30 PM    Performed by: Bette Otto RN  Authorized by: Myron Bhakta DPM  Universal Protocol:  Consent: Verbal consent obtained.  Consent given by: patient  Time out: Immediately prior to procedure a \"time out\" was called to verify the correct patient, procedure, equipment, support staff and site/side marked as required.  Patient understanding: patient states understanding of the procedure being performed  Patient consent: the patient's understanding of the procedure matches consent given  Procedure consent: procedure consent matches procedure scheduled  Patient identity confirmed: verbally with patient    Pre-procedure details:     Sensation:  Normal    Skin color:  WNL  Procedure details:     Laterality:  Left    Location:  Leg    Leg:  L lower legCast type:  Multi-layer compression short leg        Supplies:  2 layer wrap  Post-procedure details:     Pain:  Unchanged    Sensation:  Normal    Skin color:  WNL    Patient tolerance of procedure:  Tolerated well, no immediate complications  Comments:      Multilayer wrap procedure     Before application, WILLY and/or TBI determined to be adequate for healing and application of compression. Lower extremity washed prior to application of compression wrap. With the foot in dorsiflexed position, Coflex lite was applied as per physician orders without complications or complaint of pain.  The procedure was tolerated well. Toes warm & pink post application.  Patient provided education & reinforced to observe toes for any discoloration, swelling or tingling and instructed when to report to the Wound Center or to remove compression. Wound care as per providers orders, patient tolerated well.        "

## 2024-05-14 NOTE — PATIENT INSTRUCTIONS
Orders Placed This Encounter   Procedures    Wound cleansing and dressings Surgical Left Foot     Wash your hands with soap and water.  Remove old dressing, discard into plastic bag and place in trash.  Cleanse the wound with nss or soap and water prior to applying a clean dressing. Do not use tissue or cotton balls. Do not scrub the wound. Pat dry using gauze.   Shower no     Apply moisturizing lotion to skin surrounding wound     Apply acticoat 3 to the left foot  wound.  Cover with ABD   Secure with tristen and tape   Change dressing 2 times a week     Home health to complete wound care       Follow up in 1 week     Standing Status:   Future     Standing Expiration Date:   5/21/2024    Wound cleansing and dressings Venous Ulcer Left Leg     Left lower leg wound  Wash your hands with soap and water.  Remove old dressing, discard into plastic bag and place in trash.  Cleanse the wound with normal saline prior to applying a clean dressing. Do not use tissue or cotton balls. Do not scrub the wound. Pat dry using gauze.  Shower no     Apply adaptic then calcium alginate to the left lower leg wounds wound.  Cover with ABD  Secure with roll gauze / coflex lite   Change dressing 2x weekly     Multi-layer compression wrap Instructions    Keep compression wrap/wraps clean and dry. If wraps are too tight and you experience numbness/tingling, call the wound center. If after hours, remove wraps or proceed to nearest E.R. and call wound center in AM.  Wrap will be changed 2x weekly  Avoid prolonged standing in one place.  Elevate leg(s) above the level of the heart when sitting or as much as possible.     Follow up in 1 week     Standing Status:   Future     Standing Expiration Date:   5/21/2024

## 2024-05-14 NOTE — PROGRESS NOTES
Patient ID: [id number] Joseph is a 64 y.o. male Date of Birth 1960       Chief Complaint   Patient presents with    Follow Up Wound Care Visit     Left foot wound       Allergies:  Patient has no known allergies.    Diagnosis:  1. Ulcer of leg, chronic, left, limited to breakdown of skin ([facility])  -     Wound cleansing and dressings Venous Ulcer Left Leg; Future; Expected date: 05/14/2024    2. Ulcer of left foot with fat layer exposed ([facility])  -     Wound cleansing and dressings Surgical Left Foot; Future       Diagnosis ICD-10-CM Associated Orders   1. Ulcer of leg, chronic, left, limited to breakdown of skin ([facility])  L97.921 Wound cleansing and dressings Venous Ulcer Left Leg      2. Ulcer of left foot with fat layer exposed ([facility])  L97.522 Wound cleansing and dressings Surgical Left Foot           Assessment & Plan:  See wound orders.   -Excisional debridement to the left foot  - Weight-bear as tolerated to the left foot in surgical shoe  - Advised to sleep only on the bed not a recliner  - Coflex lite applied to the left lower extremity and Acticoat's contact layer to the left foot  - Surgical debridement to left foot  - Return 1 week    Subjective:   Patient transfer evaluation management of his left foot, he reports that he is sleeping in his recliner but the leg is elevated.  Notes new drainage coming from the anterior aspect of his left lower extremity        The following portions of the patient's history were reviewed and updated as appropriate:   Patient Active Problem List   Diagnosis    Type 2 diabetes mellitus with foot ulcer, with long-term current use of insulin ([facility])    Diabetic neuropathy ([facility])    Acute osteomyelitis of metatarsal bone of left foot ([facility])    Hx of right BKA ([facility])    Ambulatory dysfunction    Primary hypertension    Hyperlipidemia    Vitamin D deficiency    Diabetic peripheral angiopathy ([facility])    Severe obesity (BMI 35.0-39.9) with comorbidity ([facility])    Cellulitis of left lower extremity     Candidiasis, intertrigo    Elevated platelet count     Past Medical History:   Diagnosis Date    Diabetes mellitus (HCC)     Hypertension      Past Surgical History:   Procedure Laterality Date    LEG AMPUTATION THROUGH LOWER TIBIA AND FIBULA Right 7/25/2017    Procedure: AMPUTATION BELOW KNEE (BKA);  Surgeon: Mynor Sanchez MD;  Location:  MAIN OR;  Service: General    IL AMPUTATION FOOT TRANSMETARSAL Right 1/26/2017    Procedure: AMPUTATION TRANSMETATARSAL (TMA); OPEN;  Surgeon: Leonard Lomeli DPM;  Location: [facility];  Service: Podiatry    IL AMPUTATION FOOT TRANSMETARSAL Left 4/26/2024    Procedure: LEFT FOOT PARTIAL FIRST RAY AMPUTATION;  Surgeon: Jennifer Rubalcava DPM;  Location: [facility];  Service: Podiatry    IL AMPUTATION METATARSAL W/TOE SINGLE Left 1/30/2017    Procedure: Left partial 1st ray amputation;  Surgeon: Leonard Lomeli DPM;  Location:  MAIN OR;  Service: Podiatry    IL COLONOSCOPY FLX DX W/COLLJ SPEC WHEN PFRMD N/A 11/28/2018    Procedure: COLONOSCOPY;  Surgeon: González Napier MD;  Location: [facility];  Service: Gastroenterology    WOUND DEBRIDEMENT Right 1/30/2017    Procedure: DEBRIDEMENT FOOT/TOE (WASH OUT) delayed closure, LINDA;  Surgeon: Leonard Lomeli DPM;  Location: [facility];  Service:      Social History     Socioeconomic History    Marital status: Single     Spouse name: Not on file    Number of children: Not on file    Years of education: Not on file    Highest education level: Not on file   Occupational History    Occupation: retired   Tobacco Use    Smoking status: Former     Types: Cigarettes    Smokeless tobacco: Never    Tobacco comments:     quit 9 years ago   Vaping Use    Vaping status: Never Used   Substance and Sexual Activity    Alcohol use: Yes     Alcohol/week: 11.0 standard drinks of alcohol     Types: 6 Cans of beer, 5 Shots of liquor per week     Comment: social ; occasional use as per [employer]    Drug use: No    Sexual activity: Not on file   Other Topics Concern     Not on file   Social History Narrative    Always uses seat belt    Caffeine use    Dental care regularly     Social Determinants of Health     Financial Resource Strain: Medium Risk (4/25/2023)    Overall Financial Resource Strain (CARDIA)     Difficulty of Paying Living Expenses: Somewhat hard   Food Insecurity: No Food Insecurity (4/24/2024)    Hunger Vital Sign     Worried About Running Out of Food in the Last Year: Never true     Ran Out of Food in the Last Year: Never true   Transportation Needs: No Transportation Needs (4/24/2024)    PRAPARE - Transportation     Lack of Transportation (Medical): No     Lack of Transportation (Non-Medical): No   Physical Activity: Not on file   Stress: Not on file   Social Connections: Not on file   Intimate Partner Violence: Not on file   Housing Stability: Low Risk  (4/24/2024)    Housing Stability Vital Sign     Unable to Pay for Housing in the Last Year: No     Number of Places Lived in the Last Year: 1     Unstable Housing in the Last Year: No        Current Outpatient Medications:     aspirin (ECOTRIN LOW STRENGTH) 81 mg EC tablet, Take 81 mg by mouth daily Resume on 8/11/17 , Disp: , Rfl:     atorvastatin (LIPITOR) 80 mg tablet, Take 1 tablet (80 mg total) by mouth daily, Disp: 90 tablet, Rfl: 3    Cholecalciferol (VITAMIN D3) 1,000 units tablet, Take 2 tablets (2,000 Units total) by mouth daily For vitamin D deficiency, Disp: 60 tablet, Rfl: 0    glucose blood test strip, Test blood sugars 4 times a day, Disp: 400 each, Rfl: 3    insulin aspart (NovoLOG FlexPen) 100 UNIT/ML injection pen, Inject 10 Units under the skin 3 (three) times a day with meals You must eat a full meal with this insulin dose., Disp: , Rfl:     Insulin Glargine Solostar (Basaglar KwikPen) 100 UNIT/ML SOPN, Inject 0.33 mL (33 Units total) under the skin every 12 (twelve) hours, Disp: , Rfl:     Insulin Pen Needle 30G X 8 MM MISC, Inject under the skin daily at bedtime, Disp: 100 each, Rfl: 5     lisinopril (ZESTRIL) 2.5 mg tablet, Take 1 tablet (2.5 mg total) by mouth daily, Disp: 90 tablet, Rfl: 3    miconazole 2 % cream, Apply topically 2 (two) times a day Apply to the groin region/bilateral inguinal region and buttock/sacral region., Disp: 56 g, Rfl: 0    Multiple Vitamin (MULTI-VITAMIN DAILY PO), Take 1 tablet by mouth daily, Disp: , Rfl:   Family History   Problem Relation Age of Onset    Diabetes Mother       Review of Systems   Constitutional:  Negative for chills and fever.   HENT:  Negative for ear pain and sore throat.    Eyes:  Negative for pain and visual disturbance.   Respiratory:  Negative for cough and shortness of breath.    Cardiovascular:  Negative for chest pain and palpitations.   Gastrointestinal:  Negative for abdominal pain and vomiting.   Genitourinary:  Negative for dysuria and hematuria.   Musculoskeletal:  Negative for arthralgias and back pain.   Skin:  Negative for color change and rash.   Neurological:  Negative for seizures and syncope.   All other systems reviewed and are negative.        Objective:  /68   Pulse 80   Temp (!) 97.3 °F (36.3 °C)   Resp 18     Physical Exam  Skin:     Comments: Total dehiscence of the left foot surgical site, new onset ulceration left anterior aspect lower extremity moderate serous changing from that and moderate serosanguineous drainage needed from left foot, erythema seems to be improved             Wound 04/23/24 Venous Ulcer Leg Left (Active)   Wound Image   05/14/24 1432   Wound Description Epithelialization;Pink 05/14/24 1430   Kelly-wound Assessment Edema;Erythema;Scaly 05/14/24 1430   Wound Length (cm) 1.1 cm 05/14/24 1430   Wound Width (cm) 1.4 cm 05/14/24 1430   Wound Depth (cm) 0.1 cm 05/14/24 1430   Wound Surface Area (cm^2) 1.54 cm^2 05/14/24 1430   Wound Volume (cm^3) 0.154 cm^3 05/14/24 1430   Calculated Wound Volume (cm^3) 0.15 cm^3 05/14/24 1430   Change in Wound Size % 98.33 05/14/24 1430   Drainage Amount Small  05/14/24 1430   Drainage Description Serous 05/14/24 1430   Non-staged Wound Description Full thickness 05/14/24 1430       Wound 04/26/24 Surgical Foot Left (Active)   Wound Image   05/14/24 1427   Wound Description Slough;Pink;Yellow;Brown 05/14/24 1431   Kelly-wound Assessment Dry;Callus;Scaly 05/14/24 1431   Wound Length (cm) 5.7 cm 05/14/24 1431   Wound Width (cm) 1.9 cm 05/14/24 1431   Wound Depth (cm) 0.4 cm 05/14/24 1431   Wound Surface Area (cm^2) 10.83 cm^2 05/14/24 1431   Wound Volume (cm^3) 4.332 cm^3 05/14/24 1431   Calculated Wound Volume (cm^3) 4.33 cm^3 05/14/24 1431   Change in Wound Size % -220.74 05/14/24 1431   Wound Site Closure Sutures 05/14/24 1431   Drainage Amount Moderate 05/14/24 1431   Drainage Description Serosanguineous 05/14/24 1431   Non-staged Wound Description Full thickness 05/14/24 1431                         Debridement    Universal Protocol:  Consent: Verbal consent obtained.  Risks and benefits: risks, benefits and alternatives were discussed  Consent given by: patient  Patient understanding: patient states understanding of the procedure being performed  Patient consent: the patient's understanding of the procedure matches consent given  Patient identity confirmed: verbally with patient    Debridement Details  Performed by: physician  Debridement type: surgical  Level of debridement: subcutaneous tissue  Pain control: lidocaine 4%      Post-debridement measurements  Length (cm): 5.7  Width (cm): 1.9  Depth (cm): 0.5  Percent debrided: 100%  Surface Area (cm^2): 10.83  Area Debrided (cm^2): 10.83  Volume (cm^3): 5.42    Tissue and other material debrided: subcutaneous tissue  Devitalized tissue debrided: callus  Instrument(s) utilized: blade and curette  Bleeding: medium  Hemostasis obtained with: pressure  Procedural pain (0-10): insensate  Post-procedural pain: insensate   Response to treatment: procedure was tolerated well                 Wound Instructions:  Orders Placed  "This Encounter   Procedures    Wound cleansing and dressings Surgical Left Foot     Wash your hands with soap and water.  Remove old dressing, discard into plastic bag and place in trash.  Cleanse the wound with nss or soap and water prior to applying a clean dressing. Do not use tissue or cotton balls. Do not scrub the wound. Pat dry using gauze.   Shower no     Apply moisturizing lotion to skin surrounding wound     Apply acticoat 3 to the left foot  wound.  Cover with ABD   Secure with tristen and tape   Change dressing 2 times a week     Home health to complete wound care       Follow up in 1 week     Standing Status:   Future     Standing Expiration Date:   5/21/2024    Wound cleansing and dressings Venous Ulcer Left Leg     Left lower leg wound  Wash your hands with soap and water.  Remove old dressing, discard into plastic bag and place in trash.  Cleanse the wound with normal saline prior to applying a clean dressing. Do not use tissue or cotton balls. Do not scrub the wound. Pat dry using gauze.  Shower no     Apply adaptic then calcium alginate to the left lower leg wounds wound.  Cover with ABD  Secure with roll gauze / coflex lite   Change dressing 2x weekly     Multi-layer compression wrap Instructions    Keep compression wrap/wraps clean and dry. If wraps are too tight and you experience numbness/tingling, call the wound center. If after hours, remove wraps or proceed to nearest E.R. and call wound center in AM.  Wrap will be changed 2x weekly  Avoid prolonged standing in one place.  Elevate leg(s) above the level of the heart when sitting or as much as possible.     Follow up in 1 week     Standing Status:   Future     Standing Expiration Date:   5/21/2024         Myron Bhakta DPM      Portions of the record may have been created with voice recognition software. Occasional wrong word or \"sound a like\" substitutions may have occurred due to the inherent limitations of voice recognition " software. Read the chart carefully and recognize, using context, where substitutions have occurred.

## 2024-05-14 NOTE — LETTER
[facility]  [address] [address]  Phone#  [phone]  Fax#  [phone]    Patient:  Art Joseph  YOB: 1960  Phone:  [phone]  Date of Visit:  5/14/2024    Orders Placed This Encounter   Procedures   • Wound cleansing and dressings Surgical Left Foot     Wash your hands with soap and water.  Remove old dressing, discard into plastic bag and place in trash.  Cleanse the wound with nss or soap and water prior to applying a clean dressing. Do not use tissue or cotton balls. Do not scrub the wound. Pat dry using gauze.   Shower no     Apply moisturizing lotion to skin surrounding wound     Apply acticoat 3 to the left foot  wound.  Cover with ABD   Secure with tristen and tape   Change dressing 2 times a week     Home health to complete wound care       Follow up in 1 week     Standing Status:   Future     Standing Expiration Date:   5/21/2024   • Wound cleansing and dressings Venous Ulcer Left Leg     Left lower leg wound  Wash your hands with soap and water.  Remove old dressing, discard into plastic bag and place in trash.  Cleanse the wound with normal saline prior to applying a clean dressing. Do not use tissue or cotton balls. Do not scrub the wound. Pat dry using gauze.  Shower no     Apply adaptic then calcium alginate to the left lower leg wounds wound.  Cover with ABD  Secure with roll gauze / coflex lite   Change dressing 2x weekly     Multi-layer compression wrap Instructions    Keep compression wrap/wraps clean and dry. If wraps are too tight and you experience numbness/tingling, call the wound center. If after hours, remove wraps or proceed to nearest E.R. and call wound center in AM.  Wrap will be changed 2x weekly  Avoid prolonged standing in one place.  Elevate leg(s) above the level of the heart when sitting or as much as possible.     Follow up in 1 week     Standing Status:   Future     Standing Expiration Date:   5/21/2024          Electronically signed by Myron Bhakta DPM

## 2024-05-21 ENCOUNTER — OFFICE VISIT (OUTPATIENT)
Dept: WOUND CARE | Facility: CLINIC | Age: 64
End: 2024-05-21
Payer: COMMERCIAL

## 2024-05-21 VITALS
TEMPERATURE: 97.7 F | SYSTOLIC BLOOD PRESSURE: 120 MMHG | DIASTOLIC BLOOD PRESSURE: 70 MMHG | HEART RATE: 90 BPM | RESPIRATION RATE: 18 BRPM

## 2024-05-21 DIAGNOSIS — L97.921 ULCER OF LEG, CHRONIC, LEFT, LIMITED TO BREAKDOWN OF SKIN (HCC): Primary | ICD-10-CM

## 2024-05-21 DIAGNOSIS — L97.522 ULCER OF LEFT FOOT WITH FAT LAYER EXPOSED (HCC): ICD-10-CM

## 2024-05-21 DIAGNOSIS — T81.30XA DEHISCENCE OF WOUND: ICD-10-CM

## 2024-05-21 PROCEDURE — 11045 DBRDMT SUBQ TISS EACH ADDL: CPT | Performed by: PODIATRIST

## 2024-05-21 PROCEDURE — 11042 DBRDMT SUBQ TIS 1ST 20SQCM/<: CPT | Performed by: PODIATRIST

## 2024-05-21 NOTE — PROGRESS NOTES
Patient ID: Art Joseph is a 64 y.o. male Date of Birth 1960       Chief Complaint   Patient presents with    Follow Up Wound Care Visit     Left leg wound       Allergies:  Patient has no known allergies.    Diagnosis:  1. Ulcer of leg, chronic, left, limited to breakdown of skin (HCC)  2. Dehiscence of wound  -     Wound cleansing and dressings Surgical Left Foot; Future; Expected date: 05/28/2024  -     Wound Procedure Treatment Surgical Left Foot  3. Ulcer of left foot with fat layer exposed (HCC)  -     Wound cleansing and dressings Surgical Left Foot; Future; Expected date: 05/28/2024  -     Wound Procedure Treatment Surgical Left Foot     Diagnosis ICD-10-CM Associated Orders   1. Ulcer of leg, chronic, left, limited to breakdown of skin (HCC)  L97.921       2. Dehiscence of wound  T81.30XA Wound cleansing and dressings Surgical Left Foot     Wound Procedure Treatment Surgical Left Foot      3. Ulcer of left foot with fat layer exposed (HCC)  L97.522 Wound cleansing and dressings Surgical Left Foot     Wound Procedure Treatment Surgical Left Foot           Assessment & Plan:  See wound orders.   -Periosteum was palpable, but only subcutaneous tissue was debrided  - At this point he is having some substantial worsening of the wound but the wound appears stable without any signs of infection  - Will continue local wound care monitoring for now  - Basically none of the components of his dressings that were on today were what we ordered.  The compression wrap was removed and nonexistent when he came in to wound care today, thankfully the left lower extremity the wound itself on the leg is healed.  Furthermore, the contact layer of the left foot was Xeroform and not Acticoat.  Xeroform does not carry the same moisture balance nor anti-infective properties that Acticoat does.  Unfortunately the wound was macerated and is somewhat worsened on exam today.  -Will DC Coflex lite for now  - Discussed lifestyle  modification is avoiding having leg in dependent position, continue elevation etc.    We discussed at length lifestyle modifications for venous insufficiency and lymphedema. We recommend to not sleep in a recliner. To use lymphedema pumps on a compliant basis if available. To avoid periods of sitting with legs in a dependent position. To elevate legs and lay flat for periods in the day and at night. To take diuretics as directed. Also recommend weight loss, increased ambulation, increased activity, and monitor diet especially sodium intake. To use compression stockings.         Subjective:   Patient presents for evaluation management of his left foot and also left lower extremity.  Did have Coflex lite and wrap applied last visit.  No new pedal complaints        The following portions of the patient's history were reviewed and updated as appropriate:   Patient Active Problem List   Diagnosis    Type 2 diabetes mellitus with foot ulcer, with long-term current use of insulin (HCC)    Diabetic neuropathy (HCC)    Acute osteomyelitis of metatarsal bone of left foot (HCC)    Hx of right BKA ([facility])    Ambulatory dysfunction    Primary hypertension    Hyperlipidemia    Vitamin D deficiency    Diabetic peripheral angiopathy (HCC)    Severe obesity (BMI 35.0-39.9) with comorbidity (HCC)    Cellulitis of left lower extremity    Candidiasis, intertrigo    Elevated platelet count     Past Medical History:   Diagnosis Date    Diabetes mellitus (HCC)     Hypertension      Past Surgical History:   Procedure Laterality Date    LEG AMPUTATION THROUGH LOWER TIBIA AND FIBULA Right 7/25/2017    Procedure: AMPUTATION BELOW KNEE (BKA);  Surgeon: Mynor Sanchez MD;  [facility];  Service: General    MD AMPUTATION FOOT TRANSMETARSAL Right 1/26/2017    Procedure: AMPUTATION TRANSMETATARSAL (TMA); OPEN;  Surgeon: Leonard Lomeli DPM;  [facility];  Service: Podiatry    MD AMPUTATION FOOT TRANSMETARSAL Left 4/26/2024     Procedure: LEFT FOOT PARTIAL FIRST RAY AMPUTATION;  Surgeon: Jennifer Rubalcava DPM;  [facility];  Service: Podiatry    DC AMPUTATION METATARSAL W/TOE SINGLE Left 1/30/2017    Procedure: Left partial 1st ray amputation;  Surgeon: Leonard Lomeli DPM;  [facility];  Service: Podiatry    DC COLONOSCOPY FLX DX W/COLLJ SPEC WHEN PFRMD N/A 11/28/2018    Procedure: COLONOSCOPY;  Surgeon: González Napier MD;  [facility];  Service: Gastroenterology    WOUND DEBRIDEMENT Right 1/30/2017    Procedure: DEBRIDEMENT FOOT/TOE (WASH OUT) delayed closure, LINDA;  Surgeon: Leonard Lomeli DPM;  [facility];  Service:      Social History     Socioeconomic History    Marital status: Single     Spouse name: Not on file    Number of children: Not on file    Years of education: Not on file    Highest education level: Not on file   Occupational History    Occupation: retired   Tobacco Use    Smoking status: Former     Types: Cigarettes    Smokeless tobacco: Never    Tobacco comments:     quit 9 years ago   Vaping Use    Vaping status: Never Used   Substance and Sexual Activity    Alcohol use: Yes     Alcohol/week: 11.0 standard drinks of alcohol     Types: 6 Cans of beer, 5 Shots of liquor per week     Comment: social ; occasional use as per [employer]    Drug use: No    Sexual activity: Not on file   Other Topics Concern    Not on file   Social History Narrative    Always uses seat belt    Caffeine use    Dental care regularly     Social Determinants of Health     Financial Resource Strain: Medium Risk (4/25/2023)    Overall Financial Resource Strain (CARDIA)     Difficulty of Paying Living Expenses: Somewhat hard   Food Insecurity: No Food Insecurity (4/24/2024)    Hunger Vital Sign     Worried About Running Out of Food in the Last Year: Never true     Ran Out of Food in the Last Year: Never true   Transportation Needs: No Transportation Needs (4/24/2024)    PRAPARE - Transportation     Lack of Transportation  (Medical): No     Lack of Transportation (Non-Medical): No   Physical Activity: Not on file   Stress: Not on file   Social Connections: Not on file   Intimate Partner Violence: Not on file   Housing Stability: Low Risk  (4/24/2024)    Housing Stability Vital Sign     Unable to Pay for Housing in the Last Year: No     Number of Places Lived in the Last Year: 1     Unstable Housing in the Last Year: No        Current Outpatient Medications:     aspirin (ECOTRIN LOW STRENGTH) 81 mg EC tablet, Take 81 mg by mouth daily Resume on 8/11/17 , Disp: , Rfl:     atorvastatin (LIPITOR) 80 mg tablet, Take 1 tablet (80 mg total) by mouth daily, Disp: 90 tablet, Rfl: 3    Cholecalciferol (VITAMIN D3) 1,000 units tablet, Take 2 tablets (2,000 Units total) by mouth daily For vitamin D deficiency, Disp: 60 tablet, Rfl: 0    glucose blood test strip, Test blood sugars 4 times a day, Disp: 400 each, Rfl: 3    insulin aspart (NovoLOG FlexPen) 100 UNIT/ML injection pen, Inject 10 Units under the skin 3 (three) times a day with meals You must eat a full meal with this insulin dose., Disp: , Rfl:     Insulin Glargine Solostar (Basaglar KwikPen) 100 UNIT/ML SOPN, Inject 0.33 mL (33 Units total) under the skin every 12 (twelve) hours, Disp: , Rfl:     Insulin Pen Needle 30G X 8 MM MISC, Inject under the skin daily at bedtime, Disp: 100 each, Rfl: 5    lisinopril (ZESTRIL) 2.5 mg tablet, Take 1 tablet (2.5 mg total) by mouth daily, Disp: 90 tablet, Rfl: 3    miconazole 2 % cream, Apply topically 2 (two) times a day Apply to the groin region/bilateral inguinal region and buttock/sacral region., Disp: 56 g, Rfl: 0    Multiple Vitamin (MULTI-VITAMIN DAILY PO), Take 1 tablet by mouth daily, Disp: , Rfl:   Family History   Problem Relation Age of Onset    Diabetes Mother       Review of Systems   Constitutional:  Negative for chills and fever.   HENT:  Negative for ear pain and sore throat.    Eyes:  Negative for pain and visual disturbance.    Respiratory:  Negative for cough and shortness of breath.    Cardiovascular:  Negative for chest pain and palpitations.   Gastrointestinal:  Negative for abdominal pain and vomiting.   Genitourinary:  Negative for dysuria and hematuria.   Musculoskeletal:  Negative for arthralgias and back pain.   Skin:  Negative for color change and rash.   Neurological:  Negative for seizures and syncope.   All other systems reviewed and are negative.        Objective:  /70   Pulse 90   Temp 97.7 °F (36.5 °C) (Temporal)   Resp 18     Physical Exam  Musculoskeletal:      Comments: Ulceration on the medial aspect the left foot has substantially worsened, there is worsening wound measurements size but this is likely due to total dehiscence of the incisional site  - Left lower extremity has periosteum is palpable at the proximal aspect of the previous incision.  Mixed fibrogranular base, overall worsening of wounds and sizes substantially since previous visit   Neurological:      Gait: Gait abnormal.             Wound 04/23/24 Venous Ulcer Leg Left (Active)   Wound Image   05/14/24 1432   Wound Description Epithelialization;Pink 05/14/24 1430   Kelly-wound Assessment Edema;Erythema;Scaly 05/14/24 1430   Wound Length (cm) 1.1 cm 05/14/24 1430   Wound Width (cm) 1.4 cm 05/14/24 1430   Wound Depth (cm) 0.1 cm 05/14/24 1430   Wound Surface Area (cm^2) 1.54 cm^2 05/14/24 1430   Wound Volume (cm^3) 0.154 cm^3 05/14/24 1430   Calculated Wound Volume (cm^3) 0.15 cm^3 05/14/24 1430   Change in Wound Size % 98.33 05/14/24 1430   Drainage Amount Small 05/14/24 1430   Drainage Description Serous 05/14/24 1430   Non-staged Wound Description Full thickness 05/14/24 1430       Wound 04/26/24 Surgical Foot Left (Active)   Wound Image   05/14/24 1450   Wound Description Slough;Pink;Yellow;Brown 05/14/24 1431   Kelly-wound Assessment Dry;Callus;Scaly 05/14/24 1431   Wound Length (cm) 5.7 cm 05/14/24 1431   Wound Width (cm) 1.9 cm 05/14/24 1431    Wound Depth (cm) 0.4 cm 05/14/24 1431   Wound Surface Area (cm^2) 10.83 cm^2 05/14/24 1431   Wound Volume (cm^3) 4.332 cm^3 05/14/24 1431   Calculated Wound Volume (cm^3) 4.33 cm^3 05/14/24 1431   Change in Wound Size % -220.74 05/14/24 1431   Wound Site Closure Sutures 05/14/24 1431   Drainage Amount Moderate 05/14/24 1431   Drainage Description Serosanguineous 05/14/24 1431   Non-staged Wound Description Full thickness 05/14/24 1431                         Debridement    Universal Protocol:  Consent: Verbal consent obtained.  Risks and benefits: risks, benefits and alternatives were discussed  Consent given by: patient  Patient understanding: patient states understanding of the procedure being performed  Patient consent: the patient's understanding of the procedure matches consent given  Patient identity confirmed: verbally with patient    Debridement Details  Performed by: physician  Debridement type: surgical  Level of debridement: subcutaneous tissue  Pain control: none      Post-debridement measurements  Length (cm): 6.5  Width (cm): 3.5  Depth (cm): 0.2  Percent debrided: 100%  Surface Area (cm^2): 22.75  Area Debrided (cm^2): 22.75  Volume (cm^3): 4.55    Tissue and other material debrided: subcutaneous tissue  Devitalized tissue debrided: biofilm, necrotic debris and slough  Instrument(s) utilized: curette  Bleeding: medium  Hemostasis obtained with: pressure  Procedural pain (0-10): insensate  Post-procedural pain: insensate   Response to treatment: procedure was tolerated well                 Wound Instructions:  Orders Placed This Encounter   Procedures    Wound cleansing and dressings Surgical Left Foot     Wound cleansing and dressings Surgical Left Foot         Wash your hands with soap and water.  Remove old dressing, discard into plastic bag and place in trash.  Cleanse the wound with nss or soap and water prior to applying a clean dressing. Do not use tissue or cotton balls. Do not scrub the  "wound. Pat dry using gauze.   Shower no      Apply moisturizing lotion to skin surrounding wound      Apply acticoat 3 to the left foot  wound.  Cover with ABD   Secure with tristen and tape   Change dressing 2 times a week      Home health to complete wound care   Do Not Use Xeroform         Follow up in 1 week    Venous Ulcer Left lower leg wound healed     Standing Status:   Future     Standing Expiration Date:   5/28/2024    Wound Procedure Treatment Surgical Left Foot     This order was created via procedure documentation         Myron Bhakta DPM      Portions of the record may have been created with voice recognition software. Occasional wrong word or \"sound a like\" substitutions may have occurred due to the inherent limitations of voice recognition software. Read the chart carefully and recognize, using context, where substitutions have occurred.      "

## 2024-05-21 NOTE — PROGRESS NOTES
Wound Procedure Treatment Surgical Left Foot    Performed by: Rae Fairbanks RN  Authorized by: Myron Bhakta DPM    Associated wounds:   Wound 04/26/24 Surgical Foot Left  Wound cleansed with:  NSS  Applied primary dressing:  Acticoat  Applied secondary dressing:  ABD  Wound secured with:  Johan and Tape  Home care instructions:  Acticoat flex 3

## 2024-05-21 NOTE — PATIENT INSTRUCTIONS
Orders Placed This Encounter   Procedures    Wound cleansing and dressings Surgical Left Foot     Wound cleansing and dressings Surgical Left Foot         Wash your hands with soap and water.  Remove old dressing, discard into plastic bag and place in trash.  Cleanse the wound with nss or soap and water prior to applying a clean dressing. Do not use tissue or cotton balls. Do not scrub the wound. Pat dry using gauze.   Shower no      Apply moisturizing lotion to skin surrounding wound      Apply acticoat 3 to the left foot  wound.  Cover with ABD   Secure with tristen and tape   Change dressing 2 times a week      Home health to complete wound care   Do Not Use Xeroform         Follow up in 1 week    Venous Ulcer Left lower leg wound healed     Standing Status:   Future     Standing Expiration Date:   5/28/2024    Wound Procedure Treatment Surgical Left Foot     This order was created via procedure documentation    Debridement     This order was created via procedure documentation

## 2024-05-28 ENCOUNTER — OFFICE VISIT (OUTPATIENT)
Dept: WOUND CARE | Facility: CLINIC | Age: 64
End: 2024-05-28
Payer: COMMERCIAL

## 2024-05-28 VITALS
DIASTOLIC BLOOD PRESSURE: 76 MMHG | TEMPERATURE: 96.4 F | HEART RATE: 70 BPM | SYSTOLIC BLOOD PRESSURE: 124 MMHG | RESPIRATION RATE: 16 BRPM

## 2024-05-28 DIAGNOSIS — L97.522 ULCER OF LEFT FOOT WITH FAT LAYER EXPOSED (HCC): ICD-10-CM

## 2024-05-28 DIAGNOSIS — T81.30XA DEHISCENCE OF WOUND: ICD-10-CM

## 2024-05-28 DIAGNOSIS — L97.921 ULCER OF LEG, CHRONIC, LEFT, LIMITED TO BREAKDOWN OF SKIN (HCC): Primary | ICD-10-CM

## 2024-05-28 PROCEDURE — 29581 APPL MULTLAYER CMPRN SYS LEG: CPT

## 2024-05-28 PROCEDURE — 11042 DBRDMT SUBQ TIS 1ST 20SQCM/<: CPT | Performed by: PODIATRIST

## 2024-05-28 NOTE — PROGRESS NOTES
Wound Procedure Treatment Surgical Left Foot    Performed by: Willie Lin RN  Authorized by: Myron Bhakta DPM    Associated wounds:   Wound 04/26/24 Surgical Foot Left  Wound cleansed with:  NSS  Applied primary dressing:  Acticoat  Applied secondary dressing:  ABD  Dressing secured with:  Johan

## 2024-05-28 NOTE — PROGRESS NOTES
Patient ID: Art Joseph is a 64 y.o. male Date of Birth 1960       Chief Complaint   Patient presents with    Follow Up Wound Care Visit     Left leg and left foot wound       Allergies:  Patient has no known allergies.    Diagnosis:  1. Ulcer of leg, chronic, left, limited to breakdown of skin (HCC)  2. Dehiscence of wound     Diagnosis ICD-10-CM Associated Orders   1. Ulcer of leg, chronic, left, limited to breakdown of skin (HCC)  L97.921       2. Dehiscence of wound  T81.30XA            Assessment & Plan:  See wound orders.   -Excisional debridement of the left foot  - Compression wrap applied to the left leg advised and discussed lifestyle changes at length  - Return 1 week for continued care    Subjective:   Patient presents for evaluation management of his left foot, no new pedal complaints did not notice that he had any drainage from the left leg        The following portions of the patient's history were reviewed and updated as appropriate:   Patient Active Problem List   Diagnosis    Type 2 diabetes mellitus with foot ulcer, with long-term current use of insulin (HCC)    Diabetic neuropathy (HCC)    Acute osteomyelitis of metatarsal bone of left foot (HCC)    Hx of right BKA (HCC)    Ambulatory dysfunction    Primary hypertension    Hyperlipidemia    Vitamin D deficiency    Diabetic peripheral angiopathy (HCC)    Severe obesity (BMI 35.0-39.9) with comorbidity (HCC)    Cellulitis of left lower extremity    Candidiasis, intertrigo    Elevated platelet count     Past Medical History:   Diagnosis Date    Diabetes mellitus (HCC)     Hypertension      Past Surgical History:   Procedure Laterality Date    LEG AMPUTATION THROUGH LOWER TIBIA AND FIBULA Right 7/25/2017    Procedure: AMPUTATION BELOW KNEE (BKA);  Surgeon: Mynor Sanchez MD;  [facility];  Service: General    SC AMPUTATION FOOT TRANSMETARSAL Right 1/26/2017    Procedure: AMPUTATION TRANSMETATARSAL (TMA); OPEN;  Surgeon: Leonard Lomeli  BERNY;  [facility];  Service: Podiatry    KY AMPUTATION FOOT TRANSMETARSAL Left 4/26/2024    Procedure: LEFT FOOT PARTIAL FIRST RAY AMPUTATION;  Surgeon: Jennifer Rubalcava DPM;  [facility];  Service: Podiatry    KY AMPUTATION METATARSAL W/TOE SINGLE Left 1/30/2017    Procedure: Left partial 1st ray amputation;  Surgeon: Leonard Lomeli DPM;  [facility];  Service: Podiatry    KY COLONOSCOPY FLX DX W/COLLJ SPEC WHEN PFRMD N/A 11/28/2018    Procedure: COLONOSCOPY;  Surgeon: González Napier MD;  [facility];  Service: Gastroenterology    WOUND DEBRIDEMENT Right 1/30/2017    Procedure: DEBRIDEMENT FOOT/TOE (WASH OUT) delayed closure, LINDA;  Surgeon: Leonard Lomeli DPM;  [facility];  Service:      Social History     Socioeconomic History    Marital status: Single     Spouse name: None    Number of children: None    Years of education: None    Highest education level: None   Occupational History    Occupation: retired   Tobacco Use    Smoking status: Former     Types: Cigarettes    Smokeless tobacco: Never    Tobacco comments:     quit 9 years ago   Vaping Use    Vaping status: Never Used   Substance and Sexual Activity    Alcohol use: Yes     Alcohol/week: 11.0 standard drinks of alcohol     Types: 6 Cans of beer, 5 Shots of liquor per week     Comment: social ; occasional use as per [employer]    Drug use: No    Sexual activity: None   Other Topics Concern    None   Social History Narrative    Always uses seat belt    Caffeine use    Dental care regularly     Social Determinants of Health     Financial Resource Strain: Medium Risk (4/25/2023)    Overall Financial Resource Strain (CARDIA)     Difficulty of Paying Living Expenses: Somewhat hard   Food Insecurity: No Food Insecurity (4/24/2024)    Hunger Vital Sign     Worried About Running Out of Food in the Last Year: Never true     Ran Out of Food in the Last Year: Never true   Transportation Needs: No Transportation Needs (4/24/2024)     PRAPARE - Transportation     Lack of Transportation (Medical): No     Lack of Transportation (Non-Medical): No   Physical Activity: Not on file   Stress: Not on file   Social Connections: Not on file   Intimate Partner Violence: Not on file   Housing Stability: Low Risk  (4/24/2024)    Housing Stability Vital Sign     Unable to Pay for Housing in the Last Year: No     Number of Places Lived in the Last Year: 1     Unstable Housing in the Last Year: No        Current Outpatient Medications:     aspirin (ECOTRIN LOW STRENGTH) 81 mg EC tablet, Take 81 mg by mouth daily Resume on 8/11/17 , Disp: , Rfl:     atorvastatin (LIPITOR) 80 mg tablet, Take 1 tablet (80 mg total) by mouth daily, Disp: 90 tablet, Rfl: 3    Cholecalciferol (VITAMIN D3) 1,000 units tablet, Take 2 tablets (2,000 Units total) by mouth daily For vitamin D deficiency, Disp: 60 tablet, Rfl: 0    glucose blood test strip, Test blood sugars 4 times a day, Disp: 400 each, Rfl: 3    insulin aspart (NovoLOG FlexPen) 100 UNIT/ML injection pen, Inject 10 Units under the skin 3 (three) times a day with meals You must eat a full meal with this insulin dose., Disp: , Rfl:     Insulin Glargine Solostar (Basaglar KwikPen) 100 UNIT/ML SOPN, Inject 0.33 mL (33 Units total) under the skin every 12 (twelve) hours, Disp: , Rfl:     Insulin Pen Needle 30G X 8 MM MISC, Inject under the skin daily at bedtime, Disp: 100 each, Rfl: 5    lisinopril (ZESTRIL) 2.5 mg tablet, Take 1 tablet (2.5 mg total) by mouth daily, Disp: 90 tablet, Rfl: 3    miconazole 2 % cream, Apply topically 2 (two) times a day Apply to the groin region/bilateral inguinal region and buttock/sacral region., Disp: 56 g, Rfl: 0    Multiple Vitamin (MULTI-VITAMIN DAILY PO), Take 1 tablet by mouth daily, Disp: , Rfl:   Family History   Problem Relation Age of Onset    Diabetes Mother       Review of Systems      Objective:  There were no vitals taken for this visit.    Physical Exam  Musculoskeletal:       Comments: Excisional debridement for the dehisced surgical site  -  100% granular base following debridement healthy appearing wound following debridement ulceration noted skin breakdown anterior aspect of the left lower extremity             Wound 04/26/24 Surgical Foot Left (Active)   Wound Image   05/28/24 1512   Wound Description Pink;Slough;Yellow 05/28/24 1516   Kelly-wound Assessment Maceration;Edema;White;Pink 05/28/24 1516   Wound Length (cm) 2.5 cm 05/28/24 1516   Wound Width (cm) 3.1 cm 05/28/24 1516   Wound Depth (cm) 0.3 cm 05/28/24 1516   Wound Surface Area (cm^2) 7.75 cm^2 05/28/24 1516   Wound Volume (cm^3) 2.325 cm^3 05/28/24 1516   Calculated Wound Volume (cm^3) 2.33 cm^3 05/28/24 1516   Change in Wound Size % -72.59 05/28/24 1516   Wound Site Closure Sutures 05/14/24 1431   Drainage Amount Copious 05/28/24 1516   Drainage Description Serosanguineous 05/28/24 1516   Non-staged Wound Description Full thickness 05/28/24 1516       Wound 05/28/24 Leg Left;Anterior (Active)   Wound Image   05/28/24 1519                         Debridement    Universal Protocol:  Consent: Verbal consent obtained.  Risks and benefits: risks, benefits and alternatives were discussed  Consent given by: patient  Patient understanding: patient states understanding of the procedure being performed  Patient consent: the patient's understanding of the procedure matches consent given  Patient identity confirmed: verbally with patient    Debridement Details  Performed by: physician  Debridement type: surgical  Level of debridement: subcutaneous tissue      Post-debridement measurements  Length (cm): 2.5  Width (cm): 3.1  Depth (cm): 0.3  Percent debrided: 100%  Surface Area (cm^2): 7.75  Area Debrided (cm^2): 7.75  Volume (cm^3): 2.32    Tissue and other material debrided: subcutaneous tissue  Devitalized tissue debrided: necrotic debris and slough  Instrument(s) utilized: curette  Technique utilized: excisionalBleeding:  "medium  Hemostasis obtained with: pressure  Procedural pain (0-10): insensate  Post-procedural pain: insensate   Response to treatment: procedure was tolerated well                 Wound Instructions:  No orders of the defined types were placed in this encounter.        Myron Bhakta DPM      Portions of the record may have been created with voice recognition software. Occasional wrong word or \"sound a like\" substitutions may have occurred due to the inherent limitations of voice recognition software. Read the chart carefully and recognize, using context, where substitutions have occurred.      "

## 2024-05-28 NOTE — PROGRESS NOTES
Cast Application    Date/Time: 5/28/2024 2:45 PM    Performed by: Willie Lin RN  Authorized by: Myron Bhakta DPM  Universal Protocol:  Consent: Verbal consent obtained.  Consent given by: patient  Patient understanding: patient states understanding of the procedure being performed  Patient consent: the patient's understanding of the procedure matches consent given  Procedure consent: procedure consent matches procedure scheduled  Patient identity confirmed: verbally with patient    Pre-procedure details:     Sensation:  Normal    Skin color:  WNL  Procedure details:     Laterality:  Left    Location:  Leg    Leg:  L lower legCast type:  Multi-layer compression short leg        Supplies:  2 layer wrap  Post-procedure details:     Pain:  Unchanged    Sensation:  Normal    Skin color:  WNL    Patient tolerance of procedure:  Tolerated well, no immediate complications  Comments:      Multiplayer wrap procedure     Before application, WILLY and/or TBI determined to be adequate for healing and application of compression. Lower extremity washed prior to application of compression wrap. With the foot in dorsiflexed position, Coflex lite  was applied as per physician orders without complications or complaint of pain.  The procedure was tolerated well. Toes warm & pink post application.  Patient provided education & reinforced to observe toes for any discoloration, swelling or tingling and instructed when to report to the Wound Center or to remove compression

## 2024-05-28 NOTE — LETTER
[facility]  [address], [place] [address]  Phone#  [phone]  Fax#  [phone]    Patient:  Art Joseph  YOB: 1960  Phone:  [phone]  Date of Visit:  5/28/2024    Orders Placed This Encounter   Procedures   • Debridement     This order was created via procedure documentation   • Wound cleansing and dressings     Wound cleansing and dressings Surgical Left Foot       Wash your hands with soap and water.  Remove old dressing, discard into plastic bag and place in trash.  Cleanse the wound with nss or soap and water prior to applying a clean dressing. Do not use tissue or cotton balls. Do not scrub the wound. Pat dry using gauze.   Shower no      Apply moisturizing lotion to skin surrounding wound      Apply acticoat 3 to the left foot  wound.  Cover with ABD   Secure with tristen and tape   Change dressing 2 times a week      Home health to complete wound care        Wound cleansing and dressings Venous Ulcer Left Leg        Wash your hands with soap and water.  Remove old dressing, discard into plastic bag and place in trash.  Cleanse the wound with normal saline prior to applying a clean dressing. Do not use tissue or cotton balls. Do not scrub the wound. Pat dry using gauze.  Shower no      Apply adaptic then calcium alginate to the left lower leg wounds wound.  Cover with ABD  Secure with roll gauze / coflex lite   Change dressing 2x weekly      Multi-layer compression wrap Instructions     Keep compression wrap/wraps clean and dry. If wraps are too tight and you experience numbness/tingling, call the wound center. If after hours, remove wraps or proceed to nearest E.R. and call wound center in AM.  Wrap will be changed 2x weekly  Avoid prolonged standing in one place.  Elevate leg(s) above the level of the heart when sitting or as much as possible.      Follow up in 1 week     Standing Status:   Future     Standing Expiration Date:   6/4/2024   •  Wound Procedure Treatment Left;Anterior Leg     This order was created via procedure documentation   • Wound Procedure Treatment Surgical Left Foot     This order was created via procedure documentation   • Cast Application     This order was created via procedure documentation         Electronically signed by Myron Bhakta DPM

## 2024-05-28 NOTE — PROGRESS NOTES
Wound Procedure Treatment Left;Anterior Leg    Performed by: Willie Lin RN  Authorized by: Myron Bhakta DPM    Associated wounds:   Wound 05/28/24 Leg Left;Anterior  Wound cleansed with:  NSS  Applied primary dressing:  Non adherent contact layer and Calcium alginate  Applied secondary dressing:  ABD  Dressing secured with:  Johan

## 2024-05-31 ENCOUNTER — CONSULT (OUTPATIENT)
Dept: ENDOCRINOLOGY | Facility: CLINIC | Age: 64
End: 2024-05-31
Payer: COMMERCIAL

## 2024-05-31 VITALS
HEIGHT: 70 IN | BODY MASS INDEX: 30.86 KG/M2 | HEART RATE: 78 BPM | OXYGEN SATURATION: 97 % | DIASTOLIC BLOOD PRESSURE: 80 MMHG | SYSTOLIC BLOOD PRESSURE: 138 MMHG | WEIGHT: 215.6 LBS

## 2024-05-31 DIAGNOSIS — E11.42 TYPE 2 DIABETES MELLITUS WITH DIABETIC POLYNEUROPATHY, UNSPECIFIED WHETHER LONG TERM INSULIN USE (HCC): Primary | ICD-10-CM

## 2024-05-31 DIAGNOSIS — I10 PRIMARY HYPERTENSION: ICD-10-CM

## 2024-05-31 DIAGNOSIS — Z79.4 TYPE 2 DIABETES MELLITUS WITH FOOT ULCER, WITH LONG-TERM CURRENT USE OF INSULIN (HCC): ICD-10-CM

## 2024-05-31 DIAGNOSIS — E11.621 TYPE 2 DIABETES MELLITUS WITH FOOT ULCER, WITH LONG-TERM CURRENT USE OF INSULIN (HCC): ICD-10-CM

## 2024-05-31 DIAGNOSIS — E78.2 MIXED HYPERLIPIDEMIA: ICD-10-CM

## 2024-05-31 DIAGNOSIS — L97.509 TYPE 2 DIABETES MELLITUS WITH FOOT ULCER, WITH LONG-TERM CURRENT USE OF INSULIN (HCC): ICD-10-CM

## 2024-05-31 PROCEDURE — 99204 OFFICE O/P NEW MOD 45 MIN: CPT | Performed by: STUDENT IN AN ORGANIZED HEALTH CARE EDUCATION/TRAINING PROGRAM

## 2024-05-31 RX ORDER — INSULIN ASPART 100 [IU]/ML
INJECTION, SOLUTION INTRAVENOUS; SUBCUTANEOUS
Start: 2024-05-31

## 2024-05-31 RX ORDER — INSULIN GLARGINE 100 [IU]/ML
INJECTION, SOLUTION SUBCUTANEOUS
Qty: 15 ML | Refills: 1 | Status: SHIPPED | OUTPATIENT
Start: 2024-05-31

## 2024-05-31 NOTE — PROGRESS NOTES
Art Joseph 64 y.o. male MRN: [id number]    Encounter: [id number]      Assessment & Plan     Problem List Items Addressed This Visit     Type 2 diabetes mellitus with foot ulcer, with long-term current use of insulin (HCC)     Diabetes is longstanding and poorly controlled.  Today, I provided recommendations to change Basaglar to 50 units once daily in the evening, increase NovoLog to 14 units before meals with scale ISF 30 above 120.  I reviewed his insulin plan thoroughly and provided him a chart as reference for insulin dosing.  I explained how prebolusing mealtime insulin is more effective than delivering the shot of insulin after eating.  I believe Art could benefit from continuous glucose monitoring and will initiate the [facility].  In the meantime, he is encouraged to check fingersticks at least 3 times daily before meals.  I will ask that he return to clinic in 6 weeks for a blood sugar review.  He may certainly contact me sooner should he have any concerns.         Relevant Medications    Insulin Glargine Solostar (Basaglar KwikPen) 100 UNIT/ML SOPN    insulin aspart (NovoLOG FlexPen) 100 UNIT/ML injection pen    Primary hypertension     Fair control.  Continue present Rx         Hyperlipidemia     Continue statin        Other Visit Diagnoses     Type 2 diabetes mellitus with diabetic polyneuropathy, unspecified whether long term insulin use (HCC)    -  Primary    Relevant Medications    Insulin Glargine Solostar (Basaglar KwikPen) 100 UNIT/ML SOPN    insulin aspart (NovoLOG FlexPen) 100 UNIT/ML injection pen        RTC 6-weeks    CC: Diabetes    History of Present Illness     HPI:    Art presents today for evaluation of type 2 diabetes.  This is his first visit in office.  He was previously seen at our [place] location over 3 years ago.    Type 2 diabetes is longstanding and patient is on chronic insulin.  He has history is of complication including  osteomyelitis, diabetic peripheral neuropathy, and history of amputation including a right BKA and recent left transmetatarsal amputation.    For diabetes he takes Basaglar 33 units twice a day and NovoLog 10 units after meals.  He reports checking capillary fingerstick testing twice daily including morning and bedtime.  He typically experiences blood sugar values in the low 200s.  He states that at this range those blood sugars are good for him.  His last A1c was over 10%.  He denies any hypoglycemia.  He lives by self and is responsible for providing own meals.  He does have records from nutrition services as a reference for healthy eating.  Breakfast may typically be a cereal, lunch might be a lean meat with the side like coleslaw, and then supper is typically a frozen meal item like lean cuisine.  He aside from water he drinks diet sodas.  He is not interested in nutrition referral at present.    He mentions a possible CGM device at home, however he has not had significant use. It is unclear how he is receiving his script.      For hypertension he takes lisinopril 2.5 mg daily.  For hyperlipidemia he is on Lipitor 80 mg daily.    Review of Systems   Constitutional:  Negative for diaphoresis and unexpected weight change.   Endocrine: Negative for polydipsia and polyuria.   All other systems reviewed and are negative.      Historical Information   Past Medical History:   Diagnosis Date   • Diabetes mellitus (HCC)    • Hypertension      Past Surgical History:   Procedure Laterality Date   • LEG AMPUTATION THROUGH LOWER TIBIA AND FIBULA Right 7/25/2017    Procedure: AMPUTATION BELOW KNEE (BKA);  Surgeon: Mynor Sanchez MD;  Location: [facility];  Service: General   • NH AMPUTATION FOOT TRANSMETARSAL Right 1/26/2017    Procedure: AMPUTATION TRANSMETATARSAL (TMA); OPEN;  Surgeon: Leonard Lomeli DPM;  Location: [facility];  Service: Podiatry   • NH AMPUTATION FOOT TRANSMETARSAL Left 4/26/2024    Procedure: LEFT FOOT  PARTIAL FIRST RAY AMPUTATION;  Surgeon: Jennifer Rubalcava DPM;  Location: [facility];  Service: Podiatry   • TX AMPUTATION METATARSAL W/TOE SINGLE Left 1/30/2017    Procedure: Left partial 1st ray amputation;  Surgeon: Leonard Lomeli DPM;  Location[facility];  Service: Podiatry   • TX COLONOSCOPY FLX DX W/COLLJ SPEC WHEN PFRMD N/A 11/28/2018    Procedure: COLONOSCOPY;  Surgeon: González Napier MD;  Location: [facility];  Service: Gastroenterology   • WOUND DEBRIDEMENT Right 1/30/2017    Procedure: DEBRIDEMENT FOOT/TOE (WASH OUT) delayed closure, LINDA;  Surgeon: Leonard Lomeli DPM;  Location: [facility];  Service:      Social History   Social History     Substance and Sexual Activity   Alcohol Use Yes   • Alcohol/week: 11.0 standard drinks of alcohol   • Types: 6 Cans of beer, 5 Shots of liquor per week    Comment: social ; occasional use as per [employer]     Social History     Substance and Sexual Activity   Drug Use No     Social History     Tobacco Use   Smoking Status Former   • Types: Cigarettes   Smokeless Tobacco Never   Tobacco Comments    quit 9 years ago     Family History:   Family History   Problem Relation Age of Onset   • Diabetes Mother        Meds/Allergies   Current Outpatient Medications   Medication Sig Dispense Refill   • aspirin (ECOTRIN LOW STRENGTH) 81 mg EC tablet Take 81 mg by mouth daily Resume on 8/11/17      • atorvastatin (LIPITOR) 80 mg tablet Take 1 tablet (80 mg total) by mouth daily 90 tablet 3   • Cholecalciferol (VITAMIN D3) 1,000 units tablet Take 2 tablets (2,000 Units total) by mouth daily For vitamin D deficiency 60 tablet 0   • glucose blood test strip Test blood sugars 4 times a day 400 each 3   • insulin aspart (NovoLOG FlexPen) 100 UNIT/ML injection pen E11.65 inject 14 units before meals plus scale. TDD 75 units     • Insulin Glargine Solostar (Basaglar KwikPen) 100 UNIT/ML SOPN E11.65 inject 50 units daily at bedtime 15 mL 1   • Insulin Pen Needle 30G X 8 MM MISC Inject under the  "skin daily at bedtime 100 each 5   • lisinopril (ZESTRIL) 2.5 mg tablet Take 1 tablet (2.5 mg total) by mouth daily 90 tablet 3   • miconazole 2 % cream Apply topically 2 (two) times a day Apply to the groin region/bilateral inguinal region and buttock/sacral region. 56 g 0   • Multiple Vitamin (MULTI-VITAMIN DAILY PO) Take 1 tablet by mouth daily       No current facility-administered medications for this visit.     No Known Allergies    Objective   Vitals: Blood pressure 138/80, pulse 78, height 5' 10\" (1.778 m), weight 97.8 kg (215 lb 9.6 oz), SpO2 97%.    Physical Exam  Vitals reviewed.   Constitutional:       Appearance: Normal appearance.   HENT:      Head: Normocephalic and atraumatic.      Nose: Nose normal.   Eyes:      General: No scleral icterus.     Conjunctiva/sclera: Conjunctivae normal.   Pulmonary:      Effort: Pulmonary effort is normal. No respiratory distress.   Abdominal:      Palpations: Abdomen is soft.      Tenderness: There is no abdominal tenderness.   Musculoskeletal:      Cervical back: Normal range of motion.      Comments: Right BKA, Left TMA   Skin:     General: Skin is warm and dry.   Neurological:      General: No focal deficit present.      Mental Status: He is alert.   Psychiatric:         Mood and Affect: Mood normal.         Behavior: Behavior normal.         The history was obtained from the review of the chart, patient.    Lab Results:   Lab Results   Component Value Date/Time    Hemoglobin A1C 10.8 (H) 04/28/2024 04:13 AM    Hemoglobin A1C 14.1 (H) 10/27/2023 08:57 AM    Hemoglobin A1C 12.2 (H) 07/21/2023 10:55 AM    WBC 9.01 05/01/2024 04:10 AM    WBC 11.15 (H) 04/30/2024 04:17 AM    WBC 9.62 04/29/2024 04:11 AM    Hemoglobin 13.0 05/01/2024 04:10 AM    Hemoglobin 13.3 04/30/2024 04:17 AM    Hemoglobin 13.6 04/29/2024 04:11 AM    Hematocrit 39.7 05/01/2024 04:10 AM    Hematocrit 41.5 04/30/2024 04:17 AM    Hematocrit 44.4 04/29/2024 04:11 AM    MCV 87 05/01/2024 04:10 AM    " "MCV 88 04/30/2024 04:17 AM    MCV 90 04/29/2024 04:11 AM    Platelets 374 05/01/2024 04:10 AM    Platelets 379 04/30/2024 04:17 AM    Platelets 401 (H) 04/29/2024 04:11 AM    BUN 22 05/01/2024 04:10 AM    BUN 25 04/30/2024 04:17 AM    BUN 18 04/29/2024 04:11 AM    Potassium 3.8 05/01/2024 04:10 AM    Potassium 3.7 04/30/2024 04:17 AM    Potassium 4.2 04/29/2024 04:11 AM    Chloride 107 05/01/2024 04:10 AM    Chloride 105 04/30/2024 04:17 AM    Chloride 105 04/29/2024 04:11 AM    CO2 26 05/01/2024 04:10 AM    CO2 25 04/30/2024 04:17 AM    CO2 27 04/29/2024 04:11 AM    Creatinine 0.69 05/01/2024 04:10 AM    Creatinine 0.77 04/30/2024 04:17 AM    Creatinine 0.79 04/29/2024 04:11 AM    AST 18 05/01/2024 04:10 AM    AST 19 04/30/2024 04:17 AM    AST 10 (L) 04/24/2024 06:36 AM    ALT 21 05/01/2024 04:10 AM    ALT 23 04/30/2024 04:17 AM    ALT 7 04/24/2024 06:36 AM    Total Protein 6.8 05/01/2024 04:10 AM    Total Protein 6.7 04/30/2024 04:17 AM    Total Protein 6.8 04/24/2024 06:36 AM    Albumin 3.2 (L) 05/01/2024 04:10 AM    Albumin 3.1 (L) 04/30/2024 04:17 AM    Albumin 3.1 (L) 04/24/2024 06:36 AM    HDL, Direct 33 (L) 10/27/2023 08:57 AM    HDL, Direct 42 07/21/2023 10:55 AM    Triglycerides 187 (H) 10/27/2023 08:57 AM    Triglycerides 168 (H) 07/21/2023 10:55 AM           Imaging Studies: I have personally reviewed pertinent reports.      Portions of the record may have been created with voice recognition software. Occasional wrong word or \"sound a like\" substitutions may have occurred due to the inherent limitations of voice recognition software. Read the chart carefully and recognize, using context, where substitutions have occurred.    "

## 2024-05-31 NOTE — ASSESSMENT & PLAN NOTE
Diabetes is longstanding and poorly controlled.  Today, I provided recommendations to change Basaglar to 50 units once daily in the evening, increase NovoLog to 14 units before meals with scale ISF 30 above 120.  I reviewed his insulin plan thoroughly and provided him a chart as reference for insulin dosing.  I explained how prebolusing mealtime insulin is more effective than delivering the shot of insulin after eating.  I believe Art could benefit from continuous glucose monitoring and will initiate the process of ordering the CGM through [facility].  In the meantime, he is encouraged to check fingersticks at least 3 times daily before meals.  I will ask that he return to clinic in 6 weeks for a blood sugar review.  He may certainly contact me sooner should he have any concerns.   DISPLAY PLAN FREE TEXT

## 2024-05-31 NOTE — PATIENT INSTRUCTIONS
Insulin Instructions  Fixed Dose Injections   Insulin Glargine Solostar 100 UNIT/ML Sopn   Last edited by Torsten Christina, DO on 5/31/2024 at 2:31 PM      Time of Day Dose (units)   Evening 50     Mealtime Injections   insulin aspart 100 UNIT/ML injection pen (NovoLOG FlexPen)   Last edited by Torsten Christina, DO on 5/31/2024 at 2:32 PM      Meals   BG Target   120 mg/dL      Sensitivity Factor Fixed Mealtime Dose   30 mg/dL/unit 14 units      BG (mg/dL) Insulin (units)   80 - 149     14         150 - 179     15         180 - 209     16         210 - 239     17         240 - 269     18         270 - 299     19         300 - 329     20         330 - 359     21         360 - 389     22         390 - 419     23         420 - 449     24         450 - 479     25         480 - 509     26

## 2024-06-04 ENCOUNTER — OFFICE VISIT (OUTPATIENT)
Dept: WOUND CARE | Facility: CLINIC | Age: 64
End: 2024-06-04
Payer: COMMERCIAL

## 2024-06-04 VITALS
RESPIRATION RATE: 18 BRPM | SYSTOLIC BLOOD PRESSURE: 128 MMHG | TEMPERATURE: 96.2 F | HEART RATE: 72 BPM | DIASTOLIC BLOOD PRESSURE: 60 MMHG

## 2024-06-04 DIAGNOSIS — L97.921 ULCER OF LEG, CHRONIC, LEFT, LIMITED TO BREAKDOWN OF SKIN (HCC): Primary | ICD-10-CM

## 2024-06-04 DIAGNOSIS — L97.522 ULCER OF LEFT FOOT WITH FAT LAYER EXPOSED (HCC): ICD-10-CM

## 2024-06-04 PROCEDURE — 11042 DBRDMT SUBQ TIS 1ST 20SQCM/<: CPT | Performed by: PODIATRIST

## 2024-06-04 PROCEDURE — 99213 OFFICE O/P EST LOW 20 MIN: CPT | Performed by: PODIATRIST

## 2024-06-04 NOTE — PROGRESS NOTES
Patient ID: Art Joseph is a 64 y.o. male Date of Birth 1960       No chief complaint on file.      Allergies:  Patient has no known allergies.    Diagnosis:  1. Ulcer of leg, chronic, left, limited to breakdown of skin (HCC)  -     Wound cleansing and dressings Surgical Left Foot; Future; Expected date: 06/04/2024  -     Wound Procedure Treatment Surgical Left Foot  2. Ulcer of left foot with fat layer exposed (HCC)  -     Wound cleansing and dressings Surgical Left Foot; Future; Expected date: 06/04/2024  -     Wound Procedure Treatment Surgical Left Foot     Diagnosis ICD-10-CM Associated Orders   1. Ulcer of leg, chronic, left, limited to breakdown of skin (HCC)  L97.921 Wound cleansing and dressings Surgical Left Foot     Wound Procedure Treatment Surgical Left Foot      2. Ulcer of left foot with fat layer exposed (HCC)  L97.522 Wound cleansing and dressings Surgical Left Foot     Wound Procedure Treatment Surgical Left Foot           Assessment & Plan:  See wound orders.   -Left lower extremity is healed  - Excisional debridement of the left foot  - Measurements have slight deteriorated today  - Continue current treatment  - Continue to modify his activities of daily living  - I suspect that we are going to have somewhat of a delayed healing course based off of his comorbidities and the need to use his left lower extremity as his primary leg    Subjective:   Patient presents for evaluation management of his left foot and leg.        The following portions of the patient's history were reviewed and updated as appropriate:   Patient Active Problem List   Diagnosis    Type 2 diabetes mellitus with foot ulcer, with long-term current use of insulin ([facility])    Diabetic neuropathy ([facility])    Acute osteomyelitis of metatarsal bone of left foot ([facility])    Hx of right BKA ([facility])    Ambulatory dysfunction    Primary hypertension    Hyperlipidemia    Vitamin D deficiency    Diabetic peripheral angiopathy ([facility])    Severe  obesity (BMI 35.0-39.9) with comorbidity (HCC)    Cellulitis of left lower extremity    Candidiasis, intertrigo    Elevated platelet count     Past Medical History:   Diagnosis Date    Diabetes mellitus (HCC)     Hypertension      Past Surgical History:   Procedure Laterality Date    LEG AMPUTATION THROUGH LOWER TIBIA AND FIBULA Right 7/25/2017    Procedure: AMPUTATION BELOW KNEE (BKA);  Surgeon: Mynor Sanchez MD;  [facility];  Service: General    CO AMPUTATION FOOT TRANSMETARSAL Right 1/26/2017    Procedure: AMPUTATION TRANSMETATARSAL (TMA); OPEN;  Surgeon: Leonard Lomeli DPM;  [facility];  Service: Podiatry    CO AMPUTATION FOOT TRANSMETARSAL Left 4/26/2024    Procedure: LEFT FOOT PARTIAL FIRST RAY AMPUTATION;  Surgeon: Jennifer Rubalcava DPM;  [facility];  Service: Podiatry    CO AMPUTATION METATARSAL W/TOE SINGLE Left 1/30/2017    Procedure: Left partial 1st ray amputation;  Surgeon: Leonard Lomeli DPM;  [facility];  Service: Podiatry    CO COLONOSCOPY FLX DX W/COLLJ SPEC WHEN PFRMD N/A 11/28/2018    Procedure: COLONOSCOPY;  Surgeon: González Napier MD;  [facility];  Service: Gastroenterology    WOUND DEBRIDEMENT Right 1/30/2017    Procedure: DEBRIDEMENT FOOT/TOE (WASH OUT) delayed closure, LINDA;  Surgeon: Leonard Lomeli DPM;  [facility];  Service:      Social History     Socioeconomic History    Marital status: Single     Spouse name: Not on file    Number of children: Not on file    Years of education: Not on file    Highest education level: Not on file   Occupational History    Occupation: retired   Tobacco Use    Smoking status: Former     Types: Cigarettes    Smokeless tobacco: Never    Tobacco comments:     quit 9 years ago   Vaping Use    Vaping status: Never Used   Substance and Sexual Activity    Alcohol use: Yes     Alcohol/week: 11.0 standard drinks of alcohol     Types: 6 Cans of beer, 5 Shots of liquor per week     Comment: social ; occasional use as  per [employer]    Drug use: No    Sexual activity: Not on file   Other Topics Concern    Not on file   Social History Narrative    Always uses seat belt    Caffeine use    Dental care regularly     Social Determinants of Health     Financial Resource Strain: Medium Risk (4/25/2023)    Overall Financial Resource Strain (CARDIA)     Difficulty of Paying Living Expenses: Somewhat hard   Food Insecurity: No Food Insecurity (4/24/2024)    Hunger Vital Sign     Worried About Running Out of Food in the Last Year: Never true     Ran Out of Food in the Last Year: Never true   Transportation Needs: No Transportation Needs (4/24/2024)    PRAPARE - Transportation     Lack of Transportation (Medical): No     Lack of Transportation (Non-Medical): No   Physical Activity: Not on file   Stress: Not on file   Social Connections: Not on file   Intimate Partner Violence: Not on file   Housing Stability: Low Risk  (4/24/2024)    Housing Stability Vital Sign     Unable to Pay for Housing in the Last Year: No     Number of Places Lived in the Last Year: 1     Unstable Housing in the Last Year: No        Current Outpatient Medications:     aspirin (ECOTRIN LOW STRENGTH) 81 mg EC tablet, Take 81 mg by mouth daily Resume on 8/11/17 , Disp: , Rfl:     atorvastatin (LIPITOR) 80 mg tablet, Take 1 tablet (80 mg total) by mouth daily, Disp: 90 tablet, Rfl: 3    Cholecalciferol (VITAMIN D3) 1,000 units tablet, Take 2 tablets (2,000 Units total) by mouth daily For vitamin D deficiency, Disp: 60 tablet, Rfl: 0    glucose blood test strip, Test blood sugars 4 times a day, Disp: 400 each, Rfl: 3    insulin aspart (NovoLOG FlexPen) 100 UNIT/ML injection pen, E11.65 inject 14 units before meals plus scale. TDD 75 units, Disp: , Rfl:     Insulin Glargine Solostar (Basaglar KwikPen) 100 UNIT/ML SOPN, E11.65 inject 50 units daily at bedtime, Disp: 15 mL, Rfl: 1    Insulin Pen Needle 30G X 8 MM MISC, Inject under the skin daily at bedtime, Disp: 100 each,  Rfl: 5    lisinopril (ZESTRIL) 2.5 mg tablet, Take 1 tablet (2.5 mg total) by mouth daily, Disp: 90 tablet, Rfl: 3    miconazole 2 % cream, Apply topically 2 (two) times a day Apply to the groin region/bilateral inguinal region and buttock/sacral region., Disp: 56 g, Rfl: 0    Multiple Vitamin (MULTI-VITAMIN DAILY PO), Take 1 tablet by mouth daily, Disp: , Rfl:   Family History   Problem Relation Age of Onset    Diabetes Mother       Review of Systems   Constitutional:  Negative for chills and fever.   HENT:  Negative for ear pain and sore throat.    Eyes:  Negative for pain and visual disturbance.   Respiratory:  Negative for cough and shortness of breath.    Cardiovascular:  Negative for chest pain and palpitations.   Gastrointestinal:  Negative for abdominal pain and vomiting.   Genitourinary:  Negative for dysuria and hematuria.   Musculoskeletal:  Negative for arthralgias and back pain.   Skin:  Negative for color change and rash.   Neurological:  Negative for seizures and syncope.   All other systems reviewed and are negative.        Objective:  /60   Pulse 72   Temp (!) 96.2 °F (35.7 °C)   Resp 18     Physical Exam  Skin:     Comments: Has no new pedal complaints, continued ulceration along the left amputation site.    Baseline debridement, no bone exposure, ulcer to the left lower extremity is healed well             Wound 04/26/24 Surgical Foot Left (Active)       Wound 05/28/24 Leg Left;Anterior (Active)                         Debridement   Wound 04/26/24 Surgical Foot Left    Universal Protocol:  Consent: Verbal consent obtained.  Risks and benefits: risks, benefits and alternatives were discussed  Consent given by: patient  Patient understanding: patient states understanding of the procedure being performed  Patient consent: the patient's understanding of the procedure matches consent given  Patient identity confirmed: verbally with patient    Debridement Details  Performed by:  "physician  Debridement type: surgical  Level of debridement: subcutaneous tissue  Pain control: none      Post-debridement measurements  Length (cm): 3  Width (cm): 3.3  Depth (cm): 0.2  Percent debrided: 100%  Surface Area (cm^2): 9.9  Area Debrided (cm^2): 9.9  Volume (cm^3): 1.98    Tissue and other material debrided: subcutaneous tissue  Devitalized tissue debrided: callus and necrotic debris  Instrument(s) utilized: curette  Technique utilized: excisionalBleeding: medium  Hemostasis obtained with: pressure  Procedural pain (0-10): insensate  Post-procedural pain: insensate   Response to treatment: procedure was tolerated well                 Wound Instructions:  Orders Placed This Encounter   Procedures    Wound cleansing and dressings Surgical Left Foot     Left leg wound is healed.    Left foot wound:  Wash your hands with soap and water.  Remove old dressing, discard into plastic bag and place in trash.    Cleanse the wound with Normal saline solution prior to applying a clean dressing. Do not use tissue or cotton balls. Do not scrub the wound. Pat dry using gauze.  Shower no   Apply Adaptic followed by silver alginate to the left foot wound.  Cover with gauze.  Secure with tristen.  Change dressing 3 times per week.    Follow up 1 week.     Standing Status:   Future     Standing Expiration Date:   6/11/2024    Wound Procedure Treatment Surgical Left Foot     This order was created via procedure documentation         Myron Bhakta DPM      Portions of the record may have been created with voice recognition software. Occasional wrong word or \"sound a like\" substitutions may have occurred due to the inherent limitations of voice recognition software. Read the chart carefully and recognize, using context, where substitutions have occurred.      "

## 2024-06-04 NOTE — PROGRESS NOTES
Wound Procedure Treatment Surgical Left Foot    Performed by: Monica Syed RN  Authorized by: Myron Bhakta DPM    Associated wounds:   Wound 04/26/24 Surgical Foot Left  Wound cleansed with:  NSS  Applied primary dressing:  Silver, Non adherent contact layer and Calcium alginate  Dressing secured with:  Johan  Offloading device appllied:  Surgical shoe

## 2024-06-04 NOTE — LETTER
[facility]  [address] [address]  Phone#  [phone]  Fax#  [phone]    Patient:  Art Joseph  YOB: 1960  Phone:  [phone]  Date of Visit:  6/4/2024    Orders Placed This Encounter   Procedures   • Wound cleansing and dressings Surgical Left Foot     Left leg wound is healed.    Left foot wound:  Wash your hands with soap and water.  Remove old dressing, discard into plastic bag and place in trash.    Cleanse the wound with Normal saline solution prior to applying a clean dressing. Do not use tissue or cotton balls. Do not scrub the wound. Pat dry using gauze.  Shower no   Apply Adaptic followed by silver alginate to the left foot wound.  Cover with gauze.  Secure with tristen.  Change dressing 3 times per week.    Follow up 1 week.     Standing Status:   Future     Standing Expiration Date:   6/11/2024         Electronically signed by Myron Bhakta DPM

## 2024-06-07 ENCOUNTER — TELEPHONE (OUTPATIENT)
Age: 64
End: 2024-06-07

## 2024-06-07 NOTE — TELEPHONE ENCOUNTER
Sabina from [facility] called and asked if the office rec'd the fax that was sent to them on 6/5 for the pts continuous glucose monitor. She said she will follow up in a few days and see if it has been rec'd.

## 2024-06-11 ENCOUNTER — OFFICE VISIT (OUTPATIENT)
Dept: WOUND CARE | Facility: CLINIC | Age: 64
End: 2024-06-11
Payer: COMMERCIAL

## 2024-06-11 VITALS
TEMPERATURE: 97.3 F | RESPIRATION RATE: 16 BRPM | HEART RATE: 70 BPM | SYSTOLIC BLOOD PRESSURE: 110 MMHG | DIASTOLIC BLOOD PRESSURE: 66 MMHG

## 2024-06-11 DIAGNOSIS — L97.509 TYPE 2 DIABETES MELLITUS WITH FOOT ULCER, WITH LONG-TERM CURRENT USE OF INSULIN (HCC): ICD-10-CM

## 2024-06-11 DIAGNOSIS — L97.522 ULCER OF LEFT FOOT WITH FAT LAYER EXPOSED (HCC): Primary | ICD-10-CM

## 2024-06-11 DIAGNOSIS — Z79.4 TYPE 2 DIABETES MELLITUS WITH FOOT ULCER, WITH LONG-TERM CURRENT USE OF INSULIN (HCC): ICD-10-CM

## 2024-06-11 DIAGNOSIS — E11.621 TYPE 2 DIABETES MELLITUS WITH FOOT ULCER, WITH LONG-TERM CURRENT USE OF INSULIN (HCC): ICD-10-CM

## 2024-06-11 PROCEDURE — 11042 DBRDMT SUBQ TIS 1ST 20SQCM/<: CPT | Performed by: PODIATRIST

## 2024-06-11 NOTE — PROGRESS NOTES
Patient ID: Art Joseph is a 64 y.o. male Date of Birth 1960       Chief Complaint   Patient presents with    Follow Up Wound Care Visit     Wound left foot       Allergies:  Patient has no known allergies.    Diagnosis:  1. Ulcer of left foot with fat layer exposed (HCC)  -     Wound cleansing and dressings Surgical Left Foot; Future; Expected date: 06/18/2024  -     XR foot 3+ vw left; Future; Expected date: 06/11/2024  2. Type 2 diabetes mellitus with foot ulcer, with long-term current use of insulin ([facility])  -     XR foot 3+ vw left; Future; Expected date: 06/11/2024     Diagnosis ICD-10-CM Associated Orders   1. Ulcer of left foot with fat layer exposed ([facility])  L97.522 Wound cleansing and dressings Surgical Left Foot     XR foot 3+ vw left      2. Type 2 diabetes mellitus with foot ulcer, with long-term current use of insulin ([facility])  E11.621 XR foot 3+ vw left    L97.509     Z79.4            Assessment & Plan:  See wound orders.   -Will obtain x-rays prior to next visit  - Return for weekly debridement  - Surgical debridement as below  - Continue current local wound care regimen, healing decently slow and he would benefit from skin substitute.    Subjective:   Patient Menser evaluation management of the left foot, here for possible repeat debridement and continued care ulceration.        The following portions of the patient's history were reviewed and updated as appropriate:   Patient Active Problem List   Diagnosis    Type 2 diabetes mellitus with foot ulcer, with long-term current use of insulin ([facility])    Diabetic neuropathy ([facility])    Acute osteomyelitis of metatarsal bone of left foot ([facility])    Hx of right BKA ([facility])    Ambulatory dysfunction    Primary hypertension    Hyperlipidemia    Vitamin D deficiency    Diabetic peripheral angiopathy ([facility])    Severe obesity (BMI 35.0-39.9) with comorbidity ([facility])    Cellulitis of left lower extremity    Candidiasis, intertrigo    Elevated platelet count     Past  Medical History:   Diagnosis Date    Diabetes mellitus (HCC)     Hypertension      Past Surgical History:   Procedure Laterality Date    LEG AMPUTATION THROUGH LOWER TIBIA AND FIBULA Right 7/25/2017    Procedure: AMPUTATION BELOW KNEE (BKA);  Surgeon: Mynor Sanchez MD;  [facility];  Service: General    MS AMPUTATION FOOT TRANSMETARSAL Right 1/26/2017    Procedure: AMPUTATION TRANSMETATARSAL (TMA); OPEN;  Surgeon: Leonard Lomeli DPM;  [facility];  Service: Podiatry    MS AMPUTATION FOOT TRANSMETARSAL Left 4/26/2024    Procedure: LEFT FOOT PARTIAL FIRST RAY AMPUTATION;  Surgeon: Jennifer Rubalcava DPM;  [facility];  Service: Podiatry    MS AMPUTATION METATARSAL W/TOE SINGLE Left 1/30/2017    Procedure: Left partial 1st ray amputation;  Surgeon: Leonard Lomeli DPM;  [facility];  Service: Podiatry    MS COLONOSCOPY FLX DX W/COLLJ SPEC WHEN PFRMD N/A 11/28/2018    Procedure: COLONOSCOPY;  Surgeon: González Napier MD;  [facility];  Service: Gastroenterology    WOUND DEBRIDEMENT Right 1/30/2017    Procedure: DEBRIDEMENT FOOT/TOE (WASH OUT) delayed closure, LINDA;  Surgeon: Leonard Lomeli DPM;  [facility];  Service:      Social History     Socioeconomic History    Marital status: Single     Spouse name: Not on file    Number of children: Not on file    Years of education: Not on file    Highest education level: Not on file   Occupational History    Occupation: retired   Tobacco Use    Smoking status: Former     Types: Cigarettes    Smokeless tobacco: Never    Tobacco comments:     quit 9 years ago   Vaping Use    Vaping status: Never Used   Substance and Sexual Activity    Alcohol use: Yes     Alcohol/week: 11.0 standard drinks of alcohol     Types: 6 Cans of beer, 5 Shots of liquor per week     Comment: social ; occasional use as per [employer]    Drug use: No    Sexual activity: Not on file   Other Topics Concern    Not on file   Social History Narrative    Always uses seat  belt    Caffeine use    Dental care regularly     Social Determinants of Health     Financial Resource Strain: Medium Risk (4/25/2023)    Overall Financial Resource Strain (CARDIA)     Difficulty of Paying Living Expenses: Somewhat hard   Food Insecurity: No Food Insecurity (4/24/2024)    Hunger Vital Sign     Worried About Running Out of Food in the Last Year: Never true     Ran Out of Food in the Last Year: Never true   Transportation Needs: No Transportation Needs (4/24/2024)    PRAPARE - Transportation     Lack of Transportation (Medical): No     Lack of Transportation (Non-Medical): No   Physical Activity: Not on file   Stress: Not on file   Social Connections: Not on file   Intimate Partner Violence: Not on file   Housing Stability: Low Risk  (4/24/2024)    Housing Stability Vital Sign     Unable to Pay for Housing in the Last Year: No     Number of Places Lived in the Last Year: 1     Unstable Housing in the Last Year: No        Current Outpatient Medications:     aspirin (ECOTRIN LOW STRENGTH) 81 mg EC tablet, Take 81 mg by mouth daily Resume on 8/11/17 , Disp: , Rfl:     atorvastatin (LIPITOR) 80 mg tablet, Take 1 tablet (80 mg total) by mouth daily, Disp: 90 tablet, Rfl: 3    Cholecalciferol (VITAMIN D3) 1,000 units tablet, Take 2 tablets (2,000 Units total) by mouth daily For vitamin D deficiency, Disp: 60 tablet, Rfl: 0    glucose blood test strip, Test blood sugars 4 times a day, Disp: 400 each, Rfl: 3    insulin aspart (NovoLOG FlexPen) 100 UNIT/ML injection pen, E11.65 inject 14 units before meals plus scale. TDD 75 units, Disp: , Rfl:     Insulin Glargine Solostar (Basaglar KwikPen) 100 UNIT/ML SOPN, E11.65 inject 50 units daily at bedtime, Disp: 15 mL, Rfl: 1    Insulin Pen Needle 30G X 8 MM MISC, Inject under the skin daily at bedtime, Disp: 100 each, Rfl: 5    lisinopril (ZESTRIL) 2.5 mg tablet, Take 1 tablet (2.5 mg total) by mouth daily, Disp: 90 tablet, Rfl: 3    miconazole 2 % cream, Apply  topically 2 (two) times a day Apply to the groin region/bilateral inguinal region and buttock/sacral region., Disp: 56 g, Rfl: 0    Multiple Vitamin (MULTI-VITAMIN DAILY PO), Take 1 tablet by mouth daily, Disp: , Rfl:   Family History   Problem Relation Age of Onset    Diabetes Mother       Review of Systems   Constitutional:  Negative for chills and fever.   HENT:  Negative for ear pain and sore throat.    Eyes:  Negative for pain and visual disturbance.   Respiratory:  Negative for cough and shortness of breath.    Cardiovascular:  Negative for chest pain and palpitations.   Gastrointestinal:  Negative for abdominal pain and vomiting.   Genitourinary:  Negative for dysuria and hematuria.   Musculoskeletal:  Negative for arthralgias and back pain.   Skin:  Negative for color change and rash.   Neurological:  Negative for seizures and syncope.   All other systems reviewed and are negative.        Objective:  /66   Pulse 70   Temp (!) 97.3 °F (36.3 °C)   Resp 16     Physical Exam  Vitals reviewed.   Constitutional:       Appearance: Normal appearance.   HENT:      Head: Normocephalic and atraumatic.      Nose: Nose normal.      Mouth/Throat:      Mouth: Mucous membranes are moist.   Eyes:      Pupils: Pupils are equal, round, and reactive to light.   Pulmonary:      Effort: Pulmonary effort is normal.   Skin:     Capillary Refill: Capillary refill takes more than 3 seconds.      Comments: Continued ulceration left foot, fat layer exposed, mixed fibronecrotic base.  100% granular base following debridement.   Neurological:      General: No focal deficit present.      Mental Status: He is alert and oriented to person, place, and time. Mental status is at baseline.             Wound 04/26/24 Surgical Foot Left (Active)   Wound Image   06/11/24 1556   Wound Description Pink;Slough;Yellow 06/11/24 1547   Kelly-wound Assessment Edema;White;Pink;Brown 06/11/24 1547   Wound Length (cm) 3 cm 06/11/24 1547   Wound  Width (cm) 3 cm 06/11/24 1547   Wound Depth (cm) 0.2 cm 06/11/24 1547   Wound Surface Area (cm^2) 9 cm^2 06/11/24 1547   Wound Volume (cm^3) 1.8 cm^3 06/11/24 1547   Calculated Wound Volume (cm^3) 1.8 cm^3 06/11/24 1547   Change in Wound Size % -33.33 06/11/24 1547   Wound Site Closure Sutures 05/14/24 1431   Drainage Amount Small 06/11/24 1547   Drainage Description Serosanguineous 06/11/24 1547   Non-staged Wound Description Full thickness 06/11/24 1547                         Debridement   Wound 04/26/24 Surgical Foot Left    Universal Protocol:  Consent: Verbal consent obtained.  Risks and benefits: risks, benefits and alternatives were discussed  Consent given by: patient  Patient understanding: patient states understanding of the procedure being performed  Patient consent: the patient's understanding of the procedure matches consent given  Patient identity confirmed: verbally with patient    Debridement Details  Performed by: physician  Debridement type: surgical  Level of debridement: subcutaneous tissue      Post-debridement measurements  Length (cm): 3  Width (cm): 3  Depth (cm): 0.2  Percent debrided: 100%  Surface Area (cm^2): 9  Area Debrided (cm^2): 9  Volume (cm^3): 1.8    Tissue and other material debrided: subcutaneous tissue  Devitalized tissue debrided: necrotic debris and slough  Instrument(s) utilized: curette  Technique utilized: excisionalBleeding: medium  Hemostasis obtained with: pressure  Procedural pain (0-10): insensate  Post-procedural pain: insensate   Response to treatment: procedure was tolerated well                 Wound Instructions:  Orders Placed This Encounter   Procedures    Wound cleansing and dressings Surgical Left Foot     Wound cleansing and dressings Surgical Left Foot        Wash your hands with soap and water.  Remove old dressing, discard into plastic bag and place in trash.    Cleanse the wound with Normal saline solution prior to applying a clean dressing. Do not use  "tissue or cotton balls. Do not scrub the wound. Pat dry using gauze.  Shower no   Apply Adaptic followed by silver alginate to the left foot wound.  Cover with gauze.  Secure with tristen.  Change dressing 3 times per week.     Obtain X-Ray as ordered  Follow up 1 week.     Standing Status:   Future     Standing Expiration Date:   6/18/2024    Debridement     This order was created via procedure documentation    XR foot 3+ vw left     Standing Status:   Future     Standing Expiration Date:   6/11/2028     Scheduling Instructions:      Bring along any outside films relating to this procedure.      Weight-bearing three views of left foot         Myron Bhakta DPM      Portions of the record may have been created with voice recognition software. Occasional wrong word or \"sound a like\" substitutions may have occurred due to the inherent limitations of voice recognition software. Read the chart carefully and recognize, using context, where substitutions have occurred.      "

## 2024-06-11 NOTE — PROGRESS NOTES
Problem: Skin Integrity:  Goal: Will show no infection signs and symptoms  Description: Will show no infection signs and symptoms  10/30/2021 1758 by Isidro Mancia RN  Outcome: Met This Shift  Goal: Absence of new skin breakdown  Description: Absence of new skin breakdown  10/31/2021 0037 by Maximino Castro  Outcome: Met This Shift  10/30/2021 1758 by Isidro Mancia RN  Outcome: Met This Shift     Problem: Pain:  Goal: Pain level will decrease  Description: Pain level will decrease  10/30/2021 1758 by Isidro Mancia RN  Outcome: Met This Shift  Goal: Control of acute pain  Description: Control of acute pain  10/31/2021 0037 by Maximino Castro  Outcome: Met This Shift  10/30/2021 1758 by Isidro Mancia RN  Outcome: Met This Shift  Goal: Control of chronic pain  Description: Control of chronic pain  10/30/2021 1758 by Isidro Mancia RN  Outcome: Met This Shift     Problem: Falls - Risk of:  Goal: Will remain free from falls  Description: Will remain free from falls  10/31/2021 0037 by Maximino Castro  Outcome: Met This Shift  10/30/2021 1758 by Isidro Mancia RN  Outcome: Met This Shift  Goal: Absence of physical injury  Description: Absence of physical injury  10/31/2021 0037 by Maximino Castro  Outcome: Met This Shift  10/30/2021 1758 by Isidro Mancia RN  Outcome: Met This Shift Wound Procedure Treatment Surgical Left Foot    Performed by: Marianela Crawford RN  Authorized by: Myron Bhakta DPM    Associated wounds:   Wound 04/26/24 Surgical Foot Left  Wound cleansed with:  NSS  Applied primary dressing:  Non adherent contact layer, Silver and Calcium alginate  Applied secondary dressing:  ABD  Dressing secured with:  Johan and Tape

## 2024-06-11 NOTE — PATIENT INSTRUCTIONS
Orders Placed This Encounter   Procedures    Wound cleansing and dressings Surgical Left Foot     Wound cleansing and dressings Surgical Left Foot        Wash your hands with soap and water.  Remove old dressing, discard into plastic bag and place in trash.    Cleanse the wound with Normal saline solution prior to applying a clean dressing. Do not use tissue or cotton balls. Do not scrub the wound. Pat dry using gauze.  Shower no   Apply Adaptic followed by silver alginate to the left foot wound.  Cover with gauze.  Secure with tristen.  Change dressing 3 times per week.     Obtain X-Ray as ordered  Follow up 1 week.     Standing Status:   Future     Standing Expiration Date:   6/18/2024    Debridement Surgical Left Foot     This order was created via procedure documentation    Wound Procedure Treatment Surgical Left Foot     This order was created via procedure documentation    XR foot 3+ vw left     Standing Status:   Future     Standing Expiration Date:   6/11/2028     Scheduling Instructions:      Bring along any outside films relating to this procedure.      Weight-bearing three views of left foot

## 2024-06-11 NOTE — LETTER
[facility]  [address] [address]  Phone#  [phone]  Fax#  [phone]    Patient:  Art Joseph  YOB: 1960  Phone:  [phone]  Date of Visit:  6/11/2024    Orders Placed This Encounter   Procedures   • Wound cleansing and dressings Surgical Left Foot     Wound cleansing and dressings Surgical Left Foot        Wash your hands with soap and water.  Remove old dressing, discard into plastic bag and place in trash.    Cleanse the wound with Normal saline solution prior to applying a clean dressing. Do not use tissue or cotton balls. Do not scrub the wound. Pat dry using gauze.  Shower no   Apply Adaptic followed by silver alginate to the left foot wound.  Cover with gauze.  Secure with tristen.  Change dressing 3 times per week.     Obtain X-Ray as ordered  Follow up 1 week.     Standing Status:   Future     Standing Expiration Date:   6/18/2024   • Debridement Surgical Left Foot     This order was created via procedure documentation   • Wound Procedure Treatment Surgical Left Foot     This order was created via procedure documentation   • XR foot 3+ vw left     Standing Status:   Future     Standing Expiration Date:   6/11/2028     Scheduling Instructions:      Bring along any outside films relating to this procedure.      Weight-bearing three views of left foot         Electronically signed by Myron Bhakta DPM

## 2024-06-12 NOTE — TELEPHONE ENCOUNTER
Lucero from [facility] called to see if we received the fax for the pts continuous glucose monitor, I said I did not see anything in the chart, she will fax paperwork again today.

## 2024-06-18 ENCOUNTER — OFFICE VISIT (OUTPATIENT)
Dept: WOUND CARE | Facility: CLINIC | Age: 64
End: 2024-06-18
Payer: COMMERCIAL

## 2024-06-18 ENCOUNTER — TELEPHONE (OUTPATIENT)
Dept: ENDOCRINOLOGY | Facility: CLINIC | Age: 64
End: 2024-06-18

## 2024-06-18 ENCOUNTER — APPOINTMENT (OUTPATIENT)
Dept: RADIOLOGY | Facility: MEDICAL CENTER | Age: 64
End: 2024-06-18
Payer: COMMERCIAL

## 2024-06-18 DIAGNOSIS — E11.621 TYPE 2 DIABETES MELLITUS WITH FOOT ULCER, WITH LONG-TERM CURRENT USE OF INSULIN (HCC): ICD-10-CM

## 2024-06-18 DIAGNOSIS — L97.509 TYPE 2 DIABETES MELLITUS WITH FOOT ULCER, WITH LONG-TERM CURRENT USE OF INSULIN (HCC): ICD-10-CM

## 2024-06-18 DIAGNOSIS — Z79.4 TYPE 2 DIABETES MELLITUS WITH FOOT ULCER, WITH LONG-TERM CURRENT USE OF INSULIN (HCC): ICD-10-CM

## 2024-06-18 DIAGNOSIS — L97.522 ULCER OF LEFT FOOT WITH FAT LAYER EXPOSED (HCC): ICD-10-CM

## 2024-06-18 DIAGNOSIS — S81.812A NONINFECTED SKIN TEAR OF LEFT LOWER EXTREMITY, INITIAL ENCOUNTER: Primary | ICD-10-CM

## 2024-06-18 PROCEDURE — 11042 DBRDMT SUBQ TIS 1ST 20SQCM/<: CPT | Performed by: PODIATRIST

## 2024-06-18 PROCEDURE — 73630 X-RAY EXAM OF FOOT: CPT

## 2024-06-18 NOTE — PROGRESS NOTES
Patient ID: Art Joseph is a 64 y.o. male Date of Birth 1960       Chief Complaint   Patient presents with    Follow Up Wound Care Visit     L leg wound        Allergies:  Patient has no known allergies.    Diagnosis:  1. Noninfected skin tear of left lower extremity, initial encounter  2. Ulcer of left foot with fat layer exposed (HCC)     Diagnosis ICD-10-CM Associated Orders   1. Noninfected skin tear of left lower extremity, initial encounter  S81.812A       2. Ulcer of left foot with fat layer exposed (HCC)  L97.522            Assessment & Plan:  See wound orders.   Excisional debridement as below  - Continue current wound care regimen  - Return 1 week for continued excisional debridement  -Placing Coflex lite for the new onset skin tear with a venous stasis component      Subjective:   Patient transfer evaluation management of his foot, here for repeat debridement and continued assessment of the left foot wound.  States he hit his leg against the recliner and had drainage on the front of his left leg          The following portions of the patient's history were reviewed and updated as appropriate:   Patient Active Problem List   Diagnosis    Type 2 diabetes mellitus with foot ulcer, with long-term current use of insulin (HCC)    Diabetic neuropathy (HCC)    Acute osteomyelitis of metatarsal bone of left foot (HCC)    Hx of right BKA (HCC)    Ambulatory dysfunction    Primary hypertension    Hyperlipidemia    Vitamin D deficiency    Diabetic peripheral angiopathy (HCC)    Severe obesity (BMI 35.0-39.9) with comorbidity (HCC)    Cellulitis of left lower extremity    Candidiasis, intertrigo    Elevated platelet count     Past Medical History:   Diagnosis Date    Diabetes mellitus (HCC)     Hypertension      Past Surgical History:   Procedure Laterality Date    LEG AMPUTATION THROUGH LOWER TIBIA AND FIBULA Right 7/25/2017    Procedure: AMPUTATION BELOW KNEE (BKA);  Surgeon: Mynor Sanchez MD;  Location: [facility];  Service: General    OK AMPUTATION FOOT TRANSMETARSAL Right 1/26/2017    Procedure: AMPUTATION TRANSMETATARSAL (TMA); OPEN;  Surgeon: Leonard Lomeli DPM;  Location: [facility];  Service: Podiatry    OK AMPUTATION FOOT TRANSMETARSAL Left 4/26/2024    Procedure: LEFT FOOT PARTIAL FIRST RAY AMPUTATION;  Surgeon: Jennifer Rubalcava DPM;  Location: [facility];  Service: Podiatry    OK AMPUTATION METATARSAL W/TOE SINGLE Left 1/30/2017    Procedure: Left partial 1st ray amputation;  Surgeon: Leonard Lomeli DPM;  Location: [facility];  Service: Podiatry    OK COLONOSCOPY FLX DX W/COLLJ SPEC WHEN PFRMD N/A 11/28/2018    Procedure: COLONOSCOPY;  Surgeon: González Napier MD;  Location: [facility];  Service: Gastroenterology    WOUND DEBRIDEMENT Right 1/30/2017    Procedure: DEBRIDEMENT FOOT/TOE (WASH OUT) delayed closure, LINDA;  Surgeon: Leonard Lomeli DPM;  Location: [facility];  Service:      Social History     Socioeconomic History    Marital status: Single     Spouse name: Not on file    Number of children: Not on file    Years of education: Not on file    Highest education level: Not on file   Occupational History    Occupation: retired   Tobacco Use    Smoking status: Former     Types: Cigarettes    Smokeless tobacco: Never    Tobacco comments:     quit 9 years ago   Vaping Use    Vaping status: Never Used   Substance and Sexual Activity    Alcohol use: Yes     Alcohol/week: 11.0 standard drinks of alcohol     Types: 6 Cans of beer, 5 Shots of liquor per week     Comment: social ; occasional use as per [employer]    Drug use: No    Sexual activity: Not on file   Other Topics Concern    Not on file   Social History Narrative    Always uses seat belt    Caffeine use    Dental care regularly     Social Determinants of Health     Financial Resource Strain: Medium Risk (4/25/2023)    Overall Financial Resource Strain (CARDIA)     Difficulty of Paying Living Expenses: Somewhat hard   Food Insecurity: No Food Insecurity  (4/24/2024)    Hunger Vital Sign     Worried About Running Out of Food in the Last Year: Never true     Ran Out of Food in the Last Year: Never true   Transportation Needs: No Transportation Needs (4/24/2024)    PRAPARE - Transportation     Lack of Transportation (Medical): No     Lack of Transportation (Non-Medical): No   Physical Activity: Not on file   Stress: Not on file   Social Connections: Not on file   Intimate Partner Violence: Not on file   Housing Stability: Low Risk  (4/24/2024)    Housing Stability Vital Sign     Unable to Pay for Housing in the Last Year: No     Number of Places Lived in the Last Year: 1     Unstable Housing in the Last Year: No        Current Outpatient Medications:     aspirin (ECOTRIN LOW STRENGTH) 81 mg EC tablet, Take 81 mg by mouth daily Resume on 8/11/17 , Disp: , Rfl:     atorvastatin (LIPITOR) 80 mg tablet, Take 1 tablet (80 mg total) by mouth daily, Disp: 90 tablet, Rfl: 3    Cholecalciferol (VITAMIN D3) 1,000 units tablet, Take 2 tablets (2,000 Units total) by mouth daily For vitamin D deficiency, Disp: 60 tablet, Rfl: 0    glucose blood test strip, Test blood sugars 4 times a day, Disp: 400 each, Rfl: 3    insulin aspart (NovoLOG FlexPen) 100 UNIT/ML injection pen, E11.65 inject 14 units before meals plus scale. TDD 75 units, Disp: , Rfl:     Insulin Glargine Solostar (Basaglar KwikPen) 100 UNIT/ML SOPN, E11.65 inject 50 units daily at bedtime, Disp: 15 mL, Rfl: 1    Insulin Pen Needle 30G X 8 MM MISC, Inject under the skin daily at bedtime, Disp: 100 each, Rfl: 5    lisinopril (ZESTRIL) 2.5 mg tablet, Take 1 tablet (2.5 mg total) by mouth daily, Disp: 90 tablet, Rfl: 3    miconazole 2 % cream, Apply topically 2 (two) times a day Apply to the groin region/bilateral inguinal region and buttock/sacral region., Disp: 56 g, Rfl: 0    Multiple Vitamin (MULTI-VITAMIN DAILY PO), Take 1 tablet by mouth daily, Disp: , Rfl:   Family History   Problem Relation Age of Onset    Diabetes  Mother       Review of Systems   Constitutional:  Negative for chills and fever.   HENT:  Negative for ear pain and sore throat.    Eyes:  Negative for pain and visual disturbance.   Respiratory:  Negative for cough and shortness of breath.    Cardiovascular:  Negative for chest pain and palpitations.   Gastrointestinal:  Negative for abdominal pain and vomiting.   Genitourinary:  Negative for dysuria and hematuria.   Musculoskeletal:  Negative for arthralgias and back pain.   Skin:  Negative for color change and rash.   Neurological:  Negative for seizures and syncope.   All other systems reviewed and are negative.        Objective:  There were no vitals taken for this visit.    Physical Exam  Skin:     Comments: Mixed fibrogranular base predebridement near 100% gram base postdebridement, healing well, healing slowly but overall improvement             Wound 04/26/24 Surgical Foot Left (Active)   Wound Image   06/18/24 1513   Wound Description Pink;Slough;Yellow 06/18/24 1516   Kelly-wound Assessment Edema;White;Pink;Brown;Maceration 06/18/24 1516   Wound Length (cm) 1.9 cm 06/18/24 1516   Wound Width (cm) 3 cm 06/18/24 1516   Wound Depth (cm) 0.4 cm 06/18/24 1516   Wound Surface Area (cm^2) 5.7 cm^2 06/18/24 1516   Wound Volume (cm^3) 2.28 cm^3 06/18/24 1516   Calculated Wound Volume (cm^3) 2.28 cm^3 06/18/24 1516   Change in Wound Size % -68.89 06/18/24 1516   Wound Site Closure Sutures 05/14/24 1431   Drainage Amount Moderate 06/18/24 1516   Drainage Description Serosanguineous;Brown;Pgeuero 06/18/24 1516   Non-staged Wound Description Full thickness 06/18/24 1516       Wound 06/18/24 Traumatic Leg Anterior;Left;Medial (Active)   Wound Image   06/18/24 1513   Wound Description Pink;Granulation tissue;Yellow;Slough 06/18/24 1518   Kelly-wound Assessment Brown;Dry;Scaly;Edema 06/18/24 1518   Wound Length (cm) 1.9 cm 06/18/24 1518   Wound Width (cm) 2.9 cm 06/18/24 1518   Wound Depth (cm) 0.1 cm 06/18/24 1518   Wound  "Surface Area (cm^2) 5.51 cm^2 06/18/24 1518   Wound Volume (cm^3) 0.551 cm^3 06/18/24 1518   Calculated Wound Volume (cm^3) 0.55 cm^3 06/18/24 1518   Drainage Amount GORDON 06/18/24 1518   Non-staged Wound Description Full thickness 06/18/24 1518                         Debridement   Wound 04/26/24 Surgical Foot Left    Universal Protocol:  Consent: Verbal consent obtained.  Risks and benefits: risks, benefits and alternatives were discussed  Consent given by: patient    Debridement Details  Performed by: physician  Debridement type: surgical  Level of debridement: subcutaneous tissue  Pain control: lidocaine 4%      Post-debridement measurements  Length (cm): 1.9  Width (cm): 3  Depth (cm): 0.4  Percent debrided: 100%  Surface Area (cm^2): 5.7  Area Debrided (cm^2): 5.7  Volume (cm^3): 2.28    Tissue and other material debrided: subcutaneous tissue  Devitalized tissue debrided: necrotic debris and slough  Instrument(s) utilized: curette  Technique utilized: excisionalBleeding: medium  Hemostasis obtained with: pressure  Procedural pain (0-10): insensate  Post-procedural pain: insensate   Response to treatment: procedure was tolerated well                 Wound Instructions:  Orders Placed This Encounter   Procedures    Debridement Surgical Left Foot     This order was created via procedure documentation         Myron Bhakta DPM      Portions of the record may have been created with voice recognition software. Occasional wrong word or \"sound a like\" substitutions may have occurred due to the inherent limitations of voice recognition software. Read the chart carefully and recognize, using context, where substitutions have occurred.      "

## 2024-06-18 NOTE — PROGRESS NOTES
Wound Procedure Treatment Traumatic Anterior;Left;Medial Leg    Performed by: Rae Badillo RN  Authorized by: Myron Bhakta DPM    Associated wounds:   Wound 06/18/24 Traumatic Leg Anterior;Left;Medial  Wound cleansed with:  NSS  Applied primary dressing:  Dermagran  Applied secondary dressing:  Gauze  Dressing secured with:  Johan, Tape and Compression wrap

## 2024-06-18 NOTE — PROGRESS NOTES
Wound Procedure Treatment Surgical Left Foot    Performed by: Rae Badillo RN  Authorized by: Myron Bhakta DPM    Associated wounds:   Wound 04/26/24 Surgical Foot Left  Wound cleansed with:  NSS  Applied primary dressing:  Acticoat  Applied secondary dressing:  Gauze  Dressing secured with:  Johan, Tape and Compression wrap  Comments:  Acticoat Flex 7   Verbal consent obtained?: Yes    Consent given by:  Patient  Time Out:     Time out: Immediately prior to the procedure a time out was called    Patient states understanding of procedure being performed: Yes    Patient's understanding of procedure matches consent: Yes    Procedure consent matches procedure scheduled: Yes    Patient identity confirmed:  Verbally with patient  Compression -Pre-procedure details:     Sensation:  Normal    Skin color:  WDL    Laterality:  Left    Location:  Leg    Cast type:  Short leg    Supplies:  2 layer wrap    Pain:  Unchanged    Sensation:  Normal    Skin color:  WDL    Patient tolerance of procedure:  Tolerated well, no immediate complications     Multilayer wrap procedure     Before application, WILLY and/or TBI determined to be adequate for healing and application of compression. Lower extremity washed prior to application of compression wrap. With the foot in dorsiflexed position, Coflex was applied as per physician orders without complications or complaint of pain.  The procedure was tolerated well. Toes warm & pink post application.  Patient provided education & reinforced to observe toes for any discoloration, swelling or tingling and instructed when to report to the Wound Center or to remove compression

## 2024-06-18 NOTE — PATIENT INSTRUCTIONS
Orders Placed This Encounter   Procedures    Wound cleansing and dressings Surgical Left Foot     Wound cleansing and dressings Surgical Left Foot                   Wash your hands with soap and water.  Remove old dressing, discard into plastic bag and place in trash.    Cleanse the wound with Normal saline solution prior to applying a clean dressing. Do not use tissue or cotton balls. Do not scrub the wound. Pat dry using gauze.  Shower no         Apply Aticoat Flex 7 followed left foot wound.  Cover with gauze  Secure with tristen and tape.  Dressing will be changed at wound clinic.         Multi-layer compression wrap Instructions    Keep compression wrap/wraps clean and dry. If wraps are too tight and you experience numbness/tingling, call the wound center. If after hours, remove wraps or proceed to nearest E.R. and call wound center in AM.    Wrap will be changed 1 x weekly    Avoid prolonged standing in one place.    Elevate leg(s) above the level of the heart when sitting or as much as possible.         Follow up 1 week.     Standing Status:   Future     Standing Expiration Date:   6/25/2024    Wound cleansing and dressings Traumatic Anterior;Left;Medial Leg     Left Lower Leg Wound      Shower no           Apply Dermagran to the Lower leg wound  wound.  Cover with gauze.    Multi-layer compression wrap Instructions    Keep compression wrap/wraps clean and dry. If wraps are too tight and you experience numbness/tingling, call the wound center. If after hours, remove wraps or proceed to nearest E.R. and call wound center in AM.    Wrap will be changed 1 x weekly    Avoid prolonged standing in one place.    Elevate leg(s) above the level of the heart when sitting or as much as possible.         Follow up in 1 week     Standing Status:   Future     Standing Expiration Date:   6/25/2024    Debridement Surgical Left Foot     This order was created via procedure documentation

## 2024-06-25 ENCOUNTER — OFFICE VISIT (OUTPATIENT)
Dept: WOUND CARE | Facility: CLINIC | Age: 64
DRG: 617 | End: 2024-06-25
Payer: COMMERCIAL

## 2024-06-25 ENCOUNTER — HOSPITAL ENCOUNTER (INPATIENT)
Facility: HOSPITAL | Age: 64
LOS: 3 days | Discharge: HOME HEALTH | DRG: 617 | End: 2024-06-28
Attending: EMERGENCY MEDICINE | Admitting: FAMILY MEDICINE
Payer: COMMERCIAL

## 2024-06-25 VITALS — SYSTOLIC BLOOD PRESSURE: 138 MMHG | TEMPERATURE: 98.2 F | DIASTOLIC BLOOD PRESSURE: 76 MMHG | HEART RATE: 88 BPM

## 2024-06-25 DIAGNOSIS — M86.172 ACUTE OSTEOMYELITIS OF METATARSAL BONE OF LEFT FOOT (HCC): Primary | ICD-10-CM

## 2024-06-25 PROCEDURE — 99213 OFFICE O/P EST LOW 20 MIN: CPT | Performed by: PODIATRIST

## 2024-06-25 PROCEDURE — 99024 POSTOP FOLLOW-UP VISIT: CPT | Performed by: PODIATRIST

## 2024-06-25 NOTE — ED PROVIDER NOTES
History  Chief Complaint   Patient presents with    Wound Check     64 year old male with PMH DM, HTN, HLD, h/o osteomyelitis presenting ambulatory after podiatry evaluation for admission to the hospital.  He reports he was told he has infection in the bone and needs to be admitted.  He has h/o  R BKA over a number of years ago.  He reports he's had prior removal of the 2nd and 3rd toes from the left foot and had removal of left great toe in April of this year.  He notes he's had a wound since surgery.  He denies any pain.  He notes there has been some drainage.  He has been ambulatory with his cane and right leg prothesis.  Pt otherwise reports feeling well.  Denies fever, chills, cough, congestion.  Denies chest pain, SOB, N/V/D, abdomina pain.  Denies dizziness, lightheadedness.  No noted injury or trauma.  He reports a good appetite.  Denies any difficultly urinating.  Denies numbness, tingling, weakness.  He has been following with his podiatry who saw him in office today.  He denies any recent antibiotics.      History provided by:  Medical records and patient   used: No    Wound Check   Treatments since wound repair include a wound recheck. His temperature was unmeasured prior to arrival. There has been colored discharge from the wound. There is no redness present. There is no swelling present. There is no pain present. He has no difficulty moving the affected extremity or digit.       Prior to Admission Medications   Prescriptions Last Dose Informant Patient Reported? Taking?   Insulin Glargine Solostar (Basaglar KwikPen) 100 UNIT/ML SOPN 6/24/2024  No Yes   Sig: E11.65 inject 50 units daily at bedtime   Insulin Pen Needle 30G X 8 MM MISC 6/24/2024 Self No Yes   Sig: Inject under the skin daily at bedtime   aspirin (ECOTRIN LOW STRENGTH) 81 mg EC tablet 6/25/2024 Self Yes Yes   Sig: Take 81 mg by mouth daily Resume on 8/11/17    atorvastatin (LIPITOR) 80 mg tablet 6/25/2024 Self No Yes    Sig: Take 1 tablet (80 mg total) by mouth daily   insulin aspart (NovoLOG FlexPen) 100 UNIT/ML injection pen 6/25/2024  No Yes   Sig: E11.65 inject 14 units before meals plus scale. TDD 75 units   lisinopril (ZESTRIL) 2.5 mg tablet 6/25/2024 Self No Yes   Sig: Take 1 tablet (2.5 mg total) by mouth daily      Facility-Administered Medications: None       Past Medical History:   Diagnosis Date    Diabetes mellitus (HCC)     Hypertension        Past Surgical History:   Procedure Laterality Date    LEG AMPUTATION THROUGH LOWER TIBIA AND FIBULA Right 7/25/2017    Procedure: AMPUTATION BELOW KNEE (BKA);  Surgeon: Mynor Sanchez MD;  Location: [facility];  Service: General    NM AMPUTATION FOOT TRANSMETARSAL Right 1/26/2017    Procedure: AMPUTATION TRANSMETATARSAL (TMA); OPEN;  Surgeon: Leonard Lomeli DPM;  Location: [facility];  Service: Podiatry    NM AMPUTATION FOOT TRANSMETARSAL Left 4/26/2024    Procedure: LEFT FOOT PARTIAL FIRST RAY AMPUTATION;  Surgeon: Jennifer Rubalcava DPM;  Location: [facility];  Service: Podiatry    NM AMPUTATION METATARSAL W/TOE SINGLE Left 1/30/2017    Procedure: Left partial 1st ray amputation;  Surgeon: Leonard Lomeli DPM;  Location: [facility];  Service: Podiatry    NM COLONOSCOPY FLX DX W/COLLJ SPEC WHEN PFRMD N/A 11/28/2018    Procedure: COLONOSCOPY;  Surgeon: González Napier MD;  Location: [facility];  Service: Gastroenterology    WOUND DEBRIDEMENT Right 1/30/2017    Procedure: DEBRIDEMENT FOOT/TOE (WASH OUT) delayed closure, LINDA;  Surgeon: Leonard Lomeli DPM;  Location[facility];  Service:        Family History   Problem Relation Age of Onset    Diabetes Mother      I have reviewed and agree with the history as documented.    E-Cigarette/Vaping    E-Cigarette Use Never User      E-Cigarette/Vaping Substances    Nicotine No     THC No     CBD No     Flavoring No     Other No     Unknown No      Social History     Tobacco Use    Smoking status: Former     Types: Cigarettes    Smokeless tobacco:  Never    Tobacco comments:     quit 9 years ago   Vaping Use    Vaping status: Never Used   Substance Use Topics    Alcohol use: Yes     Alcohol/week: 11.0 standard drinks of alcohol     Types: 6 Cans of beer, 5 Shots of liquor per week     Comment: social ; occasional use as per [employer]    Drug use: No       Review of Systems   Constitutional: Negative.  Negative for appetite change, chills, fatigue and fever.   HENT: Negative.  Negative for congestion, rhinorrhea and sore throat.    Eyes: Negative.  Negative for visual disturbance.   Respiratory: Negative.  Negative for cough, shortness of breath and wheezing.    Cardiovascular: Negative.  Negative for chest pain, palpitations and leg swelling.   Gastrointestinal: Negative.  Negative for abdominal pain, diarrhea, nausea and vomiting.   Genitourinary: Negative.  Negative for dysuria, flank pain, frequency and hematuria.   Musculoskeletal: Negative.  Negative for back pain, myalgias and neck pain.   Skin:  Positive for wound. Negative for rash.   Neurological: Negative.  Negative for dizziness, light-headedness, numbness and headaches.   Psychiatric/Behavioral: Negative.     All other systems reviewed and are negative.      Physical Exam  Physical Exam  Vitals and nursing note reviewed.   Constitutional:       General: He is awake. He is not in acute distress.     Appearance: Normal appearance. He is well-developed and overweight. He is not toxic-appearing or diaphoretic.   HENT:      Head: Normocephalic and atraumatic.      Right Ear: Hearing and external ear normal.      Left Ear: Hearing and external ear normal.      Mouth/Throat:      Mouth: Mucous membranes are moist.      Pharynx: Oropharynx is clear.   Eyes:      General: Lids are normal. No scleral icterus.     Conjunctiva/sclera: Conjunctivae normal.   Neck:      Trachea: Trachea and phonation normal.   Cardiovascular:      Rate and Rhythm: Normal rate and regular rhythm.      Pulses:           Dorsalis  pedis pulses are 1+ on the left side. Right dorsalis pedis pulse not accessible.        Posterior tibial pulses are 1+ on the left side. Right posterior tibial pulse not accessible.      Heart sounds: Normal heart sounds, S1 normal and S2 normal. No murmur heard.     Comments: + R BKA  Pulmonary:      Effort: Pulmonary effort is normal. No tachypnea or respiratory distress.      Breath sounds: Normal breath sounds. No wheezing, rhonchi or rales.   Abdominal:      General: Bowel sounds are normal. There is no distension.      Palpations: Abdomen is soft.      Tenderness: There is no abdominal tenderness. There is no guarding.   Musculoskeletal:      Cervical back: Neck supple.   Skin:     General: Skin is warm and dry.      Capillary Refill: Capillary refill takes less than 2 seconds.      Findings: Wound present. No rash.      Comments: There is prior amputation of left great as well as 2nd and 3rd toes.  There is an approximate 2x4 cm open wound/ulceration of the left mid foot.  There is some mild purulent drainage on surface and mild surrounding erythema.  No crepitus.  See photo under media tab.   Neurological:      General: No focal deficit present.      Mental Status: He is alert and oriented to person, place, and time.      GCS: GCS eye subscore is 4. GCS verbal subscore is 5. GCS motor subscore is 6.      Sensory: Sensation is intact.      Motor: Motor function is intact.      Comments: Ambulatory to exam room with his cane.   Psychiatric:         Mood and Affect: Mood normal.         Speech: Speech normal.         Behavior: Behavior normal. Behavior is cooperative.         Vital Signs  ED Triage Vitals   Temperature Pulse Respirations Blood Pressure SpO2   06/25/24 1701 06/25/24 1701 06/25/24 1701 06/25/24 1701 06/25/24 1701   98.1 °F (36.7 °C) 99 18 150/72 94 %      Temp Source Heart Rate Source Patient Position - Orthostatic VS BP Location FiO2 (%)   06/25/24 1701 06/25/24 1701 06/25/24 2033 06/25/24 2033  --   Tympanic Monitor Lying Right arm       Pain Score       06/25/24 1701       No Pain           Vitals:    06/25/24 1701 06/25/24 1820 06/25/24 1821 06/25/24 2033   BP: 150/72 163/92 163/92 121/69   Pulse: 99 88 92 82   Patient Position - Orthostatic VS:    Lying         Visual Acuity      ED Medications  Medications   sodium chloride (PF) 0.9 % injection 3 mL (has no administration in time range)   aspirin (ECOTRIN LOW STRENGTH) EC tablet 81 mg (has no administration in time range)   atorvastatin (LIPITOR) tablet 80 mg (has no administration in time range)   insulin glargine (LANTUS) subcutaneous injection 50 Units 0.5 mL (50 Units Subcutaneous Given 6/25/24 2137)   lisinopril (ZESTRIL) tablet 2.5 mg (has no administration in time range)   insulin lispro (HumALOG/ADMELOG) 100 units/mL subcutaneous injection 5 Units (5 Units Subcutaneous Given 6/25/24 1944)   heparin (porcine) subcutaneous injection 5,000 Units (5,000 Units Subcutaneous Given 6/25/24 2137)   cefepime (MAXIPIME) IVPB (premix in dextrose) 2,000 mg 50 mL (has no administration in time range)   vancomycin (VANCOCIN) 1,000 mg in sodium chloride 0.9 % 250 mL IVPB (has no administration in time range)   insulin lispro (HumALOG/ADMELOG) 100 units/mL subcutaneous injection 1-6 Units (has no administration in time range)   cefepime (MAXIPIME) IVPB (premix in dextrose) 2,000 mg 50 mL (0 mg Intravenous Stopped 6/25/24 1942)   vancomycin (VANCOCIN) 1500 mg in sodium chloride 0.9% 250 mL IVPB (1,500 mg Intravenous New Bag 6/25/24 1821)       Diagnostic Studies  Results Reviewed       Procedure Component Value Units Date/Time    Procalcitonin [[id number]]  (Normal) Collected: 06/25/24 1728    Lab Status: Final result Specimen: Blood from Arm, Right Updated: 06/25/24 1808     Procalcitonin 0.06 ng/ml     C-reactive protein [[id number]]  (Abnormal) Collected: 06/25/24 1728    Lab Status: Final result Specimen: Blood from Arm, Right Updated: 06/25/24 6168     CRP  11.4 mg/L     Comprehensive metabolic panel [[id number]]  (Abnormal) Collected: 06/25/24 1728    Lab Status: Final result Specimen: Blood from Arm, Right Updated: 06/25/24 1753     Sodium 134 mmol/L      Potassium 4.0 mmol/L      Chloride 101 mmol/L      CO2 25 mmol/L      ANION GAP 8 mmol/L      BUN 11 mg/dL      Creatinine 0.93 mg/dL      Glucose 235 mg/dL      Calcium 9.3 mg/dL      AST 10 U/L      ALT 8 U/L      Alkaline Phosphatase 139 U/L      Total Protein 7.0 g/dL      Albumin 3.7 g/dL      Total Bilirubin 0.60 mg/dL      eGFR 85 ml/min/1.73sq m     Narrative:      National Kid[employer] guidelines for Chronic Kidney Disease (CKD):     Stage 1 with normal or high GFR (GFR > 90 mL/min/1.73 square meters)    Stage 2 Mild CKD (GFR = 60-89 mL/min/1.73 square meters)    Stage 3A Moderate CKD (GFR = 45-59 mL/min/1.73 square meters)    Stage 3B Moderate CKD (GFR = 30-44 mL/min/1.73 square meters)    Stage 4 Severe CKD (GFR = 15-29 mL/min/1.73 square meters)    Stage 5 End Stage CKD (GFR <15 mL/min/1.73 square meters)  Note: GFR calculation is accurate only with a steady state creatinine    Lactic acid [[id number]]  (Normal) Collected: 06/25/24 1728    Lab Status: Final result Specimen: Blood from Arm, Right Updated: 06/25/24 1751     LACTIC ACID 1.1 mmol/L     Narrative:      Result may be elevated if tourniquet was used during collection.    Sedimentation rate, automated [[id number]]  (Abnormal) Collected: 06/25/24 1728    Lab Status: Final result Specimen: Blood from Arm, Right Updated: 06/25/24 1747     Sed Rate 53 mm/hour     Protime-INR [[id number]]  (Normal) Collected: 06/25/24 1728    Lab Status: Final result Specimen: Blood from Arm, Right Updated: 06/25/24 1747     Protime 12.9 seconds      INR 0.98    APTT [[id number]]  (Normal) Collected: 06/25/24 1728    Lab Status: Final result Specimen: Blood from Arm, Right Updated: 06/25/24 1747     PTT 25 seconds     CBC and differential [[id number]]   (Abnormal) Collected: 06/25/24 1728    Lab Status: Final result Specimen: Blood from Arm, Right Updated: 06/25/24 1736     WBC 8.60 Thousand/uL      RBC 4.84 Million/uL      Hemoglobin 13.7 g/dL      Hematocrit 41.7 %      MCV 86 fL      MCH 28.3 pg      MCHC 32.9 g/dL      RDW 13.4 %      MPV 8.6 fL      Platelets 291 Thousands/uL      nRBC 0 /100 WBCs      Segmented % 60 %      Immature Grans % 0 %      Lymphocytes % 30 %      Monocytes % 7 %      Eosinophils Relative 2 %      Basophils Relative 1 %      Absolute Neutrophils 5.16 Thousands/µL      Absolute Immature Grans 0.02 Thousand/uL      Absolute Lymphocytes 2.59 Thousands/µL      Absolute Monocytes 0.60 Thousand/µL      Eosinophils Absolute 0.18 Thousand/µL      Basophils Absolute 0.05 Thousands/µL     Blood culture #2 [[id number]] Collected: 06/25/24 1728    Lab Status: In process Specimen: Blood from Arm, Right Updated: 06/25/24 1733    Blood culture #1 [[id number]] Collected: 06/25/24 1727    Lab Status: In process Specimen: Blood from Arm, Left Updated: 06/25/24 1733                   No orders to display              Procedures  ECG 12 Lead Documentation Only    Date/Time: 6/25/2024 5:51 PM    Performed by: Emelina Forman PA-C  Authorized by: Emelina Forman PA-C    Indications / Diagnosis:  Pre op  ECG reviewed by me, the ED Provider: yes    Patient location:  ED  Previous ECG:     Comparison to cardiac monitor: Yes    Interpretation:     Interpretation: normal    Rate:     ECG rate:  86    ECG rate assessment: normal    Rhythm:     Rhythm: sinus rhythm    Ectopy:     Ectopy: none    QRS:     QRS axis:  Normal    QRS intervals:  Normal  Conduction:     Conduction: normal    ST segments:     ST segments:  Normal  T waves:     T waves: normal    Comments:      , QRS 82, QT//461; no acute ischemic changes.           ED Course  ED Course as of 06/25/24 2150 Tue Jun 25, 2024   1702 Reviewed prior records.  Was seen at podiatry office  "prior to coming here and was referred to ER for admission for osteomyelitis.   1712 Had xray performed of left foot on 6/18/24 which showed - \"IMPRESSION:  Findings compatible with osteomyelitis in the first metatarsal stump. Associated significant tissue edema and skin and soft tissue defect at the medial aspect of the forefoot.\"   1730 TT to on call [facility] to discuss admission.   1732 Discussed with Dr. Thurston of [facility]; will come evaluate pt and order MRI.   1737 WBC: 8.60   1737 Hemoglobin: 13.7   1737 Platelet Count: 291   1750 POCT INR: 0.98   1750 PROTIME: 12.9   1750 PTT: 25   1750 Sed Rate(!): 53   1754 GLUCOSE(!): 235  Known diabetes   1754 Creatinine: 0.93   1754 BUN: 11   1754 Sodium(!): 134   1754 Potassium: 4.0   1754 Chloride: 101   1754 Carbon Dioxide: 25   1754 ANION GAP: 8   1754 Calcium: 9.3   1754 AST(!): 10   1754 ALT: 8   1754 ALK PHOS(!): 139   1754 Total Protein: 7.0   1754 Albumin: 3.7   1754 Total Bilirubin: 0.60   1754 GFR, Calculated: 85   1754 LACTIC ACID: 1.1   1754 C-REACTIVE PROTEIN(!): 11.4   1811 Procalcitonin: 0.06             Lab Results   Component Value Date    HGBA1C 10.8 (H) 04/28/2024                         SBIRT 22yo+      Flowsheet Row Most Recent Value   Initial Alcohol Screen: US AUDIT-C     1. How often do you have a drink containing alcohol? 0 Filed at: 06/25/2024 1659   2. How many drinks containing alcohol do you have on a typical day you are drinking?  0 Filed at: 06/25/2024 1659   3a. Male UNDER 65: How often do you have five or more drinks on one occasion? 0 Filed at: 06/25/2024 1659   3b. FEMALE Any Age, or MALE 65+: How often do you have 4 or more drinks on one occassion? 0 Filed at: 06/25/2024 1659   Audit-C Score 0 Filed at: 06/25/2024 1659   GOVIND: How many times in the past year have you...    Used an illegal drug or used a prescription medication for non-medical reasons? Never Filed at: 06/25/2024 4045                      Medical Decision Making  65 yo male with " open wound of left foot, radiographic evidence of osteomyelitis on outpatient xray presenting at request of podiatry for admission.  He is afebrile, hemodynamically stable.  He is at risk for sepsis given wound infection/osteomyelitis however does not appear systemically ill.  Will check labs.  Will provide broad spectrum antibiotics and consult [facility] for admission.  According to podiatry note, recommendation is for MRI of the foot and admission for surgical intervention.  Pt is aware of proposed plan and is slightly anxious regarding need for surgery.    Work up obtained as noted above.  No noted leukocytosis, no anemia.  Normal lactic and procalcitonin.  Based on this and vital signs/symptoms, I do not suspect sepsis. There is hyperglycemia consistent with his diabetes.  No DKA.  Renal function within normal limits.  Electrolytes within normal limits.   Repeat xray not performed at this time.  Recent outpatient xray shows findings of osteomyelitis.  Pt provided broad spectrum antibiotics.  [facility] consulted for admission.  Pt admitted in stable condition and comfortable with plan of care.    Please refer to above ER course for further details/discussion.      Problems Addressed:  Acute osteomyelitis of metatarsal bone of left foot (HCC): acute illness or injury  Hx of BKA, right (HCC): chronic illness or injury  Open wound of left foot, initial encounter: chronic illness or injury  Uncontrolled diabetes mellitus with hyperglycemia (HCC): chronic illness or injury    Amount and/or Complexity of Data Reviewed  External Data Reviewed: labs, radiology and notes.  Labs: ordered. Decision-making details documented in ED Course.  Radiology: independent interpretation performed. Decision-making details documented in ED Course.  ECG/medicine tests: ordered and independent interpretation performed. Decision-making details documented in ED Course.  Discussion of management or test interpretation with external provider(s):  [facility]    Risk  Prescription drug management.  Decision regarding hospitalization.             Disposition  Final diagnoses:   Acute osteomyelitis of metatarsal bone of left foot (HCC)   Open wound of left foot, initial encounter   Uncontrolled diabetes mellitus with hyperglycemia (HCC)   Hx of BKA, right ([facility])     Time reflects when diagnosis was documented in both MDM as applicable and the Disposition within this note       Time User Action Codes Description Comment    6/25/2024  5:14 PM Emelina Forman Add [M86.172] Acute osteomyelitis of metatarsal bone of left foot (HCC)     6/25/2024  5:38 PM Emelina Forman Add [S91.302A] Open wound of left foot, initial encounter     6/25/2024  5:40 PM Emelina Forman Add [E11.65] Uncontrolled diabetes mellitus with hyperglycemia ([facility])     6/25/2024  5:40 PM Emelina Forman Add [Z89.511] Hx of BKA, right ([facility])     6/25/2024  6:32 PM Jeff Granados Add [L03.116] Cellulitis of left lower extremity           ED Disposition       ED Disposition   Admit    Condition   Stable    Date/Time   Tue Jun 25, 2024 8985    Comment   Case was discussed with Dr. Thurston and the patient's admission status was agreed to be Admission Status: inpatient status to the service of Dr. Thurston.               Follow-up Information    None         Current Discharge Medication List        CONTINUE these medications which have NOT CHANGED    Details   aspirin (ECOTRIN LOW STRENGTH) 81 mg EC tablet Take 81 mg by mouth daily Resume on 8/11/17       atorvastatin (LIPITOR) 80 mg tablet Take 1 tablet (80 mg total) by mouth daily  Qty: 90 tablet, Refills: 3    Associated Diagnoses: Hyperlipidemia, unspecified hyperlipidemia type      insulin aspart (NovoLOG FlexPen) 100 UNIT/ML injection pen E11.65 inject 14 units before meals plus scale. TDD 75 units    Associated Diagnoses: Type 2 diabetes mellitus with diabetic polyneuropathy, unspecified whether long term insulin use ([facility])      Insulin Glargine Solostar  (Basaglar KwikPen) 100 UNIT/ML SOPN E11.65 inject 50 units daily at bedtime  Qty: 15 mL, Refills: 1    Associated Diagnoses: Type 2 diabetes mellitus with diabetic polyneuropathy, unspecified whether long term insulin use (HCC)      Insulin Pen Needle 30G X 8 MM MISC Inject under the skin daily at bedtime  Qty: 100 each, Refills: 5    Comments: Please supply with whatever is in stock and insurance covers.  Associated Diagnoses: Type 2 diabetes mellitus without complication, with long-term current use of insulin (HCC)      lisinopril (ZESTRIL) 2.5 mg tablet Take 1 tablet (2.5 mg total) by mouth daily  Qty: 90 tablet, Refills: 3    Associated Diagnoses: Hypertension, unspecified type             No discharge procedures on file.    PDMP Review       None            ED Provider  Electronically Signed by             Emelina Forman PA-C  06/25/24 8280

## 2024-06-25 NOTE — ASSESSMENT & PLAN NOTE
Day #2 Vanco / Cefepime  BC obtained x 2 in ED   Follow-up MRSA screen  MRI foot today  Podiatry consult pending

## 2024-06-25 NOTE — PROGRESS NOTES
Patient ID: Art Joseph is a 64 y.o. male Date of Birth 1960       No chief complaint on file.      Allergies:  Patient has no known allergies.    Diagnosis:  1. Acute osteomyelitis of metatarsal bone of left foot (Formerly [facility])     Diagnosis ICD-10-CM Associated Orders   1. Acute osteomyelitis of metatarsal bone of left foot (HCC)  M86.172            Assessment & Plan:  See wound orders.   -X-rays reviewed and do show substantial fragmentation osteolysis and other indications that this is acute repeat osteomyelitis of the left foot  - She will need resection of the first met base  - I recommended admission to the hospital and we will need MRI and for wound care he just needs Adaptic, 4 x 4's, DSD to the left foot  - I anticipate hopeful surgical cure following this    Subjective:   Patient transfer evaluation management of his left foot, reports no acute changes with actual wound itself.  Did review his x-ray.        The following portions of the patient's history were reviewed and updated as appropriate:   Patient Active Problem List   Diagnosis    Type 2 diabetes mellitus with foot ulcer, with long-term current use of insulin (Formerly [facility])    Diabetic neuropathy (Formerly [facility])    Acute osteomyelitis of metatarsal bone of left foot (Formerly [facility])    Hx of right BKA (Formerly [facility])    Ambulatory dysfunction    Primary hypertension    Hyperlipidemia    Vitamin D deficiency    Diabetic peripheral angiopathy (Formerly [facility])    Severe obesity (BMI 35.0-39.9) with comorbidity (Formerly [facility])    Cellulitis of left lower extremity    Candidiasis, intertrigo    Elevated platelet count     Past Medical History:   Diagnosis Date    Diabetes mellitus (Formerly [facility])     Hypertension      Past Surgical History:   Procedure Laterality Date    LEG AMPUTATION THROUGH LOWER TIBIA AND FIBULA Right 7/25/2017    Procedure: AMPUTATION BELOW KNEE (BKA);  Surgeon: Mynor Sanchez MD;  [facility];  Service: General    [place] AMPUTATION FOOT TRANSMETARSAL Right 1/26/2017    Procedure: AMPUTATION  TRANSMETATARSAL (TMA); OPEN;  Surgeon: Leonard Lomeli DPM;  [facility];  Service: Podiatry    [place] AMPUTATION FOOT TRANSMETARSAL Left 4/26/2024    Procedure: LEFT FOOT PARTIAL FIRST RAY AMPUTATION;  Surgeon: Jennifer Rubalcava DPM;  [facility];  Service: Podiatry    [place] AMPUTATION METATARSAL W/TOE SINGLE Left 1/30/2017    Procedure: Left partial 1st ray amputation;  Surgeon: Leonard Lomeli DPM;  [facility];  Service: Podiatry    [place] COLONOSCOPY FLX DX W/COLLJ SPEC WHEN PFRMD N/A 11/28/2018    Procedure: COLONOSCOPY;  Surgeon: González Napier MD;  [facility];  Service: Gastroenterology    WOUND DEBRIDEMENT Right 1/30/2017    Procedure: DEBRIDEMENT FOOT/TOE (WASH OUT) delayed closure, LINDA;  Surgeon: Leonard Lomeli DPM;  [facility];  Service:      Social History     Socioeconomic History    Marital status: Single     Spouse name: Not on file    Number of children: Not on file    Years of education: Not on file    Highest education level: Not on file   Occupational History    Occupation: retired   Tobacco Use    Smoking status: Former     Types: Cigarettes    Smokeless tobacco: Never    Tobacco comments:     quit 9 years ago   Vaping Use    Vaping status: Never Used   Substance and Sexual Activity    Alcohol use: Yes     Alcohol/week: 11.0 standard drinks of alcohol     Types: 6 Cans of beer, 5 Shots of liquor per week     Comment: social ; occasional use as per [employer]    Drug use: No    Sexual activity: Not on file   Other Topics Concern    Not on file   Social History Narrative    Always uses seat belt    Caffeine use    Dental care regularly     Social Determinants of Health     Financial Resource Strain: Medium Risk (4/25/2023)    Overall Financial Resource Strain (CARDIA)     Difficulty of Paying Living Expenses: Somewhat hard   Food Insecurity: Food Insecurity Present (6/24/2024)    Hunger Vital Sign     Worried About Running Out of Food in the Last Year: Sometimes true      Ran Out of Food in the Last Year: Patient declined   Transportation Needs: Patient Declined (6/24/2024)    PRAPARE - Transportation     Lack of Transportation (Medical): Patient declined     Lack of Transportation (Non-Medical): Patient declined   Physical Activity: Not on file   Stress: Not on file   Social Connections: Not on file   Intimate Partner Violence: Not on file   Housing Stability: High Risk (6/24/2024)    Housing Stability Vital Sign     Unable to Pay for Housing in the Last Year: Yes     Number of Times Moved in the Last Year: 0     Homeless in the Last Year: No        Current Outpatient Medications:     aspirin (ECOTRIN LOW STRENGTH) 81 mg EC tablet, Take 81 mg by mouth daily Resume on 8/11/17 , Disp: , Rfl:     atorvastatin (LIPITOR) 80 mg tablet, Take 1 tablet (80 mg total) by mouth daily, Disp: 90 tablet, Rfl: 3    Cholecalciferol (VITAMIN D3) 1,000 units tablet, Take 2 tablets (2,000 Units total) by mouth daily For vitamin D deficiency, Disp: 60 tablet, Rfl: 0    glucose blood test strip, Test blood sugars 4 times a day, Disp: 400 each, Rfl: 3    insulin aspart (NovoLOG FlexPen) 100 UNIT/ML injection pen, E11.65 inject 14 units before meals plus scale. TDD 75 units, Disp: , Rfl:     Insulin Glargine Solostar (Basaglar KwikPen) 100 UNIT/ML SOPN, E11.65 inject 50 units daily at bedtime, Disp: 15 mL, Rfl: 1    Insulin Pen Needle 30G X 8 MM MISC, Inject under the skin daily at bedtime, Disp: 100 each, Rfl: 5    lisinopril (ZESTRIL) 2.5 mg tablet, Take 1 tablet (2.5 mg total) by mouth daily, Disp: 90 tablet, Rfl: 3    miconazole 2 % cream, Apply topically 2 (two) times a day Apply to the groin region/bilateral inguinal region and buttock/sacral region., Disp: 56 g, Rfl: 0    Multiple Vitamin (MULTI-VITAMIN DAILY PO), Take 1 tablet by mouth daily, Disp: , Rfl:   Family History   Problem Relation Age of Onset    Diabetes Mother       Review of Systems   Constitutional:  Negative for chills and fever.  "  HENT:  Negative for ear pain and sore throat.    Eyes:  Negative for pain and visual disturbance.   Respiratory:  Negative for cough and shortness of breath.    Cardiovascular:  Negative for chest pain and palpitations.   Gastrointestinal:  Negative for abdominal pain and vomiting.   Genitourinary:  Negative for dysuria and hematuria.   Musculoskeletal:  Negative for arthralgias and back pain.   Skin:  Negative for color change and rash.   Neurological:  Negative for seizures and syncope.   All other systems reviewed and are negative.        Objective:  There were no vitals taken for this visit.    Physical Exam  Skin:     Comments: Wound appears to be stable without any signs of infection, there is slight erythema but blanchable, drainage is not abnormal.  Fibrogranular base             Wound 04/26/24 Surgical Foot Left (Active)       Wound 06/18/24 Traumatic Leg Anterior;Left;Medial (Active)                         Procedures             Wound Instructions:  No orders of the defined types were placed in this encounter.        Myron Bhakta DPM      Portions of the record may have been created with voice recognition software. Occasional wrong word or \"sound a like\" substitutions may have occurred due to the inherent limitations of voice recognition software. Read the chart carefully and recognize, using context, where substitutions have occurred.      "

## 2024-06-25 NOTE — H&P
H&P Exam - Art Joseph 64 y.o. male MRN: [id number]    Unit/Bed#: RM07 Encounter: [id number]    Assessment:  Acute osteomyelitis of metatarsal bone of left foot (HCC)  Assessment & Plan  Start Vanco / Cefepime  BC obtained x 2 in ED   Check MRSA screen  MRI foot   Podiatry consult     Type 2 diabetes mellitus with foot ulcer, with long-term current use of insulin (HCC)  Assessment & Plan    Lab Results   Component Value Date    HGBA1C 10.8 (H) 04/28/2024     Poorly controlled evidenced by A1c  Continue basal/bolus insulin regimen  Accu-Cheks before meals and at bedtime with ISS coverage    Hyperlipidemia  Assessment & Plan  Lipitor 80 mg at bedtime    Hx of right BKA ([facility])  Assessment & Plan  PT/OT       History of Present Illness   64-year-old male with a past medical history significant for osteomyelitis, poorly controlled diabetes mellitus presents to the emergency room at the request of podiatry for admission.  Patient was noted to have evidence of osteomyelitis on XR imaging as an outpatient and referred to the ER by his podiatrist.  On exam he is resting comfortably, endorses some anxiety about the surgery but otherwise offers no acute complaints.  Specifically denies nausea vomiting shortness of breath or fevers at home    Review of Systems   All other systems reviewed and are negative.      Historical Information   Past Medical History:   Diagnosis Date    Diabetes mellitus (HCC)     Hypertension      Past Surgical History:   Procedure Laterality Date    LEG AMPUTATION THROUGH LOWER TIBIA AND FIBULA Right 7/25/2017    Procedure: AMPUTATION BELOW KNEE (BKA);  Surgeon: Mynor Sanchez MD;  [facility];  Service: General    [place] AMPUTATION FOOT TRANSMETARSAL Right 1/26/2017    Procedure: AMPUTATION TRANSMETATARSAL (TMA); OPEN;  Surgeon: Leonard Lomeli DPM;  [facility];  Service: Podiatry    [place] AMPUTATION FOOT TRANSMETARSAL Left 4/26/2024    Procedure: LEFT FOOT PARTIAL FIRST RAY AMPUTATION;   Surgeon: Jennifer Rubalcava DPM;  [facility];  Service: Podiatry    [place] AMPUTATION METATARSAL W/TOE SINGLE Left 1/30/2017    Procedure: Left partial 1st ray amputation;  Surgeon: Leonard Lomeli DPM;  [facility];  Service: Podiatry    [place] COLONOSCOPY FLX DX W/COLLJ SPEC WHEN PFRMD N/A 11/28/2018    Procedure: COLONOSCOPY;  Surgeon: González Napier MD;  [facility];  Service: Gastroenterology    WOUND DEBRIDEMENT Right 1/30/2017    Procedure: DEBRIDEMENT FOOT/TOE (WASH OUT) delayed closure, LINDA;  Surgeon: Leonard Lomeli DPM;  [facility];  Service:      Social History   Social History     Substance and Sexual Activity   Alcohol Use Yes    Alcohol/week: 11.0 standard drinks of alcohol    Types: 6 Cans of beer, 5 Shots of liquor per week    Comment: social ; occasional use as per [employer]     Social History     Substance and Sexual Activity   Drug Use No     Social History     Tobacco Use   Smoking Status Former    Types: Cigarettes   Smokeless Tobacco Never   Tobacco Comments    quit 9 years ago     E-Cigarette Use: Never User     E-Cigarette/Vaping Substances    Nicotine No     THC No     CBD No     Flavoring No     Other No     Unknown No        Family History: non-contributory    Meds/Allergies   all medications and allergies reviewed  No Known Allergies    Objective   First Vitals:   Blood Pressure: 150/72 (06/25/24 1701)  Pulse: 99 (06/25/24 1701)  Temperature: 98.1 °F (36.7 °C) (06/25/24 1701)  Temp Source: Tympanic (06/25/24 1701)  Respirations: 18 (06/25/24 1701)  SpO2: 94 % (06/25/24 1701)    Current Vitals:   Blood Pressure: 150/72 (06/25/24 1701)  Pulse: 99 (06/25/24 1701)  Temperature: 98.1 °F (36.7 °C) (06/25/24 1701)  Temp Source: Tympanic (06/25/24 1701)  Respirations: 18 (06/25/24 1701)  SpO2: 94 % (06/25/24 1701)    No intake or output data in the 24 hours ending 06/25/24 1736    Invasive Devices       None                   Physical Exam  Vitals and nursing note reviewed.    Constitutional:       General: He is not in acute distress.     Appearance: Normal appearance. He is not toxic-appearing.   HENT:      Head: Normocephalic and atraumatic.      Right Ear: External ear normal.      Left Ear: External ear normal.      Mouth/Throat:      Mouth: Mucous membranes are moist.      Pharynx: No oropharyngeal exudate.   Eyes:      Extraocular Movements: Extraocular movements intact.      Pupils: Pupils are equal, round, and reactive to light.   Cardiovascular:      Rate and Rhythm: Normal rate and regular rhythm.      Comments: Diminished peripheral pulses    Abdominal:      General: Abdomen is flat. Bowel sounds are normal. There is no distension.      Tenderness: There is no abdominal tenderness. There is no guarding.   Musculoskeletal:      Left lower leg: Edema present.      Comments: Right BKA  Left: mild erythema , trace edema     Skin:     General: Skin is warm and dry.      Capillary Refill: Capillary refill takes 2 to 3 seconds.      Findings: Erythema present.   Neurological:      General: No focal deficit present.      Mental Status: He is alert and oriented to person, place, and time.         Lab Results:   Lab Results   Component Value Date    WBC 8.60 06/25/2024    HGB 13.7 06/25/2024    HCT 41.7 06/25/2024    MCV 86 06/25/2024     06/25/2024     Lab Results   Component Value Date     01/06/2014    SODIUM 140 05/01/2024    K 3.8 05/01/2024     05/01/2024    CO2 26 05/01/2024    ANIONGAP 12 01/06/2014    AGAP 7 05/01/2024    BUN 22 05/01/2024    CREATININE 0.69 05/01/2024    GLUC 57 (L) 05/01/2024    GLUF 281 (H) 10/27/2023    CALCIUM 9.1 05/01/2024    AST 18 05/01/2024    ALT 21 05/01/2024    ALKPHOS 96 05/01/2024    TP 6.8 05/01/2024    TBILI 0.26 05/01/2024    EGFR 99 05/01/2024       Imaging:   No orders to display     XR       Findings compatible with osteomyelitis in the first metatarsal stump. Associated significant tissue edema and skin and soft tissue  defect at the medial aspect of the forefoot.     EKG, Pathology, and Other Studies: sinus on monitor    Code Status: Prior  Advance Directive and Living Will:      Power of :    POLST:      Counseling / Coordination of Care:

## 2024-06-25 NOTE — PROGRESS NOTES
Art Joseph is a 64 y.o. male who is currently ordered Vancomycin IV with management by the Pharmacy Consult service.  Relevant clinical data and objective / subjective history reviewed.  Vancomycin Assessment:  Indication and Goal AUC/Trough: Bone/joint infection (goal -600, trough >10), -600, trough >10  Clinical Status:  new  Micro:   pending  Renal Function:  SCr: 0.93 mg/dL  CrCl: 92.9 mL/min  Renal replacement: Not on dialysis  Days of Therapy: 1  Current Dose: 1500mg loading dose  Vancomycin Plan:  New Dosinmg q12h  Estimated AUC: 445 mcg*hr/mL  Estimated Trough: 16.4 mcg/mL  Next Level: 0600 on 24  Renal Function Monitoring: Daily BMP and UOP  Pharmacy will continue to follow closely for s/sx of nephrotoxicity, infusion reactions and appropriateness of therapy.  BMP and CBC will be ordered per protocol. We will continue to follow the patient’s culture results and clinical progress daily.    Jeff Granados, Pharmacist

## 2024-06-25 NOTE — PATIENT INSTRUCTIONS
Orders Placed This Encounter   Procedures    Wound cleansing and dressings     Dry dressing applied to left foot/leg wound  MD instructed patient to report to ED  for direct admit  Follow up wound center post d/c from hospital     Standing Status:   Future     Standing Expiration Date:   7/2/2024

## 2024-06-25 NOTE — PLAN OF CARE
Problem: PAIN - ADULT  Goal: Verbalizes/displays adequate comfort level or baseline comfort level  Description: Interventions:  - Encourage patient to monitor pain and request assistance  - Assess pain using appropriate pain scale  - Administer analgesics based on type and severity of pain and evaluate response  - Implement non-pharmacological measures as appropriate and evaluate response  - Consider cultural and social influences on pain and pain management  - Notify physician/advanced practitioner if interventions unsuccessful or patient reports new pain  Outcome: Progressing     Problem: INFECTION - ADULT  Goal: Absence or prevention of progression during hospitalization  Description: INTERVENTIONS:  - Assess and monitor for signs and symptoms of infection  - Monitor lab/diagnostic results  - Monitor all insertion sites, i.e. indwelling lines, tubes, and drains  - Monitor endotracheal if appropriate and nasal secretions for changes in amount and color  - [place] appropriate cooling/warming therapies per order  - Administer medications as ordered  - Instruct and encourage patient and family to use good hand hygiene technique  - Identify and instruct in appropriate isolation precautions for identified infection/condition  Outcome: Progressing     Problem: SAFETY ADULT  Goal: Patient will remain free of falls  Description: INTERVENTIONS:  - Educate patient/family on patient safety including physical limitations  - Instruct patient to call for assistance with activity   - Consult OT/PT to assist with strengthening/mobility   - Keep Call bell within reach  - Keep bed low and locked with side rails adjusted as appropriate  - Keep care items and personal belongings within reach  - Initiate and maintain comfort rounds  - Make Fall Risk Sign visible to staff  - Obtain necessary fall risk management equipment: as needed  - Apply yellow socks and bracelet for high fall risk patients  - Consider moving patient to room  near nurses station  Outcome: Progressing     Problem: DISCHARGE PLANNING  Goal: Discharge to home or other facility with appropriate resources  Description: INTERVENTIONS:  - Identify barriers to discharge w/patient and caregiver  - Arrange for needed discharge resources and transportation as appropriate  - Identify discharge learning needs (meds, wound care, etc.)  - Arrange for interpretive services to assist at discharge as needed  - Refer to Case Management Department for coordinating discharge planning if the patient needs post-hospital services based on physician/advanced practitioner order or complex needs related to functional status, cognitive ability, or social support system  Outcome: Progressing     Problem: Knowledge Deficit  Goal: Patient/family/caregiver demonstrates understanding of disease process, treatment plan, medications, and discharge instructions  Description: Complete learning assessment and assess knowledge base.  Interventions:  - Provide teaching at level of understanding  - Provide teaching via preferred learning methods  Outcome: Progressing     Problem: SKIN/TISSUE INTEGRITY - ADULT  Goal: Incision(s), wounds(s) or drain site(s) healing without S/S of infection  Description: INTERVENTIONS  - Assess and document dressing, incision, wound bed, drain sites and surrounding tissue  - Provide patient and family education  - Perform skin care/dressing changes every as ordered  Outcome: Progressing     Problem: PAIN - ADULT  Goal: Verbalizes/displays adequate comfort level or baseline comfort level  Description: Interventions:  - Encourage patient to monitor pain and request assistance  - Assess pain using appropriate pain scale  - Administer analgesics based on type and severity of pain and evaluate response  - Implement non-pharmacological measures as appropriate and evaluate response  - Consider cultural and social influences on pain and pain management  - Notify physician/advanced  practitioner if interventions unsuccessful or patient reports new pain  6/25/2024 1832 by Milagro Pagan RN  Outcome: Progressing  6/25/2024 1832 by Milagro Pagan RN  Outcome: Progressing     Problem: INFECTION - ADULT  Goal: Absence or prevention of progression during hospitalization  Description: INTERVENTIONS:  - Assess and monitor for signs and symptoms of infection  - Monitor lab/diagnostic results  - Monitor all insertion sites, i.e. indwelling lines, tubes, and drains  - Monitor endotracheal if appropriate and nasal secretions for changes in amount and color  - [place] appropriate cooling/warming therapies per order  - Administer medications as ordered  - Instruct and encourage patient and family to use good hand hygiene technique  - Identify and instruct in appropriate isolation precautions for identified infection/condition  6/25/2024 1832 by Milagro Pagan RN  Outcome: Progressing  6/25/2024 1832 by Milagro Pagan RN  Outcome: Progressing     Problem: SAFETY ADULT  Goal: Patient will remain free of falls  Description: INTERVENTIONS:  - Educate patient/family on patient safety including physical limitations  - Instruct patient to call for assistance with activity   - Consult OT/PT to assist with strengthening/mobility   - Keep Call bell within reach  - Keep bed low and locked with side rails adjusted as appropriate  - Keep care items and personal belongings within reach  - Initiate and maintain comfort rounds  - Make Fall Risk Sign visible to staff  - Obtain necessary fall risk management equipment: as needed  - Apply yellow socks and bracelet for high fall risk patients  - Consider moving patient to room near nurses station  6/25/2024 1832 by Milagro Pagan RN  Outcome: Progressing  6/25/2024 1832 by Milagro Pagan RN  Outcome: Progressing     Problem: DISCHARGE PLANNING  Goal: Discharge to home or other facility with appropriate resources  Description: INTERVENTIONS:  - Identify  barriers to discharge w/patient and caregiver  - Arrange for needed discharge resources and transportation as appropriate  - Identify discharge learning needs (meds, wound care, etc.)  - Arrange for interpretive services to assist at discharge as needed  - Refer to Case Management Department for coordinating discharge planning if the patient needs post-hospital services based on physician/advanced practitioner order or complex needs related to functional status, cognitive ability, or social support system  6/25/2024 1832 by Milagro Pagan RN  Outcome: Progressing  6/25/2024 1832 by Milagro Pagan RN  Outcome: Progressing     Problem: Knowledge Deficit  Goal: Patient/family/caregiver demonstrates understanding of disease process, treatment plan, medications, and discharge instructions  Description: Complete learning assessment and assess knowledge base.  Interventions:  - Provide teaching at level of understanding  - Provide teaching via preferred learning methods  6/25/2024 1832 by Milagro Pagan RN  Outcome: Progressing  6/25/2024 1832 by Milagro Pagan RN  Outcome: Progressing     Problem: SKIN/TISSUE INTEGRITY - ADULT  Goal: Incision(s), wounds(s) or drain site(s) healing without S/S of infection  Description: INTERVENTIONS  - Assess and document dressing, incision, wound bed, drain sites and surrounding tissue  - Provide patient and family education  - Perform skin care/dressing changes as ordered  6/25/2024 1832 by Milagro Pagan RN  Outcome: Progressing  6/25/2024 1832 by Milagro Pagan RN  Outcome: Progressing

## 2024-06-25 NOTE — ASSESSMENT & PLAN NOTE
Lab Results   Component Value Date    HGBA1C 10.8 (H) 04/28/2024     Poorly controlled evidenced by A1c  Continue basal/bolus insulin regimen  Accu-Cheks before meals and at bedtime with ISS coverage

## 2024-06-26 ENCOUNTER — ANESTHESIA EVENT (INPATIENT)
Dept: PERIOP | Facility: HOSPITAL | Age: 64
DRG: 617 | End: 2024-06-26
Payer: COMMERCIAL

## 2024-06-26 NOTE — CASE MANAGEMENT
Case Management Assessment & Discharge Planning Note    Patient name Art Joseph  Location /425-01 MRN [id number]  : 1960 Date 2024       Current Admission Date: 2024  Current Admission Diagnosis:Acute osteomyelitis of metatarsal bone of left foot (HCC)   Patient Active Problem List    Diagnosis Date Noted Date Diagnosed    Elevated platelet count 2024     Candidiasis, intertrigo 2024     Cellulitis of left lower extremity 2024     Severe obesity (BMI 35.0-39.9) with comorbidity ([facility]) 2021     Diabetic peripheral angiopathy ([facility]) 2020     Vitamin D deficiency 2019     Ambulatory dysfunction 10/17/2017     Hx of right BKA ([facility]) 2017     Primary hypertension 2017     Hyperlipidemia 2017     Acute osteomyelitis of metatarsal bone of left foot ([facility]) 2017     Type 2 diabetes mellitus with foot ulcer, with long-term current use of insulin ([facility]) 2017     Diabetic neuropathy ([facility]) 2017       LOS (days): 1  Geometric Mean LOS (GMLOS) (days): 3  Days to GMLOS:2.2     OBJECTIVE:    Risk of Unplanned Readmission Score: 9.97         Current admission status: Inpatient  Referral Reason:  (discharge planning)    Preferred Pharmacy:   [facility] [place] - [address]  [address] [address]  Phone: [phone] Fax: [phone]    [facility] [place] [facility] - [address]  [address]  [place] [address]  Phone: [phone] Fax: [phone]    [id number] - [place] - [address]  [address] [address]  Phone: [phone] Fax: [phone]    Primary Care Provider: Kerry Christine MD    Primary Insurance: [facility]  Secondary Insurance:     ASSESSMENT:    CM met with patient at the bedside,baseline information  was obtained. CM discussed the role of CM in helping the patient develop a  discharge plan and assist the patient in carry out their plan.    Patient live [facility] first floor living.  2 steps into home patient stated he can do with cane and rail using his prostetic leg.     Active with [employer] [facility] wound left foot.    Right BKA  baseline W/C and cane.    Active Health Care Proxies       Mikhail Joseph [facility] Representative - [place]   Primary Phone: [phone] (Home)                 Advance Directives  Does patient have a Health Care POA?: No  Was patient offered paperwork?: Yes (declined)  Does patient currently have a Health Care decision maker?: Yes, please see Health Care Proxy section (mikhail [place])  Does patient have Advance Directives?: No  Was patient offered paperwork?: Yes (declined)  Primary Contact: Evens Joseph  [phone]         Readmission Root Cause  30 Day Readmission: No    Patient Information  Admitted from:: Home  Mental Status: Alert  During Assessment patient was accompanied by: Not accompanied during assessment  Assessment information provided by:: Patient  Primary Caregiver: Self  Support Systems: Family members, [facility] ([facility])  What [place] you [place] in[place]  Home entry access options. Select all that apply.: Stairs  Number of steps to enter home.: 2  Do the steps have railings?: Yes  Type of Current Residence: [facility]  Living Arrangements: Lives Alone  Is patient a ?: No    Activities of Daily Living Prior to Admission  Functional Status: Independent  Completes ADLs independently?: Yes  Ambulates independently?: No  Level of ambulatory dependence: Assistance  Does patient use assisted devices?: Yes  Assisted Devices (DME) used: Walker, Wheelchair, Shower Chair, Straight Cane  Does patient currently own DME?: Yes  What DME does the patient currently own?: Shower Chair, Straight Cane, Walker, Wheelchair  Does patient have a history of Outpatient Therapy (PT/OT)?: No  Does the patient have a  history of Short-Term Rehab?: No  Does patient have a history of HHC?: Yes  Does patient currently have HHC?: Yes ([employer] [facility] [facility])    Current Home Health Care  [facility]  Current Home Health Follow-Up Provider:: PCP    Patient Information Continued  Income Source: Pension/penitentiary  Does patient have prescription coverage?: Yes  Does patient receive dialysis treatments?: No  Does patient have a history of substance abuse?: No  Does patient have a history of Mental Health Diagnosis?: No         Means of Transportation  Means of Transport to Appts:: Public Transportation - Bus ([facility])      Social Determinants of Health (SDOH)      Flowsheet Row Most Recent Value   Housing Stability    In the last 12 months, was there a time when you were not able to pay the mortgage or rent on time? N   In the past 12 months, how many times have you moved where you were living? 1   At any time in the past 12 months, were you homeless or living in a shelter (including now)? N   Transportation Needs    In the past 12 months, has lack of transportation kept you from medical appointments or from getting medications? no   In the past 12 months, has lack of transportation kept you from meetings, work, or from getting things needed for daily living? No   Food Insecurity    Within the past 12 months, you worried that your food would run out before you got the money to buy more. Never true   Within the past 12 months, the food you bought just didn't last and you didn't have money to get more. Never true   Utilities    In the past 12 months has the electric, gas, oil, or water company threatened to shut off services in your home? No            DISCHARGE DETAILS:    Discharge planning discussed with:: patient         CM to follow for any discharge needs.    [facility].

## 2024-06-26 NOTE — PROGRESS NOTES
Art Joseph is a 64 y.o. male who is currently ordered Vancomycin IV with management by the Pharmacy Consult service.  Relevant clinical data and objective / subjective history reviewed.  Vancomycin Assessment:  Indication and Goal AUC/Trough: Bone/joint infection (goal -600, trough >10), -600, trough >10  Clinical Status: stable  Micro:   pending  Renal Function:  SCr: 0.93 mg/dL  CrCl: 93.2 mL/min  Renal replacement: Not on dialysis  Days of Therapy: 2  Current Dose: 1000mg Q12H  Vancomycin Plan:  New Dosing: same dose  Estimated AUC: 449 mcg*hr/mL  Estimated Trough: 13.2 mcg/mL  Next Level: 06/27/2024 in the AM  Renal Function Monitoring: Daily BMP and UOP  Pharmacy will continue to follow closely for s/sx of nephrotoxicity, infusion reactions and appropriateness of therapy.  BMP and CBC will be ordered per protocol. We will continue to follow the patient’s culture results and clinical progress daily.    Reggie Saez, Pharmacist

## 2024-06-26 NOTE — PROGRESS NOTES
Patient:    MRN:  [id number]    Anthonyin Request ID:  [id number]    Level of care reserved:  Home Health Agency    Partner Reserved:  [facility] [place] [address] [phone]    Clinical needs requested:  physical therapy, occupational therapy, wound care & dressings, skilled nursing, medication management, disease process education    Geography searched:  [id number]    Start of Service:    Request sent:  2:05pm EDT on 6/26/2024 by Marianela De Los Santos    Partner reserved:  2:07pm EDT on 6/26/2024 by Marianela De Los Santos    Choice list shared:  2:07pm EDT on 6/26/2024 by Marianela De Los Santos

## 2024-06-26 NOTE — PLAN OF CARE
Problem: PAIN - ADULT  Goal: Verbalizes/displays adequate comfort level or baseline comfort level  Description: Interventions:  - Encourage patient to monitor pain and request assistance  - Assess pain using appropriate pain scale  - Administer analgesics based on type and severity of pain and evaluate response  - Implement non-pharmacological measures as appropriate and evaluate response  - Consider cultural and social influences on pain and pain management  - Notify physician/advanced practitioner if interventions unsuccessful or patient reports new pain  6/26/2024 1616 by Miley Pillai  Outcome: Progressing  6/26/2024 1614 by Miley Pillai  Outcome: Progressing     Problem: INFECTION - ADULT  Goal: Absence or prevention of progression during hospitalization  Description: INTERVENTIONS:  - Assess and monitor for signs and symptoms of infection  - Monitor lab/diagnostic results  - Monitor all insertion sites, i.e. indwelling lines, tubes, and drains  - Monitor endotracheal if appropriate and nasal secretions for changes in amount and color  - [place] appropriate cooling/warming therapies per order  - Administer medications as ordered  - Instruct and encourage patient and family to use good hand hygiene technique  - Identify and instruct in appropriate isolation precautions for identified infection/condition  6/26/2024 1616 by Miley Pillai  Outcome: Progressing  6/26/2024 1614 by Miley Pillai  Outcome: Progressing     Problem: SAFETY ADULT  Goal: Patient will remain free of falls  Description: INTERVENTIONS:  - Educate patient/family on patient safety including physical limitations  - Instruct patient to call for assistance with activity   - Consult OT/PT to assist with strengthening/mobility   - Keep Call bell within reach  - Keep bed low and locked with side rails adjusted as appropriate  - Keep care items and personal belongings within reach  - Initiate and maintain comfort rounds  - Make  Fall Risk Sign visible to staff  - Obtain necessary fall risk management equipment: as needed  - Apply yellow socks and bracelet for high fall risk patients  - Consider moving patient to room near nurses station  6/26/2024 1616 by Miley Pillai  Outcome: Progressing  6/26/2024 1614 by Miley Pillai  Outcome: Progressing     Problem: DISCHARGE PLANNING  Goal: Discharge to home or other facility with appropriate resources  Description: INTERVENTIONS:  - Identify barriers to discharge w/patient and caregiver  - Arrange for needed discharge resources and transportation as appropriate  - Identify discharge learning needs (meds, wound care, etc.)  - Arrange for interpretive services to assist at discharge as needed  - Refer to Case Management Department for coordinating discharge planning if the patient needs post-hospital services based on physician/advanced practitioner order or complex needs related to functional status, cognitive ability, or social support system  6/26/2024 1616 by Miley Pillai  Outcome: Progressing  6/26/2024 1614 by Miley Pillai  Outcome: Progressing     Problem: Knowledge Deficit  Goal: Patient/family/caregiver demonstrates understanding of disease process, treatment plan, medications, and discharge instructions  Description: Complete learning assessment and assess knowledge base.  Interventions:  - Provide teaching at level of understanding  - Provide teaching via preferred learning methods  6/26/2024 1616 by Miley Pillai  Outcome: Progressing  6/26/2024 1614 by Miley Pillai  Outcome: Progressing     Problem: SKIN/TISSUE INTEGRITY - ADULT  Goal: Incision(s), wounds(s) or drain site(s) healing without S/S of infection  Description: INTERVENTIONS  - Assess and document dressing, incision, wound bed, drain sites and surrounding tissue  - Provide patient and family education  - Perform skin care/dressing changes as ordered  6/26/2024 1616 by Miley Pillai  Outcome:  Progressing  6/26/2024 1614 by Miley iPllai  Outcome: Progressing  Goal: Skin Integrity remains intact(Skin Breakdown Prevention)  Description: Assess:    -Inspect skin when repositioning, toileting, and assisting with ADLS    -Assess extremities for adequate circulation and sensation     Bed Management:  -Have minimal linens on bed & keep smooth, unwrinkled  -Change linens as needed when moist or perspiring        Toileting:  -Offer bedside commode      Activity:  -Encourage activity and walks on unit  -Encourage or provide ROM exercises   -Turn and reposition patient every 2 Hours  -Use appropriate equipment to lift or move patient in bed  -Instruct/ Assist with weight shifting every 60 min when out of bed in chair      Skin Care:  -Avoid use of baby powder, tape, friction and shearing, hot water or constrictive clothing    -Do not massage red bony areas    Next Steps:   6/26/2024 1616 by Miley Pillai  Outcome: Progressing  6/26/2024 1614 by Miley Pillai  Outcome: Progressing  Goal: Pressure injury heals and does not worsen  Description: Interventions:  - Implement low air loss mattress or specialty surface (Criteria met)  - Apply silicone foam dressing  - Instruct/assist with weight shifting every 60 minutes when in chair   - Limit chair time to 2 hour intervals  - Apply fecal or urinary incontinence containment device   - Turn and reposition patient & offload bony prominences every two hours   - Utilize friction reducing device or surface for transfers       - Consider nutrition services referral as needed  6/26/2024 1616 by Miley Pillai  Outcome: Progressing  6/26/2024 1614 by Miley Pillai  Outcome: Progressing     Problem: Prexisting or High Potential for Compromised Skin Integrity  Goal: Skin integrity is maintained or improved  Description: INTERVENTIONS:  - Identify patients at risk for skin breakdown  - Assess and monitor skin integrity  - Assess and monitor nutrition and hydration  status  - Monitor labs   - Assess for incontinence   - Turn and reposition patient  - Assist with mobility/ambulation  - Relieve pressure over bony prominences  - Avoid friction and shearing  - Provide appropriate hygiene as needed including keeping skin clean and dry  - Evaluate need for skin moisturizer/barrier cream  - Collaborate with interdisciplinary team   - Patient/family teaching  - Consider wound care consult   6/26/2024 1616 by Miley Pillai  Outcome: Progressing  6/26/2024 1614 by Miley Pillai  Outcome: Progressing     Problem: CARDIOVASCULAR - ADULT  Goal: Maintains optimal cardiac output and hemodynamic stability  Description: INTERVENTIONS:  - Monitor I/O, vital signs and rhythm  - Monitor for S/S and trends of decreased cardiac output  - Administer and titrate ordered vasoactive medications to optimize hemodynamic stability  - Assess quality of pulses, skin color and temperature  - Assess for signs of decreased coronary artery perfusion  - Instruct patient to report change in severity of symptoms  6/26/2024 1616 by Miley Pillai  Outcome: Progressing  6/26/2024 1614 by Miley Pillai  Outcome: Progressing     Problem: METABOLIC, FLUID AND ELECTROLYTES - ADULT  Goal: Electrolytes maintained within normal limits  Description: INTERVENTIONS:  - Monitor labs and assess patient for signs and symptoms of electrolyte imbalances  - Administer electrolyte replacement as ordered  - Monitor response to electrolyte replacements, including repeat lab results as appropriate  - Instruct patient on fluid and nutrition as appropriate  6/26/2024 1616 by Miley Pillai  Outcome: Progressing  6/26/2024 1614 by Miley Pillai  Outcome: Progressing     Problem: MUSCULOSKELETAL - ADULT  Goal: Maintain or return mobility to safest level of function  Description: INTERVENTIONS:  - Assess patient's ability to carry out ADLs; assess patient's baseline for ADL function and identify physical deficits which  impact ability to perform ADLs (bathing, care of mouth/teeth, toileting, grooming, dressing, etc.)  - Assess/evaluate cause of self-care deficits   - Assess range of motion  - Assess patient's mobility  - Assess patient's need for assistive devices and provide as appropriate  - Encourage maximum independence but intervene and supervise when necessary  - Involve family in performance of ADLs  - Assess for home care needs following discharge   - Consider OT consult to assist with ADL evaluation and planning for discharge  - Provide patient education as appropriate  6/26/2024 1616 by Miley Pillai  Outcome: Progressing  6/26/2024 1614 by Miley Pillai  Outcome: Progressing

## 2024-06-26 NOTE — UTILIZATION REVIEW
Initial Clinical Review    Admission: Date/Time/Statement:   Admission Orders (From admission, onward)       Ordered        06/25/24 1759  INPATIENT ADMISSION  Once                          Orders Placed This Encounter   Procedures    INPATIENT ADMISSION     Standing Status:   Standing     Number of Occurrences:   1     Order Specific Question:   Level of Care     Answer:   Med Surg [16]     Order Specific Question:   Estimated length of stay     Answer:   More than 2 Midnights     Order Specific Question:   Certification     Answer:   I certify that inpatient services are medically necessary for this patient for a duration of greater than two midnights. See H&P and MD Progress Notes for additional information about the patient's course of treatment.     ED Arrival Information       Expected   -    Arrival   6/25/2024 16:50    Acuity   Urgent              Means of arrival   Walk-In    Escorted by   Family Member    Service   Hospitalist    Admission type   Emergency              Arrival complaint   wound care pre surgery             Chief Complaint   Patient presents with    Wound Check       Initial Presentation: 64 y.o. male to the ED from wound care with complaints of wound left foot, concerning for osteomyelitis. Admitted to inpatient for acute osteomyelitis metatarsal bone of left foot.  H/O DMI, RIght BKA, osteo, thn, HLD.  No complaints.  Dorsalis pedis pulses are 1+ on the left side. Right dorsalis pedis pulse not accessible. Posterior tibial pulses are 1+ on the left side. Right posterior tibial pulse not accessible. Prior amputation of left great as well as 2nd and 3rd toes. Approximate 2x4 cm open wound/ulceration of the left mid foot. Mild purulent drainage on surface and mild surrounding erythema. No crepitus.     Blood cultures pending. Started on iv abx in the ED.  Check MRI foot. Podiatry consult.       Date: 6/26   Day 2:  Continue with IV abx.  Follow blood cultures.  As per discussion with  Podiatry, tentative OR tomorrow.     Date: 6/27  Day 3: Has surpassed a 2nd midnight with active treatments and services. Initially admitted for osteomyelitis of left foot requiring surgical intervention.   BLood cultures thus far are neg.  Continue with IV abx.   OPERATIVE NOTE  SURGERY DATE: 6/27/2024   Procedure(s):  Left - AMPUTATION LEFT FOOT 1st METATARSAL RESECTION  Anesthesia Type:   Conscious Sedation     Quick note podiatry:  WBAT to left foot for BRP.   ED Triage Vitals [06/25/24 1701]   Temperature Pulse Respirations Blood Pressure SpO2 Pain Score   98.1 °F (36.7 °C) 99 18 150/72 94 % No Pain     Weight (last 2 days)       Date/Time Weight    06/25/24 18:21:38 98.5 (217.15)            Vital Signs (last 3 days)       Date/Time Temp Pulse Resp BP MAP (mmHg) SpO2 O2 Device Cardiac (WDL) Patient Position - Orthostatic VS Pain    06/27/24 15:17:29 97.8 °F (36.6 °C) 88 -- 143/71 95 94 % -- -- -- --    06/27/24 1025 -- -- -- -- -- -- -- -- -- No Pain    06/27/24 1023 -- -- -- -- -- -- -- -- -- No Pain    06/27/24 1000 -- -- -- -- -- -- None (Room air) -- -- No Pain    06/27/24 0956 -- 90 -- 134/74 94 95 % -- -- -- --    06/27/24 0955 -- -- -- 134/74 -- -- -- -- -- --    06/27/24 0845 97.3 °F (36.3 °C) 69 22 130/70 94 95 % None (Room air) WDL -- No Pain    06/27/24 0835 -- 77 20 110/60 79 95 % None (Room air) WDL -- No Pain    06/27/24 0825 -- 79 22 123/66 89 92 % None (Room air) WDL -- No Pain    06/27/24 0815 97.3 °F (36.3 °C) 80 26 93/53 66 95 % None (Room air) WDL -- No Pain    06/27/24 03:34:17 97.7 °F (36.5 °C) 64 20 130/73 92 96 % -- -- -- --    06/26/24 2000 -- -- -- -- -- -- None (Room air) -- -- No Pain    06/26/24 14:40:53 98.6 °F (37 °C) 74 18 129/74 92 96 % -- -- -- --    06/26/24 0800 -- -- -- -- -- -- None (Room air) -- -- No Pain    06/26/24 0604 97.8 °F (36.6 °C) 75 -- 132/76 95 93 % None (Room air) -- Lying --    06/25/24 2138 -- -- -- -- -- -- None (Room air) -- -- No Pain    06/25/24 20:33:33  97.7 °F (36.5 °C) 82 -- 121/69 86 94 % None (Room air) -- Lying --    06/25/24 1937 -- -- -- -- -- -- -- -- -- No Pain    06/25/24 18:21:38 97.8 °F (36.6 °C) 92 19 163/92 116 95 % -- -- -- --    06/25/24 18:20:46 97.8 °F (36.6 °C) 88 18 163/92 116 95 % -- -- -- --    06/25/24 1701 98.1 °F (36.7 °C) 99 18 150/72 -- 94 % None (Room air) -- -- No Pain            Pertinent Labs/Diagnostic Test Results:   6/25 EKG: Interpretation:     Interpretation: normal    Rate:     ECG rate:  86     ECG rate assessment: normal    Rhythm:     Rhythm: sinus rhythm    Ectopy:     Ectopy: none    QRS:     QRS axis:  Normal     QRS intervals:  Normal   Conduction:     Conduction: normal    ST segments:     ST segments:  Normal   T waves:     T waves: normal    Comments:      , QRS 82, QT//461; no acute ischemic changes.     Results from last 7 days   Lab Units 06/27/24  0536 06/25/24  1929 06/25/24  1728   WBC Thousand/uL 5.67  --  8.60   HEMOGLOBIN g/dL 13.7  --  13.7   HEMATOCRIT % 41.9  --  41.7   PLATELETS Thousands/uL 273 142* 291   TOTAL NEUT ABS Thousands/µL 2.93  --  5.16         Results from last 7 days   Lab Units 06/27/24  0536 06/25/24  1728   SODIUM mmol/L 139 134*   POTASSIUM mmol/L 3.8 4.0   CHLORIDE mmol/L 106 101   CO2 mmol/L 24 25   ANION GAP mmol/L 9 8   BUN mg/dL 10 11   CREATININE mg/dL 0.78 0.93   EGFR ml/min/1.73sq m 94 85   CALCIUM mg/dL 9.1 9.3     Results from last 7 days   Lab Units 06/25/24  1728   AST U/L 10*   ALT U/L 8   ALK PHOS U/L 139*   TOTAL PROTEIN g/dL 7.0   ALBUMIN g/dL 3.7   TOTAL BILIRUBIN mg/dL 0.60     Results from last 7 days   Lab Units 06/27/24  1116 06/27/24  0932 06/27/24  0609 06/27/24  0503 06/27/24  0355 06/27/24  0259 06/27/24  0154 06/27/24  0057 06/26/24  2237 06/26/24  2039 06/26/24  1548 06/26/24  1054   POC GLUCOSE mg/dl 249* 95 90 101 98 101 92 107 133 132 110 136     Results from last 7 days   Lab Units 06/27/24  0536 06/25/24  1728   GLUCOSE RANDOM mg/dL 96 235*          Results from last 7 days   Lab Units 06/25/24  1929   HEMOGLOBIN A1C % 9.7*   EAG mg/dl 232         Results from last 7 days   Lab Units 06/27/24  0536 06/25/24  1728   PROTIME seconds 12.7 12.9   INR  0.96 0.98   PTT seconds  --  25         Results from last 7 days   Lab Units 06/25/24  1728   PROCALCITONIN ng/ml 0.06     Results from last 7 days   Lab Units 06/25/24  1728   LACTIC ACID mmol/L 1.1     Results from last 7 days   Lab Units 06/25/24  1728   CRP mg/L 11.4*   SED RATE mm/hour 53*       Results from last 7 days   Lab Units 06/25/24  1728 06/25/24  1727   BLOOD CULTURE  No Growth at 24 hrs. No Growth at 24 hrs.     ED Treatment-Medication Administration from 06/25/2024 1650 to 06/25/2024 1817         Date/Time Order Dose Route Action     06/25/2024 1758 cefepime (MAXIPIME) IVPB (premix in dextrose) 2,000 mg 50 mL 2,000 mg Intravenous New Bag            Past Medical History:   Diagnosis Date    Diabetes mellitus ([facility])     Hypertension          Admitting Diagnosis: Penetrating foot wound [S91.339A]  Open wound of left foot, initial encounter [S91.302A]  Hx of BKA, right ([facility]) [Z89.511]  Acute osteomyelitis of metatarsal bone of left foot ([facility]) [M86.172]  Uncontrolled diabetes mellitus with hyperglycemia ([facility]) [E11.65]  Age/Sex: 64 y.o. male  Admission Orders:  Scheduled Medications:  aspirin, 81 mg, Oral, Daily  atorvastatin, 80 mg, Oral, Daily  cefepime, 2,000 mg, Intravenous, Q12H  heparin (porcine), 5,000 Units, Subcutaneous, Q8H [facility]  [START ON 6/27/2024] insulin glargine, 50 Units, Subcutaneous, HS  insulin lispro, 1-6 Units, Subcutaneous, TID AC  insulin lispro, 5 Units, Subcutaneous, TID With Meals  lisinopril, 2.5 mg, Oral, Daily  vancomycin, 12.5 mg/kg (Adjusted), Intravenous, Q12H      Continuous IV Infusions:       PRN Meds:  sodium chloride (PF), 3 mL, Intravenous, Once PRN        IP CONSULT TO PODIATRY  IP CONSULT TO PHARMACY  IP CONSULT TO CASE MANAGEMENT    Network Utilization Review  Department  ATTENTION: Please call with any questions or concerns to [phone] and carefully listen to the prompts so that you are directed to the right person. All voicemails are confidential.   For Discharge needs, contact Care Management DC Support Team at [phone] opt. [phone]  Send all requests for admission clinical reviews, approved or denied determinations and any other requests to dedicated fax number below belonging to the campus where the patient is receiving treatment. List of dedicated fax numbers for the Facilities:  FACILITY NAME UR FAX NUMBER   ADMISSION DENIALS (Administrative/Medical Necessity) [phone]   DISCHARGE SUPPORT TEAM (NETWORK) [phone]   PARENT CHILD HEALTH (Maternity/NICU/Pediatrics) [phone]   [facility] [phone]   [facility] [phone]   [facility] [phone]   [facility] [phone]   [facility] [phone]   [facility] [phone]   [facility] [phone]   [facility] [phone]   [facility] [phone]   [facility] [phone]   [facility] [phone]   [facility] [phone]

## 2024-06-26 NOTE — OCCUPATIONAL THERAPY NOTE
Occupational Therapy         Patient Name: Art Joseph  Today's Date: 6/26/2024 06/26/24 1507   OT Last Visit   OT Visit Date 06/26/24   Note Type   Note type Cancelled Session   Cancel Reasons Patient to operating room   Additional Comments scheduled for OR tomorrow 6/27 AM; will evaluate post op with recommendation       Roshan Ward

## 2024-06-26 NOTE — PROGRESS NOTES
"[facility]  Progress Note  Name: Art Joseph I  MRN: [id number]  Unit/Bed#: 425-01 I Date of Admission: 6/25/2024   Date of Service: 6/26/2024 I Hospital Day: 1    Assessment & Plan   Acute osteomyelitis of metatarsal bone of left foot (HCC)  Assessment & Plan  Day #2 Vanco / Cefepime  BC obtained x 2 in ED   Follow-up MRSA screen  MRI foot today  Podiatry consult pending    Type 2 diabetes mellitus with foot ulcer, with long-term current use of insulin (HCC)  Assessment & Plan    Lab Results   Component Value Date    HGBA1C 10.8 (H) 04/28/2024     Poorly controlled evidenced by A1c  Continue basal/bolus insulin regimen  Accu-Cheks before meals and at bedtime with ISS coverage      Hyperlipidemia  Assessment & Plan  Lipitor 80 mg at bedtime    Hx of right BKA (HCC)  Assessment & Plan  PT/OT              Progress Note - Art Joseph 64 y.o. male MRN: [id number]    Unit/Bed#: 425-01 Encounter: [id number]        Subjective:   Patient seen and examined, feels well no acute complaints     Objective:     Vitals:   Vitals:    06/26/24 0604   BP: 132/76   Pulse: 75   Resp:    Temp: 97.8 °F (36.6 °C)   SpO2: 93%     Body mass index is 31.16 kg/m².    Intake/Output Summary (Last 24 hours) at 6/26/2024 0847  Last data filed at 6/26/2024 0829  Gross per 24 hour   Intake 360 ml   Output 1300 ml   Net -940 ml       Physical Exam:   /76 (BP Location: Right arm)   Pulse 75   Temp 97.8 °F (36.6 °C) (Oral)   Resp 19   Ht 5' 10\" (1.778 m)   Wt 98.5 kg (217 lb 2.5 oz)   SpO2 93%   BMI 31.16 kg/m²   General appearance: alert and oriented, in no acute distress  Lungs: clear to auscultation bilaterally  Heart: regular rate and rhythm, S1, S2 normal, no murmur, click, rub or gallop  Abdomen: soft, non-tender; bowel sounds normal; no masses,  no organomegaly  Extremities: Right BKA , left no edmea,   Pulses: diminished, but palpable   Neurologic: Grossly normal     Invasive Devices       Peripheral " Intravenous Line  Duration             Peripheral IV 06/25/24 Left;Ventral (anterior) Forearm <1 day                    Results from last 7 days   Lab Units 06/25/24  1929 06/25/24  1728   WBC Thousand/uL  --  8.60   HEMOGLOBIN g/dL  --  13.7   HEMATOCRIT %  --  41.7   PLATELETS Thousands/uL 142* 291       Results from last 7 days   Lab Units 06/25/24  1728   POTASSIUM mmol/L 4.0   CHLORIDE mmol/L 101   CO2 mmol/L 25   BUN mg/dL 11   CREATININE mg/dL 0.93   CALCIUM mg/dL 9.3   ALK PHOS U/L 139*   ALT U/L 8   AST U/L 10*       Medication Administration - last 24 hours from 06/25/2024 0847 to 06/26/2024 0847         Date/Time Order Dose Route Action Action by     06/25/2024 1942 EDT cefepime (MAXIPIME) IVPB (premix in dextrose) 2,000 mg 50 mL 0 mg Intravenous Stopped Lucero Ferro, TEODORO     06/25/2024 1758 EDT cefepime (MAXIPIME) IVPB (premix in dextrose) 2,000 mg 50 mL 2,000 mg Intravenous New Bag Lolis Montgomery RN     06/26/2024 0205 EDT vancomycin (VANCOCIN) 1500 mg in sodium chloride 0.9% 250 mL IVPB 0 mg Intravenous Stopped Bre Hairston LPN     06/25/2024 1821 EDT vancomycin (VANCOCIN) 1500 mg in sodium chloride 0.9% 250 mL IVPB 1,500 mg Intravenous New Bag Sherry Cummins RN     06/26/2024 0821 EDT aspirin (ECOTRIN LOW STRENGTH) EC tablet 81 mg 81 mg Oral Given Miley Pillai     06/26/2024 0820 EDT atorvastatin (LIPITOR) tablet 80 mg 80 mg Oral Given Miley Pillai     06/25/2024 2137 EDT insulin glargine (LANTUS) subcutaneous injection 50 Units 0.5 mL 50 Units Subcutaneous Given Bre Hairston LPN     06/26/2024 0820 EDT lisinopril (ZESTRIL) tablet 2.5 mg 2.5 mg Oral Given Miley Pillai     06/26/2024 0822 EDT insulin lispro (HumALOG/ADMELOG) 100 units/mL subcutaneous injection 5 Units 5 Units Subcutaneous Given Miley Pillai     06/25/2024 1944 EDT insulin lispro (HumALOG/ADMELOG) 100 units/mL subcutaneous injection 5 Units 5 Units Subcutaneous Given Lucero Ferro RN     06/26/2024  0534 EDT heparin (porcine) subcutaneous injection 5,000 Units 5,000 Units Subcutaneous Given Bre Hairston, LPN     06/25/2024 2137 EDT heparin (porcine) subcutaneous injection 5,000 Units 5,000 Units Subcutaneous Given Bre Hairston, LPN     06/25/2024 1938 EDT insulin lispro (HumALOG/ADMELOG) 100 units/mL subcutaneous injection 1-6 Units -- Subcutaneous Not Given Lucero Ferro RN     06/26/2024 0534 EDT cefepime (MAXIPIME) IVPB (premix in dextrose) 2,000 mg 50 mL 2,000 mg Intravenous New Bag Bre Hairston, LPN     06/26/2024 0626 EDT vancomycin (VANCOCIN) 1,000 mg in sodium chloride 0.9 % 250 mL IVPB 1,000 mg Intravenous New Bag Bre Hairston, LPN     06/26/2024 0821 EDT insulin lispro (HumALOG/ADMELOG) 100 units/mL subcutaneous injection 1-6 Units -- Subcutaneous Not Given Miley Pillai              Lab, Imaging and other studies: I have personally reviewed pertinent reports.    VTE Pharmacologic Prophylaxis: Heparin  VTE Mechanical Prophylaxis: sequential compression device     Jorge Thurston MD  6/26/2024,8:47 AM

## 2024-06-26 NOTE — PHYSICAL THERAPY NOTE
PHYSICAL THERAPY        Patient Name: Art Joseph  Today's Date: 6/26/2024 06/26/24 2820   PT Last Visit   PT Visit Date 06/26/24   Note Type   Note type Cancelled Session   Cancel Reasons Patient to operating room   Additional Comments Pt scheduled for OR tomorrow AM 6/27; will evaluate post-operatively when appropriate with recommendations     Radha Avery

## 2024-06-26 NOTE — PLAN OF CARE
Problem: PAIN - ADULT  Goal: Verbalizes/displays adequate comfort level or baseline comfort level  Description: Interventions:  - Encourage patient to monitor pain and request assistance  - Assess pain using appropriate pain scale  - Administer analgesics based on type and severity of pain and evaluate response  - Implement non-pharmacological measures as appropriate and evaluate response  - Consider cultural and social influences on pain and pain management  - Notify physician/advanced practitioner if interventions unsuccessful or patient reports new pain  Outcome: Progressing     Problem: INFECTION - ADULT  Goal: Absence or prevention of progression during hospitalization  Description: INTERVENTIONS:  - Assess and monitor for signs and symptoms of infection  - Monitor lab/diagnostic results  - Monitor all insertion sites, i.e. indwelling lines, tubes, and drains  - Monitor endotracheal if appropriate and nasal secretions for changes in amount and color  - Morton appropriate cooling/warming therapies per order  - Administer medications as ordered  - Instruct and encourage patient and family to use good hand hygiene technique  - Identify and instruct in appropriate isolation precautions for identified infection/condition  Outcome: Progressing     Problem: SAFETY ADULT  Goal: Patient will remain free of falls  Description: INTERVENTIONS:  - Educate patient/family on patient safety including physical limitations  - Instruct patient to call for assistance with activity   - Consult OT/PT to assist with strengthening/mobility   - Keep Call bell within reach  - Keep bed low and locked with side rails adjusted as appropriate  - Keep care items and personal belongings within reach  - Initiate and maintain comfort rounds  - Make Fall Risk Sign visible to staff  - Obtain necessary fall risk management equipment: as needed  - Apply yellow socks and bracelet for high fall risk patients  - Consider moving patient to room  near nurses station  Outcome: Progressing     Problem: DISCHARGE PLANNING  Goal: Discharge to home or other facility with appropriate resources  Description: INTERVENTIONS:  - Identify barriers to discharge w/patient and caregiver  - Arrange for needed discharge resources and transportation as appropriate  - Identify discharge learning needs (meds, wound care, etc.)  - Arrange for interpretive services to assist at discharge as needed  - Refer to Case Management Department for coordinating discharge planning if the patient needs post-hospital services based on physician/advanced practitioner order or complex needs related to functional status, cognitive ability, or social support system  Outcome: Progressing     Problem: Knowledge Deficit  Goal: Patient/family/caregiver demonstrates understanding of disease process, treatment plan, medications, and discharge instructions  Description: Complete learning assessment and assess knowledge base.  Interventions:  - Provide teaching at level of understanding  - Provide teaching via preferred learning methods  Outcome: Progressing     Problem: SKIN/TISSUE INTEGRITY - ADULT  Goal: Incision(s), wounds(s) or drain site(s) healing without S/S of infection  Description: INTERVENTIONS  - Assess and document dressing, incision, wound bed, drain sites and surrounding tissue  - Provide patient and family education  - Perform skin care/dressing changes as ordered  Outcome: Progressing     Problem: Prexisting or High Potential for Compromised Skin Integrity  Goal: Skin integrity is maintained or improved  Description: INTERVENTIONS:  - Identify patients at risk for skin breakdown  - Assess and monitor skin integrity  - Assess and monitor nutrition and hydration status  - Monitor labs   - Assess for incontinence   - Turn and reposition patient  - Assist with mobility/ambulation  - Relieve pressure over bony prominences  - Avoid friction and shearing  - Provide appropriate hygiene as  needed including keeping skin clean and dry  - Evaluate need for skin moisturizer/barrier cream  - Collaborate with interdisciplinary team   - Patient/family teaching  - Consider wound care consult   Outcome: Progressing

## 2024-06-26 NOTE — QUICK NOTE
Spoke with podiatry, tentative OR tomorrow      NPO after midnight  Hold basal insulin and resume on 6/27 evening, and initiate insulin infusion tonight @ 2300

## 2024-06-26 NOTE — QUICK NOTE
- Patient well-known to me from wound care, left foot x-ray positive for osteomyelitis  - Will likely need an minimum remnant first metatarsal base excision  - Will be n.p.o. after midnight  -Anticipate intervention in a.m. tomorrow 6/27

## 2024-06-27 ENCOUNTER — APPOINTMENT (INPATIENT)
Dept: RADIOLOGY | Facility: HOSPITAL | Age: 64
DRG: 617 | End: 2024-06-27
Payer: COMMERCIAL

## 2024-06-27 ENCOUNTER — ANESTHESIA (INPATIENT)
Dept: PERIOP | Facility: HOSPITAL | Age: 64
DRG: 617 | End: 2024-06-27
Payer: COMMERCIAL

## 2024-06-27 PROBLEM — E66.9 CLASS 1 OBESITY IN ADULT: Status: ACTIVE | Noted: 2021-08-25

## 2024-06-27 PROBLEM — E66.811 CLASS 1 OBESITY IN ADULT: Status: ACTIVE | Noted: 2021-08-25

## 2024-06-27 RX ORDER — PROPOFOL 10 MG/ML
INJECTION, EMULSION INTRAVENOUS AS NEEDED
Status: DISCONTINUED | OUTPATIENT
Start: 2024-06-27 | End: 2024-06-27

## 2024-06-27 RX ORDER — SODIUM CHLORIDE, SODIUM LACTATE, POTASSIUM CHLORIDE, CALCIUM CHLORIDE 600; 310; 30; 20 MG/100ML; MG/100ML; MG/100ML; MG/100ML
INJECTION, SOLUTION INTRAVENOUS CONTINUOUS PRN
Status: DISCONTINUED | OUTPATIENT
Start: 2024-06-27 | End: 2024-06-27

## 2024-06-27 RX ORDER — FENTANYL CITRATE 50 UG/ML
INJECTION, SOLUTION INTRAMUSCULAR; INTRAVENOUS AS NEEDED
Status: DISCONTINUED | OUTPATIENT
Start: 2024-06-27 | End: 2024-06-27

## 2024-06-27 RX ADMIN — FENTANYL CITRATE 25 MCG: 50 INJECTION INTRAMUSCULAR; INTRAVENOUS at 07:33

## 2024-06-27 RX ADMIN — PROPOFOL 120 MCG/KG/MIN: 10 INJECTION, EMULSION INTRAVENOUS at 07:36

## 2024-06-27 RX ADMIN — SODIUM CHLORIDE, SODIUM LACTATE, POTASSIUM CHLORIDE, AND CALCIUM CHLORIDE: .6; .31; .03; .02 INJECTION, SOLUTION INTRAVENOUS at 07:30

## 2024-06-27 RX ADMIN — PROPOFOL 50 MG: 10 INJECTION, EMULSION INTRAVENOUS at 07:33

## 2024-06-27 NOTE — PLAN OF CARE
Problem: OCCUPATIONAL THERAPY ADULT  Goal: Performs self-care activities at highest level of function for planned discharge setting.  See evaluation for individualized goals.  Description: Treatment Interventions: ADL retraining, UE strengthening/ROM, Functional transfer training, Endurance training, Patient/family training, Equipment evaluation/education, Activityengagement          See flowsheet documentation for full assessment, interventions and recommendations.   Note: Limitation: Decreased ADL status, Decreased UE strength, Decreased Safe judgement during ADL, Decreased endurance, Decreased self-care trans, Decreased high-level ADLs     Assessment: Pt is a 64 y.o. male seen for OT evaluation s/p admit to [facility] on 6/25/2024 w/ <principal problem not specified>.  Comorbidities affecting pt's functional performance at time of assessment include:  DM, osteomyelitis of metatarsal bone of L foot, R BKA, HLD, neuropathy, HTN, cellulitis . Personal factors affecting pt at time of IE include:limited home support, difficulty performing ADLS, difficulty performing IADLS , limited insight into deficits, and health management . Prior to admission, pt was (I) with ADLs and IADLs with quad cane during mobility. Upon evaluation: Pt requires (S)-min (A) with use of RW during functional mobility 2* the following deficits impacting occupational performance: weakness, decreased strength, decreased balance, decreased tolerance, impaired initiation, decreased safety awareness, orthopedic restrictions, and impaired interpersonal skills. Pt to benefit from continued skilled OT tx while in the hospital to address deficits as defined above and maximize level of functional independence w ADL's and functional mobility. Occupational Performance areas to address include: grooming, bathing/shower, toilet hygiene, dressing, functional mobility, community mobility, and clothing management. The patient's raw score on the AM-PAC Daily Activity  Inpatient Short Form is 21. A raw score of greater than or equal to 19 suggests the patient may benefit from discharge to home. Discharge recommendation at this time is level III minimum resource intensity.  Pt benefited from co-evaluation of skilled OT and PT therapists in order to most appropriately address functional deficits d/t extensive assistance required for safe functional mobility, decreased activity tolerance, and regression from functioning level prior to admission and/or onset of present illness. OT/PT objectives were addressed separately; please see PT note for specific goal areas targeted.     Rehab Resource Intensity Level, OT: III (Minimum Resource Intensity)

## 2024-06-27 NOTE — PROGRESS NOTES
"[facility]  Progress Note  Name: Art Joseph I  MRN: [id number]  Unit/Bed#: 425-01 I Date of Admission: 6/25/2024   Date of Service: 6/27/2024 I Hospital Day: 2    Assessment & Plan   Acute osteomyelitis of metatarsal bone of left foot (HCC)  Assessment & Plan  Day #2 Vanco / Cefepime, dc today and start doxycycline  POD # 0 s/p transmet amputation   Per podiatry   - High risk for limb loss  - Weightbearing as tolerated to the left foot but only for bathroom facilities and transfer  - Dressing changes every other day with Adaptic 4 x 4, DSD       BC obtained x 2 in ED are NGTD  Follow-up MRSA screen  MRI foot reviewed  Podiatry consult appreciated, anticipate dc home tomorrow if does well with PT     Type 2 diabetes mellitus with foot ulcer, with long-term current use of insulin (HCC)  Assessment & Plan    Lab Results   Component Value Date    HGBA1C 9.7 (H) 06/25/2024     Poorly controlled evidenced by A1c  Continue basal/bolus insulin regimen  Accu-Cheks before meals and at bedtime with ISS coverage      Hyperlipidemia  Assessment & Plan  Lipitor 80 mg at bedtime    Hx of right BKA (HCC)  Assessment & Plan  PT/OT              Progress Note - Art Joseph 64 y.o. male MRN: [id number]    Unit/Bed#: 425-01 Encounter: [id number]        Subjective:   Patient seen and examined, no complaints , will work with PT     Objective:     Vitals:   Vitals:    06/27/24 0845   BP: 130/70   Pulse: 69   Resp: 22   Temp: (!) 97.3 °F (36.3 °C)   SpO2: 95%     Body mass index is 31.16 kg/m².    Intake/Output Summary (Last 24 hours) at 6/27/2024 0905  Last data filed at 6/27/2024 0812  Gross per 24 hour   Intake 680 ml   Output 1850 ml   Net -1170 ml       Physical Exam:   /70   Pulse 69   Temp (!) 97.3 °F (36.3 °C)   Resp 22   Ht 5' 10\" (1.778 m)   Wt 98.5 kg (217 lb 2.5 oz)   SpO2 95%   BMI 31.16 kg/m²   General appearance: alert and oriented, in no acute distress  Lungs: clear to " auscultation bilaterally  Heart: regular rate and rhythm, S1, S2 normal, no murmur, click, rub or gallop  Abdomen: soft, non-tender; bowel sounds normal; no masses,  no organomegaly  Extremities: R: BKA  L post op dressing, foot warm  Pulses: pop + 1   Neurologic: Grossly normal     Invasive Devices       Peripheral Intravenous Line  Duration             Peripheral IV 06/26/24 Right;Upper;Ventral (anterior) Arm <1 day                    Results from last 7 days   Lab Units 06/27/24  0536 06/25/24  1929 06/25/24  1728   WBC Thousand/uL 5.67  --  8.60   HEMOGLOBIN g/dL 13.7  --  13.7   HEMATOCRIT % 41.9  --  41.7   PLATELETS Thousands/uL 273 142* 291       Results from last 7 days   Lab Units 06/27/24  0536 06/25/24  1728   POTASSIUM mmol/L 3.8 4.0   CHLORIDE mmol/L 106 101   CO2 mmol/L 24 25   BUN mg/dL 10 11   CREATININE mg/dL 0.78 0.93   CALCIUM mg/dL 9.1 9.3   ALK PHOS U/L  --  139*   ALT U/L  --  8   AST U/L  --  10*       Medication Administration - last 24 hours from 06/26/2024 0905 to 06/27/2024 0905         Date/Time Order Dose Route Action Action by     06/27/2024 0900 EDT sodium chloride (PF) 0.9 % injection 3 mL -- Intravenous MAR Unhold Jorge Thurston MD     06/27/2024 0658 EDT sodium chloride (PF) 0.9 % injection 3 mL -- Intravenous MAR Hold Automatic Transfer Provider     06/27/2024 0900 EDT aspirin (ECOTRIN LOW STRENGTH) EC tablet 81 mg -- Oral MAR Unhold Jorge Thurston MD     06/27/2024 0900 EDT aspirin (ECOTRIN LOW STRENGTH) EC tablet 81 mg -- Oral Dose Auto Held Automatic Transfer Provider     06/27/2024 0658 EDT aspirin (ECOTRIN LOW STRENGTH) EC tablet 81 mg -- Oral MAR Hold Automatic Transfer Provider     06/27/2024 0900 EDT atorvastatin (LIPITOR) tablet 80 mg -- Oral MAR Unhold Jorge Thurston MD     06/27/2024 0900 EDT atorvastatin (LIPITOR) tablet 80 mg -- Oral Dose Auto Held Automatic Transfer Provider     06/27/2024 0658 EDT atorvastatin (LIPITOR) tablet 80 mg -- Oral MAR Hold Automatic  Transfer Provider     06/27/2024 0900 EDT lisinopril (ZESTRIL) tablet 2.5 mg -- Oral MAR Unhold Jorge Thurston MD     06/27/2024 0900 EDT lisinopril (ZESTRIL) tablet 2.5 mg -- Oral Dose Auto Held Automatic Transfer Provider     06/27/2024 0658 EDT lisinopril (ZESTRIL) tablet 2.5 mg -- Oral MAR Hold Automatic Transfer Provider     06/27/2024 0900 EDT insulin lispro (HumALOG/ADMELOG) 100 units/mL subcutaneous injection 5 Units -- Subcutaneous MAR Unhold Jorge Thurston MD     06/27/2024 0730 EDT insulin lispro (HumALOG/ADMELOG) 100 units/mL subcutaneous injection 5 Units -- Subcutaneous Dose Auto Held Automatic Transfer Provider     06/27/2024 0658 EDT insulin lispro (HumALOG/ADMELOG) 100 units/mL subcutaneous injection 5 Units -- Subcutaneous MAR Hold Automatic Transfer Provider     06/26/2024 1729 EDT insulin lispro (HumALOG/ADMELOG) 100 units/mL subcutaneous injection 5 Units 5 Units Subcutaneous Given Miley Pillai     06/26/2024 1219 EDT insulin lispro (HumALOG/ADMELOG) 100 units/mL subcutaneous injection 5 Units 5 Units Subcutaneous Given Miley Pillai     06/27/2024 0900 EDT heparin (porcine) subcutaneous injection 5,000 Units -- Subcutaneous MAR Unhold Jorge Thurston MD     06/27/2024 0658 EDT heparin (porcine) subcutaneous injection 5,000 Units -- Subcutaneous MAR Hold Automatic Transfer Provider     06/27/2024 0501 EDT heparin (porcine) subcutaneous injection 5,000 Units 5,000 Units Subcutaneous Not Given Jeri López RN     06/26/2024 2112 EDT heparin (porcine) subcutaneous injection 5,000 Units 5,000 Units Subcutaneous Not Given Jeri López RN     06/26/2024 1620 EDT heparin (porcine) subcutaneous injection 5,000 Units 5,000 Units Subcutaneous Not Given Miley Pillai     06/27/2024 0900 EDT cefepime (MAXIPIME) IVPB (premix in dextrose) 2,000 mg 50 mL -- Intravenous MAR Unhold Jorge Thurston MD     06/27/2024 0658 EDT cefepime (MAXIPIME) IVPB (premix in dextrose) 2,000 mg 50 mL --  Intravenous MAR Hold Automatic Transfer Provider     06/27/2024 0508 EDT cefepime (MAXIPIME) IVPB (premix in dextrose) 2,000 mg 50 mL 2,000 mg Intravenous New Bag Jeri López RN     06/26/2024 1730 EDT cefepime (MAXIPIME) IVPB (premix in dextrose) 2,000 mg 50 mL 2,000 mg Intravenous New Bag Miley Pillai     06/27/2024 0900 EDT vancomycin (VANCOCIN) 1,000 mg in sodium chloride 0.9 % 250 mL IVPB -- Intravenous MAR Unhold Jorge Thurston MD     06/27/2024 0700 EDT vancomycin (VANCOCIN) 1,000 mg in sodium chloride 0.9 % 250 mL IVPB -- Intravenous Dose Auto Held Automatic Transfer Provider     06/27/2024 0658 EDT vancomycin (VANCOCIN) 1,000 mg in sodium chloride 0.9 % 250 mL IVPB -- Intravenous MAR Hold Automatic Transfer Provider     06/26/2024 1845 EDT vancomycin (VANCOCIN) 1,000 mg in sodium chloride 0.9 % 250 mL IVPB 1,000 mg Intravenous New Bag Miley Pillai     06/27/2024 0900 EDT insulin lispro (HumALOG/ADMELOG) 100 units/mL subcutaneous injection 1-6 Units -- Subcutaneous MAR Unhold Jorge Thurston MD     06/27/2024 0700 EDT insulin lispro (HumALOG/ADMELOG) 100 units/mL subcutaneous injection 1-6 Units -- Subcutaneous Dose Auto Held Automatic Transfer Provider     06/27/2024 0658 EDT insulin lispro (HumALOG/ADMELOG) 100 units/mL subcutaneous injection 1-6 Units -- Subcutaneous MAR Hold Automatic Transfer Provider     06/26/2024 1728 EDT insulin lispro (HumALOG/ADMELOG) 100 units/mL subcutaneous injection 1-6 Units -- Subcutaneous Not Given Miley Pillai     06/26/2024 1219 EDT insulin lispro (HumALOG/ADMELOG) 100 units/mL subcutaneous injection 1-6 Units -- Subcutaneous Not Given Miley Pillai     06/27/2024 0900 EDT insulin glargine (LANTUS) subcutaneous injection 50 Units 0.5 mL -- Subcutaneous MAR Unhold Jorge Thurston MD     06/27/2024 0658 EDT insulin glargine (LANTUS) subcutaneous injection 50 Units 0.5 mL -- Subcutaneous MAR Hold Automatic Transfer Provider     06/27/2024 0600 EDT  insulin regular (HumuLIN R,NovoLIN R) 1 Units/mL in sodium chloride 0.9 % 100 mL infusion 0 Units/hr Intravenous Hold Jeri López RN     06/27/2024 0500 EDT insulin regular (HumuLIN R,NovoLIN R) 1 Units/mL in sodium chloride 0.9 % 100 mL infusion 0 Units/hr Intravenous Hold Jeri López RN     06/27/2024 0400 EDT insulin regular (HumuLIN R,NovoLIN R) 1 Units/mL in sodium chloride 0.9 % 100 mL infusion 0 Units/hr Intravenous Hold Jeri López RN     06/27/2024 0314 EDT insulin regular (HumuLIN R,NovoLIN R) 1 Units/mL in sodium chloride 0.9 % 100 mL infusion 0 Units/hr Intravenous Hold Jeri López RN     06/27/2024 0200 EDT insulin regular (HumuLIN R,NovoLIN R) 1 Units/mL in sodium chloride 0.9 % 100 mL infusion 0 Units/hr Intravenous Hold Jeri López RN     06/27/2024 0107 EDT insulin regular (HumuLIN R,NovoLIN R) 1 Units/mL in sodium chloride 0.9 % 100 mL infusion 0 Units/hr Intravenous Hold Jeri López RN     06/26/2024 2300 EDT insulin regular (HumuLIN R,NovoLIN R) 1 Units/mL in sodium chloride 0.9 % 100 mL infusion 0.5 Units/hr Intravenous New Bag Jeri López RN              Lab, Imaging and other studies: I have personally reviewed pertinent reports.    VTE Pharmacologic Prophylaxis: Heparin  VTE Mechanical Prophylaxis: sequential compression device     Jorge Thurston MD  6/27/2024,9:05 AM

## 2024-06-27 NOTE — ANESTHESIA PREPROCEDURE EVALUATION
Procedure:  AMPUTATION FOOT, L1st met resection (Left: Foot)    Relevant Problems   ANESTHESIA (within normal limits)      CARDIO   (+) Diabetic peripheral angiopathy (HCC)   (+) Hyperlipidemia   (+) Primary hypertension      ENDO   (+) Type 2 diabetes mellitus with foot ulcer, with long-term current use of insulin (HCC)      NEURO/PSYCH   (+) Diabetic neuropathy (HCC)      Orthopedic/Musculoskeletal   (+) Hx of right BKA (HCC)      Other   (+) Acute osteomyelitis of metatarsal bone of left foot (HCC)   (+) Class 1 obesity in adult        Physical Exam    Airway    Mallampati score: II  TM Distance: >3 FB  Neck ROM: full     Dental    lower dentures    Cardiovascular      Pulmonary      Other Findings  Just a few upper teeth in poor condition but no loose teeth      Anesthesia Plan  ASA Score- 3     Anesthesia Type- IV sedation with anesthesia with ASA Monitors.         Additional Monitors:     Airway Plan:     Comment: GA/LMA backup.       Plan Factors-Exercise tolerance (METS): >4 METS.    Chart reviewed. EKG reviewed.  Existing labs reviewed. Patient summary reviewed.    Patient is not a current smoker.              Induction-     Postoperative Plan-     Perioperative Resuscitation Plan - Level 1 - Full Code.       Informed Consent- Anesthetic plan and risks discussed with patient.  I personally reviewed this patient with the CRNA. Discussed and agreed on the Anesthesia Plan with the CRNA..

## 2024-06-27 NOTE — ANESTHESIA POSTPROCEDURE EVALUATION
Post-Op Assessment Note    CV Status:  Stable  Pain Score: 0    Pain management: satisfactory to patient       Mental Status:  Alert and awake   Hydration Status:  Euvolemic   PONV Controlled:  Controlled   Airway Patency:  Patent     Post Op Vitals Reviewed: Yes    No anethesia notable event occurred.    Staff: CRNA               BP   93/53   Temp   97.3   Pulse  79   Resp   16   SpO2   96

## 2024-06-27 NOTE — PHYSICAL THERAPY NOTE
Physical Therapy Evaluation    Patient Name: Art Joseph    Today's Date: 6/27/2024     Problem List  Active Problems:    Type 2 diabetes mellitus with foot ulcer, with long-term current use of insulin (HCC)    Acute osteomyelitis of metatarsal bone of left foot (HCC)    Hx of right BKA (HCC)    Hyperlipidemia    Class 1 obesity in adult       Past Medical History  Past Medical History:   Diagnosis Date    Diabetes mellitus (HCC)     Hypertension         Past Surgical History  Past Surgical History:   Procedure Laterality Date    LEG AMPUTATION THROUGH LOWER TIBIA AND FIBULA Right 7/25/2017    Procedure: AMPUTATION BELOW KNEE (BKA);  Surgeon: Mynor Sanchez MD;  [facility]: [facility];  Service: General    MI AMPUTATION FOOT TRANSMETARSAL Right 1/26/2017    Procedure: AMPUTATION TRANSMETATARSAL (TMA); OPEN;  Surgeon: Leonard Lomeli DPM;  Location: [facility];  Service: Podiatry    MI AMPUTATION FOOT TRANSMETARSAL Left 4/26/2024    Procedure: LEFT FOOT PARTIAL FIRST RAY AMPUTATION;  Surgeon: Jennifer Rubalcava DPM;  Location: [facility];  Service: Podiatry    MI AMPUTATION METATARSAL W/TOE SINGLE Left 1/30/2017    Procedure: Left partial 1st ray amputation;  Surgeon: Leonard Lomeli DPM;  Location: [facility];  Service: Podiatry    MI COLONOSCOPY FLX DX W/COLLJ SPEC WHEN PFRMD N/A 11/28/2018    Procedure: COLONOSCOPY;  Surgeon: González Napier MD;  Location: [facility];  Service: Gastroenterology    WOUND DEBRIDEMENT Right 1/30/2017    Procedure: DEBRIDEMENT FOOT/TOE (WASH OUT) delayed closure, LINDA;  Surgeon: Leonard Lomeli DPM;  [facility];  Service:            06/27/24 1025   PT Last Visit   PT Visit Date 06/27/24   Note Type   Note type Evaluation   Pain Assessment   Pain Assessment Tool 0-10   Pain Score No Pain   Restrictions/Precautions   Weight Bearing Precautions Per Order Yes   LLE Weight Bearing Per Order WBAT  (transfers and bathroom privileges only)    Braces or Orthoses Other (Comment)  (surgical shoe to L foot; R BKA prosthetic)   Other Precautions Pain;Fall Risk;Multiple lines;Bed Alarm;Chair Alarm;WBS   Home Living   Type of Home Apartment   Home Layout One level;Ramped entrance   Bathroom Shower/Tub Tub/shower unit   Bathroom Toilet Standard   Bathroom Equipment Shower chair   Bathroom Accessibility Accessible   Home Equipment Walker;Cane;Quad cane   Additional Comments pt reports use of quad cane at baseline   Prior Function   Level of [employer] Independent with ADLs;Independent with functional mobility;Needs assistance with IADLS   Lives With Alone   Receives Help From Family;Home health   IADLs Family/Friend/Other provides transportation   Falls in the last 6 months 0   Vocational On disability   General   Family/Caregiver Present No   Cognition   Overall Cognitive Status WFL   Arousal/Participation Alert   Attention Within functional limits   Orientation Level Oriented X4   Following Commands Follows all commands and directions without difficulty   Subjective   Subjective pt pleasant and cooperative; motivated to participate   RLE Assessment   RLE Assessment X  (pt is s/p BKA; residual limb WFL)   LLE Assessment   LLE Assessment X  (knee and hip WFL; NT at ankle d/t bandaging and limited weight bearing)   Bed Mobility   Supine to Sit 5  Supervision   Additional items Increased time required;Verbal cues   Sit to Supine   (pt OOB at end of session)   Transfers   Sit to Stand 5  Supervision   Additional items Increased time required;Verbal cues   Stand to Sit 5  Supervision   Additional items Increased time required;Verbal cues   Additional Comments RW used   Ambulation/Elevation   Gait pattern Excessively slow;Short stride;Foward flexed;Decreased foot clearance   Gait Assistance 5  Supervision   Additional items Verbal cues   Assistive Device Rolling walker   Distance 3'   Balance   Static Sitting Good   Dynamic Sitting Good   Static Standing Fair +    Dynamic Standing Fair   Ambulatory Fair -  (with RW)   Endurance Deficit   Endurance Deficit Yes   Activity Tolerance   Activity Tolerance Patient limited by fatigue   Assessment   Prognosis Good   Problem List Decreased strength;Decreased endurance;Impaired balance;Decreased mobility;Decreased skin integrity   Assessment Patient is a 64 y.o. male evaluated by Physical Therapy s/p admit to [facility] on 6/25/2024 with admitting diagnosis of: Penetrating foot wound, Open wound of left foot, initial encounter, Hx of BKA, right, Acute osteomyelitis of metatarsal bone of left foot, Uncontrolled diabetes mellitus with hyperglycemia. PT was consulted to assess patient's functional mobility and discharge needs. Ordered are PT Evaluation and treatment with activity level of: WBAT Left LE (transfers and bathroom privileges only). Comorbidities affecting patient's physical performance at time of assessment include:  DM, HLD, neuropathy, ambulatory dysfunction, HTN . Personal factors affecting the patient at time of IE include: ambulating with assistive device, inability to navigate community distances, inability/difficulty performing IADLs, and inability/difficulty performing ADLs. Please locate objective findings from PT assessment regarding body systems outlined above. Upon evaluation, pt able to perform all functional mobility with SUP, RW, and increased time. Occasional verbal cuing provided for safety awareness and sequencing. Seated rest break taken following ~5' of ambulation d/t physician preference to limit ambulation. Pt tolerated well without complaint; no true LOB experienced. HR and SpO2 remained WFL on RA throughout. The patient's AM-PAC Basic Mobility Inpatient Short Form Raw Score is 17. A Raw score of greater than 16 suggests the patient may benefit from discharge to home. Please also refer to the recommendation of the Physical Therapist for safe discharge planning. Co treatment with OT  secondary to complex medical condition of pt, possible A of 2 required to achieve and maintain transitional movements, requiring the need of skilled therapeutic intervention of 2 therapists to achieve delivery of services. Pt will benefit from continued PT intervention during LOS to address current deficits, increase LOF, and facilitate safe d/c to next level of care when medically appropriate. D/c recommendation at this time is rehabilitation resource intensity level III.   Goals   Patient Goals to go home   LTG Expiration Date 07/11/24   Long Term Goal #1 Pt will participate in B LE strengthening exercises to facilitate improved functional activity tolerance. Pt will perform all functional transfers and bed mobility mod(I) with good safety awareness. Pt will ambulate 50' mod(I) with LRAD while maintaining good functional dynamic balance.   Plan   Treatment/Interventions Functional transfer training;LE strengthening/ROM;Therapeutic exercise;Endurance training;Bed mobility;Gait training   PT Frequency 3-5x/wk   Discharge Recommendation   Rehab Resource Intensity Level, PT III (Minimum Resource Intensity)   AM-PAC Basic Mobility Inpatient   Turning in Flat Bed Without Bedrails 3   Lying on Back to Sitting on Edge of Flat Bed Without Bedrails 3   Moving Bed to Chair 3   Standing Up From Chair Using Arms 3   Walk in Room 3   Climb 3-5 Stairs With Railing 2   Basic Mobility Inpatient Raw Score 17   Basic Mobility Standardized Score 39.67   [facility] Highest Level Of Mobility   -HLM Goal 5: Stand one or more mins   -[facility] Achieved 5: Stand (1 or more minutes)   End of Consult   Patient Position at End of Consult Bedside chair;Bed/Chair alarm activated;All needs within reach      (0) indicator not present

## 2024-06-27 NOTE — CONSULTS
[facility] Podiatry - Consultation    Patient Information:   Art Joseph 64 y.o. male MRN: [id number]  Unit/Bed#: [facility] Encounter: [id number]  PCP: Kerry Christine MD  Date of Admission:  6/25/2024  Date of Consultation: 06/27/24  Requesting Physician: Jorge Thurston MD      ASSESSMENT:    Art Joseph is a 64 y.o. male with:    1.  Osteomyelitis of left foot  Diabetes with neuropathy      PLAN:    X-ray reviewed and does show fragmentation of the remnant base of the first metatarsal  - Will need surgical excision of the remaining portion metatarsal hopefully closure possible graft anticipate surgical cure  Patient remains high risk for limb loss  Rest of care per primary team.        SUBJECTIVE    History of Present Illness:    Art Joseph is a 64 y.o. male with past medical history of diabetes with ulceration who presents with recurrent osteomyelitis of left foot.     Review of Systems:    Constitutional: Negative.    HENT: Negative.    Eyes: Negative.    Respiratory: Negative.    Cardiovascular: Negative.    Gastrointestinal: Negative.    Musculoskeletal: weakened   Skin:as above    Neurological: numbness   Psych: Negative.     Past Medical and Surgical History:     Past Medical History:   Diagnosis Date    Diabetes mellitus (HCC)     Hypertension        Past Surgical History:   Procedure Laterality Date    LEG AMPUTATION THROUGH LOWER TIBIA AND FIBULA Right 7/25/2017    Procedure: AMPUTATION BELOW KNEE (BKA);  Surgeon: Mynor Sanchez MD;  Location:  MAIN OR;  Service: General    UT AMPUTATION FOOT TRANSMETARSAL Right 1/26/2017    Procedure: AMPUTATION TRANSMETATARSAL (TMA); OPEN;  Surgeon: Leonard Lomeli DPM;  Location:  MAIN OR;  Service: Podiatry    UT AMPUTATION FOOT TRANSMETARSAL Left 4/26/2024    Procedure: LEFT FOOT PARTIAL FIRST RAY AMPUTATION;  Surgeon: Jennifer Rubalcava DPM;  Location: [facility];  Service: Podiatry    UT AMPUTATION METATARSAL W/TOE SINGLE Left 1/30/2017    Procedure: Left  FLU VACCINE Lot# [id number], GIVEN IM IN RIGHT DELTOID. Tolerated well pt. Instructed to wait 15 mins. VIS form given.     "partial 1st ray amputation;  Surgeon: Leonard Lomeli DPM;  Location: [facility];  Service: Podiatry    MO COLONOSCOPY FLX DX W/COLLJ SPEC WHEN PFRMD N/A 11/28/2018    Procedure: COLONOSCOPY;  Surgeon: González Napier MD;  Location: [facility];  Service: Gastroenterology    WOUND DEBRIDEMENT Right 1/30/2017    Procedure: DEBRIDEMENT FOOT/TOE (WASH OUT) delayed closure, LINDA;  Surgeon: Leonard Lomeli DPM;  Location: [facility];  Service:        Meds/Allergies:      Medications Prior to Admission:     aspirin (ECOTRIN LOW STRENGTH) 81 mg EC tablet    atorvastatin (LIPITOR) 80 mg tablet    insulin aspart (NovoLOG FlexPen) 100 UNIT/ML injection pen    Insulin Glargine Solostar (Basaglar KwikPen) 100 UNIT/ML SOPN    Insulin Pen Needle 30G X 8 MM MISC    lisinopril (ZESTRIL) 2.5 mg tablet    No Known Allergies    Social History:     Marital Status: Single    Substance Use History:   Social History     Substance and Sexual Activity   Alcohol Use Yes    Alcohol/week: 11.0 standard drinks of alcohol    Types: 6 Cans of beer, 5 Shots of liquor per week    Comment: social ; occasional use as per [employer]     Social History     Tobacco Use   Smoking Status Former    Types: Cigarettes   Smokeless Tobacco Never   Tobacco Comments    quit 9 years ago     Social History     Substance and Sexual Activity   Drug Use No       Family History:    Family History   Problem Relation Age of Onset    Diabetes Mother          OBJECTIVE:    Vitals:   Blood Pressure: 130/73 (06/27/24 0334)  Pulse: 64 (06/27/24 0334)  Temperature: 97.7 °F (36.5 °C) (06/27/24 0334)  Temp Source: Oral (06/26/24 0604)  Respirations: 20 (06/27/24 0334)  Height: 5' 10\" (177.8 cm) (06/25/24 1821)  Weight - Scale: 98.5 kg (217 lb 2.5 oz) (06/25/24 1821)  SpO2: 96 % (06/27/24 0334)    Physical Exam:     General Appearance: Alert, cooperative, no distress.  HEENT: Head normocephalic, atraumatic, without obvious abnormality.    Lungs: Non-labored breathing. No respiratory " "distress.  Abdomen: Without distension.  Psychiatric: AAOx3  Lower Extremity:  Vascular:   Dressing left intact of the left foot  Additional Data:     Lab Results: I have personally reviewed pertinent labs including:    Results from last 7 days   Lab Units 06/27/24  0536   WBC Thousand/uL 5.67   HEMOGLOBIN g/dL 13.7   HEMATOCRIT % 41.9   PLATELETS Thousands/uL 273   SEGS PCT % 52   LYMPHO PCT % 32   MONO PCT % 10   EOS PCT % 5     Results from last 7 days   Lab Units 06/27/24  0536 06/25/24  1728   POTASSIUM mmol/L 3.8 4.0   CHLORIDE mmol/L 106 101   CO2 mmol/L 24 25   BUN mg/dL 10 11   CREATININE mg/dL 0.78 0.93   CALCIUM mg/dL 9.1 9.3   ALK PHOS U/L  --  139*   ALT U/L  --  8   AST U/L  --  10*     Results from last 7 days   Lab Units 06/27/24  0536   INR  0.96       Cultures: I have personally reviewed pertinent cultures including:    Results from last 7 days   Lab Units 06/25/24  1728 06/25/24  1727   BLOOD CULTURE  No Growth at 24 hrs. No Growth at 24 hrs.           Imaging: I have personally reviewed pertinent films in PACS.  EKG, Pathology, and Other Studies: I have personally reviewed pertinent reports.        ** Please Note: Portions of the record may have been created with voice recognition software. Occasional wrong word or \"sound a like\" substitutions may have occurred due to the inherent limitations of voice recognition software. Read the chart carefully and recognize, using context, where substitutions have occurred. **    "

## 2024-06-27 NOTE — PLAN OF CARE
Problem: PHYSICAL THERAPY ADULT  Goal: Performs mobility at highest level of function for planned discharge setting.  See evaluation for individualized goals.  Description: Treatment/Interventions: Functional transfer training, LE strengthening/ROM, Therapeutic exercise, Endurance training, Bed mobility, Gait training          See flowsheet documentation for full assessment, interventions and recommendations.  Note: Prognosis: Good  Problem List: Decreased strength, Decreased endurance, Impaired balance, Decreased mobility, Decreased skin integrity  Assessment: Patient is a 64 y.o. male evaluated by Physical Therapy s/p admit to [facility] on 6/25/2024 with admitting diagnosis of: Penetrating foot wound, Open wound of left foot, initial encounter, Hx of BKA, right, Acute osteomyelitis of metatarsal bone of left foot, Uncontrolled diabetes mellitus with hyperglycemia. PT was consulted to assess patient's functional mobility and discharge needs. Ordered are PT Evaluation and treatment with activity level of: WBAT Left LE (transfers and bathroom privileges only). Comorbidities affecting patient's physical performance at time of assessment include:  DM, HLD, neuropathy, ambulatory dysfunction, HTN . Personal factors affecting the patient at time of IE include: ambulating with assistive device, inability to navigate community distances, inability/difficulty performing IADLs, and inability/difficulty performing ADLs. Please locate objective findings from PT assessment regarding body systems outlined above. Upon evaluation, pt able to perform all functional mobility with SUP, RW, and increased time. Occasional verbal cuing provided for safety awareness and sequencing. Seated rest break taken following ~5' of ambulation d/t physician preference to limit ambulation. Pt tolerated well without complaint; no true LOB experienced. HR and SpO2 remained WFL on RA throughout. The patient's AM-PAC Basic Mobility  Inpatient Short Form Raw Score is 17. A Raw score of greater than 16 suggests the patient may benefit from discharge to home. Please also refer to the recommendation of the Physical Therapist for safe discharge planning. Co treatment with OT secondary to complex medical condition of pt, possible A of 2 required to achieve and maintain transitional movements, requiring the need of skilled therapeutic intervention of 2 therapists to achieve delivery of services. Pt will benefit from continued PT intervention during LOS to address current deficits, increase LOF, and facilitate safe d/c to next level of care when medically appropriate. D/c recommendation at this time is rehabilitation resource intensity level III.        Rehab Resource Intensity Level, PT: III (Minimum Resource Intensity)    See flowsheet documentation for full assessment.

## 2024-06-27 NOTE — DISCHARGE INSTR - OTHER ORDERS
Podiatry: Please change dressing to the left foot every other day with Adaptic, 4 fours, ABD, Johan, noncompressive Ace.  Weight-bear as tolerated to the left foot for bathroom privileges and transfers only

## 2024-06-27 NOTE — PLAN OF CARE
Problem: PAIN - ADULT  Goal: Verbalizes/displays adequate comfort level or baseline comfort level  Description: Interventions:  - Encourage patient to monitor pain and request assistance  - Assess pain using appropriate pain scale  - Administer analgesics based on type and severity of pain and evaluate response  - Implement non-pharmacological measures as appropriate and evaluate response  - Consider cultural and social influences on pain and pain management  - Notify physician/advanced practitioner if interventions unsuccessful or patient reports new pain  Outcome: Progressing     Problem: INFECTION - ADULT  Goal: Absence or prevention of progression during hospitalization  Description: INTERVENTIONS:  - Assess and monitor for signs and symptoms of infection  - Monitor lab/diagnostic results  - Monitor all insertion sites, i.e. indwelling lines, tubes, and drains  - Monitor endotracheal if appropriate and nasal secretions for changes in amount and color  - [place] appropriate cooling/warming therapies per order  - Administer medications as ordered  - Instruct and encourage patient and family to use good hand hygiene technique  - Identify and instruct in appropriate isolation precautions for identified infection/condition  Outcome: Progressing     Problem: SAFETY ADULT  Goal: Patient will remain free of falls  Description: INTERVENTIONS:  - Educate patient/family on patient safety including physical limitations  - Instruct patient to call for assistance with activity   - Consult OT/PT to assist with strengthening/mobility   - Keep Call bell within reach  - Keep bed low and locked with side rails adjusted as appropriate  - Keep care items and personal belongings within reach  - Initiate and maintain comfort rounds  - Make Fall Risk Sign visible to staff  - Obtain necessary fall risk management equipment: as needed  - Apply yellow socks and bracelet for high fall risk patients  - Consider moving patient to room  near nurses station  Outcome: Progressing     Problem: DISCHARGE PLANNING  Goal: Discharge to home or other facility with appropriate resources  Description: INTERVENTIONS:  - Identify barriers to discharge w/patient and caregiver  - Arrange for needed discharge resources and transportation as appropriate  - Identify discharge learning needs (meds, wound care, etc.)  - Arrange for interpretive services to assist at discharge as needed  - Refer to Case Management Department for coordinating discharge planning if the patient needs post-hospital services based on physician/advanced practitioner order or complex needs related to functional status, cognitive ability, or social support system  Outcome: Progressing     Problem: Knowledge Deficit  Goal: Patient/family/caregiver demonstrates understanding of disease process, treatment plan, medications, and discharge instructions  Description: Complete learning assessment and assess knowledge base.  Interventions:  - Provide teaching at level of understanding  - Provide teaching via preferred learning methods  Outcome: Progressing     Problem: SKIN/TISSUE INTEGRITY - ADULT  Goal: Incision(s), wounds(s) or drain site(s) healing without S/S of infection  Description: INTERVENTIONS  - Assess and document dressing, incision, wound bed, drain sites and surrounding tissue  - Provide patient and family education  - Perform skin care/dressing changes as ordered  Outcome: Progressing  Goal: Skin Integrity remains intact(Skin Breakdown Prevention)  Description: Assess:    -Inspect skin when repositioning, toileting, and assisting with ADLS    -Assess extremities for adequate circulation and sensation     Bed Management:  -Have minimal linens on bed & keep smooth, unwrinkled  -Change linens as needed when moist or perspiring  -Avoid sitting or lying in one position for more than 2 hours while in bed  -Keep HOB at 30degrees     Toileting:  -Offer bedside  commode      Activity:    -Encourage activity and walks on unit  -Encourage or provide ROM exercises   -Turn and reposition patient every 2 Hours  -Use appropriate equipment to lift or move patient in bed      Skin Care:  -Avoid use of baby powder, tape, friction and shearing, hot water or constrictive clothing  -Do not massage red bony areas      Outcome: Progressing  Goal: Pressure injury heals and does not worsen  Description: Interventions:  - Implement low air loss mattress or specialty surface (Criteria met)  - Apply silicone foam dressin  - Apply fecal or urinary incontinence containment device       - Utilize friction reducing device or surface for transfers       - Consider nutrition services referral as needed  Outcome: Progressing     Problem: Prexisting or High Potential for Compromised Skin Integrity  Goal: Skin integrity is maintained or improved  Description: INTERVENTIONS:  - Identify patients at risk for skin breakdown  - Assess and monitor skin integrity  - Assess and monitor nutrition and hydration status  - Monitor labs   - Assess for incontinence   - Turn and reposition patient  - Assist with mobility/ambulation  - Relieve pressure over bony prominences  - Avoid friction and shearing  - Provide appropriate hygiene as needed including keeping skin clean and dry  - Evaluate need for skin moisturizer/barrier cream  - Collaborate with interdisciplinary team   - Patient/family teaching  - Consider wound care consult   Outcome: Progressing     Problem: CARDIOVASCULAR - ADULT  Goal: Maintains optimal cardiac output and hemodynamic stability  Description: INTERVENTIONS:  - Monitor I/O, vital signs and rhythm  - Monitor for S/S and trends of decreased cardiac output  - Administer and titrate ordered vasoactive medications to optimize hemodynamic stability  - Assess quality of pulses, skin color and temperature  - Assess for signs of decreased coronary artery perfusion  - Instruct patient to report change  in severity of symptoms  Outcome: Progressing     Problem: METABOLIC, FLUID AND ELECTROLYTES - ADULT  Goal: Electrolytes maintained within normal limits  Description: INTERVENTIONS:  - Monitor labs and assess patient for signs and symptoms of electrolyte imbalances  - Administer electrolyte replacement as ordered  - Monitor response to electrolyte replacements, including repeat lab results as appropriate  - Instruct patient on fluid and nutrition as appropriate  Outcome: Progressing     Problem: MUSCULOSKELETAL - ADULT  Goal: Maintain or return mobility to safest level of function  Description: INTERVENTIONS:  - Assess patient's ability to carry out ADLs; assess patient's baseline for ADL function and identify physical deficits which impact ability to perform ADLs (bathing, care of mouth/teeth, toileting, grooming, dressing, etc.)  - Assess/evaluate cause of self-care deficits   - Assess range of motion  - Assess patient's mobility  - Assess patient's need for assistive devices and provide as appropriate  - Encourage maximum independence but intervene and supervise when necessary  - Involve family in performance of ADLs  - Assess for home care needs following discharge   - Consider OT consult to assist with ADL evaluation and planning for discharge  - Provide patient education as appropriate  Outcome: Progressing     Problem: Nutrition/Hydration-ADULT  Goal: Nutrient/Hydration intake appropriate for improving, restoring or maintaining nutritional needs  Description: Monitor and assess patient's nutrition/hydration status for malnutrition. Collaborate with interdisciplinary team and initiate plan and interventions as ordered.  Monitor patient's weight and dietary intake as ordered or per policy. Utilize nutrition screening tool and intervene as necessary. Determine patient's food preferences and provide high-protein, high-caloric foods as appropriate.     INTERVENTIONS:  - Monitor oral intake, urinary output, labs,  and treatment plans  - Assess nutrition and hydration status and recommend course of action  - Evaluate amount of meals eaten  - Assist patient with eating if necessary   - Allow adequate time for meals  - Recommend/ encourage appropriate diets, oral nutritional supplements, and vitamin/mineral supplements  - Order, calculate, and assess calorie counts as needed  - Recommend, monitor, and adjust tube feedings and TPN/PPN based on assessed needs  - Assess need for intravenous fluids  - Provide specific nutrition/hydration education as appropriate  - Include patient/family/caregiver in decisions related to nutrition  Outcome: Progressing     Problem: PAIN - ADULT  Goal: Verbalizes/displays adequate comfort level or baseline comfort level  Description: Interventions:  - Encourage patient to monitor pain and request assistance  - Assess pain using appropriate pain scale  - Administer analgesics based on type and severity of pain and evaluate response  - Implement non-pharmacological measures as appropriate and evaluate response  - Consider cultural and social influences on pain and pain management  - Notify physician/advanced practitioner if interventions unsuccessful or patient reports new pain  Outcome: Progressing     Problem: INFECTION - ADULT  Goal: Absence or prevention of progression during hospitalization  Description: INTERVENTIONS:  - Assess and monitor for signs and symptoms of infection  - Monitor lab/diagnostic results  - Monitor all insertion sites, i.e. indwelling lines, tubes, and drains  - Monitor endotracheal if appropriate and nasal secretions for changes in amount and color  - [place] appropriate cooling/warming therapies per order  - Administer medications as ordered  - Instruct and encourage patient and family to use good hand hygiene technique  - Identify and instruct in appropriate isolation precautions for identified infection/condition  Outcome: Progressing     Problem: SAFETY ADULT  Goal:  Patient will remain free of falls  Description: INTERVENTIONS:  - Educate patient/family on patient safety including physical limitations  - Instruct patient to call for assistance with activity   - Consult OT/PT to assist with strengthening/mobility   - Keep Call bell within reach  - Keep bed low and locked with side rails adjusted as appropriate  - Keep care items and personal belongings within reach  - Initiate and maintain comfort rounds  - Make Fall Risk Sign visible to staff  - Obtain necessary fall risk management equipment: as needed  - Apply yellow socks and bracelet for high fall risk patients  - Consider moving patient to room near nurses station  Outcome: Progressing     Problem: DISCHARGE PLANNING  Goal: Discharge to home or other facility with appropriate resources  Description: INTERVENTIONS:  - Identify barriers to discharge w/patient and caregiver  - Arrange for needed discharge resources and transportation as appropriate  - Identify discharge learning needs (meds, wound care, etc.)  - Arrange for interpretive services to assist at discharge as needed  - Refer to Case Management Department for coordinating discharge planning if the patient needs post-hospital services based on physician/advanced practitioner order or complex needs related to functional status, cognitive ability, or social support system  Outcome: Progressing     Problem: Knowledge Deficit  Goal: Patient/family/caregiver demonstrates understanding of disease process, treatment plan, medications, and discharge instructions  Description: Complete learning assessment and assess knowledge base.  Interventions:  - Provide teaching at level of understanding  - Provide teaching via preferred learning methods  Outcome: Progressing     Problem: SKIN/TISSUE INTEGRITY - ADULT  Goal: Incision(s), wounds(s) or drain site(s) healing without S/S of infection  Description: INTERVENTIONS  - Assess and document dressing, incision, wound bed, drain  sites and surrounding tissue  - Provide patient and family education  - Perform skin care/dressing changes as ordered  Outcome: Progressing  Goal: Skin Integrity remains intact(Skin Breakdown Prevention)  Description: Assess:  -Clean and moisturize skin every day  -Inspect skin when repositioning, toileting, and assisting with ADLS  -Assess extremities for adequate circulation and sensation     Bed Management:  -Have minimal linens on bed & keep smooth, unwrinkled  -Change linens as needed when moist or perspiring  -Keep HOB at 30 degrees     Toileting:  -Offer bedside commode    Activity:  -Encourage activity and walks on unit  -Encourage or provide ROM exercises   -Turn and reposition patient every two Hours  -Use appropriate equipment to lift or move patient in bed    Skin Care:  -Avoid use of baby powder, tape, friction and shearing, hot water or constrictive clothing  -Do not massage red bony areas      Outcome: Progressing  Goal: Pressure injury heals and does not worsen  Description: Interventions:  - Implement low air loss mattress or specialty surface (Criteria met)  - Apply silicone foam dressing  - Apply fecal or urinary incontinence containment device   - Utilize friction reducing device or surface for transfers     - Consider nutrition services referral as needed  Outcome: Progressing     Problem: Prexisting or High Potential for Compromised Skin Integrity  Goal: Skin integrity is maintained or improved  Description: INTERVENTIONS:  - Identify patients at risk for skin breakdown  - Assess and monitor skin integrity  - Assess and monitor nutrition and hydration status  - Monitor labs   - Assess for incontinence   - Turn and reposition patient  - Assist with mobility/ambulation  - Relieve pressure over bony prominences  - Avoid friction and shearing  - Provide appropriate hygiene as needed including keeping skin clean and dry  - Evaluate need for skin moisturizer/barrier cream  - Collaborate with  interdisciplinary team   - Patient/family teaching  - Consider wound care consult   Outcome: Progressing     Problem: CARDIOVASCULAR - ADULT  Goal: Maintains optimal cardiac output and hemodynamic stability  Description: INTERVENTIONS:  - Monitor I/O, vital signs and rhythm  - Monitor for S/S and trends of decreased cardiac output  - Administer and titrate ordered vasoactive medications to optimize hemodynamic stability  - Assess quality of pulses, skin color and temperature  - Assess for signs of decreased coronary artery perfusion  - Instruct patient to report change in severity of symptoms  Outcome: Progressing     Problem: METABOLIC, FLUID AND ELECTROLYTES - ADULT  Goal: Electrolytes maintained within normal limits  Description: INTERVENTIONS:  - Monitor labs and assess patient for signs and symptoms of electrolyte imbalances  - Administer electrolyte replacement as ordered  - Monitor response to electrolyte replacements, including repeat lab results as appropriate  - Instruct patient on fluid and nutrition as appropriate  Outcome: Progressing     Problem: MUSCULOSKELETAL - ADULT  Goal: Maintain or return mobility to safest level of function  Description: INTERVENTIONS:  - Assess patient's ability to carry out ADLs; assess patient's baseline for ADL function and identify physical deficits which impact ability to perform ADLs (bathing, care of mouth/teeth, toileting, grooming, dressing, etc.)  - Assess/evaluate cause of self-care deficits   - Assess range of motion  - Assess patient's mobility  - Assess patient's need for assistive devices and provide as appropriate  - Encourage maximum independence but intervene and supervise when necessary  - Involve family in performance of ADLs  - Assess for home care needs following discharge   - Consider OT consult to assist with ADL evaluation and planning for discharge  - Provide patient education as appropriate  Outcome: Progressing     Problem:  Nutrition/Hydration-ADULT  Goal: Nutrient/Hydration intake appropriate for improving, restoring or maintaining nutritional needs  Description: Monitor and assess patient's nutrition/hydration status for malnutrition. Collaborate with interdisciplinary team and initiate plan and interventions as ordered.  Monitor patient's weight and dietary intake as ordered or per policy. Utilize nutrition screening tool and intervene as necessary. Determine patient's food preferences and provide high-protein, high-caloric foods as appropriate.     INTERVENTIONS:  - Monitor oral intake, urinary output, labs, and treatment plans  - Assess nutrition and hydration status and recommend course of action  - Evaluate amount of meals eaten  - Assist patient with eating if necessary   - Allow adequate time for meals  - Recommend/ encourage appropriate diets, oral nutritional supplements, and vitamin/mineral supplements  - Order, calculate, and assess calorie counts as needed  - Recommend, monitor, and adjust tube feedings and TPN/PPN based on assessed needs  - Assess need for intravenous fluids  - Provide specific nutrition/hydration education as appropriate  - Include patient/family/caregiver in decisions related to nutrition  Outcome: Progressing

## 2024-06-27 NOTE — ASSESSMENT & PLAN NOTE
Lab Results   Component Value Date    HGBA1C 9.7 (H) 06/25/2024     Poorly controlled evidenced by A1c  Continue basal/bolus insulin regimen  Accu-Cheks before meals and at bedtime with ISS coverage

## 2024-06-27 NOTE — ASSESSMENT & PLAN NOTE
Day #2 Vanco / Cefepime, dc today and start doxycycline  POD # 0 s/p transmet amputation   Per podiatry   - High risk for limb loss  - Weightbearing as tolerated to the left foot but only for bathroom facilities and transfer  - Dressing changes every other day with Adaptic 4 x 4, DSD       BC obtained x 2 in ED are NGTD  Follow-up MRSA screen  MRI foot reviewed  Podiatry consult appreciated, anticipate dc home tomorrow if does well with PT

## 2024-06-27 NOTE — OCCUPATIONAL THERAPY NOTE
"    Occupational Therapy Evaluation     Patient Name: Art Joseph  Today's Date: 6/27/2024  Problem List  Active Problems:    Type 2 diabetes mellitus with foot ulcer, with long-term current use of insulin (HCC)    Acute osteomyelitis of metatarsal bone of left foot (HCC)    Hx of right BKA (HCC)    Hyperlipidemia    Class 1 obesity in adult    Past Medical History  Past Medical History:   Diagnosis Date    Diabetes mellitus (HCC)     Hypertension      Past Surgical History  Past Surgical History:   Procedure Laterality Date    LEG AMPUTATION THROUGH LOWER TIBIA AND FIBULA Right 7/25/2017    Procedure: AMPUTATION BELOW KNEE (BKA);  Surgeon: Mynor Sanchez MD;  [facility];  Service: General    CA AMPUTATION FOOT TRANSMETARSAL Right 1/26/2017    Procedure: AMPUTATION TRANSMETATARSAL (TMA); OPEN;  Surgeon: Leonard Lomeli DPM;  [facility];  Service: Podiatry    CA AMPUTATION FOOT TRANSMETARSAL Left 4/26/2024    Procedure: LEFT FOOT PARTIAL FIRST RAY AMPUTATION;  Surgeon: Jennifer Rubalcava DPM;  [facility];  Service: Podiatry    CA AMPUTATION METATARSAL W/TOE SINGLE Left 1/30/2017    Procedure: Left partial 1st ray amputation;  Surgeon: Leonard Lomeli DPM;  [facility];  Service: Podiatry    CA COLONOSCOPY FLX DX W/COLLJ SPEC WHEN PFRMD N/A 11/28/2018    Procedure: COLONOSCOPY;  Surgeon: González Napier MD;  [facility]: [facility];  Service: Gastroenterology    WOUND DEBRIDEMENT Right 1/30/2017    Procedure: DEBRIDEMENT FOOT/TOE (WASH OUT) delayed closure, LINDA;  Surgeon: Leonard Lomeli DPM;  [facility];  Service:              06/27/24 1023   OT Last Visit   OT Visit Date 06/27/24   Note Type   Note type Evaluation   Pain Assessment   Pain Score No Pain   Restrictions/Precautions   Weight Bearing Precautions Per Order Yes   LLE Weight Bearing Per Order (S)  WBAT  (per podiatry note-\"only for bathroom and transfers\")   Braces or Orthoses Other (Comment)  (surigcal shoe applied to L " "LE; prosthetic to R LE >10 years)   Other Precautions Multiple lines;Fall Risk;Chair Alarm;Bed Alarm   Home Living   Type of Home Apartment   Home Layout One level;Performs ADLs on one level;Able to live on main level with bedroom/bathroom;Other (Comment)  (no MIKAELA; 2 steps within apartment)   Bathroom Shower/Tub Tub/shower unit   Bathroom Toilet Standard   Bathroom Accessibility Accessible   Home Equipment Walker;Wheelchair-manual;Quad cane;Cane   Additional Comments pt reports use of quad cane at baseline during functional mobility   Prior Function   Level of [place] Independent with ADLs;Independent with functional mobility;Independent with IADLS   Lives With Alone   IADLs   ([employer] for transportation)   Falls in the last 6 months 0   Vocational On disability   Subjective   Subjective \"yes dear, how are you?\"   ADL   Where Assessed Edge of bed   LB Dressing Assistance 4  Minimal Assistance   LB Dressing Deficit Don/doff R shoe;Don/doff L shoe   Toileting Assistance  5  Supervision/Setup   Toileting Deficit Use of bedpan/urinal setup   Additional Comments applied surgical shoe to L LE and pt sits EOB to don R LE prosthetic with (S) level   Bed Mobility   Supine to Sit 5  Supervision   Additional items Bedrails;Increased time required   Sit to Supine   (seated in chair at end of session)   Additional Comments pt on RA with no complaints of SOB; SPO2 WFL   Transfers   Sit to Stand 5  Supervision   Additional items Increased time required;Verbal cues   Stand to Sit 5  Supervision   Additional items Increased time required;Verbal cues   Additional Comments pt initially performs functional transfers with quad cane with cues for hand placement, however moderately unsteady and utilizing IV pole and quad cane for support; pt therefore utilizes RW for increased stability and safety for remainder of session; no significant LOB   Functional Mobility   Functional Mobility 5  Supervision   Additional Comments pt " performs ~2ft to recliner with RW; no significant LOB or instability; cues for safety   Additional items Rolling walker   Balance   Static Sitting Good   Dynamic Sitting Good   Static Standing Fair +   Dynamic Standing Fair   Ambulatory Fair -   Activity Tolerance   Activity Tolerance Patient limited by fatigue   RUE Assessment   RUE Assessment WFL   LUE Assessment   LUE Assessment WFL   Hand Function   Gross Motor Coordination Functional   Fine Motor Coordination Functional   Sensation   Light Touch No apparent deficits   Sharp/Dull No apparent deficits   Psychosocial   Psychosocial (WDL) WDL   Cognition   Overall Cognitive Status WFL   Arousal/Participation Alert   Attention Within functional limits   Orientation Level Oriented X4   Memory Within functional limits   Following Commands Follows one step commands without difficulty   Assessment   Limitation Decreased ADL status;Decreased UE strength;Decreased Safe judgement during ADL;Decreased endurance;Decreased self-care trans;Decreased high-level ADLs   Assessment Pt is a 64 y.o. male seen for OT evaluation s/p admit to [facility] on 6/25/2024 w/ <principal problem not specified>.  Comorbidities affecting pt's functional performance at time of assessment include:  DM, osteomyelitis of metatarsal bone of L foot, R BKA, HLD, neuropathy, HTN, cellulitis . Personal factors affecting pt at time of IE include:limited home support, difficulty performing ADLS, difficulty performing IADLS , limited insight into deficits, and health management . Prior to admission, pt was (I) with ADLs and IADLs with quad cane during mobility. Upon evaluation: Pt requires (S)-min (A) with use of RW during functional mobility 2* the following deficits impacting occupational performance: weakness, decreased strength, decreased balance, decreased tolerance, impaired initiation, decreased safety awareness, orthopedic restrictions, and impaired interpersonal skills. Pt to benefit from continued  skilled OT tx while in the hospital to address deficits as defined above and maximize level of functional independence w ADL's and functional mobility. Occupational Performance areas to address include: grooming, bathing/shower, toilet hygiene, dressing, functional mobility, community mobility, and clothing management. The patient's raw score on the AM-PAC Daily Activity Inpatient Short Form is 21. A raw score of greater than or equal to 19 suggests the patient may benefit from discharge to home. Discharge recommendation at this time is level III minimum resource intensity.  Pt benefited from co-evaluation of skilled OT and PT therapists in order to most appropriately address functional deficits d/t extensive assistance required for safe functional mobility, decreased activity tolerance, and regression from functioning level prior to admission and/or onset of present illness. OT/PT objectives were addressed separately; please see PT note for specific goal areas targeted.   Goals   Patient Goals to go home   Short Term Goal  pt will perform UE strengthening exercises   Long Term Goal #1 pt will demonstrate toilet transfers and hygiene at (I) level   Long Term Goal #2 pt will demonstrate functional mobility with RW at mod (I) level   Long Term Goal pt will perform UB/LB bathing and grooming tasks at (I) level   Plan   Treatment Interventions ADL retraining;UE strengthening/ROM;Functional transfer training;Endurance training;Patient/family training;Equipment evaluation/education;Activityengagement   Goal Expiration Date 07/11/24   OT Frequency 3-5x/wk   Discharge Recommendation   Rehab Resource Intensity Level, OT III (Minimum Resource Intensity)   AM-PAC Daily Activity Inpatient   Lower Body Dressing 3   Bathing 3   Toileting 3   Upper Body Dressing 4   Grooming 4   Eating 4   Daily Activity Raw Score 21   Daily Activity Standardized Score (Calc for Raw Score >=11) 44.27   AM-PAC Applied Cognition Inpatient    Following a Speech/Presentation 4   Understanding Ordinary Conversation 4   Taking Medications 4   Remembering Where Things Are Placed or Put Away 3   Remembering List of 4-5 Errands 3   Taking Care of Complicated Tasks 3   Applied Cognition Raw Score 21   Applied Cognition Standardized Score 44.3

## 2024-06-27 NOTE — QUICK NOTE
- Very high risk for limb loss  - Weight-bear as tolerated to the left foot but only for bathroom privileges and transfers only.  -He will need every other day dressing change with Adaptic, 4 x 4's, DSD  - He will need an oral course of doxycycline upon discharge, 10-day course of doxycycline.  -Likely surgical cure  -Stable for DC from a podiatric standpoint

## 2024-06-27 NOTE — PROGRESS NOTES
Art Joseph is a 64 y.o. male who is currently ordered Vancomycin IV with management by the Pharmacy Consult service.  Relevant clinical data and objective / subjective history reviewed.  Vancomycin Assessment:  Indication and Goal AUC/Trough: Bone/joint infection (goal -600, trough >10), -600, trough >10  Clinical Status: stable  Micro:     Renal Function:  SCr: 0.78 mg/dL  CrCl: 88 mL/min  Renal replacement: Not on dialysis  Days of Therapy: 3  Current Dose: 1000mg IV q12h  Vancomycin Plan:  New Dosing: same  Estimated AUC: 406 mcg*hr/mL  Estimated Trough: 11.5 mcg/mL  Next Level: 07/03/24 with AM labs  Renal Function Monitoring: Daily BMP and UOP  Pharmacy will continue to follow closely for s/sx of nephrotoxicity, infusion reactions and appropriateness of therapy.  BMP and CBC will be ordered per protocol. We will continue to follow the patient’s culture results and clinical progress daily.    Christopher Guardado, Pharmacist

## 2024-06-27 NOTE — OP NOTE
OPERATIVE REPORT  PATIENT NAME: Art Joseph    :  1960  MRN: [id number]  Pt Location: [facility] ROOM 03    SURGERY DATE: 2024    Surgeons and Role:     * Myron Bhakta DPM - Primary    Preop Diagnosis:  Acute osteomyelitis of metatarsal bone of left foot (HCC) [M86.172]    Post-Op Diagnosis Codes:     * Acute osteomyelitis of metatarsal bone of left foot (HCC) [M86.172]    Procedure(s):  Left - AMPUTATION LEFT FOOT 1st METATARSAL RESECTION    Specimen(s):  ID Type Source Tests Collected by Time Destination   1 : left foot 1st metatarsal Tissue Foot, Left TISSUE EXAM Myron Bhakta DPM 2024 0745        Estimated Blood Loss:   Minimal    Drains:  * No LDAs found *    Anesthesia Type:   Conscious Sedation     Operative Indications:  Acute osteomyelitis of metatarsal bone of left foot (HCC) [M86.172]      Operative Findings:  C/w dx      Complications:   None    Procedure and Technique:  Patient brought the operating room.  For the procedure on the litter.  A full timeout was informed and the foot was anesthetized with lidocaine plain.  The foot was scrubbed prepped draped in usual aseptic manner, attention was directed to the left foot where a wound was noted which was excised in toto straight to bone.  The tissue was placed on the back table utilizing a combination of chronic collars hemostats and sterile 10 blade, the remnant base of the first metatarsal was resected.  It is noted to be soft somewhat nonviable with some coverage of inflammatory tissue.    This area was rinsed with copious amounts of sterile saline, closure obtained with 2 of nylon.  Likely surgical cure.       I was present for the entire procedure.    Patient Disposition:  PACU  and extubated and stable        SIGNATURE: Myron Bhakta DPM  DATE: 2024  TIME: 8:29 AM

## 2024-06-28 ENCOUNTER — TELEPHONE (OUTPATIENT)
Age: 64
End: 2024-06-28

## 2024-06-28 NOTE — TELEPHONE ENCOUNTER
Pt was discharge from hosp surgery with wound discharge resume of care this weekend.can doctor Perilli sign orders.

## 2024-06-28 NOTE — CASE MANAGEMENT
Case Management Discharge Planning Note    Patient name Art Joseph  Location /425-01 MRN [id number]  : 1960 Date 2024       Current Admission Date: 2024  Current Admission Diagnosis:Acute osteomyelitis of metatarsal bone of left foot (HCC)   Patient Active Problem List    Diagnosis Date Noted Date Diagnosed    Elevated platelet count 2024     Candidiasis, intertrigo 2024     Cellulitis of left lower extremity 2024     Class 1 obesity in adult 2021     Diabetic peripheral angiopathy (HCC) 2020     Vitamin D deficiency 2019     Ambulatory dysfunction 10/17/2017     Hx of right BKA (HCC) 2017     Primary hypertension 2017     Hyperlipidemia 2017     Acute osteomyelitis of metatarsal bone of left foot (HCC) 2017     Type 2 diabetes mellitus with foot ulcer, with long-term current use of insulin ([facility]) 2017     Diabetic neuropathy ([facility]) 2017       LOS (days): 3  Geometric Mean LOS (GMLOS) (days): 3  Days to GMLOS:0.3     OBJECTIVE:  Risk of Unplanned Readmission Score: 9.92         Current admission status: Inpatient   Preferred Pharmacy:   [place], [place] - [address]  [address] [address]  Phone: [phone] Fax: [phone]    [facility] - [place], [place] - [address]  [address] [address]  Phone: [phone] Fax: [phone]    [id number] [place] [facility] [address]  [address] [address]  Phone: [phone] Fax: [phone]    Primary Care Provider: Kerry Christine MD    Primary Insurance: AYLA  REP  Secondary Insurance:     DISCHARGE DETAILS:        Patient stable for discharge home today with [facility].    [employer] updated in aidin.  Dc info uploaded and faxed.    Follow up providers listed on AVS.    Family to transport patient  home.

## 2024-06-28 NOTE — DISCHARGE SUMMARY
Discharge Summary - Art Joseph 64 y.o. male MRN: [id number]    Unit/Bed#: 425-01 Encounter: [id number]    Admission Date:   Admission Orders (From admission, onward)       Ordered        06/25/24 1759  INPATIENT ADMISSION  Once            06/25/24 1801  Inpatient Admission  Once                            Admitting Diagnosis: Penetrating foot wound [S91.339A]  Open wound of left foot, initial encounter [S91.302A]  Hx of BKA, right ([facility]) [Z89.511]  Acute osteomyelitis of metatarsal bone of left foot ([facility]) [M86.172]  Uncontrolled diabetes mellitus with hyperglycemia ([facility]) [E11.65]    HPI: 64-year-old male with a past medical history significant for osteomyelitis, poorly controlled diabetes mellitus presents to the emergency room at the request of podiatry for admission. Patient was noted to have evidence of osteomyelitis on XR imaging as an outpatient and referred to the ER by his podiatrist. On exam he is resting comfortably, endorses some anxiety about the surgery but otherwise offers no acute complaints. Specifically denies nausea vomiting shortness of breath or fevers at home     Procedures Performed:   Orders Placed This Encounter   Procedures    ED ECG Documentation Only       Summary of Hospital Course:       Acute osteomyelitis of metatarsal bone of first metatarsal stump of left foot    64-year-old male with a history of poorly controlled diabetes and osteomyelitis presented to the emergency room at the direction of podiatry for admission.  Patient had XR evidence of osteomyelitis, admitted and placed on vancomycin/cefepime.  Patient was taken for surgical intervention on 6/27/2024, recommended by podiatry to be discharged on additional doxycycline to complete 10 days of antibiotics.  Home health care will be ordered on discharge and the patient will follow-up podiatry as an outpatient.    Poorly controlled diabetes mellitus: Counseled patient about the importance of establishing care and following up  with endocrinology, he is at high risk for limb loss per podiatry.      Significant Findings, Care, Treatment and Services Provided:     XR    Findings compatible with osteomyelitis in the first metatarsal stump. Associated significant tissue edema and skin and soft tissue defect at the medial aspect of the forefoot.     Complications: none    Discharge Diagnosis: see above    Medical Problems       Resolved Problems  Date Reviewed: 6/27/2024   None         Condition at Discharge: fair         Discharge instructions/Information to patient and family:   See after visit summary for information provided to patient and family.      Provisions for Follow-Up Care:  See after visit summary for information related to follow-up care and any pertinent home health orders.      PCP: Kerry Christine MD    Disposition: Home    Planned Readmission: No      Discharge Statement   I spent 40 minutes discharging the patient. This time was spent on the day of discharge. I had direct contact with the patient on the day of discharge. Additional documentation is required if more than 30 minutes were spent on discharge.     Discharge Medications:  See after visit summary for reconciled discharge medications provided to patient and family.

## 2024-06-28 NOTE — NURSING NOTE
Pt discharged to home with [facility].  Pt left via wheelchair.   D/c instructions given and reviewed with pt by [facility] nurse.

## 2024-06-28 NOTE — UTILIZATION REVIEW
NOTIFICATION OF ADMISSION DISCHARGE   This is a Notification of Discharge from [facility]. Please be advised that this patient has been discharge from our facility. Below you will find the admission and discharge date and time including the patient’s disposition.   UTILIZATION REVIEW CONTACT:  Alonso Flores  Utilization   Network Utilization Review Department  Phone: [phone] x carefully listen to the prompts. All voicemails are confidential.  Email: [email or web address]     ADMISSION INFORMATION  PRESENTATION DATE: 6/25/2024  4:55 PM  OBERVATION ADMISSION DATE: N/A  INPATIENT ADMISSION DATE: 6/25/24  5:59 PM   DISCHARGE DATE: 6/28/2024 12:25 PM   DISPOSITION:Home with Home Health Care    Network Utilization Review Department  ATTENTION: Please call with any questions or concerns to [phone] and carefully listen to the prompts so that you are directed to the right person. All voicemails are confidential.   For Discharge needs, contact Care Management DC Support Team at [phone] opt. 2  Send all requests for admission clinical reviews, approved or denied determinations and any other requests to dedicated fax number below belonging to the campus where the patient is receiving treatment. List of dedicated fax numbers for the Facilities:  FACILITY NAME UR FAX NUMBER   ADMISSION DENIALS (Administrative/Medical Necessity) [phone]   DISCHARGE SUPPORT TEAM ([facility]) [phone]   PARENT CHILD HEALTH (Maternity/NICU/Pediatrics) [phone]   [facility] [phone]   [facility] [phone]   [facility] [phone]   [facility] [phone]   [facility] [phone]   [facility] [phone]   [facility] [phone]   [facility]  [phone]   [facility] [phone]   [facility] [phone]   [facility] [phone]   [facility] [phone]

## 2024-07-01 ENCOUNTER — TELEPHONE (OUTPATIENT)
Age: 64
End: 2024-07-01

## 2024-07-01 ENCOUNTER — TRANSITIONAL CARE MANAGEMENT (OUTPATIENT)
Dept: INTERNAL MEDICINE CLINIC | Facility: CLINIC | Age: 64
End: 2024-07-01

## 2024-07-01 ENCOUNTER — OFFICE VISIT (OUTPATIENT)
Dept: INTERNAL MEDICINE CLINIC | Facility: CLINIC | Age: 64
End: 2024-07-01
Payer: COMMERCIAL

## 2024-07-01 VITALS
HEIGHT: 70 IN | HEART RATE: 90 BPM | OXYGEN SATURATION: 96 % | BODY MASS INDEX: 30.85 KG/M2 | DIASTOLIC BLOOD PRESSURE: 84 MMHG | TEMPERATURE: 98.5 F | WEIGHT: 215.5 LBS | SYSTOLIC BLOOD PRESSURE: 158 MMHG

## 2024-07-01 DIAGNOSIS — Z79.4 TYPE 2 DIABETES MELLITUS WITH FOOT ULCER, WITH LONG-TERM CURRENT USE OF INSULIN (HCC): ICD-10-CM

## 2024-07-01 DIAGNOSIS — E78.2 MIXED HYPERLIPIDEMIA: ICD-10-CM

## 2024-07-01 DIAGNOSIS — E55.9 VITAMIN D DEFICIENCY: ICD-10-CM

## 2024-07-01 DIAGNOSIS — E11.43 DIABETIC AUTONOMIC NEUROPATHY ASSOCIATED WITH TYPE 2 DIABETES MELLITUS (HCC): ICD-10-CM

## 2024-07-01 DIAGNOSIS — L97.509 TYPE 2 DIABETES MELLITUS WITH FOOT ULCER, WITH LONG-TERM CURRENT USE OF INSULIN (HCC): ICD-10-CM

## 2024-07-01 DIAGNOSIS — M86.172 ACUTE OSTEOMYELITIS OF METATARSAL BONE OF LEFT FOOT (HCC): Primary | ICD-10-CM

## 2024-07-01 DIAGNOSIS — E11.51 DIABETIC PERIPHERAL ANGIOPATHY (HCC): ICD-10-CM

## 2024-07-01 DIAGNOSIS — Z89.511 HX OF RIGHT BKA (HCC): ICD-10-CM

## 2024-07-01 DIAGNOSIS — E11.621 TYPE 2 DIABETES MELLITUS WITH FOOT ULCER, WITH LONG-TERM CURRENT USE OF INSULIN (HCC): ICD-10-CM

## 2024-07-01 PROCEDURE — 99496 TRANSJ CARE MGMT HIGH F2F 7D: CPT | Performed by: FAMILY MEDICINE

## 2024-07-01 NOTE — TELEPHONE ENCOUNTER
Pt have appt this morning his left great- toe was amputated. nurse called ask if doctor laurie could look at the incision and change the dressing nurse is going to continue with f/u for wound care.

## 2024-07-01 NOTE — PROGRESS NOTES
Transition of Care Visit  Name: Art Joseph      : 1960      MRN: [id number]  Encounter Provider: Kerry Christine MD  Encounter Date: 2024   Encounter department: [facility]    Assessment & Plan   1. Acute osteomyelitis of metatarsal bone of left foot (HCC)  -     CBC and differential; Future  2. Diabetic peripheral angiopathy (HCC)  -     CBC and differential; Future  -     Hemoglobin A1C; Future  -     Ambulatory referral to Endocrinology; Future; Expected date: 2024  3. Type 2 diabetes mellitus with foot ulcer, with long-term current use of insulin (HCC)  -     CBC and differential; Future  -     Comprehensive metabolic panel; Future  -     Hemoglobin A1C; Future  -     Albumin / creatinine urine ratio  -     Ambulatory referral to Endocrinology; Future; Expected date: 2024  4. Diabetic autonomic neuropathy associated with type 2 diabetes mellitus (HCC)  -     CBC and differential; Future  -     Hemoglobin A1C; Future  -     Ambulatory referral to Endocrinology; Future; Expected date: 2024  5. Hx of right BKA (HCC)  -     CBC and differential; Future  6. Mixed hyperlipidemia  -     Lipid panel; Future  -     TSH, 3rd generation; Future  7. Vitamin D deficiency  -     Vitamin D 25 hydroxy; Future    Orders and recommendations as noted above.  Recent hospital records reviewed.  The area of the amputation and subsequent surgery for the first metatarsal examined and cleaned and new dressing placed.  Continue to follow-up with home health for wound care.  Importance of following up with podiatry discussed.  Complete course of doxycycline as prescribed.  Importance of good blood sugar control discussed.  He is going to follow-up with endocrinology in the near future.  Continue with the insulin.  Discussed dosing of this based on blood sugars.  Bring list of blood sugars to endocrinology.  Discussed following blood sugars approximately 4 times a day.   Continue with the lisinopril as previously.  Continue with the atorvastatin.  Will have him follow-up in about 2 months as previously scheduled or sooner if needed.       History of Present Illness     Transitional Care Management Review:   Art Joseph is a 64 y.o. male here for TCM follow up.     During the TCM phone call patient stated:  TCM Call       Date and time call was made  7/1/2024  7:56 AM    Hospital care reviewed  Records reviewed    Patient was hospitial[facility] at  [facility]    Date of Admission  06/25/24    Date of discharge  06/28/24    Diagnosis  Acute osteomyelitis of metatarsal bone of left foot    Disposition  Home    Were the patients medications reviewed and updated  Yes    Current Symptoms  None          TCM Call       Post hospital issues  None    Should patient be enrolled in anticoag monitoring?  No    Scheduled for follow up?  Yes    Did you obtain your prescribed medications  Yes    Do you need help managing your prescriptions or medications  No    Is transportation to your appointment needed  Yes    Specify why  Uses public transportation    I have advised the patient to call PCP with any new or worsening symptoms  Salma Garcia Lead    Living Arrangements  Alone    Support System  Family    The type of support provided  Emotional; Physical    Do you have social support  Yes, some    Are you recieving any outpatient services  No    Are you recieving home care services  Yes    Types of home care services  Nurse visit; Other (comment)    Comment  [facility]    Are you using any community resources  No    Current waiver services  No    Have you fallen in the last 12 months  No    Interperter language line needed  No    Counseling  Patient    Counseling topics  Activities of daily living    Comments  Pt stated he does not have any pain.  Stated he does have a boot on his L foot.  Stated he can ambulate, but must walk on his L heel.          He presents for follow-up after  recent hospitalization.  He had the amputation of the left first toe previously and had subsequently followed up with podiatry.  There were concerns about potential extension of infection into the first metatarsal.  He was admitted and started on antibiotics.  Had the subsequent surgery for likely osteomyelitis.  Has tolerated this relatively well.  Denies any significant pain.  Is ambulating with his right-sided prosthesis and postop shoe on the left.  Has been using the cane.  Denies any recent falls.  Is getting some help from his brother with some activities.  He is following up with endocrinology later this month.  Blood sugars have been variable.  Did not bring a list of them with him.  Stated that his blood sugar does occasionally go up into the 200s but this is an improvement since his blood sugars had previously been above 400.  Continues with the insulin.  Denies any hypoglycemic episodes.      Review of Systems   Constitutional:  Negative for activity change, appetite change, chills and fever.   HENT:  Negative for hearing loss.    Eyes:  Negative for pain and visual disturbance.   Respiratory:  Negative for cough, chest tightness and shortness of breath.    Cardiovascular:  Negative for chest pain and palpitations.   Gastrointestinal:  Negative for abdominal pain, blood in stool, diarrhea, nausea and vomiting.   Endocrine: Negative for polydipsia, polyphagia and polyuria.        As per HPI   Genitourinary:  Negative for dysuria.   Musculoskeletal:  Positive for gait problem. Negative for arthralgias.   Skin:  Positive for wound. Negative for color change.   Neurological:  Negative for dizziness and headaches.   Hematological:  Does not bruise/bleed easily.   Psychiatric/Behavioral:  Negative for behavioral problems, decreased concentration and sleep disturbance. The patient is not nervous/anxious.      Objective     /84 (BP Location: Left arm, Patient Position: Sitting, Cuff Size: Large)   Pulse  "90   Temp 98.5 °F (36.9 °C)   Ht 5' 10\" (1.778 m)   Wt 97.8 kg (215 lb 8 oz)   SpO2 96%   BMI 30.92 kg/m²     Physical Exam  Vitals and nursing note reviewed.   Constitutional:       General: He is not in acute distress.     Appearance: He is well-developed, well-groomed and overweight.   HENT:      Head: Normocephalic and atraumatic.      Nose: Nose normal.   Eyes:      Conjunctiva/sclera: Conjunctivae normal.      Pupils: Pupils are equal, round, and reactive to light.   Cardiovascular:      Rate and Rhythm: Normal rate and regular rhythm.      Heart sounds: Normal heart sounds. No murmur heard.     No friction rub. No gallop.   Pulmonary:      Breath sounds: No wheezing or rales.   Chest:      Chest wall: No tenderness.   Abdominal:      General: There is no distension.      Tenderness: There is no abdominal tenderness. There is no guarding or rebound.   Musculoskeletal:      Comments: Right BKA; left first toe amputation with well-healed incisions; incision noted over medial aspect of left foot   Lymphadenopathy:      Cervical: No cervical adenopathy.   Skin:     General: Skin is warm and dry.      Comments: Incision noted over medial aspect of left foot with mattress sutures in place; no erythema; serosanguineous discharge on bandage   Neurological:      Mental Status: He is alert and oriented to person, place, and time.   Psychiatric:         Mood and Affect: Mood and affect normal.         Speech: Speech normal.         Behavior: Behavior normal. Behavior is cooperative.         Cognition and Memory: Cognition and memory normal.       Medications have been reviewed by provider in current encounter    Administrative Statements         "

## 2024-07-01 NOTE — TELEPHONE ENCOUNTER
Caller: [facility]     Doctor and/or Office: Yony ABREU#: [phone]    Escalation: Care Would like to know if she can see him on  Monday , Wed and Friday she stated the way the orders are now it states every other day, but they do not go out on weekends.

## 2024-07-03 ENCOUNTER — TELEPHONE (OUTPATIENT)
Age: 64
End: 2024-07-03

## 2024-07-03 NOTE — TELEPHONE ENCOUNTER
Called [facility] to advise Monday, Wednesday, & Friday will work fine for home health services for Art.    PMD

## 2024-07-08 ENCOUNTER — APPOINTMENT (EMERGENCY)
Dept: RADIOLOGY | Facility: HOSPITAL | Age: 64
DRG: 464 | End: 2024-07-08
Payer: COMMERCIAL

## 2024-07-08 ENCOUNTER — HOSPITAL ENCOUNTER (INPATIENT)
Facility: HOSPITAL | Age: 64
DRG: 464 | End: 2024-07-08
Admitting: FAMILY MEDICINE
Payer: COMMERCIAL

## 2024-07-08 DIAGNOSIS — E11.621 TYPE 2 DIABETES MELLITUS WITH FOOT ULCER, WITH LONG-TERM CURRENT USE OF INSULIN (HCC): ICD-10-CM

## 2024-07-08 DIAGNOSIS — M86.172 ACUTE OSTEOMYELITIS OF METATARSAL BONE OF LEFT FOOT (HCC): ICD-10-CM

## 2024-07-08 DIAGNOSIS — L97.509 TYPE 2 DIABETES MELLITUS WITH FOOT ULCER, WITH LONG-TERM CURRENT USE OF INSULIN (HCC): ICD-10-CM

## 2024-07-08 DIAGNOSIS — E55.9 VITAMIN D DEFICIENCY: ICD-10-CM

## 2024-07-08 DIAGNOSIS — B87.9 MAGGOT INFESTATION: Primary | ICD-10-CM

## 2024-07-08 DIAGNOSIS — Z79.4 TYPE 2 DIABETES MELLITUS WITH FOOT ULCER, WITH LONG-TERM CURRENT USE OF INSULIN (HCC): ICD-10-CM

## 2024-07-08 DIAGNOSIS — E11.42 TYPE 2 DIABETES MELLITUS WITH DIABETIC POLYNEUROPATHY, UNSPECIFIED WHETHER LONG TERM INSULIN USE (HCC): ICD-10-CM

## 2024-07-08 DIAGNOSIS — E11.51 DIABETIC PERIPHERAL ANGIOPATHY (HCC): ICD-10-CM

## 2024-07-08 DIAGNOSIS — T81.30XA WOUND DEHISCENCE: ICD-10-CM

## 2024-07-08 LAB
ALBUMIN SERPL BCG-MCNC: 3.5 G/DL (ref 3.5–5)
ALP SERPL-CCNC: 141 U/L (ref 34–104)
ALT SERPL W P-5'-P-CCNC: 8 U/L (ref 7–52)
ANION GAP SERPL CALCULATED.3IONS-SCNC: 10 MMOL/L (ref 4–13)
APTT PPP: 26 SECONDS (ref 23–37)
AST SERPL W P-5'-P-CCNC: 10 U/L (ref 13–39)
BASOPHILS # BLD AUTO: 0.1 THOUSANDS/ÂΜL (ref 0–0.1)
BASOPHILS NFR BLD AUTO: 1 % (ref 0–1)
BILIRUB SERPL-MCNC: 0.31 MG/DL (ref 0.2–1)
BUN SERPL-MCNC: 16 MG/DL (ref 5–25)
CALCIUM SERPL-MCNC: 9.3 MG/DL (ref 8.4–10.2)
CHLORIDE SERPL-SCNC: 103 MMOL/L (ref 96–108)
CO2 SERPL-SCNC: 25 MMOL/L (ref 21–32)
CREAT SERPL-MCNC: 0.83 MG/DL (ref 0.6–1.3)
EOSINOPHIL # BLD AUTO: 0.29 THOUSAND/ÂΜL (ref 0–0.61)
EOSINOPHIL NFR BLD AUTO: 4 % (ref 0–6)
ERYTHROCYTE [DISTWIDTH] IN BLOOD BY AUTOMATED COUNT: 13.7 % (ref 11.6–15.1)
ERYTHROCYTE [SEDIMENTATION RATE] IN BLOOD: 58 MM/HOUR (ref 0–19)
GFR SERPL CREATININE-BSD FRML MDRD: 92 ML/MIN/1.73SQ M
GLUCOSE SERPL-MCNC: 146 MG/DL (ref 65–140)
GLUCOSE SERPL-MCNC: 173 MG/DL (ref 65–140)
HCT VFR BLD AUTO: 40.7 % (ref 36.5–49.3)
HGB BLD-MCNC: 13.4 G/DL (ref 12–17)
IMM GRANULOCYTES # BLD AUTO: 0.03 THOUSAND/UL (ref 0–0.2)
IMM GRANULOCYTES NFR BLD AUTO: 0 % (ref 0–2)
INR PPP: 0.94 (ref 0.84–1.19)
LYMPHOCYTES # BLD AUTO: 2.65 THOUSANDS/ÂΜL (ref 0.6–4.47)
LYMPHOCYTES NFR BLD AUTO: 33 % (ref 14–44)
MCH RBC QN AUTO: 28.7 PG (ref 26.8–34.3)
MCHC RBC AUTO-ENTMCNC: 32.9 G/DL (ref 31.4–37.4)
MCV RBC AUTO: 87 FL (ref 82–98)
MONOCYTES # BLD AUTO: 0.51 THOUSAND/ÂΜL (ref 0.17–1.22)
MONOCYTES NFR BLD AUTO: 6 % (ref 4–12)
NEUTROPHILS # BLD AUTO: 4.39 THOUSANDS/ÂΜL (ref 1.85–7.62)
NEUTS SEG NFR BLD AUTO: 56 % (ref 43–75)
NRBC BLD AUTO-RTO: 0 /100 WBCS
PLATELET # BLD AUTO: 322 THOUSANDS/UL (ref 149–390)
PLATELET # BLD AUTO: 342 THOUSANDS/UL (ref 149–390)
PMV BLD AUTO: 8.6 FL (ref 8.9–12.7)
PMV BLD AUTO: 8.7 FL (ref 8.9–12.7)
POTASSIUM SERPL-SCNC: 4.2 MMOL/L (ref 3.5–5.3)
PROT SERPL-MCNC: 6.7 G/DL (ref 6.4–8.4)
PROTHROMBIN TIME: 12.5 SECONDS (ref 11.6–14.5)
RBC # BLD AUTO: 4.67 MILLION/UL (ref 3.88–5.62)
SODIUM SERPL-SCNC: 138 MMOL/L (ref 135–147)
WBC # BLD AUTO: 7.97 THOUSAND/UL (ref 4.31–10.16)

## 2024-07-08 PROCEDURE — 99284 EMERGENCY DEPT VISIT MOD MDM: CPT

## 2024-07-08 PROCEDURE — 85025 COMPLETE CBC W/AUTO DIFF WBC: CPT

## 2024-07-08 PROCEDURE — 73630 X-RAY EXAM OF FOOT: CPT

## 2024-07-08 PROCEDURE — 80053 COMPREHEN METABOLIC PANEL: CPT

## 2024-07-08 PROCEDURE — 85049 AUTOMATED PLATELET COUNT: CPT | Performed by: INTERNAL MEDICINE

## 2024-07-08 PROCEDURE — 85610 PROTHROMBIN TIME: CPT

## 2024-07-08 PROCEDURE — 99223 1ST HOSP IP/OBS HIGH 75: CPT | Performed by: INTERNAL MEDICINE

## 2024-07-08 PROCEDURE — 36415 COLL VENOUS BLD VENIPUNCTURE: CPT

## 2024-07-08 PROCEDURE — 85730 THROMBOPLASTIN TIME PARTIAL: CPT

## 2024-07-08 PROCEDURE — 82948 REAGENT STRIP/BLOOD GLUCOSE: CPT

## 2024-07-08 PROCEDURE — 99285 EMERGENCY DEPT VISIT HI MDM: CPT

## 2024-07-08 PROCEDURE — 85652 RBC SED RATE AUTOMATED: CPT

## 2024-07-08 RX ORDER — ONDANSETRON 2 MG/ML
4 INJECTION INTRAMUSCULAR; INTRAVENOUS EVERY 6 HOURS PRN
Status: DISCONTINUED | OUTPATIENT
Start: 2024-07-08 | End: 2024-07-18 | Stop reason: HOSPADM

## 2024-07-08 RX ORDER — CEFTRIAXONE 2 G/50ML
2000 INJECTION, SOLUTION INTRAVENOUS ONCE
Status: COMPLETED | OUTPATIENT
Start: 2024-07-08 | End: 2024-07-08

## 2024-07-08 RX ORDER — LISINOPRIL 2.5 MG/1
2.5 TABLET ORAL DAILY
Status: DISCONTINUED | OUTPATIENT
Start: 2024-07-09 | End: 2024-07-18 | Stop reason: HOSPADM

## 2024-07-08 RX ORDER — ENOXAPARIN SODIUM 100 MG/ML
40 INJECTION SUBCUTANEOUS DAILY
Status: DISCONTINUED | OUTPATIENT
Start: 2024-07-08 | End: 2024-07-18 | Stop reason: HOSPADM

## 2024-07-08 RX ORDER — INSULIN LISPRO 100 [IU]/ML
14 INJECTION, SOLUTION INTRAVENOUS; SUBCUTANEOUS
Status: DISCONTINUED | OUTPATIENT
Start: 2024-07-09 | End: 2024-07-14

## 2024-07-08 RX ORDER — ATORVASTATIN CALCIUM 40 MG/1
80 TABLET, FILM COATED ORAL
Status: DISCONTINUED | OUTPATIENT
Start: 2024-07-09 | End: 2024-07-18 | Stop reason: HOSPADM

## 2024-07-08 RX ORDER — ACETAMINOPHEN 325 MG/1
650 TABLET ORAL EVERY 6 HOURS PRN
Status: DISCONTINUED | OUTPATIENT
Start: 2024-07-08 | End: 2024-07-18 | Stop reason: HOSPADM

## 2024-07-08 RX ORDER — INSULIN LISPRO 100 [IU]/ML
1-6 INJECTION, SOLUTION INTRAVENOUS; SUBCUTANEOUS
Status: DISCONTINUED | OUTPATIENT
Start: 2024-07-08 | End: 2024-07-15

## 2024-07-08 RX ORDER — INSULIN GLARGINE 100 [IU]/ML
50 INJECTION, SOLUTION SUBCUTANEOUS
Status: DISCONTINUED | OUTPATIENT
Start: 2024-07-08 | End: 2024-07-14

## 2024-07-08 RX ADMIN — INSULIN GLARGINE 50 UNITS: 100 INJECTION, SOLUTION SUBCUTANEOUS at 22:15

## 2024-07-08 RX ADMIN — ENOXAPARIN SODIUM 40 MG: 40 INJECTION SUBCUTANEOUS at 20:43

## 2024-07-08 RX ADMIN — VANCOMYCIN HYDROCHLORIDE 1750 MG: 1 INJECTION, POWDER, LYOPHILIZED, FOR SOLUTION INTRAVENOUS at 20:38

## 2024-07-08 RX ADMIN — CEFTRIAXONE 2000 MG: 2 INJECTION, SOLUTION INTRAVENOUS at 19:45

## 2024-07-08 NOTE — ED PROVIDER NOTES
History  Chief Complaint   Patient presents with    Wound Check     Patient had L foot debridement 2 weeks ago, home health nurse had a visit today and noticed maggot in wound.      64M w/ recent amputation of left first metatarsal on 06/27 presents to the ED after it was noticed on dressing change that he had maggots in his wound. He does not have sensation in the foot so did not notice any change in pain or other sensation. He has not changed the dressing himself. Denies f/c, n/v, cp, sob or other complaints.     Prior to Admission Medications   Prescriptions Last Dose Informant Patient Reported? Taking?   Insulin Glargine Solostar (Basaglar KwikPen) 100 UNIT/ML SOPN 7/8/2024  No Yes   Sig: E11.65 inject 50 units daily at bedtime   Insulin Pen Needle 30G X 8 MM MISC 7/8/2024 Self No Yes   Sig: Inject under the skin daily at bedtime   aspirin (ECOTRIN LOW STRENGTH) 81 mg EC tablet 7/9/2024 Self Yes Yes   Sig: Take 81 mg by mouth daily Resume on 8/11/17    atorvastatin (LIPITOR) 80 mg tablet 7/8/2024 Self No Yes   Sig: Take 1 tablet (80 mg total) by mouth daily   insulin aspart (NovoLOG FlexPen) 100 UNIT/ML injection pen 7/9/2024  No Yes   Sig: E11.65 inject 14 units before meals plus scale. TDD 75 units   lisinopril (ZESTRIL) 2.5 mg tablet 7/9/2024 Self No Yes   Sig: Take 1 tablet (2.5 mg total) by mouth daily      Facility-Administered Medications: None       Past Medical History:   Diagnosis Date    Diabetes mellitus (HCC)     Hypertension        Past Surgical History:   Procedure Laterality Date    FOOT AMPUTATION Left 6/27/2024    Procedure: AMPUTATION LEFT FOOT 1st METATARSAL RESECTION;  Surgeon: Myron Bhakta DPM;  Location: [facility];  Service: Podiatry    LEG AMPUTATION THROUGH LOWER TIBIA AND FIBULA Right 7/25/2017    Procedure: AMPUTATION BELOW KNEE (BKA);  Surgeon: Mynor Sanchez MD;  Location:  MAIN OR;  Service: General    DC AMPUTATION FOOT TRANSMETARSAL Right 1/26/2017    Procedure:  AMPUTATION TRANSMETATARSAL (TMA); OPEN;  Surgeon: Leonard Lomeli DPM;  Location: [facility];  Service: Podiatry    AR AMPUTATION FOOT TRANSMETARSAL Left 4/26/2024    Procedure: LEFT FOOT PARTIAL FIRST RAY AMPUTATION;  Surgeon: Jennifer Rubalcava DPM;  Location: [facility];  Service: Podiatry    AR AMPUTATION METATARSAL W/TOE SINGLE Left 1/30/2017    Procedure: Left partial 1st ray amputation;  Surgeon: Leonard Lomeli DPM;  Location: [facility];  Service: Podiatry    AR COLONOSCOPY FLX DX W/COLLJ SPEC WHEN PFRMD N/A 11/28/2018    Procedure: COLONOSCOPY;  Surgeon: González Napier MD;  Location: [facility];  Service: Gastroenterology    WOUND DEBRIDEMENT Right 1/30/2017    Procedure: DEBRIDEMENT FOOT/TOE (WASH OUT) delayed closure, LINDA;  Surgeon: Lenoard Lomeli DPM;  Location: [facility];  Service:        Family History   Problem Relation Age of Onset    Diabetes Mother      I have reviewed and agree with the history as documented.    E-Cigarette/Vaping    E-Cigarette Use Never User      E-Cigarette/Vaping Substances    Nicotine No     THC No     CBD No     Flavoring No     Other No     Unknown No      Social History     Tobacco Use    Smoking status: Former     Types: Cigarettes    Smokeless tobacco: Never    Tobacco comments:     quit 9 years ago   Vaping Use    Vaping status: Never Used   Substance Use Topics    Alcohol use: Yes     Alcohol/week: 11.0 standard drinks of alcohol     Types: 6 Cans of beer, 5 Shots of liquor per week     Comment: social ; occasional use as per [employer]    Drug use: No       Review of Systems   All other systems reviewed and are negative.      Physical Exam  Physical Exam  Vitals reviewed.   Constitutional:       General: He is not in acute distress.  HENT:      Head: Normocephalic.   Cardiovascular:      Rate and Rhythm: Normal rate.   Pulmonary:      Effort: Pulmonary effort is normal.   Abdominal:      General: Abdomen is flat.   Musculoskeletal:         General: No tenderness.   Skin:      General: Skin is dry.      Comments: Left 1st metatarsal amputation site with macerated tissue and multiple areas where maggots are burrowing into tissue. No expanding erythema/induration   Neurological:      General: No focal deficit present.      Mental Status: He is alert.      Comments: Minimal sensation in left foot   Psychiatric:         Mood and Affect: Mood normal.         Vital Signs  ED Triage Vitals [07/08/24 1723]   Temperature Pulse Respirations Blood Pressure SpO2   97.9 °F (36.6 °C) 86 20 (!) 172/79 96 %      Temp Source Heart Rate Source Patient Position - Orthostatic VS BP Location FiO2 (%)   Temporal Monitor Sitting Right arm --      Pain Score       No Pain           Vitals:    07/09/24 1100 07/09/24 1155 07/09/24 1156 07/09/24 1455   BP: 118/58 (!) 171/95  132/67   Pulse: 71 82  85   Patient Position - Orthostatic VS: Lying  Lying          Visual Acuity      ED Medications  Medications   aspirin (ECOTRIN LOW STRENGTH) EC tablet 81 mg (81 mg Oral Given 7/9/24 0901)   atorvastatin (LIPITOR) tablet 80 mg (80 mg Oral Given 7/9/24 1656)   insulin lispro (HumALOG/ADMELOG) 100 units/mL subcutaneous injection 14 Units (14 Units Subcutaneous Given 7/9/24 1656)   insulin glargine (LANTUS) subcutaneous injection 50 Units 0.5 mL (50 Units Subcutaneous Given 7/8/24 2215)   lisinopril (ZESTRIL) tablet 2.5 mg (2.5 mg Oral Given 7/9/24 0901)   acetaminophen (TYLENOL) tablet 650 mg (has no administration in time range)   ondansetron (ZOFRAN) injection 4 mg (has no administration in time range)   enoxaparin (LOVENOX) subcutaneous injection 40 mg (40 mg Subcutaneous Given 7/9/24 0903)   vancomycin (VANCOCIN) 1250 mg in sodium chloride 0.9% 250 mL IVPB (1,250 mg Intravenous New Bag 7/9/24 1833)   insulin lispro (HumALOG/ADMELOG) 100 units/mL subcutaneous injection 1-6 Units ( Subcutaneous Not Given 7/9/24 1644)   Dakins (full strength) (DAKIN'S) 0.5 percent topical solution 1 Application (1 Application Irrigation  Not Given 7/9/24 1451)   vancomycin (VANCOCIN) 1750 mg in sodium chloride 0.9% 500 mL IVPB (0 mg Intravenous Stopped 7/8/24 2238)   cefTRIAXone (ROCEPHIN) IVPB (premix in dextrose) 2,000 mg 50 mL (0 mg Intravenous Stopped 7/8/24 2015)       Diagnostic Studies  Results Reviewed       Procedure Component Value Units Date/Time    Fingerstick Glucose (POCT) [[id number]]  (Abnormal) Collected: 07/09/24 1139    Lab Status: Final result Specimen: Blood Updated: 07/09/24 1141     POC Glucose 198 mg/dl     Fingerstick Glucose (POCT) [[id number]]  (Normal) Collected: 07/09/24 0718    Lab Status: Final result Specimen: Blood Updated: 07/09/24 0720     POC Glucose 112 mg/dl     Fingerstick Glucose (POCT) [[id number]]  (Abnormal) Collected: 07/08/24 2256    Lab Status: Final result Specimen: Blood Updated: 07/08/24 2258     POC Glucose 146 mg/dl     Platelet count [[id number]]  (Abnormal) Collected: 07/08/24 2031    Lab Status: Final result Specimen: Blood from Arm, Left Updated: 07/08/24 2038     Platelets 322 Thousands/uL      MPV 8.6 fL     Sedimentation rate, automated [[id number]]  (Abnormal) Collected: 07/08/24 1802    Lab Status: Final result Specimen: Blood from Arm, Left Updated: 07/08/24 1855     Sed Rate 58 mm/hour     Comprehensive metabolic panel [[id number]]  (Abnormal) Collected: 07/08/24 1802    Lab Status: Final result Specimen: Blood from Arm, Left Updated: 07/08/24 1831     Sodium 138 mmol/L      Potassium 4.2 mmol/L      Chloride 103 mmol/L      CO2 25 mmol/L      ANION GAP 10 mmol/L      BUN 16 mg/dL      Creatinine 0.83 mg/dL      Glucose 173 mg/dL      Calcium 9.3 mg/dL      AST 10 U/L      ALT 8 U/L      Alkaline Phosphatase 141 U/L      Total Protein 6.7 g/dL      Albumin 3.5 g/dL      Total Bilirubin 0.31 mg/dL      eGFR 92 ml/min/1.73sq m     Narrative:      National Kidney Disease Foundation guidelines for Chronic Kidney Disease (CKD):     Stage 1 with normal or high GFR (GFR > 90 mL/min/1.73 square  meters)    Stage 2 Mild CKD (GFR = 60-89 mL/min/1.73 square meters)    Stage 3A Moderate CKD (GFR = 45-59 mL/min/1.73 square meters)    Stage 3B Moderate CKD (GFR = 30-44 mL/min/1.73 square meters)    Stage 4 Severe CKD (GFR = 15-29 mL/min/1.73 square meters)    Stage 5 End Stage CKD (GFR <15 mL/min/1.73 square meters)  Note: GFR calculation is accurate only with a steady state creatinine    Protime-INR [[id number]]  (Normal) Collected: 07/08/24 1802    Lab Status: Final result Specimen: Blood from Arm, Left Updated: 07/08/24 1829     Protime 12.5 seconds      INR 0.94    APTT [[id number]]  (Normal) Collected: 07/08/24 1802    Lab Status: Final result Specimen: Blood from Arm, Left Updated: 07/08/24 1829     PTT 26 seconds     CBC and differential [[id number]]  (Abnormal) Collected: 07/08/24 1802    Lab Status: Final result Specimen: Blood from Arm, Left Updated: 07/08/24 1808     WBC 7.97 Thousand/uL      RBC 4.67 Million/uL      Hemoglobin 13.4 g/dL      Hematocrit 40.7 %      MCV 87 fL      MCH 28.7 pg      MCHC 32.9 g/dL      RDW 13.7 %      MPV 8.7 fL      Platelets 342 Thousands/uL      nRBC 0 /100 WBCs      Segmented % 56 %      Immature Grans % 0 %      Lymphocytes % 33 %      Monocytes % 6 %      Eosinophils Relative 4 %      Basophils Relative 1 %      Absolute Neutrophils 4.39 Thousands/µL      Absolute Immature Grans 0.03 Thousand/uL      Absolute Lymphocytes 2.65 Thousands/µL      Absolute Monocytes 0.51 Thousand/µL      Eosinophils Absolute 0.29 Thousand/µL      Basophils Absolute 0.10 Thousands/µL                    XR foot 3+ views LEFT   ED Interpretation by Marlo Ellison MD (07/08 1814)   S/p 1st metatarsal amputation. No gas in tissue. No new areas of osteomyelitis.      Final Result by Yoselin Olivas MD (07/09 1230)   No obvious radiographic osteomyelitis. Consider MRI for more definitive diagnosis.   Chronic changes, as per the body of the report.   Soft tissue swelling at the medial forefoot.          Computerized Assisted Algorithm (CAA) may have been used to analyze all applicable images.         Workstation performed: [id number]         MRI inpatient order    (Results Pending)              Procedures  Procedures         ED Course                               SBIRT 20yo+      Flowsheet Row Most Recent Value   Initial Alcohol Screen: US AUDIT-C     1. How often do you have a drink containing alcohol? 0 Filed at: 07/08/2024 1723   2. How many drinks containing alcohol do you have on a typical day you are drinking?  0 Filed at: 07/08/2024 1723   3a. Male UNDER 65: How often do you have five or more drinks on one occasion? 0 Filed at: 07/08/2024 1723   3b. FEMALE Any Age, or MALE 65+: How often do you have 4 or more drinks on one occassion? 0 Filed at: 07/08/2024 1723   Audit-C Score 0 Filed at: 07/08/2024 1723   GOVIND: How many times in the past year have you...    Used an illegal drug or used a prescription medication for non-medical reasons? Never Filed at: 07/08/2024 1723                      Medical Decision Making  64 M presenting to the Ed due to maggot infestation of 1st metatarsal amputation site. Ultrasound jelly applied to the area and some maggots removed but there are multiple areas where maggots continue to burrow in/out of the site. Xray obtained and showing no signs of subcutaneous gas, or other acute osseous abnormalities. Labs obtained and with minimal changes compared to prior. Consulted podiatry for further evaluation with possible debridement. Will admit for intervention tomorrow.     Amount and/or Complexity of Data Reviewed  Labs: ordered.  Radiology: ordered and independent interpretation performed.    Risk  Prescription drug management.  Decision regarding hospitalization.             Disposition  Final diagnoses:   Maggot infestation   Wound dehiscence     Time reflects when diagnosis was documented in both MDM as applicable and the Disposition within this note       Time User  Action Codes Description Comment    7/8/2024  5:31 PM Marlo Ellison Add [B87.9] Maggot infestation     7/8/2024  7:00 PM YohollanditisMarlo Add [T81.30XA] Wound dehiscence     7/8/2024  8:19 PM Fatemeh Reid Add [E11.621,  L97.509,  Z79.4] Type 2 diabetes mellitus with foot ulcer, with long-term current use of insulin ([facility])     7/9/2024  7:19 AM Fernando Gutierrezrey Add [M86.172] Acute osteomyelitis of metatarsal bone of left foot (HCC)           ED Disposition       ED Disposition   Admit    Condition   Stable    Date/Time   Mon Jul 8, 2024 1900    Comment   Case was discussed with sixto and the patient's admission status was agreed to be Admission Status: inpatient status to the service of [facility] .               Follow-up Information    None         Current Discharge Medication List        CONTINUE these medications which have NOT CHANGED    Details   aspirin (ECOTRIN LOW STRENGTH) 81 mg EC tablet Take 81 mg by mouth daily Resume on 8/11/17       atorvastatin (LIPITOR) 80 mg tablet Take 1 tablet (80 mg total) by mouth daily  Qty: 90 tablet, Refills: 3    Associated Diagnoses: Hyperlipidemia, unspecified hyperlipidemia type      insulin aspart (NovoLOG FlexPen) 100 UNIT/ML injection pen E11.65 inject 14 units before meals plus scale. TDD 75 units    Associated Diagnoses: Type 2 diabetes mellitus with diabetic polyneuropathy, unspecified whether long term insulin use (HCC)      Insulin Glargine Solostar (Basaglar KwikPen) 100 UNIT/ML SOPN E11.65 inject 50 units daily at bedtime  Qty: 15 mL, Refills: 1    Associated Diagnoses: Type 2 diabetes mellitus with diabetic polyneuropathy, unspecified whether long term insulin use (HCC)      Insulin Pen Needle 30G X 8 MM MISC Inject under the skin daily at bedtime  Qty: 100 each, Refills: 5    Comments: Please supply with whatever is in stock and insurance covers.  Associated Diagnoses: Type 2 diabetes mellitus without complication, with long-term current use of insulin (HCC)       lisinopril (ZESTRIL) 2.5 mg tablet Take 1 tablet (2.5 mg total) by mouth daily  Qty: 90 tablet, Refills: 3    Associated Diagnoses: Hypertension, unspecified type             No discharge procedures on file.    PDMP Review       None            ED Provider  Electronically Signed by             Marlo Ellison MD  07/09/24 2001

## 2024-07-09 PROBLEM — E78.00 HYPERCHOLESTEREMIA: Status: ACTIVE | Noted: 2017-04-14

## 2024-07-09 PROBLEM — S91.302A OPEN WOUND OF LEFT FOOT: Status: ACTIVE | Noted: 2024-07-09

## 2024-07-09 LAB
GLUCOSE SERPL-MCNC: 112 MG/DL (ref 65–140)
GLUCOSE SERPL-MCNC: 122 MG/DL (ref 65–140)
GLUCOSE SERPL-MCNC: 198 MG/DL (ref 65–140)
GLUCOSE SERPL-MCNC: 93 MG/DL (ref 65–140)

## 2024-07-09 PROCEDURE — 97163 PT EVAL HIGH COMPLEX 45 MIN: CPT

## 2024-07-09 PROCEDURE — 87070 CULTURE OTHR SPECIMN AEROBIC: CPT | Performed by: PODIATRIST

## 2024-07-09 PROCEDURE — 97167 OT EVAL HIGH COMPLEX 60 MIN: CPT

## 2024-07-09 PROCEDURE — 87205 SMEAR GRAM STAIN: CPT | Performed by: PODIATRIST

## 2024-07-09 PROCEDURE — 82948 REAGENT STRIP/BLOOD GLUCOSE: CPT

## 2024-07-09 PROCEDURE — 99024 POSTOP FOLLOW-UP VISIT: CPT | Performed by: PODIATRIST

## 2024-07-09 PROCEDURE — 99232 SBSQ HOSP IP/OBS MODERATE 35: CPT | Performed by: INTERNAL MEDICINE

## 2024-07-09 PROCEDURE — 87075 CULTR BACTERIA EXCEPT BLOOD: CPT | Performed by: PODIATRIST

## 2024-07-09 PROCEDURE — 87186 SC STD MICRODIL/AGAR DIL: CPT | Performed by: PODIATRIST

## 2024-07-09 PROCEDURE — 87181 SC STD AGAR DILUTION PER AGT: CPT | Performed by: PODIATRIST

## 2024-07-09 PROCEDURE — 87077 CULTURE AEROBIC IDENTIFY: CPT | Performed by: PODIATRIST

## 2024-07-09 PROCEDURE — 87147 CULTURE TYPE IMMUNOLOGIC: CPT | Performed by: PODIATRIST

## 2024-07-09 PROCEDURE — 87147 CULTURE TYPE IMMUNOLOGIC: CPT | Performed by: FAMILY MEDICINE

## 2024-07-09 PROCEDURE — 87081 CULTURE SCREEN ONLY: CPT | Performed by: FAMILY MEDICINE

## 2024-07-09 PROCEDURE — 99223 1ST HOSP IP/OBS HIGH 75: CPT | Performed by: INTERNAL MEDICINE

## 2024-07-09 RX ORDER — SODIUM HYPOCHLORITE 5 MG/ML
1 SOLUTION TOPICAL DAILY
Status: DISCONTINUED | OUTPATIENT
Start: 2024-07-09 | End: 2024-07-18 | Stop reason: HOSPADM

## 2024-07-09 RX ADMIN — ASPIRIN 81 MG: 81 TABLET, COATED ORAL at 09:01

## 2024-07-09 RX ADMIN — INSULIN LISPRO 14 UNITS: 100 INJECTION, SOLUTION INTRAVENOUS; SUBCUTANEOUS at 16:56

## 2024-07-09 RX ADMIN — INSULIN LISPRO 2 UNITS: 100 INJECTION, SOLUTION INTRAVENOUS; SUBCUTANEOUS at 11:43

## 2024-07-09 RX ADMIN — ATORVASTATIN CALCIUM 80 MG: 40 TABLET, FILM COATED ORAL at 16:56

## 2024-07-09 RX ADMIN — INSULIN LISPRO 14 UNITS: 100 INJECTION, SOLUTION INTRAVENOUS; SUBCUTANEOUS at 09:03

## 2024-07-09 RX ADMIN — LISINOPRIL 2.5 MG: 2.5 TABLET ORAL at 09:01

## 2024-07-09 RX ADMIN — INSULIN LISPRO 14 UNITS: 100 INJECTION, SOLUTION INTRAVENOUS; SUBCUTANEOUS at 11:43

## 2024-07-09 RX ADMIN — VANCOMYCIN HYDROCHLORIDE 1250 MG: 1 INJECTION, POWDER, LYOPHILIZED, FOR SOLUTION INTRAVENOUS at 06:10

## 2024-07-09 RX ADMIN — VANCOMYCIN HYDROCHLORIDE 1250 MG: 1 INJECTION, POWDER, LYOPHILIZED, FOR SOLUTION INTRAVENOUS at 18:33

## 2024-07-09 RX ADMIN — ENOXAPARIN SODIUM 40 MG: 40 INJECTION SUBCUTANEOUS at 09:03

## 2024-07-09 NOTE — ASSESSMENT & PLAN NOTE
"Lab Results   Component Value Date    HGBA1C 9.7 (H) 06/25/2024       No results for input(s): \"POCGLU\" in the last 72 hours.    Blood Sugar Average: Last 72 hrs:    Poorly controlled.   Continue home dose of insulin  Pt on Lantus 50 units qhs and lispro 14 units with meals  Continue SSI and accucheks achs    "

## 2024-07-09 NOTE — CONSULTS
Consultation - [facility] Infectious Disease   rAt Joseph 64 y.o. male MRN: [id number]  Unit/Bed#: 424-01 Encounter: [id number]      Inpatient consult to Infectious Diseases  Consult performed by: Willie Abrams PA-C  Consult ordered by: Jerome Gutierrze DO        VIRTUAL CARE DOCUMENTATION:     1. This service was provided via Telemedicine using [employer] Kit     2. Parties in the room with patient during teleconsult Patient only    3. Confidentiality My office door was closed     4. Participants No one else was in the room    5. Patient acknowledged consent and understanding of privacy and security of the  Telemedicine consult. I informed the patient that I have reviewed their record in Epic and presented the opportunity for them to ask any questions regarding the visit today.  The patient agreed to participate.    6. Time spent 10 minutes communicating with the patient via telehealth.       IMPRESSION & RECOMMENDATIONS:   1. Progressive left foot wound with possible cellulitis. Recent admission with left foot OM s/p resection of the first MT head 6/27/24. Now presented with evidence of maggots in the wound bed. I have low suspicion for SSTI, however will keep patient on abx for now pending podiatry evaluation. He denies pain, erythema, or malodor coming from the wound and he is not systemically ill or toxic appearing. He remains high risk for limb loss.   -continue IV vancomycin for now   -follow up podiatry input   -local wound care per podiatry   -monitor temperature and hemodynamics  -recheck CBC and BMP in a.m to monitor response to treatment      2. Left foot osteomyelitis. Recent hx of 1st MT head OM s/p first ray resection 4/26/24 followed by additional resection for residual OM 6/27/24. OR cx from April isolated Staph simulans.    -follow up podiatry recommendations     3. Type 2 DM with long term insulin use. Uncontrolled, HA1C 9.7%. Remains a risk factor for infection and poor wound  healing.   -recommend tight glycemic control      4. PVD, venous stasis. Hx of R BKA. Remains a risk factor for poor wound healing.     Antibiotics:  D2 IV vancomycin     I have discussed the above management plan in detail with patient.     Above plan discussed in detail with Dr. Gutierrez who is in agreement with plan to continue abx as above.     ID consult service will continue to follow.    HISTORY OF PRESENT ILLNESS:  Reason for Consult: Acute osteo of L foot     HPI: Art Joseph is a 64 y.o. year old male uncontrolled DM, venous stasis and PVD s/p R BKA and progressive L foot osteo who presented with maggots in his left foot wound bed. Patient is known to outpatient ID service. He was recently seen in April 2024 and tx for L foot cellulitis with first MT head OM. He is s/p first ray resection initially 4/26 and op culture isolated staph simulans. Bone margin path was negative for residual OM and he was transitioned to PO Bactrim to complete 7 day course ost op. He was following in wound care clinic and outpatient XR showed concern for first MT stump OM. He underwent additional resection of the 1st MT on 6/27 and podiatry suspected likely surgical cure of infection. He was d/c with 10 day course of doxy. Patient was due to see pods outpatient today. He was seen by his visiting nurse yesterday who noticed patient had maggots in his wound and was recommended to come to ED for eval. He was started on IV vanco and ceftriaxone. He denies fevers or chills. States he finished recent course of doxy. Denies worsening pain in his LE though he has hx of neuropathy. Denies erythema or malodor coming from the wound.     REVIEW OF SYSTEMS:  A complete review of systems is negative other than that noted in the HPI.    PAST MEDICAL HISTORY:  Past Medical History:   Diagnosis Date    Diabetes mellitus (HCC)     Hypertension      Past Surgical History:   Procedure Laterality Date    FOOT AMPUTATION Left 6/27/2024     Procedure: AMPUTATION LEFT FOOT 1st METATARSAL RESECTION;  Surgeon: Myron Bhakta DPM;  Location: [facility];  Service: Podiatry    LEG AMPUTATION THROUGH LOWER TIBIA AND FIBULA Right 7/25/2017    Procedure: AMPUTATION BELOW KNEE (BKA);  Surgeon: Mynor Sanchez MD;  Location:  MAIN OR;  Service: General    WA AMPUTATION FOOT TRANSMETARSAL Right 1/26/2017    Procedure: AMPUTATION TRANSMETATARSAL (TMA); OPEN;  Surgeon: Leonard Lomeli DPM;  Location: [facility];  Service: Podiatry    WA AMPUTATION FOOT TRANSMETARSAL Left 4/26/2024    Procedure: LEFT FOOT PARTIAL FIRST RAY AMPUTATION;  Surgeon: Jennifer Rubalcava DPM;  Location: [facility];  Service: Podiatry    WA AMPUTATION METATARSAL W/TOE SINGLE Left 1/30/2017    Procedure: Left partial 1st ray amputation;  Surgeon: Leonard Lomeli DPM;  Location:  MAIN OR;  Service: Podiatry    WA COLONOSCOPY FLX DX W/COLLJ SPEC WHEN PFRMD N/A 11/28/2018    Procedure: COLONOSCOPY;  Surgeon: González Napier MD;  Location: [facility];  Service: Gastroenterology    WOUND DEBRIDEMENT Right 1/30/2017    Procedure: DEBRIDEMENT FOOT/TOE (WASH OUT) delayed closure, LINDA;  Surgeon: Leonard Lomeli DPM;  Location: [facility];  Service:        FAMILY HISTORY:  Non-contributory    SOCIAL HISTORY:  Social History   Social History     Substance and Sexual Activity   Alcohol Use Yes    Alcohol/week: 11.0 standard drinks of alcohol    Types: 6 Cans of beer, 5 Shots of liquor per week    Comment: social ; occasional use as per [employer]     Social History     Substance and Sexual Activity   Drug Use No     Social History     Tobacco Use   Smoking Status Former    Types: Cigarettes   Smokeless Tobacco Never   Tobacco Comments    quit 9 years ago       ALLERGIES:  No Known Allergies    MEDICATIONS:  All current active medications have been reviewed.    PHYSICAL EXAM:  Temp:  [97.7 °F (36.5 °C)-97.9 °F (36.6 °C)] 97.7 °F (36.5 °C)  HR:  [71-88] 82  Resp:  [17-20] 20  BP: (118-172)/()  171/95  SpO2:  [94 %-96 %] 96 %  Temp (24hrs), Av.8 °F (36.6 °C), Min:97.7 °F (36.5 °C), Max:97.9 °F (36.6 °C)  Current: Temperature: 97.7 °F (36.5 °C)  No intake or output data in the 24 hours ending 24 1230    Physical exam findings reported by bedside and primary medical team staff.    General Appearance:  Appearing well, nontoxic, and in no distress   Head:  Normocephalic, without obvious abnormality, atraumatic   Eyes:  Conjunctiva pink and sclera anicteric, both eyes   Nose: Nares normal, mucosa normal, no drainage   Throat: Oropharynx moist without lesions   Neck: Supple, symmetrical, no adenopathy, no tenderness/mass/nodules   Back:   Symmetric, no curvature, ROM normal.   Lungs:   Clear to auscultation bilaterally, respirations unlabored   Heart:  RRR; no murmur, rub or gallop   Abdomen:   Soft, non-tender, non-distended, positive bowel sounds    Extremities: R BKA   Skin: No rashes or lesions. Left foot medial wound with sutures in place and periwound erythema without obvious evidence of cellulitis to remaining forefoot, ankle, or extremity.    Neurologic: Alert and oriented times 3, extremity strength 5/5 and symmetric       LABS, IMAGING, & OTHER STUDIES:  Extensive review of the medical records in epic including review of the notes, radiographs, and laboratory results were reviewed personally as below.     Lab Results:  I have personally reviewed pertinent labs.  Results from last 7 days   Lab Units 24  2031 24  1802   WBC Thousand/uL  --  7.97   HEMOGLOBIN g/dL  --  13.4   PLATELETS Thousands/uL 322 342     Results from last 7 days   Lab Units 24  1802   SODIUM mmol/L 138   POTASSIUM mmol/L 4.2   CHLORIDE mmol/L 103   CO2 mmol/L 25   BUN mg/dL 16   CREATININE mg/dL 0.83   EGFR ml/min/1.73sq m 92   CALCIUM mg/dL 9.3   AST U/L 10*   ALT U/L 8   ALK PHOS U/L 141*                           Lab interpretation/comments: normal WBC, Cr normal     Culture data: no cx data      Imaging Studies:   Imaging interpretation/comments: XR without soft tissue gas

## 2024-07-09 NOTE — PROGRESS NOTES
"[facility]  Progress Note  Name: Art Joesph I  MRN: [id number]  Unit/Bed#: 424-01 I Date of Admission: 7/8/2024   Date of Service: 7/9/2024 I Hospital Day: 1    Assessment & Plan   * Open wound of left foot  Assessment & Plan  Recent hospitalization from 06/25/2024 through 06/28/2024 for osteomyelitis of the left foot requiring a left 1st metatarsal amputation/resection by Podiatry  Now with a left wound infection and associated cellulitis with probable underlying osteomyelitis  Also found to have maggots in his open wound  I appreciate Infectious Disease's input and Podiatry's input.  Check an MRI of the left foot with contrast per Podiatry's recommendations  Check a MRSA nasal culture and wound culture  Treat with IV vancomycin for now    Maggot infestation  Assessment & Plan  Please see the assessment and plan for \"Open wound of left foot\"      Hypercholesteremia  Assessment & Plan  Continue statin therapy    Primary hypertension  Assessment & Plan  Continue PO lisinopril  Follow the blood pressure trend    Ambulatory dysfunction  Assessment & Plan  PT/OT evaluations    Hx of right BKA (HCC)  Assessment & Plan  Outpatient follow-up with Vascular Surgery    Type 2 diabetes mellitus with foot ulcer, with long-term current use of insulin ([facility])  Assessment & Plan  Lab Results   Component Value Date    HGBA1C 9.7 (H) 06/25/2024       Recent Labs     07/08/24  2256 07/09/24  0718 07/09/24  1139   POCGLU 146* 112 198*       Blood Sugar Average: Last 72 hrs:  (P) 152    Continue Lantus 50 Units SQ QHS and Humalog 14 Units TID with meals  ISS with blood glucose monitoring ACHS  Continue PO lisinopril for renal protection  Hypoglycemia protocol  Outpatient Endocrinology evaluation        VTE Pharmacologic Prophylaxis: VTE Score: 8 High Risk (Score >/= 5) - Pharmacological DVT Prophylaxis Ordered: enoxaparin (Lovenox). Sequential Compression Devices Ordered for the left lower extremity only.  No " SCD on the right lower extremity with the patient having a right-sided BKA.    Mobility:   Basic Mobility Inpatient Raw Score: 16  JH-HLM Goal: 5: Stand one or more mins  JH-HLM Achieved: 4: Move to chair/commode  JH-HLM Goal NOT achieved. Continue with multidisciplinary rounding and encourage appropriate mobility to improve upon JH-HLM goals.    Patient Centered Rounds: I performed bedside rounds with nursing staff today.     Total Time Spent on Date of Encounter in care of patient: 35 mins. This time was spent on one or more of the following: performing physical exam; counseling and coordination of care; obtaining or reviewing history; documenting in the medical record; reviewing/ordering tests, medications or procedures; communicating with other healthcare professionals and discussing with patient's family/caregivers.    Current Length of Stay: 1 day(s)  Current Patient Status: Inpatient   Certification Statement: The patient will continue to require additional inpatient hospital stay due to the need for IV antibiotic treatment and for an MRI of the left foot.  Discharge Plan: Anticipate discharge in >72 hrs to rehab facility.    Code Status: Level 1 - Full Code    Subjective:   The patient was seen and examined.  No left foot pain.  No chest pain.  No shortness of breath.  No abdominal pain.  No nausea or vomiting.      Objective:     Vitals:   Temp (24hrs), Av.1 °F (36.7 °C), Min:97.7 °F (36.5 °C), Max:98.8 °F (37.1 °C)    Temp:  [97.7 °F (36.5 °C)-98.8 °F (37.1 °C)] 98.8 °F (37.1 °C)  HR:  [71-88] 85  Resp:  [17-20] 18  BP: (118-172)/() 132/67  SpO2:  [94 %-96 %] 94 %  Body mass index is 30.91 kg/m².     Input and Output Summary (last 24 hours):     Intake/Output Summary (Last 24 hours) at 2024 1512  Last data filed at 2024 1401  Gross per 24 hour   Intake --   Output 250 ml   Net -250 ml       Physical Exam:   Physical Exam  General:  NAD, follows commands  HEENT:  NC/AT, mucous membranes  moist  Neck:  Supple, No JVP elevation  CV:  + S1, + S2, RRR  Pulm:  Lung fields are CTA bilaterally  Abd:  Soft, Non-tender, Non-distended  Ext:  No clubbing/cyanosis/edema, Left foot wound dressing intact, History of right-sided BKA (below-the-knee amputation)  Skin:  No rashes  Neuro:  Awake, alert, oriented  Psych:  Normal mood and affect      Additional Data:    Labs:  Results from last 7 days   Lab Units 07/08/24  2031 07/08/24  1802   WBC Thousand/uL  --  7.97   HEMOGLOBIN g/dL  --  13.4   HEMATOCRIT %  --  40.7   PLATELETS Thousands/uL 322 342   SEGS PCT %  --  56   LYMPHO PCT %  --  33   MONO PCT %  --  6   EOS PCT %  --  4     Results from last 7 days   Lab Units 07/08/24  1802   SODIUM mmol/L 138   POTASSIUM mmol/L 4.2   CHLORIDE mmol/L 103   CO2 mmol/L 25   BUN mg/dL 16   CREATININE mg/dL 0.83   ANION GAP mmol/L 10   CALCIUM mg/dL 9.3   ALBUMIN g/dL 3.5   TOTAL BILIRUBIN mg/dL 0.31   ALK PHOS U/L 141*   ALT U/L 8   AST U/L 10*   GLUCOSE RANDOM mg/dL 173*     Results from last 7 days   Lab Units 07/08/24  1802   INR  0.94     Results from last 7 days   Lab Units 07/09/24  1139 07/09/24  0718 07/08/24  2256   POC GLUCOSE mg/dl 198* 112 146*               Lines/Drains:  Invasive Devices       Peripheral Intravenous Line  Duration             Peripheral IV 07/08/24 Left;Ventral (anterior) Forearm <1 day                          Imaging: No pertinent imaging reviewed.    Recent Cultures (last 7 days):         Last 24 Hours Medication List:   Current Facility-Administered Medications   Medication Dose Route Frequency Provider Last Rate    acetaminophen  650 mg Oral Q6H PRN Fatemeh Reid MD      aspirin  81 mg Oral Daily Fatemeh Reid MD      atorvastatin  80 mg Oral Daily With Dinner Fatemeh Reid MD      Dakins (full strength)  1 Application Irrigation Daily Myron Bhakta DPM      enoxaparin  40 mg Subcutaneous Daily Fatemeh Reid MD      insulin glargine  50 Units  Subcutaneous HS Fatemeh Reid MD      insulin lispro  1-6 Units Subcutaneous 4x Daily (with meals and at bedtime) Fatemeh Reid MD      insulin lispro  14 Units Subcutaneous TID With Meals Fatemeh Reid MD      lisinopril  2.5 mg Oral Daily Fatemeh Reid MD      ondansetron  4 mg Intravenous Q6H PRN Fatemeh Reid MD      vancomycin  1,250 mg Intravenous Q12H Fatemeh Reid MD Stopped (07/09/24 1006)        Today, Patient Was Seen By: Jerome Gutierrez DO    **Please Note: This note may have been constructed using a voice recognition system.**

## 2024-07-09 NOTE — PROGRESS NOTES
Art Joseph is a 64 y.o. male who is currently ordered Vancomycin IV with management by the Pharmacy Consult service.  Relevant clinical data and objective / subjective history reviewed.  Vancomycin Assessment:  Indication and Goal AUC/Trough: Bone/joint infection (goal -600, trough >10), -600, trough >10  Clinical Status:  new  Micro:     Renal Function:  SCr: 0.83 mg/dL  CrCl: 104 mL/min  Renal replacement: Not on dialysis  Days of Therapy: 1  Current Dose: 1750 mg IV loading dose  Vancomycin Plan:  New Dosin mg IV q12h  Estimated AUC: 489 mcg*hr/mL  Estimated Trough: 13.7 mcg/mL  Next Level: 7/10 with AM labs  Renal Function Monitoring: Daily BMP and UOP  Pharmacy will continue to follow closely for s/sx of nephrotoxicity, infusion reactions and appropriateness of therapy.  BMP and CBC will be ordered per protocol. We will continue to follow the patient’s culture results and clinical progress daily.    Rafael Moy, Pharmacist

## 2024-07-09 NOTE — PROGRESS NOTES
Art Joseph is a 64 y.o. male who is currently ordered Vancomycin IV with management by the Pharmacy Consult service.  Relevant clinical data and objective / subjective history reviewed.  Vancomycin Assessment:  Indication and Goal AUC/Trough: Bone/joint infection (goal -600, trough >10), -600, trough >10  Clinical Status: stable  Micro:   N/a  Renal Function:  SCr: 0.83 mg/dL  CrCl: 104 mL/min  Renal replacement: Not on dialysis  Days of Therapy: 2  Current Dose: 1750mg loading dose  Vancomycin Plan:  New Dosinmg q12h  Estimated AUC: 509 mcg*hr/mL  Estimated Trough: 14.4 mcg/mL  Next Level: 0600 on 7/10/24  Renal Function Monitoring: Daily BMP and UOP  Pharmacy will continue to follow closely for s/sx of nephrotoxicity, infusion reactions and appropriateness of therapy.  BMP and CBC will be ordered per protocol. We will continue to follow the patient’s culture results and clinical progress daily.    Jeff Granados, Pharmacist

## 2024-07-09 NOTE — ASSESSMENT & PLAN NOTE
S/p amputation of the LT first metatarsal on 6/27  Now with wound with maggot infestation  Xray showing no subcutaneous gas or new areas of osteomyelitis  Will follow up on podiatry recommendation  Continue with IV abx for now

## 2024-07-09 NOTE — ASSESSMENT & PLAN NOTE
Recent hospitalization from 06/25/2024 through 06/28/2024 for osteomyelitis of the left foot requiring a left 1st metatarsal amputation/resection by Podiatry  Now with a left wound infection and associated cellulitis with probable underlying osteomyelitis  Also found to have maggots in his open wound  I appreciate Infectious Disease's input and Podiatry's input.  Check an MRI of the left foot with contrast per Podiatry's recommendations  Check a MRSA nasal culture and wound culture  Treat with IV vancomycin for now

## 2024-07-09 NOTE — ASSESSMENT & PLAN NOTE
Pt with RT BKA and recent amputation of the LT first metatarsal  Baseline ambulatory dysfunction  Will continue PT for evaluation

## 2024-07-09 NOTE — PHYSICAL THERAPY NOTE
Physical Therapy Evaluation    Patient Name: Art Joseph    Today's Date: 7/9/2024     Problem List  Principal Problem:    Open wound of left foot  Active Problems:    Type 2 diabetes mellitus with foot ulcer, with long-term current use of insulin (HCC)    Acute osteomyelitis of metatarsal bone of left foot (HCC)    Hx of right BKA (HCC)    Ambulatory dysfunction    Primary hypertension    Hyperlipidemia    Maggot infestation       Past Medical History  Past Medical History:   Diagnosis Date    Diabetes mellitus (HCC)     Hypertension         Past Surgical History  Past Surgical History:   Procedure Laterality Date    FOOT AMPUTATION Left 6/27/2024    Procedure: AMPUTATION LEFT FOOT 1st METATARSAL RESECTION;  Surgeon: Myron Bhakta DPM;  [facility];  Service: Podiatry    LEG AMPUTATION THROUGH LOWER TIBIA AND FIBULA Right 7/25/2017    Procedure: AMPUTATION BELOW KNEE (BKA);  Surgeon: Mynor Sanchez MD;  [facility];  Service: General    AK AMPUTATION FOOT TRANSMETARSAL Right 1/26/2017    Procedure: AMPUTATION TRANSMETATARSAL (TMA); OPEN;  Surgeon: Leonard Lomeli DPM;  [facility];  Service: Podiatry    AK AMPUTATION FOOT TRANSMETARSAL Left 4/26/2024    Procedure: LEFT FOOT PARTIAL FIRST RAY AMPUTATION;  Surgeon: Jennifer Rubalcava DPM;  [facility];  Service: Podiatry    AK AMPUTATION METATARSAL W/TOE SINGLE Left 1/30/2017    Procedure: Left partial 1st ray amputation;  Surgeon: Leonard Lomeli DPM;  [facility];  Service: Podiatry    AK COLONOSCOPY FLX DX W/COLLJ SPEC WHEN PFRMD N/A 11/28/2018    Procedure: COLONOSCOPY;  Surgeon: González Napier MD;  [facility]: [facility];  Service: Gastroenterology    WOUND DEBRIDEMENT Right 1/30/2017    Procedure: DEBRIDEMENT FOOT/TOE (WASH OUT) delayed closure, LINDA;  Surgeon: Leonard Lomeli DPM;  [facility];  Service:            07/09/24 1336   PT Last Visit   PT  "Visit Date 07/09/24   Note Type   Note type Evaluation   Pain Assessment   Pain Assessment Tool 0-10   Pain Score No Pain   Restrictions/Precautions   Weight Bearing Precautions Per Order Yes   LLE Weight Bearing Per Order WBAT   Braces or Orthoses   (R prosthetic; L surgical shoe)   Other Precautions Fall Risk;WBS;Chair Alarm;Bed Alarm   Home Living   Type of Home Apartment   Home Layout One level;Ramped entrance   Bathroom Shower/Tub Tub/shower unit   Bathroom Toilet Standard   Bathroom Equipment Shower chair   Bathroom Accessibility Accessible   Home Equipment Walker;Cane;Quad cane   Additional Comments pt reports use of quad cane at baseline   Prior Function   Level of [place] Independent with ADLs;Independent with functional mobility;Needs assistance with IADLS   Lives With Alone   Receives Help From Family   IADLs Family/Friend/Other provides transportation   Falls in the last 6 months 0   Vocational On disability   General   Family/Caregiver Present No   Cognition   Overall Cognitive Status WFL   Arousal/Participation Alert   Orientation Level Oriented X4   Following Commands Follows all commands and directions without difficulty   Subjective   Subjective \"I'll take a winning lottery ticket\"   RLE Assessment   RLE Assessment X  (s/p R BKA; residual limb appears WFL)   LLE Assessment   LLE Assessment X  (WFL at hip and knee; NT at ankle d/t bandaging and dehisced wound)   Bed Mobility   Supine to Sit 5  Supervision   Additional items Increased time required;Verbal cues;HOB elevated;Bedrails   Sit to Supine   (pt OOB at end of session)   Transfers   Sit to Stand 5  Supervision   Additional items Increased time required;Verbal cues   Stand to Sit 5  Supervision   Additional items Increased time required;Verbal cues   Stand pivot 5  Supervision   Additional items Increased time required;Verbal cues   Additional Comments pt utilizing recliner armrests for support   Ambulation/Elevation   Gait pattern Not " appropriate;Not tested   Balance   Static Sitting Good   Dynamic Sitting Good   Static Standing Fair   Dynamic Standing Fair -   Endurance Deficit   Endurance Deficit Yes   Endurance Deficit Description pt appears easily fatigued with mobility   Activity Tolerance   Activity Tolerance Patient limited by fatigue   Assessment   Prognosis Good   Problem List Decreased strength;Decreased endurance;Impaired balance;Decreased mobility;Decreased skin integrity;Orthopedic restrictions   Assessment Patient is a 64 y.o. male evaluated by Physical Therapy s/p admit to [facility] on 7/8/2024 with admitting diagnosis of: Maggot infestation, Wound dehiscence, Visit for wound check, Acute osteomyelitis of metatarsal bone of left foot, Type 2 diabetes mellitus with foot ulcer, with long-term current use of insulin, and principal problem of: Open wound of left foot. PT was consulted to assess patient's functional mobility and discharge needs. Ordered are PT Evaluation and treatment with activity level of: WBAT Left LE. Comorbidities affecting patient's physical performance at time of assessment include:  DM, hx of R BKA, ambulatory dysfunction, HLD, neuropathy . Personal factors affecting the patient at time of IE include: ambulating with assistive device, inability to navigate community distances, inability/difficulty performing IADLs, and inability/difficulty performing ADLs. Please locate objective findings from PT assessment regarding body systems outlined above. Upon evaluation, pt able to perform all functional mobility with SUP, increased time, and occasional verbal cuing. Ambulation deferred this session d/t dehiscence of L foot wound, so pt performed stand pivot transfer from bed to recliner chair; no LOB experienced. HR and SpO2 remained WFL on RA throughout. The patient's AM-PAC Basic Mobility Inpatient Short Form Raw Score is 16. A Raw score of less than or equal to 16 suggests the patient may benefit from  discharge to post-acute rehabilitation services. Please also refer to the recommendation of the Physical Therapist for safe discharge planning. Co treatment with OT secondary to complex medical condition of pt, possible A of 2 required to achieve and maintain transitional movements, requiring the need of skilled therapeutic intervention of 2 therapists to achieve delivery of services. Pt will benefit from continued PT intervention during LOS to address current deficits, increase LOF, and facilitate safe d/c to next level of care when medically appropriate. D/c recommendation at this time is rehabilitation resource intensity level II.   Goals   Patient Goals to go home   LTG Expiration Date 07/23/24   Long Term Goal #1 Pt will participate in B LE strengthening exercises to facilitate improved functional activity tolerance. Pt will perform all functional transfers and bed mobility mod(I) with good safety awareness. Pt will ambulate 50' mod(I) with LRAD while maintaining good functional dynamic balance.   Plan   Treatment/Interventions Functional transfer training;LE strengthening/ROM;Therapeutic exercise;Endurance training;Bed mobility;Gait training   PT Frequency 3-5x/wk   Discharge Recommendation   Rehab Resource Intensity Level, PT II (Moderate Resource Intensity)   AM-PAC Basic Mobility Inpatient   Turning in Flat Bed Without Bedrails 3   Lying on Back to Sitting on Edge of Flat Bed Without Bedrails 3   Moving Bed to Chair 3   Standing Up From Chair Using Arms 3   Walk in Room 2   Climb 3-5 Stairs With Railing 2   Basic Mobility Inpatient Raw Score 16   Basic Mobility Standardized Score 38.32   [facility] Highest Level Of Mobility   -[facility] Goal 5: Stand one or more mins   -HL Achieved 4: Move to chair/commode   End of Consult   Patient Position at End of Consult Bedside chair;Bed/Chair alarm activated;All needs within reach

## 2024-07-09 NOTE — OCCUPATIONAL THERAPY NOTE
Occupational Therapy Evaluation     Patient Name: Art Joseph  Today's Date: 7/9/2024  Problem List  Principal Problem:    Open wound of left foot  Active Problems:    Type 2 diabetes mellitus with foot ulcer, with long-term current use of insulin (HCC)    Acute osteomyelitis of metatarsal bone of left foot (HCC)    Hx of right BKA (HCC)    Ambulatory dysfunction    Primary hypertension    Hypercholesteremia    Maggot infestation    Past Medical History  Past Medical History:   Diagnosis Date    Diabetes mellitus (HCC)     Hypertension      Past Surgical History  Past Surgical History:   Procedure Laterality Date    FOOT AMPUTATION Left 6/27/2024    Procedure: AMPUTATION LEFT FOOT 1st METATARSAL RESECTION;  Surgeon: Myron Bhakta DPM;  [facility];  Service: Podiatry    LEG AMPUTATION THROUGH LOWER TIBIA AND FIBULA Right 7/25/2017    Procedure: AMPUTATION BELOW KNEE (BKA);  Surgeon: Mynor Sanchez MD;  [facility];  Service: General    PA AMPUTATION FOOT TRANSMETARSAL Right 1/26/2017    Procedure: AMPUTATION TRANSMETATARSAL (TMA); OPEN;  Surgeon: Leonard Lomeli DPM;  [facility];  Service: Podiatry    PA AMPUTATION FOOT TRANSMETARSAL Left 4/26/2024    Procedure: LEFT FOOT PARTIAL FIRST RAY AMPUTATION;  Surgeon: Jennifer Rubalcava DPM;  [facility];  Service: Podiatry    PA AMPUTATION METATARSAL W/TOE SINGLE Left 1/30/2017    Procedure: Left partial 1st ray amputation;  Surgeon: Leonard Lomeli DPM;  [facility];  Service: Podiatry    PA COLONOSCOPY FLX DX W/COLLJ SPEC WHEN PFRMD N/A 11/28/2018    Procedure: COLONOSCOPY;  Surgeon: González Napier MD;  [facility];  Service: Gastroenterology    WOUND DEBRIDEMENT Right 1/30/2017    Procedure: DEBRIDEMENT FOOT/TOE (WASH OUT) delayed closure, LINDA;  Surgeon: Leonard Lomeli DPM;  [facility];  Service:              07/09/24 1335   OT Last Visit   OT Visit Date 07/09/24   Note Type   Note type  "Evaluation   Pain Assessment   Pain Score No Pain   Restrictions/Precautions   Weight Bearing Precautions Per Order Yes   LLE Weight Bearing Per Order (S)  WBAT   Braces or Orthoses (S)  Other (Comment)  (R prosthetic; L surgical shoe)   Other Precautions Fall Risk;WBS;Chair Alarm;Bed Alarm   Home Living   Type of Home Apartment   Home Layout One level;Performs ADLs on one level;Able to live on main level with bedroom/bathroom;Ramped entrance   Bathroom Shower/Tub Tub/shower unit   Bathroom Toilet Standard   Bathroom Equipment Shower chair   Bathroom Accessibility Accessible   Home Equipment Walker;Cane;Quad cane   Additional Comments pt reports use of quad cane during functional mobility at baseline   Prior Function   Level of [place] Independent with ADLs;Independent with functional mobility;Needs assistance with IADLS   Lives With Alone   Receives Help From Family   IADLs Family/Friend/Other provides transportation   Falls in the last 6 months 0   Vocational On disability   Comments pt has VNA services currently; pt is to be minimally WB to L LE in surgical shoe for transfers and short distances   Subjective   Subjective \"I might lose part of my foot\"   ADL   Where Assessed Edge of bed   Grooming Assistance 5  Supervision/Setup   Grooming Deficit Setup   UB Bathing Assistance 4  Minimal Assistance   LB Bathing Assistance 4  Minimal Assistance   LB Bathing Deficit Perineal area;Buttocks;Right lower leg including foot;Left lower leg including foot   UB Dressing Assistance 5  Supervision/Setup   LB Dressing Assistance 3  Moderate Assistance   LB Dressing Deficit Don/doff L shoe  ((A) to don L surgical shoe in sitting)   Toileting Assistance  4  Minimal Assistance   Toileting Deficit Perineal hygiene;Clothing management down;Clothing management up;Increased time to complete   Additional Comments Given fxl performance skills + medical complexity, therapist suspecting via clinical judgement + skilled analysis; pt " currently requires stated assist above to perform each area of ADL d/t limitations including: generalized muscle weakness, sitting/standing balance deficits, fxl activity tolerance limitations, difficulty performing bend/reach-anterior trunk flexion, requires external assist to complete transitional movements, decreased core strength, decreased postural control, instability in stance, cognitive deficits, safety awareness concerns, and decreased problem solving abilities.   Bed Mobility   Supine to Sit 5  Supervision   Additional items Increased time required;Verbal cues;Bedrails   Sit to Supine   (seated in chair at end of session)   Additional Comments pt on RA during session; SPO2 WFL with no complaints of SOB   Transfers   Sit to Stand 5  Supervision   Additional items Increased time required;Verbal cues   Stand to Sit 5  Supervision   Additional items Increased time required;Verbal cues   Stand pivot 5  Supervision   Additional items Increased time required;Verbal cues   Additional Comments pt performs functional transfers with (S) to recliner from bed via SPT   Functional Mobility   Additional Comments (S)  pt does not perform functional mobility this sessoin due to the above precautions   Balance   Static Sitting Good   Dynamic Sitting Good   Static Standing Fair   Dynamic Standing Fair -   Activity Tolerance   Activity Tolerance Patient limited by fatigue   RUE Assessment   RUE Assessment WFL   LUE Assessment   LUE Assessment WFL   Hand Function   Gross Motor Coordination Functional   Fine Motor Coordination Functional   Sensation   Light Touch No apparent deficits   Sharp/Dull No apparent deficits   Psychosocial   Psychosocial (WDL) X   Patient Behaviors/Mood Tearful   Cognition   Overall Cognitive Status WFL   Arousal/Participation Alert   Attention Within functional limits   Orientation Level Oriented X4   Memory Within functional limits   Following Commands Follows one step commands without difficulty    Assessment   Limitation Decreased ADL status;Decreased cognition;Decreased UE strength;Decreased Safe judgement during ADL;Decreased endurance;Decreased self-care trans;Decreased high-level ADLs   Assessment Pt is a 64 y.o. male seen for OT evaluation s/p admit to [facility] on [facility]/8/2024 w/ Open wound of left foot.  Comorbidities affecting pt's functional performance at time of assessment include:  DM, osteo of L LE, R BKA, diabetic angiopathy, open wound of L foot, maggot infestation, hypercholestermia, HTN . Personal factors affecting pt at time of IE include:limited home support, behavioral pattern, difficulty performing ADLS, difficulty performing IADLS , limited insight into deficits, compliance, decreased initiation and engagement , and health management . Prior to admission, pt was (I) with ADLs and (A) with IADLs with use of quad cane during mobility. Upon evaluation: Pt requires (S)-mod (A) with use of no device during functional transfers 2* the following deficits impacting occupational performance: weakness, decreased strength, decreased balance, decreased tolerance, impaired initiation, impaired problem solving, decreased safety awareness, and orthopedic restrictions. Pt to benefit from continued skilled OT tx while in the hospital to address deficits as defined above and maximize level of functional independence w ADL's and functional mobility. Occupational Performance areas to address include: grooming, bathing/shower, toilet hygiene, dressing, functional mobility, community mobility, and clothing management. The patient's raw score on the -PAC Daily Activity Inpatient Short Form is 17. A raw score of less than 19 suggests the patient may benefit from discharge to post-acute rehabilitation services. Discharge recommendation at this time is level II moderate resource intensity.  Pt benefited from co-evaluation of skilled OT and PT therapists in order to most appropriately address functional deficits d/t  extensive assistance required for safe functional mobility, decreased activity tolerance, and regression from functioning level prior to admission and/or onset of present illness. OT/PT objectives were addressed separately; please see PT note for specific goal areas targeted.   Goals   Patient Goals to go home   Short Term Goal  pt will perform UE strengthening exercises   Long Term Goal #1 pt will demonstrate UB/LB bathing and grooming tasks at min (A) level   Long Term Goal #2 pt will perform functional mobility with least restrictive device and L LE surgical shoe at min (A) level   Long Term Goal pt will perform toilet transfers and hygiene at (I) level   Plan   Treatment Interventions ADL retraining;Functional transfer training;UE strengthening/ROM;Endurance training;Patient/family training;Equipment evaluation/education;Activityengagement   Goal Expiration Date 07/23/24   OT Frequency 3-5x/wk   Discharge Recommendation   Rehab Resource Intensity Level, OT II (Moderate Resource Intensity)   AM-PAC Daily Activity Inpatient   Lower Body Dressing 2   Bathing 2   Toileting 3   Upper Body Dressing 3   Grooming 3   Eating 4   Daily Activity Raw Score 17   Daily Activity Standardized Score (Calc for Raw Score >=11) 37.26   AM-PAC Applied Cognition Inpatient   Following a Speech/Presentation 4   Understanding Ordinary Conversation 4   Taking Medications 4   Remembering Where Things Are Placed or Put Away 3   Remembering List of 4-5 Errands 3   Taking Care of Complicated Tasks 3   Applied Cognition Raw Score 21   Applied Cognition Standardized Score 44.3

## 2024-07-09 NOTE — UTILIZATION REVIEW
Initial Clinical Review    Admission: Date/Time/Statement:   Admission Orders (From admission, onward)       Ordered        07/08/24 1900  INPATIENT ADMISSION  Once                          Orders Placed This Encounter   Procedures    INPATIENT ADMISSION     Standing Status:   Standing     Number of Occurrences:   1     Order Specific Question:   Level of Care     Answer:   Med Surg [16]     Order Specific Question:   Estimated length of stay     Answer:   More than 2 Midnights     Order Specific Question:   Certification     Answer:   I certify that inpatient services are medically necessary for this patient for a duration of greater than two midnights. See H&P and MD Progress Notes for additional information about the patient's course of treatment.     ED Arrival Information       Expected   -    Arrival   7/8/2024 17:21    Acuity   Urgent              Means of arrival   Ambulance    Escorted by   [facility] Hospitalist    Admission type   Emergency              Arrival complaint   -             Chief Complaint   Patient presents with    Wound Check     Patient had L foot debridement 2 weeks ago, home health nurse had a visit today and noticed maggot in wound.        Initial Presentation: 64 y.o. male presents to ed from home via ems on 7-8-24 for evaluation and treatment of left foot wound.  Visiting nurse notes maggot seen in wound today.  L foot debridement done 2 weeks ago. PMHX: neuropathy of feet, HTN, DM2.  Clinical assessment significant for Left 1st metatarsal amputation site with macerated tissue and multiple areas where maggots are burrowing into tissue. No expanding erythema/induration.    Ultrasound jelly applied to the area and some maggots removed but there are multiple areas where maggots continue to burrow in/out of the site. Initially treated with iv vancomycin, iv cefriaxone, sq insulin, sq lovenox.  Admit to inpatient med surg for maggot infestation of 1st  metatarsal amputation site.       Date: 7-9-24    Day 2: inpatient med surg    Podiatry consult completed. Unfortunately he does not seem to be mounting a great immune response, his incision has basically dehisced again, there were maggots in the central area of the wound which were rinsed out with copious amounts of Betadine. Today,  foot uncovered and maggots were again seen in the central wound visible and open to air. Wound cultures taken.  Plan for repeat MRI. Consider TMA or debridement on 7-11. Continue iv vancomycin, sq insulin sliding scale and local wound care.       Date: 7-10-24   Day 3: Has surpassed a 2nd midnight with active treatments and services.Certification Statement: The patient will continue to require additional inpatient hospital stay due to the need for IV antibiotic treatment and for an MRI of the left foot. Left foot dressing intact.  Culture shows rare gram neg rods, rare gram positive cocci in pairs.  Continue iv vancomycin. Continue to monitor glucose and renal function closely.  Glucose 93> 183 on sq insulin with sliding scale.  BUN/ CR wnl.  Anticipate surgery on 7-11-24.      Date/Time: 07/11/24 1035         Procedure: DEBRIDEMENT FOOT/TOE (WASH OUT) (Left: Foot)   Anesthesia type: Conscious Sedation   Diagnosis:      Maggot infestation [B87.9]      Acute osteomyelitis of metatarsal bone of left foot (HCC) [M86.172]   Pre-op diagnosis:      Maggot infestation [B87.9]      Acute osteomyelitis of metatarsal bone of left foot (HCC) [M86.172]         ED Triage Vitals [07/08/24 1723]   Temperature Pulse Respirations Blood Pressure SpO2 Pain Score   97.9 °F (36.6 °C) 86 20 (!) 172/79 96 % No Pain     Weight (last 2 days)       Date/Time Weight    07/09/24 1156 97.7 (215.39)            Vital Signs (last 3 days)       Date/Time Temp Pulse Resp BP MAP (mmHg) SpO2 O2 Device Pain    07/09/24 14:55:05 98.8 °F (37.1 °C) 85 18 132/67 89 94 % -- --    07/09/24 1336 -- -- -- -- -- -- -- No Pain     07/09/24 1335 -- -- -- -- -- -- -- No Pain    07/09/24 1300 -- -- -- -- -- -- -- No Pain    07/09/24 1245 -- -- -- -- -- -- -- No Pain    07/09/24 1156 -- -- 20 -- -- -- None (Room air) --    07/09/24 11:55:15 97.7 °F (36.5 °C) 82 20 171/95 120 96 % -- --    07/09/24 1100 -- 71 18 118/58 80 95 % None (Room air) --    07/09/24 0900 -- 88 17 158/71 -- 96 % None (Room air) --    07/09/24 0200 -- 72 18 164/75 108 95 % None (Room air) --    07/09/24 0100 -- 71 18 168/110 132 94 % None (Room air) --    07/09/24 0000 -- 71 18 172/79 113 94 % None (Room air) --    07/08/24 2130 -- 82 18 172/88 122 96 % None (Room air) --    07/08/24 1930 -- 76 20 160/79 113 94 % None (Room air) --    07/08/24 1723 97.9 °F (36.6 °C) 86 20 172/79 114 96 % None (Room air) No Pain              Pertinent Labs/Diagnostic Test Results:         Radiology:  XR foot 3+ views LEFT   ED (07/08 1814)   S/p 1st metatarsal amputation. No gas in tissue. No new areas of osteomyelitis.      Final  (07/09 1230)   No obvious radiographic osteomyelitis. Consider MRI for more definitive diagnosis.   Chronic changes, as per the body of the report.   Soft tissue swelling at the medial forefoot.         MRI inpatient order    (Results Pending)     Cardiology:  No orders to display     GI:  No orders to display           Results from last 7 days   Lab Units 07/08/24 2031 07/08/24  1802   WBC Thousand/uL  --  7.97   HEMOGLOBIN g/dL  --  13.4   HEMATOCRIT %  --  40.7   PLATELETS Thousands/uL 322 342   TOTAL NEUT ABS Thousands/µL  --  4.39         Results from last 7 days   Lab Units 07/08/24  1802   SODIUM mmol/L 138   POTASSIUM mmol/L 4.2   CHLORIDE mmol/L 103   CO2 mmol/L 25   ANION GAP mmol/L 10   BUN mg/dL 16   CREATININE mg/dL 0.83   EGFR ml/min/1.73sq m 92   CALCIUM mg/dL 9.3     Results from last 7 days   Lab Units 07/08/24  1802   AST U/L 10*   ALT U/L 8   ALK PHOS U/L 141*   TOTAL PROTEIN g/dL 6.7   ALBUMIN g/dL 3.5   TOTAL BILIRUBIN mg/dL 0.31     Results  from last 7 days   Lab Units 07/09/24  1139 07/09/24  0718 07/08/24  2256   POC GLUCOSE mg/dl 198* 112 146*     Results from last 7 days   Lab Units 07/08/24  1802   GLUCOSE RANDOM mg/dL 173*       Results from last 7 days   Lab Units 07/08/24  1802   PROTIME seconds 12.5   INR  0.94   PTT seconds 26       Results from last 7 days   Lab Units 07/08/24  1802   SED RATE mm/hour 58*       ED Treatment-Medication Administration from 07/08/2024 1721 to 07/09/2024 1149         Date/Time Order Dose Route Action     07/08/2024 2038 vancomycin (VANCOCIN) 1750 mg in sodium chloride 0.9% 500 mL IVPB 1,750 mg Intravenous New Bag     07/08/2024 1945 cefTRIAXone (ROCEPHIN) IVPB (premix in dextrose) 2,000 mg 50 mL 2,000 mg Intravenous New Bag     07/09/2024 0901 aspirin (ECOTRIN LOW STRENGTH) EC tablet 81 mg 81 mg Oral Given     07/09/2024 0903 insulin lispro (HumALOG/ADMELOG) 100 units/mL subcutaneous injection 14 Units 14 Units Subcutaneous Given     07/09/2024 1143 insulin lispro (HumALOG/ADMELOG) 100 units/mL subcutaneous injection 14 Units 14 Units Subcutaneous Given     07/08/2024 2215 insulin glargine (LANTUS) subcutaneous injection 50 Units 0.5 mL 50 Units Subcutaneous Given     07/09/2024 0901 lisinopril (ZESTRIL) tablet 2.5 mg 2.5 mg Oral Given     07/08/2024 2043 enoxaparin (LOVENOX) subcutaneous injection 40 mg 40 mg Subcutaneous Given     07/09/2024 0903 enoxaparin (LOVENOX) subcutaneous injection 40 mg 40 mg Subcutaneous Given     07/09/2024 0610 vancomycin (VANCOCIN) 1250 mg in sodium chloride 0.9% 250 mL IVPB 1,250 mg Intravenous New Bag     07/09/2024 1143 insulin lispro (HumALOG/ADMELOG) 100 units/mL subcutaneous injection 1-6 Units 2 Units Subcutaneous Given            Past Medical History:   Diagnosis Date    Diabetes mellitus (HCC)     Hypertension      Present on Admission:   Acute osteomyelitis of metatarsal bone of left foot (HCC)   Ambulatory dysfunction   Hypercholesteremia   Primary hypertension   Maggot  infestation      Admitting Diagnosis:     Maggot infestation [B87.9]  Wound dehiscence [T81.30XA]  Visit for wound check [Z51.89]  Acute osteomyelitis of metatarsal bone of left foot (HCC) [M86.172]  Type 2 diabetes mellitus with foot ulcer, with long-term current use of insulin (HCC) [E11.621, L97.509, Z79.4]    Age/Sex: 64 y.o. male    Scheduled Medications:    aspirin, 81 mg, Oral, Daily  atorvastatin, 80 mg, Oral, Daily With Dinner  Dakins (full strength), 1 Application, Irrigation, Daily  enoxaparin, 40 mg, Subcutaneous, Daily  insulin glargine, 50 Units, Subcutaneous, HS  insulin lispro, 1-6 Units, Subcutaneous, 4x Daily (with meals and at bedtime)  insulin lispro, 14 Units, Subcutaneous, TID With Meals  lisinopril, 2.5 mg, Oral, Daily  vancomycin, 1,250 mg, Intravenous, Q12H      Continuous IV Infusions:     PRN Meds:  acetaminophen, 650 mg, Oral, Q6H PRN  ondansetron, 4 mg, Intravenous, Q6H PRN        IP CONSULT TO PODIATRY  IP CONSULT TO PODIATRY  IP CONSULT TO PHARMACY  IP CONSULT TO INFECTIOUS DISEASES    Network Utilization Review Department  ATTENTION: Please call with any questions or concerns to [phone] and carefully listen to the prompts so that you are directed to the right person. All voicemails are confidential.   For Discharge needs, contact Care Management DC Support Team at [phone] opt. [phone]  Send all requests for admission clinical reviews, approved or denied determinations and any other requests to dedicated fax number below belonging to the campus where the patient is receiving treatment. List of dedicated fax numbers for the Facilities:  FACILITY NAME UR FAX NUMBER   ADMISSION DENIALS (Administrative/Medical Necessity) [phone]   DISCHARGE SUPPORT TEAM (NETWORK) [phone]   PARENT CHILD HEALTH (Maternity/NICU/Pediatrics) [phone]   [facility] [phone]   [facility] [phone]   [facility]  [place] [phone]   [facility] [phone]   [facility] [phone]   [facility] [phone]   [facility] [phone]   [facility] [phone]   [facility] [phone]   [facility] [phone]   [facility] [phone]   [facility] [place] [phone]

## 2024-07-09 NOTE — PLAN OF CARE
Problem: METABOLIC, FLUID AND ELECTROLYTES - ADULT  Goal: Electrolytes maintained within normal limits  Description: INTERVENTIONS:  - Monitor labs and assess patient for signs and symptoms of electrolyte imbalances  - Administer electrolyte replacement as ordered  - Monitor response to electrolyte replacements, including repeat lab results as appropriate  - Instruct patient on fluid and nutrition as appropriate  Outcome: Progressing     Problem: SKIN/TISSUE INTEGRITY - ADULT  Goal: Skin Integrity remains intact(Skin Breakdown Prevention)  Description: Assess:  -Perform Zaid assessment every 4 hrs  -Clean and moisturize skin every 4 hrs  -Inspect skin when repositioning, toileting, and assisting with ADLS  -Assess under medical devices such as mosimo every 4 hrs  -Assess extremities for adequate circulation and sensation     Bed Management:  -Have minimal linens on bed & keep smooth, unwrinkled  -Change linens as needed when moist or perspiring  -Avoid sitting or lying in one position for more than 4 hours while in bed  -Keep HOB at 30degrees     Toileting:  -Offer bedside commode  -Assess for incontinence every 4 hrs  -Use incontinent care products after each incontinent episode such as lotion and spray    Activity:  -Mobilize patient 3 times a day  -Encourage activity and walks on unit  -Encourage or provide ROM exercises   -Turn and reposition patient every 4 Hours  -Use appropriate equipment to lift or move patient in bed  -Instruct/ Assist with weight shifting every 4hrs when out of bed in chair  -Consider limitation of chair time 2 hour intervals    Skin Care:  -Avoid use of baby powder, tape, friction and shearing, hot water or constrictive clothing  -Relieve pressure over bony prominences using lotion  -Do not massage red bony areas    Next Steps:  -Teach patient strategies to minimize risks such as getting oob   -Consider consults to  interdisciplinary teams such as pt/ot  Outcome: Progressing  Goal:  Incision(s), wounds(s) or drain site(s) healing without S/S of infection  Description: INTERVENTIONS  - Assess and document dressing, incision, wound bed, drain sites and surrounding tissue  - Provide patient and family education  - Perform skin care/dressing changes every 4 hrs  Outcome: Progressing     Problem: MUSCULOSKELETAL - ADULT  Goal: Maintain or return mobility to safest level of function  Description: INTERVENTIONS:  - Assess patient's ability to carry out ADLs; assess patient's baseline for ADL function and identify physical deficits which impact ability to perform ADLs (bathing, care of mouth/teeth, toileting, grooming, dressing, etc.)  - Assess/evaluate cause of self-care deficits   - Assess range of motion  - Assess patient's mobility  - Assess patient's need for assistive devices and provide as appropriate  - Encourage maximum independence but intervene and supervise when necessary  - Involve family in performance of ADLs  - Assess for home care needs following discharge   - Consider OT consult to assist with ADL evaluation and planning for discharge  - Provide patient education as appropriate  Outcome: Progressing  Goal: Maintain proper alignment of affected body part  Description: INTERVENTIONS:  - Support, maintain and protect limb and body alignment  - Provide patient/ family with appropriate education  Outcome: Progressing     Problem: PAIN - ADULT  Goal: Verbalizes/displays adequate comfort level or baseline comfort level  Description: Interventions:  - Encourage patient to monitor pain and request assistance  - Assess pain using appropriate pain scale  - Administer analgesics based on type and severity of pain and evaluate response  - Implement non-pharmacological measures as appropriate and evaluate response  - Consider cultural and social influences on pain and pain management  - Notify physician/advanced practitioner if interventions unsuccessful or patient reports new pain  Outcome:  Progressing     Problem: INFECTION - ADULT  Goal: Absence or prevention of progression during hospitalization  Description: INTERVENTIONS:  - Assess and monitor for signs and symptoms of infection  - Monitor lab/diagnostic results  - Monitor all insertion sites, i.e. indwelling lines, tubes, and drains  - Monitor endotracheal if appropriate and nasal secretions for changes in amount and color  - [place] appropriate cooling/warming therapies per order  - Administer medications as ordered  - Instruct and encourage patient and family to use good hand hygiene technique  - Identify and instruct in appropriate isolation precautions for identified infection/condition  Outcome: Progressing     Problem: SAFETY ADULT  Goal: Patient will remain free of falls  Description: INTERVENTIONS:  - Educate patient/family on patient safety including physical limitations  - Instruct patient to call for assistance with activity   - Consult OT/PT to assist with strengthening/mobility   - Keep Call bell within reach  - Keep bed low and locked with side rails adjusted as appropriate  - Keep care items and personal belongings within reach  - Initiate and maintain comfort rounds  - Make Fall Risk Sign visible to staff  - Offer Toileting every 2 Hours, in advance of need  - Initiate/Maintain bed/chair alarm  - Obtain necessary fall risk management equipment: alarms  - Apply yellow socks and bracelet for high fall risk patients  - Consider moving patient to room near nurses station  Outcome: Progressing     Problem: DISCHARGE PLANNING  Goal: Discharge to home or other facility with appropriate resources  Description: INTERVENTIONS:  - Identify barriers to discharge w/patient and caregiver  - Arrange for needed discharge resources and transportation as appropriate  - Identify discharge learning needs (meds, wound care, etc.)  - Arrange for interpretive services to assist at discharge as needed  - Refer to Case Management Department for  coordinating discharge planning if the patient needs post-hospital services based on physician/advanced practitioner order or complex needs related to functional status, cognitive ability, or social support system  Outcome: Progressing     Problem: Knowledge Deficit  Goal: Patient/family/caregiver demonstrates understanding of disease process, treatment plan, medications, and discharge instructions  Description: Complete learning assessment and assess knowledge base.  Interventions:  - Provide teaching at level of understanding  - Provide teaching via preferred learning methods  Outcome: Progressing

## 2024-07-09 NOTE — PLAN OF CARE
Problem: PHYSICAL THERAPY ADULT  Goal: Performs mobility at highest level of function for planned discharge setting.  See evaluation for individualized goals.  Description: Treatment/Interventions: Functional transfer training, LE strengthening/ROM, Therapeutic exercise, Endurance training, Bed mobility, Gait training          See flowsheet documentation for full assessment, interventions and recommendations.  Note: Prognosis: Good  Problem List: Decreased strength, Decreased endurance, Impaired balance, Decreased mobility, Decreased skin integrity, Orthopedic restrictions  Assessment: Patient is a 64 y.o. male evaluated by Physical Therapy s/p admit to [facility] on 7/8/2024 with admitting diagnosis of: Maggot infestation, Wound dehiscence, Visit for wound check, Acute osteomyelitis of metatarsal bone of left foot, Type 2 diabetes mellitus with foot ulcer, with long-term current use of insulin, and principal problem of: Open wound of left foot. PT was consulted to assess patient's functional mobility and discharge needs. Ordered are PT Evaluation and treatment with activity level of: WBAT Left LE. Comorbidities affecting patient's physical performance at time of assessment include:  DM, hx of R BKA, ambulatory dysfunction, HLD, neuropathy . Personal factors affecting the patient at time of IE include: ambulating with assistive device, inability to navigate community distances, inability/difficulty performing IADLs, and inability/difficulty performing ADLs. Please locate objective findings from PT assessment regarding body systems outlined above. Upon evaluation, pt able to perform all functional mobility with SUP, increased time, and occasional verbal cuing. Ambulation deferred this session d/t dehiscence of L foot wound, so pt performed stand pivot transfer from bed to recliner chair; no LOB experienced. HR and SpO2 remained WFL on RA throughout. The patient's AM-PAC Basic Mobility Inpatient Short  Form Raw Score is 16. A Raw score of less than or equal to 16 suggests the patient may benefit from discharge to post-acute rehabilitation services. Please also refer to the recommendation of the Physical Therapist for safe discharge planning. Co treatment with OT secondary to complex medical condition of pt, possible A of 2 required to achieve and maintain transitional movements, requiring the need of skilled therapeutic intervention of 2 therapists to achieve delivery of services. Pt will benefit from continued PT intervention during LOS to address current deficits, increase LOF, and facilitate safe d/c to next level of care when medically appropriate. D/c recommendation at this time is rehabilitation resource intensity level II.        Rehab Resource Intensity Level, PT: II (Moderate Resource Intensity)    See flowsheet documentation for full assessment.

## 2024-07-09 NOTE — PLAN OF CARE
Problem: OCCUPATIONAL THERAPY ADULT  Goal: Performs self-care activities at highest level of function for planned discharge setting.  See evaluation for individualized goals.  Description: Treatment Interventions: ADL retraining, Functional transfer training, UE strengthening/ROM, Endurance training, Patient/family training, Equipment evaluation/education, Activityengagement          See flowsheet documentation for full assessment, interventions and recommendations.   Note: Limitation: Decreased ADL status, Decreased cognition, Decreased UE strength, Decreased Safe judgement during ADL, Decreased endurance, Decreased self-care trans, Decreased high-level ADLs     Assessment: Pt is a 64 y.o. male seen for OT evaluation s/p admit to [facility] on 7/8/2024 w/ Open wound of left foot.  Comorbidities affecting pt's functional performance at time of assessment include:  DM, osteo of L LE, R BKA, diabetic angiopathy, open wound of L foot, maggot infestation, hypercholestermia, HTN . Personal factors affecting pt at time of IE include:limited home support, behavioral pattern, difficulty performing ADLS, difficulty performing IADLS , limited insight into deficits, compliance, decreased initiation and engagement , and health management . Prior to admission, pt was (I) with ADLs and (A) with IADLs with use of quad cane during mobility. Upon evaluation: Pt requires (S)-mod (A) with use of no device during functional transfers 2* the following deficits impacting occupational performance: weakness, decreased strength, decreased balance, decreased tolerance, impaired initiation, impaired problem solving, decreased safety awareness, and orthopedic restrictions. Pt to benefit from continued skilled OT tx while in the hospital to address deficits as defined above and maximize level of functional independence w ADL's and functional mobility. Occupational Performance areas to address include: grooming, bathing/shower, toilet hygiene,  dressing, functional mobility, community mobility, and clothing management. The patient's raw score on the -PAC Daily Activity Inpatient Short Form is 17. A raw score of less than 19 suggests the patient may benefit from discharge to post-acute rehabilitation services. Discharge recommendation at this time is level II moderate resource intensity.  Pt benefited from co-evaluation of skilled OT and PT therapists in order to most appropriately address functional deficits d/t extensive assistance required for safe functional mobility, decreased activity tolerance, and regression from functioning level prior to admission and/or onset of present illness. OT/PT objectives were addressed separately; please see PT note for specific goal areas targeted.     Rehab Resource Intensity Level, OT: II (Moderate Resource Intensity)

## 2024-07-09 NOTE — ASSESSMENT & PLAN NOTE
Lab Results   Component Value Date    HGBA1C 9.7 (H) 06/25/2024       Recent Labs     07/08/24  2256 07/09/24  0718 07/09/24  1139   POCGLU 146* 112 198*       Blood Sugar Average: Last 72 hrs:  (P) 152    Continue Lantus 50 Units SQ QHS and Humalog 14 Units TID with meals  ISS with blood glucose monitoring ACHS  Continue PO lisinopril for renal protection  Hypoglycemia protocol  Outpatient Endocrinology evaluation

## 2024-07-09 NOTE — ASSESSMENT & PLAN NOTE
Pt sent for wound evaluation by the visiting nurse after noting maggot in wound after dressing change.   In the ED, ultrasound jelly was applied to the area and some maggots removed but there are multiple areas where maggots continue to burrow in/out of the site.  LT foot Xray showing no subcutaneous gas.   Pt had no constitutional symptoms.   WBC was wnl, esr elevated at 58 but similar to recent of 53 about 2 wks ago  Pt started on IV abx with Ancef and Vancomycin  Recently completed course of doxycycline 7/7/24  MRSA screen positive  Will continue on IV Vancomycin for now  Will consult podiatry team for possible debridement in the AM

## 2024-07-09 NOTE — WOUND OSTOMY CARE
Patient was seen today by podiatry for the Left foot wound . Notes reflect likely underlying osteomyelitis wit Maggots of the central area of the left foot wound . Podiatry plan for OR debridement on 7/11 and orders placed for Dakins dressings 2 times per day . Wound care will sign off due to podiatry managing and orders in place . Jenn Ward RN BSN CWOCN

## 2024-07-09 NOTE — CONSULTS
[facility] - Consultation    Patient Information:   Art Joseph 64 y.o. male MRN: [id number]  Unit/Bed#: 424-01 Encounter: [id number]  PCP: Kerry Christine MD  Date of Admission:  7/8/2024  Date of Consultation: 07/09/24  Requesting Physician: Jorge Thurston MD      ASSESSMENT:    Art Joseph is a 64 y.o. male with:    Cellulitis left foot  Likely underlying osteomyelitis left foot  Uncontrolled diabetes    PLAN:    Unfortunately he does not seem to be mounting a great immune response, his incision has basically dehisced again, there were maggots in the central area of the wound which were rinsed out with copious amounts of Betadine  Upon examination today in the hospital hospital, the foot was uncovered and the maggots were sitting in the central of the wound visible and open to air.  Wound cultures aerobic and anaerobic taken from wound site  X-rays show no acute changes but due to the poor operative outcomes that he has experienced I am highly concerned for things that are not visible on x-ray at this point.  Will obtain a repeat MRI but the chances of him needing a TMA are very high, will plan for repeat OR debridement on 7/11 pending MRI  He will need Dakin's twice daily dressing changes per wound care for now  Rest of care per primary team.      SUBJECTIVE    History of Present Illness:    Art Joseph is a 64 y.o. male with past medical history of uncontrolled diabetes who presents with new onset foot infection left foot with maggot infestation    Review of Systems:    Constitutional: Negative.    HENT: Negative.    Eyes: Negative.    Respiratory: Negative.    Cardiovascular: Negative.    Gastrointestinal: Negative.    Musculoskeletal: neg    Skin: as above   Neurological: numbness   Psych: Negative.     Past Medical and Surgical History:     Past Medical History:   Diagnosis Date    Diabetes mellitus (HCC)     Hypertension        Past Surgical History:   Procedure Laterality Date     FOOT AMPUTATION Left 6/27/2024    Procedure: AMPUTATION LEFT FOOT 1st METATARSAL RESECTION;  Surgeon: Myron Bhakta DPM;  [facility];  Service: Podiatry    LEG AMPUTATION THROUGH LOWER TIBIA AND FIBULA Right 7/25/2017    Procedure: AMPUTATION BELOW KNEE (BKA);  Surgeon: Mynor Sanchez MD;  [facility];  Service: General    VT AMPUTATION FOOT TRANSMETARSAL Right 1/26/2017    Procedure: AMPUTATION TRANSMETATARSAL (TMA); OPEN;  Surgeon: Leonard Lomeli DPM;  [facility];  Service: Podiatry    VT AMPUTATION FOOT TRANSMETARSAL Left 4/26/2024    Procedure: LEFT FOOT PARTIAL FIRST RAY AMPUTATION;  Surgeon: Jennifer Rubalcava DPM;  [facility];  Service: Podiatry    VT AMPUTATION METATARSAL W/TOE SINGLE Left 1/30/2017    Procedure: Left partial 1st ray amputation;  Surgeon: Leonard Lomeli DPM;  [facility];  Service: Podiatry    VT COLONOSCOPY FLX DX W/COLLJ SPEC WHEN PFRMD N/A 11/28/2018    Procedure: COLONOSCOPY;  Surgeon: González Napier MD;  [facility];  Service: Gastroenterology    WOUND DEBRIDEMENT Right 1/30/2017    Procedure: DEBRIDEMENT FOOT/TOE (WASH OUT) delayed closure, LINDA;  Surgeon: Leonard Lomeli DPM;  [facility];  Service:        Meds/Allergies:      Medications Prior to Admission:     aspirin (ECOTRIN LOW STRENGTH) 81 mg EC tablet    atorvastatin (LIPITOR) 80 mg tablet    insulin aspart (NovoLOG FlexPen) 100 UNIT/ML injection pen    Insulin Glargine Solostar (Basaglar KwikPen) 100 UNIT/ML SOPN    Insulin Pen Needle 30G X 8 MM MISC    lisinopril (ZESTRIL) 2.5 mg tablet    No Known Allergies    Social History:     Marital Status: Single    Substance Use History:   Social History     Substance and Sexual Activity   Alcohol Use Yes    Alcohol/week: 11.0 standard drinks of alcohol    Types: 6 Cans of beer, 5 Shots of liquor per week    Comment: social ; occasional use as per [employer]     Social History     Tobacco Use   Smoking Status Former     "Types: Cigarettes   Smokeless Tobacco Never   Tobacco Comments    quit 9 years ago     Social History     Substance and Sexual Activity   Drug Use No       Family History:    Family History   Problem Relation Age of Onset    Diabetes Mother          OBJECTIVE:    Vitals:   Blood Pressure: (!) 171/95 (07/09/24 1155)  Pulse: 82 (07/09/24 1155)  Temperature: 97.7 °F (36.5 °C) (07/09/24 1155)  Temp Source: Oral (07/09/24 1156)  Respirations: 20 (07/09/24 1156)  Height: 5' 10\" (177.8 cm) (07/09/24 1156)  Weight - Scale: 97.7 kg (215 lb 6.2 oz) (07/09/24 1156)  SpO2: 96 % (07/09/24 1155)    Physical Exam:     General Appearance: Alert, cooperative, no distress.  HEENT: Head normocephalic, atraumatic, without obvious abnormality.  Heart: Normal rate and rhythm.  Lungs: Non-labored breathing. No respiratory distress.  Abdomen: Without distension.  Psychiatric: AAOx3  Lower Extremity:  Vascular:   There is total dehiscence again of the left medial foot incision with maggots centrally in the wound, but is not necessarily palpable but there is straw-colored drainage and significant soft tissue swelling to the periwound and nonblanchable erythema along the periwound.  Additional Data:     Lab Results: I have personally reviewed pertinent labs including:    Results from last 7 days   Lab Units 07/08/24  2031 07/08/24  1802   WBC Thousand/uL  --  7.97   HEMOGLOBIN g/dL  --  13.4   HEMATOCRIT %  --  40.7   PLATELETS Thousands/uL 322 342   SEGS PCT %  --  56   LYMPHO PCT %  --  33   MONO PCT %  --  6   EOS PCT %  --  4     Results from last 7 days   Lab Units 07/08/24  1802   POTASSIUM mmol/L 4.2   CHLORIDE mmol/L 103   CO2 mmol/L 25   BUN mg/dL 16   CREATININE mg/dL 0.83   CALCIUM mg/dL 9.3   ALK PHOS U/L 141*   ALT U/L 8   AST U/L 10*     Results from last 7 days   Lab Units 07/08/24  1802   INR  0.94       Cultures: I have personally reviewed pertinent cultures including:              Imaging: I have personally reviewed " "pertinent films in PACS.  EKG, Pathology, and Other Studies: I have personally reviewed pertinent reports.        ** Please Note: Portions of the record may have been created with voice recognition software. Occasional wrong word or \"sound a like\" substitutions may have occurred due to the inherent limitations of voice recognition software. Read the chart carefully and recognize, using context, where substitutions have occurred. **    "

## 2024-07-09 NOTE — H&P
"[facility]  H and P  Name: Art Joseph I  MRN: [id number]  Unit/Bed#: RM14 I Date of Admission: 7/8/2024   Date of Service: 7/8/2024 I Hospital Day: 0    Assessment & Plan   * Maggot infestation  Assessment & Plan  Pt sent for wound evaluation by the visiting nurse after noting maggot in wound after dressing change.   In the ED, ultrasound jelly was applied to the area and some maggots removed but there are multiple areas where maggots continue to burrow in/out of the site.  LT foot Xray showing no subcutaneous gas.   Pt had no constitutional symptoms.   WBC was wnl, esr elevated at 58 but similar to recent of 53 about 2 wks ago  Pt started on IV abx with Ancef and Vancomycin  Recently completed course of doxycycline 7/7/24  MRSA screen positive  Will continue on IV Vancomycin for now  Will consult podiatry team for possible debridement in the AM      Acute osteomyelitis of metatarsal bone of left foot (HCC)  Assessment & Plan  S/p amputation of the LT first metatarsal on 6/27  Now with wound with maggot infestation  Xray showing no subcutaneous gas or new areas of osteomyelitis  Will follow up on podiatry recommendation  Continue with IV abx for now    Type 2 diabetes mellitus with foot ulcer, with long-term current use of insulin ([facility])  Assessment & Plan  Lab Results   Component Value Date    HGBA1C 9.7 (H) 06/25/2024       No results for input(s): \"POCGLU\" in the last 72 hours.    Blood Sugar Average: Last 72 hrs:    Poorly controlled.   Continue home dose of insulin  Pt on Lantus 50 units qhs and lispro 14 units with meals  Continue SSI and accucheks achs      Primary hypertension  Assessment & Plan  On Zestril 2.5 mg daily    Hyperlipidemia  Assessment & Plan  Continue with Atorvastatin 80 mg qhs    Ambulatory dysfunction  Assessment & Plan  Pt with RT BKA and recent amputation of the LT first metatarsal  Baseline ambulatory dysfunction  Will continue PT for evaluation    Hx of right " BKA ([facility])  Assessment & Plan  PT evaluation           History of Present Illness     HPI:  Art Joseph is a 64 y.o. male with history of poorly controlled DM with recent hospitalization for LT foot DM ulcer and osteomyelitis s/p amputation of the LT first metatarsal on 6/27 and has been on outpatient doxycycline that was just completed yesterday. He presents to the ED for evaluation of his wound. He states the visiting nurse came today and noted he has maggot in his wound after dressing change. Pt states he himself had not noticed anything. He denies any fevers or chills. He has no sensation on his feet. No malaise or loss of appetite. No generalized body aches. In the ED, ultrasound jelly was applied to the LT 1st metatarsal amputation site and it was reported that some maggots were removed as well as noting multiple areas where maggots continue to burrow in and out. Foot Xray showing no signs of subcutaneous gas or new areas of osteomyelitis. He wbc was wnl, esr 58, vitals remained stable. He has been started on IV abx and podiatry consulted.     Historical Information   Past Medical History:   Diagnosis Date    Diabetes mellitus ([facility])     Hypertension      Patient Active Problem List   Diagnosis    Type 2 diabetes mellitus with foot ulcer, with long-term current use of insulin ([facility])    Diabetic neuropathy ([facility])    Acute osteomyelitis of metatarsal bone of left foot ([facility])    Hx of right BKA ([facility])    Ambulatory dysfunction    Primary hypertension    Hyperlipidemia    Vitamin D deficiency    Diabetic peripheral angiopathy ([facility])    Class 1 obesity in adult    Cellulitis of left lower extremity    Candidiasis, intertrigo    Elevated platelet count    Maggot infestation     Past Surgical History:   Procedure Laterality Date    FOOT AMPUTATION Left 6/27/2024    Procedure: AMPUTATION LEFT FOOT 1st METATARSAL RESECTION;  Surgeon: Myron Bhakta DPM;  [facility];  Service: Podiatry    LEG  AMPUTATION THROUGH LOWER TIBIA AND FIBULA Right 7/25/2017    Procedure: AMPUTATION BELOW KNEE (BKA);  Surgeon: Mynor Sanchez MD;  [facility];  Service: General    VA AMPUTATION FOOT TRANSMETARSAL Right 1/26/2017    Procedure: AMPUTATION TRANSMETATARSAL (TMA); OPEN;  Surgeon: Leonard Lomeli DPM;  [facility];  Service: Podiatry    VA AMPUTATION FOOT TRANSMETARSAL Left 4/26/2024    Procedure: LEFT FOOT PARTIAL FIRST RAY AMPUTATION;  Surgeon: Jennifer Rubalcava DPM;  [facility];  Service: Podiatry    VA AMPUTATION METATARSAL W/TOE SINGLE Left 1/30/2017    Procedure: Left partial 1st ray amputation;  Surgeon: Leonard Lomeli DPM;  [facility];  Service: Podiatry    VA COLONOSCOPY FLX DX W/COLLJ SPEC WHEN PFRMD N/A 11/28/2018    Procedure: COLONOSCOPY;  Surgeon: González Napier MD;  [facility];  Service: Gastroenterology    WOUND DEBRIDEMENT Right 1/30/2017    Procedure: DEBRIDEMENT FOOT/TOE (WASH OUT) delayed closure, LINDA;  Surgeon: Leonard Lomeli DPM;  [facility];  Service:        Social History   Social History     Substance and Sexual Activity   Alcohol Use Yes    Alcohol/week: 11.0 standard drinks of alcohol    Types: 6 Cans of beer, 5 Shots of liquor per week    Comment: social ; occasional use as per [employer]     Social History     Substance and Sexual Activity   Drug Use No     Social History     Tobacco Use   Smoking Status Former    Types: Cigarettes   Smokeless Tobacco Never   Tobacco Comments    quit 9 years ago       Family History:   Family History   Problem Relation Age of Onset    Diabetes Mother        Meds/Allergies       Current Facility-Administered Medications:     acetaminophen (TYLENOL) tablet 650 mg, 650 mg, Oral, Q6H PRN, Fatemeh Reid MD    [START ON 7/9/2024] aspirin (ECOTRIN LOW STRENGTH) EC tablet 81 mg, 81 mg, Oral, Daily, Fatemeh Reid MD    [START ON 7/9/2024] atorvastatin (LIPITOR) tablet 80 mg, 80 mg, Oral, Daily With Dinner,  Fatemeh Reid MD    enoxaparin (LOVENOX) subcutaneous injection 40 mg, 40 mg, Subcutaneous, Daily, Fatemeh Reid MD, 40 mg at 07/08/24 2043    insulin glargine (LANTUS) subcutaneous injection 50 Units 0.5 mL, 50 Units, Subcutaneous, HS, Fatemeh Reid MD, 50 Units at 07/08/24 2215    insulin lispro (HumALOG/ADMELOG) 100 units/mL subcutaneous injection 1-6 Units, 1-6 Units, Subcutaneous, 4x Daily (with meals and at bedtime) **AND** [START ON 7/9/2024] Fingerstick Glucose (POCT), , , 4x Daily AC and at bedtime, Fatemeh Reid MD    [START ON 7/9/2024] insulin lispro (HumALOG/ADMELOG) 100 units/mL subcutaneous injection 14 Units, 14 Units, Subcutaneous, TID With Meals, Fatemeh Reid MD    [START ON 7/9/2024] lisinopril (ZESTRIL) tablet 2.5 mg, 2.5 mg, Oral, Daily, Fatemeh Reid MD    ondansetron (ZOFRAN) injection 4 mg, 4 mg, Intravenous, Q6H PRN, Fatemeh Reid MD    [START ON 7/9/2024] vancomycin (VANCOCIN) 1250 mg in sodium chloride 0.9% 250 mL IVPB, 1,250 mg, Intravenous, Q12H, Fatemeh Reid MD    Current Outpatient Medications:     aspirin (ECOTRIN LOW STRENGTH) 81 mg EC tablet, Take 81 mg by mouth daily Resume on 8/11/17 , Disp: , Rfl:     atorvastatin (LIPITOR) 80 mg tablet, Take 1 tablet (80 mg total) by mouth daily, Disp: 90 tablet, Rfl: 3    insulin aspart (NovoLOG FlexPen) 100 UNIT/ML injection pen, E11.65 inject 14 units before meals plus scale. TDD 75 units, Disp: , Rfl:     Insulin Glargine Solostar (Basaglar KwikPen) 100 UNIT/ML SOPN, E11.65 inject 50 units daily at bedtime, Disp: 15 mL, Rfl: 1    Insulin Pen Needle 30G X 8 MM MISC, Inject under the skin daily at bedtime, Disp: 100 each, Rfl: 5    lisinopril (ZESTRIL) 2.5 mg tablet, Take 1 tablet (2.5 mg total) by mouth daily, Disp: 90 tablet, Rfl: 3    No Known Allergies    Review of Systems  A detailed 12 point review of systems was conducted and is negative apart from those mentioned in the  HPI.        Objective   Vitals: Blood pressure (!) 172/88, pulse 82, temperature 97.9 °F (36.6 °C), temperature source Temporal, resp. rate 18, SpO2 96%.    Physical Exam   General- Awake and alert, NAD  HEENT: PERRLA, EOMI, sclera anicteric, moist mucous membranes, tongue mucosa without lesions.  Neck: supple, no JVD, lymphadenopathy, thyromegaly.  Heart: Regular rate and rhythm, S1S2 present.  No murmur, rub or gallop.    Lungs; Clear to auscultation bilaterally.  No wheezing, crackles or rhonchi.  No accessory muscle use or respiratory distress.  Abdomen: soft, non-tender, non-distended, NABS.  No guarding or rebound. No peritoneal sound or mass.  Extremities: RT BKA, noted with chronic venous stasis changes on the left LE with some edema. There is noted a large wound at the site of recent 1st metatarsal with some macerated base. No purulent discharge noted. Area with surrounding chronic appearing erythema but no undue warmth or tenderness.   Neurologic:  Cranial nerves II-XII intact. Speech fluent and goal directed.  Awake, alert and oriented x 3.  Skin: warm and dry and as above.     Lab Results:   Results from last 7 days   Lab Units 07/08/24  2031 07/08/24  1802   WBC Thousand/uL  --  7.97   HEMOGLOBIN g/dL  --  13.4   HEMATOCRIT %  --  40.7   PLATELETS Thousands/uL 322 342     Results from last 7 days   Lab Units 07/08/24  1802   POTASSIUM mmol/L 4.2   CHLORIDE mmol/L 103   CO2 mmol/L 25   BUN mg/dL 16   CREATININE mg/dL 0.83   CALCIUM mg/dL 9.3         Imaging:  XR foot 3+ views LEFT   ED Interpretation by Marlo Ellison MD (07/08 1814)   S/p 1st metatarsal amputation. No gas in tissue. No new areas of osteomyelitis.           EKG, Pathology, and Other Studies:  Indications / Diagnosis:  Pre op  Patient location:  ED  Previous ECG:    Comparison to cardiac monitor: Yes    Interpretation:    Interpretation: normal    Rate:    ECG rate:  86    ECG rate assessment: normal    Rhythm:    Rhythm: sinus rhythm   "  Ectopy:    Ectopy: none    QRS:    QRS axis:  Normal    QRS intervals:  Normal  Conduction:    Conduction: normal    ST segments:    ST segments:  Normal  T waves:    T waves: normal    Comments:     , QRS 82, QT//461; no acute ischemic changes.      Code Status: Level 1 - Full Code      Counseling / Coordination of Care  Total floor / unit time spent today 70 minutes.  Greater than 50% of total time was spent with the patient and / or family counseling and / or coordination of care.          Portions of the record may have been created with voice recognition software. Occasional wrong word or \"sound a like\" substitutions may have occurred due to the inherent limitations of voice recognition software. Read the chart carefully and recognize, using context, where substitutions have occurred.    "

## 2024-07-10 ENCOUNTER — APPOINTMENT (INPATIENT)
Dept: MRI IMAGING | Facility: HOSPITAL | Age: 64
DRG: 464 | End: 2024-07-10
Payer: COMMERCIAL

## 2024-07-10 ENCOUNTER — ANESTHESIA EVENT (INPATIENT)
Dept: PERIOP | Facility: HOSPITAL | Age: 64
DRG: 464 | End: 2024-07-10
Payer: COMMERCIAL

## 2024-07-10 LAB
ALBUMIN SERPL BCG-MCNC: 3.3 G/DL (ref 3.5–5)
ALP SERPL-CCNC: 124 U/L (ref 34–104)
ALT SERPL W P-5'-P-CCNC: 7 U/L (ref 7–52)
ANION GAP SERPL CALCULATED.3IONS-SCNC: 9 MMOL/L (ref 4–13)
AST SERPL W P-5'-P-CCNC: 9 U/L (ref 13–39)
BASOPHILS # BLD AUTO: 0.07 THOUSANDS/ÂΜL (ref 0–0.1)
BASOPHILS NFR BLD AUTO: 1 % (ref 0–1)
BILIRUB SERPL-MCNC: 0.38 MG/DL (ref 0.2–1)
BUN SERPL-MCNC: 12 MG/DL (ref 5–25)
CALCIUM ALBUM COR SERPL-MCNC: 9.6 MG/DL (ref 8.3–10.1)
CALCIUM SERPL-MCNC: 9 MG/DL (ref 8.4–10.2)
CHLORIDE SERPL-SCNC: 105 MMOL/L (ref 96–108)
CK SERPL-CCNC: 23 U/L (ref 39–308)
CO2 SERPL-SCNC: 24 MMOL/L (ref 21–32)
CREAT SERPL-MCNC: 0.79 MG/DL (ref 0.6–1.3)
EOSINOPHIL # BLD AUTO: 0.28 THOUSAND/ÂΜL (ref 0–0.61)
EOSINOPHIL NFR BLD AUTO: 4 % (ref 0–6)
ERYTHROCYTE [DISTWIDTH] IN BLOOD BY AUTOMATED COUNT: 13.7 % (ref 11.6–15.1)
GFR SERPL CREATININE-BSD FRML MDRD: 94 ML/MIN/1.73SQ M
GLUCOSE SERPL-MCNC: 146 MG/DL (ref 65–140)
GLUCOSE SERPL-MCNC: 181 MG/DL (ref 65–140)
GLUCOSE SERPL-MCNC: 183 MG/DL (ref 65–140)
GLUCOSE SERPL-MCNC: 83 MG/DL (ref 65–140)
GLUCOSE SERPL-MCNC: 93 MG/DL (ref 65–140)
HCT VFR BLD AUTO: 39.7 % (ref 36.5–49.3)
HGB BLD-MCNC: 13 G/DL (ref 12–17)
IMM GRANULOCYTES # BLD AUTO: 0.02 THOUSAND/UL (ref 0–0.2)
IMM GRANULOCYTES NFR BLD AUTO: 0 % (ref 0–2)
LYMPHOCYTES # BLD AUTO: 2.55 THOUSANDS/ÂΜL (ref 0.6–4.47)
LYMPHOCYTES NFR BLD AUTO: 34 % (ref 14–44)
MAGNESIUM SERPL-MCNC: 2 MG/DL (ref 1.9–2.7)
MCH RBC QN AUTO: 28.3 PG (ref 26.8–34.3)
MCHC RBC AUTO-ENTMCNC: 32.7 G/DL (ref 31.4–37.4)
MCV RBC AUTO: 87 FL (ref 82–98)
MONOCYTES # BLD AUTO: 0.67 THOUSAND/ÂΜL (ref 0.17–1.22)
MONOCYTES NFR BLD AUTO: 9 % (ref 4–12)
MRSA NOSE QL CULT: ABNORMAL
MRSA NOSE QL CULT: ABNORMAL
NEUTROPHILS # BLD AUTO: 3.85 THOUSANDS/ÂΜL (ref 1.85–7.62)
NEUTS SEG NFR BLD AUTO: 52 % (ref 43–75)
NRBC BLD AUTO-RTO: 0 /100 WBCS
PHOSPHATE SERPL-MCNC: 4.1 MG/DL (ref 2.3–4.1)
PLATELET # BLD AUTO: 294 THOUSANDS/UL (ref 149–390)
PMV BLD AUTO: 8.6 FL (ref 8.9–12.7)
POTASSIUM SERPL-SCNC: 3.8 MMOL/L (ref 3.5–5.3)
PROCALCITONIN SERPL-MCNC: 0.1 NG/ML
PROT SERPL-MCNC: 6.8 G/DL (ref 6.4–8.4)
RBC # BLD AUTO: 4.59 MILLION/UL (ref 3.88–5.62)
SODIUM SERPL-SCNC: 138 MMOL/L (ref 135–147)
VANCOMYCIN SERPL-MCNC: 11 UG/ML (ref 10–20)
WBC # BLD AUTO: 7.44 THOUSAND/UL (ref 4.31–10.16)

## 2024-07-10 PROCEDURE — 85025 COMPLETE CBC W/AUTO DIFF WBC: CPT | Performed by: INTERNAL MEDICINE

## 2024-07-10 PROCEDURE — 99233 SBSQ HOSP IP/OBS HIGH 50: CPT | Performed by: PHYSICIAN ASSISTANT

## 2024-07-10 PROCEDURE — 97530 THERAPEUTIC ACTIVITIES: CPT

## 2024-07-10 PROCEDURE — 97110 THERAPEUTIC EXERCISES: CPT

## 2024-07-10 PROCEDURE — 83735 ASSAY OF MAGNESIUM: CPT | Performed by: INTERNAL MEDICINE

## 2024-07-10 PROCEDURE — 80053 COMPREHEN METABOLIC PANEL: CPT | Performed by: INTERNAL MEDICINE

## 2024-07-10 PROCEDURE — 99232 SBSQ HOSP IP/OBS MODERATE 35: CPT | Performed by: INTERNAL MEDICINE

## 2024-07-10 PROCEDURE — 84145 PROCALCITONIN (PCT): CPT | Performed by: INTERNAL MEDICINE

## 2024-07-10 PROCEDURE — 97535 SELF CARE MNGMENT TRAINING: CPT

## 2024-07-10 PROCEDURE — 97116 GAIT TRAINING THERAPY: CPT

## 2024-07-10 PROCEDURE — 82948 REAGENT STRIP/BLOOD GLUCOSE: CPT

## 2024-07-10 PROCEDURE — 73718 MRI LOWER EXTREMITY W/O DYE: CPT

## 2024-07-10 PROCEDURE — 82550 ASSAY OF CK (CPK): CPT | Performed by: INTERNAL MEDICINE

## 2024-07-10 PROCEDURE — 80202 ASSAY OF VANCOMYCIN: CPT | Performed by: FAMILY MEDICINE

## 2024-07-10 PROCEDURE — 84100 ASSAY OF PHOSPHORUS: CPT | Performed by: INTERNAL MEDICINE

## 2024-07-10 RX ORDER — CEFEPIME HYDROCHLORIDE 2 G/50ML
2000 INJECTION, SOLUTION INTRAVENOUS EVERY 12 HOURS
Status: DISCONTINUED | OUTPATIENT
Start: 2024-07-10 | End: 2024-07-12

## 2024-07-10 RX ADMIN — LISINOPRIL 2.5 MG: 2.5 TABLET ORAL at 09:30

## 2024-07-10 RX ADMIN — INSULIN LISPRO 1 UNITS: 100 INJECTION, SOLUTION INTRAVENOUS; SUBCUTANEOUS at 12:03

## 2024-07-10 RX ADMIN — INSULIN LISPRO 14 UNITS: 100 INJECTION, SOLUTION INTRAVENOUS; SUBCUTANEOUS at 09:37

## 2024-07-10 RX ADMIN — Medication 1 APPLICATION: at 09:34

## 2024-07-10 RX ADMIN — VANCOMYCIN HYDROCHLORIDE 1250 MG: 1 INJECTION, POWDER, LYOPHILIZED, FOR SOLUTION INTRAVENOUS at 06:22

## 2024-07-10 RX ADMIN — INSULIN LISPRO 1 UNITS: 100 INJECTION, SOLUTION INTRAVENOUS; SUBCUTANEOUS at 21:57

## 2024-07-10 RX ADMIN — ASPIRIN 81 MG: 81 TABLET, COATED ORAL at 09:30

## 2024-07-10 RX ADMIN — ENOXAPARIN SODIUM 40 MG: 40 INJECTION SUBCUTANEOUS at 09:30

## 2024-07-10 RX ADMIN — VANCOMYCIN HYDROCHLORIDE 1250 MG: 1 INJECTION, POWDER, LYOPHILIZED, FOR SOLUTION INTRAVENOUS at 19:18

## 2024-07-10 RX ADMIN — ATORVASTATIN CALCIUM 80 MG: 40 TABLET, FILM COATED ORAL at 17:49

## 2024-07-10 RX ADMIN — INSULIN LISPRO 14 UNITS: 100 INJECTION, SOLUTION INTRAVENOUS; SUBCUTANEOUS at 12:03

## 2024-07-10 RX ADMIN — INSULIN GLARGINE 50 UNITS: 100 INJECTION, SOLUTION SUBCUTANEOUS at 21:57

## 2024-07-10 RX ADMIN — INSULIN LISPRO 14 UNITS: 100 INJECTION, SOLUTION INTRAVENOUS; SUBCUTANEOUS at 17:49

## 2024-07-10 RX ADMIN — CEFEPIME HYDROCHLORIDE 2000 MG: 2 INJECTION, SOLUTION INTRAVENOUS at 13:00

## 2024-07-10 NOTE — CASE MANAGEMENT
Case Management Discharge Planning Note    Patient name Art Joseph  Location /424-01 MRN [id number]  : 1960 Date 7/10/2024       Current Admission Date: 2024  Current Admission Diagnosis:Open wound of left foot   Patient Active Problem List    Diagnosis Date Noted Date Diagnosed    Open wound of left foot 2024     Maggot infestation 2024     Elevated platelet count 2024     Candidiasis, intertrigo 2024     Cellulitis of left lower extremity 2024     Class 1 obesity in adult 2021     Diabetic peripheral angiopathy (HCC) 2020     Vitamin D deficiency 2019     Ambulatory dysfunction 10/17/2017     Hx of right BKA (HCC) 2017     Primary hypertension 2017     Hypercholesteremia 2017     Acute osteomyelitis of metatarsal bone of left foot ([facility]) 2017     Type 2 diabetes mellitus with foot ulcer, with long-term current use of insulin ([facility]) 2017     Diabetic neuropathy ([facility]) 2017       LOS (days): 2  Geometric Mean LOS (GMLOS) (days): 3  Days to GMLOS:1.2     OBJECTIVE:  Risk of Unplanned Readmission Score: 13.02         Current admission status: Inpatient   Preferred Pharmacy:   [facility] [place] - [address]  [address] [address]  Phone: [phone] Fax: [phone]    [facility] - [place] - [address]  [address] [facility] [place] [address]  Phone: [phone] Fax: [phone]    [facility]pharm[facility] #[id number] - [place] - [address]  [address] [facility] [address]  Phone: [phone] Fax: [phone]    Primary Care Provider: Kerry Christine MD    Primary Insurance: AYLA  REP  Secondary Insurance:     DISCHARGE DETAILS:    Discharge planning discussed with:: patient  Freedom of Choice: Yes  Comments - [place] of Choice: CM disucssed   STR options with  patient his preference is  [place]  [place] in [place]  CM contacted family/caregiver?: No- see comments  Were Treatment Team discharge recommendations reviewed with patient/caregiver?: Yes  Did patient/caregiver verbalize understanding of patient care needs?: Yes  Were patient/caregiver advised of the risks associated with not following Treatment Team discharge recommendations?: Yes       [facility] secured In aidin.

## 2024-07-10 NOTE — PLAN OF CARE
Problem: METABOLIC, FLUID AND ELECTROLYTES - ADULT  Goal: Electrolytes maintained within normal limits  Description: INTERVENTIONS:  - Monitor labs and assess patient for signs and symptoms of electrolyte imbalances  - Administer electrolyte replacement as ordered  - Monitor response to electrolyte replacements, including repeat lab results as appropriate  - Instruct patient on fluid and nutrition as appropriate  Outcome: Progressing     Problem: SKIN/TISSUE INTEGRITY - ADULT  Goal: Skin Integrity remains intact(Skin Breakdown Prevention)  Description: Assess:  -Perform Zaid assessment every 4 hrs  -Clean and moisturize skin every 4 hrs  -Inspect skin when repositioning, toileting, and assisting with ADLS  -Assess under medical devices such as mosimo every 4 hrs  -Assess extremities for adequate circulation and sensation     Bed Management:  -Have minimal linens on bed & keep smooth, unwrinkled  -Change linens as needed when moist or perspiring  -Avoid sitting or lying in one position for more than 4 hours while in bed  -Keep HOB at 30degrees     Toileting:  -Offer bedside commode  -Assess for incontinence every 4 hrs  -Use incontinent care products after each incontinent episode such as lotion and spray    Activity:  -Mobilize patient 3 times a day  -Encourage activity and walks on unit  -Encourage or provide ROM exercises   -Turn and reposition patient every 4 Hours  -Use appropriate equipment to lift or move patient in bed  -Instruct/ Assist with weight shifting every 4hrs when out of bed in chair  -Consider limitation of chair time 2 hour intervals    Skin Care:  -Avoid use of baby powder, tape, friction and shearing, hot water or constrictive clothing  -Relieve pressure over bony prominences using lotion  -Do not massage red bony areas    Next Steps:  -Teach patient strategies to minimize risks such as getting oob   -Consider consults to  interdisciplinary teams such as pt/ot  Outcome: Progressing  Goal:  Incision(s), wounds(s) or drain site(s) healing without S/S of infection  Description: INTERVENTIONS  - Assess and document dressing, incision, wound bed, drain sites and surrounding tissue  - Provide patient and family education  - Perform skin care/dressing changes every 4 hrs  Outcome: Progressing     Problem: MUSCULOSKELETAL - ADULT  Goal: Maintain or return mobility to safest level of function  Description: INTERVENTIONS:  - Assess patient's ability to carry out ADLs; assess patient's baseline for ADL function and identify physical deficits which impact ability to perform ADLs (bathing, care of mouth/teeth, toileting, grooming, dressing, etc.)  - Assess/evaluate cause of self-care deficits   - Assess range of motion  - Assess patient's mobility  - Assess patient's need for assistive devices and provide as appropriate  - Encourage maximum independence but intervene and supervise when necessary  - Involve family in performance of ADLs  - Assess for home care needs following discharge   - Consider OT consult to assist with ADL evaluation and planning for discharge  - Provide patient education as appropriate  Outcome: Progressing  Goal: Maintain proper alignment of affected body part  Description: INTERVENTIONS:  - Support, maintain and protect limb and body alignment  - Provide patient/ family with appropriate education  Outcome: Progressing     Problem: PAIN - ADULT  Goal: Verbalizes/displays adequate comfort level or baseline comfort level  Description: Interventions:  - Encourage patient to monitor pain and request assistance  - Assess pain using appropriate pain scale  - Administer analgesics based on type and severity of pain and evaluate response  - Implement non-pharmacological measures as appropriate and evaluate response  - Consider cultural and social influences on pain and pain management  - Notify physician/advanced practitioner if interventions unsuccessful or patient reports new pain  Outcome:  Progressing     Problem: INFECTION - ADULT  Goal: Absence or prevention of progression during hospitalization  Description: INTERVENTIONS:  - Assess and monitor for signs and symptoms of infection  - Monitor lab/diagnostic results  - Monitor all insertion sites, i.e. indwelling lines, tubes, and drains  - Monitor endotracheal if appropriate and nasal secretions for changes in amount and color  - [place] appropriate cooling/warming therapies per order  - Administer medications as ordered  - Instruct and encourage patient and family to use good hand hygiene technique  - Identify and instruct in appropriate isolation precautions for identified infection/condition  Outcome: Progressing     Problem: SAFETY ADULT  Goal: Patient will remain free of falls  Description: INTERVENTIONS:  - Educate patient/family on patient safety including physical limitations  - Instruct patient to call for assistance with activity   - Consult OT/PT to assist with strengthening/mobility   - Keep Call bell within reach  - Keep bed low and locked with side rails adjusted as appropriate  - Keep care items and personal belongings within reach  - Initiate and maintain comfort rounds  - Make Fall Risk Sign visible to staff  - Offer Toileting every 2 Hours, in advance of need  - Initiate/Maintain bed/chair alarm  - Obtain necessary fall risk management equipment: alarms  - Apply yellow socks and bracelet for high fall risk patients  - Consider moving patient to room near nurses station  Outcome: Progressing     Problem: DISCHARGE PLANNING  Goal: Discharge to home or other facility with appropriate resources  Description: INTERVENTIONS:  - Identify barriers to discharge w/patient and caregiver  - Arrange for needed discharge resources and transportation as appropriate  - Identify discharge learning needs (meds, wound care, etc.)  - Arrange for interpretive services to assist at discharge as needed  - Refer to Case Management Department for  coordinating discharge planning if the patient needs post-hospital services based on physician/advanced practitioner order or complex needs related to functional status, cognitive ability, or social support system  Outcome: Progressing     Problem: Knowledge Deficit  Goal: Patient/family/caregiver demonstrates understanding of disease process, treatment plan, medications, and discharge instructions  Description: Complete learning assessment and assess knowledge base.  Interventions:  - Provide teaching at level of understanding  - Provide teaching via preferred learning methods  Outcome: Progressing     Problem: Prexisting or High Potential for Compromised Skin Integrity  Goal: Skin integrity is maintained or improved  Description: INTERVENTIONS:  - Identify patients at risk for skin breakdown  - Assess and monitor skin integrity  - Assess and monitor nutrition and hydration status  - Monitor labs   - Assess for incontinence   - Turn and reposition patient  - Assist with mobility/ambulation  - Relieve pressure over bony prominences  - Avoid friction and shearing  - Provide appropriate hygiene as needed including keeping skin clean and dry  - Evaluate need for skin moisturizer/barrier cream  - Collaborate with interdisciplinary team   - Patient/family teaching  - Consider wound care consult   Outcome: Progressing

## 2024-07-10 NOTE — PLAN OF CARE
Problem: PHYSICAL THERAPY ADULT  Goal: Performs mobility at highest level of function for planned discharge setting.  See evaluation for individualized goals.  Description: Treatment/Interventions: Functional transfer training, LE strengthening/ROM, Therapeutic exercise, Endurance training, Bed mobility, Gait training          See flowsheet documentation for full assessment, interventions and recommendations.  Outcome: Progressing  Note: Prognosis: Good  Problem List:  (Decreased strength; Decreased endurance; Impaired balance; Decreased mobility; Decreased skin integrity; Orthopedic restrictions)  Assessment: Pt seen this date for PT treatment session to increase level of mobility and functional activity tolerance. PT treatment session this date consisting of  therapeutic exercises, bed mobility, transfers and  gait training w/ emphasis on improving pt's ability to ambulate. Pt. Currently performing  tx and ambulation at SUP-min x 1 level of function. In comparison to previous session, Pt. With improvement activity tolerance. Pt is in need of continued activity in PT to improve strength balance endurance mobility transfers and ambulation with return to maximize LOF. From PT/mobility standpoint, recommendation at time of d/c would be level II: moderate resource intensity  in order to promote return to PLOF and independence.  The patient's -[facility] Basic Mobility Inpatient Short Form Raw Score is 17. A Raw score of greater than 16 suggests the patient may benefit from discharge to home. Please also refer to the recommendation of the Physical Therapist for safe discharge planning. Pt continues to be functioning below baseline level. PT will continue to see pt during current hospitalization in order to address the deficits listed above and provide interventions consistent w/ POC in effort to achieve STGs.        Rehab Resource Intensity Level, PT: II (Moderate Resource Intensity)    See flowsheet documentation for full  assessment.

## 2024-07-10 NOTE — ANESTHESIA PREPROCEDURE EVALUATION
Procedure:  DEBRIDEMENT FOOT/TOE (WASH OUT) (Left: Foot)    Relevant Problems   CARDIO   (+) Hypercholesteremia   (+) Primary hypertension      ENDO   (+) Type 2 diabetes mellitus with foot ulcer, with long-term current use of insulin (HCC)      NEURO/PSYCH   (+) Diabetic neuropathy (HCC)      Other   (+) Acute osteomyelitis of metatarsal bone of left foot (HCC)      Diabetes mellitus (HCC)    Hypertension           Anesthesia Plan  ASA Score- 3 Emergent    Anesthesia Type- IV sedation with anesthesia with ASA Monitors.         Additional Monitors:     Airway Plan:            Plan Factors-Exercise tolerance (METS): >4 METS.    Chart reviewed. EKG reviewed. Imaging results reviewed. Existing labs reviewed. Patient summary reviewed.                  Induction- intravenous.    Postoperative Plan-     Perioperative Resuscitation Plan - Level 1 - Full Code.       Informed Consent- Anesthetic plan and risks discussed with patient.  I personally reviewed this patient with the CRNA. Discussed and agreed on the Anesthesia Plan with the CRNA..

## 2024-07-10 NOTE — ASSESSMENT & PLAN NOTE
Lab Results   Component Value Date    HGBA1C 9.7 (H) 06/25/2024       Recent Labs     07/09/24  1139 07/09/24  1545 07/09/24  2138 07/10/24  0724   POCGLU 198* 122 93 93         Blood Sugar Average: Last 72 hrs:  (P) [id number]    Continue Lantus 50 Units SQ QHS and Humalog 14 Units TID with meals  ISS with blood glucose monitoring ACHS  Continue PO lisinopril for renal protection  Hypoglycemia protocol  Outpatient Endocrinology evaluation

## 2024-07-10 NOTE — PROGRESS NOTES
"[facility]  Progress Note  Name: Art Joseph I  MRN: [id number]  Unit/Bed#: 424-01 I Date of Admission: 7/8/2024   Date of Service: 7/10/2024 I Hospital Day: 2    Assessment & Plan   * Open wound of left foot  Assessment & Plan  Recent hospitalization from 06/25/2024 through 06/28/2024 for osteomyelitis of the left foot requiring a left 1st metatarsal amputation/resection by Podiatry  Now with a left wound infection and associated cellulitis with probable underlying osteomyelitis  Also found to have maggots in his open wound  I appreciate Infectious Disease's input and Podiatry's input.  Check an MRI of the left foot with contrast per Podiatry's recommendations  Checked a MRSA nasal culture and wound culture on 07/09/2024, with results pending at this time  Continue IV vancomycin for now    Maggot infestation  Assessment & Plan  Please see the assessment and plan for \"Open wound of left foot\"      Hypercholesteremia  Assessment & Plan  Continue statin therapy    Primary hypertension  Assessment & Plan  Continue PO lisinopril  Follow the blood pressure trend    Ambulatory dysfunction  Assessment & Plan  PT/OT evaluations    Hx of right BKA (HCC)  Assessment & Plan  Outpatient follow-up with Vascular Surgery    Type 2 diabetes mellitus with foot ulcer, with long-term current use of insulin ([facility])  Assessment & Plan  Lab Results   Component Value Date    HGBA1C 9.7 (H) 06/25/2024       Recent Labs     07/09/24  1139 07/09/24  1545 07/09/24  2138 07/10/24  0724   POCGLU 198* 122 93 93         Blood Sugar Average: Last 72 hrs:  (P) [id number]    Continue Lantus 50 Units SQ QHS and Humalog 14 Units TID with meals  ISS with blood glucose monitoring ACHS  Continue PO lisinopril for renal protection  Hypoglycemia protocol  Outpatient Endocrinology evaluation        VTE Pharmacologic Prophylaxis: VTE Score: 8 High Risk (Score >/= 5) - Pharmacological DVT Prophylaxis Ordered: enoxaparin " (Lovenox). Sequential Compression Device Ordered for the left lower extremity.  No SCD on the right lower extremity with a history of a right-sided BKA.    Mobility:   Basic Mobility Inpatient Raw Score: 16  JH-HLM Goal: 5: Stand one or more mins  JH-HLM Achieved: 4: Move to chair/commode  JH-HLM Goal NOT achieved. Continue with multidisciplinary rounding and encourage appropriate mobility to improve upon JH-HLM goals.    Patient Centered Rounds: I performed bedside rounds with nursing staff today.   Discussions with Specialists or Other Care Team Provider: I discussed the case with Infectious Disease and with Podiatry.    Total Time Spent on Date of Encounter in care of patient: 35 mins. This time was spent on one or more of the following: performing physical exam; counseling and coordination of care; obtaining or reviewing history; documenting in the medical record; reviewing/ordering tests, medications or procedures; communicating with other healthcare professionals and discussing with patient's family/caregivers.    Current Length of Stay: 2 day(s)  Current Patient Status: Inpatient   Certification Statement: The patient will continue to require additional inpatient hospital stay due to the need for IV antibiotic treatment and for an MRI of the left foot.  Discharge Plan: Anticipate discharge in >72 hrs to home with home services.    Code Status: Level 1 - Full Code    Subjective:   The patient was seen and examined.  The patient is doing better.  No fevers or chills.  No extremity pain.  No chest pain.  No shortness of breath.  No abdominal pain.  No nausea or vomiting.      Objective:     Vitals:   Temp (24hrs), Av.3 °F (36.8 °C), Min:97.7 °F (36.5 °C), Max:98.8 °F (37.1 °C)    Temp:  [97.7 °F (36.5 °C)-98.8 °F (37.1 °C)] 98.1 °F (36.7 °C)  HR:  [69-85] 69  Resp:  [17-20] 17  BP: (118-171)/(58-95) 137/78  SpO2:  [94 %-96 %] 95 %  Body mass index is 30.91 kg/m².     Input and Output Summary (last 24 hours):      Intake/Output Summary (Last 24 hours) at 7/10/2024 1038  Last data filed at 7/10/2024 0846  Gross per 24 hour   Intake 600 ml   Output 650 ml   Net -50 ml       Physical Exam:   Physical Exam  General:  NAD, follows commands  HEENT:  NC/AT, mucous membranes moist  Neck:  Supple, No JVP elevation  CV:  + S1, + S2, RRR  Pulm:  Lung fields are CTA bilaterally  Abd:  Soft, Non-tender, Non-distended  Ext:  No clubbing/cyanosis/edema, History of a right-sided BKA (below-the-knee amputation), Left foot wound dressing intact  Skin:  No rashes  Neuro:  Awake, alert, oriented  Psych:  Normal mood and affect      Additional Data:    Labs:  Results from last 7 days   Lab Units 07/10/24  0519   WBC Thousand/uL 7.44   HEMOGLOBIN g/dL 13.0   HEMATOCRIT % 39.7   PLATELETS Thousands/uL 294   SEGS PCT % 52   LYMPHO PCT % 34   MONO PCT % 9   EOS PCT % 4     Results from last 7 days   Lab Units 07/10/24  0519   SODIUM mmol/L 138   POTASSIUM mmol/L 3.8   CHLORIDE mmol/L 105   CO2 mmol/L 24   BUN mg/dL 12   CREATININE mg/dL 0.79   ANION GAP mmol/L 9   CALCIUM mg/dL 9.0   ALBUMIN g/dL 3.3*   TOTAL BILIRUBIN mg/dL 0.38   ALK PHOS U/L 124*   ALT U/L 7   AST U/L 9*   GLUCOSE RANDOM mg/dL 83     Results from last 7 days   Lab Units 07/08/24  1802   INR  0.94     Results from last 7 days   Lab Units 07/10/24  0724 07/09/24  2138 07/09/24  1545 07/09/24  1139 07/09/24  0718 07/08/24  2256   POC GLUCOSE mg/dl 93 93 122 198* 112 146*         Results from last 7 days   Lab Units 07/10/24  0519   PROCALCITONIN ng/ml 0.10       Lines/Drains:  Invasive Devices       Peripheral Intravenous Line  Duration             Peripheral IV 07/09/24 Distal;Dorsal (posterior);Right Forearm <1 day                          Imaging: No pertinent imaging reviewed.    Recent Cultures (last 7 days):   Results from last 7 days   Lab Units 07/09/24  1411   GRAM STAIN RESULT  Rare Gram negative rods*  Rare Gram positive cocci in pairs*  No polys seen*       Last 24  Hours Medication List:   Current Facility-Administered Medications   Medication Dose Route Frequency Provider Last Rate    acetaminophen  650 mg Oral Q6H PRN Fatemeh Reid MD      aspirin  81 mg Oral Daily Fatemeh Reid MD      atorvastatin  80 mg Oral Daily With Dinner Fatemeh Reid MD      Dakins (full strength)  1 Application Irrigation Daily Myron Duran BERNY Bhakta      enoxaparin  40 mg Subcutaneous Daily Fatemeh Reid MD      insulin glargine  50 Units Subcutaneous HS Fatemeh Reid MD      insulin lispro  1-6 Units Subcutaneous 4x Daily (with meals and at bedtime) Fatemeh Reid MD      insulin lispro  14 Units Subcutaneous TID With Meals Fatemeh Reid MD      lisinopril  2.5 mg Oral Daily Fatemeh Reid MD      ondansetron  4 mg Intravenous Q6H PRN Fatemeh Reid MD      vancomycin  1,250 mg Intravenous Q12H Fatemeh Reid MD 1,250 mg (07/09/24 9893)        Today, Patient Was Seen By: Jerome Gutierrez DO    **Please Note: This note may have been constructed using a voice recognition system.**

## 2024-07-10 NOTE — PLAN OF CARE
Problem: OCCUPATIONAL THERAPY ADULT  Goal: Performs self-care activities at highest level of function for planned discharge setting.  See evaluation for individualized goals.  Description: Treatment Interventions: ADL retraining, Functional transfer training, UE strengthening/ROM, Endurance training, Patient/family training, Equipment evaluation/education, Activityengagement          See flowsheet documentation for full assessment, interventions and recommendations.   Outcome: Progressing  Note: Limitation: Decreased ADL status, Decreased cognition, Decreased UE strength, Decreased Safe judgement during ADL, Decreased endurance, Decreased self-care trans, Decreased high-level ADLs     Assessment: Patient participated in Skilled OT session this date with interventions consisting of ADL re training with the use of correct body mechnaics, Energy Conservation techniques, Work simplification skills , safety awareness and fall prevention techniques,  therapeutic activities to: increase activity tolerance, increase standing tolerance time with unilateral UE support to complete sink level ADLs, increase cardiovascular endurance , and increase dynamic sit/ stand balance during functional activity  . Patient agreeable to OT treatment session, upon arrival patient was found seated OOB to Chair. Patient requiring verbal cues for safety and frequent rest periods. Patient continues to be functioning below baseline level, occupational performance remains limited secondary to factors listed above and increased risk for falls and injury. The patient's raw score on the AM-PAC Daily Activity Inpatient Short Form is 18. A raw score of less than 19 suggests the patient may benefit from discharge to post-acute rehabilitation services. Please refer to the recommendation of the Occupational Therapist for safe discharge planning. From OT standpoint, recommendation at time of d/c would be level 2, moderate resource intensity.     Rehab  Resource Intensity Level, OT: II (Moderate Resource Intensity)

## 2024-07-10 NOTE — OCCUPATIONAL THERAPY NOTE
Occupational Therapy         Patient Name: Art Joseph  Today's Date: 7/10/2024       07/10/24 1314   OT Last Visit   OT Visit Date 07/10/24   Note Type   Note Type Treatment for insurance authorization   Pain Assessment   Pain Assessment Tool 0-10   Pain Score No Pain   Restrictions/Precautions   Weight Bearing Precautions Per Order Yes   LLE Weight Bearing Per Order WBAT   Braces or Orthoses   (surgical shoe L LE, prosthetic R LE)   Other Precautions WBS;Multiple lines;Fall Risk;Telemetry;Chair Alarm   ADL   Where Assessed   (chair, standard toilet)   Grooming Assistance 5  Supervision/Setup   Grooming Deficit Wash/dry hands;Standing with assistive device;Increased time to complete;Verbal cueing;Supervision/safety   UB Bathing Assistance 5  Supervision/Setup   UB Bathing Deficit Increased time to complete;Verbal cueing;Supervision/safety   LB Bathing Assistance 4  Minimal Assistance   LB Bathing Deficit Buttocks;Increased time to complete;Verbal cueing   UB Dressing Assistance 5  Supervision/Setup   UB Dressing Deficit Increased time to complete;Setup   LB Dressing Assistance 4  Minimal Assistance   LB Dressing Deficit Steadying;Requires assistive device for steadying;Increased time to complete;Pull up over hips   Toileting Assistance  Other (Comment)  (CGA for safety)   Toileting Deficit Steadying;Increased time to complete;Grab bar use;Other (Comment)  (standard toilet)   Toileting Comments Given fxl performance skills + medical complexity, therapist suspecting via clinical judgement + skilled analysis; pt currently requires stated assist above to perform each area of ADL d/t limitations including: generalized muscle weakness, sitting/standing balance deficits, fxl activity tolerance limitations, instability in stance,  safety awareness concerns.   Bed Mobility   Additional Comments Pt OOB in chair at start/end of session, all needs withinreach.   Transfers   Sit to Stand 5  Supervision  "  Additional items Armrests;Verbal cues   Stand to Sit 5  Supervision   Additional items Armrests;Verbal cues   Stand pivot 5  Supervision   Additional items Armrests;Increased time required;Verbal cues   Toilet transfer 5  Supervision   Additional items Verbal cues;Standard toilet;Other  (grab bars)   Additional Comments RW used   Functional Mobility   Functional Mobility   (CGA)   Additional Comments Pt performs functional mobility to/from restroom in his room with CGA and use of RW. Pt requires verbal cues for safety awareness. Mild instability with no LOB. Pt on RA with SPO2 remaining WFL.   Additional items Rolling walker   Subjective   Subjective \"My wheelchair is part of my living room furniture.\"   Cognition   Overall Cognitive Status WFL   Arousal/Participation Alert;Cooperative   Attention Within functional limits   Orientation Level Oriented X4   Memory Within functional limits   Following Commands Follows one step commands without difficulty   Activity Tolerance   Activity Tolerance Patient limited by fatigue   Medical Staff Made Aware nurse aware   Assessment   Assessment Patient participated in Skilled OT session this date with interventions consisting of ADL re training with the use of correct body mechnaics, Energy Conservation techniques, Work simplification skills , safety awareness and fall prevention techniques,  therapeutic activities to: increase activity tolerance, increase standing tolerance time with unilateral UE support to complete sink level ADLs, increase cardiovascular endurance , and increase dynamic sit/ stand balance during functional activity  . Patient agreeable to OT treatment session, upon arrival patient was found seated OOB to Chair. Patient requiring verbal cues for safety and frequent rest periods. Patient continues to be functioning below baseline level, occupational performance remains limited secondary to factors listed above and increased risk for falls and injury. The " patient's raw score on the AM-PAC Daily Activity Inpatient Short Form is 18. A raw score of less than 19 suggests the patient may benefit from discharge to post-acute rehabilitation services. Please refer to the recommendation of the Occupational Therapist for safe discharge planning. From OT standpoint, recommendation at time of d/c would be level 2, moderate resource intensity.   Plan   Treatment Interventions ADL retraining;Functional transfer training;Endurance training;Compensatory technique education;Energy conservation;Activityengagement   Goal Expiration Date 07/23/24   OT Treatment Day 1   OT Frequency 3-5x/wk   Discharge Recommendation   Rehab Resource Intensity Level, OT II (Moderate Resource Intensity)   AM-PAC Daily Activity Inpatient   Lower Body Dressing 3   Bathing 2   Toileting 3   Upper Body Dressing 3   Grooming 3   Eating 4   Daily Activity Raw Score 18   Daily Activity Standardized Score (Calc for Raw Score >=11) 38.66   AM-PAC Applied Cognition Inpatient   Following a Speech/Presentation 4   Understanding Ordinary Conversation 4   Taking Medications 4   Remembering Where Things Are Placed or Put Away 3   Remembering List of 4-5 Errands 3   Taking Care of Complicated Tasks 3   Applied Cognition Raw Score 21   Applied Cognition Standardized Score 44.3     Pt will benefit from continued OT services in order to maximize (I) c ADL performance, FM c least restrictive AD, and improve overall endurance/strength required to complete functional tasks in preparation for d/c.    Pt left seated in chair at end of session; all needs within reach; all lines intact.    Theresa Orozco

## 2024-07-10 NOTE — PROGRESS NOTES
Progress Note - [facility] Infectious Disease   Art Joseph 64 y.o. male MRN: [id number]  Unit/Bed#: 424-01 Encounter: [id number]      VIRTUAL CARE DOCUMENTATION:     1. This service was provided via Telemedicine using [employer]     2. Parties in the room with patient during teleconsult Patient only    3. Confidentiality My office door was closed     4. Participants No one else was in the room    5. Patient acknowledged consent and understanding of privacy and security of the  Telemedicine consult. I informed the patient that I have reviewed their record in Epic and presented the opportunity for them to ask any questions regarding the visit today.  The patient agreed to participate.    6. Time spent 2 minutes communication with the patient via telehealth.       IMPRESSION & RECOMMENDATIONS:   1. Progressive left foot wound with possible cellulitis. Recent admission with left foot OM s/p resection of the first MT head 6/27/24. Now presented with evidence of maggots in the wound bed with wound dehiscence. Bedside debridement 7/9 revealed additional maggot infestation with signs of soft tissue infection. Cultures obtained and so far polymicrobial. MRSA nares previously positive. MRI is pending. He is tentatively scheduled for OR. He remains high risk for limb loss.   -continue IV vancomycin for now   -will add IV cefepime 2g q12h pending additional wound cx   -follow up wound cultures   -follow up MRI foot   -local wound care per podiatry   -monitor temperature and hemodynamics  -recheck CBC and BMP in a.m to monitor response to treatment      2. Left foot osteomyelitis. Recent hx of 1st MT head OM s/p first ray resection 4/26/24 followed by additional resection for residual OM 6/27/24. Now with wound dehiscence and concern for residual OM. OR cx from April isolated Staph simulans.               -follow up MRI findings    -close podiatry follow up ongoing      3. Type 2 DM with long term insulin use.  Uncontrolled, HA1C 9.7%. Remains a risk factor for infection and poor wound healing.   -recommend tight glycemic control      4. PVD, venous stasis. Hx of R BKA. Remains a risk factor for poor wound healing.     Antibiotics:  D3 IV vancomycin  D1 IV cefepime     I have discussed the above management plan in detail with patient.     Above plan discussed in detail with Dr. Gutierrez who is in agreement with plan to continue abx as above.     24 Hour events:  Yesterday and overnight notes reviewed. Seen by pods who obtained additional cx and performed bedside debridement with more maggots.     Subjective:  Patient has no fever, chills, sweats; no nausea, vomiting, diarrhea; no cough, shortness of breath; no pain. Tolerating the abx.     Objective:  Vitals:  Temp:  [98.1 °F (36.7 °C)-98.8 °F (37.1 °C)] 98.1 °F (36.7 °C)  HR:  [69-85] 69  Resp:  [17-18] 17  BP: (132-155)/(67-79) 137/78  SpO2:  [94 %-96 %] 95 %  Temp (24hrs), Av.5 °F (36.9 °C), Min:98.1 °F (36.7 °C), Max:98.8 °F (37.1 °C)  Current: Temperature: 98.1 °F (36.7 °C)    PHYSICAL EXAM:    Physical exam findings reported by bedside and primary medical team staff.    General Appearance:  Appearing well, nontoxic, and in no distress   HEENT: Normocephalic, without obvious abnormality, atraumatic. Conjunctiva pink and sclera anicteric. Oropharynx moist without lesions.     Lungs:   Clear to auscultation bilaterally, respirations unlabored   Heart:  RRR; no murmur, rub or gallop   Abdomen:   Soft, non-tender, non-distended, positive bowel sounds    Extremities: R BKA, L surgical shoe in place with dry dressing intact    Musculoskeletal: Back symmetric without curvature, ROM normal.    : No CVA or suprapubic tenderness.    Skin: No rashes or lesions. L medial foot wound. Peripheral IV intact without evidence of erythema, warmth, or exudate.        LABS, IMAGING, & OTHER STUDIES:  Extensive review of the medical records in epic including review of the notes,  radiographs, and laboratory results were reviewed personally as below.     Lab Results:  Results from last 7 days   Lab Units 07/10/24  0519 07/08/24 2031 07/08/24  1802   WBC Thousand/uL 7.44  --  7.97   HEMOGLOBIN g/dL 13.0  --  13.4   PLATELETS Thousands/uL 294 322 342     Results from last 7 days   Lab Units 07/10/24  0519 07/08/24  1802   SODIUM mmol/L 138 138   POTASSIUM mmol/L 3.8 4.2   CHLORIDE mmol/L 105 103   CO2 mmol/L 24 25   BUN mg/dL 12 16   CREATININE mg/dL 0.79 0.83   EGFR ml/min/1.73sq m 94 92   CALCIUM mg/dL 9.0 9.3   AST U/L 9* 10*   ALT U/L 7 8   ALK PHOS U/L 124* 141*     Results from last 7 days   Lab Units 07/09/24  1411   GRAM STAIN RESULT  Rare Gram negative rods*  Rare Gram positive cocci in pairs*  No polys seen*   WOUND CULTURE  Culture too young- will reincubate     Results from last 7 days   Lab Units 07/10/24  0519   PROCALCITONIN ng/ml 0.10                   Lab interpretation/comments: normal WBC      Culture data: recent MRSA positive, blood cx negative; new wound cx with GNR and GPC in pairs    Imaging Studies:   Imaging interpretation/comments: MRI pending

## 2024-07-10 NOTE — CASE MANAGEMENT
Case Management Assessment & Discharge Planning Note    Patient name Art Joseph  Location /424-01 MRN [id number]  : 1960 Date 7/10/2024       Current Admission Date: 2024  Current Admission Diagnosis:Open wound of left foot   Patient Active Problem List    Diagnosis Date Noted Date Diagnosed    Open wound of left foot 2024     Maggot infestation 2024     Elevated platelet count 2024     Candidiasis, intertrigo 2024     Cellulitis of left lower extremity 2024     Class 1 obesity in adult 2021     Diabetic peripheral angiopathy (HCC) 2020     Vitamin D deficiency 2019     Ambulatory dysfunction 10/17/2017     Hx of right BKA (HCC) 2017     Primary hypertension 2017     Hypercholesteremia 2017     Acute osteomyelitis of metatarsal bone of left foot (HCC) 2017     Type 2 diabetes mellitus with foot ulcer, with long-term current use of insulin ([facility]) 2017     Diabetic neuropathy ([facility]) 2017       LOS (days): 2  Geometric Mean LOS (GMLOS) (days): 3  Days to GMLOS:1.3     OBJECTIVE:  PATIENT READMITTED TO HOSPITAL  Risk of Unplanned Readmission Score: 11.69         Current admission status: Inpatient  Referral Reason:  (discharge planning)    Preferred Pharmacy:   [facility] [place] - [address]  [address] [address]  Phone: [phone] Fax: [phone]    [facility] [place] - [address]  [address]  [place] [address]  Phone: [phone] Fax: [phone]    [facility]/pharmacy #[id number] - [place] - [address]  [address] [address]  Phone: [phone] Fax: [phone]    Primary Care Provider: Kerry Christine MD    Primary Insurance: [facility]  Secondary Insurance:     ASSESSMENT:    CM met with patient at the bedside,baseline information  was obtained. CM discussed the  role of CM in helping the patient develop a discharge plan and assist the patient in carry out their plan.    Baseline ambulate with walker     Patient active with [facility].      Readmission    6/25/24- 6/28/24  Osteo       Active Health Care Proxies       Mikhail Joseph [facility] Care Representative -    Primary Phone: [phone] (Home)                 Advance Directives  Does patient have a Health Care POA?: No  Was patient offered paperwork?: Yes (declined)  Does patient currently have a Health Care decision maker?: Yes, please see Health Care Proxy section (Mikhail)  Does patient have Advance Directives?: No  Was patient offered paperwork?: Yes (declined)  Primary Contact: mikhailluna Riddlechantell Walker         Readmission Root Cause  30 Day Readmission: Yes  Who directed you to return to the hospital?: Self  Did you understand whom to contact if you had questions or problems?: Yes  Did you get your prescriptions before you left the hospital?: No  Reason:: Not preferred pharmacy  Were you able to get your prescriptions filled when you left the hospital?: Yes  Did you take your medications as prescribed?: Yes  Were you able to get to your follow-up appointments?: Yes  During previous admission, was a post-acute recommendation made?: Yes  What post-acute resources were offered?: [facility] ([facility])  Patient was readmitted due to: osteo  Action Plan: resume [facility]    Patient Information  Admitted from:: Home  Mental Status: Alert  During Assessment patient was accompanied by: Not accompanied during assessment  Assessment information provided by:: Patient  Primary Caregiver: Family  Caregiver's Name:: Mikhail Walker  Caregiver's Relationship to Patient:: Family Member  Caregiver's Telephone Number:: see face sheet  Support Systems: Family members  County of Residence: [place]  What city do you live in[place]  Home entry access options. Select all that apply.:  Stairs  Number of steps to enter home.: 2  Do the steps have railings?: Yes  Type of Current Residence: [facility]  Living Arrangements: Lives Alone    Activities of Daily Living Prior to Admission  Functional Status: Independent  Completes ADLs independently?: Yes  Ambulates independently?: Yes  Does patient use assisted devices?: Yes  Assisted Devices (DME) used: Straight Cane, Walker  Does patient currently own DME?: Yes  What DME does the patient currently own?: Straight Cane, Walker  Does patient have a history of Outpatient Therapy (PT/OT)?: No  Does the patient have a history of Short-Term Rehab?: No  Does patient have a history of HHC?: Yes ([facility])  Does patient currently have HHC?: Yes ([facility])    Current Home Health Care  Current Home Health Agency:: [facility]  Current Home Health Follow-Up Provider:: PCP    Patient Information Continued  Income Source: Pension/prison  Does patient have prescription coverage?: Yes  Does patient receive dialysis treatments?: No  Does patient have a history of substance abuse?: No  Does patient have a history of Mental Health Diagnosis?: No    PHQ 2/9 Screening   Reviewed PHQ 2/9 Depression Screening Score?: No    Means of Transportation  Means of Transport to Appts:: Family transport      Social Determinants of Health (SDOH)      Flowsheet Row Most Recent Value   Housing Stability    In the last 12 months, was there a time when you were not able to pay the mortgage or rent on time? N   In the past 12 months, how many times have you moved where you were living? 1   At any time in the past 12 months, were you homeless or living in a shelter (including now)? N   Transportation Needs    In the past 12 months, has lack of transportation kept you from medical appointments or from getting medications? no   In the past 12 months, has lack of transportation kept you from meetings, work, or from getting things needed for daily living? No    Food Insecurity    Within the past 12 months, you worried that your food would run out before you got the money to buy more. Never true   Within the past 12 months, the food you bought just didn't last and you didn't have money to get more. Never true   Utilities    In the past 12 months has the electric, gas, oil, or water company threatened to shut off services in your home? No            DISCHARGE DETAILS:    Discharge planning discussed with:: patient  Freedom of Choice: Yes  Comments - Freedom of Choice: [facility]     Were Treatment Team discharge recommendations reviewed with patient/caregiver?: Yes  Did patient/caregiver verbalize understanding of patient care needs?: Yes  Were patient/caregiver advised of the risks associated with not following Treatment Team discharge recommendations?: Yes       Treatment Team Recommendation: Home with Home Health Care  Discharge Destination Plan:: Home with Home Health Care ([facility])  Transport at Discharge : Family             CM to follow for discharge planning.        CM discussed  FOC with patient  at bedside. Patient agreeable with blanket referrals in [place] for local facilities to determine bed availability according to your medical needs and insurance coverage. Patient  has no preference     Referrals placed in aidin for [facility]      CM to follow for discharge needs.

## 2024-07-10 NOTE — PHYSICAL THERAPY NOTE
PHYSICAL THERAPY NOTE          Patient Name: Art Joseph  Today's Date: 7/10/2024   07/10/24 0846   PT Last Visit   PT Visit Date 07/10/24   Note Type   Note Type Treatment   Pain Assessment   Pain Assessment Tool 0-10   Pain Score No Pain   Restrictions/Precautions   Weight Bearing Precautions Per Order Yes   LLE Weight Bearing Per Order WBAT   Braces or Orthoses   (R prosthetic, L surgical shoe)   Other Precautions   (Fall Risk; WBS; Chair Alarm; Bed Alarm)   General   Chart Reviewed Yes   Family/Caregiver Present No   Cognition   Overall Cognitive Status WFL   Arousal/Participation Alert;Cooperative   Attention Within functional limits   Orientation Level Oriented X4   Memory Within functional limits   Following Commands Follows all commands and directions without difficulty   Subjective   Subjective Agreeable to therapy.   Bed Mobility   Supine to Sit 5  Supervision   Additional items HOB elevated;Increased time required;Verbal cues   Additional Comments Sat EOB with Good sitting balance. prothetic and surgical shoe in place.   Transfers   Sit to Stand 5  Supervision   Additional items Increased time required;Verbal cues   Stand to Sit 5  Supervision   Additional items Armrests;Increased time required;Verbal cues   Stand pivot 5  Supervision   Additional items Increased time required   Additional Comments RW used   Ambulation/Elevation   Gait pattern Excessively slow;Improper Weight shift;Forward Flexion   Gait Assistance 4  Minimal assist   Additional items Verbal cues   Assistive Device Rolling walker   Distance 40'   Balance   Static Sitting Good   Dynamic Sitting Good   Static Standing Fair   Dynamic Standing Fair -   Ambulatory Fair -  (RW)   Endurance Deficit   Endurance Deficit Yes   Activity Tolerance   Activity Tolerance Patient limited by fatigue   Exercises   Quad Sets Sitting;15 reps;AROM;Bilateral   Hip Flexion  Sitting;15 reps;AROM;Bilateral   Hip Abduction Sitting;20 reps;AROM;Bilateral   Hip Adduction Sitting;20 reps;AROM;Bilateral   Knee AROM Long Arc Quad Sitting;20 reps;AROM;Bilateral   Assessment   Prognosis Good   Problem List   (Decreased strength; Decreased endurance; Impaired balance; Decreased mobility; Decreased skin integrity; Orthopedic restrictions)   Assessment Pt seen this date for PT treatment session to increase level of mobility and functional activity tolerance. PT treatment session this date consisting of  therapeutic exercises, bed mobility, transfers and  gait training w/ emphasis on improving pt's ability to ambulate. Pt. Currently performing  tx and ambulation at SUP-min x 1 level of function. In comparison to previous session, Pt. With improvement activity tolerance. Pt is in need of continued activity in PT to improve strength balance endurance mobility transfers and ambulation with return to maximize LOF. From PT/mobility standpoint, recommendation at time of d/c would be level II: moderate resource intensity  in order to promote return to PLOF and independence.  The patient's AM-PAC Basic Mobility Inpatient Short Form Raw Score is 17. A Raw score of greater than 16 suggests the patient may benefit from discharge to home. Please also refer to the recommendation of the Physical Therapist for safe discharge planning. Pt continues to be functioning below baseline level. PT will continue to see pt during current hospitalization in order to address the deficits listed above and provide interventions consistent w/ POC in effort to achieve STGs.   Goals   Patient Goals to go home   LTG Expiration Date 07/23/24   PT Treatment Day 1   Plan   Treatment/Interventions Functional transfer training;LE strengthening/ROM;Therapeutic exercise;Endurance training;Bed mobility;Gait training;Spoke to nursing   Progress Progressing toward goals   PT Frequency 3-5x/wk   Discharge Recommendation   Rehab Resource  Intensity Level, PT II (Moderate Resource Intensity)   AM-PAC Basic Mobility Inpatient   Turning in Flat Bed Without Bedrails 3   Lying on Back to Sitting on Edge of Flat Bed Without Bedrails 3   Moving Bed to Chair 3   Standing Up From Chair Using Arms 3   Walk in Room 3   Climb 3-5 Stairs With Railing 2   Basic Mobility Inpatient Raw Score 17   Basic Mobility Standardized Score 39.67   [facility] Highest Level Of Mobility   -HLM Goal 5: Stand one or more mins   -HLM Achieved 7: Walk 25 feet or more   Education   Education Provided Mobility training   Patient Demonstrates verbal understanding;Reinforcement needed   End of Consult   Patient Position at End of Consult Bedside chair;Bed/Chair alarm activated;All needs within reach   End of Consult Comments discussed POC with PT

## 2024-07-10 NOTE — ASSESSMENT & PLAN NOTE
Recent hospitalization from 06/25/2024 through 06/28/2024 for osteomyelitis of the left foot requiring a left 1st metatarsal amputation/resection by Podiatry  Now with a left wound infection and associated cellulitis with probable underlying osteomyelitis  Also found to have maggots in his open wound  I appreciate Infectious Disease's input and Podiatry's input.  Check an MRI of the left foot with contrast per Podiatry's recommendations  Checked a MRSA nasal culture and wound culture on 07/09/2024, with results pending at this time  Continue IV vancomycin for now

## 2024-07-10 NOTE — PLAN OF CARE
Problem: METABOLIC, FLUID AND ELECTROLYTES - ADULT  Goal: Electrolytes maintained within normal limits  Description: INTERVENTIONS:  - Monitor labs and assess patient for signs and symptoms of electrolyte imbalances  - Administer electrolyte replacement as ordered  - Monitor response to electrolyte replacements, including repeat lab results as appropriate  - Instruct patient on fluid and nutrition as appropriate  Outcome: Progressing     Problem: SKIN/TISSUE INTEGRITY - ADULT  Goal: Skin Integrity remains intact(Skin Breakdown Prevention)  Description: Assess:  -Perform Zaid assessment every 4 hrs  -Clean and moisturize skin every 4 hrs  -Inspect skin when repositioning, toileting, and assisting with ADLS  -Assess under medical devices such as mosimo every 4 hrs  -Assess extremities for adequate circulation and sensation     Bed Management:  -Have minimal linens on bed & keep smooth, unwrinkled  -Change linens as needed when moist or perspiring  -Avoid sitting or lying in one position for more than 4 hours while in bed  -Keep HOB at 30degrees     Toileting:  -Offer bedside commode  -Assess for incontinence every 4 hrs  -Use incontinent care products after each incontinent episode such as lotion and spray    Activity:  -Mobilize patient 3 times a day  -Encourage activity and walks on unit  -Encourage or provide ROM exercises   -Turn and reposition patient every 4 Hours  -Use appropriate equipment to lift or move patient in bed  -Instruct/ Assist with weight shifting every 4hrs when out of bed in chair  -Consider limitation of chair time 2 hour intervals    Skin Care:  -Avoid use of baby powder, tape, friction and shearing, hot water or constrictive clothing  -Relieve pressure over bony prominences using lotion  -Do not massage red bony areas    Next Steps:  -Teach patient strategies to minimize risks such as getting oob   -Consider consults to  interdisciplinary teams such as pt/ot  Outcome: Progressing  Goal:  Incision(s), wounds(s) or drain site(s) healing without S/S of infection  Description: INTERVENTIONS  - Assess and document dressing, incision, wound bed, drain sites and surrounding tissue  - Provide patient and family education  - Perform skin care/dressing changes every 4 hrs  Outcome: Progressing     Problem: MUSCULOSKELETAL - ADULT  Goal: Maintain or return mobility to safest level of function  Description: INTERVENTIONS:  - Assess patient's ability to carry out ADLs; assess patient's baseline for ADL function and identify physical deficits which impact ability to perform ADLs (bathing, care of mouth/teeth, toileting, grooming, dressing, etc.)  - Assess/evaluate cause of self-care deficits   - Assess range of motion  - Assess patient's mobility  - Assess patient's need for assistive devices and provide as appropriate  - Encourage maximum independence but intervene and supervise when necessary  - Involve family in performance of ADLs  - Assess for home care needs following discharge   - Consider OT consult to assist with ADL evaluation and planning for discharge  - Provide patient education as appropriate  Outcome: Progressing  Goal: Maintain proper alignment of affected body part  Description: INTERVENTIONS:  - Support, maintain and protect limb and body alignment  - Provide patient/ family with appropriate education  Outcome: Progressing     Problem: PAIN - ADULT  Goal: Verbalizes/displays adequate comfort level or baseline comfort level  Description: Interventions:  - Encourage patient to monitor pain and request assistance  - Assess pain using appropriate pain scale  - Administer analgesics based on type and severity of pain and evaluate response  - Implement non-pharmacological measures as appropriate and evaluate response  - Consider cultural and social influences on pain and pain management  - Notify physician/advanced practitioner if interventions unsuccessful or patient reports new pain  Outcome:  Progressing     Problem: INFECTION - ADULT  Goal: Absence or prevention of progression during hospitalization  Description: INTERVENTIONS:  - Assess and monitor for signs and symptoms of infection  - Monitor lab/diagnostic results  - Monitor all insertion sites, i.e. indwelling lines, tubes, and drains  - Monitor endotracheal if appropriate and nasal secretions for changes in amount and color  - [place] appropriate cooling/warming therapies per order  - Administer medications as ordered  - Instruct and encourage patient and family to use good hand hygiene technique  - Identify and instruct in appropriate isolation precautions for identified infection/condition  Outcome: Progressing     Problem: SAFETY ADULT  Goal: Patient will remain free of falls  Description: INTERVENTIONS:  - Educate patient/family on patient safety including physical limitations  - Instruct patient to call for assistance with activity   - Consult OT/PT to assist with strengthening/mobility   - Keep Call bell within reach  - Keep bed low and locked with side rails adjusted as appropriate  - Keep care items and personal belongings within reach  - Initiate and maintain comfort rounds  - Make Fall Risk Sign visible to staff  - Offer Toileting every 2 Hours, in advance of need  - Initiate/Maintain bed/chair alarm  - Obtain necessary fall risk management equipment: alarms  - Apply yellow socks and bracelet for high fall risk patients  - Consider moving patient to room near nurses station  Outcome: Progressing     Problem: DISCHARGE PLANNING  Goal: Discharge to home or other facility with appropriate resources  Description: INTERVENTIONS:  - Identify barriers to discharge w/patient and caregiver  - Arrange for needed discharge resources and transportation as appropriate  - Identify discharge learning needs (meds, wound care, etc.)  - Arrange for interpretive services to assist at discharge as needed  - Refer to Case Management Department for  coordinating discharge planning if the patient needs post-hospital services based on physician/advanced practitioner order or complex needs related to functional status, cognitive ability, or social support system  Outcome: Progressing     Problem: Knowledge Deficit  Goal: Patient/family/caregiver demonstrates understanding of disease process, treatment plan, medications, and discharge instructions  Description: Complete learning assessment and assess knowledge base.  Interventions:  - Provide teaching at level of understanding  - Provide teaching via preferred learning methods  Outcome: Progressing

## 2024-07-10 NOTE — PROGRESS NOTES
Art Joseph is a 64 y.o. male who is currently ordered Vancomycin IV with management by the Pharmacy Consult service.  Relevant clinical data and objective / subjective history reviewed.  Vancomycin Assessment:  Indication and Goal AUC/Trough: Bone/joint infection (goal -600, trough >10), -600, trough >10  Clinical Status: stable  Micro:     Renal Function:  SCr: 0.83 mg/dL  CrCl: 104 mL/min  Renal replacement: Not on dialysis  Days of Therapy: 3  Current Dose: 1250mg Q12H  Vancomycin Plan:  New Dosing: same dose  Estimated AUC: 484 mcg*hr/mL  Estimated Trough: 13.4 mcg/mL  Next Level: 07/13/2024 in the AM  Renal Function Monitoring: Daily BMP and UOP  Pharmacy will continue to follow closely for s/sx of nephrotoxicity, infusion reactions and appropriateness of therapy.  BMP and CBC will be ordered per protocol. We will continue to follow the patient’s culture results and clinical progress daily.    Reggie Saez, Pharmacist

## 2024-07-11 ENCOUNTER — APPOINTMENT (INPATIENT)
Dept: RADIOLOGY | Facility: HOSPITAL | Age: 64
DRG: 464 | End: 2024-07-11
Payer: COMMERCIAL

## 2024-07-11 ENCOUNTER — ANESTHESIA (INPATIENT)
Dept: PERIOP | Facility: HOSPITAL | Age: 64
DRG: 464 | End: 2024-07-11
Payer: COMMERCIAL

## 2024-07-11 PROBLEM — Z22.322 MRSA COLONIZATION: Status: ACTIVE | Noted: 2024-07-11

## 2024-07-11 PROBLEM — M86.9 OSTEOMYELITIS OF LEFT FOOT (HCC): Status: ACTIVE | Noted: 2024-07-11

## 2024-07-11 PROBLEM — E83.39 HYPERPHOSPHATEMIA: Status: ACTIVE | Noted: 2024-07-11

## 2024-07-11 LAB
ALBUMIN SERPL BCG-MCNC: 3.5 G/DL (ref 3.5–5)
ALP SERPL-CCNC: 120 U/L (ref 34–104)
ALT SERPL W P-5'-P-CCNC: 10 U/L (ref 7–52)
ANION GAP SERPL CALCULATED.3IONS-SCNC: 11 MMOL/L (ref 4–13)
AST SERPL W P-5'-P-CCNC: 12 U/L (ref 13–39)
BASOPHILS # BLD AUTO: 0.08 THOUSANDS/ÂΜL (ref 0–0.1)
BASOPHILS NFR BLD AUTO: 1 % (ref 0–1)
BILIRUB SERPL-MCNC: 0.35 MG/DL (ref 0.2–1)
BUN SERPL-MCNC: 14 MG/DL (ref 5–25)
CALCIUM SERPL-MCNC: 9.2 MG/DL (ref 8.4–10.2)
CHLORIDE SERPL-SCNC: 105 MMOL/L (ref 96–108)
CO2 SERPL-SCNC: 23 MMOL/L (ref 21–32)
CREAT SERPL-MCNC: 0.77 MG/DL (ref 0.6–1.3)
EOSINOPHIL # BLD AUTO: 0.17 THOUSAND/ÂΜL (ref 0–0.61)
EOSINOPHIL NFR BLD AUTO: 2 % (ref 0–6)
ERYTHROCYTE [DISTWIDTH] IN BLOOD BY AUTOMATED COUNT: 13.9 % (ref 11.6–15.1)
GFR SERPL CREATININE-BSD FRML MDRD: 95 ML/MIN/1.73SQ M
GLUCOSE SERPL-MCNC: 134 MG/DL (ref 65–140)
GLUCOSE SERPL-MCNC: 134 MG/DL (ref 65–140)
GLUCOSE SERPL-MCNC: 85 MG/DL (ref 65–140)
GLUCOSE SERPL-MCNC: 93 MG/DL (ref 65–140)
GLUCOSE SERPL-MCNC: 93 MG/DL (ref 65–140)
HCT VFR BLD AUTO: 41.4 % (ref 36.5–49.3)
HGB BLD-MCNC: 13.5 G/DL (ref 12–17)
IMM GRANULOCYTES # BLD AUTO: 0.04 THOUSAND/UL (ref 0–0.2)
IMM GRANULOCYTES NFR BLD AUTO: 0 % (ref 0–2)
LYMPHOCYTES # BLD AUTO: 2.42 THOUSANDS/ÂΜL (ref 0.6–4.47)
LYMPHOCYTES NFR BLD AUTO: 23 % (ref 14–44)
MAGNESIUM SERPL-MCNC: 2.1 MG/DL (ref 1.9–2.7)
MCH RBC QN AUTO: 28.4 PG (ref 26.8–34.3)
MCHC RBC AUTO-ENTMCNC: 32.6 G/DL (ref 31.4–37.4)
MCV RBC AUTO: 87 FL (ref 82–98)
MONOCYTES # BLD AUTO: 0.74 THOUSAND/ÂΜL (ref 0.17–1.22)
MONOCYTES NFR BLD AUTO: 7 % (ref 4–12)
NEUTROPHILS # BLD AUTO: 7.17 THOUSANDS/ÂΜL (ref 1.85–7.62)
NEUTS SEG NFR BLD AUTO: 67 % (ref 43–75)
NRBC BLD AUTO-RTO: 0 /100 WBCS
PHOSPHATE SERPL-MCNC: 4.5 MG/DL (ref 2.3–4.1)
PLATELET # BLD AUTO: 341 THOUSANDS/UL (ref 149–390)
PMV BLD AUTO: 8.7 FL (ref 8.9–12.7)
POTASSIUM SERPL-SCNC: 4 MMOL/L (ref 3.5–5.3)
PROCALCITONIN SERPL-MCNC: 0.08 NG/ML
PROT SERPL-MCNC: 7.2 G/DL (ref 6.4–8.4)
RBC # BLD AUTO: 4.75 MILLION/UL (ref 3.88–5.62)
SODIUM SERPL-SCNC: 139 MMOL/L (ref 135–147)
WBC # BLD AUTO: 10.62 THOUSAND/UL (ref 4.31–10.16)

## 2024-07-11 PROCEDURE — 0QBM0ZZ EXCISION OF LEFT TARSAL, OPEN APPROACH: ICD-10-PCS | Performed by: PODIATRIST

## 2024-07-11 PROCEDURE — 87205 SMEAR GRAM STAIN: CPT | Performed by: PODIATRIST

## 2024-07-11 PROCEDURE — 88304 TISSUE EXAM BY PATHOLOGIST: CPT | Performed by: PATHOLOGY

## 2024-07-11 PROCEDURE — 97530 THERAPEUTIC ACTIVITIES: CPT

## 2024-07-11 PROCEDURE — 80053 COMPREHEN METABOLIC PANEL: CPT | Performed by: INTERNAL MEDICINE

## 2024-07-11 PROCEDURE — 88311 DECALCIFY TISSUE: CPT | Performed by: PATHOLOGY

## 2024-07-11 PROCEDURE — 97168 OT RE-EVAL EST PLAN CARE: CPT

## 2024-07-11 PROCEDURE — 15275 SKIN SUB GRAFT FACE/NK/HF/G: CPT | Performed by: PODIATRIST

## 2024-07-11 PROCEDURE — 85025 COMPLETE CBC W/AUTO DIFF WBC: CPT | Performed by: INTERNAL MEDICINE

## 2024-07-11 PROCEDURE — 87186 SC STD MICRODIL/AGAR DIL: CPT | Performed by: PODIATRIST

## 2024-07-11 PROCEDURE — 87181 SC STD AGAR DILUTION PER AGT: CPT | Performed by: PODIATRIST

## 2024-07-11 PROCEDURE — 73630 X-RAY EXAM OF FOOT: CPT

## 2024-07-11 PROCEDURE — 28122 PARTIAL REMOVAL OF FOOT BONE: CPT | Performed by: PODIATRIST

## 2024-07-11 PROCEDURE — 87075 CULTR BACTERIA EXCEPT BLOOD: CPT | Performed by: PODIATRIST

## 2024-07-11 PROCEDURE — 97164 PT RE-EVAL EST PLAN CARE: CPT

## 2024-07-11 PROCEDURE — 84100 ASSAY OF PHOSPHORUS: CPT | Performed by: INTERNAL MEDICINE

## 2024-07-11 PROCEDURE — 0HRNXK3 REPLACEMENT OF LEFT FOOT SKIN WITH NONAUTOLOGOUS TISSUE SUBSTITUTE, FULL THICKNESS, EXTERNAL APPROACH: ICD-10-PCS | Performed by: PODIATRIST

## 2024-07-11 PROCEDURE — 82948 REAGENT STRIP/BLOOD GLUCOSE: CPT

## 2024-07-11 PROCEDURE — 87077 CULTURE AEROBIC IDENTIFY: CPT | Performed by: PODIATRIST

## 2024-07-11 PROCEDURE — 99223 1ST HOSP IP/OBS HIGH 75: CPT | Performed by: INTERNAL MEDICINE

## 2024-07-11 PROCEDURE — 99232 SBSQ HOSP IP/OBS MODERATE 35: CPT | Performed by: INTERNAL MEDICINE

## 2024-07-11 PROCEDURE — 87070 CULTURE OTHR SPECIMN AEROBIC: CPT | Performed by: PODIATRIST

## 2024-07-11 PROCEDURE — 83735 ASSAY OF MAGNESIUM: CPT | Performed by: INTERNAL MEDICINE

## 2024-07-11 PROCEDURE — 84145 PROCALCITONIN (PCT): CPT | Performed by: INTERNAL MEDICINE

## 2024-07-11 DEVICE — IMPLANTABLE DEVICE: Type: IMPLANTABLE DEVICE | Site: FOOT | Status: FUNCTIONAL

## 2024-07-11 RX ORDER — MAGNESIUM HYDROXIDE 1200 MG/15ML
LIQUID ORAL AS NEEDED
Status: DISCONTINUED | OUTPATIENT
Start: 2024-07-11 | End: 2024-07-11 | Stop reason: HOSPADM

## 2024-07-11 RX ORDER — OXYCODONE HYDROCHLORIDE AND ACETAMINOPHEN 5; 325 MG/1; MG/1
1 TABLET ORAL EVERY 4 HOURS PRN
Status: DISCONTINUED | OUTPATIENT
Start: 2024-07-11 | End: 2024-07-18 | Stop reason: HOSPADM

## 2024-07-11 RX ORDER — PROPOFOL 10 MG/ML
INJECTION, EMULSION INTRAVENOUS CONTINUOUS PRN
Status: DISCONTINUED | OUTPATIENT
Start: 2024-07-11 | End: 2024-07-11

## 2024-07-11 RX ORDER — ONDANSETRON 2 MG/ML
4 INJECTION INTRAMUSCULAR; INTRAVENOUS ONCE AS NEEDED
Status: DISCONTINUED | OUTPATIENT
Start: 2024-07-11 | End: 2024-07-11 | Stop reason: HOSPADM

## 2024-07-11 RX ORDER — SODIUM CHLORIDE, SODIUM LACTATE, POTASSIUM CHLORIDE, CALCIUM CHLORIDE 600; 310; 30; 20 MG/100ML; MG/100ML; MG/100ML; MG/100ML
INJECTION, SOLUTION INTRAVENOUS CONTINUOUS PRN
Status: DISCONTINUED | OUTPATIENT
Start: 2024-07-11 | End: 2024-07-11

## 2024-07-11 RX ORDER — SODIUM CHLORIDE 9 MG/ML
INJECTION, SOLUTION INTRAVENOUS AS NEEDED
Status: DISCONTINUED | OUTPATIENT
Start: 2024-07-11 | End: 2024-07-11 | Stop reason: HOSPADM

## 2024-07-11 RX ADMIN — INSULIN LISPRO 14 UNITS: 100 INJECTION, SOLUTION INTRAVENOUS; SUBCUTANEOUS at 13:49

## 2024-07-11 RX ADMIN — CEFEPIME HYDROCHLORIDE 2000 MG: 2 INJECTION, SOLUTION INTRAVENOUS at 00:10

## 2024-07-11 RX ADMIN — VANCOMYCIN HYDROCHLORIDE 1250 MG: 1 INJECTION, POWDER, LYOPHILIZED, FOR SOLUTION INTRAVENOUS at 06:07

## 2024-07-11 RX ADMIN — SODIUM CHLORIDE, SODIUM LACTATE, POTASSIUM CHLORIDE, AND CALCIUM CHLORIDE: .6; .31; .03; .02 INJECTION, SOLUTION INTRAVENOUS at 10:06

## 2024-07-11 RX ADMIN — INSULIN GLARGINE 50 UNITS: 100 INJECTION, SOLUTION SUBCUTANEOUS at 21:20

## 2024-07-11 RX ADMIN — PHENYLEPHRINE HYDROCHLORIDE 50 MCG/MIN: 10 INJECTION INTRAVENOUS at 10:21

## 2024-07-11 RX ADMIN — PROPOFOL 50 MG: 10 INJECTION, EMULSION INTRAVENOUS at 10:10

## 2024-07-11 RX ADMIN — PROPOFOL 150 MCG/KG/MIN: 10 INJECTION, EMULSION INTRAVENOUS at 10:09

## 2024-07-11 RX ADMIN — ATORVASTATIN CALCIUM 80 MG: 40 TABLET, FILM COATED ORAL at 17:25

## 2024-07-11 RX ADMIN — INSULIN LISPRO 14 UNITS: 100 INJECTION, SOLUTION INTRAVENOUS; SUBCUTANEOUS at 17:27

## 2024-07-11 RX ADMIN — VANCOMYCIN HYDROCHLORIDE 1250 MG: 1 INJECTION, POWDER, LYOPHILIZED, FOR SOLUTION INTRAVENOUS at 19:49

## 2024-07-11 RX ADMIN — CEFEPIME HYDROCHLORIDE 2000 MG: 2 INJECTION, SOLUTION INTRAVENOUS at 13:44

## 2024-07-11 NOTE — PLAN OF CARE
Problem: PHYSICAL THERAPY ADULT  Goal: Performs mobility at highest level of function for planned discharge setting.  See evaluation for individualized goals.  Description: Treatment/Interventions: Functional transfer training, LE strengthening/ROM, Therapeutic exercise, Endurance training, Bed mobility, Gait training          See flowsheet documentation for full assessment, interventions and recommendations.  Outcome: Progressing  Note: Prognosis: Good  Problem List: Decreased strength, Decreased endurance, Impaired balance, Decreased mobility, Orthopedic restrictions  Assessment: Pt seen this date for PT re-evaluation s/p L foot debridement. Pt currently able to perform all functional mobility with SUP-Bora, RW, and increased time. Occasional verbal cuing provided for safety awareness and sequencing. Ambulation limited to short functional distance from bed to chair to limit excessive weight bearing on surgical sight; no true LOB experienced. HR and SpO2 remained WFL on RA throughout. The patient's AM-PAC Basic Mobility Inpatient Short Form Raw Score is 17. A Raw score of greater than 16 suggests the patient may benefit from discharge to home. Please also refer to the recommendation of the Physical Therapist for safe discharge planning. Co treatment with OT secondary to complex medical condition of pt, possible A of 2 required to achieve and maintain transitional movements, requiring the need of skilled therapeutic intervention of 2 therapists to achieve delivery of services. Continued PT intervention indicated to address remaining deficits, increase LOF, and facilitate safe d/c to next level of care when medically appropriate.        Rehab Resource Intensity Level, PT: II (Moderate Resource Intensity)    See flowsheet documentation for full assessment.

## 2024-07-11 NOTE — PROGRESS NOTES
"[facility]  Progress Note  Name: Art Joseph I  MRN: [id number]  Unit/Bed#: 424-01 I Date of Admission: 7/8/2024   Date of Service: 7/11/2024 I Hospital Day: 3    Assessment & Plan   * Open wound of left foot  Assessment & Plan  Recent hospitalization from 06/25/2024 through 06/28/2024 for osteomyelitis of the left foot requiring a left 1st metatarsal amputation/resection on 06/27/2024 by Podiatry  Now with a left wound infection and associated cellulitis with probable underlying osteomyelitis  Also found to have maggots in his open wound  I appreciate Infectious Disease's input and Podiatry's input.  Checked a left foot wound culture on 07/09/2024, which is positive for Proteus vulgaris and Staphylococcus aureus with final results pending  Being treated with IV vancomycin  IV cefepime was added on 07/10/2024 per Infectious Disease's recommendations based on the left foot wound culture result  For left foot wound debridement and possible bone resection by Podiatry on 07/11/2024    MRI of the left foot with contrast (07/10/2024):  IMPRESSION:     1. Heterogeneous predominately T2 hyperintense subcutaneous collection just deep to the dorsal and medial skin wound extending down to the subjacent medial cuneiform. There is increased T2 signal within the subjacent medial cuneiform and faint low T1   signal. Findings are suspicious for early osteomyelitis.  2. No additional marrow signal abnormality to suggest other areas of osteomyelitis.    Osteomyelitis of left foot (HCC)  Assessment & Plan  Please see the assessment and plan for \"Open wound of left foot\"    Maggot infestation  Assessment & Plan  Please see the assessment and plan for \"Open wound of left foot\"      Hyperphosphatemia  Assessment & Plan  No treatment is indicated at this time  Follow the phosphorus level    Hypercholesteremia  Assessment & Plan  Continue statin therapy    Primary hypertension  Assessment & Plan  Continue PO " "lisinopril  Follow the blood pressure trend    Ambulatory dysfunction  Assessment & Plan  PT/OT evaluations    Hx of right BKA (HCC)  Assessment & Plan  Outpatient follow-up with Vascular Surgery    Type 2 diabetes mellitus with foot ulcer, with long-term current use of insulin (HCC)  Assessment & Plan  Lab Results   Component Value Date    HGBA1C 9.7 (H) 06/25/2024       Recent Labs     07/10/24  1600 07/10/24  2042 07/11/24  0659 07/11/24  1149   POCGLU 146* 181* 93 93         Blood Sugar Average: Last 72 hrs:  (P) [id number]    Continue Lantus 50 Units SQ QHS and Humalog 14 Units TID with meals  ISS with blood glucose monitoring ACHS  Continue PO lisinopril for renal protection  Hypoglycemia protocol  Outpatient Endocrinology evaluation          Subjective/Objective     Subjective:   The patient was seen and examined.  The patient is doing better.  No fevers or chills.  No extremity pain.  No chest pain.  No shortness of breath.  No abdominal pain.  No nausea or vomiting.      Objective:  Vitals: Blood pressure 120/63, pulse 75, temperature (!) 97.2 °F (36.2 °C), resp. rate 16, height 5' 10\" (1.778 m), weight 97.7 kg (215 lb 6.2 oz), SpO2 97%.,Body mass index is 30.91 kg/m².      Intake/Output Summary (Last 24 hours) at 7/11/2024 1253  Last data filed at 7/11/2024 1249  Gross per 24 hour   Intake 1160 ml   Output 2250 ml   Net -1090 ml       Invasive Devices       Peripheral Intravenous Line  Duration             Peripheral IV 07/09/24 Distal;Dorsal (posterior);Right Forearm 1 day                    Physical Exam:  General:  NAD, follows commands  HEENT:  NC/AT, mucous membranes moist  Neck:  Supple, No JVP elevation  CV:  + S1, + S2, RRR  Pulm:  Lung fields are CTA bilaterally  Abd:  Soft, Non-tender, Non-distended  Ext:  No clubbing/cyanosis/edema, Left foot wound dressing intact  Skin:  No rashes  Neuro:  Awake, alert, oriented  Psych:  Normal mood and affect    Results from last 7 days   Lab Units " 07/11/24  0559 07/10/24  0519 07/08/24 2031 07/08/24  1802   WBC Thousand/uL 10.62* 7.44  --  7.97   HEMOGLOBIN g/dL 13.5 13.0  --  13.4   HEMATOCRIT % 41.4 39.7  --  40.7   PLATELETS Thousands/uL 341 294 322 342     Results from last 7 days   Lab Units 07/11/24  0559 07/10/24  0519 07/08/24  1802   SODIUM mmol/L 139 138 138   POTASSIUM mmol/L 4.0 3.8 4.2   CHLORIDE mmol/L 105 105 103   CO2 mmol/L 23 24 25   BUN mg/dL 14 12 16   CREATININE mg/dL 0.77 0.79 0.83   CALCIUM mg/dL 9.2 9.0 9.3     Results from last 7 days   Lab Units 07/11/24  0559 07/10/24  0519   MAGNESIUM mg/dL 2.1 2.0     Results from last 7 days   Lab Units 07/11/24  0559 07/10/24  0519 07/08/24  1802   ALK PHOS U/L 120* 124* 141*   ALT U/L 10 7 8   AST U/L 12* 9* 10*       Lab, Imaging and other studies: I have personally reviewed pertinent reports.    VTE Pharmacologic Prophylaxis: Enoxaparin (Lovenox)  VTE Mechanical Prophylaxis: sequential compression device on the left lower extremity only.  No SCD on the right lower extremity with the patient having a right-sided BKA.

## 2024-07-11 NOTE — ASSESSMENT & PLAN NOTE
Recent hospitalization from 06/25/2024 through 06/28/2024 for osteomyelitis of the left foot requiring a left 1st metatarsal amputation/resection on 06/27/2024 by Podiatry  Now with a left wound infection and associated cellulitis with probable underlying osteomyelitis  Also found to have maggots in his open wound  I appreciate Infectious Disease's input and Podiatry's input.  Checked a left foot wound culture on 07/09/2024, which is positive for Proteus vulgaris and Staphylococcus aureus with final results pending  Being treated with IV vancomycin  IV cefepime was added on 07/10/2024 per Infectious Disease's recommendations based on the left foot wound culture result  For left foot wound debridement and possible bone resection by Podiatry on 07/11/2024    MRI of the left foot with contrast (07/10/2024):  IMPRESSION:     1. Heterogeneous predominately T2 hyperintense subcutaneous collection just deep to the dorsal and medial skin wound extending down to the subjacent medial cuneiform. There is increased T2 signal within the subjacent medial cuneiform and faint low T1   signal. Findings are suspicious for early osteomyelitis.  2. No additional marrow signal abnormality to suggest other areas of osteomyelitis.

## 2024-07-11 NOTE — OCCUPATIONAL THERAPY NOTE
Occupational Therapy Re-Evaluation     Patient Name: Art Joseph  Today's Date: 7/11/2024  Problem List  Principal Problem:    Open wound of left foot  Active Problems:    Type 2 diabetes mellitus with foot ulcer, with long-term current use of insulin (HCC)    Acute osteomyelitis of metatarsal bone of left foot (HCC)    Hx of right BKA (HCC)    Ambulatory dysfunction    Primary hypertension    Hypercholesteremia    Maggot infestation    Hyperphosphatemia    Osteomyelitis of left foot (HCC)    Past Medical History  Past Medical History:   Diagnosis Date    Diabetes mellitus (HCC)     Hypertension      Past Surgical History  Past Surgical History:   Procedure Laterality Date    FOOT AMPUTATION Left 6/27/2024    Procedure: AMPUTATION LEFT FOOT 1st METATARSAL RESECTION;  Surgeon: Myron Bhakta DPM;  Location: [facility];  Service: Podiatry    LEG AMPUTATION THROUGH LOWER TIBIA AND FIBULA Right 7/25/2017    Procedure: AMPUTATION BELOW KNEE (BKA);  Surgeon: Mynor Sanchez MD;  Location: [facility];  Service: General    ND AMPUTATION FOOT TRANSMETARSAL Right 1/26/2017    Procedure: AMPUTATION TRANSMETATARSAL (TMA); OPEN;  Surgeon: Leonard Lomeli DPM;  Location: [facility];  Service: Podiatry    ND AMPUTATION FOOT TRANSMETARSAL Left 4/26/2024    Procedure: LEFT FOOT PARTIAL FIRST RAY AMPUTATION;  Surgeon: Jennifer Rubalcava DPM;  Location: [facility];  Service: Podiatry    ND AMPUTATION METATARSAL W/TOE SINGLE Left 1/30/2017    Procedure: Left partial 1st ray amputation;  Surgeon: Leonard Lomeli DPM;  Location: [facility];  Service: Podiatry    ND COLONOSCOPY FLX DX W/COLLJ SPEC WHEN PFRMD N/A 11/28/2018    Procedure: COLONOSCOPY;  Surgeon: González Napier MD;  Location: [facility];  Service: Gastroenterology    WOUND DEBRIDEMENT Right 1/30/2017    Procedure: DEBRIDEMENT FOOT/TOE (WASH OUT) delayed closure, LINDA;  Surgeon: Leonard Lomeli DPM;  Location: [facility];  Service:              07/11/24 1435   OT Last  "Visit   OT Visit Date 07/11/24   Note Type   Note type Re-Evaluation   Pain Assessment   Pain Score No Pain   Restrictions/Precautions   Weight Bearing Precautions Per Order Yes   LLE Weight Bearing Per Order WBAT   Braces or Orthoses Other (Comment)  (surgical shoe)   Other Precautions WBS;Chair Alarm;Bed Alarm;Fall Risk   Home Living   Additional Comments see initial evaluation for details   Prior Function   Comments see initial evaluation for details   Subjective   Subjective \"everything turned out okay\"   ADL   Where Assessed Edge of bed   LB Dressing Assistance 3  Moderate Assistance   LB Dressing Deficit Don/doff L shoe  (able to don R LE prosthetic with (S) level)   Additional Comments pt dons underwear during session as well with (S) level   Bed Mobility   Supine to Sit 5  Supervision   Additional items Bedrails;Leg ;Verbal cues   Sit to Supine   (seated in chair at end of session)   Additional Comments pt on RA during session; SpO2 WFL with no complaitns of SOB   Transfers   Sit to Stand 5  Supervision   Additional items Increased time required;Verbal cues  (RW)   Stand to Sit 5  Supervision   Additional items Increased time required;Verbal cues  (RW)   Stand pivot 5  Supervision   Additional items Increased time required;Verbal cues  (RW)   Additional Comments pt with RW during functional transfers; no significant LOB or instability   Functional Mobility   Additional Comments pt does not perform functional mobility this session; performs SPT to chair; attempt to limit mobility due to recurrent wound   Balance   Static Sitting Good   Dynamic Sitting Good   Static Standing Fair   Dynamic Standing Fair -   Ambulatory Fair -   Activity Tolerance   Activity Tolerance Patient limited by fatigue   RUE Assessment   RUE Assessment WFL   LUE Assessment   LUE Assessment WFL   Hand Function   Gross Motor Coordination Functional   Fine Motor Coordination Functional   Sensation   Light Touch No apparent deficits "   Sharp/Dull No apparent deficits   Psychosocial   Psychosocial (WDL) WDL   Cognition   Overall Cognitive Status WFL   Arousal/Participation Alert   Attention Within functional limits   Orientation Level Oriented X4   Memory Within functional limits   Following Commands Follows all commands and directions without difficulty   Assessment   Limitation Decreased ADL status;Decreased UE strength;Decreased Safe judgement during ADL;Decreased endurance;Decreased self-care trans;Decreased high-level ADLs   Assessment Pt is a 64 y.o. male seen for OT evaluation s/p admit to [facility] on 7/8/2024 w/ Open wound of left foot.  Comorbidities affecting pt's functional performance at time of assessment include:  DM, osteomyelitis, R BKA, HTN, maggot infestation, open wound of L foot, hyperphosphatemia, osteomyelitis of L foot . Personal factors affecting pt at time of IE include:limited home support, difficulty performing ADLS, difficulty performing IADLS , limited insight into deficits, compliance, decreased initiation and engagement , and health management . Prior to admission, pt was (I) with ADLs and IADLs with use of quad cane during mobility. Upon evaluation: Pt requires (S)-mod (A) level with use of RW during functional mobility 2* the following deficits impacting occupational performance: weakness, decreased strength, decreased balance, decreased tolerance, impaired initiation, decreased safety awareness, and orthopedic restrictions. Pt to benefit from continued skilled OT tx while in the hospital to address deficits as defined above and maximize level of functional independence w ADL's and functional mobility. Occupational Performance areas to address include: grooming, bathing/shower, toilet hygiene, dressing, functional mobility, community mobility, and clothing management. The patient's raw score on the -PAC Daily Activity Inpatient Short Form is 19. A raw score of greater than or equal to 19 suggests the patient may  benefit from discharge to post-acute rehabilitation services. Discharge recommendation at this time is level II moderate resource intensity.  Pt benefited from co-evaluation of skilled OT and PT therapists in order to most appropriately address functional deficits d/t extensive assistance required for safe functional mobility, decreased activity tolerance, and regression from functioning level prior to admission and/or onset of present illness. OT/PT objectives were addressed separately; please see PT note for specific goal areas targeted.   Goals   Patient Goals to go home   Short Term Goal  pt will perform UE strengthening exercises   Long Term Goal #1 pt will perform functional mobility with RW at mod (I) level   Long Term Goal #2 pt will demonstrate toilet transfers and hygiene at (I) level   Long Term Goal pt will demonstrate UB/LB bathing and grooming tasks at (I) level   Plan   Treatment Interventions ADL retraining;Functional transfer training;UE strengthening/ROM;Endurance training;Patient/family training;Equipment evaluation/education;Compensatory technique education;Activityengagement   Goal Expiration Date 07/25/24   OT Frequency 3-5x/wk   Discharge Recommendation   Rehab Resource Intensity Level, OT II (Moderate Resource Intensity)   AM-PAC Daily Activity Inpatient   Lower Body Dressing 2   Bathing 2   Toileting 3   Upper Body Dressing 4   Grooming 4   Eating 4   Daily Activity Raw Score 19   Daily Activity Standardized Score (Calc for Raw Score >=11) 40.22   AM-PAC Applied Cognition Inpatient   Following a Speech/Presentation 4   Understanding Ordinary Conversation 4   Taking Medications 4   Remembering Where Things Are Placed or Put Away 4   Remembering List of 4-5 Errands 3   Taking Care of Complicated Tasks 3   Applied Cognition Raw Score 22   Applied Cognition Standardized Score 47.83

## 2024-07-11 NOTE — PROGRESS NOTES
Art Joseph is a 64 y.o. male who is currently ordered Vancomycin IV with management by the Pharmacy Consult service.  Relevant clinical data and objective / subjective history reviewed.  Vancomycin Assessment:  Indication and Goal AUC/Trough: Bone/joint infection (goal -600, trough >10), -600, trough >10  Clinical Status: stable  Micro:     Renal Function:  SCr: 0.77 mg/dL  CrCl: 112.1 mL/min  Renal replacement: Not on dialysis  Days of Therapy: 4  Current Dose: 1250mg q12h  Vancomycin Plan:  New Dosing: same  Estimated AUC: 467 mcg*hr/mL  Estimated Trough: 12.7 mcg/mL  Next Level: 0600 on 7/13/24  Renal Function Monitoring: Daily BMP and UOP  Pharmacy will continue to follow closely for s/sx of nephrotoxicity, infusion reactions and appropriateness of therapy.  BMP and CBC will be ordered per protocol. We will continue to follow the patient’s culture results and clinical progress daily.    Jeff Granados, Pharmacist

## 2024-07-11 NOTE — DISCHARGE INSTR - OTHER ORDERS
Podiatry: Please change dsg to the left foot with adaptic, 4x4, abd, tristen every other day beginning on 7/18. WBAt to the left foot in sx shoe.

## 2024-07-11 NOTE — PLAN OF CARE
Problem: METABOLIC, FLUID AND ELECTROLYTES - ADULT  Goal: Electrolytes maintained within normal limits  Description: INTERVENTIONS:  - Monitor labs and assess patient for signs and symptoms of electrolyte imbalances  - Administer electrolyte replacement as ordered  - Monitor response to electrolyte replacements, including repeat lab results as appropriate  - Instruct patient on fluid and nutrition as appropriate  Outcome: Progressing     Problem: SKIN/TISSUE INTEGRITY - ADULT  Goal: Skin Integrity remains intact(Skin Breakdown Prevention)  Description: Assess:  -Perform Zaid assessment every 4 hrs  -Clean and moisturize skin every 4 hrs  -Inspect skin when repositioning, toileting, and assisting with ADLS  -Assess under medical devices such as mosimo every 4 hrs  -Assess extremities for adequate circulation and sensation     Bed Management:  -Have minimal linens on bed & keep smooth, unwrinkled  -Change linens as needed when moist or perspiring  -Avoid sitting or lying in one position for more than 4 hours while in bed  -Keep HOB at 30degrees     Toileting:  -Offer bedside commode  -Assess for incontinence every 4 hrs  -Use incontinent care products after each incontinent episode such as lotion and spray    Activity:  -Mobilize patient 3 times a day  -Encourage activity and walks on unit  -Encourage or provide ROM exercises   -Turn and reposition patient every 4 Hours  -Use appropriate equipment to lift or move patient in bed  -Instruct/ Assist with weight shifting every 4hrs when out of bed in chair  -Consider limitation of chair time 2 hour intervals    Skin Care:  -Avoid use of baby powder, tape, friction and shearing, hot water or constrictive clothing  -Relieve pressure over bony prominences using lotion  -Do not massage red bony areas    Next Steps:  -Teach patient strategies to minimize risks such as getting oob   -Consider consults to  interdisciplinary teams such as pt/ot  Outcome: Progressing  Goal:  Incision(s), wounds(s) or drain site(s) healing without S/S of infection  Description: INTERVENTIONS  - Assess and document dressing, incision, wound bed, drain sites and surrounding tissue  - Provide patient and family education  - Perform skin care/dressing changes every 4 hrs  Outcome: Progressing     Problem: MUSCULOSKELETAL - ADULT  Goal: Maintain or return mobility to safest level of function  Description: INTERVENTIONS:  - Assess patient's ability to carry out ADLs; assess patient's baseline for ADL function and identify physical deficits which impact ability to perform ADLs (bathing, care of mouth/teeth, toileting, grooming, dressing, etc.)  - Assess/evaluate cause of self-care deficits   - Assess range of motion  - Assess patient's mobility  - Assess patient's need for assistive devices and provide as appropriate  - Encourage maximum independence but intervene and supervise when necessary  - Involve family in performance of ADLs  - Assess for home care needs following discharge   - Consider OT consult to assist with ADL evaluation and planning for discharge  - Provide patient education as appropriate  Outcome: Progressing  Goal: Maintain proper alignment of affected body part  Description: INTERVENTIONS:  - Support, maintain and protect limb and body alignment  - Provide patient/ family with appropriate education  Outcome: Progressing     Problem: PAIN - ADULT  Goal: Verbalizes/displays adequate comfort level or baseline comfort level  Description: Interventions:  - Encourage patient to monitor pain and request assistance  - Assess pain using appropriate pain scale  - Administer analgesics based on type and severity of pain and evaluate response  - Implement non-pharmacological measures as appropriate and evaluate response  - Consider cultural and social influences on pain and pain management  - Notify physician/advanced practitioner if interventions unsuccessful or patient reports new pain  Outcome:  Progressing     Problem: INFECTION - ADULT  Goal: Absence or prevention of progression during hospitalization  Description: INTERVENTIONS:  - Assess and monitor for signs and symptoms of infection  - Monitor lab/diagnostic results  - Monitor all insertion sites, i.e. indwelling lines, tubes, and drains  - Monitor endotracheal if appropriate and nasal secretions for changes in amount and color  - [place] appropriate cooling/warming therapies per order  - Administer medications as ordered  - Instruct and encourage patient and family to use good hand hygiene technique  - Identify and instruct in appropriate isolation precautions for identified infection/condition  Outcome: Progressing     Problem: SAFETY ADULT  Goal: Patient will remain free of falls  Description: INTERVENTIONS:  - Educate patient/family on patient safety including physical limitations  - Instruct patient to call for assistance with activity   - Consult OT/PT to assist with strengthening/mobility   - Keep Call bell within reach  - Keep bed low and locked with side rails adjusted as appropriate  - Keep care items and personal belongings within reach  - Initiate and maintain comfort rounds  - Make Fall Risk Sign visible to staff  - Offer Toileting every 2 Hours, in advance of need  - Initiate/Maintain bed/chair alarm  - Obtain necessary fall risk management equipment: alarms  - Apply yellow socks and bracelet for high fall risk patients  - Consider moving patient to room near nurses station  Outcome: Progressing     Problem: DISCHARGE PLANNING  Goal: Discharge to home or other facility with appropriate resources  Description: INTERVENTIONS:  - Identify barriers to discharge w/patient and caregiver  - Arrange for needed discharge resources and transportation as appropriate  - Identify discharge learning needs (meds, wound care, etc.)  - Arrange for interpretive services to assist at discharge as needed  - Refer to Case Management Department for  coordinating discharge planning if the patient needs post-hospital services based on physician/advanced practitioner order or complex needs related to functional status, cognitive ability, or social support system  Outcome: Progressing     Problem: Knowledge Deficit  Goal: Patient/family/caregiver demonstrates understanding of disease process, treatment plan, medications, and discharge instructions  Description: Complete learning assessment and assess knowledge base.  Interventions:  - Provide teaching at level of understanding  - Provide teaching via preferred learning methods  Outcome: Progressing     Problem: Prexisting or High Potential for Compromised Skin Integrity  Goal: Skin integrity is maintained or improved  Description: INTERVENTIONS:  - Identify patients at risk for skin breakdown  - Assess and monitor skin integrity  - Assess and monitor nutrition and hydration status  - Monitor labs   - Assess for incontinence   - Turn and reposition patient  - Assist with mobility/ambulation  - Relieve pressure over bony prominences  - Avoid friction and shearing  - Provide appropriate hygiene as needed including keeping skin clean and dry  - Evaluate need for skin moisturizer/barrier cream  - Collaborate with interdisciplinary team   - Patient/family teaching  - Consider wound care consult   Outcome: Progressing

## 2024-07-11 NOTE — ANESTHESIA POSTPROCEDURE EVALUATION
Please order Lumbar MRI for lumbar radiculopathy of low back pain radiating to her buttocks.    Post-Op Assessment Note    CV Status:  Stable    Pain management: adequate       Mental Status:  Awake   Hydration Status:  Stable   PONV Controlled:  None  Two or more mitigation strategies used for obstructive sleep apnea  No anethesia notable event occurred.    Staff: CRNA               /57 (07/11/24 1120)    Temp   98..5   Pulse 75 (07/11/24 1120)   Resp 19 (07/11/24 1120)    SpO2 94 % (07/11/24 1120)

## 2024-07-11 NOTE — ASSESSMENT & PLAN NOTE
Lab Results   Component Value Date    HGBA1C 9.7 (H) 06/25/2024       Recent Labs     07/10/24  1600 07/10/24  2042 07/11/24  0659 07/11/24  1149   POCGLU 146* 181* 93 93         Blood Sugar Average: Last 72 hrs:  (P) [id number]    Continue Lantus 50 Units SQ QHS and Humalog 14 Units TID with meals  ISS with blood glucose monitoring ACHS  Continue PO lisinopril for renal protection  Hypoglycemia protocol  Outpatient Endocrinology evaluation

## 2024-07-11 NOTE — OP NOTE
OPERATIVE REPORT - Podiatry  PATIENT NAME: Art Joseph    :  1960  MRN: [id number]  Pt Location: [facility] OR ROOM 01    SURGERY DATE: 2024    Surgeons and Role:     * Myron Bhakta DPM - Primary     * Javi Meyer DPM - Assisting    Pre-op Diagnosis:  Maggot infestation [B87.9]  Acute osteomyelitis of metatarsal bone of left foot (HCC) [M86.172]    Post-Op Diagnosis Codes:     * Maggot infestation [B87.9]     * Acute osteomyelitis of metatarsal bone of left foot (HCC) [M86.172]    Procedure(s) (LRB):  DEBRIDEMENT FOOT/TOE (WASH OUT) (Left)    Specimen(s):  ID Type Source Tests Collected by Time Destination   1 : left medial cuneiform Other Bone NON-GYNECOLOGIC CYTOLOGY Myron Bhakta DPM 2024 1049    A : clean margins left foot wound Tissue Foot, Left ANAEROBIC CULTURE AND GRAM STAIN, CULTURE, TISSUE AND GRAM STAIN Myron Bhakta DPM 2024 1042    C : left foot wound Tissue Foot, Left ANAEROBIC CULTURE AND GRAM STAIN, CULTURE, TISSUE AND GRAM STAIN Myron Bhakta DPM 2024 1042        Estimated Blood Loss:   Minimal    Drains:  * No LDAs found *    Anesthesia Type:   Conscious Sedation  with 10 ml of 2% Lidocaine and 0.25% Bupivacaine in a 1:1 mixture    Hemostasis:  Mechanical    Materials:  Implant Name Type Inv. Item Serial No.  Lot No. LRB No. Used Action   (18SQ CM) ALLOGRAFT STRAVIX MESHED 3 X 6CM - [id number]  (18SQ CM) ALLOGRAFT STRAVIX MESHED 3 X 6CM  [employer]  Left 1 Implanted     0-0 Chromic Gut  2-0 nylon    Operative Findings:  1.  Surgical cure not definitive, follow-up pathology and clean margin results  2.  Adequate bleeding noted to soft tissue and bone  3.  Distal aspect of residual medial cuneiform was resected  4.  The distal most and proximal most aspects of the surgical wound were closed using 2-0 nylon  5. Stravix graft was applied to the wound and secured using 0 chromic gut    Complications:    None    Procedure and Technique:     Under mild sedation, the patient was brought into the operating room and placed on the hospital stretcher in the supine position. IV sedation was achieved by anesthesia team and a universal timeout was performed where all parties are in agreement of correct patient, correct procedure and correct site. A Marc block was performed consisting of 10 ml of 2% Lidocaine and 0.25% Bupivacaine in a 1:1 mixture. The foot was then prepped and draped in the usual aseptic manner.  A pneumatic tourniquet was not applied to the left lower extremity for this procedure    Attention was directed to the wound dehiscence located at the medial aspect of the left foot along the prior first ray resection surgical site.  Utilizing a 15 blade, the area of wound dehiscence was excised in its entirety and the incision carried down to bone.  Careful attention was taken to remove non-viable soft tissue until the wound bed consisted of brisk bleeding healthy granular tissue.  Attention was then directed to the medial cuneiform which was exposed at the base of the surgical wound.  Surrounding soft tissue structures were freed using a key elevator.  A dorsal to plantar transverse cut was made utilizing a sagittal saw.  The the specimen was passed off the table and sent to pathology for routine specimen.  Clean margin was then collected utilizing a rongeur and sent for culture.  The surgical site was then flushed with copious amounts of sterile saline.  Hemostasis was achieved utilizing pressure.  A fenestrated Stravix graft was then applied and secured using 0-0 Chromic Gut.  The left foot was then cleansed and dried using sterile saline and sterile laps.  The surgical site was then dressed utilizing Adaptic and a bolster dressing consisting of 4 x 4 gauze, ABD pads, Kerlix, and Ace wrap.    The patient tolerated the procedure and anesthesia well without immediate complications and transferred to PACU with  "vital signs stable.     As with many limb salvage procedures, we contemplate the possibility of performing further stages to this procedure. Procedures may include debridements, delayed closure, plastic surgery techniques, or more proximal amputations. This procedure may be considered part of a multi-staged limb salvage treatment plan.     Dr. Bhakta was present during the entire procedure and participated in all key aspects.    SIGNATURE: Javi Meyer DPM  DATE: July 11, 2024  TIME: 11:05 AM      Portions of the record may have been created with voice recognition software. Occasional wrong word or \"sound a like\" substitutions may have occurred due to the inherent limitations of voice recognition software. Read the chart carefully and recognize, using context, where substitutions have occurred.\              "

## 2024-07-11 NOTE — PHYSICAL THERAPY NOTE
Physical Therapy Re-Evaluation    Patient Name: Art Joseph    Today's Date: 7/11/2024     Problem List  Principal Problem:    Open wound of left foot  Active Problems:    Type 2 diabetes mellitus with foot ulcer, with long-term current use of insulin (HCC)    Acute osteomyelitis of metatarsal bone of left foot (HCC)    Hx of right BKA (HCC)    Ambulatory dysfunction    Primary hypertension    Hypercholesteremia    Maggot infestation    Hyperphosphatemia    Osteomyelitis of left foot (HCC)       Past Medical History  Past Medical History:   Diagnosis Date    Diabetes mellitus (HCC)     Hypertension         Past Surgical History  Past Surgical History:   Procedure Laterality Date    FOOT AMPUTATION Left 6/27/2024    Procedure: AMPUTATION LEFT FOOT 1st METATARSAL RESECTION;  Surgeon: Myron Bhakta DPM;  Location: [facility];  Service: Podiatry    LEG AMPUTATION THROUGH LOWER TIBIA AND FIBULA Right 7/25/2017    Procedure: AMPUTATION BELOW KNEE (BKA);  Surgeon: Mynor Sanchez MD;  Location: [facility];  Service: General    UT AMPUTATION FOOT TRANSMETARSAL Right 1/26/2017    Procedure: AMPUTATION TRANSMETATARSAL (TMA); OPEN;  Surgeon: Leonard Lomeli DPM;  Location: [facility];  Service: Podiatry    UT AMPUTATION FOOT TRANSMETARSAL Left 4/26/2024    Procedure: LEFT FOOT PARTIAL FIRST RAY AMPUTATION;  Surgeon: Jennifer Rubalcava DPM;  Location: [facility];  Service: Podiatry    UT AMPUTATION METATARSAL W/TOE SINGLE Left 1/30/2017    Procedure: Left partial 1st ray amputation;  Surgeon: Leonard Lomeli DPM;  Location:  MAIN OR;  Service: Podiatry    UT COLONOSCOPY FLX DX W/COLLJ SPEC WHEN PFRMD N/A 11/28/2018    Procedure: COLONOSCOPY;  Surgeon: oGnzález Napier MD;  Location: [facility];  Service: Gastroenterology    WOUND DEBRIDEMENT Right 1/30/2017    Procedure: DEBRIDEMENT FOOT/TOE (WASH OUT) delayed closure, LINDA;  Surgeon: Leonard Lomeli DPM;  Location: [facility];  Service:         07/11/24 1436   PT Last Visit   PT Visit Date 07/11/24   Note Type   Note type Re-Evaluation   Pain Assessment   Pain Assessment Tool 0-10   Pain Score No Pain   Restrictions/Precautions   Weight Bearing Precautions Per Order Yes   LLE Weight Bearing Per Order WBAT   Braces or Orthoses Other (Comment)  (L surgical shoe; R BKA prosthetic)   Other Precautions Fall Risk;Multiple lines;Bed Alarm;Chair Alarm;WBS   Home Living   Additional Comments see initial evaluation   Prior Function   Comments see intitial evaluation   General   Family/Caregiver Present No   Cognition   Overall Cognitive Status WFL   Arousal/Participation Alert   Attention Within functional limits   Orientation Level Oriented X4   Memory Within functional limits   Following Commands Follows all commands and directions without difficulty   Subjective   Subjective pt pleasant and cooperative; motivated to mobilize following surgery and attend STR when ready   RLE Assessment   RLE Assessment X  (pt s/p BKA; residual limb WFL)   LLE Assessment   LLE Assessment X  (WFL at hip and knee; NT at ankle d/t bulky dressing)   Bed Mobility   Supine to Sit 5  Supervision   Additional items Increased time required;Verbal cues;HOB elevated;Bedrails   Sit to Supine   (pt OOB at end of session)   Transfers   Sit to Stand 5  Supervision   Additional items Increased time required;Verbal cues   Stand to Sit 5  Supervision   Additional items Increased time required;Verbal cues   Additional Comments RW used   Ambulation/Elevation   Gait pattern Excessively slow;Short stride;Foward flexed;Decreased foot clearance   Gait Assistance 4  Minimal assist   Additional items Assist x 1;Verbal cues   Assistive Device Rolling walker   Distance 5'   Balance   Static Sitting Good   Dynamic Sitting Good   Static Standing Fair   Dynamic Standing Fair   Ambulatory Fair -  (with RW)   Endurance Deficit   Endurance Deficit Yes   Endurance Deficit Description pt  appears fatigued with increased mobility   Activity Tolerance   Activity Tolerance Patient limited by fatigue   Assessment   Prognosis Good   Problem List Decreased strength;Decreased endurance;Impaired balance;Decreased mobility;Orthopedic restrictions   Assessment Pt seen this date for PT re-evaluation s/p L foot debridement. Pt currently able to perform all functional mobility with SUP-Bora, RW, and increased time. Occasional verbal cuing provided for safety awareness and sequencing. Ambulation limited to short functional distance from bed to chair to limit excessive weight bearing on surgical sight; no true LOB experienced. HR and SpO2 remained WFL on RA throughout. The patient's AM-[facility] Basic Mobility Inpatient Short Form Raw Score is 17. A Raw score of greater than 16 suggests the patient may benefit from discharge to home. Please also refer to the recommendation of the Physical Therapist for safe discharge planning. Co treatment with OT secondary to complex medical condition of pt, possible A of 2 required to achieve and maintain transitional movements, requiring the need of skilled therapeutic intervention of 2 therapists to achieve delivery of services. Continued PT intervention indicated to address remaining deficits, increase LOF, and facilitate safe d/c to next level of care when medically appropriate.   Goals   LTG Expiration Date 07/25/24   Long Term Goal #1 goals set upon initial evaluation remain appropriate   PT Treatment Day 1   Plan   Treatment/Interventions Functional transfer training;LE strengthening/ROM;Therapeutic exercise;Endurance training;Bed mobility;Gait training   PT Frequency 3-5x/wk   Discharge Recommendation   Rehab Resource Intensity Level, PT II (Moderate Resource Intensity)   AM-PAC Basic Mobility Inpatient   Turning in Flat Bed Without Bedrails 3   Lying on Back to Sitting on Edge of Flat Bed Without Bedrails 3   Moving Bed to Chair 3   Standing Up From Chair Using Arms 3   Walk  in Room 3   Climb 3-5 Stairs With Railing 2   Basic Mobility Inpatient Raw Score 17   Basic Mobility Standardized Score 39.67   [facility] Highest Level Of Mobility   -[facility] Goal 5: Stand one or more mins   -[facility] Achieved 6: Walk 10 steps or more   End of Consult   Patient Position at End of Consult Bedside chair;Bed/Chair alarm activated;All needs within reach

## 2024-07-11 NOTE — PLAN OF CARE
Problem: OCCUPATIONAL THERAPY ADULT  Goal: Performs self-care activities at highest level of function for planned discharge setting.  See evaluation for individualized goals.  Description: Treatment Interventions: ADL retraining, Functional transfer training, UE strengthening/ROM, Endurance training, Patient/family training, Equipment evaluation/education, Activityengagement          See flowsheet documentation for full assessment, interventions and recommendations.   Note: Limitation: Decreased ADL status, Decreased UE strength, Decreased Safe judgement during ADL, Decreased endurance, Decreased self-care trans, Decreased high-level ADLs     Assessment: Pt is a 64 y.o. male seen for OT evaluation s/p admit to [facility] on 7/8/2024 w/ Open wound of left foot.  Comorbidities affecting pt's functional performance at time of assessment include:  DM, osteomyelitis, R BKA, HTN, maggot infestation, open wound of L foot, hyperphosphatemia, osteomyelitis of L foot . Personal factors affecting pt at time of IE include:limited home support, difficulty performing ADLS, difficulty performing IADLS , limited insight into deficits, compliance, decreased initiation and engagement , and health management . Prior to admission, pt was (I) with ADLs and IADLs with use of quad cane during mobility. Upon evaluation: Pt requires (S)-mod (A) level with use of RW during functional mobility 2* the following deficits impacting occupational performance: weakness, decreased strength, decreased balance, decreased tolerance, impaired initiation, decreased safety awareness, and orthopedic restrictions. Pt to benefit from continued skilled OT tx while in the hospital to address deficits as defined above and maximize level of functional independence w ADL's and functional mobility. Occupational Performance areas to address include: grooming, bathing/shower, toilet hygiene, dressing, functional mobility, community mobility, and clothing management. The  patient's raw score on the AM-PAC Daily Activity Inpatient Short Form is 19. A raw score of greater than or equal to 19 suggests the patient may benefit from discharge to post-acute rehabilitation services. Discharge recommendation at this time is level II moderate resource intensity.  Pt benefited from co-evaluation of skilled OT and PT therapists in order to most appropriately address functional deficits d/t extensive assistance required for safe functional mobility, decreased activity tolerance, and regression from functioning level prior to admission and/or onset of present illness. OT/PT objectives were addressed separately; please see PT note for specific goal areas targeted.     Rehab Resource Intensity Level, OT: II (Moderate Resource Intensity)

## 2024-07-11 NOTE — TELEMEDICINE
VIRTUAL CARE DOCUMENTATION:     1. This service was provided via Telemedicine using [employer] Kit     2. Parties in the room with patient during teleconsult Patient only    3. Confidentiality My office door was closed     4. Participants No one else was in the room    5. Patient acknowledged consent and understanding of privacy and security of the  Telemedicine consult. I informed the patient that I have reviewed their record in Epic and presented the opportunity for them to ask any questions regarding the visit today.  The patient agreed to participate.    6. Time spent 30 minutes     TeleConsultation - Infectious Disease   Art Joseph 64 y.o. male MRN: [id number]  Unit/Bed#: 424-01 Encounter: [id number]      Impression/Recommendations:  1.  Persistent left foot cellulitis, with abscess and osteomyelitis, secondary to recurrent wound dehiscence.  Last soft tissue culture had growth of coagulase-negative Staphylococcus.  Patient is currently on IV vancomycin/cefepime. Patient remains clinically and systemically well, without evidence of sepsis or systemic toxicity.  Patient is status post left foot I&D, with residual osteomyelitis clinically.  Wound culture with growth of Staph aureus and Proteus.  Operative culture was obtained.  Patient will need long-term IV antibiotic.  He remains at risk for foot loss.  Continue IV vancomycin/cefepime for now.  Add IV cefepime.  Monitor temperature/WBC.     2.  Recurrent left foot wound dehiscence, despite recent I&D and first metatarsal head resection for surgical cure.  Patient is status post I&D, as in above.  Wound care per podiatry.  Follow-up operative cultures     3.  Maggot infestation of wound bed, likely secondary to presence of necrotic tissue.  Will likely need additional I&D.  I&D plan per podiatry.     4.  DM, poorly controlled, with hyperglycemia and elevated hemoglobin A1c.  This is risk factor for poor wound healing and infection above.  Management per  primary service.     5.  PVD.     Discussed with patient in detail regarding the above plan.  Discussed with Dr. Gutierrez from primary service regarding antibiotic plan above.  He is in agreement.    I spent 30 minutes in evaluation of the patient of which 15 minutes was in counseling/coordination of care    Antibiotics:  Vancomycin/cefepime  Antibiotic # 4    Subjective:  Patient is status post left foot I&D.  He has mild pain in foot, controlled.  Temperature stays down.  No chills.  He is tolerating antibiotics well.  No nausea, vomiting or diarrhea.    Objective:  Vitals:  Temp:  [97.1 °F (36.2 °C)-98.5 °F (36.9 °C)] 97.2 °F (36.2 °C)  HR:  [70-78] 75  Resp:  [16-19] 16  BP: (101-133)/(51-80) 120/63  SpO2:  [94 %-99 %] 97 %  Temp (24hrs), Av.8 °F (36.6 °C), Min:97.1 °F (36.2 °C), Max:98.5 °F (36.9 °C)  Current: Temperature: (!) 97.2 °F (36.2 °C)    Physical Exam:    Physical exam has been primarily done by the patient's nurse and/or the primary service due to limited examination abilities on telemedicine.     General: Awake, alert, cooperative, no distress.   Neck:  Supple. No mass.  No lymphadenopathy.   Lungs: Expansion symmetric, no rales, no wheezing, respirations unlabored.   Heart:  Regular rate and rhythm, S1 and S2 normal, no murmur.   Abdomen: Soft, nondistended, non-tender, bowel sounds active all four quadrants, no masses, no organomegaly.   Extremities: Stable leg edema.  Left foot with dressing in place.  Dressing is dry.  No erythema/warmth beyond dressing.  Stable mild foot tenderness.   Skin:  No rash.   Neuro: Moves all extremities.     Invasive Devices       Peripheral Intravenous Line  Duration             Peripheral IV 24 Distal;Dorsal (posterior);Right Forearm 1 day                    Labs studies:   I have personally reviewed pertinent labs.  Results from last 7 days   Lab Units 24  0559 07/10/24  0519 24  1802   POTASSIUM mmol/L 4.0 3.8 4.2   CHLORIDE mmol/L 105 105  103   CO2 mmol/L 23 24 25   BUN mg/dL 14 12 16   CREATININE mg/dL 0.77 0.79 0.83   EGFR ml/min/1.73sq m 95 94 92   CALCIUM mg/dL 9.2 9.0 9.3   AST U/L 12* 9* 10*   ALT U/L 10 7 8   ALK PHOS U/L 120* 124* 141*     Results from last 7 days   Lab Units 07/11/24  0559 07/10/24  0519 07/08/24  2031 07/08/24  1802   WBC Thousand/uL 10.62* 7.44  --  7.97   HEMOGLOBIN g/dL 13.5 13.0  --  13.4   PLATELETS Thousands/uL 341 294 322 342     Results from last 7 days   Lab Units 07/09/24  1413 07/09/24  1411   GRAM STAIN RESULT   --  Rare Gram negative rods*  Rare Gram positive cocci in pairs*  No polys seen*   WOUND CULTURE   --  2+ Growth of Proteus vulgaris group*  2+ Growth of Staphylococcus aureus*   MRSA CULTURE ONLY  Methicillin Resistant Staphylococcus aureus isolated*  This patient requires contact isolation precautions per [place] law. Contact precautions are not required in [place] for nasal surveillance cultures.  --        Imaging Studies:   I have personally reviewed pertinent imaging study reports and images in PACS.  Left foot MRI reviewed personally.  Increased signal intensity in tarsal bones, suggestive of osteomyelitis.  Subcutaneous collection.    EKG, Pathology, and Other Studies:   I have personally reviewed pertinent reports.

## 2024-07-11 NOTE — QUICK NOTE
- WBAT to the left foot in surgical shoe  - dsg to stay intact until 7/18 and then will need qod dsg changes  with adaptic, dsd  - Unlikely surgical cure, medial cuneiform appeared viable, no purulence identified, but soft tissue, osseous cultures taken and the distal cut of the cuneiform was sent to pathology for analysis  - May need long term IV abx  - No further operative plans while in house  - If clean margins cultures return positive will need 6 wks of IV abx.

## 2024-07-12 LAB
ALBUMIN SERPL BCG-MCNC: 3.7 G/DL (ref 3.5–5)
ALP SERPL-CCNC: 126 U/L (ref 34–104)
ALT SERPL W P-5'-P-CCNC: 14 U/L (ref 7–52)
ANION GAP SERPL CALCULATED.3IONS-SCNC: 10 MMOL/L (ref 4–13)
AST SERPL W P-5'-P-CCNC: 14 U/L (ref 13–39)
BACTERIA SPEC ANAEROBE CULT: NORMAL
BACTERIA WND AEROBE CULT: ABNORMAL
BACTERIA WND AEROBE CULT: ABNORMAL
BASOPHILS # BLD AUTO: 0.07 THOUSANDS/ÂΜL (ref 0–0.1)
BASOPHILS NFR BLD AUTO: 1 % (ref 0–1)
BILIRUB SERPL-MCNC: 0.47 MG/DL (ref 0.2–1)
BUN SERPL-MCNC: 17 MG/DL (ref 5–25)
CALCIUM SERPL-MCNC: 9.5 MG/DL (ref 8.4–10.2)
CHLORIDE SERPL-SCNC: 103 MMOL/L (ref 96–108)
CK SERPL-CCNC: 33 U/L (ref 39–308)
CO2 SERPL-SCNC: 26 MMOL/L (ref 21–32)
CREAT SERPL-MCNC: 0.88 MG/DL (ref 0.6–1.3)
EOSINOPHIL # BLD AUTO: 0.24 THOUSAND/ÂΜL (ref 0–0.61)
EOSINOPHIL NFR BLD AUTO: 3 % (ref 0–6)
ERYTHROCYTE [DISTWIDTH] IN BLOOD BY AUTOMATED COUNT: 13.7 % (ref 11.6–15.1)
GFR SERPL CREATININE-BSD FRML MDRD: 90 ML/MIN/1.73SQ M
GLUCOSE SERPL-MCNC: 124 MG/DL (ref 65–140)
GLUCOSE SERPL-MCNC: 176 MG/DL (ref 65–140)
GLUCOSE SERPL-MCNC: 69 MG/DL (ref 65–140)
GLUCOSE SERPL-MCNC: 74 MG/DL (ref 65–140)
GLUCOSE SERPL-MCNC: 90 MG/DL (ref 65–140)
GRAM STN SPEC: ABNORMAL
HCT VFR BLD AUTO: 42.6 % (ref 36.5–49.3)
HGB BLD-MCNC: 13.7 G/DL (ref 12–17)
IMM GRANULOCYTES # BLD AUTO: 0.04 THOUSAND/UL (ref 0–0.2)
IMM GRANULOCYTES NFR BLD AUTO: 0 % (ref 0–2)
LYMPHOCYTES # BLD AUTO: 2.75 THOUSANDS/ÂΜL (ref 0.6–4.47)
LYMPHOCYTES NFR BLD AUTO: 30 % (ref 14–44)
MAGNESIUM SERPL-MCNC: 2.1 MG/DL (ref 1.9–2.7)
MCH RBC QN AUTO: 28 PG (ref 26.8–34.3)
MCHC RBC AUTO-ENTMCNC: 32.2 G/DL (ref 31.4–37.4)
MCV RBC AUTO: 87 FL (ref 82–98)
MONOCYTES # BLD AUTO: 0.81 THOUSAND/ÂΜL (ref 0.17–1.22)
MONOCYTES NFR BLD AUTO: 9 % (ref 4–12)
NEUTROPHILS # BLD AUTO: 5.15 THOUSANDS/ÂΜL (ref 1.85–7.62)
NEUTS SEG NFR BLD AUTO: 57 % (ref 43–75)
NRBC BLD AUTO-RTO: 0 /100 WBCS
PHOSPHATE SERPL-MCNC: 4.1 MG/DL (ref 2.3–4.1)
PLATELET # BLD AUTO: 327 THOUSANDS/UL (ref 149–390)
PMV BLD AUTO: 8.5 FL (ref 8.9–12.7)
POTASSIUM SERPL-SCNC: 4.1 MMOL/L (ref 3.5–5.3)
PROCALCITONIN SERPL-MCNC: 0.09 NG/ML
PROT SERPL-MCNC: 7.6 G/DL (ref 6.4–8.4)
RBC # BLD AUTO: 4.89 MILLION/UL (ref 3.88–5.62)
SODIUM SERPL-SCNC: 139 MMOL/L (ref 135–147)
WBC # BLD AUTO: 9.06 THOUSAND/UL (ref 4.31–10.16)

## 2024-07-12 PROCEDURE — 85025 COMPLETE CBC W/AUTO DIFF WBC: CPT | Performed by: INTERNAL MEDICINE

## 2024-07-12 PROCEDURE — 82550 ASSAY OF CK (CPK): CPT | Performed by: INTERNAL MEDICINE

## 2024-07-12 PROCEDURE — 97530 THERAPEUTIC ACTIVITIES: CPT

## 2024-07-12 PROCEDURE — 84145 PROCALCITONIN (PCT): CPT | Performed by: INTERNAL MEDICINE

## 2024-07-12 PROCEDURE — 84100 ASSAY OF PHOSPHORUS: CPT | Performed by: INTERNAL MEDICINE

## 2024-07-12 PROCEDURE — G0425 INPT/ED TELECONSULT30: HCPCS | Performed by: INTERNAL MEDICINE

## 2024-07-12 PROCEDURE — 99232 SBSQ HOSP IP/OBS MODERATE 35: CPT | Performed by: INTERNAL MEDICINE

## 2024-07-12 PROCEDURE — 82948 REAGENT STRIP/BLOOD GLUCOSE: CPT

## 2024-07-12 PROCEDURE — 80053 COMPREHEN METABOLIC PANEL: CPT | Performed by: INTERNAL MEDICINE

## 2024-07-12 PROCEDURE — 83735 ASSAY OF MAGNESIUM: CPT | Performed by: INTERNAL MEDICINE

## 2024-07-12 PROCEDURE — 97110 THERAPEUTIC EXERCISES: CPT

## 2024-07-12 RX ORDER — CEFTRIAXONE 2 G/50ML
2000 INJECTION, SOLUTION INTRAVENOUS EVERY 24 HOURS
Status: DISCONTINUED | OUTPATIENT
Start: 2024-07-12 | End: 2024-07-15

## 2024-07-12 RX ADMIN — LISINOPRIL 2.5 MG: 2.5 TABLET ORAL at 08:55

## 2024-07-12 RX ADMIN — INSULIN LISPRO 14 UNITS: 100 INJECTION, SOLUTION INTRAVENOUS; SUBCUTANEOUS at 17:31

## 2024-07-12 RX ADMIN — ENOXAPARIN SODIUM 40 MG: 40 INJECTION SUBCUTANEOUS at 08:55

## 2024-07-12 RX ADMIN — VANCOMYCIN HYDROCHLORIDE 1250 MG: 1 INJECTION, POWDER, LYOPHILIZED, FOR SOLUTION INTRAVENOUS at 19:28

## 2024-07-12 RX ADMIN — INSULIN LISPRO 14 UNITS: 100 INJECTION, SOLUTION INTRAVENOUS; SUBCUTANEOUS at 08:14

## 2024-07-12 RX ADMIN — INSULIN LISPRO 14 UNITS: 100 INJECTION, SOLUTION INTRAVENOUS; SUBCUTANEOUS at 12:45

## 2024-07-12 RX ADMIN — ATORVASTATIN CALCIUM 80 MG: 40 TABLET, FILM COATED ORAL at 17:31

## 2024-07-12 RX ADMIN — ASPIRIN 81 MG: 81 TABLET, COATED ORAL at 08:55

## 2024-07-12 RX ADMIN — VANCOMYCIN HYDROCHLORIDE 1250 MG: 1 INJECTION, POWDER, LYOPHILIZED, FOR SOLUTION INTRAVENOUS at 08:14

## 2024-07-12 RX ADMIN — CEFEPIME HYDROCHLORIDE 2000 MG: 2 INJECTION, SOLUTION INTRAVENOUS at 01:20

## 2024-07-12 RX ADMIN — CEFTRIAXONE 2000 MG: 2 INJECTION, SOLUTION INTRAVENOUS at 12:44

## 2024-07-12 RX ADMIN — INSULIN LISPRO 1 UNITS: 100 INJECTION, SOLUTION INTRAVENOUS; SUBCUTANEOUS at 17:31

## 2024-07-12 RX ADMIN — INSULIN GLARGINE 50 UNITS: 100 INJECTION, SOLUTION SUBCUTANEOUS at 21:28

## 2024-07-12 NOTE — PROGRESS NOTES
Art Joseph is a 64 y.o. male who is currently ordered Vancomycin IV with management by the Pharmacy Consult service.  Relevant clinical data and objective / subjective history reviewed.  Vancomycin Assessment:  Indication and Goal AUC/Trough: Bone/joint infection (goal -600, trough >10), -600, trough >10  Clinical Status: stable  Micro:     Renal Function:  SCr: 0.88 mg/dL  CrCl: 98.1 mL/min  Renal replacement: Not on dialysis  Days of Therapy: 5  Current Dose: 1250mg Q12H  Vancomycin Plan:  New Dosing: same dose  Estimated AUC: 531 mcg*hr/mL  Estimated Trough: 15.3 mcg/mL  Next Level: tomorrow in the AM  Renal Function Monitoring: Daily BMP and UOP  Pharmacy will continue to follow closely for s/sx of nephrotoxicity, infusion reactions and appropriateness of therapy.  BMP and CBC will be ordered per protocol. We will continue to follow the patient’s culture results and clinical progress daily.    Reggie Saez, Pharmacist

## 2024-07-12 NOTE — TELEMEDICINE
VIRTUAL CARE DOCUMENTATION:     1. This service was provided via Telemedicine using K[employer] Kit     2. Parties in the room with patient during teleconsult Patient only    3. Confidentiality My office door was closed     4. Participants No one else was in the room    5. Patient acknowledged consent and understanding of privacy and security of the  Telemedicine consult. I informed the patient that I have reviewed their record in Epic and presented the opportunity for them to ask any questions regarding the visit today.  The patient agreed to participate.    6. Time spent 30 minutes     TeleConsultation - Infectious Disease   Art Joseph 64 y.o. male MRN: [id number]  Unit/Bed#: 424-01 Encounter: [id number]      IImpression/Recommendations:  1.  Persistent left foot cellulitis, with abscess and osteomyelitis, secondary to recurrent wound dehiscence.  Patient remains clinically and systemically well, without evidence of sepsis or systemic toxicity.  Wound culture with growth of MRSA and Proteus.  Patient is status post left foot I&D, with possible residual osteomyelitis clinically.  Bone margin was sent for pathology.  Operative cultures are pending.  Patient will need long-term IV antibiotic.  He remains at risk for foot loss.  Continue IV vancomycinw.  Change cefepime to ceftriaxone.  Follow-up on operative culture.  Follow-up on bone margin pathology.  Monitor temperature/WBC.  This point, duration of IV antibiotic is unclear, depending on bone margin pathology.  If bone pathology shows residual osteomyelitis, patient may need long-term IV antibiotic again.     2.  Recurrent left foot wound dehiscence, despite recent I&D and first metatarsal head resection for surgical cure.  Patient is status post I&D, as in above.  Wound care per podiatry.  Follow-up operative cultures     3.  Maggot infestation of wound bed, likely secondary to presence of necrotic tissue.  Will likely need additional I&D.  I&D plan per  podiatry.     4.  DM, poorly controlled, with hyperglycemia and elevated hemoglobin A1c.  This is risk factor for poor wound healing and infection above.  Management per primary service.     5.  PVD.     Discussed with patient in detail regarding the above plan.  Discussed with Dr. Gutierrez from primary service regarding antibiotic plan above.  He is in agreement.     I spent 30 minutes in evaluation of the patient of which 15 minutes was in counseling/coordination of care     Antibiotics:  Vancomycin/cefepime  Antibiotic # 5     Subjective:  Patient is comfortable.  Pain in foot is mild and controlled.  Temperature stays down.  No chills.  He is tolerating antibiotics well.  No nausea, vomiting or diarrhea.    Objective:  Vitals:  Temp:  [97.2 °F (36.2 °C)-98.5 °F (36.9 °C)] 97.4 °F (36.3 °C)  HR:  [75-88] 80  Resp:  [16-20] 16  BP: (108-158)/(55-78) 119/67  SpO2:  [92 %-97 %] 96 %  Temp (24hrs), Av.7 °F (36.5 °C), Min:97.2 °F (36.2 °C), Max:98.5 °F (36.9 °C)  Current: Temperature: (!) 97.4 °F (36.3 °C)    Physical Exam:    Physical exam has been primarily done by the patient's nurse and/or the primary service due to limited examination abilities on telemedicine.     General: Awake, alert, cooperative, no distress.   Neck:  Supple. No mass.  No lymphadenopathy.   Lungs: Expansion symmetric, no rales, no wheezing, respirations unlabored.   Heart:  Regular rate and rhythm, S1 and S2 normal, no murmur.   Abdomen: Soft, nondistended, non-tender, bowel sounds active all four quadrants, no masses, no organomegaly.   Extremities: Left foot with dressing in place.  Dressing is dry.. No erythema/warmth beyond dressing.  Minimal tenderness.   Skin:  No rash.   Neuro: Moves all extremities.     Invasive Devices       Peripheral Intravenous Line  Duration             Peripheral IV 24 Dorsal (posterior);Left;Proximal Forearm <1 day                    Labs studies:   I have personally reviewed pertinent labs.  Results  from last 7 days   Lab Units 07/12/24  0542 07/11/24  0559 07/10/24  0519   POTASSIUM mmol/L 4.1 4.0 3.8   CHLORIDE mmol/L 103 105 105   CO2 mmol/L 26 23 24   BUN mg/dL 17 14 12   CREATININE mg/dL 0.88 0.77 0.79   EGFR ml/min/1.73sq m 90 95 94   CALCIUM mg/dL 9.5 9.2 9.0   AST U/L 14 12* 9*   ALT U/L 14 10 7   ALK PHOS U/L 126* 120* 124*     Results from last 7 days   Lab Units 07/12/24  0542 07/11/24  0559 07/10/24  0519   WBC Thousand/uL 9.06 10.62* 7.44   HEMOGLOBIN g/dL 13.7 13.5 13.0   PLATELETS Thousands/uL 327 341 294     Results from last 7 days   Lab Units 07/11/24  1042 07/09/24  1413 07/09/24  1411   GRAM STAIN RESULT  No Polys or Bacteria seen  No Polys or Bacteria seen  --  Rare Gram negative rods*  Rare Gram positive cocci in pairs*  No polys seen*   WOUND CULTURE   --   --  2+ Growth of Proteus vulgaris group*  2+ Growth of Methicillin Resistant Staphylococcus aureus*   MRSA CULTURE ONLY   --  Methicillin Resistant Staphylococcus aureus isolated*  This patient requires contact isolation precautions per [place] law. Contact precautions are not required in [place] for nasal surveillance cultures.  --        Imaging Studies:   I have personally reviewed pertinent imaging study reports and images in PACS.    EKG, Pathology, and Other Studies:   I have personally reviewed pertinent reports.

## 2024-07-12 NOTE — PLAN OF CARE
Problem: METABOLIC, FLUID AND ELECTROLYTES - ADULT  Goal: Electrolytes maintained within normal limits  Description: INTERVENTIONS:  - Monitor labs and assess patient for signs and symptoms of electrolyte imbalances  - Administer electrolyte replacement as ordered  - Monitor response to electrolyte replacements, including repeat lab results as appropriate  - Instruct patient on fluid and nutrition as appropriate  Outcome: Progressing     Problem: SKIN/TISSUE INTEGRITY - ADULT  Goal: Skin Integrity remains intact(Skin Breakdown Prevention)  Description: Assess:  -Perform Zaid assessment every 4 hrs  -Clean and moisturize skin every 4 hrs  -Inspect skin when repositioning, toileting, and assisting with ADLS  -Assess under medical devices such as mosimo every 4 hrs  -Assess extremities for adequate circulation and sensation     Bed Management:  -Have minimal linens on bed & keep smooth, unwrinkled  -Change linens as needed when moist or perspiring  -Avoid sitting or lying in one position for more than 4 hours while in bed  -Keep HOB at 30degrees     Toileting:  -Offer bedside commode  -Assess for incontinence every 4 hrs  -Use incontinent care products after each incontinent episode such as lotion and spray    Activity:  -Mobilize patient 3 times a day  -Encourage activity and walks on unit  -Encourage or provide ROM exercises   -Turn and reposition patient every 4 Hours  -Use appropriate equipment to lift or move patient in bed  -Instruct/ Assist with weight shifting every 4hrs when out of bed in chair  -Consider limitation of chair time 2 hour intervals    Skin Care:  -Avoid use of baby powder, tape, friction and shearing, hot water or constrictive clothing  -Relieve pressure over bony prominences using lotion  -Do not massage red bony areas    Next Steps:  -Teach patient strategies to minimize risks such as getting oob   -Consider consults to  interdisciplinary teams such as pt/ot  Outcome: Progressing  Goal:  Incision(s), wounds(s) or drain site(s) healing without S/S of infection  Description: INTERVENTIONS  - Assess and document dressing, incision, wound bed, drain sites and surrounding tissue  - Provide patient and family education  - Perform skin care/dressing changes every 4 hrs  Outcome: Progressing     Problem: MUSCULOSKELETAL - ADULT  Goal: Maintain or return mobility to safest level of function  Description: INTERVENTIONS:  - Assess patient's ability to carry out ADLs; assess patient's baseline for ADL function and identify physical deficits which impact ability to perform ADLs (bathing, care of mouth/teeth, toileting, grooming, dressing, etc.)  - Assess/evaluate cause of self-care deficits   - Assess range of motion  - Assess patient's mobility  - Assess patient's need for assistive devices and provide as appropriate  - Encourage maximum independence but intervene and supervise when necessary  - Involve family in performance of ADLs  - Assess for home care needs following discharge   - Consider OT consult to assist with ADL evaluation and planning for discharge  - Provide patient education as appropriate  Outcome: Progressing  Goal: Maintain proper alignment of affected body part  Description: INTERVENTIONS:  - Support, maintain and protect limb and body alignment  - Provide patient/ family with appropriate education  Outcome: Progressing     Problem: PAIN - ADULT  Goal: Verbalizes/displays adequate comfort level or baseline comfort level  Description: Interventions:  - Encourage patient to monitor pain and request assistance  - Assess pain using appropriate pain scale  - Administer analgesics based on type and severity of pain and evaluate response  - Implement non-pharmacological measures as appropriate and evaluate response  - Consider cultural and social influences on pain and pain management  - Notify physician/advanced practitioner if interventions unsuccessful or patient reports new pain  Outcome:  “Patient's name, , procedure and correct site were confirmed during the [place] Timeout.” Progressing     Problem: INFECTION - ADULT  Goal: Absence or prevention of progression during hospitalization  Description: INTERVENTIONS:  - Assess and monitor for signs and symptoms of infection  - Monitor lab/diagnostic results  - Monitor all insertion sites, i.e. indwelling lines, tubes, and drains  - Monitor endotracheal if appropriate and nasal secretions for changes in amount and color  - Grady appropriate cooling/warming therapies per order  - Administer medications as ordered  - Instruct and encourage patient and family to use good hand hygiene technique  - Identify and instruct in appropriate isolation precautions for identified infection/condition  Outcome: Progressing     Problem: SAFETY ADULT  Goal: Patient will remain free of falls  Description: INTERVENTIONS:  - Educate patient/family on patient safety including physical limitations  - Instruct patient to call for assistance with activity   - Consult OT/PT to assist with strengthening/mobility   - Keep Call bell within reach  - Keep bed low and locked with side rails adjusted as appropriate  - Keep care items and personal belongings within reach  - Initiate and maintain comfort rounds  - Make Fall Risk Sign visible to staff  - Offer Toileting every 2 Hours, in advance of need  - Initiate/Maintain bed/chair alarm  - Obtain necessary fall risk management equipment: alarms  - Apply yellow socks and bracelet for high fall risk patients  - Consider moving patient to room near nurses station  Outcome: Progressing     Problem: DISCHARGE PLANNING  Goal: Discharge to home or other facility with appropriate resources  Description: INTERVENTIONS:  - Identify barriers to discharge w/patient and caregiver  - Arrange for needed discharge resources and transportation as appropriate  - Identify discharge learning needs (meds, wound care, etc.)  - Arrange for interpretive services to assist at discharge as needed  - Refer to Case Management Department for  coordinating discharge planning if the patient needs post-hospital services based on physician/advanced practitioner order or complex needs related to functional status, cognitive ability, or social support system  Outcome: Progressing     Problem: Knowledge Deficit  Goal: Patient/family/caregiver demonstrates understanding of disease process, treatment plan, medications, and discharge instructions  Description: Complete learning assessment and assess knowledge base.  Interventions:  - Provide teaching at level of understanding  - Provide teaching via preferred learning methods  Outcome: Progressing     Problem: Prexisting or High Potential for Compromised Skin Integrity  Goal: Skin integrity is maintained or improved  Description: INTERVENTIONS:  - Identify patients at risk for skin breakdown  - Assess and monitor skin integrity  - Assess and monitor nutrition and hydration status  - Monitor labs   - Assess for incontinence   - Turn and reposition patient  - Assist with mobility/ambulation  - Relieve pressure over bony prominences  - Avoid friction and shearing  - Provide appropriate hygiene as needed including keeping skin clean and dry  - Evaluate need for skin moisturizer/barrier cream  - Collaborate with interdisciplinary team   - Patient/family teaching  - Consider wound care consult   Outcome: Progressing     Problem: Nutrition/Hydration-ADULT  Goal: Nutrient/Hydration intake appropriate for improving, restoring or maintaining nutritional needs  Description: Monitor and assess patient's nutrition/hydration status for malnutrition. Collaborate with interdisciplinary team and initiate plan and interventions as ordered.  Monitor patient's weight and dietary intake as ordered or per policy. Utilize nutrition screening tool and intervene as necessary. Determine patient's food preferences and provide high-protein, high-caloric foods as appropriate.     INTERVENTIONS:  - Monitor oral intake, urinary output, labs,  and treatment plans  - Assess nutrition and hydration status and recommend course of action  - Evaluate amount of meals eaten  - Assist patient with eating if necessary   - Allow adequate time for meals  - Recommend/ encourage appropriate diets, oral nutritional supplements, and vitamin/mineral supplements  - Order, calculate, and assess calorie counts as needed  - Recommend, monitor, and adjust tube feedings and TPN/PPN based on assessed needs  - Assess need for intravenous fluids  - Provide specific nutrition/hydration education as appropriate  - Include patient/family/caregiver in decisions related to nutrition  Outcome: Progressing

## 2024-07-12 NOTE — ASSESSMENT & PLAN NOTE
Lab Results   Component Value Date    HGBA1C 9.7 (H) 06/25/2024       Recent Labs     07/11/24  1609 07/11/24 2058 07/12/24  0725 07/12/24  1132   POCGLU 134 134 74 124         Blood Sugar Average: Last 72 hrs:  (P) [id number]    Continue Lantus 50 Units SQ QHS and Humalog 14 Units TID with meals  ISS with blood glucose monitoring ACHS  Continue PO lisinopril for renal protection  Hypoglycemia protocol  Outpatient Endocrinology evaluation

## 2024-07-12 NOTE — ASSESSMENT & PLAN NOTE
Recent hospitalization from 06/25/2024 through 06/28/2024 for osteomyelitis of the left foot requiring a left 1st metatarsal amputation/resection on 06/27/2024 by Podiatry  Now with a left wound infection and associated cellulitis with probable underlying osteomyelitis  Also found to have maggots in his open wound  I appreciate Infectious Disease's input and Podiatry's input.  Checked a left foot wound culture on 07/09/2024, which is positive for Proteus vulgaris and MRSA  Being treated with IV vancomycin  IV cefepime was added on 07/10/2024 per Infectious Disease's recommendations based on the left foot wound culture result  Changed IV cefepime to IV ceftriaxone on 07/12/2024  Underwent left foot wound debridement and washout by Podiatry on 07/11/2024  Await bone margin pathology results and surgical wound cultures to determine antibiotic treatment course per Infectious Disease    MRI of the left foot with contrast (07/10/2024):  IMPRESSION:     1. Heterogeneous predominately T2 hyperintense subcutaneous collection just deep to the dorsal and medial skin wound extending down to the subjacent medial cuneiform. There is increased T2 signal within the subjacent medial cuneiform and faint low T1   signal. Findings are suspicious for early osteomyelitis.  2. No additional marrow signal abnormality to suggest other areas of osteomyelitis.

## 2024-07-12 NOTE — PLAN OF CARE
Problem: PHYSICAL THERAPY ADULT  Goal: Performs mobility at highest level of function for planned discharge setting.  See evaluation for individualized goals.  Description: Treatment/Interventions: Functional transfer training, LE strengthening/ROM, Therapeutic exercise, Endurance training, Bed mobility, Gait training          See flowsheet documentation for full assessment, interventions and recommendations.  Outcome: Not Progressing  Note: Prognosis: Good  Problem List: Decreased strength, Decreased endurance, Impaired balance, Decreased mobility, Decreased safety awareness, Impaired judgement, Orthopedic restrictions  Assessment: Pt seen for PT treatment session this date with interventions consisting of Therapeutic exercise consisting of: AROM 25 reps B LE in sitting and supine position and therapeutic activity consisting of training: bed mobility, supine<>sit transfers, and static sitting tolerance at EOB for 20 minutes w/ B UE support. Pt agreeable to PT treatment session upon arrival, pt found supine in bed w/ HOB elevated, in no apparent distress and responsive. In comparison to previous session, pt with no improvements as evidenced by pt limited by medical staus . Post session: pt returned BTB, all needs in reach, and RN notified of session findings/recommendations. Continue to recommend Level II (Moderate Resource Intensity at time of d/c in order to maximize pt's functional independence and safety w/ mobility. Pt continues to be functioning below baseline level. PT will continue to see pt during current hospitalization in order to address the deficits listed above and provide interventions consistent w/ POC in effort to achieve STGs.    Daisy Childs, PTA        Rehab Resource Intensity Level, PT: II (Moderate Resource Intensity)    See flowsheet documentation for full assessment.

## 2024-07-12 NOTE — PHYSICAL THERAPY NOTE
"   PHYSICAL THERAPY TREATMENT NOTE  NAME:  Art Joseph  DATE: 07/12/24    Length Of Stay: 4  Performed at least 2 patient identifiers during session: Name and ID bracelet    TREATMENT:      07/12/24 1053   PT Last Visit   PT Visit Date 07/12/24   Note Type   Note Type Treatment   Pain Assessment   Pain Assessment Tool 0-10   Pain Score No Pain   Restrictions/Precautions   Weight Bearing Precautions Per Order Yes   LLE Weight Bearing Per Order WBAT   Braces or Orthoses   (surgical shoe, R BKA prosthesis)   Other Precautions Chair Alarm;Bed Alarm;Fall Risk;Multiple lines;WBS   General   Chart Reviewed Yes   Family/Caregiver Present No   Cognition   Overall Cognitive Status WFL   Arousal/Participation Alert;Responsive;Cooperative   Attention Within functional limits   Orientation Level Oriented X4   Memory Within functional limits   Following Commands Follows all commands and directions without difficulty   Comments pt agreeable to PT treatment   Subjective   Subjective \"the doctor on the TV just told me not to put any weight on my foot\"   Bed Mobility   Supine to Sit 5  Supervision   Additional items Bedrails;Increased time required;Verbal cues   Sit to Supine 5  Supervision   Additional items Assist x 1;Increased time required;Verbal cues;Bedrails   Additional Comments pt sat at EOB x20 min to complete B LE ther ex   Balance   Static Sitting Good   Dynamic Sitting Good   Endurance Deficit   Endurance Deficit Yes   Activity Tolerance   Activity Tolerance Patient limited by fatigue   Exercises   Quad Sets Sitting;25 reps;AROM;Bilateral   Glute Sets Sitting;25 reps;AROM;Bilateral   Hip Flexion Supine;Sitting;25 reps;AROM;Bilateral   Hip Abduction Sitting;25 reps;AROM;Bilateral   Hip Adduction Sitting;Supine;25 reps;AROM;Bilateral   Knee AROM Short Arc Quad Supine;25 reps;AROM;Bilateral   Knee AROM Long Arc Quad Sitting;25 reps;AROM;Bilateral   Ankle Pumps Sitting;25 reps;AROM;Left   Marching Sitting;25 " reps;AROM;Bilateral   Assessment   Prognosis Good   Problem List Decreased strength;Decreased endurance;Impaired balance;Decreased mobility;Decreased safety awareness;Impaired judgement;Orthopedic restrictions   Assessment Pt seen for PT treatment session this date with interventions consisting of Therapeutic exercise consisting of: AROM 25 reps B LE in sitting and supine position and therapeutic activity consisting of training: bed mobility, supine<>sit transfers, and static sitting tolerance at EOB for 20 minutes w/ B UE support. Pt agreeable to PT treatment session upon arrival, pt found supine in bed w/ HOB elevated, in no apparent distress and responsive. In comparison to previous session, pt with no improvements as evidenced by pt limited by medical staus . Post session: pt returned BTB, all needs in reach, and RN notified of session findings/recommendations. Continue to recommend Level II (Moderate Resource Intensity at time of d/c in order to maximize pt's functional independence and safety w/ mobility. Pt continues to be functioning below baseline level. PT will continue to see pt during current hospitalization in order to address the deficits listed above and provide interventions consistent w/ POC in effort to achieve STGs.    Daisy Childs, PTA   Goals   Patient Goals to go home   LTG Expiration Date 07/25/24   Plan   Treatment/Interventions Functional transfer training;LE strengthening/ROM;Therapeutic exercise;Endurance training;Bed mobility;Gait training   PT Frequency 3-5x/wk   Discharge Recommendation   Rehab Resource Intensity Level, PT II (Moderate Resource Intensity)   AM-PAC Basic Mobility Inpatient   Turning in Flat Bed Without Bedrails 3   Lying on Back to Sitting on Edge of Flat Bed Without Bedrails 3   Moving Bed to Chair 3   Standing Up From Chair Using Arms 3   Walk in Room 3   Climb 3-5 Stairs With Railing 2   Basic Mobility Inpatient Raw Score 17   Basic Mobility Standardized Score  39.67   [facility] Highest Level Of Mobility   -[facility] Goal 5: Stand one or more mins   -HL Achieved 3: Sit at edge of bed   End of Consult   Patient Position at End of Consult Supine;Bed/Chair alarm activated;All needs within reach         The patient's AM-PAC Basic Mobility Inpatient Short Form Raw Score is 17. A Raw score of greater than 16 suggests the patient may benefit from discharge to home. Please also refer to the recommendation of the Physical Therapist for safe discharge planning.      Daisy Childs, PTA,PTA

## 2024-07-12 NOTE — PROGRESS NOTES
"[facility]  Progress Note  Name: Art Joseph I  MRN: [id number]  Unit/Bed#: 424-01 I Date of Admission: 7/8/2024   Date of Service: 7/12/2024 I Hospital Day: 4    Assessment & Plan   * Open wound of left foot  Assessment & Plan  Recent hospitalization from 06/25/2024 through 06/28/2024 for osteomyelitis of the left foot requiring a left 1st metatarsal amputation/resection on 06/27/2024 by Podiatry  Now with a left wound infection and associated cellulitis with probable underlying osteomyelitis  Also found to have maggots in his open wound  I appreciate Infectious Disease's input and Podiatry's input.  Checked a left foot wound culture on 07/09/2024, which is positive for Proteus vulgaris and MRSA  Being treated with IV vancomycin  IV cefepime was added on 07/10/2024 per Infectious Disease's recommendations based on the left foot wound culture result  Changed IV cefepime to IV ceftriaxone on 07/12/2024  Underwent left foot wound debridement and washout by Podiatry on 07/11/2024  Await bone margin pathology results and surgical wound cultures to determine antibiotic treatment course per Infectious Disease    MRI of the left foot with contrast (07/10/2024):  IMPRESSION:     1. Heterogeneous predominately T2 hyperintense subcutaneous collection just deep to the dorsal and medial skin wound extending down to the subjacent medial cuneiform. There is increased T2 signal within the subjacent medial cuneiform and faint low T1   signal. Findings are suspicious for early osteomyelitis.  2. No additional marrow signal abnormality to suggest other areas of osteomyelitis.    Osteomyelitis of left foot (HCC)  Assessment & Plan  Please see the assessment and plan for \"Open wound of left foot\"    Maggot infestation  Assessment & Plan  Please see the assessment and plan for \"Open wound of left foot\"      MRSA colonization  Assessment & Plan  Utilize the MRSA decolonization " protocol    Hyperphosphatemia  Assessment & Plan  No treatment is indicated at this time  Follow the phosphorus level    Hypercholesteremia  Assessment & Plan  Continue statin therapy    Primary hypertension  Assessment & Plan  Continue PO lisinopril  Follow the blood pressure trend    Ambulatory dysfunction  Assessment & Plan  PT/OT evaluations    Hx of right BKA (HCC)  Assessment & Plan  Outpatient follow-up with Vascular Surgery    Type 2 diabetes mellitus with foot ulcer, with long-term current use of insulin (HCC)  Assessment & Plan  Lab Results   Component Value Date    HGBA1C 9.7 (H) 06/25/2024       Recent Labs     07/11/24  1609 07/11/24  2058 07/12/24  0725 07/12/24  1132   POCGLU 134 134 74 124         Blood Sugar Average: Last 72 hrs:  (P) 127.6302911614431175    Continue Lantus 50 Units SQ QHS and Humalog 14 Units TID with meals  ISS with blood glucose monitoring ACHS  Continue PO lisinopril for renal protection  Hypoglycemia protocol  Outpatient Endocrinology evaluation        VTE Pharmacologic Prophylaxis: VTE Score: 8 High Risk (Score >/= 5) - Pharmacological DVT Prophylaxis Ordered: enoxaparin (Lovenox). Sequential Compression Devices Ordered for the left lower extremity.  No SCD on the right lower extremity with a history of a right BKA.    Mobility:   Basic Mobility Inpatient Raw Score: 17  JH-HLM Goal: 5: Stand one or more mins  JH-HLM Achieved: 3: Sit at edge of bed  JH-HLM Goal NOT achieved. Continue with multidisciplinary rounding and encourage appropriate mobility to improve upon JH-HLM goals.    Patient Centered Rounds: I performed bedside rounds with nursing staff today.   Discussions with Specialists or Other Care Team Provider: I discussed the case with Infectious Disease.    Total Time Spent on Date of Encounter in care of patient: 35 mins. This time was spent on one or more of the following: performing physical exam; counseling and coordination of care; obtaining or reviewing history;  documenting in the medical record; reviewing/ordering tests, medications or procedures; communicating with other healthcare professionals and discussing with patient's family/caregivers.    Current Length of Stay: 4 day(s)  Current Patient Status: Inpatient   Certification Statement: The patient will continue to require additional inpatient hospital stay due to the need for IV antibiotic treatment.  Discharge Plan: Anticipate discharge in >72 hrs to rehab facility.    Code Status: Level 1 - Full Code    Subjective:   The patient was seen and examined.  The patient is doing well.  No fevers or chills.  No extremity pain.  No chest pain.  No shortness of breath.  No abdominal pain.  No nausea or vomiting.      Objective:     Vitals:   Temp (24hrs), Av.8 °F (36.6 °C), Min:97.2 °F (36.2 °C), Max:98.5 °F (36.9 °C)    Temp:  [97.2 °F (36.2 °C)-98.5 °F (36.9 °C)] 97.4 °F (36.3 °C)  HR:  [76-88] 80  Resp:  [16-20] 16  BP: (108-158)/(55-78) 119/67  SpO2:  [92 %-96 %] 96 %  Body mass index is 30.91 kg/m².     Input and Output Summary (last 24 hours):     Intake/Output Summary (Last 24 hours) at 2024 1351  Last data filed at 2024 1250  Gross per 24 hour   Intake 840 ml   Output 3700 ml   Net -2860 ml       Physical Exam:   Physical Exam  General:  NAD, follows commands  HEENT:  NC/AT, mucous membranes moist  Neck:  Supple, No JVP elevation  CV:  + S1, + S2, RRR  Pulm:  Lung fields are CTA bilaterally  Abd:  Soft, Non-tender, Non-distended  Ext:  No clubbing/cyanosis/edema, Left foot surgical wound dressing intact, Right BKA  Skin:  No rashes  Neuro:  Awake, alert, oriented  Psych:  Normal mood and affect      Additional Data:    Labs:  Results from last 7 days   Lab Units 24  0542   WBC Thousand/uL 9.06   HEMOGLOBIN g/dL 13.7   HEMATOCRIT % 42.6   PLATELETS Thousands/uL 327   SEGS PCT % 57   LYMPHO PCT % 30   MONO PCT % 9   EOS PCT % 3     Results from last 7 days   Lab Units 24  0542   SODIUM mmol/L  139   POTASSIUM mmol/L 4.1   CHLORIDE mmol/L 103   CO2 mmol/L 26   BUN mg/dL 17   CREATININE mg/dL 0.88   ANION GAP mmol/L 10   CALCIUM mg/dL 9.5   ALBUMIN g/dL 3.7   TOTAL BILIRUBIN mg/dL 0.47   ALK PHOS U/L 126*   ALT U/L 14   AST U/L 14   GLUCOSE RANDOM mg/dL 69     Results from last 7 days   Lab Units 07/08/24  1802   INR  0.94     Results from last 7 days   Lab Units 07/12/24  1132 07/12/24  0725 07/11/24  2058 07/11/24  1609 07/11/24  1149 07/11/24  0659 07/10/24  2042 07/10/24  1600 07/10/24  1057 07/10/24  0724 07/09/24  2138 07/09/24  1545   POC GLUCOSE mg/dl 124 74 134 134 93 93 181* 146* 183* 93 93 122         Results from last 7 days   Lab Units 07/12/24  0542 07/11/24  0559 07/10/24  0519   PROCALCITONIN ng/ml 0.09 0.08 0.10       Lines/Drains:  Invasive Devices       Peripheral Intravenous Line  Duration             Peripheral IV 07/11/24 Dorsal (posterior);Left;Proximal Forearm <1 day                          Imaging: No pertinent imaging reviewed.    Recent Cultures (last 7 days):   Results from last 7 days   Lab Units 07/11/24  1042 07/09/24  1411   GRAM STAIN RESULT  No Polys or Bacteria seen  No Polys or Bacteria seen Rare Gram negative rods*  Rare Gram positive cocci in pairs*  No polys seen*   WOUND CULTURE   --  2+ Growth of Proteus vulgaris group*  2+ Growth of Methicillin Resistant Staphylococcus aureus*       Last 24 Hours Medication List:   Current Facility-Administered Medications   Medication Dose Route Frequency Provider Last Rate    acetaminophen  650 mg Oral Q6H PRN Javi Meyer DPM      aspirin  81 mg Oral Daily Javi Meyer DPM      atorvastatin  80 mg Oral Daily With Dinner Javi Meyer DPM      cefTRIAXone  2,000 mg Intravenous Q24H Ángel Rousseau MD 2,000 mg (07/12/24 1244)    Dakins (full strength)  1 Application Irrigation Daily Javi Meyer DPM      enoxaparin  40 mg Subcutaneous Daily Javi Meyer DPM      insulin glargine  50 Units Subcutaneous HS Javi DUARTE  BERNY Meyer      insulin lispro  1-6 Units Subcutaneous 4x Daily (with meals and at bedtime) Javi Meyer DPM      insulin lispro  14 Units Subcutaneous TID With Meals Javi Meyer DPM      lisinopril  2.5 mg Oral Daily Javi Meyer DPM      ondansetron  4 mg Intravenous Q6H PRN Javi Meyer DPM      oxyCODONE-acetaminophen  1 tablet Oral Q4H PRN Javi Meyer DPM      vancomycin  1,250 mg Intravenous Q12H EUGENE HuM 1,250 mg (07/09/24 8750)        Today, Patient Was Seen By: Jerome Gutierrez DO    **Please Note: This note may have been constructed using a voice recognition system.**

## 2024-07-13 ENCOUNTER — TELEPHONE (OUTPATIENT)
Dept: OTHER | Facility: OTHER | Age: 64
End: 2024-07-13

## 2024-07-13 LAB
ALBUMIN SERPL BCG-MCNC: 3.5 G/DL (ref 3.5–5)
ALP SERPL-CCNC: 113 U/L (ref 34–104)
ALT SERPL W P-5'-P-CCNC: 12 U/L (ref 7–52)
ANION GAP SERPL CALCULATED.3IONS-SCNC: 9 MMOL/L (ref 4–13)
AST SERPL W P-5'-P-CCNC: 12 U/L (ref 13–39)
BASOPHILS # BLD AUTO: 0.07 THOUSANDS/ÂΜL (ref 0–0.1)
BASOPHILS NFR BLD AUTO: 1 % (ref 0–1)
BILIRUB SERPL-MCNC: 0.32 MG/DL (ref 0.2–1)
BUN SERPL-MCNC: 16 MG/DL (ref 5–25)
CALCIUM SERPL-MCNC: 9.3 MG/DL (ref 8.4–10.2)
CHLORIDE SERPL-SCNC: 106 MMOL/L (ref 96–108)
CO2 SERPL-SCNC: 25 MMOL/L (ref 21–32)
CREAT SERPL-MCNC: 0.74 MG/DL (ref 0.6–1.3)
EOSINOPHIL # BLD AUTO: 0.23 THOUSAND/ÂΜL (ref 0–0.61)
EOSINOPHIL NFR BLD AUTO: 3 % (ref 0–6)
ERYTHROCYTE [DISTWIDTH] IN BLOOD BY AUTOMATED COUNT: 13.8 % (ref 11.6–15.1)
GFR SERPL CREATININE-BSD FRML MDRD: 96 ML/MIN/1.73SQ M
GLUCOSE SERPL-MCNC: 166 MG/DL (ref 65–140)
GLUCOSE SERPL-MCNC: 172 MG/DL (ref 65–140)
GLUCOSE SERPL-MCNC: 61 MG/DL (ref 65–140)
GLUCOSE SERPL-MCNC: 74 MG/DL (ref 65–140)
GLUCOSE SERPL-MCNC: 86 MG/DL (ref 65–140)
HCT VFR BLD AUTO: 40.1 % (ref 36.5–49.3)
HGB BLD-MCNC: 12.9 G/DL (ref 12–17)
IMM GRANULOCYTES # BLD AUTO: 0.02 THOUSAND/UL (ref 0–0.2)
IMM GRANULOCYTES NFR BLD AUTO: 0 % (ref 0–2)
LYMPHOCYTES # BLD AUTO: 2.75 THOUSANDS/ÂΜL (ref 0.6–4.47)
LYMPHOCYTES NFR BLD AUTO: 32 % (ref 14–44)
MAGNESIUM SERPL-MCNC: 2.1 MG/DL (ref 1.9–2.7)
MCH RBC QN AUTO: 28 PG (ref 26.8–34.3)
MCHC RBC AUTO-ENTMCNC: 32.2 G/DL (ref 31.4–37.4)
MCV RBC AUTO: 87 FL (ref 82–98)
MONOCYTES # BLD AUTO: 0.82 THOUSAND/ÂΜL (ref 0.17–1.22)
MONOCYTES NFR BLD AUTO: 10 % (ref 4–12)
NEUTROPHILS # BLD AUTO: 4.77 THOUSANDS/ÂΜL (ref 1.85–7.62)
NEUTS SEG NFR BLD AUTO: 54 % (ref 43–75)
NRBC BLD AUTO-RTO: 0 /100 WBCS
PHOSPHATE SERPL-MCNC: 4.1 MG/DL (ref 2.3–4.1)
PLATELET # BLD AUTO: 328 THOUSANDS/UL (ref 149–390)
PMV BLD AUTO: 8.8 FL (ref 8.9–12.7)
POTASSIUM SERPL-SCNC: 3.7 MMOL/L (ref 3.5–5.3)
PROCALCITONIN SERPL-MCNC: 0.07 NG/ML
PROT SERPL-MCNC: 7.2 G/DL (ref 6.4–8.4)
RBC # BLD AUTO: 4.6 MILLION/UL (ref 3.88–5.62)
SODIUM SERPL-SCNC: 140 MMOL/L (ref 135–147)
VANCOMYCIN SERPL-MCNC: 15.7 UG/ML (ref 10–20)
WBC # BLD AUTO: 8.66 THOUSAND/UL (ref 4.31–10.16)

## 2024-07-13 PROCEDURE — 84100 ASSAY OF PHOSPHORUS: CPT | Performed by: INTERNAL MEDICINE

## 2024-07-13 PROCEDURE — 85025 COMPLETE CBC W/AUTO DIFF WBC: CPT | Performed by: INTERNAL MEDICINE

## 2024-07-13 PROCEDURE — 82948 REAGENT STRIP/BLOOD GLUCOSE: CPT

## 2024-07-13 PROCEDURE — 80202 ASSAY OF VANCOMYCIN: CPT

## 2024-07-13 PROCEDURE — 80053 COMPREHEN METABOLIC PANEL: CPT | Performed by: INTERNAL MEDICINE

## 2024-07-13 PROCEDURE — 99232 SBSQ HOSP IP/OBS MODERATE 35: CPT | Performed by: INTERNAL MEDICINE

## 2024-07-13 PROCEDURE — 83735 ASSAY OF MAGNESIUM: CPT | Performed by: INTERNAL MEDICINE

## 2024-07-13 PROCEDURE — 84145 PROCALCITONIN (PCT): CPT | Performed by: INTERNAL MEDICINE

## 2024-07-13 RX ADMIN — LISINOPRIL 2.5 MG: 2.5 TABLET ORAL at 08:00

## 2024-07-13 RX ADMIN — ENOXAPARIN SODIUM 40 MG: 40 INJECTION SUBCUTANEOUS at 08:00

## 2024-07-13 RX ADMIN — CEFTRIAXONE 2000 MG: 2 INJECTION, SOLUTION INTRAVENOUS at 12:10

## 2024-07-13 RX ADMIN — ASPIRIN 81 MG: 81 TABLET, COATED ORAL at 08:00

## 2024-07-13 RX ADMIN — INSULIN LISPRO 14 UNITS: 100 INJECTION, SOLUTION INTRAVENOUS; SUBCUTANEOUS at 17:01

## 2024-07-13 RX ADMIN — INSULIN GLARGINE 50 UNITS: 100 INJECTION, SOLUTION SUBCUTANEOUS at 22:10

## 2024-07-13 RX ADMIN — VANCOMYCIN HYDROCHLORIDE 1250 MG: 1 INJECTION, POWDER, LYOPHILIZED, FOR SOLUTION INTRAVENOUS at 19:14

## 2024-07-13 RX ADMIN — INSULIN LISPRO 1 UNITS: 100 INJECTION, SOLUTION INTRAVENOUS; SUBCUTANEOUS at 12:10

## 2024-07-13 RX ADMIN — VANCOMYCIN HYDROCHLORIDE 1250 MG: 1 INJECTION, POWDER, LYOPHILIZED, FOR SOLUTION INTRAVENOUS at 07:58

## 2024-07-13 RX ADMIN — INSULIN LISPRO 1 UNITS: 100 INJECTION, SOLUTION INTRAVENOUS; SUBCUTANEOUS at 17:01

## 2024-07-13 RX ADMIN — INSULIN LISPRO 14 UNITS: 100 INJECTION, SOLUTION INTRAVENOUS; SUBCUTANEOUS at 12:10

## 2024-07-13 RX ADMIN — ATORVASTATIN CALCIUM 80 MG: 40 TABLET, FILM COATED ORAL at 17:01

## 2024-07-13 NOTE — PLAN OF CARE
Problem: METABOLIC, FLUID AND ELECTROLYTES - ADULT  Goal: Electrolytes maintained within normal limits  Description: INTERVENTIONS:  - Monitor labs and assess patient for signs and symptoms of electrolyte imbalances  - Administer electrolyte replacement as ordered  - Monitor response to electrolyte replacements, including repeat lab results as appropriate  - Instruct patient on fluid and nutrition as appropriate  Outcome: Progressing     Problem: SKIN/TISSUE INTEGRITY - ADULT  Goal: Skin Integrity remains intact(Skin Breakdown Prevention)  Description: Assess:  -Perform Zaid assessment every 4 hrs  -Clean and moisturize skin every 4 hrs  -Inspect skin when repositioning, toileting, and assisting with ADLS  -Assess under medical devices such as mosimo every 4 hrs  -Assess extremities for adequate circulation and sensation     Bed Management:  -Have minimal linens on bed & keep smooth, unwrinkled  -Change linens as needed when moist or perspiring  -Avoid sitting or lying in one position for more than 4 hours while in bed  -Keep HOB at 30degrees     Toileting:  -Offer bedside commode  -Assess for incontinence every 4 hrs  -Use incontinent care products after each incontinent episode such as lotion and spray    Activity:  -Mobilize patient 3 times a day  -Encourage activity and walks on unit  -Encourage or provide ROM exercises   -Turn and reposition patient every 4 Hours  -Use appropriate equipment to lift or move patient in bed  -Instruct/ Assist with weight shifting every 4hrs when out of bed in chair  -Consider limitation of chair time 2 hour intervals    Skin Care:  -Avoid use of baby powder, tape, friction and shearing, hot water or constrictive clothing  -Relieve pressure over bony prominences using lotion  -Do not massage red bony areas    Next Steps:  -Teach patient strategies to minimize risks such as getting oob   -Consider consults to  interdisciplinary teams such as pt/ot  Outcome: Progressing  Goal:  Incision(s), wounds(s) or drain site(s) healing without S/S of infection  Description: INTERVENTIONS  - Assess and document dressing, incision, wound bed, drain sites and surrounding tissue  - Provide patient and family education  - Perform skin care/dressing changes every 4 hrs  Outcome: Progressing     Problem: MUSCULOSKELETAL - ADULT  Goal: Maintain or return mobility to safest level of function  Description: INTERVENTIONS:  - Assess patient's ability to carry out ADLs; assess patient's baseline for ADL function and identify physical deficits which impact ability to perform ADLs (bathing, care of mouth/teeth, toileting, grooming, dressing, etc.)  - Assess/evaluate cause of self-care deficits   - Assess range of motion  - Assess patient's mobility  - Assess patient's need for assistive devices and provide as appropriate  - Encourage maximum independence but intervene and supervise when necessary  - Involve family in performance of ADLs  - Assess for home care needs following discharge   - Consider OT consult to assist with ADL evaluation and planning for discharge  - Provide patient education as appropriate  Outcome: Progressing  Goal: Maintain proper alignment of affected body part  Description: INTERVENTIONS:  - Support, maintain and protect limb and body alignment  - Provide patient/ family with appropriate education  Outcome: Progressing     Problem: PAIN - ADULT  Goal: Verbalizes/displays adequate comfort level or baseline comfort level  Description: Interventions:  - Encourage patient to monitor pain and request assistance  - Assess pain using appropriate pain scale  - Administer analgesics based on type and severity of pain and evaluate response  - Implement non-pharmacological measures as appropriate and evaluate response  - Consider cultural and social influences on pain and pain management  - Notify physician/advanced practitioner if interventions unsuccessful or patient reports new pain  Outcome:  Progressing     Problem: INFECTION - ADULT  Goal: Absence or prevention of progression during hospitalization  Description: INTERVENTIONS:  - Assess and monitor for signs and symptoms of infection  - Monitor lab/diagnostic results  - Monitor all insertion sites, i.e. indwelling lines, tubes, and drains  - Monitor endotracheal if appropriate and nasal secretions for changes in amount and color  - [place] appropriate cooling/warming therapies per order  - Administer medications as ordered  - Instruct and encourage patient and family to use good hand hygiene technique  - Identify and instruct in appropriate isolation precautions for identified infection/condition  Outcome: Progressing     Problem: SAFETY ADULT  Goal: Patient will remain free of falls  Description: INTERVENTIONS:  - Educate patient/family on patient safety including physical limitations  - Instruct patient to call for assistance with activity   - Consult OT/PT to assist with strengthening/mobility   - Keep Call bell within reach  - Keep bed low and locked with side rails adjusted as appropriate  - Keep care items and personal belongings within reach  - Initiate and maintain comfort rounds  - Make Fall Risk Sign visible to staff  - Offer Toileting every 2 Hours, in advance of need  - Initiate/Maintain bed/chair alarm  - Obtain necessary fall risk management equipment: alarms  - Apply yellow socks and bracelet for high fall risk patients  - Consider moving patient to room near nurses station  Outcome: Progressing     Problem: DISCHARGE PLANNING  Goal: Discharge to home or other facility with appropriate resources  Description: INTERVENTIONS:  - Identify barriers to discharge w/patient and caregiver  - Arrange for needed discharge resources and transportation as appropriate  - Identify discharge learning needs (meds, wound care, etc.)  - Arrange for interpretive services to assist at discharge as needed  - Refer to Case Management Department for  coordinating discharge planning if the patient needs post-hospital services based on physician/advanced practitioner order or complex needs related to functional status, cognitive ability, or social support system  Outcome: Progressing     Problem: Knowledge Deficit  Goal: Patient/family/caregiver demonstrates understanding of disease process, treatment plan, medications, and discharge instructions  Description: Complete learning assessment and assess knowledge base.  Interventions:  - Provide teaching at level of understanding  - Provide teaching via preferred learning methods  Outcome: Progressing     Problem: Prexisting or High Potential for Compromised Skin Integrity  Goal: Skin integrity is maintained or improved  Description: INTERVENTIONS:  - Identify patients at risk for skin breakdown  - Assess and monitor skin integrity  - Assess and monitor nutrition and hydration status  - Monitor labs   - Assess for incontinence   - Turn and reposition patient  - Assist with mobility/ambulation  - Relieve pressure over bony prominences  - Avoid friction and shearing  - Provide appropriate hygiene as needed including keeping skin clean and dry  - Evaluate need for skin moisturizer/barrier cream  - Collaborate with interdisciplinary team   - Patient/family teaching  - Consider wound care consult   Outcome: Progressing     Problem: Nutrition/Hydration-ADULT  Goal: Nutrient/Hydration intake appropriate for improving, restoring or maintaining nutritional needs  Description: Monitor and assess patient's nutrition/hydration status for malnutrition. Collaborate with interdisciplinary team and initiate plan and interventions as ordered.  Monitor patient's weight and dietary intake as ordered or per policy. Utilize nutrition screening tool and intervene as necessary. Determine patient's food preferences and provide high-protein, high-caloric foods as appropriate.     INTERVENTIONS:  - Monitor oral intake, urinary output, labs,  and treatment plans  - Assess nutrition and hydration status and recommend course of action  - Evaluate amount of meals eaten  - Assist patient with eating if necessary   - Allow adequate time for meals  - Recommend/ encourage appropriate diets, oral nutritional supplements, and vitamin/mineral supplements  - Order, calculate, and assess calorie counts as needed  - Recommend, monitor, and adjust tube feedings and TPN/PPN based on assessed needs  - Assess need for intravenous fluids  - Provide specific nutrition/hydration education as appropriate  - Include patient/family/caregiver in decisions related to nutrition  Outcome: Progressing

## 2024-07-13 NOTE — ASSESSMENT & PLAN NOTE
Lab Results   Component Value Date    HGBA1C 9.7 (H) 06/25/2024       Recent Labs     07/12/24  1630 07/12/24  2121 07/13/24  0736 07/13/24  1137   POCGLU 176* 90 74 172*         Blood Sugar Average: Last 72 hrs:  (P) 126.3352540192115307    Continue Lantus 50 Units SQ QHS and Humalog 14 Units TID with meals  ISS with blood glucose monitoring ACHS  Continue PO lisinopril for renal protection  Hypoglycemia protocol  Outpatient Endocrinology evaluation

## 2024-07-13 NOTE — PROGRESS NOTES
"[facility]  Progress Note  Name: Art Joseph I  MRN: [id number]  Unit/Bed#: 424-01 I Date of Admission: 7/8/2024   Date of Service: 7/13/2024 I Hospital Day: 5    Assessment & Plan   * Open wound of left foot  Assessment & Plan  Recent hospitalization from 06/25/2024 through 06/28/2024 for osteomyelitis of the left foot requiring a left 1st metatarsal amputation/resection on 06/27/2024 by Podiatry  Now with a left wound infection and associated cellulitis with probable underlying osteomyelitis  Also found to have maggots in his open wound  I appreciate Infectious Disease's input and Podiatry's input.  Checked a left foot wound culture on 07/09/2024, which is positive for Proteus vulgaris and MRSA  Being treated with IV vancomycin  IV cefepime was added on 07/10/2024 per Infectious Disease's recommendations based on the left foot wound culture result  Changed IV cefepime to IV ceftriaxone on 07/12/2024  Underwent left foot wound debridement and washout by Podiatry on 07/11/2024  Await bone margin pathology results and surgical wound cultures to determine antibiotic treatment course per Infectious Disease    MRI of the left foot with contrast (07/10/2024):  IMPRESSION:     1. Heterogeneous predominately T2 hyperintense subcutaneous collection just deep to the dorsal and medial skin wound extending down to the subjacent medial cuneiform. There is increased T2 signal within the subjacent medial cuneiform and faint low T1   signal. Findings are suspicious for early osteomyelitis.  2. No additional marrow signal abnormality to suggest other areas of osteomyelitis.    Osteomyelitis of left foot (HCC)  Assessment & Plan  Please see the assessment and plan for \"Open wound of left foot\"    Maggot infestation  Assessment & Plan  Please see the assessment and plan for \"Open wound of left foot\"      MRSA colonization  Assessment & Plan  Utilize the MRSA decolonization " protocol    Hyperphosphatemia  Assessment & Plan  No treatment is indicated at this time  Follow the phosphorus level    Primary hypertension  Assessment & Plan  Continue PO lisinopril  Follow the blood pressure trend    Ambulatory dysfunction  Assessment & Plan  PT/OT evaluations    Hx of right BKA (HCC)  Assessment & Plan  Outpatient follow-up with Vascular Surgery    Type 2 diabetes mellitus with foot ulcer, with long-term current use of insulin (HCC)  Assessment & Plan  Lab Results   Component Value Date    HGBA1C 9.7 (H) 06/25/2024       Recent Labs     07/12/24  1630 07/12/24  2121 07/13/24  0736 07/13/24  1137   POCGLU 176* 90 74 172*         Blood Sugar Average: Last 72 hrs:  (P) [id number]    Continue Lantus 50 Units SQ QHS and Humalog 14 Units TID with meals  ISS with blood glucose monitoring ACHS  Continue PO lisinopril for renal protection  Hypoglycemia protocol  Outpatient Endocrinology evaluation        VTE Pharmacologic Prophylaxis: VTE Score: 8 High Risk (Score >/= 5) - Pharmacological DVT Prophylaxis Ordered: enoxaparin (Lovenox). No Sequential Compression Devices Ordered for the right lower extremity due to the patient having a right BKA.  Utilize a SCD on the left lower extremity.    Mobility:   Basic Mobility Inpatient Raw Score: 17  JH-HLM Goal: 5: Stand one or more mins  JH-HLM Achieved: 3: Sit at edge of bed  JH-HLM Goal NOT achieved. Continue with multidisciplinary rounding and encourage appropriate mobility to improve upon JH-HLM goals.    Patient Centered Rounds: I performed bedside rounds with nursing staff today.   Discussions with Specialists or Other Care Team Provider: I discussed the case with Infectious Disease and with Podiatry.    Education and Discussions with Family / Patient: Patient declined call to .     Total Time Spent on Date of Encounter in care of patient: 35 mins. This time was spent on one or more of the following: performing physical exam;  counseling and coordination of care; obtaining or reviewing history; documenting in the medical record; reviewing/ordering tests, medications or procedures; communicating with other healthcare professionals and discussing with patient's family/caregivers.    Current Length of Stay: 5 day(s)  Current Patient Status: Inpatient   Certification Statement: The patient will continue to require additional inpatient hospital stay due to the need for IV antibiotic treatment.  Discharge Plan: Anticipate discharge in >72 hrs to rehab facility.    Code Status: Level 1 - Full Code    Subjective:   The patient was seen and examined.  The patient is doing well.  No extremity pain.  No chest pain.  No shortness of breath.  No abdominal pain.  No nausea or vomiting.      Objective:     Vitals:   Temp (24hrs), Av.8 °F (36.6 °C), Min:97.7 °F (36.5 °C), Max:97.9 °F (36.6 °C)    Temp:  [97.7 °F (36.5 °C)-97.9 °F (36.6 °C)] 97.7 °F (36.5 °C)  HR:  [65-79] 65  Resp:  [16-18] 16  BP: (124-140)/(58-75) 140/75  SpO2:  [93 %-96 %] 93 %  Body mass index is 30.91 kg/m².     Input and Output Summary (last 24 hours):     Intake/Output Summary (Last 24 hours) at 2024 1149  Last data filed at 2024 0900  Gross per 24 hour   Intake 1140 ml   Output 1975 ml   Net -835 ml       Physical Exam:   Physical Exam  General:  NAD, follows commands  HEENT:  NC/AT, mucous membranes moist  Neck:  Supple, No JVP elevation  CV:  + S1, + S2, RRR  Pulm:  Lung fields are CTA bilaterally  Abd:  Soft, Non-tender, Non-distended  Ext:  No clubbing/cyanosis/edema, Left foot surgical wound dressing intact, History of right-sided BKA  Skin:  No rashes  Neuro:  Awake, alert, oriented  Psych:  Normal mood and affect      Additional Data:    Labs:  Results from last 7 days   Lab Units 24  0502   WBC Thousand/uL 8.66   HEMOGLOBIN g/dL 12.9   HEMATOCRIT % 40.1   PLATELETS Thousands/uL 328   SEGS PCT % 54   LYMPHO PCT % 32   MONO PCT % 10   EOS PCT % 3      Results from last 7 days   Lab Units 07/13/24  0502   SODIUM mmol/L 140   POTASSIUM mmol/L 3.7   CHLORIDE mmol/L 106   CO2 mmol/L 25   BUN mg/dL 16   CREATININE mg/dL 0.74   ANION GAP mmol/L 9   CALCIUM mg/dL 9.3   ALBUMIN g/dL 3.5   TOTAL BILIRUBIN mg/dL 0.32   ALK PHOS U/L 113*   ALT U/L 12   AST U/L 12*   GLUCOSE RANDOM mg/dL 61*     Results from last 7 days   Lab Units 07/08/24  1802   INR  0.94     Results from last 7 days   Lab Units 07/13/24  1137 07/13/24  0736 07/12/24  2121 07/12/24  1630 07/12/24  1132 07/12/24  0725 07/11/24  2058 07/11/24  1609 07/11/24  1149 07/11/24  0659 07/10/24  2042 07/10/24  1600   POC GLUCOSE mg/dl 172* 74 90 176* 124 74 134 134 93 93 181* 146*         Results from last 7 days   Lab Units 07/13/24  0502 07/12/24  0542 07/11/24  0559 07/10/24  0519   PROCALCITONIN ng/ml 0.07 0.09 0.08 0.10       Lines/Drains:  Invasive Devices       Peripheral Intravenous Line  Duration             Peripheral IV 07/12/24 Dorsal (posterior);Left Forearm <1 day                          Imaging: No pertinent imaging reviewed.    Recent Cultures (last 7 days):   Results from last 7 days   Lab Units 07/11/24  1042 07/09/24  1411   GRAM STAIN RESULT  No Polys or Bacteria seen  No Polys or Bacteria seen Rare Gram negative rods*  Rare Gram positive cocci in pairs*  No polys seen*   WOUND CULTURE   --  2+ Growth of Proteus vulgaris group*  2+ Growth of Methicillin Resistant Staphylococcus aureus*       Last 24 Hours Medication List:   Current Facility-Administered Medications   Medication Dose Route Frequency Provider Last Rate    acetaminophen  650 mg Oral Q6H PRN Javi Meyer DPM      aspirin  81 mg Oral Daily Javi Meyer DPM      atorvastatin  80 mg Oral Daily With Dinner Javi Meyer DPM      cefTRIAXone  2,000 mg Intravenous Q24H Ángel Rousseau MD 2,000 mg (07/12/24 1244)    Dakins (full strength)  1 Application Irrigation Daily Javi J Betsy, DPM      enoxaparin  40 mg Subcutaneous  Daily Javi Meyer DPM      insulin glargine  50 Units Subcutaneous HS Javi Meyer DPM      insulin lispro  1-6 Units Subcutaneous 4x Daily (with meals and at bedtime) Javi Meyer DPM      insulin lispro  14 Units Subcutaneous TID With Meals Javi Meyer DPM      lisinopril  2.5 mg Oral Daily Javi Meyer DPM      ondansetron  4 mg Intravenous Q6H PRN Javi Meyer DPM      oxyCODONE-acetaminophen  1 tablet Oral Q4H PRN Javi Myeer DPM      vancomycin  1,250 mg Intravenous Q12H Javi Meyer DPM 1,250 mg (07/09/24 5295)        Today, Patient Was Seen By: Jerome Gutierrez DO    **Please Note: This note may have been constructed using a voice recognition system.**

## 2024-07-13 NOTE — PROGRESS NOTES
Art Joseph is a 64 y.o. male who is currently ordered Vancomycin IV with management by the Pharmacy Consult service.  Relevant clinical data and objective / subjective history reviewed.  Vancomycin Assessment:  Indication and Goal AUC/Trough: Bone/joint infection (goal -600, trough >10), -600, trough >10  Clinical Status: stable  Micro:             Renal Function:  SCr: 0.74 mg/dL  CrCl: 93 mL/min  Renal replacement: Not on dialysis  Days of Therapy: 6  Current Dose: 1250mg IV q12h  Vancomycin Plan:  New Dosing: same  Estimated AUC: 468 mcg*hr/mL  Estimated Trough: 12.8 mcg/mL  Next Level: 7/19/24 with AM labs  Renal Function Monitoring: Daily BMP and UOP  Pharmacy will continue to follow closely for s/sx of nephrotoxicity, infusion reactions and appropriateness of therapy.  BMP and CBC will be ordered per protocol. We will continue to follow the patient’s culture results and clinical progress daily.    Christopher Guardado, Pharmacist

## 2024-07-13 NOTE — PLAN OF CARE
Problem: METABOLIC, FLUID AND ELECTROLYTES - ADULT  Goal: Electrolytes maintained within normal limits  Description: INTERVENTIONS:  - Monitor labs and assess patient for signs and symptoms of electrolyte imbalances  - Administer electrolyte replacement as ordered  - Monitor response to electrolyte replacements, including repeat lab results as appropriate  - Instruct patient on fluid and nutrition as appropriate  Outcome: Progressing     Problem: SKIN/TISSUE INTEGRITY - ADULT  Goal: Skin Integrity remains intact(Skin Breakdown Prevention)  Description: Assess:  -Perform Zaid assessment every 4 hrs  -Clean and moisturize skin every 4 hrs  -Inspect skin when repositioning, toileting, and assisting with ADLS  -Assess under medical devices such as mosimo every 4 hrs  -Assess extremities for adequate circulation and sensation     Bed Management:  -Have minimal linens on bed & keep smooth, unwrinkled  -Change linens as needed when moist or perspiring  -Avoid sitting or lying in one position for more than 4 hours while in bed  -Keep HOB at 30degrees     Toileting:  -Offer bedside commode  -Assess for incontinence every 4 hrs  -Use incontinent care products after each incontinent episode such as lotion and spray    Activity:  -Mobilize patient 3 times a day  -Encourage activity and walks on unit  -Encourage or provide ROM exercises   -Turn and reposition patient every 4 Hours  -Use appropriate equipment to lift or move patient in bed  -Instruct/ Assist with weight shifting every 4hrs when out of bed in chair  -Consider limitation of chair time 2 hour intervals    Skin Care:  -Avoid use of baby powder, tape, friction and shearing, hot water or constrictive clothing  -Relieve pressure over bony prominences using lotion  -Do not massage red bony areas    Next Steps:  -Teach patient strategies to minimize risks such as getting oob   -Consider consults to  interdisciplinary teams such as pt/ot  Outcome: Progressing  Goal:  Incision(s), wounds(s) or drain site(s) healing without S/S of infection  Description: INTERVENTIONS  - Assess and document dressing, incision, wound bed, drain sites and surrounding tissue  - Provide patient and family education  - Perform skin care/dressing changes every 4 hrs  Outcome: Progressing     Problem: MUSCULOSKELETAL - ADULT  Goal: Maintain or return mobility to safest level of function  Description: INTERVENTIONS:  - Assess patient's ability to carry out ADLs; assess patient's baseline for ADL function and identify physical deficits which impact ability to perform ADLs (bathing, care of mouth/teeth, toileting, grooming, dressing, etc.)  - Assess/evaluate cause of self-care deficits   - Assess range of motion  - Assess patient's mobility  - Assess patient's need for assistive devices and provide as appropriate  - Encourage maximum independence but intervene and supervise when necessary  - Involve family in performance of ADLs  - Assess for home care needs following discharge   - Consider OT consult to assist with ADL evaluation and planning for discharge  - Provide patient education as appropriate  Outcome: Progressing  Goal: Maintain proper alignment of affected body part  Description: INTERVENTIONS:  - Support, maintain and protect limb and body alignment  - Provide patient/ family with appropriate education  Outcome: Progressing     Problem: PAIN - ADULT  Goal: Verbalizes/displays adequate comfort level or baseline comfort level  Description: Interventions:  - Encourage patient to monitor pain and request assistance  - Assess pain using appropriate pain scale  - Administer analgesics based on type and severity of pain and evaluate response  - Implement non-pharmacological measures as appropriate and evaluate response  - Consider cultural and social influences on pain and pain management  - Notify physician/advanced practitioner if interventions unsuccessful or patient reports new pain  Outcome:  Progressing     Problem: INFECTION - ADULT  Goal: Absence or prevention of progression during hospitalization  Description: INTERVENTIONS:  - Assess and monitor for signs and symptoms of infection  - Monitor lab/diagnostic results  - Monitor all insertion sites, i.e. indwelling lines, tubes, and drains  - Monitor endotracheal if appropriate and nasal secretions for changes in amount and color  - [place] appropriate cooling/warming therapies per order  - Administer medications as ordered  - Instruct and encourage patient and family to use good hand hygiene technique  - Identify and instruct in appropriate isolation precautions for identified infection/condition  Outcome: Progressing     Problem: SAFETY ADULT  Goal: Patient will remain free of falls  Description: INTERVENTIONS:  - Educate patient/family on patient safety including physical limitations  - Instruct patient to call for assistance with activity   - Consult OT/PT to assist with strengthening/mobility   - Keep Call bell within reach  - Keep bed low and locked with side rails adjusted as appropriate  - Keep care items and personal belongings within reach  - Initiate and maintain comfort rounds  - Make Fall Risk Sign visible to staff  - Offer Toileting every 2 Hours, in advance of need  - Initiate/Maintain bed/chair alarm  - Obtain necessary fall risk management equipment: alarms  - Apply yellow socks and bracelet for high fall risk patients  - Consider moving patient to room near nurses station  Outcome: Progressing     Problem: DISCHARGE PLANNING  Goal: Discharge to home or other facility with appropriate resources  Description: INTERVENTIONS:  - Identify barriers to discharge w/patient and caregiver  - Arrange for needed discharge resources and transportation as appropriate  - Identify discharge learning needs (meds, wound care, etc.)  - Arrange for interpretive services to assist at discharge as needed  - Refer to Case Management Department for  coordinating discharge planning if the patient needs post-hospital services based on physician/advanced practitioner order or complex needs related to functional status, cognitive ability, or social support system  Outcome: Progressing     Problem: Knowledge Deficit  Goal: Patient/family/caregiver demonstrates understanding of disease process, treatment plan, medications, and discharge instructions  Description: Complete learning assessment and assess knowledge base.  Interventions:  - Provide teaching at level of understanding  - Provide teaching via preferred learning methods  Outcome: Progressing     Problem: Prexisting or High Potential for Compromised Skin Integrity  Goal: Skin integrity is maintained or improved  Description: INTERVENTIONS:  - Identify patients at risk for skin breakdown  - Assess and monitor skin integrity  - Assess and monitor nutrition and hydration status  - Monitor labs   - Assess for incontinence   - Turn and reposition patient  - Assist with mobility/ambulation  - Relieve pressure over bony prominences  - Avoid friction and shearing  - Provide appropriate hygiene as needed including keeping skin clean and dry  - Evaluate need for skin moisturizer/barrier cream  - Collaborate with interdisciplinary team   - Patient/family teaching  - Consider wound care consult   Outcome: Progressing     Problem: Nutrition/Hydration-ADULT  Goal: Nutrient/Hydration intake appropriate for improving, restoring or maintaining nutritional needs  Description: Monitor and assess patient's nutrition/hydration status for malnutrition. Collaborate with interdisciplinary team and initiate plan and interventions as ordered.  Monitor patient's weight and dietary intake as ordered or per policy. Utilize nutrition screening tool and intervene as necessary. Determine patient's food preferences and provide high-protein, high-caloric foods as appropriate.     INTERVENTIONS:  - Monitor oral intake, urinary output, labs,  and treatment plans  - Assess nutrition and hydration status and recommend course of action  - Evaluate amount of meals eaten  - Assist patient with eating if necessary   - Allow adequate time for meals  - Recommend/ encourage appropriate diets, oral nutritional supplements, and vitamin/mineral supplements  - Order, calculate, and assess calorie counts as needed  - Recommend, monitor, and adjust tube feedings and TPN/PPN based on assessed needs  - Assess need for intravenous fluids  - Provide specific nutrition/hydration education as appropriate  - Include patient/family/caregiver in decisions related to nutrition  Outcome: Progressing

## 2024-07-14 VITALS
BODY MASS INDEX: 30.84 KG/M2 | SYSTOLIC BLOOD PRESSURE: 128 MMHG | DIASTOLIC BLOOD PRESSURE: 70 MMHG | HEIGHT: 70 IN | WEIGHT: 215.39 LBS | TEMPERATURE: 98.1 F | RESPIRATION RATE: 16 BRPM | HEART RATE: 70 BPM | OXYGEN SATURATION: 96 %

## 2024-07-14 PROBLEM — E11.649 TYPE 2 DIABETES MELLITUS WITH HYPOGLYCEMIA WITHOUT COMA, WITH LONG-TERM CURRENT USE OF INSULIN (HCC): Status: ACTIVE | Noted: 2017-01-27

## 2024-07-14 LAB
ALBUMIN SERPL BCG-MCNC: 3.5 G/DL (ref 3.5–5)
ALP SERPL-CCNC: 110 U/L (ref 34–104)
ALT SERPL W P-5'-P-CCNC: 13 U/L (ref 7–52)
ANION GAP SERPL CALCULATED.3IONS-SCNC: 9 MMOL/L (ref 4–13)
AST SERPL W P-5'-P-CCNC: 11 U/L (ref 13–39)
BACTERIA SPEC ANAEROBE CULT: NORMAL
BACTERIA SPEC ANAEROBE CULT: NORMAL
BACTERIA TISS AEROBE CULT: ABNORMAL
BASOPHILS # BLD AUTO: 0.09 THOUSANDS/ÂΜL (ref 0–0.1)
BASOPHILS NFR BLD AUTO: 1 % (ref 0–1)
BILIRUB SERPL-MCNC: 0.31 MG/DL (ref 0.2–1)
BUN SERPL-MCNC: 18 MG/DL (ref 5–25)
CALCIUM SERPL-MCNC: 9.5 MG/DL (ref 8.4–10.2)
CHLORIDE SERPL-SCNC: 106 MMOL/L (ref 96–108)
CO2 SERPL-SCNC: 24 MMOL/L (ref 21–32)
CREAT SERPL-MCNC: 0.76 MG/DL (ref 0.6–1.3)
EOSINOPHIL # BLD AUTO: 0.29 THOUSAND/ÂΜL (ref 0–0.61)
EOSINOPHIL NFR BLD AUTO: 3 % (ref 0–6)
ERYTHROCYTE [DISTWIDTH] IN BLOOD BY AUTOMATED COUNT: 13.8 % (ref 11.6–15.1)
GFR SERPL CREATININE-BSD FRML MDRD: 95 ML/MIN/1.73SQ M
GLUCOSE SERPL-MCNC: 104 MG/DL (ref 65–140)
GLUCOSE SERPL-MCNC: 190 MG/DL (ref 65–140)
GLUCOSE SERPL-MCNC: 212 MG/DL (ref 65–140)
GLUCOSE SERPL-MCNC: 63 MG/DL (ref 65–140)
GLUCOSE SERPL-MCNC: 64 MG/DL (ref 65–140)
GRAM STN SPEC: ABNORMAL
GRAM STN SPEC: ABNORMAL
HCT VFR BLD AUTO: 41.3 % (ref 36.5–49.3)
HGB BLD-MCNC: 13.3 G/DL (ref 12–17)
IMM GRANULOCYTES # BLD AUTO: 0.03 THOUSAND/UL (ref 0–0.2)
IMM GRANULOCYTES NFR BLD AUTO: 0 % (ref 0–2)
LYMPHOCYTES # BLD AUTO: 3.21 THOUSANDS/ÂΜL (ref 0.6–4.47)
LYMPHOCYTES NFR BLD AUTO: 36 % (ref 14–44)
MAGNESIUM SERPL-MCNC: 2.1 MG/DL (ref 1.9–2.7)
MCH RBC QN AUTO: 28.1 PG (ref 26.8–34.3)
MCHC RBC AUTO-ENTMCNC: 32.2 G/DL (ref 31.4–37.4)
MCV RBC AUTO: 87 FL (ref 82–98)
MONOCYTES # BLD AUTO: 0.76 THOUSAND/ÂΜL (ref 0.17–1.22)
MONOCYTES NFR BLD AUTO: 8 % (ref 4–12)
NEUTROPHILS # BLD AUTO: 4.64 THOUSANDS/ÂΜL (ref 1.85–7.62)
NEUTS SEG NFR BLD AUTO: 52 % (ref 43–75)
NRBC BLD AUTO-RTO: 0 /100 WBCS
PHOSPHATE SERPL-MCNC: 3.9 MG/DL (ref 2.3–4.1)
PLATELET # BLD AUTO: 317 THOUSANDS/UL (ref 149–390)
PMV BLD AUTO: 8.7 FL (ref 8.9–12.7)
POTASSIUM SERPL-SCNC: 4 MMOL/L (ref 3.5–5.3)
PROT SERPL-MCNC: 7.3 G/DL (ref 6.4–8.4)
RBC # BLD AUTO: 4.74 MILLION/UL (ref 3.88–5.62)
SODIUM SERPL-SCNC: 139 MMOL/L (ref 135–147)
WBC # BLD AUTO: 9.02 THOUSAND/UL (ref 4.31–10.16)

## 2024-07-14 PROCEDURE — 80053 COMPREHEN METABOLIC PANEL: CPT | Performed by: INTERNAL MEDICINE

## 2024-07-14 PROCEDURE — 83735 ASSAY OF MAGNESIUM: CPT | Performed by: INTERNAL MEDICINE

## 2024-07-14 PROCEDURE — 82948 REAGENT STRIP/BLOOD GLUCOSE: CPT

## 2024-07-14 PROCEDURE — 99232 SBSQ HOSP IP/OBS MODERATE 35: CPT | Performed by: INTERNAL MEDICINE

## 2024-07-14 PROCEDURE — 84100 ASSAY OF PHOSPHORUS: CPT | Performed by: INTERNAL MEDICINE

## 2024-07-14 PROCEDURE — 85025 COMPLETE CBC W/AUTO DIFF WBC: CPT | Performed by: INTERNAL MEDICINE

## 2024-07-14 RX ORDER — INSULIN GLARGINE 100 [IU]/ML
40 INJECTION, SOLUTION SUBCUTANEOUS
Status: DISCONTINUED | OUTPATIENT
Start: 2024-07-14 | End: 2024-07-15

## 2024-07-14 RX ORDER — INSULIN LISPRO 100 [IU]/ML
10 INJECTION, SOLUTION INTRAVENOUS; SUBCUTANEOUS
Status: DISCONTINUED | OUTPATIENT
Start: 2024-07-14 | End: 2024-07-17

## 2024-07-14 RX ADMIN — ENOXAPARIN SODIUM 40 MG: 40 INJECTION SUBCUTANEOUS at 08:23

## 2024-07-14 RX ADMIN — LISINOPRIL 2.5 MG: 2.5 TABLET ORAL at 08:23

## 2024-07-14 RX ADMIN — INSULIN GLARGINE 40 UNITS: 100 INJECTION, SOLUTION SUBCUTANEOUS at 21:52

## 2024-07-14 RX ADMIN — INSULIN LISPRO 2 UNITS: 100 INJECTION, SOLUTION INTRAVENOUS; SUBCUTANEOUS at 11:33

## 2024-07-14 RX ADMIN — CEFTRIAXONE 2000 MG: 2 INJECTION, SOLUTION INTRAVENOUS at 11:33

## 2024-07-14 RX ADMIN — INSULIN LISPRO 10 UNITS: 100 INJECTION, SOLUTION INTRAVENOUS; SUBCUTANEOUS at 11:42

## 2024-07-14 RX ADMIN — CHOLECALCIFEROL TAB 25 MCG (1000 UNIT) 1000 UNITS: 25 TAB at 11:57

## 2024-07-14 RX ADMIN — INSULIN LISPRO 2 UNITS: 100 INJECTION, SOLUTION INTRAVENOUS; SUBCUTANEOUS at 21:49

## 2024-07-14 RX ADMIN — VANCOMYCIN HYDROCHLORIDE 1250 MG: 1 INJECTION, POWDER, LYOPHILIZED, FOR SOLUTION INTRAVENOUS at 18:05

## 2024-07-14 RX ADMIN — ATORVASTATIN CALCIUM 80 MG: 40 TABLET, FILM COATED ORAL at 16:12

## 2024-07-14 RX ADMIN — VANCOMYCIN HYDROCHLORIDE 1250 MG: 1 INJECTION, POWDER, LYOPHILIZED, FOR SOLUTION INTRAVENOUS at 06:32

## 2024-07-14 RX ADMIN — ASPIRIN 81 MG: 81 TABLET, COATED ORAL at 08:23

## 2024-07-14 NOTE — ASSESSMENT & PLAN NOTE
Utilize cholecalciferol 1000 I.U. PO Qdaily  Outpatient follow-up with PCP in regards to this matter     Latest Reference Range & Units Most Recent   VITAMIN D, 25-HYDROXY 30.0 - 100.0 ng/mL 16.0 (L)  4/30/24 04:17   (L): Data is abnormally low

## 2024-07-14 NOTE — PLAN OF CARE
Problem: METABOLIC, FLUID AND ELECTROLYTES - ADULT  Goal: Electrolytes maintained within normal limits  Description: INTERVENTIONS:  - Monitor labs and assess patient for signs and symptoms of electrolyte imbalances  - Administer electrolyte replacement as ordered  - Monitor response to electrolyte replacements, including repeat lab results as appropriate  - Instruct patient on fluid and nutrition as appropriate  Outcome: Progressing     Problem: SKIN/TISSUE INTEGRITY - ADULT  Goal: Skin Integrity remains intact(Skin Breakdown Prevention)  Description: Assess:  -Perform [place] assessment every 4 hrs  -Clean and moisturize skin every 4 hrs  -Inspect skin when repositioning, toileting, and assisting with ADLS  -Assess under medical devices such as mosimo every 4 hrs  -Assess extremities for adequate circulation and sensation     Bed Management:  -Have minimal linens on bed & keep smooth, unwrinkled  -Change linens as needed when moist or perspiring  -Avoid sitting or lying in one position for more than 4 hours while in bed  -Keep HOB at 30degrees     Toileting:  -Offer bedside commode  -Assess for incontinence every 4 hrs  -Use incontinent care products after each incontinent episode such as lotion and spray    Activity:  -Mobilize patient 3 times a day  -Encourage activity and walks on unit  -Encourage or provide ROM exercises   -Turn and reposition patient every 4 Hours  -Use appropriate equipment to lift or move patient in bed  -Instruct/ Assist with weight shifting every 4hrs when out of bed in chair  -Consider limitation of chair time 2 hour intervals    Skin Care:  -Avoid use of baby powder, tape, friction and shearing, hot water or constrictive clothing  -Relieve pressure over bony prominences using lotion  -Do not massage red bony areas    Next Steps:  -Teach patient strategies to minimize risks such as getting oob   -Consider consults to  interdisciplinary teams such as pt/ot  Outcome: Progressing  Goal:  Incision(s), wounds(s) or drain site(s) healing without S/S of infection  Description: INTERVENTIONS  - Assess and document dressing, incision, wound bed, drain sites and surrounding tissue  - Provide patient and family education  - Perform skin care/dressing changes every 4 hrs  Outcome: Progressing     Problem: MUSCULOSKELETAL - ADULT  Goal: Maintain or return mobility to safest level of function  Description: INTERVENTIONS:  - Assess patient's ability to carry out ADLs; assess patient's baseline for ADL function and identify physical deficits which impact ability to perform ADLs (bathing, care of mouth/teeth, toileting, grooming, dressing, etc.)  - Assess/evaluate cause of self-care deficits   - Assess range of motion  - Assess patient's mobility  - Assess patient's need for assistive devices and provide as appropriate  - Encourage maximum independence but intervene and supervise when necessary  - Involve family in performance of ADLs  - Assess for home care needs following discharge   - Consider OT consult to assist with ADL evaluation and planning for discharge  - Provide patient education as appropriate  Outcome: Progressing  Goal: Maintain proper alignment of affected body part  Description: INTERVENTIONS:  - Support, maintain and protect limb and body alignment  - Provide patient/ family with appropriate education  Outcome: Progressing     Problem: PAIN - ADULT  Goal: Verbalizes/displays adequate comfort level or baseline comfort level  Description: Interventions:  - Encourage patient to monitor pain and request assistance  - Assess pain using appropriate pain scale  - Administer analgesics based on type and severity of pain and evaluate response  - Implement non-pharmacological measures as appropriate and evaluate response  - Consider cultural and social influences on pain and pain management  - Notify physician/advanced practitioner if interventions unsuccessful or patient reports new pain  Outcome:  Progressing     Problem: INFECTION - ADULT  Goal: Absence or prevention of progression during hospitalization  Description: INTERVENTIONS:  - Assess and monitor for signs and symptoms of infection  - Monitor lab/diagnostic results  - Monitor all insertion sites, i.e. indwelling lines, tubes, and drains  - Monitor endotracheal if appropriate and nasal secretions for changes in amount and color  - [place] appropriate cooling/warming therapies per order  - Administer medications as ordered  - Instruct and encourage patient and family to use good hand hygiene technique  - Identify and instruct in appropriate isolation precautions for identified infection/condition  Outcome: Progressing     Problem: SAFETY ADULT  Goal: Patient will remain free of falls  Description: INTERVENTIONS:  - Educate patient/family on patient safety including physical limitations  - Instruct patient to call for assistance with activity   - Consult OT/PT to assist with strengthening/mobility   - Keep Call bell within reach  - Keep bed low and locked with side rails adjusted as appropriate  - Keep care items and personal belongings within reach  - Initiate and maintain comfort rounds  - Make Fall Risk Sign visible to staff  - Offer Toileting every 2 Hours, in advance of need  - Initiate/Maintain bed/chair alarm  - Obtain necessary fall risk management equipment: alarms  - Apply yellow socks and bracelet for high fall risk patients  - Consider moving patient to room near nurses station  Outcome: Progressing     Problem: DISCHARGE PLANNING  Goal: Discharge to home or other facility with appropriate resources  Description: INTERVENTIONS:  - Identify barriers to discharge w/patient and caregiver  - Arrange for needed discharge resources and transportation as appropriate  - Identify discharge learning needs (meds, wound care, etc.)  - Arrange for interpretive services to assist at discharge as needed  - Refer to Case Management Department for  coordinating discharge planning if the patient needs post-hospital services based on physician/advanced practitioner order or complex needs related to functional status, cognitive ability, or social support system  Outcome: Progressing     Problem: Knowledge Deficit  Goal: Patient/family/caregiver demonstrates understanding of disease process, treatment plan, medications, and discharge instructions  Description: Complete learning assessment and assess knowledge base.  Interventions:  - Provide teaching at level of understanding  - Provide teaching via preferred learning methods  Outcome: Progressing     Problem: Prexisting or High Potential for Compromised Skin Integrity  Goal: Skin integrity is maintained or improved  Description: INTERVENTIONS:  - Identify patients at risk for skin breakdown  - Assess and monitor skin integrity  - Assess and monitor nutrition and hydration status  - Monitor labs   - Assess for incontinence   - Turn and reposition patient  - Assist with mobility/ambulation  - Relieve pressure over bony prominences  - Avoid friction and shearing  - Provide appropriate hygiene as needed including keeping skin clean and dry  - Evaluate need for skin moisturizer/barrier cream  - Collaborate with interdisciplinary team   - Patient/family teaching  - Consider wound care consult   Outcome: Progressing     Problem: Nutrition/Hydration-ADULT  Goal: Nutrient/Hydration intake appropriate for improving, restoring or maintaining nutritional needs  Description: Monitor and assess patient's nutrition/hydration status for malnutrition. Collaborate with interdisciplinary team and initiate plan and interventions as ordered.  Monitor patient's weight and dietary intake as ordered or per policy. Utilize nutrition screening tool and intervene as necessary. Determine patient's food preferences and provide high-protein, high-caloric foods as appropriate.     INTERVENTIONS:  - Monitor oral intake, urinary output, labs,  and treatment plans  - Assess nutrition and hydration status and recommend course of action  - Evaluate amount of meals eaten  - Assist patient with eating if necessary   - Allow adequate time for meals  - Recommend/ encourage appropriate diets, oral nutritional supplements, and vitamin/mineral supplements  - Order, calculate, and assess calorie counts as needed  - Recommend, monitor, and adjust tube feedings and TPN/PPN based on assessed needs  - Assess need for intravenous fluids  - Provide specific nutrition/hydration education as appropriate  - Include patient/family/caregiver in decisions related to nutrition  Outcome: Progressing

## 2024-07-14 NOTE — ASSESSMENT & PLAN NOTE
Lab Results   Component Value Date    HGBA1C 9.7 (H) 06/25/2024       Recent Labs     07/13/24  1627 07/13/24  2049 07/14/24  0707 07/14/24  1054   POCGLU 166* 86 63* 212*         Blood Sugar Average: Last 72 hrs:  (P) 120.7111873930124564    Developed hypoglycemia on 07/14/2024  Decreased Lantus to 40 Units SQ QHS on 07/14/2024 and decreased Humalog to 10 Units TID with meals on 07/14/2024  ISS with blood glucose monitoring ACHS  Continue PO lisinopril for renal protection  Hypoglycemia protocol  Outpatient Endocrinology evaluation

## 2024-07-14 NOTE — PROGRESS NOTES
"[facility]  Progress Note  Name: Art Joseph I  MRN: [id number]  Unit/Bed#: 424-01 I Date of Admission: 7/8/2024   Date of Service: 7/14/2024 I Hospital Day: 6    Assessment & Plan   * Open wound of left foot  Assessment & Plan  Recent hospitalization from 06/25/2024 through 06/28/2024 for osteomyelitis of the left foot requiring a left 1st metatarsal amputation/resection on 06/27/2024 by Podiatry  Now with a left wound infection and associated cellulitis with probable underlying osteomyelitis  Also found to have maggots in his open wound  I appreciate Infectious Disease's input and Podiatry's input.  Checked a left foot wound culture on 07/09/2024, which is positive for Proteus vulgaris and MRSA  Being treated with IV vancomycin  IV cefepime was added on 07/10/2024 per Infectious Disease's recommendations based on the left foot wound culture result  Changed IV cefepime to IV ceftriaxone on 07/12/2024  Underwent left foot wound debridement and washout by Podiatry on 07/11/2024  Await bone margin pathology results and surgical wound cultures to determine antibiotic treatment course per Infectious Disease    MRI of the left foot with contrast (07/10/2024):  IMPRESSION:     1. Heterogeneous predominately T2 hyperintense subcutaneous collection just deep to the dorsal and medial skin wound extending down to the subjacent medial cuneiform. There is increased T2 signal within the subjacent medial cuneiform and faint low T1   signal. Findings are suspicious for early osteomyelitis.  2. No additional marrow signal abnormality to suggest other areas of osteomyelitis.    Osteomyelitis of left foot (HCC)  Assessment & Plan  Please see the assessment and plan for \"Open wound of left foot\"    Maggot infestation  Assessment & Plan  Please see the assessment and plan for \"Open wound of left foot\"      MRSA colonization  Assessment & Plan  Utilize the MRSA decolonization " protocol    Hyperphosphatemia  Assessment & Plan  No treatment is indicated at this time  Follow the phosphorus level    Vitamin D deficiency  Assessment & Plan  Utilize cholecalciferol 1000 I.U. PO Qdaily  Outpatient follow-up with PCP in regards to this matter     Latest Reference Range & Units Most Recent   VITAMIN D, 25-HYDROXY 30.0 - 100.0 ng/mL 16.0 (L)  4/30/24 04:17   (L): Data is abnormally low    Hypercholesteremia  Assessment & Plan  Continue statin therapy    Primary hypertension  Assessment & Plan  Continue PO lisinopril  Follow the blood pressure trend    Ambulatory dysfunction  Assessment & Plan  PT/OT evaluations    Hx of right BKA (HCC)  Assessment & Plan  Outpatient follow-up with Vascular Surgery    Type 2 diabetes mellitus with hypoglycemia without coma, with long-term current use of insulin (HCC)  Assessment & Plan  Lab Results   Component Value Date    HGBA1C 9.7 (H) 06/25/2024       Recent Labs     07/13/24  1627 07/13/24  2049 07/14/24  0707 07/14/24  1054   POCGLU 166* 86 63* 212*         Blood Sugar Average: Last 72 hrs:  (P) 120.7593154888166212    Developed hypoglycemia on 07/14/2024  Decreased Lantus to 40 Units SQ QHS on 07/14/2024 and decreased Humalog to 10 Units TID with meals on 07/14/2024  ISS with blood glucose monitoring ACHS  Continue PO lisinopril for renal protection  Hypoglycemia protocol  Outpatient Endocrinology evaluation        VTE Pharmacologic Prophylaxis: VTE Score: 8 High Risk (Score >/= 5) - Pharmacological DVT Prophylaxis Ordered: enoxaparin (Lovenox). Sequential Compression Devices Ordered for the left lower extremity. No SCD for the right lower extremity with a right-sided BKA.    Mobility:   Basic Mobility Inpatient Raw Score: 17  JH-HLM Goal: 5: Stand one or more mins  JH-HLM Achieved: 3: Sit at edge of bed  JH-HLM Goal NOT achieved. Continue with multidisciplinary rounding and encourage appropriate mobility to improve upon JH-HLM goals.    Patient Centered  Rounds: I performed bedside rounds with nursing staff today.     Education and Discussions with Family / Patient: Patient declined call to .     Total Time Spent on Date of Encounter in care of patient: 35 mins. This time was spent on one or more of the following: performing physical exam; counseling and coordination of care; obtaining or reviewing history; documenting in the medical record; reviewing/ordering tests, medications or procedures; communicating with other healthcare professionals and discussing with patient's family/caregivers.    Current Length of Stay: 6 day(s)  Current Patient Status: Inpatient   Certification Statement: The patient will continue to require additional inpatient hospital stay due to the need for IV antibiotic treatment.  Discharge Plan: Anticipate discharge in 48-72 hrs to rehab facility.    Code Status: Level 1 - Full Code    Subjective:   The patient was seen and examined.  The patient is doing well.  No extremity pain.  No chest pain.  No shortness of breath.  No abdominal pain.  No nausea or vomiting.      Objective:     Vitals:   Temp (24hrs), Av.1 °F (36.7 °C), Min:97.8 °F (36.6 °C), Max:98.3 °F (36.8 °C)    Temp:  [97.8 °F (36.6 °C)-98.3 °F (36.8 °C)] 98.1 °F (36.7 °C)  HR:  [70-83] 70  Resp:  [15-16] 15  BP: (127-143)/(72-74) 127/72  SpO2:  [94 %-97 %] 96 %  Body mass index is 30.91 kg/m².     Input and Output Summary (last 24 hours):     Intake/Output Summary (Last 24 hours) at 2024 1143  Last data filed at 2024 1010  Gross per 24 hour   Intake 240 ml   Output 1700 ml   Net -1460 ml       Physical Exam:   Physical Exam  General:  NAD, follows commands  HEENT:  NC/AT, mucous membranes moist  Neck:  Supple, No JVP elevation  CV:  + S1, + S2, RRR  Pulm:  Lung fields are CTA bilaterally  Abd:  Soft, Non-tender, Non-distended  Ext:  No clubbing/cyanosis/edema, Left foot surgical wound dressing intact, Right-sided BKA  Skin:  No rashes  Neuro:  Awake,  alert, oriented  Psych:  Normal mood and affect      Additional Data:    Labs:  Results from last 7 days   Lab Units 07/14/24  0454   WBC Thousand/uL 9.02   HEMOGLOBIN g/dL 13.3   HEMATOCRIT % 41.3   PLATELETS Thousands/uL 317   SEGS PCT % 52   LYMPHO PCT % 36   MONO PCT % 8   EOS PCT % 3     Results from last 7 days   Lab Units 07/14/24  0454   SODIUM mmol/L 139   POTASSIUM mmol/L 4.0   CHLORIDE mmol/L 106   CO2 mmol/L 24   BUN mg/dL 18   CREATININE mg/dL 0.76   ANION GAP mmol/L 9   CALCIUM mg/dL 9.5   ALBUMIN g/dL 3.5   TOTAL BILIRUBIN mg/dL 0.31   ALK PHOS U/L 110*   ALT U/L 13   AST U/L 11*   GLUCOSE RANDOM mg/dL 64*     Results from last 7 days   Lab Units 07/08/24  1802   INR  0.94     Results from last 7 days   Lab Units 07/14/24  1054 07/14/24  0707 07/13/24  2049 07/13/24  1627 07/13/24  1137 07/13/24  0736 07/12/24  2121 07/12/24  1630 07/12/24  1132 07/12/24  0725 07/11/24  2058 07/11/24  1609   POC GLUCOSE mg/dl 212* 63* 86 166* 172* 74 90 176* 124 74 134 134         Results from last 7 days   Lab Units 07/13/24  0502 07/12/24  0542 07/11/24  0559 07/10/24  0519   PROCALCITONIN ng/ml 0.07 0.09 0.08 0.10       Lines/Drains:  Invasive Devices       Peripheral Intravenous Line  Duration             Peripheral IV 07/12/24 Dorsal (posterior);Left Forearm 1 day                          Imaging: No pertinent imaging reviewed.    Recent Cultures (last 7 days):   Results from last 7 days   Lab Units 07/11/24  1042 07/09/24  1411   GRAM STAIN RESULT  No Polys or Bacteria seen  No Polys or Bacteria seen Rare Gram negative rods*  Rare Gram positive cocci in pairs*  No polys seen*   WOUND CULTURE   --  2+ Growth of Proteus vulgaris group*  2+ Growth of Methicillin Resistant Staphylococcus aureus*       Last 24 Hours Medication List:   Current Facility-Administered Medications   Medication Dose Route Frequency Provider Last Rate    acetaminophen  650 mg Oral Q6H PRN Javi J Betsy, DPM      aspirin  81 mg Oral  Daily Javi Meyer DPM      atorvastatin  80 mg Oral Daily With Dinner Javi Meyer DPM      cefTRIAXone  2,000 mg Intravenous Q24H Ángel Rousseau MD 2,000 mg (07/14/24 1133)    Cholecalciferol  1,000 Units Oral Daily Jerome Gutierrez DO      Dakins (full strength)  1 Application Irrigation Daily Javi Meyer DPM      enoxaparin  40 mg Subcutaneous Daily Javi Meyer DPM      insulin glargine  40 Units Subcutaneous HS Jerome Gutierrez DO      insulin lispro  1-6 Units Subcutaneous 4x Daily (with meals and at bedtime) Javi Meyer DPM      insulin lispro  10 Units Subcutaneous TID With Meals Jerome Gutierrez DO      lisinopril  2.5 mg Oral Daily Javi Meyer DPM      ondansetron  4 mg Intravenous Q6H PRN Javi Meyer DPM      oxyCODONE-acetaminophen  1 tablet Oral Q4H PRN Javi Meyer DPM      vancomycin  1,250 mg Intravenous Q12H Javi Meyer DPM 1,250 mg (07/14/24 0632)        Today, Patient Was Seen By: Jerome Gutierrez DO    **Please Note: This note may have been constructed using a voice recognition system.**

## 2024-07-14 NOTE — PLAN OF CARE
Problem: METABOLIC, FLUID AND ELECTROLYTES - ADULT  Goal: Electrolytes maintained within normal limits  Description: INTERVENTIONS:  - Monitor labs and assess patient for signs and symptoms of electrolyte imbalances  - Administer electrolyte replacement as ordered  - Monitor response to electrolyte replacements, including repeat lab results as appropriate  - Instruct patient on fluid and nutrition as appropriate  Outcome: Progressing     Problem: SKIN/TISSUE INTEGRITY - ADULT  Goal: Skin Integrity remains intact(Skin Breakdown Prevention)  Description: Assess:  -Perform Zaid assessment every 4 hrs  -Clean and moisturize skin every 4 hrs  -Inspect skin when repositioning, toileting, and assisting with ADLS  -Assess under medical devices such as mosimo every 4 hrs  -Assess extremities for adequate circulation and sensation     Bed Management:  -Have minimal linens on bed & keep smooth, unwrinkled  -Change linens as needed when moist or perspiring  -Avoid sitting or lying in one position for more than 4 hours while in bed  -Keep HOB at 30degrees     Toileting:  -Offer bedside commode  -Assess for incontinence every 4 hrs  -Use incontinent care products after each incontinent episode such as lotion and spray    Activity:  -Mobilize patient 3 times a day  -Encourage activity and walks on unit  -Encourage or provide ROM exercises   -Turn and reposition patient every 4 Hours  -Use appropriate equipment to lift or move patient in bed  -Instruct/ Assist with weight shifting every 4hrs when out of bed in chair  -Consider limitation of chair time 2 hour intervals    Skin Care:  -Avoid use of baby powder, tape, friction and shearing, hot water or constrictive clothing  -Relieve pressure over bony prominences using lotion  -Do not massage red bony areas    Next Steps:  -Teach patient strategies to minimize risks such as getting oob   -Consider consults to  interdisciplinary teams such as pt/ot  Outcome: Progressing  Goal:  Incision(s), wounds(s) or drain site(s) healing without S/S of infection  Description: INTERVENTIONS  - Assess and document dressing, incision, wound bed, drain sites and surrounding tissue  - Provide patient and family education  - Perform skin care/dressing changes every 4 hrs  Outcome: Progressing     Problem: MUSCULOSKELETAL - ADULT  Goal: Maintain or return mobility to safest level of function  Description: INTERVENTIONS:  - Assess patient's ability to carry out ADLs; assess patient's baseline for ADL function and identify physical deficits which impact ability to perform ADLs (bathing, care of mouth/teeth, toileting, grooming, dressing, etc.)  - Assess/evaluate cause of self-care deficits   - Assess range of motion  - Assess patient's mobility  - Assess patient's need for assistive devices and provide as appropriate  - Encourage maximum independence but intervene and supervise when necessary  - Involve family in performance of ADLs  - Assess for home care needs following discharge   - Consider OT consult to assist with ADL evaluation and planning for discharge  - Provide patient education as appropriate  Outcome: Progressing  Goal: Maintain proper alignment of affected body part  Description: INTERVENTIONS:  - Support, maintain and protect limb and body alignment  - Provide patient/ family with appropriate education  Outcome: Progressing     Problem: PAIN - ADULT  Goal: Verbalizes/displays adequate comfort level or baseline comfort level  Description: Interventions:  - Encourage patient to monitor pain and request assistance  - Assess pain using appropriate pain scale  - Administer analgesics based on type and severity of pain and evaluate response  - Implement non-pharmacological measures as appropriate and evaluate response  - Consider cultural and social influences on pain and pain management  - Notify physician/advanced practitioner if interventions unsuccessful or patient reports new pain  Outcome:  Progressing     Problem: INFECTION - ADULT  Goal: Absence or prevention of progression during hospitalization  Description: INTERVENTIONS:  - Assess and monitor for signs and symptoms of infection  - Monitor lab/diagnostic results  - Monitor all insertion sites, i.e. indwelling lines, tubes, and drains  - Monitor endotracheal if appropriate and nasal secretions for changes in amount and color  - [place] appropriate cooling/warming therapies per order  - Administer medications as ordered  - Instruct and encourage patient and family to use good hand hygiene technique  - Identify and instruct in appropriate isolation precautions for identified infection/condition  Outcome: Progressing     Problem: SAFETY ADULT  Goal: Patient will remain free of falls  Description: INTERVENTIONS:  - Educate patient/family on patient safety including physical limitations  - Instruct patient to call for assistance with activity   - Consult OT/PT to assist with strengthening/mobility   - Keep Call bell within reach  - Keep bed low and locked with side rails adjusted as appropriate  - Keep care items and personal belongings within reach  - Initiate and maintain comfort rounds  - Make Fall Risk Sign visible to staff  - Offer Toileting every 2 Hours, in advance of need  - Initiate/Maintain bed/chair alarm  - Obtain necessary fall risk management equipment: alarms  - Apply yellow socks and bracelet for high fall risk patients  - Consider moving patient to room near nurses station  Outcome: Progressing     Problem: DISCHARGE PLANNING  Goal: Discharge to home or other facility with appropriate resources  Description: INTERVENTIONS:  - Identify barriers to discharge w/patient and caregiver  - Arrange for needed discharge resources and transportation as appropriate  - Identify discharge learning needs (meds, wound care, etc.)  - Arrange for interpretive services to assist at discharge as needed  - Refer to Case Management Department for  coordinating discharge planning if the patient needs post-hospital services based on physician/advanced practitioner order or complex needs related to functional status, cognitive ability, or social support system  Outcome: Progressing     Problem: Knowledge Deficit  Goal: Patient/family/caregiver demonstrates understanding of disease process, treatment plan, medications, and discharge instructions  Description: Complete learning assessment and assess knowledge base.  Interventions:  - Provide teaching at level of understanding  - Provide teaching via preferred learning methods  Outcome: Progressing     Problem: Prexisting or High Potential for Compromised Skin Integrity  Goal: Skin integrity is maintained or improved  Description: INTERVENTIONS:  - Identify patients at risk for skin breakdown  - Assess and monitor skin integrity  - Assess and monitor nutrition and hydration status  - Monitor labs   - Assess for incontinence   - Turn and reposition patient  - Assist with mobility/ambulation  - Relieve pressure over bony prominences  - Avoid friction and shearing  - Provide appropriate hygiene as needed including keeping skin clean and dry  - Evaluate need for skin moisturizer/barrier cream  - Collaborate with interdisciplinary team   - Patient/family teaching  - Consider wound care consult   Outcome: Progressing     Problem: Nutrition/Hydration-ADULT  Goal: Nutrient/Hydration intake appropriate for improving, restoring or maintaining nutritional needs  Description: Monitor and assess patient's nutrition/hydration status for malnutrition. Collaborate with interdisciplinary team and initiate plan and interventions as ordered.  Monitor patient's weight and dietary intake as ordered or per policy. Utilize nutrition screening tool and intervene as necessary. Determine patient's food preferences and provide high-protein, high-caloric foods as appropriate.     INTERVENTIONS:  - Monitor oral intake, urinary output, labs,  and treatment plans  - Assess nutrition and hydration status and recommend course of action  - Evaluate amount of meals eaten  - Assist patient with eating if necessary   - Allow adequate time for meals  - Recommend/ encourage appropriate diets, oral nutritional supplements, and vitamin/mineral supplements  - Order, calculate, and assess calorie counts as needed  - Recommend, monitor, and adjust tube feedings and TPN/PPN based on assessed needs  - Assess need for intravenous fluids  - Provide specific nutrition/hydration education as appropriate  - Include patient/family/caregiver in decisions related to nutrition  Outcome: Progressing

## 2024-07-15 PROBLEM — E83.39 HYPERPHOSPHATEMIA: Status: RESOLVED | Noted: 2024-07-11 | Resolved: 2024-07-15

## 2024-07-15 LAB
ALBUMIN SERPL BCG-MCNC: 3.6 G/DL (ref 3.5–5)
ALP SERPL-CCNC: 105 U/L (ref 34–104)
ALT SERPL W P-5'-P-CCNC: 13 U/L (ref 7–52)
ANION GAP SERPL CALCULATED.3IONS-SCNC: 8 MMOL/L (ref 4–13)
AST SERPL W P-5'-P-CCNC: 11 U/L (ref 13–39)
BASOPHILS # BLD AUTO: 0.09 THOUSANDS/ÂΜL (ref 0–0.1)
BASOPHILS NFR BLD AUTO: 1 % (ref 0–1)
BILIRUB SERPL-MCNC: 0.31 MG/DL (ref 0.2–1)
BUN SERPL-MCNC: 20 MG/DL (ref 5–25)
CALCIUM SERPL-MCNC: 9.4 MG/DL (ref 8.4–10.2)
CHLORIDE SERPL-SCNC: 105 MMOL/L (ref 96–108)
CO2 SERPL-SCNC: 26 MMOL/L (ref 21–32)
CREAT SERPL-MCNC: 0.78 MG/DL (ref 0.6–1.3)
EOSINOPHIL # BLD AUTO: 0.29 THOUSAND/ÂΜL (ref 0–0.61)
EOSINOPHIL NFR BLD AUTO: 3 % (ref 0–6)
ERYTHROCYTE [DISTWIDTH] IN BLOOD BY AUTOMATED COUNT: 13.8 % (ref 11.6–15.1)
GFR SERPL CREATININE-BSD FRML MDRD: 94 ML/MIN/1.73SQ M
GLUCOSE SERPL-MCNC: 107 MG/DL (ref 65–140)
GLUCOSE SERPL-MCNC: 112 MG/DL (ref 65–140)
GLUCOSE SERPL-MCNC: 148 MG/DL (ref 65–140)
GLUCOSE SERPL-MCNC: 179 MG/DL (ref 65–140)
GLUCOSE SERPL-MCNC: 56 MG/DL (ref 65–140)
GLUCOSE SERPL-MCNC: 63 MG/DL (ref 65–140)
GLUCOSE SERPL-MCNC: 90 MG/DL (ref 65–140)
HCT VFR BLD AUTO: 42 % (ref 36.5–49.3)
HGB BLD-MCNC: 13.7 G/DL (ref 12–17)
IMM GRANULOCYTES # BLD AUTO: 0.04 THOUSAND/UL (ref 0–0.2)
IMM GRANULOCYTES NFR BLD AUTO: 1 % (ref 0–2)
LYMPHOCYTES # BLD AUTO: 3.3 THOUSANDS/ÂΜL (ref 0.6–4.47)
LYMPHOCYTES NFR BLD AUTO: 38 % (ref 14–44)
MAGNESIUM SERPL-MCNC: 2.2 MG/DL (ref 1.9–2.7)
MCH RBC QN AUTO: 28.4 PG (ref 26.8–34.3)
MCHC RBC AUTO-ENTMCNC: 32.6 G/DL (ref 31.4–37.4)
MCV RBC AUTO: 87 FL (ref 82–98)
MONOCYTES # BLD AUTO: 0.74 THOUSAND/ÂΜL (ref 0.17–1.22)
MONOCYTES NFR BLD AUTO: 9 % (ref 4–12)
NEUTROPHILS # BLD AUTO: 4.28 THOUSANDS/ÂΜL (ref 1.85–7.62)
NEUTS SEG NFR BLD AUTO: 48 % (ref 43–75)
NRBC BLD AUTO-RTO: 0 /100 WBCS
PHOSPHATE SERPL-MCNC: 3.9 MG/DL (ref 2.3–4.1)
PLATELET # BLD AUTO: 332 THOUSANDS/UL (ref 149–390)
PMV BLD AUTO: 8.8 FL (ref 8.9–12.7)
POTASSIUM SERPL-SCNC: 3.7 MMOL/L (ref 3.5–5.3)
PROT SERPL-MCNC: 7.3 G/DL (ref 6.4–8.4)
RBC # BLD AUTO: 4.83 MILLION/UL (ref 3.88–5.62)
SODIUM SERPL-SCNC: 139 MMOL/L (ref 135–147)
WBC # BLD AUTO: 8.74 THOUSAND/UL (ref 4.31–10.16)

## 2024-07-15 PROCEDURE — 82948 REAGENT STRIP/BLOOD GLUCOSE: CPT

## 2024-07-15 PROCEDURE — 99232 SBSQ HOSP IP/OBS MODERATE 35: CPT | Performed by: HOSPITALIST

## 2024-07-15 PROCEDURE — 80053 COMPREHEN METABOLIC PANEL: CPT | Performed by: INTERNAL MEDICINE

## 2024-07-15 PROCEDURE — 97530 THERAPEUTIC ACTIVITIES: CPT

## 2024-07-15 PROCEDURE — 97116 GAIT TRAINING THERAPY: CPT

## 2024-07-15 PROCEDURE — 97110 THERAPEUTIC EXERCISES: CPT

## 2024-07-15 PROCEDURE — 85025 COMPLETE CBC W/AUTO DIFF WBC: CPT | Performed by: INTERNAL MEDICINE

## 2024-07-15 PROCEDURE — 84100 ASSAY OF PHOSPHORUS: CPT | Performed by: INTERNAL MEDICINE

## 2024-07-15 PROCEDURE — 99233 SBSQ HOSP IP/OBS HIGH 50: CPT | Performed by: INTERNAL MEDICINE

## 2024-07-15 PROCEDURE — 83735 ASSAY OF MAGNESIUM: CPT | Performed by: INTERNAL MEDICINE

## 2024-07-15 RX ORDER — INSULIN GLARGINE 100 [IU]/ML
35 INJECTION, SOLUTION SUBCUTANEOUS
Status: DISCONTINUED | OUTPATIENT
Start: 2024-07-15 | End: 2024-07-16

## 2024-07-15 RX ORDER — CEFTRIAXONE 2 G/50ML
2000 INJECTION, SOLUTION INTRAVENOUS EVERY 24 HOURS
Status: DISCONTINUED | OUTPATIENT
Start: 2024-07-16 | End: 2024-07-16

## 2024-07-15 RX ORDER — INSULIN LISPRO 100 [IU]/ML
1-6 INJECTION, SOLUTION INTRAVENOUS; SUBCUTANEOUS
Status: DISCONTINUED | OUTPATIENT
Start: 2024-07-15 | End: 2024-07-18 | Stop reason: HOSPADM

## 2024-07-15 RX ADMIN — VANCOMYCIN HYDROCHLORIDE 1250 MG: 1 INJECTION, POWDER, LYOPHILIZED, FOR SOLUTION INTRAVENOUS at 06:29

## 2024-07-15 RX ADMIN — ENOXAPARIN SODIUM 40 MG: 40 INJECTION SUBCUTANEOUS at 08:08

## 2024-07-15 RX ADMIN — LISINOPRIL 2.5 MG: 2.5 TABLET ORAL at 12:02

## 2024-07-15 RX ADMIN — CEFTRIAXONE 2000 MG: 2 INJECTION, SOLUTION INTRAVENOUS at 12:02

## 2024-07-15 RX ADMIN — ASPIRIN 81 MG: 81 TABLET, COATED ORAL at 08:08

## 2024-07-15 RX ADMIN — INSULIN LISPRO 10 UNITS: 100 INJECTION, SOLUTION INTRAVENOUS; SUBCUTANEOUS at 09:00

## 2024-07-15 RX ADMIN — VANCOMYCIN HYDROCHLORIDE 1250 MG: 1 INJECTION, POWDER, LYOPHILIZED, FOR SOLUTION INTRAVENOUS at 18:21

## 2024-07-15 RX ADMIN — INSULIN LISPRO 10 UNITS: 100 INJECTION, SOLUTION INTRAVENOUS; SUBCUTANEOUS at 12:01

## 2024-07-15 RX ADMIN — CHOLECALCIFEROL TAB 25 MCG (1000 UNIT) 1000 UNITS: 25 TAB at 08:08

## 2024-07-15 RX ADMIN — INSULIN GLARGINE 35 UNITS: 100 INJECTION, SOLUTION SUBCUTANEOUS at 21:42

## 2024-07-15 RX ADMIN — ATORVASTATIN CALCIUM 80 MG: 40 TABLET, FILM COATED ORAL at 17:10

## 2024-07-15 RX ADMIN — INSULIN LISPRO 10 UNITS: 100 INJECTION, SOLUTION INTRAVENOUS; SUBCUTANEOUS at 17:10

## 2024-07-15 NOTE — PLAN OF CARE
Problem: PHYSICAL THERAPY ADULT  Goal: Performs mobility at highest level of function for planned discharge setting.  See evaluation for individualized goals.  Description: Treatment/Interventions: Functional transfer training, LE strengthening/ROM, Therapeutic exercise, Endurance training, Bed mobility, Gait training          See flowsheet documentation for full assessment, interventions and recommendations.  Outcome: Progressing  Note: Prognosis: Good  Problem List: Decreased strength, Decreased endurance, Impaired balance, Decreased mobility, Orthopedic restrictions  Assessment: Patient seen this date for PT treatment session to increase level of mobility and functional activity tolerance. This date, pt able to perform all functional mobility with Supervision -Bora, RW, and increased time. Occasional verbal cuing provided for safety awareness and sequencing. Seated rest break taken following 20' of ambulation d/t fatigue and SOB. HR and SpO2 remained WFL on RA throughout. No true LOB experienced. Pt demonstrating ability to don R prosthetic with S/U and L surgical shoe with Bora. The patient's AM-PAC Basic Mobility Inpatient Short Form Raw Score is 17. A Raw score of greater than 16 suggests the patient may benefit from discharge to home. Please also refer to the recommendation of the Physical Therapist for safe discharge planning. Co treatment with OT secondary to complex medical condition of pt, possible A of 2 required to achieve and maintain transitional movements, requiring the need of skilled therapeutic intervention of 2 therapists to achieve delivery of services. D/c recommendation continues to be rehab resource intensity level II.        Rehab Resource Intensity Level, PT: II (Moderate Resource Intensity)    See flowsheet documentation for full assessment.

## 2024-07-15 NOTE — OCCUPATIONAL THERAPY NOTE
Occupational Therapy Progress Note     Patient Name: Art Joseph  Today's Date: 7/15/2024  Problem List  Principal Problem:    Open wound of left foot  Active Problems:    Type 2 diabetes mellitus with hypoglycemia without coma, with long-term current use of insulin (HCC)    Hx of right BKA (HCC)    Ambulatory dysfunction    Primary hypertension    Hypercholesteremia    Vitamin D deficiency    Maggot infestation    Osteomyelitis of left foot (HCC)    MRSA colonization              07/15/24 1127   OT Last Visit   OT Visit Date 07/15/24   Note Type   Note Type Treatment   Pain Assessment   Pain Score No Pain   Restrictions/Precautions   Weight Bearing Precautions Per Order Yes   LLE Weight Bearing Per Order WBAT   Braces or Orthoses Other (Comment)  (BKA and prosthetic to R LE and WBAT in surgical shoe to L LE)   Other Precautions Chair Alarm;Bed Alarm;Pain;Fall Risk;WBS;Contact/isolation   ADL   Where Assessed Edge of bed   LB Dressing Assistance 4  Minimal Assistance   LB Dressing Deficit Don/doff L shoe  (R prosthetic)   LB Dressing Comments pt requires min (A) for donning surgical shoe  while seated EOB to ensure proper fit; dons prosthetic with (S) level   Bed Mobility   Supine to Sit 5  Supervision   Additional items Increased time required;Verbal cues   Sit to Supine   (seated in chair at end of session)   Additional Comments pt on RA during session; SPO2 WFL with no complaints of SOB   Transfers   Sit to Stand 5  Supervision   Additional items Increased time required;Verbal cues   Stand to Sit 5  Supervision   Additional items Increased time required;Verbal cues   Additional Comments pt performs functioanl transfes with RW during session; no significant LOB or instability; confirmed WBS with documentation from physician as WBAT to L LE   Functional Mobility   Functional Mobility 5  Supervision   Additional Comments pt performs functional mobility with (S) level and use of RW; no significant LOB, however  "mild instability; performs ~30ft-limited due to wound   Additional items Rolling walker   Subjective   Subjective \"I am tired of IVs\"   Cognition   Overall Cognitive Status WFL   Arousal/Participation Alert   Attention Within functional limits   Orientation Level Oriented X4   Memory Within functional limits   Following Commands Follows all commands and directions without difficulty   Additional Activities   Additional Activities Comments Pt performs seated UE TE this session consisting of the following: chest press, SARs, and bicep curls with #3 dowel x2 sets of x20 reps each; tolerates well.   Activity Tolerance   Activity Tolerance Patient limited by fatigue   Assessment   Assessment Patient participated in Skilled OT session this date with interventions consisting of ADL re training with the use of correct body mechnaics, safety awareness and fall prevention techniques, therapeutic exercise to: increase functional use of BUEs, increase BUE muscle strength ,  therapeutic activities to: increase activity tolerance, increase standing tolerance time with unilateral UE support to complete sink level ADLs, increase cardiovascular endurance , increase dynamic sit/ stand balance during functional activity , increase postural control, increase trunk control, and increase OOB/ sitting tolerance . Co treatment with PT secondary to complex medical condition of pt, mod-max A of 2 required to achieve and maintain transitional movements, requiring the need of skilled therapeutic intervention of 2 therapists to achieve delivery of services. Patient agreeable to OT treatment session, upon arrival patient was found supine in bed. Patient requiring verbal cues for safety, verbal cues for correct technique, and frequent rest periods. Patient continues to be functioning below baseline level, occupational performance remains limited secondary to factors listed above and increased risk for falls and injury. The patient's raw score on " the AM-PAC Daily Activity Inpatient Short Form is 20. A raw score of greater than or equal to 19 suggests the patient may benefit from discharge to home. Please refer to the recommendation of the Occupational Therapist for safe discharge planning. From OT standpoint, recommendation at time of d/c would be level II moderate resource intensity.   Plan   Goal Expiration Date 07/25/24   OT Treatment Day 1   OT Frequency 3-5x/wk   Discharge Recommendation   Rehab Resource Intensity Level, OT II (Moderate Resource Intensity)   AM-PAC Daily Activity Inpatient   Lower Body Dressing 2   Bathing 3   Toileting 3   Upper Body Dressing 4   Grooming 4   Eating 4   Daily Activity Raw Score 20   Daily Activity Standardized Score (Calc for Raw Score >=11) 42.03   AM-PAC Applied Cognition Inpatient   Following a Speech/Presentation 4   Understanding Ordinary Conversation 4   Taking Medications 3   Remembering Where Things Are Placed or Put Away 3   Remembering List of 4-5 Errands 3   Taking Care of Complicated Tasks 3   Applied Cognition Raw Score 20   Applied Cognition Standardized Score 41.76

## 2024-07-15 NOTE — PROGRESS NOTES
"[facility]  Progress Note  Name: Art Joseph I  MRN: [id number]  Unit/Bed#: 424-01 I Date of Admission: 7/8/2024   Date of Service: 7/15/2024 I Hospital Day: 7    Assessment & Plan   * Open wound of left foot  Assessment & Plan  Recent hospitalization from 06/25/2024 through 06/28/2024 for osteomyelitis of the left foot requiring a left 1st metatarsal amputation/resection on 06/27/2024 by Podiatry. Now with a left wound infection and associated cellulitis with probable underlying osteomyelitis. Also found to have maggots in his open wound  I appreciate Infectious Disease's input and Podiatry's input.  Checked a left foot wound culture on 07/09/2024, which is positive for Proteus vulgaris and MRSA  Being treated with IV vancomycin  IV cefepime was added on 07/10/2024 per Infectious Disease's recommendations based on the left foot wound culture result  Changed IV cefepime to IV ceftriaxone on 07/12/2024  Underwent left foot wound debridement and washout by Podiatry on 07/11/2024  Await bone margin pathology results and surgical wound cultures (now available with sensitivities) to determine antibiotic treatment course per Infectious Disease    Osteomyelitis of left foot (HCC)  Assessment & Plan  Please see the assessment and plan for \"Open wound of left foot\"    Type 2 diabetes mellitus with hypoglycemia without coma, with long-term current use of insulin (HCC)  Assessment & Plan  Lab Results   Component Value Date    HGBA1C 9.7 (H) 06/25/2024       Recent Labs     07/15/24  0732 07/15/24  0809 07/15/24  1010 07/15/24  1118   POCGLU 63* 90 179* 107         Blood Sugar Average: Last 72 hrs:  (P) 123.125    Developed hypoglycemia on 07/15/2024  Decreased Lantus to 35 Units SQ QHS on 07/14/2024 and decreased Humalog to 10 Units TID with meals on 07/15/2024  ISS with blood glucose monitoring AC  Continue PO lisinopril for renal protection  Hypoglycemia protocol  Outpatient Endocrinology " "evaluation    MRSA colonization  Assessment & Plan  Utilize the MRSA decolonization protocol    Maggot infestation  Assessment & Plan  Please see the assessment and plan for \"Open wound of left foot\"      Vitamin D deficiency  Assessment & Plan  Utilize cholecalciferol 1000 I.U. PO Qdaily  Outpatient follow-up with PCP in regards to this matter    Hypercholesteremia  Assessment & Plan  Continue statin therapy    Primary hypertension  Assessment & Plan  Continue PO lisinopril  Follow the blood pressure trend    Ambulatory dysfunction  Assessment & Plan  PT/OT evaluations    Hx of right BKA (HCC)  Assessment & Plan  Outpatient follow-up with Vascular Surgery    Hyperphosphatemia-resolved as of 7/15/2024  Assessment & Plan  No treatment is indicated at this time  Follow the phosphorus level, improved               VTE Pharmacologic Prophylaxis: VTE Score: 8 High Risk (Score >/= 5) - Pharmacological DVT Prophylaxis Ordered: enoxaparin (Lovenox). Sequential Compression Devices Ordered.    Mobility:   Basic Mobility Inpatient Raw Score: 19  JH-HLM Goal: 6: Walk 10 steps or more  JH-HLM Achieved: 3: Sit at edge of bed  JH-HLM Goal NOT achieved. Continue with multidisciplinary rounding and encourage appropriate mobility to improve upon JH-HLM goals.    Patient Centered Rounds: I performed bedside rounds with nursing staff today.   Discussions with Specialists or Other Care Team Provider: none    Education and Discussions with Family / Patient: Patient declined call to .     Total Time Spent on Date of Encounter in care of patient: 31 mins. This time was spent on one or more of the following: performing physical exam; counseling and coordination of care; obtaining or reviewing history; documenting in the medical record; reviewing/ordering tests, medications or procedures; communicating with other healthcare professionals and discussing with patient's family/caregivers.    Current Length of Stay: 7 " day(s)  Current Patient Status: Inpatient   Certification Statement: The patient will continue to require additional inpatient hospital stay due to osetomyelitis  Discharge Plan: Anticipate discharge in 24-48 hrs to rehab facility.    Code Status: Level 1 - Full Code    Subjective:   Patient is not having any significant pain in his left foot, no acute complaints at this time.  Seems surprised and somewhat saddened by the news that he will likely need IV antibiotics given culture results, but awaiting further input.    Objective:     Vitals:   Temp (24hrs), Av °F (36.7 °C), Min:97.9 °F (36.6 °C), Max:98.1 °F (36.7 °C)    Temp:  [97.9 °F (36.6 °C)-98.1 °F (36.7 °C)] 98.1 °F (36.7 °C)  HR:  [70-72] 70  Resp:  [16-18] 18  BP: (128-132)/(69-70) 132/69  SpO2:  [94 %-96 %] 95 %  Body mass index is 30.91 kg/m².     Input and Output Summary (last 24 hours):     Intake/Output Summary (Last 24 hours) at 7/15/2024 1219  Last data filed at 7/15/2024 0804  Gross per 24 hour   Intake 1140 ml   Output 450 ml   Net 690 ml       Physical Exam:   Physical Exam  Vitals and nursing note reviewed.   Constitutional:       General: He is not in acute distress.     Appearance: He is well-developed.   HENT:      Head: Normocephalic and atraumatic.   Eyes:      Conjunctiva/sclera: Conjunctivae normal.   Cardiovascular:      Rate and Rhythm: Normal rate and regular rhythm.      Heart sounds: No murmur heard.  Pulmonary:      Effort: Pulmonary effort is normal. No respiratory distress.   Abdominal:      Palpations: Abdomen is soft.      Tenderness: There is no abdominal tenderness.   Musculoskeletal:         General: No swelling.      Cervical back: Neck supple.      Comments:  Left foot surgical wound dressing intact, Right-sided BKA   Skin:     General: Skin is warm and dry.   Neurological:      Mental Status: He is alert.   Psychiatric:         Mood and Affect: Mood normal.          Additional Data:     Labs:  Results from last 7 days    Lab Units 07/15/24  0534   WBC Thousand/uL 8.74   HEMOGLOBIN g/dL 13.7   HEMATOCRIT % 42.0   PLATELETS Thousands/uL 332   SEGS PCT % 48   LYMPHO PCT % 38   MONO PCT % 9   EOS PCT % 3     Results from last 7 days   Lab Units 07/15/24  0534   SODIUM mmol/L 139   POTASSIUM mmol/L 3.7   CHLORIDE mmol/L 105   CO2 mmol/L 26   BUN mg/dL 20   CREATININE mg/dL 0.78   ANION GAP mmol/L 8   CALCIUM mg/dL 9.4   ALBUMIN g/dL 3.6   TOTAL BILIRUBIN mg/dL 0.31   ALK PHOS U/L 105*   ALT U/L 13   AST U/L 11*   GLUCOSE RANDOM mg/dL 56*     Results from last 7 days   Lab Units 07/08/24  1802   INR  0.94     Results from last 7 days   Lab Units 07/15/24  1118 07/15/24  1010 07/15/24  0809 07/15/24  0732 07/14/24  2056 07/14/24  1557 07/14/24  1054 07/14/24  0707 07/13/24  2049 07/13/24  1627 07/13/24  1137 07/13/24  0736   POC GLUCOSE mg/dl 107 179* 90 63* 190* 104 212* 63* 86 166* 172* 74         Results from last 7 days   Lab Units 07/13/24  0502 07/12/24  0542 07/11/24  0559 07/10/24  0519   PROCALCITONIN ng/ml 0.07 0.09 0.08 0.10       Lines/Drains:  Invasive Devices       Peripheral Intravenous Line  Duration             Peripheral IV 07/12/24 Dorsal (posterior);Left Forearm 2 days                          Imaging: No pertinent imaging reviewed.    Recent Cultures (last 7 days):   Results from last 7 days   Lab Units 07/11/24  1042 07/09/24  1411   GRAM STAIN RESULT  No Polys or Bacteria seen  No Polys or Bacteria seen Rare Gram negative rods*  Rare Gram positive cocci in pairs*  No polys seen*   WOUND CULTURE   --  2+ Growth of Proteus vulgaris group*  2+ Growth of Methicillin Resistant Staphylococcus aureus*       Last 24 Hours Medication List:   Current Facility-Administered Medications   Medication Dose Route Frequency Provider Last Rate    acetaminophen  650 mg Oral Q6H PRN Javi Meyer DPM      aspirin  81 mg Oral Daily Javi Meyer DPM      atorvastatin  80 mg Oral Daily With Dinner Javi Meyer DPM       cefTRIAXone  2,000 mg Intravenous Q24H Ángel Rousseau MD 2,000 mg (07/15/24 1202)    Cholecalciferol  1,000 Units Oral Daily Jerome Gutierrez DO      Dakins (full strength)  1 Application Irrigation Daily Javi Meyer DPM      enoxaparin  40 mg Subcutaneous Daily Javi Meyer DPM      insulin glargine  35 Units Subcutaneous HS Abdirahman Jackson DO      insulin lispro  1-6 Units Subcutaneous TID With Meals Abdirahman Jackson DO      insulin lispro  10 Units Subcutaneous TID With Meals Jerome Gutierrez DO      lisinopril  2.5 mg Oral Daily Javi Meyer DPM      ondansetron  4 mg Intravenous Q6H PRN Javi Meyer DPM      oxyCODONE-acetaminophen  1 tablet Oral Q4H PRN Javi Meyer DPM      vancomycin  1,250 mg Intravenous Q12H Javi Meyer DPM 1,250 mg (07/14/24 0632)        Today, Patient Was Seen By: Abdirahman Jackson DO    **Please Note: This note may have been constructed using a voice recognition system.**

## 2024-07-15 NOTE — PROGRESS NOTES
Progress Note - [facility] Infectious Disease   Art Joseph 64 y.o. male MRN: [id number]  Unit/Bed#: 424-01 Encounter: [id number]      VIRTUAL CARE DOCUMENTATION:     1. This service was provided via Telemedicine using [employer]     2. Parties in the room with patient during teleconsult Patient only    3. Confidentiality My office door was closed     4. Participants No one else was in the room    5. Patient acknowledged consent and understanding of privacy and security of the  Telemedicine consult. I informed the patient that I have reviewed their record in Epic and presented the opportunity for them to ask any questions regarding the visit today.  The patient agreed to participate.    6. Time spent 8 minutes communicating with the patient via telehealth.       IMPRESSION & RECOMMENDATIONS:   1.  Persistent left foot cellulitis, with abscess and osteomyelitis, secondary to recurrent wound dehiscence.  Patient remains clinically and systemically well, without evidence of sepsis or systemic toxicity.  Wound culture with growth of MRSA and Proteus.  Patient is status post left foot I&D, with possible residual osteomyelitis clinically.  Bone margin was sent for pathology.  Operative cultures isolated MRSA.  Patient may need long-term antibiotic if pathology is suggestive of OM.  Regardless, he unfortunately remains high risk for foot loss.    Continue IV vancomycin with Rx consult for trough management   Continue IV ceftriaxone.  Will ask micro to release susceptibility to minocycline  Follow-up on bone margin pathology.  Monitor temperature/WBC.  This point, duration of IV antibiotic is unclear, depending on bone margin pathology.  If bone pathology shows residual osteomyelitis, patient may need long-term antibiotic again.     2.  Recurrent left foot wound dehiscence, despite recent I&D and first metatarsal head resection for surgical cure.  Patient is status post I&D, as in above.  Wound care per  podiatry.  Follow-up operative cultures     3.  Maggot infestation of wound bed, likely secondary to presence of necrotic tissue.  Will likely need additional I&D.  I&D plan per podiatry.     4.  DM, poorly controlled, with hyperglycemia and elevated hemoglobin A1c.  This is risk factor for poor wound healing and infection above.  Management per primary service.     5.  PVD.    Antibiotics:  D4 IV ceftriaxone   D8 IV vancomycin     I have discussed the above management plan in detail with patient.     Above plan discussed in detail with Dr. Jackson who is in agreement with plan to continue IV abx as above.     24 Hour events:  Yesterday and overnight notes reviewed. Pending bone pathology. Needs rehab.     Subjective:  Patient has no fever, chills, sweats; no nausea, vomiting, diarrhea; no cough, shortness of breath; denies foot  pain. No new symptoms. Continues to tolerate the abx. Feels depressed.     Objective:  Vitals:  Temp:  [97.9 °F (36.6 °C)-98.1 °F (36.7 °C)] 98.1 °F (36.7 °C)  HR:  [70-72] 70  Resp:  [16-18] 18  BP: (128-132)/(69-70) 132/69  SpO2:  [94 %-96 %] 95 %  Temp (24hrs), Av °F (36.7 °C), Min:97.9 °F (36.6 °C), Max:98.1 °F (36.7 °C)  Current: Temperature: 98.1 °F (36.7 °C)    PHYSICAL EXAM:    Physical exam findings reported by bedside and primary medical team staff.    General Appearance:  Appearing well, nontoxic, and in no distress. Sitting in bedside recliner.    HEENT: Normocephalic, without obvious abnormality, atraumatic. Conjunctiva pink and sclera anicteric. Oropharynx moist without lesions.     Lungs:   Clear to auscultation bilaterally, respirations unlabored   Heart:  RRR; no murmur, rub or gallop   Abdomen:   Soft, non-tender, non-distended, positive bowel sounds    Extremities: No cyanosis, clubbing or edema. S/p R BKA.   Musculoskeletal: Back symmetric without curvature, ROM normal.    : No CVA or suprapubic tenderness.    Skin: No rashes or lesions. Left foot dressing  c/d/I.Peripheral IV intact without evidence of erythema, warmth, or exudate.        LABS, IMAGING, & OTHER STUDIES:  Extensive review of the medical records in epic including review of the notes, radiographs, and laboratory results were reviewed personally as below.     Lab Results:  Results from last 7 days   Lab Units 07/15/24  0534 07/14/24  0454 07/13/24  0502   WBC Thousand/uL 8.74 9.02 8.66   HEMOGLOBIN g/dL 13.7 13.3 12.9   PLATELETS Thousands/uL 332 317 328     Results from last 7 days   Lab Units 07/15/24  0534 07/14/24  0454 07/13/24  0502   SODIUM mmol/L 139 139 140   POTASSIUM mmol/L 3.7 4.0 3.7   CHLORIDE mmol/L 105 106 106   CO2 mmol/L 26 24 25   BUN mg/dL 20 18 16   CREATININE mg/dL 0.78 0.76 0.74   EGFR ml/min/1.73sq m 94 95 96   CALCIUM mg/dL 9.4 9.5 9.3   AST U/L 11* 11* 12*   ALT U/L 13 13 12   ALK PHOS U/L 105* 110* 113*     Results from last 7 days   Lab Units 07/11/24  1042 07/09/24  1413 07/09/24  1411   GRAM STAIN RESULT  No Polys or Bacteria seen  No Polys or Bacteria seen  --  Rare Gram negative rods*  Rare Gram positive cocci in pairs*  No polys seen*   WOUND CULTURE   --   --  2+ Growth of Proteus vulgaris group*  2+ Growth of Methicillin Resistant Staphylococcus aureus*   MRSA CULTURE ONLY   --  Methicillin Resistant Staphylococcus aureus isolated*  This patient requires contact isolation precautions per [place] law. Contact precautions are not required in [place] for nasal surveillance cultures.  --      Results from last 7 days   Lab Units 07/13/24  0502 07/12/24  0542 07/11/24  0559 07/10/24  0519   PROCALCITONIN ng/ml 0.07 0.09 0.08 0.10                   Lab interpretation/comments: normal WBC      Culture data: OR cx with MRSA, path pending     Imaging Studies:   Imaging interpretation/comments: none

## 2024-07-15 NOTE — ASSESSMENT & PLAN NOTE
Recent hospitalization from 06/25/2024 through 06/28/2024 for osteomyelitis of the left foot requiring a left 1st metatarsal amputation/resection on 06/27/2024 by Podiatry. Now with a left wound infection and associated cellulitis with probable underlying osteomyelitis. Also found to have maggots in his open wound  I appreciate Infectious Disease's input and Podiatry's input.  Checked a left foot wound culture on 07/09/2024, which is positive for Proteus vulgaris and MRSA  Being treated with IV vancomycin  IV cefepime was added on 07/10/2024 per Infectious Disease's recommendations based on the left foot wound culture result  Changed IV cefepime to IV ceftriaxone on 07/12/2024  Underwent left foot wound debridement and washout by Podiatry on 07/11/2024  Await bone margin pathology results and surgical wound cultures (now available with sensitivities) to determine antibiotic treatment course per Infectious Disease

## 2024-07-15 NOTE — ASSESSMENT & PLAN NOTE
Utilize cholecalciferol 1000 I.U. PO Qdaily  Outpatient follow-up with PCP in regards to this matter

## 2024-07-15 NOTE — ASSESSMENT & PLAN NOTE
Lab Results   Component Value Date    HGBA1C 9.7 (H) 06/25/2024       Recent Labs     07/15/24  0732 07/15/24  0809 07/15/24  1010 07/15/24  1118   POCGLU 63* 90 179* 107         Blood Sugar Average: Last 72 hrs:  (P) 123.125    Developed hypoglycemia on 07/15/2024  Decreased Lantus to 35 Units SQ QHS on 07/14/2024 and decreased Humalog to 10 Units TID with meals on 07/15/2024  ISS with blood glucose monitoring AC  Continue PO lisinopril for renal protection  Hypoglycemia protocol  Outpatient Endocrinology evaluation

## 2024-07-15 NOTE — PROGRESS NOTES
Art Joseph is a 64 y.o. male who is currently ordered Vancomycin IV with management by the Pharmacy Consult service.  Relevant clinical data and objective / subjective history reviewed.  Vancomycin Assessment:  Indication and Goal AUC/Trough: Bone/joint infection (goal -600, trough >10), -600, trough >10  Clinical Status: stable  Micro:     Renal Function:  SCr: 0.78 mg/dL  CrCl: 110.7 mL/min  Renal replacement: Not on dialysis  Days of Therapy: 7  Current Dose: 1250mg Q12H  Vancomycin Plan:  New Dosing: same dose  Estimated AUC: 494 mcg*hr/mL  Estimated Trough: 13.8 mcg/mL  Next Level: 07/19/2024 in the AM  Renal Function Monitoring: Daily BMP and UOP  Pharmacy will continue to follow closely for s/sx of nephrotoxicity, infusion reactions and appropriateness of therapy.  BMP and CBC will be ordered per protocol. We will continue to follow the patient’s culture results and clinical progress daily.    Reggie Saez, Pharmacist

## 2024-07-15 NOTE — PLAN OF CARE
Problem: METABOLIC, FLUID AND ELECTROLYTES - ADULT  Goal: Electrolytes maintained within normal limits  Description: INTERVENTIONS:  - Monitor labs and assess patient for signs and symptoms of electrolyte imbalances  - Administer electrolyte replacement as ordered  - Monitor response to electrolyte replacements, including repeat lab results as appropriate  - Instruct patient on fluid and nutrition as appropriate  Outcome: Progressing  Goal: Glucose maintained within target range  Description: INTERVENTIONS:  - Monitor Blood Glucose as ordered  - Assess for signs and symptoms of hyperglycemia and hypoglycemia  - Administer ordered medications to maintain glucose within target range  - Assess nutritional intake and initiate nutrition service referral as needed  Outcome: Progressing     Problem: SKIN/TISSUE INTEGRITY - ADULT  Goal: Skin Integrity remains intact(Skin Breakdown Prevention)  Description: Assess:  -Perform Zaid assessment every 4 hrs  -Clean and moisturize skin every 4 hrs  -Inspect skin when repositioning, toileting, and assisting with ADLS  -Assess under medical devices such as mosimo every 4 hrs  -Assess extremities for adequate circulation and sensation     Bed Management:  -Have minimal linens on bed & keep smooth, unwrinkled  -Change linens as needed when moist or perspiring  -Avoid sitting or lying in one position for more than 4 hours while in bed  -Keep HOB at 30degrees     Toileting:  -Offer bedside commode  -Assess for incontinence every 4 hrs  -Use incontinent care products after each incontinent episode such as lotion and spray    Activity:  -Mobilize patient 3 times a day  -Encourage activity and walks on unit  -Encourage or provide ROM exercises   -Turn and reposition patient every 4 Hours  -Use appropriate equipment to lift or move patient in bed  -Instruct/ Assist with weight shifting every 4hrs when out of bed in chair  -Consider limitation of chair time 2 hour intervals    Skin  Care:  -Avoid use of baby powder, tape, friction and shearing, hot water or constrictive clothing  -Relieve pressure over bony prominences using lotion  -Do not massage red bony areas    Next Steps:  -Teach patient strategies to minimize risks such as getting oob   -Consider consults to  interdisciplinary teams such as pt/ot  Outcome: Progressing  Goal: Incision(s), wounds(s) or drain site(s) healing without S/S of infection  Description: INTERVENTIONS  - Assess and document dressing, incision, wound bed, drain sites and surrounding tissue  - Provide patient and family education  - Perform skin care/dressing changes every 4 hrs  Outcome: Progressing     Problem: MUSCULOSKELETAL - ADULT  Goal: Maintain or return mobility to safest level of function  Description: INTERVENTIONS:  - Assess patient's ability to carry out ADLs; assess patient's baseline for ADL function and identify physical deficits which impact ability to perform ADLs (bathing, care of mouth/teeth, toileting, grooming, dressing, etc.)  - Assess/evaluate cause of self-care deficits   - Assess range of motion  - Assess patient's mobility  - Assess patient's need for assistive devices and provide as appropriate  - Encourage maximum independence but intervene and supervise when necessary  - Involve family in performance of ADLs  - Assess for home care needs following discharge   - Consider OT consult to assist with ADL evaluation and planning for discharge  - Provide patient education as appropriate  Outcome: Progressing  Goal: Maintain proper alignment of affected body part  Description: INTERVENTIONS:  - Support, maintain and protect limb and body alignment  - Provide patient/ family with appropriate education  Outcome: Progressing     Problem: PAIN - ADULT  Goal: Verbalizes/displays adequate comfort level or baseline comfort level  Description: Interventions:  - Encourage patient to monitor pain and request assistance  - Assess pain using appropriate  pain scale  - Administer analgesics based on type and severity of pain and evaluate response  - Implement non-pharmacological measures as appropriate and evaluate response  - Consider cultural and social influences on pain and pain management  - Notify physician/advanced practitioner if interventions unsuccessful or patient reports new pain  Outcome: Progressing     Problem: INFECTION - ADULT  Goal: Absence or prevention of progression during hospitalization  Description: INTERVENTIONS:  - Assess and monitor for signs and symptoms of infection  - Monitor lab/diagnostic results  - Monitor all insertion sites, i.e. indwelling lines, tubes, and drains  - Monitor endotracheal if appropriate and nasal secretions for changes in amount and color  - [place] appropriate cooling/warming therapies per order  - Administer medications as ordered  - Instruct and encourage patient and family to use good hand hygiene technique  - Identify and instruct in appropriate isolation precautions for identified infection/condition  Outcome: Progressing     Problem: SAFETY ADULT  Goal: Patient will remain free of falls  Description: INTERVENTIONS:  - Educate patient/family on patient safety including physical limitations  - Instruct patient to call for assistance with activity   - Consult OT/PT to assist with strengthening/mobility   - Keep Call bell within reach  - Keep bed low and locked with side rails adjusted as appropriate  - Keep care items and personal belongings within reach  - Initiate and maintain comfort rounds  - Make Fall Risk Sign visible to staff  - Offer Toileting every 2 Hours, in advance of need  - Initiate/Maintain bed/chair alarm  - Obtain necessary fall risk management equipment: alarms  - Apply yellow socks and bracelet for high fall risk patients  - Consider moving patient to room near nurses station  Outcome: Progressing     Problem: DISCHARGE PLANNING  Goal: Discharge to home or other facility with appropriate  resources  Description: INTERVENTIONS:  - Identify barriers to discharge w/patient and caregiver  - Arrange for needed discharge resources and transportation as appropriate  - Identify discharge learning needs (meds, wound care, etc.)  - Arrange for interpretive services to assist at discharge as needed  - Refer to Case Management Department for coordinating discharge planning if the patient needs post-hospital services based on physician/advanced practitioner order or complex needs related to functional status, cognitive ability, or social support system  Outcome: Progressing     Problem: Knowledge Deficit  Goal: Patient/family/caregiver demonstrates understanding of disease process, treatment plan, medications, and discharge instructions  Description: Complete learning assessment and assess knowledge base.  Interventions:  - Provide teaching at level of understanding  - Provide teaching via preferred learning methods  Outcome: Progressing     Problem: Prexisting or High Potential for Compromised Skin Integrity  Goal: Skin integrity is maintained or improved  Description: INTERVENTIONS:  - Identify patients at risk for skin breakdown  - Assess and monitor skin integrity  - Assess and monitor nutrition and hydration status  - Monitor labs   - Assess for incontinence   - Turn and reposition patient  - Assist with mobility/ambulation  - Relieve pressure over bony prominences  - Avoid friction and shearing  - Provide appropriate hygiene as needed including keeping skin clean and dry  - Evaluate need for skin moisturizer/barrier cream  - Collaborate with interdisciplinary team   - Patient/family teaching  - Consider wound care consult   Outcome: Progressing     Problem: Nutrition/Hydration-ADULT  Goal: Nutrient/Hydration intake appropriate for improving, restoring or maintaining nutritional needs  Description: Monitor and assess patient's nutrition/hydration status for malnutrition. Collaborate with interdisciplinary  team and initiate plan and interventions as ordered.  Monitor patient's weight and dietary intake as ordered or per policy. Utilize nutrition screening tool and intervene as necessary. Determine patient's food preferences and provide high-protein, high-caloric foods as appropriate.     INTERVENTIONS:  - Monitor oral intake, urinary output, labs, and treatment plans  - Assess nutrition and hydration status and recommend course of action  - Evaluate amount of meals eaten  - Assist patient with eating if necessary   - Allow adequate time for meals  - Recommend/ encourage appropriate diets, oral nutritional supplements, and vitamin/mineral supplements  - Order, calculate, and assess calorie counts as needed  - Recommend, monitor, and adjust tube feedings and TPN/PPN based on assessed needs  - Assess need for intravenous fluids  - Provide specific nutrition/hydration education as appropriate  - Include patient/family/caregiver in decisions related to nutrition  Outcome: Progressing

## 2024-07-15 NOTE — PLAN OF CARE
Problem: OCCUPATIONAL THERAPY ADULT  Goal: Performs self-care activities at highest level of function for planned discharge setting.  See evaluation for individualized goals.  Description: Treatment Interventions: ADL retraining, Functional transfer training, UE strengthening/ROM, Endurance training, Patient/family training, Equipment evaluation/education, Activityengagement          See flowsheet documentation for full assessment, interventions and recommendations.   Outcome: Progressing  Note: Limitation: Decreased ADL status, Decreased UE strength, Decreased Safe judgement during ADL, Decreased endurance, Decreased self-care trans, Decreased high-level ADLs     Assessment: Patient participated in Skilled OT session this date with interventions consisting of ADL re training with the use of correct body mechnaics, safety awareness and fall prevention techniques, therapeutic exercise to: increase functional use of BUEs, increase BUE muscle strength ,  therapeutic activities to: increase activity tolerance, increase standing tolerance time with unilateral UE support to complete sink level ADLs, increase cardiovascular endurance , increase dynamic sit/ stand balance during functional activity , increase postural control, increase trunk control, and increase OOB/ sitting tolerance . Co treatment with PT secondary to complex medical condition of pt, mod-max A of 2 required to achieve and maintain transitional movements, requiring the need of skilled therapeutic intervention of 2 therapists to achieve delivery of services. Patient agreeable to OT treatment session, upon arrival patient was found supine in bed. Patient requiring verbal cues for safety, verbal cues for correct technique, and frequent rest periods. Patient continues to be functioning below baseline level, occupational performance remains limited secondary to factors listed above and increased risk for falls and injury. The patient's raw score on the  AM-PAC Daily Activity Inpatient Short Form is 20. A raw score of greater than or equal to 19 suggests the patient may benefit from discharge to home. Please refer to the recommendation of the Occupational Therapist for safe discharge planning. From OT standpoint, recommendation at time of d/c would be level II moderate resource intensity.     Rehab Resource Intensity Level, OT: II (Moderate Resource Intensity)

## 2024-07-15 NOTE — CASE MANAGEMENT
Case Management Progress Note    Patient name Art Joseph  Location /424-01 MRN [id number]  : 1960 Date 7/15/2024       LOS (days): 7  Geometric Mean LOS (GMLOS) (days): 5.6  Days to GMLOS:-1.4        OBJECTIVE:        Current admission status: Inpatient  Preferred Pharmacy:   [place], [place] - [address] [facility]  [address] [facility] [address]  Phone: [phone] Fax: [phone]    [facility] [place] - [address] [facility]  [address] [facility] [address]  Phone: [phone] Fax: [phone]    [id number] - [place] - [address] [facility]  [address] [facility] [address]  Phone: [phone] Fax: [phone]    Primary Care Provider: Kerry Christine MD    Primary Insurance: [employer]  REP  Secondary Insurance:     PROGRESS NOTE:    CM continues to monitor case and will coordinate d/c plan to [place] [facility].    Awaiting ID - determine antibiotic course.  Will need auth for STR.

## 2024-07-15 NOTE — PHYSICAL THERAPY NOTE
Physical Therapy Treatment Note    Patient Name: Art Joseph    Today's Date: 7/15/2024     Problem List  Principal Problem:    Open wound of left foot  Active Problems:    Type 2 diabetes mellitus with hypoglycemia without coma, with long-term current use of insulin (HCC)    Hx of right BKA (HCC)    Ambulatory dysfunction    Primary hypertension    Hypercholesteremia    Vitamin D deficiency    Maggot infestation    Osteomyelitis of left foot (HCC)    MRSA colonization       Past Medical History  Past Medical History:   Diagnosis Date    Diabetes mellitus (HCC)     Hypertension         Past Surgical History  Past Surgical History:   Procedure Laterality Date    FOOT AMPUTATION Left 6/27/2024    Procedure: AMPUTATION LEFT FOOT 1st METATARSAL RESECTION;  Surgeon: Myron Bhakta DPM;  Location: [facility];  Service: Podiatry    LEG AMPUTATION THROUGH LOWER TIBIA AND FIBULA Right 7/25/2017    Procedure: AMPUTATION BELOW KNEE (BKA);  Surgeon: Mynor Sanchez MD;  [facility];  Service: General    ID AMPUTATION FOOT TRANSMETARSAL Right 1/26/2017    Procedure: AMPUTATION TRANSMETATARSAL (TMA); OPEN;  Surgeon: Leonard Lomeli DPM;  [facility];  Service: Podiatry    ID AMPUTATION FOOT TRANSMETARSAL Left 4/26/2024    Procedure: LEFT FOOT PARTIAL FIRST RAY AMPUTATION;  Surgeon: Jennifer Rubalcava DPM;  [facility];  Service: Podiatry    ID AMPUTATION METATARSAL W/TOE SINGLE Left 1/30/2017    Procedure: Left partial 1st ray amputation;  Surgeon: Leonard Lomeli DPM;  [facility];  Service: Podiatry    ID COLONOSCOPY FLX DX W/COLLJ SPEC WHEN PFRMD N/A 11/28/2018    Procedure: COLONOSCOPY;  Surgeon: González Napier MD;  [facility]: [facility];  Service: Gastroenterology    WOUND DEBRIDEMENT Right 1/30/2017    Procedure: DEBRIDEMENT FOOT/TOE (WASH OUT) delayed closure, LINDA;  Surgeon: Leonard Lomeli DPM;  [facility];  Service:      WOUND DEBRIDEMENT Left 7/11/2024    Procedure: DEBRIDEMENT FOOT/TOE (WASH OUT);  Surgeon: Myron Bhakta DPM;  [facility];  Service: Podiatry           07/15/24 1129   PT Last Visit   PT Visit Date 07/15/24   Note Type   Note Type Treatment   Pain Assessment   Pain Assessment Tool 0-10   Pain Score No Pain   Restrictions/Precautions   Weight Bearing Precautions Per Order Yes   LLE Weight Bearing Per Order WBAT   Braces or Orthoses Other (Comment)  (R BKA prosthetic; L surgical shoe)   Other Precautions Fall Risk;Multiple lines;Chair Alarm;Bed Alarm;Contact/isolation;WBS   General   Chart Reviewed Yes   Family/Caregiver Present No   Cognition   Overall Cognitive Status WFL   Arousal/Participation Alert   Attention Within functional limits   Orientation Level Oriented X4   Memory Within functional limits   Following Commands Follows all commands and directions without difficulty   Subjective   Subjective pt enjoys talking about his extensive music collection   Bed Mobility   Supine to Sit 5  Supervision   Additional items Increased time required;Verbal cues   Sit to Supine   (pt OOB at end of session)   Transfers   Sit to Stand 5  Supervision   Additional items Increased time required;Verbal cues   Stand to Sit 5  Supervision   Additional items Increased time required;Verbal cues   Additional Comments RW used   Ambulation/Elevation   Gait pattern Excessively slow;Short stride;Foward flexed;Decreased foot clearance;Improper Weight shift   Gait Assistance 4  Minimal assist   Additional items Assist x 1;Verbal cues   Assistive Device Rolling walker   Distance 20'   Balance   Static Sitting Good   Dynamic Sitting Good   Static Standing Fair   Dynamic Standing Fair   Ambulatory Fair -  (with RW)   Endurance Deficit   Endurance Deficit Yes   Activity Tolerance   Activity Tolerance Patient limited by fatigue   Assessment   Prognosis Good   Problem List Decreased strength;Decreased endurance;Impaired  balance;Decreased mobility;Orthopedic restrictions   Assessment Patient seen this date for PT treatment session to increase level of mobility and functional activity tolerance. This date, pt able to perform all functional mobility with Supervision -Bora, ELLYN, and increased time. Occasional verbal cuing provided for safety awareness and sequencing. Seated rest break taken following 20' of ambulation d/t fatigue and SOB. HR and SpO2 remained WFL on RA throughout. No true LOB experienced. Pt demonstrating ability to don R prosthetic with S/U and L surgical shoe with Bora. The patient's AM-PAC Basic Mobility Inpatient Short Form Raw Score is 17. A Raw score of greater than 16 suggests the patient may benefit from discharge to home. Please also refer to the recommendation of the Physical Therapist for safe discharge planning. Co treatment with OT secondary to complex medical condition of pt, possible A of 2 required to achieve and maintain transitional movements, requiring the need of skilled therapeutic intervention of 2 therapists to achieve delivery of services. D/c recommendation continues to be rehab resource intensity level II.   Goals   LTG Expiration Date 07/25/24   PT Treatment Day 3   Plan   Treatment/Interventions Functional transfer training;LE strengthening/ROM;Therapeutic exercise;Endurance training;Bed mobility;Gait training   Progress Progressing toward goals   PT Frequency 3-5x/wk   Discharge Recommendation   Rehab Resource Intensity Level, PT II (Moderate Resource Intensity)   AM-PAC Basic Mobility Inpatient   Turning in Flat Bed Without Bedrails 3   Lying on Back to Sitting on Edge of Flat Bed Without Bedrails 3   Moving Bed to Chair 3   Standing Up From Chair Using Arms 3   Walk in Room 3   Climb 3-5 Stairs With Railing 2   Basic Mobility Inpatient Raw Score 17   Basic Mobility Standardized Score 39.67   [facility] Highest Level Of Mobility   -HLM Goal 5: Stand one or more mins   -HLM Achieved 6:  Walk 10 steps or more   Education   Education Provided Mobility training;Assistive device   Patient Demonstrates acceptance/verbal understanding   End of Consult   Patient Position at End of Consult Bedside chair;Bed/Chair alarm activated;All needs within reach

## 2024-07-16 ENCOUNTER — APPOINTMENT (INPATIENT)
Dept: INTERVENTIONAL RADIOLOGY/VASCULAR | Facility: HOSPITAL | Age: 64
DRG: 464 | End: 2024-07-16
Attending: HOSPITALIST
Payer: COMMERCIAL

## 2024-07-16 LAB
BACTERIA TISS AEROBE CULT: ABNORMAL
BACTERIA WND AEROBE CULT: ABNORMAL
BACTERIA WND AEROBE CULT: ABNORMAL
CK SERPL-CCNC: 40 U/L (ref 39–308)
GLUCOSE SERPL-MCNC: 111 MG/DL (ref 65–140)
GLUCOSE SERPL-MCNC: 136 MG/DL (ref 65–140)
GLUCOSE SERPL-MCNC: 227 MG/DL (ref 65–140)
GLUCOSE SERPL-MCNC: 73 MG/DL (ref 65–140)
GRAM STN SPEC: ABNORMAL

## 2024-07-16 PROCEDURE — C1751 CATH, INF, PER/CENT/MIDLINE: HCPCS

## 2024-07-16 PROCEDURE — 82948 REAGENT STRIP/BLOOD GLUCOSE: CPT

## 2024-07-16 PROCEDURE — 97116 GAIT TRAINING THERAPY: CPT

## 2024-07-16 PROCEDURE — 97110 THERAPEUTIC EXERCISES: CPT

## 2024-07-16 PROCEDURE — 99233 SBSQ HOSP IP/OBS HIGH 50: CPT | Performed by: HOSPITALIST

## 2024-07-16 PROCEDURE — NC001 PR NO CHARGE: Performed by: RADIOLOGY

## 2024-07-16 PROCEDURE — 82550 ASSAY OF CK (CPK): CPT | Performed by: PHYSICIAN ASSISTANT

## 2024-07-16 PROCEDURE — 76937 US GUIDE VASCULAR ACCESS: CPT

## 2024-07-16 PROCEDURE — 02HV33Z INSERTION OF INFUSION DEVICE INTO SUPERIOR VENA CAVA, PERCUTANEOUS APPROACH: ICD-10-PCS | Performed by: RADIOLOGY

## 2024-07-16 PROCEDURE — 99232 SBSQ HOSP IP/OBS MODERATE 35: CPT | Performed by: PHYSICIAN ASSISTANT

## 2024-07-16 RX ORDER — INSULIN GLARGINE 100 [IU]/ML
30 INJECTION, SOLUTION SUBCUTANEOUS
Status: DISCONTINUED | OUTPATIENT
Start: 2024-07-16 | End: 2024-07-18

## 2024-07-16 RX ORDER — LIDOCAINE WITH 8.4% SOD BICARB 0.9%(10ML)
SYRINGE (ML) INJECTION AS NEEDED
Status: COMPLETED | OUTPATIENT
Start: 2024-07-16 | End: 2024-07-16

## 2024-07-16 RX ORDER — AMOXICILLIN AND CLAVULANATE POTASSIUM 875; 125 MG/1; MG/1
1 TABLET, FILM COATED ORAL EVERY 12 HOURS SCHEDULED
Status: DISCONTINUED | OUTPATIENT
Start: 2024-07-16 | End: 2024-07-18 | Stop reason: HOSPADM

## 2024-07-16 RX ADMIN — AMOXICILLIN AND CLAVULANATE POTASSIUM 1 TABLET: 875; 125 TABLET, FILM COATED ORAL at 12:06

## 2024-07-16 RX ADMIN — INSULIN GLARGINE 30 UNITS: 100 INJECTION, SOLUTION SUBCUTANEOUS at 21:36

## 2024-07-16 RX ADMIN — INSULIN LISPRO 10 UNITS: 100 INJECTION, SOLUTION INTRAVENOUS; SUBCUTANEOUS at 17:23

## 2024-07-16 RX ADMIN — DAPTOMYCIN 500 MG: 500 INJECTION, POWDER, LYOPHILIZED, FOR SOLUTION INTRAVENOUS at 17:26

## 2024-07-16 RX ADMIN — INSULIN LISPRO 2 UNITS: 100 INJECTION, SOLUTION INTRAVENOUS; SUBCUTANEOUS at 12:06

## 2024-07-16 RX ADMIN — CHOLECALCIFEROL TAB 25 MCG (1000 UNIT) 1000 UNITS: 25 TAB at 08:27

## 2024-07-16 RX ADMIN — ATORVASTATIN CALCIUM 80 MG: 40 TABLET, FILM COATED ORAL at 17:24

## 2024-07-16 RX ADMIN — VANCOMYCIN HYDROCHLORIDE 1250 MG: 1 INJECTION, POWDER, LYOPHILIZED, FOR SOLUTION INTRAVENOUS at 06:32

## 2024-07-16 RX ADMIN — ENOXAPARIN SODIUM 40 MG: 40 INJECTION SUBCUTANEOUS at 08:27

## 2024-07-16 RX ADMIN — LISINOPRIL 2.5 MG: 2.5 TABLET ORAL at 08:28

## 2024-07-16 RX ADMIN — AMOXICILLIN AND CLAVULANATE POTASSIUM 1 TABLET: 875; 125 TABLET, FILM COATED ORAL at 21:36

## 2024-07-16 RX ADMIN — ASPIRIN 81 MG: 81 TABLET, COATED ORAL at 08:27

## 2024-07-16 RX ADMIN — Medication 10 ML: at 16:31

## 2024-07-16 RX ADMIN — INSULIN LISPRO 10 UNITS: 100 INJECTION, SOLUTION INTRAVENOUS; SUBCUTANEOUS at 12:06

## 2024-07-16 RX ADMIN — INSULIN LISPRO 10 UNITS: 100 INJECTION, SOLUTION INTRAVENOUS; SUBCUTANEOUS at 08:28

## 2024-07-16 NOTE — PLAN OF CARE
Problem: METABOLIC, FLUID AND ELECTROLYTES - ADULT  Goal: Electrolytes maintained within normal limits  Description: INTERVENTIONS:  - Monitor labs and assess patient for signs and symptoms of electrolyte imbalances  - Administer electrolyte replacement as ordered  - Monitor response to electrolyte replacements, including repeat lab results as appropriate  - Instruct patient on fluid and nutrition as appropriate  Outcome: Progressing  Goal: Glucose maintained within target range  Description: INTERVENTIONS:  - Monitor Blood Glucose as ordered  - Assess for signs and symptoms of hyperglycemia and hypoglycemia  - Administer ordered medications to maintain glucose within target range  - Assess nutritional intake and initiate nutrition service referral as needed  Outcome: Progressing     Problem: SKIN/TISSUE INTEGRITY - ADULT  Goal: Skin Integrity remains intact(Skin Breakdown Prevention)  Description: Assess:  -Perform Zaid assessment every 4 hrs  -Clean and moisturize skin every 4 hrs  -Inspect skin when repositioning, toileting, and assisting with ADLS  -Assess under medical devices such as mosimo every 4 hrs  -Assess extremities for adequate circulation and sensation     Bed Management:  -Have minimal linens on bed & keep smooth, unwrinkled  -Change linens as needed when moist or perspiring  -Avoid sitting or lying in one position for more than 4 hours while in bed  -Keep HOB at 30degrees     Toileting:  -Offer bedside commode  -Assess for incontinence every 4 hrs  -Use incontinent care products after each incontinent episode such as lotion and spray    Activity:  -Mobilize patient 3 times a day  -Encourage activity and walks on unit  -Encourage or provide ROM exercises   -Turn and reposition patient every 4 Hours  -Use appropriate equipment to lift or move patient in bed  -Instruct/ Assist with weight shifting every 4hrs when out of bed in chair  -Consider limitation of chair time 2 hour intervals    Skin  Care:  -Avoid use of baby powder, tape, friction and shearing, hot water or constrictive clothing  -Relieve pressure over bony prominences using lotion  -Do not massage red bony areas    Next Steps:  -Teach patient strategies to minimize risks such as getting oob   -Consider consults to  interdisciplinary teams such as pt/ot  Outcome: Progressing  Goal: Incision(s), wounds(s) or drain site(s) healing without S/S of infection  Description: INTERVENTIONS  - Assess and document dressing, incision, wound bed, drain sites and surrounding tissue  - Provide patient and family education  - Perform skin care/dressing changes every 4 hrs  Outcome: Progressing     Problem: MUSCULOSKELETAL - ADULT  Goal: Maintain or return mobility to safest level of function  Description: INTERVENTIONS:  - Assess patient's ability to carry out ADLs; assess patient's baseline for ADL function and identify physical deficits which impact ability to perform ADLs (bathing, care of mouth/teeth, toileting, grooming, dressing, etc.)  - Assess/evaluate cause of self-care deficits   - Assess range of motion  - Assess patient's mobility  - Assess patient's need for assistive devices and provide as appropriate  - Encourage maximum independence but intervene and supervise when necessary  - Involve family in performance of ADLs  - Assess for home care needs following discharge   - Consider OT consult to assist with ADL evaluation and planning for discharge  - Provide patient education as appropriate  Outcome: Progressing  Goal: Maintain proper alignment of affected body part  Description: INTERVENTIONS:  - Support, maintain and protect limb and body alignment  - Provide patient/ family with appropriate education  Outcome: Progressing     Problem: PAIN - ADULT  Goal: Verbalizes/displays adequate comfort level or baseline comfort level  Description: Interventions:  - Encourage patient to monitor pain and request assistance  - Assess pain using appropriate  pain scale  - Administer analgesics based on type and severity of pain and evaluate response  - Implement non-pharmacological measures as appropriate and evaluate response  - Consider cultural and social influences on pain and pain management  - Notify physician/advanced practitioner if interventions unsuccessful or patient reports new pain  Outcome: Progressing     Problem: INFECTION - ADULT  Goal: Absence or prevention of progression during hospitalization  Description: INTERVENTIONS:  - Assess and monitor for signs and symptoms of infection  - Monitor lab/diagnostic results  - Monitor all insertion sites, i.e. indwelling lines, tubes, and drains  - Monitor endotracheal if appropriate and nasal secretions for changes in amount and color  - [place] appropriate cooling/warming therapies per order  - Administer medications as ordered  - Instruct and encourage patient and family to use good hand hygiene technique  - Identify and instruct in appropriate isolation precautions for identified infection/condition  Outcome: Progressing     Problem: SAFETY ADULT  Goal: Patient will remain free of falls  Description: INTERVENTIONS:  - Educate patient/family on patient safety including physical limitations  - Instruct patient to call for assistance with activity   - Consult OT/PT to assist with strengthening/mobility   - Keep Call bell within reach  - Keep bed low and locked with side rails adjusted as appropriate  - Keep care items and personal belongings within reach  - Initiate and maintain comfort rounds  - Make Fall Risk Sign visible to staff  - Offer Toileting every 2 Hours, in advance of need  - Initiate/Maintain bed/chair alarm  - Obtain necessary fall risk management equipment: alarms  - Apply yellow socks and bracelet for high fall risk patients  - Consider moving patient to room near nurses station  Outcome: Progressing     Problem: DISCHARGE PLANNING  Goal: Discharge to home or other facility with appropriate  resources  Description: INTERVENTIONS:  - Identify barriers to discharge w/patient and caregiver  - Arrange for needed discharge resources and transportation as appropriate  - Identify discharge learning needs (meds, wound care, etc.)  - Arrange for interpretive services to assist at discharge as needed  - Refer to Case Management Department for coordinating discharge planning if the patient needs post-hospital services based on physician/advanced practitioner order or complex needs related to functional status, cognitive ability, or social support system  Outcome: Progressing     Problem: Knowledge Deficit  Goal: Patient/family/caregiver demonstrates understanding of disease process, treatment plan, medications, and discharge instructions  Description: Complete learning assessment and assess knowledge base.  Interventions:  - Provide teaching at level of understanding  - Provide teaching via preferred learning methods  Outcome: Progressing     Problem: Prexisting or High Potential for Compromised Skin Integrity  Goal: Skin integrity is maintained or improved  Description: INTERVENTIONS:  - Identify patients at risk for skin breakdown  - Assess and monitor skin integrity  - Assess and monitor nutrition and hydration status  - Monitor labs   - Assess for incontinence   - Turn and reposition patient  - Assist with mobility/ambulation  - Relieve pressure over bony prominences  - Avoid friction and shearing  - Provide appropriate hygiene as needed including keeping skin clean and dry  - Evaluate need for skin moisturizer/barrier cream  - Collaborate with interdisciplinary team   - Patient/family teaching  - Consider wound care consult   Outcome: Progressing     Problem: Nutrition/Hydration-ADULT  Goal: Nutrient/Hydration intake appropriate for improving, restoring or maintaining nutritional needs  Description: Monitor and assess patient's nutrition/hydration status for malnutrition. Collaborate with interdisciplinary  team and initiate plan and interventions as ordered.  Monitor patient's weight and dietary intake as ordered or per policy. Utilize nutrition screening tool and intervene as necessary. Determine patient's food preferences and provide high-protein, high-caloric foods as appropriate.     INTERVENTIONS:  - Monitor oral intake, urinary output, labs, and treatment plans  - Assess nutrition and hydration status and recommend course of action  - Evaluate amount of meals eaten  - Assist patient with eating if necessary   - Allow adequate time for meals  - Recommend/ encourage appropriate diets, oral nutritional supplements, and vitamin/mineral supplements  - Order, calculate, and assess calorie counts as needed  - Recommend, monitor, and adjust tube feedings and TPN/PPN based on assessed needs  - Assess need for intravenous fluids  - Provide specific nutrition/hydration education as appropriate  - Include patient/family/caregiver in decisions related to nutrition  Outcome: Progressing

## 2024-07-16 NOTE — ASSESSMENT & PLAN NOTE
Lab Results   Component Value Date    HGBA1C 9.7 (H) 06/25/2024       Recent Labs     07/15/24  1628 07/15/24  2103 07/16/24  0714 07/16/24  1056   POCGLU 148* 112 73 227*         Blood Sugar Average: Last 72 hrs:  (P) 129.125    Developed hypoglycemia on 07/15/2024  Decreased Lantus to 30 Units SQ QHS on 07/14/2024 and decreased Humalog to 10 Units TID with meals on 07/15/2024  ISS with blood glucose monitoring AC  Continue PO lisinopril for renal protection  Hypoglycemia protocol  Outpatient Endocrinology evaluation

## 2024-07-16 NOTE — PROCEDURES
Venous Access Line Insertion    Date/Time: 7/16/2024 5:44 PM    Performed by: Mark Lafleur MD  Authorized by: Mark Lafleur MD    Patient location:  IR  Consent:     Consent obtained:  Written    Consent given by:  Patient    Risks discussed:  Arterial puncture, bleeding and infection    Alternatives discussed:  No treatment and delayed treatment  Universal protocol:     Procedure explained and questions answered to patient or proxy's satisfaction: yes      Immediately prior to procedure, a time out was called: yes      Relevant documents present and verified: yes      Test results available and properly labeled: yes      Radiology Images displayed and confirmed.  If images not available, report reviewed: yes      Required blood products, implants, devices, and special equipment available: yes      Site/side marked: yes      Patient identity confirmed:  Verbally with patient and arm band  Pre-procedure details:     Hand hygiene: Hand hygiene performed prior to insertion      Sterile barrier technique: All elements of maximal sterile technique followed      Skin preparation:  2% chlorhexidine    Skin preparation agent: Skin preparation agent completely dried prior to procedure    Procedure details:     Complex Venous Access Line Type: PICC      Complex Venous Access Line Indications: long term antibiotics      Catheter tip vessel location: atriocaval junction      Orientation:  Right    Location:  [place] (comment)    Procedural supplies:  Single lumen    Catheter size:  4.5 Fr    Total catheter length (cm):  Please see PACS    Catheter out on skin (cm):  0    Max flow rate:  5    Arm circumference:  Please see PACS    Approach: percutaneous technique used      Patient position:  Flat    Ultrasound image availability:  Images available in PACS    Sterile ultrasound techniques: Sterile gel and sterile probe covers were used      Number of attempts:  1    Successful placement: yes      Landmarks identified: yes     Anesthesia (see MAR for exact dosages):     Anesthesia method:  Local infiltration    Local anesthetic:  Lidocaine 1% w/o epi  Post-procedure details:     Post-procedure:  Dressing applied and securement device placed    Assessment:  Blood return through all ports and placement verified by x-ray    Post-procedure complications: none      Patient tolerance of procedure:  Tolerated well, no immediate complications    Observer name:  Unnamed tributary of basilic vein

## 2024-07-16 NOTE — PROGRESS NOTES
Progress Note - [facility] Infectious Disease   Art Joseph 64 y.o. male MRN: [id number]  Unit/Bed#: 424-01 Encounter: [id number]      VIRTUAL CARE DOCUMENTATION:     1. This service was provided via Telemedicine using [facility] Kit     2. Parties in the room with patient during teleconsult Patient only    3. Confidentiality My office door was closed     4. Participants No one else was in the room    5. Patient acknowledged consent and understanding of privacy and security of the  Telemedicine consult. I informed the patient that I have reviewed their record in Epic and presented the opportunity for them to ask any questions regarding the visit today.  The patient agreed to participate.    6. Time spent 5 minutes communication with the patient via telehealth.       IMPRESSION & RECOMMENDATIONS:   1.  Persistent left foot cellulitis, with abscess and osteomyelitis, secondary to recurrent wound dehiscence.  Patient remains clinically and systemically well, without evidence of sepsis or systemic toxicity.  Wound culture with growth of MRSA ([R] to minocycline) and Proteus.  Patient is status post left foot I&D, with possible residual osteomyelitis clinically.  Bone margin was sent for pathology.  Operative cultures isolated MRSA. D/w podiatry, will plan for long term course of abx. Unfortunately no safe oral regimen available and patient will need IV plus PO abx course. Regardless, he unfortunately remains high risk for foot loss.    -Start PO Augmentin 875mg BID plus IV daptomycin 6mg/kg ABW x 6 weeks from bone resection through 8/21/24  -Follow-up on bone margin pathology  -Check CPK   -Follow-up outpatient with ID in 2 weeks - okay for virtual   -Weekly CBCd CMP and CK   -OK for PICC from ID standpoint   -ID office aware of d/c needs   -Outpatient f/u with podiatry      2.  Recurrent left foot wound dehiscence, despite recent I&D and first metatarsal head resection for surgical cure.  Patient is status post I&D,  as in above.  Wound care per podiatry.     3.  Maggot infestation of wound bed, likely secondary to presence of necrotic tissue.  Will likely need additional I&D.  I&D plan per podiatry.     4.  DM, poorly controlled, with hyperglycemia and elevated hemoglobin A1c.  This is risk factor for poor wound healing and infection above.  Management per primary service.     5.  PVD.    Antibiotics:  D1 IV dapto  D1 PO augmentin   D9 IV abx     I have discussed the above management plan in detail with patient.     Above plan discussed in detail with Dr. Jackson who is in agreement with plan to adjust abx as above to prepare for d/c.     24 Hour events:  Yesterday and overnight notes reviewed. Getting picc today.     Subjective:  Patient has no fever, chills, sweats; no nausea, vomiting, diarrhea; no cough, shortness of breath; no pain. No new symptoms. Agrees to plan for IV abx. Was upset and hesitant yesterday but expresses understanding.     Objective:  Vitals:  Temp:  [97.5 °F (36.4 °C)-98.1 °F (36.7 °C)] 97.6 °F (36.4 °C)  HR:  [69-77] 69  Resp:  [16-18] 17  BP: (121-142)/(51-64) 121/51  SpO2:  [94 %-95 %] 95 %  Temp (24hrs), Av.7 °F (36.5 °C), Min:97.5 °F (36.4 °C), Max:98.1 °F (36.7 °C)  Current: Temperature: 97.6 °F (36.4 °C)    PHYSICAL EXAM:    Physical exam findings reported by bedside and primary medical team staff.    General Appearance:  Appearing well, nontoxic, and in no distress sitting in bed talking on the phone    HEENT: Normocephalic, without obvious abnormality, atraumatic. Conjunctiva pink and sclera anicteric. Oropharynx moist without lesions.     Lungs:   Clear to auscultation bilaterally, respirations unlabored   Heart:  RRR; no murmur, rub or gallop   Abdomen:   Soft, non-tender, non-distended, positive bowel sounds    Extremities: R BKA. LLE venous stasis changes. Foot in surgical shoe.    Musculoskeletal: Back symmetric without curvature, ROM normal.    : No CVA or suprapubic tenderness.      Skin: No rashes or lesions. L foot dressing c/d/I. Peripheral IV intact without evidence of erythema, warmth, or exudate.        LABS, IMAGING, & OTHER STUDIES:  Extensive review of the medical records in epic including review of the notes, radiographs, and laboratory results were reviewed personally as below.     Lab Results:  Results from last 7 days   Lab Units 07/15/24  0534 07/14/24  0454 07/13/24  0502   WBC Thousand/uL 8.74 9.02 8.66   HEMOGLOBIN g/dL 13.7 13.3 12.9   PLATELETS Thousands/uL 332 317 328     Results from last 7 days   Lab Units 07/15/24  0534 07/14/24  0454 07/13/24  0502   SODIUM mmol/L 139 139 140   POTASSIUM mmol/L 3.7 4.0 3.7   CHLORIDE mmol/L 105 106 106   CO2 mmol/L 26 24 25   BUN mg/dL 20 18 16   CREATININE mg/dL 0.78 0.76 0.74   EGFR ml/min/1.73sq m 94 95 96   CALCIUM mg/dL 9.4 9.5 9.3   AST U/L 11* 11* 12*   ALT U/L 13 13 12   ALK PHOS U/L 105* 110* 113*     Results from last 7 days   Lab Units 07/11/24  1042 07/09/24  1413 07/09/24  1411   GRAM STAIN RESULT  No Polys or Bacteria seen  No Polys or Bacteria seen  --  Rare Gram negative rods*  Rare Gram positive cocci in pairs*  No polys seen*   WOUND CULTURE   --   --  2+ Growth of Proteus vulgaris group*  2+ Growth of Methicillin Resistant Staphylococcus aureus*   MRSA CULTURE ONLY   --  Methicillin Resistant Staphylococcus aureus isolated*  This patient requires contact isolation precautions per [place] law. Contact precautions are not required in [place] for nasal surveillance cultures.  --      Results from last 7 days   Lab Units 07/13/24  0502 07/12/24  0542 07/11/24  0559 07/10/24  0519   PROCALCITONIN ng/ml 0.07 0.09 0.08 0.10                   Lab interpretation/comments: no labs to review today      Culture data: OR cs with MRSA [R] minocycline     Imaging Studies:   Imaging interpretation/comments: no additional imaging

## 2024-07-16 NOTE — ASSESSMENT & PLAN NOTE
Recent hospitalization from 06/25/2024 through 06/28/2024 for osteomyelitis of the left foot requiring a left 1st metatarsal amputation/resection on 06/27/2024 by Podiatry. Now with a left wound infection and associated cellulitis with probable underlying osteomyelitis. Also found to have maggots in his open wound  I appreciate Infectious Disease's input and Podiatry's input.  Checked a left foot wound culture on 07/09/2024, which is positive for Proteus vulgaris and MRSA  Being treated with IV vancomycin  IV cefepime was added on 07/10/2024 per Infectious Disease's recommendations based on the left foot wound culture result  Changed IV cefepime to IV ceftriaxone on 07/12/2024  Underwent left foot wound debridement and washout by Podiatry on 07/11/2024  Await bone margin pathology results and surgical wound cultures (now available with sensitivities) to determine antibiotic treatment course per   Final ID recs  Start PO Augmentin 875mg BID plus IV daptomycin 6mg/kg ABW x 6 weeks from bone resection through 8/21/24  Follow-up on bone margin pathology  Check CPK   Follow-up outpatient with ID in 2 weeks - okay for virtual   Weekly CBCd CMP and CK   OK for PICC from ID standpoint - hopeful to get 7/16  Outpatient f/u with podiatry

## 2024-07-16 NOTE — PLAN OF CARE
Problem: PHYSICAL THERAPY ADULT  Goal: Performs mobility at highest level of function for planned discharge setting.  See evaluation for individualized goals.  Description: Treatment/Interventions: Functional transfer training, LE strengthening/ROM, Therapeutic exercise, Endurance training, Bed mobility, Gait training          See flowsheet documentation for full assessment, interventions and recommendations.  Outcome: Progressing  Note: Prognosis: Good  Problem List:  (Decreased strength; Decreased endurance; Impaired balance; Decreased mobility; Decreased skin integrity; Orthopedic restrictions)  Assessment: Pt seen this date for PT treatment session to increase level of mobility and functional activity tolerance. PT treatment session this date consisting of  therapeutic exercises, bed mobility, transfers and  gait training w/ emphasis on improving pt's ability to ambulate. Pt. Currently performing  tx and ambulation at SUP-min x 1 level of function with use of RW. In comparison to previous session, Pt. With improvement activity tolerance. Pt is in need of continued activity in PT to improve strength balance endurance mobility transfers and ambulation with return to maximize LOF. From PT/mobility standpoint, recommendation at time of d/c would be level II: moderate resource intensity   in order to promote return to PLOF and independence.  The patient's AM-[facility] Basic Mobility Inpatient Short Form Raw Score is 19. A Raw score of greater than 16 suggests the patient may benefit from discharge to home. Please also refer to the recommendation of the Physical Therapist for safe discharge planning. Pt continues to be functioning below baseline level. PT will continue to see pt during current hospitalization in order to address the deficits listed above and provide interventions consistent w/ POC in effort to achieve STGs.        Rehab Resource Intensity Level, PT: II (Moderate Resource Intensity)    See flowsheet  documentation for full assessment.

## 2024-07-16 NOTE — DISCHARGE INSTRUCTIONS
[facility]  Interventional Radiology  [phone]           Peripherally Inserted Central Catheter     WHAT YOU NEED TO KNOW:   A PICC is an IV placed into a large blood vessel near your heart. It is usually inserted through a blood vessel in your arm. Your PICC may have multiple ports. Ports are tubes where you can inject medicine. A PICC can stay in place for several weeks or months. You may need a PICC to get nutrition, medicine, or fluids. Blood samples can be removed from your PICC and sent to the lab for tests.   DISCHARGE INSTRUCTIONS:    [facility], [place] and [place] patients,    Contact Interventional Radiology at [phone]   [place] PATIENTS: Contact Interventional Radiology at [phone]   [place] PATIENTS: Contact Interventional Radiology at [phone] if:  Blood soaks through your bandage.    Your arm or leg feels warm, tender, and painful. It may look swollen and red.   You have trouble moving your arm.    Your catheter falls out.  You have a fever or swelling, redness, pain, or pus where the catheter was inserted.  Persistent nausea or vomiting.    You cannot flush your catheter, or you feel pain when you flush your catheter.    You see a hole or crack in the tubing of your catheter.    You see fluid leaking from the insertion site.    You run out of supplies to care for your catheter.    You have questions or concerns about your condition or care.

## 2024-07-16 NOTE — CONSULTS
The patient’s Vancomycin therapy has been completed /discontinued. Thank you for this consult; Pharmacy will sign-off now

## 2024-07-16 NOTE — PROGRESS NOTES
Art Joseph is a 64 y.o. male who is currently ordered Vancomycin IV with management by the Pharmacy Consult service.  Relevant clinical data and objective / subjective history reviewed.  Vancomycin Assessment:  Indication and Goal AUC/Trough: Bone/joint infection (goal -600, trough >10), -600, trough >10  Clinical Status: stable  Micro:     Renal Function:  SCr: 0.78 mg/dL  CrCl: 110.7 mL/min  Renal replacement: Not on dialysis  Days of Therapy: 8  Current Dose: 1250mg q12h  Vancomycin Plan:  New Dosing: same  Estimated AUC: 495 mcg*hr/mL  Estimated Trough: 13.8 mcg/mL  Next Level: 0600 on 7/19/24  Renal Function Monitoring: Daily BMP and UOP  Pharmacy will continue to follow closely for s/sx of nephrotoxicity, infusion reactions and appropriateness of therapy.  BMP and CBC will be ordered per protocol. We will continue to follow the patient’s culture results and clinical progress daily.    Jeff Granados, Pharmacist

## 2024-07-16 NOTE — CASE MANAGEMENT
Case Management Discharge Planning Note    Patient name Art Joseph  Location /424-01 MRN [id number]  : 1960 Date 2024       Current Admission Date: 2024  Current Admission Diagnosis:Open wound of left foot   Patient Active Problem List    Diagnosis Date Noted Date Diagnosed    Osteomyelitis of left foot (HCC) 2024     MRSA colonization 2024     Open wound of left foot 2024     Maggot infestation 2024     Elevated platelet count 2024     Candidiasis, intertrigo 2024     Cellulitis of left lower extremity 2024     Class 1 obesity in adult 2021     Diabetic peripheral angiopathy ([facility]) 2020     Vitamin D deficiency 2019     Ambulatory dysfunction 10/17/2017     Hx of right BKA ([facility]) 2017     Primary hypertension 2017     Hypercholesteremia 2017     Acute osteomyelitis of metatarsal bone of left foot ([facility]) 2017     Type 2 diabetes mellitus with hypoglycemia without coma, with long-term current use of insulin ([facility]) 2017     Diabetic neuropathy ([facility]) 2017       LOS (days): 8  Geometric Mean LOS (GMLOS) (days): 5.6  Days to GMLOS:-2.2     OBJECTIVE:  Risk of Unplanned Readmission Score: 14.73         Current admission status: Inpatient   Preferred Pharmacy:   [facility] [place] - [address]  [address] [address]  Phone: [phone] Fax: [phone]    [facility] - [place] - [address]  [address] [facility] [address]  Phone: [phone] Fax: [phone]    [facility]/pharmacy #[id number] - [place] - [address]  [address] [facility] [address]  Phone: [phone] Fax: [phone]    Primary Care Provider: Kerry Christine MD    Primary Insurance: [facility]  Secondary Insurance:     DISCHARGE DETAILS:     IV Daptomycin order uploaded to [facility].    Auth started in aidin with discharge support.         Patient will need 6 weeks IV abx and PICC line placed.

## 2024-07-16 NOTE — PLAN OF CARE
Problem: METABOLIC, FLUID AND ELECTROLYTES - ADULT  Goal: Electrolytes maintained within normal limits  Description: INTERVENTIONS:  - Monitor labs and assess patient for signs and symptoms of electrolyte imbalances  - Administer electrolyte replacement as ordered  - Monitor response to electrolyte replacements, including repeat lab results as appropriate  - Instruct patient on fluid and nutrition as appropriate  Outcome: Progressing  Goal: Glucose maintained within target range  Description: INTERVENTIONS:  - Monitor Blood Glucose as ordered  - Assess for signs and symptoms of hyperglycemia and hypoglycemia  - Administer ordered medications to maintain glucose within target range  - Assess nutritional intake and initiate nutrition service referral as needed  Outcome: Progressing     Problem: SKIN/TISSUE INTEGRITY - ADULT  Goal: Skin Integrity remains intact(Skin Breakdown Prevention)  Description: Assess:  -Perform Zaid assessment every 4 hrs  -Clean and moisturize skin every 4 hrs  -Inspect skin when repositioning, toileting, and assisting with ADLS  -Assess under medical devices such as mosimo every 4 hrs  -Assess extremities for adequate circulation and sensation     Bed Management:  -Have minimal linens on bed & keep smooth, unwrinkled  -Change linens as needed when moist or perspiring  -Avoid sitting or lying in one position for more than 4 hours while in bed  -Keep HOB at 30degrees     Toileting:  -Offer bedside commode  -Assess for incontinence every 4 hrs  -Use incontinent care products after each incontinent episode such as lotion and spray    Activity:  -Mobilize patient 3 times a day  -Encourage activity and walks on unit  -Encourage or provide ROM exercises   -Turn and reposition patient every 4 Hours  -Use appropriate equipment to lift or move patient in bed  -Instruct/ Assist with weight shifting every 4hrs when out of bed in chair  -Consider limitation of chair time 2 hour intervals    Skin  Care:  -Avoid use of baby powder, tape, friction and shearing, hot water or constrictive clothing  -Relieve pressure over bony prominences using lotion  -Do not massage red bony areas    Next Steps:  -Teach patient strategies to minimize risks such as getting oob   -Consider consults to  interdisciplinary teams such as pt/ot  Outcome: Progressing  Goal: Incision(s), wounds(s) or drain site(s) healing without S/S of infection  Description: INTERVENTIONS  - Assess and document dressing, incision, wound bed, drain sites and surrounding tissue  - Provide patient and family education  - Perform skin care/dressing changes every 4 hrs  Outcome: Progressing     Problem: MUSCULOSKELETAL - ADULT  Goal: Maintain or return mobility to safest level of function  Description: INTERVENTIONS:  - Assess patient's ability to carry out ADLs; assess patient's baseline for ADL function and identify physical deficits which impact ability to perform ADLs (bathing, care of mouth/teeth, toileting, grooming, dressing, etc.)  - Assess/evaluate cause of self-care deficits   - Assess range of motion  - Assess patient's mobility  - Assess patient's need for assistive devices and provide as appropriate  - Encourage maximum independence but intervene and supervise when necessary  - Involve family in performance of ADLs  - Assess for home care needs following discharge   - Consider OT consult to assist with ADL evaluation and planning for discharge  - Provide patient education as appropriate  Outcome: Progressing  Goal: Maintain proper alignment of affected body part  Description: INTERVENTIONS:  - Support, maintain and protect limb and body alignment  - Provide patient/ family with appropriate education  Outcome: Progressing     Problem: PAIN - ADULT  Goal: Verbalizes/displays adequate comfort level or baseline comfort level  Description: Interventions:  - Encourage patient to monitor pain and request assistance  - Assess pain using appropriate  pain scale  - Administer analgesics based on type and severity of pain and evaluate response  - Implement non-pharmacological measures as appropriate and evaluate response  - Consider cultural and social influences on pain and pain management  - Notify physician/advanced practitioner if interventions unsuccessful or patient reports new pain  Outcome: Progressing     Problem: INFECTION - ADULT  Goal: Absence or prevention of progression during hospitalization  Description: INTERVENTIONS:  - Assess and monitor for signs and symptoms of infection  - Monitor lab/diagnostic results  - Monitor all insertion sites, i.e. indwelling lines, tubes, and drains  - Monitor endotracheal if appropriate and nasal secretions for changes in amount and color  - Dale appropriate cooling/warming therapies per order  - Administer medications as ordered  - Instruct and encourage patient and family to use good hand hygiene technique  - Identify and instruct in appropriate isolation precautions for identified infection/condition  Outcome: Progressing     Problem: SAFETY ADULT  Goal: Patient will remain free of falls  Description: INTERVENTIONS:  - Educate patient/family on patient safety including physical limitations  - Instruct patient to call for assistance with activity   - Consult OT/PT to assist with strengthening/mobility   - Keep Call bell within reach  - Keep bed low and locked with side rails adjusted as appropriate  - Keep care items and personal belongings within reach  - Initiate and maintain comfort rounds  - Make Fall Risk Sign visible to staff  - Offer Toileting every 2 Hours, in advance of need  - Initiate/Maintain bed/chair alarm  - Obtain necessary fall risk management equipment: alarms  - Apply yellow socks and bracelet for high fall risk patients  - Consider moving patient to room near nurses station  Outcome: Progressing     Problem: DISCHARGE PLANNING  Goal: Discharge to home or other facility with appropriate  resources  Description: INTERVENTIONS:  - Identify barriers to discharge w/patient and caregiver  - Arrange for needed discharge resources and transportation as appropriate  - Identify discharge learning needs (meds, wound care, etc.)  - Arrange for interpretive services to assist at discharge as needed  - Refer to Case Management Department for coordinating discharge planning if the patient needs post-hospital services based on physician/advanced practitioner order or complex needs related to functional status, cognitive ability, or social support system  Outcome: Progressing     Problem: Knowledge Deficit  Goal: Patient/family/caregiver demonstrates understanding of disease process, treatment plan, medications, and discharge instructions  Description: Complete learning assessment and assess knowledge base.  Interventions:  - Provide teaching at level of understanding  - Provide teaching via preferred learning methods  Outcome: Progressing     Problem: Prexisting or High Potential for Compromised Skin Integrity  Goal: Skin integrity is maintained or improved  Description: INTERVENTIONS:  - Identify patients at risk for skin breakdown  - Assess and monitor skin integrity  - Assess and monitor nutrition and hydration status  - Monitor labs   - Assess for incontinence   - Turn and reposition patient  - Assist with mobility/ambulation  - Relieve pressure over bony prominences  - Avoid friction and shearing  - Provide appropriate hygiene as needed including keeping skin clean and dry  - Evaluate need for skin moisturizer/barrier cream  - Collaborate with interdisciplinary team   - Patient/family teaching  - Consider wound care consult   Outcome: Progressing     Problem: Nutrition/Hydration-ADULT  Goal: Nutrient/Hydration intake appropriate for improving, restoring or maintaining nutritional needs  Description: Monitor and assess patient's nutrition/hydration status for malnutrition. Collaborate with interdisciplinary  team and initiate plan and interventions as ordered.  Monitor patient's weight and dietary intake as ordered or per policy. Utilize nutrition screening tool and intervene as necessary. Determine patient's food preferences and provide high-protein, high-caloric foods as appropriate.     INTERVENTIONS:  - Monitor oral intake, urinary output, labs, and treatment plans  - Assess nutrition and hydration status and recommend course of action  - Evaluate amount of meals eaten  - Assist patient with eating if necessary   - Allow adequate time for meals  - Recommend/ encourage appropriate diets, oral nutritional supplements, and vitamin/mineral supplements  - Order, calculate, and assess calorie counts as needed  - Recommend, monitor, and adjust tube feedings and TPN/PPN based on assessed needs  - Assess need for intravenous fluids  - Provide specific nutrition/hydration education as appropriate  - Include patient/family/caregiver in decisions related to nutrition  Outcome: Progressing

## 2024-07-16 NOTE — PHYSICAL THERAPY NOTE
PHYSICAL THERAPY NOTE          Patient Name: Art Joseph  Today's Date: 7/16/2024 07/16/24 1129   PT Last Visit   PT Visit Date 07/16/24   Note Type   Note Type Treatment   Pain Assessment   Pain Assessment Tool 0-10   Pain Score No Pain   Restrictions/Precautions   Weight Bearing Precautions Per Order Yes   LLE Weight Bearing Per Order WBAT   Braces or Orthoses   (R prosthetic;L surgical shoe)   Other Precautions   (Fall Risk; WBS; Chair Alarm; Bed Alarm)   General   Chart Reviewed Yes   Response to Previous Treatment Patient with no complaints from previous session.   Family/Caregiver Present No   Cognition   Overall Cognitive Status WFL   Arousal/Participation Alert;Cooperative   Attention Within functional limits   Orientation Level Oriented X4   Following Commands Follows all commands and directions without difficulty   Subjective   Subjective Pt. would like to ambulate   Bed Mobility   Supine to Sit 5  Supervision   Additional items HOB elevated;Bedrails;Increased time required;Verbal cues   Sit to Supine 5  Supervision   Additional items Increased time required;Verbal cues   Additional Comments Sat EOB with Good sitting balance   Transfers   Sit to Stand 5  Supervision   Additional items Armrests;Increased time required;Verbal cues   Stand to Sit 5  Supervision   Additional items Increased time required;Verbal cues   Stand pivot 5  Supervision   Additional items Increased time required;Verbal cues   Additional Comments RW used   Ambulation/Elevation   Gait pattern Excessively slow;Short stride;Foward flexed;Decreased foot clearance;Improper Weight shift   Gait Assistance 4  Minimal assist   Additional items Assist x 1;Verbal cues   Assistive Device Rolling walker   Distance 100'   Balance   Static Sitting Good   Dynamic Sitting Good   Static Standing Fair   Dynamic Standing Fair   Ambulatory Fair -  (RW)   Endurance  Deficit   Endurance Deficit Yes   Activity Tolerance   Activity Tolerance Patient limited by fatigue   Exercises   Quad Sets Sitting;20 reps;AROM;Bilateral   Glute Sets Sitting;20 reps;AROM   Hip Flexion Sitting;20 reps;AROM;Bilateral   Hip Abduction Sitting;20 reps;AROM;Bilateral   Hip Adduction Sitting;20 reps;AROM;Bilateral   Knee AROM Long Arc Quad Sitting;20 reps;AROM;Bilateral   Ankle Pumps Sitting;20 reps;AROM;Left   Marching Sitting;20 reps;AROM;Bilateral   Assessment   Prognosis Good   Problem List   (Decreased strength; Decreased endurance; Impaired balance; Decreased mobility; Decreased skin integrity; Orthopedic restrictions)   Assessment Pt seen this date for PT treatment session to increase level of mobility and functional activity tolerance. PT treatment session this date consisting of  therapeutic exercises, bed mobility, transfers and  gait training w/ emphasis on improving pt's ability to ambulate. Pt. Currently performing  tx and ambulation at SUP-min x 1 level of function with use of RW. In comparison to previous session, Pt. With improvement activity tolerance. Pt is in need of continued activity in PT to improve strength balance endurance mobility transfers and ambulation with return to maximize LOF. From PT/mobility standpoint, recommendation at time of d/c would be level II: moderate resource intensity   in order to promote return to PLOF and independence.  The patient's AM-PAC Basic Mobility Inpatient Short Form Raw Score is 19. A Raw score of greater than 16 suggests the patient may benefit from discharge to home. Please also refer to the recommendation of the Physical Therapist for safe discharge planning. Pt continues to be functioning below baseline level. PT will continue to see pt during current hospitalization in order to address the deficits listed above and provide interventions consistent w/ POC in effort to achieve STGs.   Goals   Patient Goals to go home   LTG Expiration Date  07/25/24   PT Treatment Day 4   Plan   Treatment/Interventions Functional transfer training;LE strengthening/ROM;Therapeutic exercise;Endurance training;Bed mobility;Gait training;Spoke to nursing   Progress Progressing toward goals   PT Frequency 3-5x/wk   Discharge Recommendation   Rehab Resource Intensity Level, PT II (Moderate Resource Intensity)   AM-PAC Basic Mobility Inpatient   Turning in Flat Bed Without Bedrails 4   Lying on Back to Sitting on Edge of Flat Bed Without Bedrails 3   Moving Bed to Chair 3   Standing Up From Chair Using Arms 4   Walk in Room 3   Climb 3-5 Stairs With Railing 2   Basic Mobility Inpatient Raw Score 19   Basic Mobility Standardized Score 42.48   [facility] Highest Level Of Mobility   -HLM Goal 6: Walk 10 steps or more   -HLM Achieved 7: Walk 25 feet or more   Education   Education Provided Mobility training   Patient Demonstrates verbal understanding   End of Consult   Patient Position at End of Consult Bedside chair;Bed/Chair alarm activated;All needs within reach   End of Consult Comments discussed POC with PT

## 2024-07-16 NOTE — PROGRESS NOTES
"[facility]  Progress Note  Name: Art Joseph I  MRN: [id number]  Unit/Bed#: 424-01 I Date of Admission: 7/8/2024   Date of Service: 7/16/2024 I Hospital Day: 8    Assessment & Plan   * Open wound of left foot  Assessment & Plan  Recent hospitalization from 06/25/2024 through 06/28/2024 for osteomyelitis of the left foot requiring a left 1st metatarsal amputation/resection on 06/27/2024 by Podiatry. Now with a left wound infection and associated cellulitis with probable underlying osteomyelitis. Also found to have maggots in his open wound  I appreciate Infectious Disease's input and Podiatry's input.  Checked a left foot wound culture on 07/09/2024, which is positive for Proteus vulgaris and MRSA  Being treated with IV vancomycin  IV cefepime was added on 07/10/2024 per Infectious Disease's recommendations based on the left foot wound culture result  Changed IV cefepime to IV ceftriaxone on 07/12/2024  Underwent left foot wound debridement and washout by Podiatry on 07/11/2024  Await bone margin pathology results and surgical wound cultures (now available with sensitivities) to determine antibiotic treatment course per   Final ID recs  Start PO Augmentin 875mg BID plus IV daptomycin 6mg/kg ABW x 6 weeks from bone resection through 8/21/24  Follow-up on bone margin pathology  Check CPK   Follow-up outpatient with ID in 2 weeks - okay for virtual   Weekly CBCd CMP and CK   OK for PICC from ID standpoint - hopeful to get 7/16  Outpatient f/u with podiatry     Osteomyelitis of left foot (HCC)  Assessment & Plan  Please see the assessment and plan for \"Open wound of left foot\"    Type 2 diabetes mellitus with hypoglycemia without coma, with long-term current use of insulin (HCC)  Assessment & Plan  Lab Results   Component Value Date    HGBA1C 9.7 (H) 06/25/2024       Recent Labs     07/15/24  1628 07/15/24  2103 07/16/24  0714 07/16/24  1056   POCGLU 148* 112 73 227*         Blood Sugar " "Average: Last 72 hrs:  (P) 129.125    Developed hypoglycemia on 07/15/2024  Decreased Lantus to 30 Units SQ QHS on 07/14/2024 and decreased Humalog to 10 Units TID with meals on 07/15/2024  ISS with blood glucose monitoring AC  Continue PO lisinopril for renal protection  Hypoglycemia protocol  Outpatient Endocrinology evaluation    MRSA colonization  Assessment & Plan  Utilize the MRSA decolonization protocol    Maggot infestation  Assessment & Plan  Please see the assessment and plan for \"Open wound of left foot\"      Vitamin D deficiency  Assessment & Plan  Utilize cholecalciferol 1000 I.U. PO Qdaily  Outpatient follow-up with PCP in regards to this matter    Hypercholesteremia  Assessment & Plan  Continue statin therapy    Primary hypertension  Assessment & Plan  Continue PO lisinopril  Follow the blood pressure trend    Ambulatory dysfunction  Assessment & Plan  PT/OT evaluations    Hx of right BKA (HCC)  Assessment & Plan  Outpatient follow-up with Vascular Surgery               VTE Pharmacologic Prophylaxis: VTE Score: 8 High Risk (Score >/= 5) - Pharmacological DVT Prophylaxis Ordered: enoxaparin (Lovenox). Sequential Compression Devices Ordered.    Mobility:   Basic Mobility Inpatient Raw Score: 17  JH-HLM Goal: 5: Stand one or more mins  JH-HLM Achieved: 6: Walk 10 steps or more  JH-HLM Goal achieved. Continue to encourage appropriate mobility.    Patient Centered Rounds: I performed bedside rounds with nursing staff today.   Discussions with Specialists or Other Care Team Provider: none      Total Time Spent on Date of Encounter in care of patient: 31 mins. This time was spent on one or more of the following: performing physical exam; counseling and coordination of care; obtaining or reviewing history; documenting in the medical record; reviewing/ordering tests, medications or procedures; communicating with other healthcare professionals and discussing with patient's family/caregivers.    Current Length " of Stay: 8 day(s)  Current Patient Status: Inpatient   Certification Statement: The patient will continue to require additional inpatient hospital stay due to osteo  Discharge Plan: Anticipate discharge tomorrow to rehab facility.    Code Status: Level 1 - Full Code    Subjective:   Patient feels pain controlled, overall improved, no acute complaints    Objective:     Vitals:   Temp (24hrs), Av.7 °F (36.5 °C), Min:97.5 °F (36.4 °C), Max:98.1 °F (36.7 °C)    Temp:  [97.5 °F (36.4 °C)-98.1 °F (36.7 °C)] 97.6 °F (36.4 °C)  HR:  [69-77] 69  Resp:  [16-18] 17  BP: (121-142)/(51-64) 121/51  SpO2:  [94 %-95 %] 95 %  Body mass index is 30.91 kg/m².     Input and Output Summary (last 24 hours):     Intake/Output Summary (Last 24 hours) at 2024 1158  Last data filed at 2024 0830  Gross per 24 hour   Intake 1740 ml   Output 1500 ml   Net 240 ml       Physical Exam:   Physical Exam  Vitals and nursing note reviewed.   Constitutional:       General: He is not in acute distress.     Appearance: He is well-developed.   HENT:      Head: Normocephalic and atraumatic.   Eyes:      Conjunctiva/sclera: Conjunctivae normal.   Cardiovascular:      Rate and Rhythm: Normal rate and regular rhythm.      Heart sounds: No murmur heard.  Pulmonary:      Effort: Pulmonary effort is normal. No respiratory distress.   Abdominal:      Palpations: Abdomen is soft.      Tenderness: There is no abdominal tenderness.   Musculoskeletal:         General: No swelling.      Cervical back: Neck supple.      Comments:  Left foot surgical wound dressing intact, Right-sided BKA   Skin:     General: Skin is warm and dry.   Neurological:      Mental Status: He is alert.   Psychiatric:         Mood and Affect: Mood normal.          Additional Data:     Labs:  Results from last 7 days   Lab Units 07/15/24  0534   WBC Thousand/uL 8.74   HEMOGLOBIN g/dL 13.7   HEMATOCRIT % 42.0   PLATELETS Thousands/uL 332   SEGS PCT % 48   LYMPHO PCT % 38   MONO PCT  % 9   EOS PCT % 3     Results from last 7 days   Lab Units 07/15/24  0534   SODIUM mmol/L 139   POTASSIUM mmol/L 3.7   CHLORIDE mmol/L 105   CO2 mmol/L 26   BUN mg/dL 20   CREATININE mg/dL 0.78   ANION GAP mmol/L 8   CALCIUM mg/dL 9.4   ALBUMIN g/dL 3.6   TOTAL BILIRUBIN mg/dL 0.31   ALK PHOS U/L 105*   ALT U/L 13   AST U/L 11*   GLUCOSE RANDOM mg/dL 56*         Results from last 7 days   Lab Units 07/16/24  1056 07/16/24  0714 07/15/24  2103 07/15/24  1628 07/15/24  1118 07/15/24  1010 07/15/24  0809 07/15/24  0732 07/14/24  2056 07/14/24  1557 07/14/24  1054 07/14/24  0707   POC GLUCOSE mg/dl 227* 73 112 148* 107 179* 90 63* 190* 104 212* 63*         Results from last 7 days   Lab Units 07/13/24  0502 07/12/24  0542 07/11/24  0559 07/10/24  0519   PROCALCITONIN ng/ml 0.07 0.09 0.08 0.10       Lines/Drains:  Invasive Devices       Peripheral Intravenous Line  Duration             Peripheral IV 07/15/24 Dorsal (posterior);Left;Proximal Forearm <1 day                          Imaging: No pertinent imaging reviewed.    Recent Cultures (last 7 days):   Results from last 7 days   Lab Units 07/11/24  1042 07/09/24  1411   GRAM STAIN RESULT  No Polys or Bacteria seen  No Polys or Bacteria seen Rare Gram negative rods*  Rare Gram positive cocci in pairs*  No polys seen*   WOUND CULTURE   --  2+ Growth of Proteus vulgaris group*  2+ Growth of Methicillin Resistant Staphylococcus aureus*       Last 24 Hours Medication List:   Current Facility-Administered Medications   Medication Dose Route Frequency Provider Last Rate    acetaminophen  650 mg Oral Q6H PRN Javi Meyer DPM      amoxicillin-clavulanate  1 tablet Oral Q12H JASPER Abrams PA-C      aspirin  81 mg Oral Daily Javi Meyer DPM      atorvastatin  80 mg Oral Daily With Dinner Javi Meyer DPM      Cholecalciferol  1,000 Units Oral Daily Jerome M [place], DO      Dakins (full strength)  1 Application Irrigation Daily Javi Meyer, EUGENEM       DAPTOmycin  6 mg/kg (Adjusted) Intravenous Q24H Willie Abrams PA-C      enoxaparin  40 mg Subcutaneous Daily Javi Meyer DPM      insulin glargine  30 Units Subcutaneous HS Abdirahman Jackson DO      insulin lispro  1-6 Units Subcutaneous TID With Meals Abdirahman Jackson,       insulin lispro  10 Units Subcutaneous TID With Meals Jerome Gutierrez,       lisinopril  2.5 mg Oral Daily Javi Meyer DPM      ondansetron  4 mg Intravenous Q6H PRN Javi Meyer DPM      oxyCODONE-acetaminophen  1 tablet Oral Q4H PRN Javi Meyer DPM          Today, Patient Was Seen By: Abdirahman Jackson DO    **Please Note: This note may have been constructed using a voice recognition system.**

## 2024-07-17 LAB
CK SERPL-CCNC: 32 U/L (ref 39–308)
GLUCOSE SERPL-MCNC: 159 MG/DL (ref 65–140)
GLUCOSE SERPL-MCNC: 200 MG/DL (ref 65–140)
GLUCOSE SERPL-MCNC: 219 MG/DL (ref 65–140)
GLUCOSE SERPL-MCNC: 66 MG/DL (ref 65–140)
GLUCOSE SERPL-MCNC: 70 MG/DL (ref 65–140)

## 2024-07-17 PROCEDURE — 82550 ASSAY OF CK (CPK): CPT | Performed by: HOSPITALIST

## 2024-07-17 PROCEDURE — 97116 GAIT TRAINING THERAPY: CPT

## 2024-07-17 PROCEDURE — 97110 THERAPEUTIC EXERCISES: CPT

## 2024-07-17 PROCEDURE — 99232 SBSQ HOSP IP/OBS MODERATE 35: CPT | Performed by: HOSPITALIST

## 2024-07-17 PROCEDURE — 99232 SBSQ HOSP IP/OBS MODERATE 35: CPT | Performed by: PHYSICIAN ASSISTANT

## 2024-07-17 PROCEDURE — 82948 REAGENT STRIP/BLOOD GLUCOSE: CPT

## 2024-07-17 RX ORDER — INSULIN LISPRO 100 [IU]/ML
8 INJECTION, SOLUTION INTRAVENOUS; SUBCUTANEOUS
Status: DISCONTINUED | OUTPATIENT
Start: 2024-07-18 | End: 2024-07-18 | Stop reason: HOSPADM

## 2024-07-17 RX ADMIN — DAPTOMYCIN 500 MG: 500 INJECTION, POWDER, LYOPHILIZED, FOR SOLUTION INTRAVENOUS at 17:34

## 2024-07-17 RX ADMIN — INSULIN LISPRO 2 UNITS: 100 INJECTION, SOLUTION INTRAVENOUS; SUBCUTANEOUS at 11:47

## 2024-07-17 RX ADMIN — ENOXAPARIN SODIUM 40 MG: 40 INJECTION SUBCUTANEOUS at 08:56

## 2024-07-17 RX ADMIN — ASPIRIN 81 MG: 81 TABLET, COATED ORAL at 08:56

## 2024-07-17 RX ADMIN — INSULIN LISPRO 10 UNITS: 100 INJECTION, SOLUTION INTRAVENOUS; SUBCUTANEOUS at 11:47

## 2024-07-17 RX ADMIN — INSULIN GLARGINE 30 UNITS: 100 INJECTION, SOLUTION SUBCUTANEOUS at 21:41

## 2024-07-17 RX ADMIN — ATORVASTATIN CALCIUM 80 MG: 40 TABLET, FILM COATED ORAL at 17:34

## 2024-07-17 RX ADMIN — AMOXICILLIN AND CLAVULANATE POTASSIUM 1 TABLET: 875; 125 TABLET, FILM COATED ORAL at 08:56

## 2024-07-17 RX ADMIN — CHOLECALCIFEROL TAB 25 MCG (1000 UNIT) 1000 UNITS: 25 TAB at 08:56

## 2024-07-17 RX ADMIN — INSULIN LISPRO 10 UNITS: 100 INJECTION, SOLUTION INTRAVENOUS; SUBCUTANEOUS at 08:56

## 2024-07-17 RX ADMIN — AMOXICILLIN AND CLAVULANATE POTASSIUM 1 TABLET: 875; 125 TABLET, FILM COATED ORAL at 21:39

## 2024-07-17 RX ADMIN — LISINOPRIL 2.5 MG: 2.5 TABLET ORAL at 08:56

## 2024-07-17 NOTE — ASSESSMENT & PLAN NOTE
Recent hospitalization from 06/25/2024 through 06/28/2024 for osteomyelitis of the left foot requiring a left 1st metatarsal amputation/resection on 06/27/2024 by Podiatry. Now with a left wound infection and associated cellulitis with probable underlying osteomyelitis. Also found to have maggots in his open wound  I appreciate Infectious Disease's input and Podiatry's input.  Checked a left foot wound culture on 07/09/2024, which is positive for Proteus vulgaris and MRSA  Being treated with IV vancomycin  IV cefepime was added on 07/10/2024 per Infectious Disease's recommendations based on the left foot wound culture result  Changed IV cefepime to IV ceftriaxone on 07/12/2024  Underwent left foot wound debridement and washout by Podiatry on 07/11/2024  Await bone margin pathology results and surgical wound cultures (now available with sensitivities) to determine antibiotic treatment course per   S/p PICC 7/16  Final ID recs  Start PO Augmentin 875mg BID plus IV daptomycin 6mg/kg ABW x 6 weeks from bone resection through 8/21/24  Follow-up on bone margin cultures/path  Follow-up outpatient with ID in 2 weeks - okay for virtual   Weekly CBCd CMP and CK on IV antibiotics  Outpatient f/u with podiatry

## 2024-07-17 NOTE — ASSESSMENT & PLAN NOTE
Lab Results   Component Value Date    HGBA1C 9.7 (H) 06/25/2024       Recent Labs     07/16/24  2043 07/17/24  0722 07/17/24  1102 07/17/24  1609   POCGLU 136 70 219* 66         Blood Sugar Average: Last 72 hrs:  (P) 127.8785505398065137    Developed hypoglycemia on 07/15/2024  Decreased Lantus to 30 Units SQ QHS on 07/14/2024 and decreased Humalog to 8 Units TID with meals on 07/15/2024  ISS with blood glucose monitoring AC  Continue PO lisinopril for renal protection  Hypoglycemia protocol  Outpatient Endocrinology evaluation

## 2024-07-17 NOTE — CASE MANAGEMENT
Case Management Discharge Planning Note    Patient name Art Joseph  Location /424-01 MRN [id number]  : 1960 Date 2024       Current Admission Date: 2024  Current Admission Diagnosis:Open wound of left foot   Patient Active Problem List    Diagnosis Date Noted Date Diagnosed    Osteomyelitis of left foot (HCC) 2024     MRSA colonization 2024     Open wound of left foot 2024     Maggot infestation 2024     Elevated platelet count 2024     Candidiasis, intertrigo 2024     Cellulitis of left lower extremity 2024     Class 1 obesity in adult 2021     Diabetic peripheral angiopathy ([facility]) 2020     Vitamin D deficiency 2019     Ambulatory dysfunction 10/17/2017     Hx of right BKA ([facility]) 2017     Primary hypertension 2017     Hypercholesteremia 2017     Acute osteomyelitis of metatarsal bone of left foot ([facility]) 2017     Type 2 diabetes mellitus with hypoglycemia without coma, with long-term current use of insulin ([facility]) 2017     Diabetic neuropathy ([facility]) 2017       LOS (days): 9  Geometric Mean LOS (GMLOS) (days): 5.6  Days to GMLOS:-3.2     OBJECTIVE:  Risk of Unplanned Readmission Score: 15.11         Current admission status: Inpatient   Preferred Pharmacy:   [facility] [place] - [address]  [address] [address]  Phone: [phone] Fax: [phone]    [facility] - [place] - [address]  [address]  [place] [address]  Phone: [phone] Fax: [phone]    [id number] - [place] - [address]  [address] [facility] [address]  Phone: [phone] Fax: [phone]    Primary Care Provider: Kerry Christine MD    Primary Insurance: AYLA  REP  Secondary Insurance:     DISCHARGE DETAILS:                                                                                                                Facility Insurance Auth Number: [id number]

## 2024-07-17 NOTE — PROGRESS NOTES
"[facility]  Progress Note  Name: Art Joseph I  MRN: [id number]  Unit/Bed#: 424-01 I Date of Admission: 7/8/2024   Date of Service: 7/17/2024 I Hospital Day: 9    Assessment & Plan   * Open wound of left foot  Assessment & Plan  Recent hospitalization from 06/25/2024 through 06/28/2024 for osteomyelitis of the left foot requiring a left 1st metatarsal amputation/resection on 06/27/2024 by Podiatry. Now with a left wound infection and associated cellulitis with probable underlying osteomyelitis. Also found to have maggots in his open wound  I appreciate Infectious Disease's input and Podiatry's input.  Checked a left foot wound culture on 07/09/2024, which is positive for Proteus vulgaris and MRSA  Being treated with IV vancomycin  IV cefepime was added on 07/10/2024 per Infectious Disease's recommendations based on the left foot wound culture result  Changed IV cefepime to IV ceftriaxone on 07/12/2024  Underwent left foot wound debridement and washout by Podiatry on 07/11/2024  Await bone margin pathology results and surgical wound cultures (now available with sensitivities) to determine antibiotic treatment course per   S/p PICC 7/16  Final ID recs  Start PO Augmentin 875mg BID plus IV daptomycin 6mg/kg ABW x 6 weeks from bone resection through 8/21/24  Follow-up on bone margin cultures/path  Follow-up outpatient with ID in 2 weeks - okay for virtual   Weekly CBCd CMP and CK on IV antibiotics  Outpatient f/u with podiatry     Osteomyelitis of left foot (HCC)  Assessment & Plan  Please see the assessment and plan for \"Open wound of left foot\"    Type 2 diabetes mellitus with hypoglycemia without coma, with long-term current use of insulin (HCC)  Assessment & Plan  Lab Results   Component Value Date    HGBA1C 9.7 (H) 06/25/2024       Recent Labs     07/16/24  2043 07/17/24  0722 07/17/24  1102 07/17/24  1609   POCGLU 136 70 219* 66         Blood Sugar Average: Last 72 hrs:  (P) " "127.4757997119871869    Developed hypoglycemia on 07/15/2024  Decreased Lantus to 30 Units SQ QHS on 07/14/2024 and decreased Humalog to 8 Units TID with meals on 07/15/2024  ISS with blood glucose monitoring AC  Continue PO lisinopril for renal protection  Hypoglycemia protocol  Outpatient Endocrinology evaluation    MRSA colonization  Assessment & Plan  Utilize the MRSA decolonization protocol    Maggot infestation  Assessment & Plan  Please see the assessment and plan for \"Open wound of left foot\"      Vitamin D deficiency  Assessment & Plan  Utilize cholecalciferol 1000 I.U. PO Qdaily  Outpatient follow-up with PCP in regards to this matter    Hypercholesteremia  Assessment & Plan  Continue statin therapy    Primary hypertension  Assessment & Plan  Continue PO lisinopril  Follow the blood pressure trend    Ambulatory dysfunction  Assessment & Plan  PT/OT evaluations    Hx of right BKA (HCC)  Assessment & Plan  Outpatient follow-up with Vascular Surgery               VTE Pharmacologic Prophylaxis: VTE Score: 8 High Risk (Score >/= 5) - Pharmacological DVT Prophylaxis Ordered: enoxaparin (Lovenox). Sequential Compression Devices Ordered.    Mobility:   Basic Mobility Inpatient Raw Score: 19  JH-HLM Goal: 6: Walk 10 steps or more  JH-HLM Achieved: 7: Walk 25 feet or more  JH-HLM Goal achieved. Continue to encourage appropriate mobility.    Patient Centered Rounds: I performed bedside rounds with nursing staff today.   Discussions with Specialists or Other Care Team Provider: none    Education and Discussions with Family / Patient: Updated  (brother) at bedside.    Total Time Spent on Date of Encounter in care of patient: 28 mins. This time was spent on one or more of the following: performing physical exam; counseling and coordination of care; obtaining or reviewing history; documenting in the medical record; reviewing/ordering tests, medications or procedures; communicating with other healthcare " professionals and discussing with patient's family/caregivers.    Current Length of Stay: 9 day(s)  Current Patient Status: Inpatient   Certification Statement: The patient will continue to require additional inpatient hospital stay due to osteo  Discharge Plan: Anticipate discharge tomorrow to rehab facility.    Code Status: Level 1 - Full Code    Subjective:   Patient feels well, no complaints    Objective:     Vitals:   Temp (24hrs), Av.2 °F (36.8 °C), Min:98.1 °F (36.7 °C), Max:98.4 °F (36.9 °C)    Temp:  [98.1 °F (36.7 °C)-98.4 °F (36.9 °C)] 98.1 °F (36.7 °C)  HR:  [72-76] 73  Resp:  [16-18] 16  BP: (137-141)/(77-81) 139/77  SpO2:  [91 %-95 %] 94 %  Body mass index is 30.91 kg/m².     Input and Output Summary (last 24 hours):     Intake/Output Summary (Last 24 hours) at 2024 1637  Last data filed at 2024 1630  Gross per 24 hour   Intake 1560 ml   Output 2775 ml   Net -1215 ml       Physical Exam:   Physical Exam  Vitals and nursing note reviewed.   Constitutional:       General: He is not in acute distress.     Appearance: He is well-developed.   HENT:      Head: Normocephalic and atraumatic.   Eyes:      Conjunctiva/sclera: Conjunctivae normal.   Cardiovascular:      Rate and Rhythm: Normal rate and regular rhythm.      Heart sounds: No murmur heard.  Pulmonary:      Effort: Pulmonary effort is normal. No respiratory distress.   Abdominal:      Palpations: Abdomen is soft.      Tenderness: There is no abdominal tenderness.   Musculoskeletal:         General: No swelling.      Cervical back: Neck supple.      Comments:  Left foot surgical wound dressing intact, Right-sided BKA   Skin:     General: Skin is warm and dry.   Neurological:      Mental Status: He is alert.   Psychiatric:         Mood and Affect: Mood normal.          Additional Data:     Labs:  Results from last 7 days   Lab Units 07/15/24  0534   WBC Thousand/uL 8.74   HEMOGLOBIN g/dL 13.7   HEMATOCRIT % 42.0   PLATELETS Thousands/uL  332   SEGS PCT % 48   LYMPHO PCT % 38   MONO PCT % 9   EOS PCT % 3     Results from last 7 days   Lab Units 07/15/24  0534   SODIUM mmol/L 139   POTASSIUM mmol/L 3.7   CHLORIDE mmol/L 105   CO2 mmol/L 26   BUN mg/dL 20   CREATININE mg/dL 0.78   ANION GAP mmol/L 8   CALCIUM mg/dL 9.4   ALBUMIN g/dL 3.6   TOTAL BILIRUBIN mg/dL 0.31   ALK PHOS U/L 105*   ALT U/L 13   AST U/L 11*   GLUCOSE RANDOM mg/dL 56*         Results from last 7 days   Lab Units 07/17/24  1609 07/17/24  1102 07/17/24  0722 07/16/24  2043 07/16/24  1612 07/16/24  1056 07/16/24  0714 07/15/24  2103 07/15/24  1628 07/15/24  1118 07/15/24  1010 07/15/24  0809   POC GLUCOSE mg/dl 66 219* 70 136 111 227* 73 112 148* 107 179* 90         Results from last 7 days   Lab Units 07/13/24  0502 07/12/24  0542 07/11/24  0559   PROCALCITONIN ng/ml 0.07 0.09 0.08       Lines/Drains:  Invasive Devices       Peripherally Inserted Central Catheter Line  Duration             PICC Line 07/16/24 Right Basilic <1 day              Peripheral Intravenous Line  Duration             Peripheral IV 07/15/24 Dorsal (posterior);Left;Proximal Forearm 2 days                    Central Line:  Goal for removal: N/A - Discharging with PICC for IV ABX/medications             Imaging: No pertinent imaging reviewed.    Recent Cultures (last 7 days):   Results from last 7 days   Lab Units 07/11/24  1042   GRAM STAIN RESULT  No Polys or Bacteria seen  No Polys or Bacteria seen       Last 24 Hours Medication List:   Current Facility-Administered Medications   Medication Dose Route Frequency Provider Last Rate    acetaminophen  650 mg Oral Q6H PRN Javi Meyer DPM      amoxicillin-clavulanate  1 tablet Oral Q12H [facility] Willie Abrams PA-C      aspirin  81 mg Oral Daily Javi Meyer DPM      atorvastatin  80 mg Oral Daily With Dinner Javi Meyer DPM      Cholecalciferol  1,000 Units Oral Daily Jeffrey M Hostetter, DO Dakins (full strength)  1 Application Irrigation Daily  Javi Meyer DPM      DAPTOmycin  6 mg/kg (Adjusted) Intravenous Q24H Willie Abrams PA-C 500 mg (07/16/24 1726)    enoxaparin  40 mg Subcutaneous Daily Javi Meyer DPM      insulin glargine  30 Units Subcutaneous HS Abdirahman Jackson DO      insulin lispro  1-6 Units Subcutaneous TID With Meals Abdirahman Jackson DO      [START ON 7/18/2024] insulin lispro  8 Units Subcutaneous TID With Meals Abdirahman Jackson DO      lisinopril  2.5 mg Oral Daily Javi Meyer DPM      ondansetron  4 mg Intravenous Q6H PRN Javi Meyer DPM      oxyCODONE-acetaminophen  1 tablet Oral Q4H PRN Javi Meyer DPM          Today, Patient Was Seen By: Abdirahman Jackson DO    **Please Note: This note may have been constructed using a voice recognition system.**

## 2024-07-17 NOTE — PLAN OF CARE
Problem: METABOLIC, FLUID AND ELECTROLYTES - ADULT  Goal: Electrolytes maintained within normal limits  Description: INTERVENTIONS:  - Monitor labs and assess patient for signs and symptoms of electrolyte imbalances  - Administer electrolyte replacement as ordered  - Monitor response to electrolyte replacements, including repeat lab results as appropriate  - Instruct patient on fluid and nutrition as appropriate  Outcome: Progressing  Goal: Glucose maintained within target range  Description: INTERVENTIONS:  - Monitor Blood Glucose as ordered  - Assess for signs and symptoms of hyperglycemia and hypoglycemia  - Administer ordered medications to maintain glucose within target range  - Assess nutritional intake and initiate nutrition service referral as needed  Outcome: Progressing     Problem: SKIN/TISSUE INTEGRITY - ADULT  Goal: Skin Integrity remains intact(Skin Breakdown Prevention)  Description: Assess:  -Perform Zaid assessment every 4 hrs  -Clean and moisturize skin every 4 hrs  -Inspect skin when repositioning, toileting, and assisting with ADLS  -Assess under medical devices such as mosimo every 4 hrs  -Assess extremities for adequate circulation and sensation     Bed Management:  -Have minimal linens on bed & keep smooth, unwrinkled  -Change linens as needed when moist or perspiring  -Avoid sitting or lying in one position for more than 4 hours while in bed  -Keep HOB at 30degrees     Toileting:  -Offer bedside commode  -Assess for incontinence every 4 hrs  -Use incontinent care products after each incontinent episode such as lotion and spray    Activity:  -Mobilize patient 3 times a day  -Encourage activity and walks on unit  -Encourage or provide ROM exercises   -Turn and reposition patient every 4 Hours  -Use appropriate equipment to lift or move patient in bed  -Instruct/ Assist with weight shifting every 4hrs when out of bed in chair  -Consider limitation of chair time 2 hour intervals    Skin  Care:  -Avoid use of baby powder, tape, friction and shearing, hot water or constrictive clothing  -Relieve pressure over bony prominences using lotion  -Do not massage red bony areas    Next Steps:  -Teach patient strategies to minimize risks such as getting oob   -Consider consults to  interdisciplinary teams such as pt/ot  Outcome: Progressing  Goal: Incision(s), wounds(s) or drain site(s) healing without S/S of infection  Description: INTERVENTIONS  - Assess and document dressing, incision, wound bed, drain sites and surrounding tissue  - Provide patient and family education  - Perform skin care/dressing changes every 4 hrs  Outcome: Progressing     Problem: MUSCULOSKELETAL - ADULT  Goal: Maintain or return mobility to safest level of function  Description: INTERVENTIONS:  - Assess patient's ability to carry out ADLs; assess patient's baseline for ADL function and identify physical deficits which impact ability to perform ADLs (bathing, care of mouth/teeth, toileting, grooming, dressing, etc.)  - Assess/evaluate cause of self-care deficits   - Assess range of motion  - Assess patient's mobility  - Assess patient's need for assistive devices and provide as appropriate  - Encourage maximum independence but intervene and supervise when necessary  - Involve family in performance of ADLs  - Assess for home care needs following discharge   - Consider OT consult to assist with ADL evaluation and planning for discharge  - Provide patient education as appropriate  Outcome: Progressing  Goal: Maintain proper alignment of affected body part  Description: INTERVENTIONS:  - Support, maintain and protect limb and body alignment  - Provide patient/ family with appropriate education  Outcome: Progressing     Problem: PAIN - ADULT  Goal: Verbalizes/displays adequate comfort level or baseline comfort level  Description: Interventions:  - Encourage patient to monitor pain and request assistance  - Assess pain using appropriate  pain scale  - Administer analgesics based on type and severity of pain and evaluate response  - Implement non-pharmacological measures as appropriate and evaluate response  - Consider cultural and social influences on pain and pain management  - Notify physician/advanced practitioner if interventions unsuccessful or patient reports new pain  Outcome: Progressing     Problem: INFECTION - ADULT  Goal: Absence or prevention of progression during hospitalization  Description: INTERVENTIONS:  - Assess and monitor for signs and symptoms of infection  - Monitor lab/diagnostic results  - Monitor all insertion sites, i.e. indwelling lines, tubes, and drains  - Monitor endotracheal if appropriate and nasal secretions for changes in amount and color  - [place] appropriate cooling/warming therapies per order  - Administer medications as ordered  - Instruct and encourage patient and family to use good hand hygiene technique  - Identify and instruct in appropriate isolation precautions for identified infection/condition  Outcome: Progressing     Problem: SAFETY ADULT  Goal: Patient will remain free of falls  Description: INTERVENTIONS:  - Educate patient/family on patient safety including physical limitations  - Instruct patient to call for assistance with activity   - Consult OT/PT to assist with strengthening/mobility   - Keep Call bell within reach  - Keep bed low and locked with side rails adjusted as appropriate  - Keep care items and personal belongings within reach  - Initiate and maintain comfort rounds  - Make Fall Risk Sign visible to staff  - Offer Toileting every 2 Hours, in advance of need  - Initiate/Maintain bed/chair alarm  - Obtain necessary fall risk management equipment: alarms  - Apply yellow socks and bracelet for high fall risk patients  - Consider moving patient to room near nurses station  Outcome: Progressing     Problem: DISCHARGE PLANNING  Goal: Discharge to home or other facility with appropriate  resources  Description: INTERVENTIONS:  - Identify barriers to discharge w/patient and caregiver  - Arrange for needed discharge resources and transportation as appropriate  - Identify discharge learning needs (meds, wound care, etc.)  - Arrange for interpretive services to assist at discharge as needed  - Refer to Case Management Department for coordinating discharge planning if the patient needs post-hospital services based on physician/advanced practitioner order or complex needs related to functional status, cognitive ability, or social support system  Outcome: Progressing     Problem: Knowledge Deficit  Goal: Patient/family/caregiver demonstrates understanding of disease process, treatment plan, medications, and discharge instructions  Description: Complete learning assessment and assess knowledge base.  Interventions:  - Provide teaching at level of understanding  - Provide teaching via preferred learning methods  Outcome: Progressing     Problem: Prexisting or High Potential for Compromised Skin Integrity  Goal: Skin integrity is maintained or improved  Description: INTERVENTIONS:  - Identify patients at risk for skin breakdown  - Assess and monitor skin integrity  - Assess and monitor nutrition and hydration status  - Monitor labs   - Assess for incontinence   - Turn and reposition patient  - Assist with mobility/ambulation  - Relieve pressure over bony prominences  - Avoid friction and shearing  - Provide appropriate hygiene as needed including keeping skin clean and dry  - Evaluate need for skin moisturizer/barrier cream  - Collaborate with interdisciplinary team   - Patient/family teaching  - Consider wound care consult   Outcome: Progressing     Problem: Nutrition/Hydration-ADULT  Goal: Nutrient/Hydration intake appropriate for improving, restoring or maintaining nutritional needs  Description: Monitor and assess patient's nutrition/hydration status for malnutrition. Collaborate with interdisciplinary  team and initiate plan and interventions as ordered.  Monitor patient's weight and dietary intake as ordered or per policy. Utilize nutrition screening tool and intervene as necessary. Determine patient's food preferences and provide high-protein, high-caloric foods as appropriate.     INTERVENTIONS:  - Monitor oral intake, urinary output, labs, and treatment plans  - Assess nutrition and hydration status and recommend course of action  - Evaluate amount of meals eaten  - Assist patient with eating if necessary   - Allow adequate time for meals  - Recommend/ encourage appropriate diets, oral nutritional supplements, and vitamin/mineral supplements  - Order, calculate, and assess calorie counts as needed  - Recommend, monitor, and adjust tube feedings and TPN/PPN based on assessed needs  - Assess need for intravenous fluids  - Provide specific nutrition/hydration education as appropriate  - Include patient/family/caregiver in decisions related to nutrition  Outcome: Progressing

## 2024-07-17 NOTE — CASE MANAGEMENT
[facility] received request for authorization from Care Manager.  Authorization request submitted for: SNF  Facility Name: [facility] NPI: [id number]  Facility MD: Aaron Lopez NPI: [id number]  Authorization initiated by contacting insurance: [employer]   Via: [employer] Portal   Clinicals submitted via [employer] attachment   Pending Reference #: [id number]    Care Manager notified: Marianela De Los Santos     Updates to authorization status will be noted in chart. Please reach out to CM for updates on any clinical information.

## 2024-07-17 NOTE — PLAN OF CARE
Problem: METABOLIC, FLUID AND ELECTROLYTES - ADULT  Goal: Electrolytes maintained within normal limits  Description: INTERVENTIONS:  - Monitor labs and assess patient for signs and symptoms of electrolyte imbalances  - Administer electrolyte replacement as ordered  - Monitor response to electrolyte replacements, including repeat lab results as appropriate  - Instruct patient on fluid and nutrition as appropriate  Outcome: Progressing  Goal: Glucose maintained within target range  Description: INTERVENTIONS:  - Monitor Blood Glucose as ordered  - Assess for signs and symptoms of hyperglycemia and hypoglycemia  - Administer ordered medications to maintain glucose within target range  - Assess nutritional intake and initiate nutrition service referral as needed  Outcome: Progressing     Problem: SKIN/TISSUE INTEGRITY - ADULT  Goal: Skin Integrity remains intact(Skin Breakdown Prevention)  Description: Assess:  -Perform Zaid assessment every 4 hrs  -Clean and moisturize skin every 4 hrs  -Inspect skin when repositioning, toileting, and assisting with ADLS  -Assess under medical devices such as mosimo every 4 hrs  -Assess extremities for adequate circulation and sensation     Bed Management:  -Have minimal linens on bed & keep smooth, unwrinkled  -Change linens as needed when moist or perspiring  -Avoid sitting or lying in one position for more than 4 hours while in bed  -Keep HOB at 30degrees     Toileting:  -Offer bedside commode  -Assess for incontinence every 4 hrs  -Use incontinent care products after each incontinent episode such as lotion and spray    Activity:  -Mobilize patient 3 times a day  -Encourage activity and walks on unit  -Encourage or provide ROM exercises   -Turn and reposition patient every 4 Hours  -Use appropriate equipment to lift or move patient in bed  -Instruct/ Assist with weight shifting every 4hrs when out of bed in chair  -Consider limitation of chair time 2 hour intervals    Skin  Care:  -Avoid use of baby powder, tape, friction and shearing, hot water or constrictive clothing  -Relieve pressure over bony prominences using lotion  -Do not massage red bony areas    Next Steps:  -Teach patient strategies to minimize risks such as getting oob   -Consider consults to  interdisciplinary teams such as pt/ot  Outcome: Progressing  Goal: Incision(s), wounds(s) or drain site(s) healing without S/S of infection  Description: INTERVENTIONS  - Assess and document dressing, incision, wound bed, drain sites and surrounding tissue  - Provide patient and family education  - Perform skin care/dressing changes every 4 hrs  Outcome: Progressing     Problem: MUSCULOSKELETAL - ADULT  Goal: Maintain or return mobility to safest level of function  Description: INTERVENTIONS:  - Assess patient's ability to carry out ADLs; assess patient's baseline for ADL function and identify physical deficits which impact ability to perform ADLs (bathing, care of mouth/teeth, toileting, grooming, dressing, etc.)  - Assess/evaluate cause of self-care deficits   - Assess range of motion  - Assess patient's mobility  - Assess patient's need for assistive devices and provide as appropriate  - Encourage maximum independence but intervene and supervise when necessary  - Involve family in performance of ADLs  - Assess for home care needs following discharge   - Consider OT consult to assist with ADL evaluation and planning for discharge  - Provide patient education as appropriate  Outcome: Progressing  Goal: Maintain proper alignment of affected body part  Description: INTERVENTIONS:  - Support, maintain and protect limb and body alignment  - Provide patient/ family with appropriate education  Outcome: Progressing     Problem: PAIN - ADULT  Goal: Verbalizes/displays adequate comfort level or baseline comfort level  Description: Interventions:  - Encourage patient to monitor pain and request assistance  - Assess pain using appropriate  pain scale  - Administer analgesics based on type and severity of pain and evaluate response  - Implement non-pharmacological measures as appropriate and evaluate response  - Consider cultural and social influences on pain and pain management  - Notify physician/advanced practitioner if interventions unsuccessful or patient reports new pain  Outcome: Progressing     Problem: INFECTION - ADULT  Goal: Absence or prevention of progression during hospitalization  Description: INTERVENTIONS:  - Assess and monitor for signs and symptoms of infection  - Monitor lab/diagnostic results  - Monitor all insertion sites, i.e. indwelling lines, tubes, and drains  - Monitor endotracheal if appropriate and nasal secretions for changes in amount and color  - [place] appropriate cooling/warming therapies per order  - Administer medications as ordered  - Instruct and encourage patient and family to use good hand hygiene technique  - Identify and instruct in appropriate isolation precautions for identified infection/condition  Outcome: Progressing     Problem: SAFETY ADULT  Goal: Patient will remain free of falls  Description: INTERVENTIONS:  - Educate patient/family on patient safety including physical limitations  - Instruct patient to call for assistance with activity   - Consult OT/PT to assist with strengthening/mobility   - Keep Call bell within reach  - Keep bed low and locked with side rails adjusted as appropriate  - Keep care items and personal belongings within reach  - Initiate and maintain comfort rounds  - Make Fall Risk Sign visible to staff  - Offer Toileting every 2 Hours, in advance of need  - Initiate/Maintain bed/chair alarm  - Obtain necessary fall risk management equipment: alarms  - Apply yellow socks and bracelet for high fall risk patients  - Consider moving patient to room near nurses station  Outcome: Progressing     Problem: DISCHARGE PLANNING  Goal: Discharge to home or other facility with appropriate  resources  Description: INTERVENTIONS:  - Identify barriers to discharge w/patient and caregiver  - Arrange for needed discharge resources and transportation as appropriate  - Identify discharge learning needs (meds, wound care, etc.)  - Arrange for interpretive services to assist at discharge as needed  - Refer to Case Management Department for coordinating discharge planning if the patient needs post-hospital services based on physician/advanced practitioner order or complex needs related to functional status, cognitive ability, or social support system  Outcome: Progressing     Problem: Knowledge Deficit  Goal: Patient/family/caregiver demonstrates understanding of disease process, treatment plan, medications, and discharge instructions  Description: Complete learning assessment and assess knowledge base.  Interventions:  - Provide teaching at level of understanding  - Provide teaching via preferred learning methods  Outcome: Progressing     Problem: Prexisting or High Potential for Compromised Skin Integrity  Goal: Skin integrity is maintained or improved  Description: INTERVENTIONS:  - Identify patients at risk for skin breakdown  - Assess and monitor skin integrity  - Assess and monitor nutrition and hydration status  - Monitor labs   - Assess for incontinence   - Turn and reposition patient  - Assist with mobility/ambulation  - Relieve pressure over bony prominences  - Avoid friction and shearing  - Provide appropriate hygiene as needed including keeping skin clean and dry  - Evaluate need for skin moisturizer/barrier cream  - Collaborate with interdisciplinary team   - Patient/family teaching  - Consider wound care consult   Outcome: Progressing     Problem: Nutrition/Hydration-ADULT  Goal: Nutrient/Hydration intake appropriate for improving, restoring or maintaining nutritional needs  Description: Monitor and assess patient's nutrition/hydration status for malnutrition. Collaborate with interdisciplinary  team and initiate plan and interventions as ordered.  Monitor patient's weight and dietary intake as ordered or per policy. Utilize nutrition screening tool and intervene as necessary. Determine patient's food preferences and provide high-protein, high-caloric foods as appropriate.     INTERVENTIONS:  - Monitor oral intake, urinary output, labs, and treatment plans  - Assess nutrition and hydration status and recommend course of action  - Evaluate amount of meals eaten  - Assist patient with eating if necessary   - Allow adequate time for meals  - Recommend/ encourage appropriate diets, oral nutritional supplements, and vitamin/mineral supplements  - Order, calculate, and assess calorie counts as needed  - Recommend, monitor, and adjust tube feedings and TPN/PPN based on assessed needs  - Assess need for intravenous fluids  - Provide specific nutrition/hydration education as appropriate  - Include patient/family/caregiver in decisions related to nutrition  Outcome: Progressing

## 2024-07-17 NOTE — PHYSICAL THERAPY NOTE
PHYSICAL THERAPY NOTE          Patient Name: Art Joseph  Today's Date: 7/17/2024 07/17/24 1001   PT Last Visit   PT Visit Date 07/17/24   Note Type   Note Type Treatment   Pain Assessment   Pain Assessment Tool 0-10   Pain Score No Pain   Restrictions/Precautions   Weight Bearing Precautions Per Order Yes   LLE Weight Bearing Per Order WBAT   Braces or Orthoses   (R prosthetic; L surgical shoe)   Other Precautions   (Fall Risk; WBS; Chair Alarm; Bed Alarm)   General   Chart Reviewed Yes   Response to Previous Treatment Patient with no complaints from previous session.   Family/Caregiver Present No   Cognition   Overall Cognitive Status WFL   Arousal/Participation Alert;Cooperative   Attention Within functional limits   Orientation Level Oriented X4   Following Commands Follows all commands and directions without difficulty   Subjective   Subjective Agreeable to therapy   Bed Mobility   Supine to Sit 5  Supervision   Additional items HOB elevated;Bedrails;Increased time required   Additional Comments Sat EOB with Good sitting balance. No c/o dizziness with transitional movements.   Transfers   Sit to Stand 5  Supervision   Additional items Armrests;Increased time required;Verbal cues   Stand to Sit 5  Supervision   Additional items Armrests;Increased time required;Verbal cues   Stand pivot 5  Supervision   Additional items Increased time required   Additional Comments RW used   Ambulation/Elevation   Gait pattern   (Short stride; Foward flexed; Decreased foot clearance; Improper Weight shift)   Gait Assistance 5  Supervision   Additional items Assist x 1;Verbal cues   Assistive Device Rolling walker   Distance 125' x 2   Balance   Static Sitting Good   Dynamic Sitting Good   Static Standing Fair   Dynamic Standing Fair   Ambulatory Fair -  (RW)   Endurance Deficit   Endurance Deficit Yes   Activity Tolerance   Activity Tolerance  Patient limited by fatigue   Exercises   Quad Sets Sitting;20 reps;AROM;Bilateral   Hip Flexion Sitting;20 reps;AROM;Bilateral   Hip Abduction Sitting;20 reps;AROM;Bilateral   Hip Adduction Sitting;20 reps;AROM;Bilateral   Knee AROM Long Arc Quad Sitting;20 reps;AROM;Bilateral   Ankle Pumps Sitting;20 reps;AROM;Bilateral   Marching Sitting;20 reps;AROM;Bilateral   Assessment   Prognosis Good   Problem List   (Decreased strength; Decreased endurance; Impaired balance; Decreased mobility; Decreased skin integrity; Orthopedic restrictions)   Assessment Pt seen this date for PT treatment session to increase level of mobility and functional activity tolerance. PT treatment session this date consisting of  therapeutic exercises, bed mobility, transfers and  gait training w/ emphasis on improving pt's ability to ambulate. Pt. Currently performing  tx and ambulation at SUP x 1 with RW.  In comparison to previous session, Pt. With improvement activity tolerance. Pt is in need of continued activity in PT to improve strength balance endurance mobility transfers and ambulation with return to maximize LOF. From PT/mobility standpoint, recommendation at time of d/c would be level II: moderate resource intensity   in order to promote return to PLOF and independence.  The patient's AM-PAC Basic Mobility Inpatient Short Form Raw Score is 20. A Raw score of greater than 16 suggests the patient may benefit from discharge to home. Please also refer to the recommendation of the Physical Therapist for safe discharge planning. Pt continues to be functioning below baseline level. PT will continue to see pt during current hospitalization in order to address the deficits listed above and provide interventions consistent w/ POC in effort to achieve STGs.   Goals   Patient Goals to go home   LTG Expiration Date 07/25/24   PT Treatment Day 5   Plan   Treatment/Interventions Functional transfer training;LE strengthening/ROM;Therapeutic  exercise;Endurance training;Bed mobility;Gait training;Spoke to nursing   Progress Progressing toward goals   PT Frequency 3-5x/wk   Discharge Recommendation   Rehab Resource Intensity Level, PT II (Moderate Resource Intensity)   AM-PAC Basic Mobility Inpatient   Turning in Flat Bed Without Bedrails 4   Lying on Back to Sitting on Edge of Flat Bed Without Bedrails 3   Moving Bed to Chair 3   Standing Up From Chair Using Arms 4   Walk in Room 3   Climb 3-5 Stairs With Railing 3   Basic Mobility Inpatient Raw Score 20   Basic Mobility Standardized Score 43.99   [facility] Highest Level Of Mobility   -HLM Goal 6: Walk 10 steps or more   -HLM Achieved 7: Walk 25 feet or more   Education   Education Provided Mobility training   Patient Demonstrates verbal understanding   End of Consult   Patient Position at End of Consult Supine;Bed/Chair alarm activated;All needs within reach   End of Consult Comments discussed POC with Pt

## 2024-07-17 NOTE — PROGRESS NOTES
Progress Note - [facility] Infectious Disease   Art Joseph 64 y.o. male MRN: [id number]  Unit/Bed#: 424-01 Encounter: [id number]      VIRTUAL CARE DOCUMENTATION:     1. This service was provided via Telemedicine using [employer] Interactive Kit     2. Parties in the room with patient during teleconsult Patient only    3. Confidentiality My office door was closed     4. Participants No one else was in the room    5. Patient acknowledged consent and understanding of privacy and security of the  Telemedicine consult. I informed the patient that I have reviewed their record in Epic and presented the opportunity for them to ask any questions regarding the visit today.  The patient agreed to participate.    6. Time spent 4 minutes communication with the patient via telehealth.       IMPRESSION & RECOMMENDATIONS:   1.  Persistent left foot cellulitis, with abscess and osteomyelitis, secondary to recurrent wound dehiscence.  Patient remains clinically and systemically well, without evidence of sepsis or systemic toxicity.  Wound culture with growth of MRSA ([R] to minocycline) and Proteus.  Patient is status post left foot I&D, with possible residual osteomyelitis clinically.  Bone margin was sent for pathology.  Operative cultures isolated MRSA. D/w podiatry, will plan for long term course of abx. Unfortunately no safe oral regimen available and patient will need IV plus PO abx course. Regardless, he unfortunately remains high risk for foot loss.  s/p PICC placement yesterday in [facility].   -Continue PO Augmentin 875mg BID plus IV daptomycin 6mg/kg ABW x 6 weeks from bone resection through 8/21/24  -Follow-up on bone margin pathology  -Follow-up outpatient with me in 2 weeks alli 7/29 in [place] office   -Weekly CBCd CMP and CK while on IV abx   -D/C PICC after last dose of IV abx   -Outpatient f/u with podiatry      2.  Recurrent left foot wound dehiscence, despite recent I&D and first metatarsal head resection for surgical cure.   Patient is status post I&D, as in above.  Wound care per podiatry.     3.  Maggot infestation of wound bed, likely secondary to presence of necrotic tissue.  Will likely need additional I&D.  I&D plan per podiatry.     4.  DM, poorly controlled, with hyperglycemia and elevated hemoglobin A1c.  This is risk factor for poor wound healing and infection above.  Management per primary service.     5.  PVD.    Antibiotics:  D2 IV dapto  D2 PO augmentin  D10 abx     I have discussed the above management plan in detail with patient.     Above plan discussed in detail with Dr. Jackson who is in agreement with plan to continue IV/PO abx regimen as above.     24 Hour events:  Yesterday and overnight notes reviewed. S/p PICC RUE in IR yesterday. Awaiting d/c to rehab.     Subjective:  Patient has no fever, chills, sweats; no nausea, vomiting, diarrhea; no cough, shortness of breath; no pain. No new symptoms.    Objective:  Vitals:  Temp:  [98.2 °F (36.8 °C)-98.5 °F (36.9 °C)] 98.2 °F (36.8 °C)  HR:  [72-76] 76  Resp:  [16-18] 18  BP: (125-141)/(62-81) 137/79  SpO2:  [91 %-95 %] 91 %  Temp (24hrs), Av.4 °F (36.9 °C), Min:98.2 °F (36.8 °C), Max:98.5 °F (36.9 °C)  Current: Temperature: 98.2 °F (36.8 °C)    PHYSICAL EXAM:    Physical exam findings reported by bedside and primary medical team staff.    General Appearance:  Appearing well, nontoxic, and in no distress, eating lunch sitting at edge of bed. In good spirits.    HEENT: Normocephalic, without obvious abnormality, atraumatic. Conjunctiva pink and sclera anicteric. Oropharynx moist without lesions.     Lungs:   Clear to auscultation bilaterally, respirations unlabored   Heart:  RRR; no murmur, rub or gallop   Abdomen:   Soft, non-tender, non-distended, positive bowel sounds    Extremities: No cyanosis, clubbing or edema. R BKA.    Musculoskeletal: Back symmetric without curvature, ROM normal.    : No CVA or suprapubic tenderness.     Skin: No rashes or lesions. L foot  dressing c/d/I. RUE PICC intact without evidence of erythema, warmth, or exudate.        LABS, IMAGING, & OTHER STUDIES:  Extensive review of the medical records in epic including review of the notes, radiographs, and laboratory results were reviewed personally as below.     Lab Results:  Results from last 7 days   Lab Units 07/15/24  0534 07/14/24  0454 07/13/24  0502   WBC Thousand/uL 8.74 9.02 8.66   HEMOGLOBIN g/dL 13.7 13.3 12.9   PLATELETS Thousands/uL 332 317 328     Results from last 7 days   Lab Units 07/15/24  0534 07/14/24  0454 07/13/24  0502   SODIUM mmol/L 139 139 140   POTASSIUM mmol/L 3.7 4.0 3.7   CHLORIDE mmol/L 105 106 106   CO2 mmol/L 26 24 25   BUN mg/dL 20 18 16   CREATININE mg/dL 0.78 0.76 0.74   EGFR ml/min/1.73sq m 94 95 96   CALCIUM mg/dL 9.4 9.5 9.3   AST U/L 11* 11* 12*   ALT U/L 13 13 12   ALK PHOS U/L 105* 110* 113*     Results from last 7 days   Lab Units 07/11/24  1042   GRAM STAIN RESULT  No Polys or Bacteria seen  No Polys or Bacteria seen     Results from last 7 days   Lab Units 07/13/24  0502 07/12/24  0542 07/11/24  0559   PROCALCITONIN ng/ml 0.07 0.09 0.08                   Lab interpretation/comments: low normal CK      Culture data: no new growth on wound/OR cx     Imaging Studies:   Imaging interpretation/comments: none to review

## 2024-07-17 NOTE — CASE MANAGEMENT
[facility] has received APPROVED authorization.  Insurance: [employer]   Called in by Rep: Gabby BUSH#: [phone]   Authorization received for: SNF  Facility: [facility]  Authorization #: [id number]   Start of Care: 07/17  Next Review Date: 07/29  Continued Stay Care Coordinator: Gabby BUSH#: [phone]   Submit next review to F#: [phone]     Care Manager notified: Marianela De Los Santos     Please reach out to CM for updates on any clinical information.

## 2024-07-17 NOTE — PLAN OF CARE
Problem: PHYSICAL THERAPY ADULT  Goal: Performs mobility at highest level of function for planned discharge setting.  See evaluation for individualized goals.  Description: Treatment/Interventions: Functional transfer training, LE strengthening/ROM, Therapeutic exercise, Endurance training, Bed mobility, Gait training          See flowsheet documentation for full assessment, interventions and recommendations.  Outcome: Progressing  Note: Prognosis: Good  Problem List:  (Decreased strength; Decreased endurance; Impaired balance; Decreased mobility; Decreased skin integrity; Orthopedic restrictions)  Assessment: Pt seen this date for PT treatment session to increase level of mobility and functional activity tolerance. PT treatment session this date consisting of  therapeutic exercises, bed mobility, transfers and  gait training w/ emphasis on improving pt's ability to ambulate. Pt. Currently performing  tx and ambulation at SUP x 1 with RW.  In comparison to previous session, Pt. With improvement activity tolerance. Pt is in need of continued activity in PT to improve strength balance endurance mobility transfers and ambulation with return to maximize LOF. From PT/mobility standpoint, recommendation at time of d/c would be level II: moderate resource intensity   in order to promote return to PLOF and independence.  The patient's AM-[facility] Basic Mobility Inpatient Short Form Raw Score is 20. A Raw score of greater than 16 suggests the patient may benefit from discharge to home. Please also refer to the recommendation of the Physical Therapist for safe discharge planning. Pt continues to be functioning below baseline level. PT will continue to see pt during current hospitalization in order to address the deficits listed above and provide interventions consistent w/ POC in effort to achieve STGs.        Rehab Resource Intensity Level, PT: II (Moderate Resource Intensity)    See flowsheet documentation for full assessment.

## 2024-07-18 VITALS
TEMPERATURE: 97.5 F | WEIGHT: 215.39 LBS | OXYGEN SATURATION: 92 % | HEART RATE: 79 BPM | SYSTOLIC BLOOD PRESSURE: 129 MMHG | HEIGHT: 70 IN | RESPIRATION RATE: 20 BRPM | DIASTOLIC BLOOD PRESSURE: 74 MMHG | BODY MASS INDEX: 30.84 KG/M2

## 2024-07-18 DIAGNOSIS — L03.116 CELLULITIS OF LEFT LOWER EXTREMITY: ICD-10-CM

## 2024-07-18 DIAGNOSIS — M86.9 OSTEOMYELITIS OF LEFT FOOT, UNSPECIFIED TYPE (HCC): Primary | ICD-10-CM

## 2024-07-18 DIAGNOSIS — S91.302A OPEN WOUND OF LEFT FOOT, INITIAL ENCOUNTER: ICD-10-CM

## 2024-07-18 LAB
GLUCOSE SERPL-MCNC: 164 MG/DL (ref 65–140)
GLUCOSE SERPL-MCNC: 61 MG/DL (ref 65–140)

## 2024-07-18 PROCEDURE — 99239 HOSP IP/OBS DSCHRG MGMT >30: CPT | Performed by: HOSPITALIST

## 2024-07-18 PROCEDURE — 88304 TISSUE EXAM BY PATHOLOGIST: CPT | Performed by: PATHOLOGY

## 2024-07-18 PROCEDURE — 82948 REAGENT STRIP/BLOOD GLUCOSE: CPT

## 2024-07-18 PROCEDURE — 88311 DECALCIFY TISSUE: CPT | Performed by: PATHOLOGY

## 2024-07-18 RX ORDER — INSULIN ASPART 100 [IU]/ML
INJECTION, SOLUTION INTRAVENOUS; SUBCUTANEOUS
Start: 2024-07-18

## 2024-07-18 RX ORDER — INSULIN GLARGINE 100 [IU]/ML
25 INJECTION, SOLUTION SUBCUTANEOUS
Status: DISCONTINUED | OUTPATIENT
Start: 2024-07-18 | End: 2024-07-18 | Stop reason: HOSPADM

## 2024-07-18 RX ORDER — SODIUM HYPOCHLORITE 5 MG/ML
1 SOLUTION TOPICAL DAILY
Start: 2024-07-19

## 2024-07-18 RX ORDER — AMOXICILLIN AND CLAVULANATE POTASSIUM 875; 125 MG/1; MG/1
1 TABLET, FILM COATED ORAL EVERY 12 HOURS SCHEDULED
Qty: 68 TABLET | Refills: 0 | Status: SHIPPED | OUTPATIENT
Start: 2024-07-18 | End: 2024-08-21

## 2024-07-18 RX ORDER — INSULIN GLARGINE 100 [IU]/ML
INJECTION, SOLUTION SUBCUTANEOUS
Qty: 15 ML | Refills: 0 | Status: SHIPPED | OUTPATIENT
Start: 2024-07-18

## 2024-07-18 RX ADMIN — INSULIN LISPRO 8 UNITS: 100 INJECTION, SOLUTION INTRAVENOUS; SUBCUTANEOUS at 08:42

## 2024-07-18 RX ADMIN — INSULIN LISPRO 8 UNITS: 100 INJECTION, SOLUTION INTRAVENOUS; SUBCUTANEOUS at 11:53

## 2024-07-18 RX ADMIN — INSULIN LISPRO 1 UNITS: 100 INJECTION, SOLUTION INTRAVENOUS; SUBCUTANEOUS at 11:53

## 2024-07-18 RX ADMIN — ASPIRIN 81 MG: 81 TABLET, COATED ORAL at 08:42

## 2024-07-18 RX ADMIN — ENOXAPARIN SODIUM 40 MG: 40 INJECTION SUBCUTANEOUS at 08:42

## 2024-07-18 RX ADMIN — LISINOPRIL 2.5 MG: 2.5 TABLET ORAL at 08:42

## 2024-07-18 RX ADMIN — CHOLECALCIFEROL TAB 25 MCG (1000 UNIT) 1000 UNITS: 25 TAB at 08:42

## 2024-07-18 RX ADMIN — AMOXICILLIN AND CLAVULANATE POTASSIUM 1 TABLET: 875; 125 TABLET, FILM COATED ORAL at 08:42

## 2024-07-18 NOTE — PLAN OF CARE
Problem: METABOLIC, FLUID AND ELECTROLYTES - ADULT  Goal: Electrolytes maintained within normal limits  Description: INTERVENTIONS:  - Monitor labs and assess patient for signs and symptoms of electrolyte imbalances  - Administer electrolyte replacement as ordered  - Monitor response to electrolyte replacements, including repeat lab results as appropriate  - Instruct patient on fluid and nutrition as appropriate  Outcome: Progressing  Goal: Glucose maintained within target range  Description: INTERVENTIONS:  - Monitor Blood Glucose as ordered  - Assess for signs and symptoms of hyperglycemia and hypoglycemia  - Administer ordered medications to maintain glucose within target range  - Assess nutritional intake and initiate nutrition service referral as needed  Outcome: Progressing

## 2024-07-18 NOTE — DISCHARGE INSTR - AVS FIRST PAGE
- Complete antibiotics as prescribed, IV Daptomycin and PO Augmentin through 8/21/2024  - Follow up with Podiatry and Wound Care  - Weekly CBC, CMP, and CPK while on IV antibiotics

## 2024-07-18 NOTE — NURSING NOTE
Patient was discharged in stable condition. IV was taken out and all of patients belongings were gathered. All documents were sent with the transport team, who transported [facility] to [facility]. This nurse attempted to call and give report was unsuccessful in reaching anyone available at this time.

## 2024-07-18 NOTE — ASSESSMENT & PLAN NOTE
Lab Results   Component Value Date    HGBA1C 9.7 (H) 06/25/2024       Recent Labs     07/17/24  1741 07/17/24  2139 07/18/24  0733 07/18/24  1133   POCGLU 159* 200* 61* 164*         Blood Sugar Average: Last 72 hrs:  (P) 128.9741609666190459    Recurrent morning hypoglycemia, Lantus down-titrated  Decreased Lantus to 25 Units SQ QHS oand decreased Humalog to 8 Units TID with meals  ISS with blood glucose monitoring AC  Continue PO lisinopril for renal protection  Hypoglycemia protocol  Outpatient Endocrinology evaluation

## 2024-07-18 NOTE — DISCHARGE SUMMARY
"[facility]  Discharge- Art Joseph 1960, 64 y.o. male MRN: [id number]  Unit/Bed#: 424-01 Encounter: [id number]  Primary Care Provider: Kerry Christine MD   Date and time admitted to hospital: 7/8/2024  5:21 PM    * Open wound of left foot  Assessment & Plan  Recent hospitalization from 06/25/2024 through 06/28/2024 for osteomyelitis of the left foot requiring a left 1st metatarsal amputation/resection on 06/27/2024 by Podiatry. Now with a left wound infection and associated cellulitis with probable underlying osteomyelitis. Also found to have maggots in his open wound  I appreciate Infectious Disease's input and Podiatry's input.  Checked a left foot wound culture on 07/09/2024, which is positive for Proteus vulgaris and MRSA  Being treated with IV vancomycin  IV cefepime was added on 07/10/2024 per Infectious Disease's recommendations based on the left foot wound culture result  Changed IV cefepime to IV ceftriaxone on 07/12/2024  Underwent left foot wound debridement and washout by Podiatry on 07/11/2024  Await bone margin pathology results and surgical wound cultures (now available with sensitivities) to determine antibiotic treatment course per   S/p PICC 7/16  Final ID recs  Start PO Augmentin 875mg BID plus IV daptomycin 6mg/kg ABW x 6 weeks from bone resection through 8/21/24  Follow-up on bone margin cultures/path  Follow-up outpatient with ID in 2 weeks - okay for virtual   Weekly CBC, CMP and CK on IV antibiotics  Outpatient f/u with podiatry     Osteomyelitis of left foot (HCC)  Assessment & Plan  Please see the assessment and plan for \"Open wound of left foot\"    Type 2 diabetes mellitus with hypoglycemia without coma, with long-term current use of insulin (HCC)  Assessment & Plan  Lab Results   Component Value Date    HGBA1C 9.7 (H) 06/25/2024       Recent Labs     07/17/24  1741 07/17/24  2139 07/18/24  0733 07/18/24  1133   POCGLU 159* 200* 61* 164*         Blood " "Sugar Average: Last 72 hrs:  (P) 128.8716876763998215    Recurrent morning hypoglycemia, Lantus down-titrated  Decreased Lantus to 25 Units SQ QHS oand decreased Humalog to 8 Units TID with meals  ISS with blood glucose monitoring AC  Continue PO lisinopril for renal protection  Hypoglycemia protocol  Outpatient Endocrinology evaluation    MRSA colonization  Assessment & Plan  Utilize the MRSA decolonization protocol    Maggot infestation  Assessment & Plan  Please see the assessment and plan for \"Open wound of left foot\"      Vitamin D deficiency  Assessment & Plan  Utilize cholecalciferol 1000 I.U. PO Qdaily  Outpatient follow-up with PCP in regards to this matter    Hypercholesteremia  Assessment & Plan  Continue statin therapy    Primary hypertension  Assessment & Plan  Continue PO lisinopril    Ambulatory dysfunction  Assessment & Plan  PT/OT evaluations - rehab    Hx of right BKA (HCC)  Assessment & Plan  Outpatient follow-up with Vascular Surgery        Medical Problems       Resolved Problems  Date Reviewed: 7/18/2024            Resolved    Hyperphosphatemia 7/15/2024     Resolved by  Abdirahman Jackson DO        Discharging Physician / Practitioner: Abdirahman Jackson DO  PCP: Kerry Christine MD  Admission Date:   Admission Orders (From admission, onward)       Ordered        07/08/24 1900  INPATIENT ADMISSION  Once                          Discharge Date: 07/18/24    Consultations During Hospital Stay:  ID, Podiatry    Procedures Performed:   left foot wound debridement and washout by Podiatry on 07/11/2024  PICC line via IR on 7/16     Significant Findings / Test Results:   XR foot left 3+ views   Final Result by Jeffrey Finch MD (07/11 1630)      Postoperative changes status post osteotomy of the medial cuneiform.         Computerized Assisted Algorithm (CAA) may have been used to analyze all applicable images.         Workstation performed: [id number]         MRI foot/forefoot toes left wo " contrast   Final Result by Bradley Landon Kocher, MD (07/10 6162)      1. Heterogeneous predominately T2 hyperintense subcutaneous collection just deep to the dorsal and medial skin wound extending down to the subjacent medial cuneiform. There is increased T2 signal within the subjacent medial cuneiform and faint low T1    signal. Findings are suspicious for early osteomyelitis.   2. No additional marrow signal abnormality to suggest other areas of osteomyelitis.            Workstation performed: [id number]         XR foot 3+ views LEFT   ED Interpretation by Marlo Ellison MD (07/08 1814)   S/p 1st metatarsal amputation. No gas in tissue. No new areas of osteomyelitis.      Final Result by Yoselin Olivas MD (07/09 1230)   No obvious radiographic osteomyelitis. Consider MRI for more definitive diagnosis.   Chronic changes, as per the body of the report.   Soft tissue swelling at the medial forefoot.         Computerized Assisted Algorithm (CAA) may have been used to analyze all applicable images.         Workstation performed: [id number]         IR PICC line placement single lumen (preferred for home antibiotics/medications)    (Results Pending)     Lab Results   Component Value Date    WBC 8.74 07/15/2024    HGB 13.7 07/15/2024    HCT 42.0 07/15/2024    MCV 87 07/15/2024     07/15/2024     Lab Results   Component Value Date    SODIUM 139 07/15/2024    K 3.7 07/15/2024     07/15/2024    CO2 26 07/15/2024    BUN 20 07/15/2024    CREATININE 0.78 07/15/2024    GLUC 56 (L) 07/15/2024    CALCIUM 9.4 07/15/2024     07/11/2024 1042 07/16/2024 0742 Culture, tissue and Gram stain [[id number]]    (Abnormal)   Tissue from Foot, Left    Edited Result - FINAL [facility]  Component Value   Tissue Culture 1+ Growth of Methicillin Resistant Staphylococcus aureus Abnormal     This organism has been edited. The previous result was Staphylococcus aureus on 7/13/2024 at 1403 EDT.  Please note:  This patient requires contact precautions.    1+ Growth of    Mixed Skin Iris   Gram Stain Result No Polys or Bacteria seen       Susceptibility    Methicillin Resistant Staphylococcus aureus (1)    Antibiotic Interpretation Microscan  Method Status    Cefazolin ($) Resistant >16.00 ug/ml JASON Final    Clindamycin ($) Resistant >4.00 ug/ml JASON Final    Erythromycin ($$$$) Resistant >4.00 ug/ml JASON Final    Gentamicin ($$) Susceptible <=4 ug/ml JASON Final    Oxacillin Resistant >2.00 ug/ml JASON Final    Penicillin Resistant >2.000 ug/ml JASON Final    Tetracycline Resistant >8 ug/ml JASON Final    Trimethoprim + Sulfamethoxazole ($$$) Susceptible 1/19 ug/ml JASON Final    Vancomycin ($) Susceptible 1.00 ug/ml JASON Final    Minocycline Resistant 24 ug/ml JASON Final    ZID Performed  Yes  JASON Final              Incidental Findings:   See above   I reviewed the above mentioned incidental findings with the patient and/or family and they expressed understanding.    Test Results Pending at Discharge (will require follow up):   None     Outpatient Tests Requested:  Weekly CBC, CMP, and CPK while on IV antibiotics    Complications:  none    Reason for Admission: osteomyelitis/wound dehiscence    Hospital Course:   Art Joseph is a 64 y.o. male patient who originally presented to the hospital on 7/8/2024 due to wound dehiscence, manifestation of wound, and acute osteomyelitis of the left foot.  Patient was started on empiric IV antibiotics, and seen by podiatry, taken for wound debridement and washout on 7/11/2024.  Wound cultures were found to be positive for MRSA as well as Proteus vulgaris.  Patient was seen by infectious disease and antibiotics were tailored to culture data, initially on IV vancomycin and cefepime, then IV vancomycin and ceftriaxone, and ultimately final course of antibiotics recommended with IV daptomycin and p.o. Augmentin twice daily, to be completed from day of washout, through 8/21/2024.  Patient overall  "had clinical improvement.  Of note, patient's insulin was adjusted, he was noted to be repeatedly hypoglycemic in the morning, his Lantus was adjusted to 25 units at night, and mealtime coverage reduced to 8 units with meals as well.  Patient is medically stable for discharge to rehab, will need follow-up with podiatry, wound care, vascular surgery, and infectious disease.  He was also referred to endocrinology for further diabetes management.  Patient to have weekly CBC, CMP, and CPK while on IV antibiotics.        Please see above list of diagnoses and related plan for additional information.     Condition at Discharge: good    Discharge Day Visit / Exam:   Subjective: Patient feels improved, no significant pain of the left foot.  Vitals: Blood Pressure: 129/74 (07/18/24 0640)  Pulse: 79 (07/18/24 0640)  Temperature: 97.5 °F (36.4 °C) (07/18/24 0640)  Temp Source: Oral (07/17/24 0724)  Respirations: 20 (07/18/24 0640)  Height: 5' 10\" (177.8 cm) (07/09/24 1156)  Weight - Scale: 97.7 kg (215 lb 6.2 oz) (07/09/24 1156)  SpO2: 92 % (07/18/24 0640)  Exam:   Physical Exam  Vitals and nursing note reviewed.   Constitutional:       General: He is not in acute distress.     Appearance: He is well-developed.   HENT:      Head: Normocephalic and atraumatic.   Eyes:      Conjunctiva/sclera: Conjunctivae normal.   Cardiovascular:      Rate and Rhythm: Normal rate and regular rhythm.      Heart sounds: No murmur heard.  Pulmonary:      Effort: Pulmonary effort is normal. No respiratory distress.   Abdominal:      Palpations: Abdomen is soft.      Tenderness: There is no abdominal tenderness.   Musculoskeletal:         General: No swelling.      Cervical back: Neck supple.      Comments:  Left foot surgical wound dressing intact, Right-sided BKA   Skin:     General: Skin is warm and dry.   Neurological:      Mental Status: He is alert.   Psychiatric:         Mood and Affect: Mood normal.          Discussion with Family: Patient " declined call to .     Discharge instructions/Information to patient and family:   See after visit summary for information provided to patient and family.      Provisions for Follow-Up Care:  See after visit summary for information related to follow-up care and any pertinent home health orders.      Mobility at time of Discharge:   Basic Mobility Inpatient Raw Score: 19  JH-HLM Goal: 6: Walk 10 steps or more  JH-HLM Achieved: 6: Walk 10 steps or more  HLM Goal achieved. Continue to encourage appropriate mobility.     Disposition:   Other Skilled Nursing Facility at [place]    Planned Readmission: no     Discharge Statement:  I spent 40 minutes discharging the patient. This time was spent on the day of discharge. I had direct contact with the patient on the day of discharge. Greater than 50% of the total time was spent examining patient, answering all patient questions, arranging and discussing plan of care with patient as well as directly providing post-discharge instructions.  Additional time then spent on discharge activities.    Discharge Medications:  See after visit summary for reconciled discharge medications provided to patient and/or family.      **Please Note: This note may have been constructed using a voice recognition system**

## 2024-07-18 NOTE — PLAN OF CARE
Problem: METABOLIC, FLUID AND ELECTROLYTES - ADULT  Goal: Electrolytes maintained within normal limits  Description: INTERVENTIONS:  - Monitor labs and assess patient for signs and symptoms of electrolyte imbalances  - Administer electrolyte replacement as ordered  - Monitor response to electrolyte replacements, including repeat lab results as appropriate  - Instruct patient on fluid and nutrition as appropriate  Outcome: Progressing  Goal: Glucose maintained within target range  Description: INTERVENTIONS:  - Monitor Blood Glucose as ordered  - Assess for signs and symptoms of hyperglycemia and hypoglycemia  - Administer ordered medications to maintain glucose within target range  - Assess nutritional intake and initiate nutrition service referral as needed  Outcome: Progressing     Problem: SKIN/TISSUE INTEGRITY - ADULT  Goal: Skin Integrity remains intact(Skin Breakdown Prevention)  Description: Assess:  -Perform Zaid assessment every 4 hrs  -Clean and moisturize skin every 4 hrs  -Inspect skin when repositioning, toileting, and assisting with ADLS  -Assess under medical devices such as mosimo every 4 hrs  -Assess extremities for adequate circulation and sensation     Bed Management:  -Have minimal linens on bed & keep smooth, unwrinkled  -Change linens as needed when moist or perspiring  -Avoid sitting or lying in one position for more than 4 hours while in bed  -Keep HOB at 30degrees     Toileting:  -Offer bedside commode  -Assess for incontinence every 4 hrs  -Use incontinent care products after each incontinent episode such as lotion and spray    Activity:  -Mobilize patient 3 times a day  -Encourage activity and walks on unit  -Encourage or provide ROM exercises   -Turn and reposition patient every 4 Hours  -Use appropriate equipment to lift or move patient in bed  -Instruct/ Assist with weight shifting every 4hrs when out of bed in chair  -Consider limitation of chair time 2 hour intervals    Skin  Care:  -Avoid use of baby powder, tape, friction and shearing, hot water or constrictive clothing  -Relieve pressure over bony prominences using lotion  -Do not massage red bony areas    Next Steps:  -Teach patient strategies to minimize risks such as getting oob   -Consider consults to  interdisciplinary teams such as pt/ot  Outcome: Progressing  Goal: Incision(s), wounds(s) or drain site(s) healing without S/S of infection  Description: INTERVENTIONS  - Assess and document dressing, incision, wound bed, drain sites and surrounding tissue  - Provide patient and family education  - Perform skin care/dressing changes every 4 hrs  Outcome: Progressing     Problem: MUSCULOSKELETAL - ADULT  Goal: Maintain or return mobility to safest level of function  Description: INTERVENTIONS:  - Assess patient's ability to carry out ADLs; assess patient's baseline for ADL function and identify physical deficits which impact ability to perform ADLs (bathing, care of mouth/teeth, toileting, grooming, dressing, etc.)  - Assess/evaluate cause of self-care deficits   - Assess range of motion  - Assess patient's mobility  - Assess patient's need for assistive devices and provide as appropriate  - Encourage maximum independence but intervene and supervise when necessary  - Involve family in performance of ADLs  - Assess for home care needs following discharge   - Consider OT consult to assist with ADL evaluation and planning for discharge  - Provide patient education as appropriate  Outcome: Progressing  Goal: Maintain proper alignment of affected body part  Description: INTERVENTIONS:  - Support, maintain and protect limb and body alignment  - Provide patient/ family with appropriate education  Outcome: Progressing     Problem: PAIN - ADULT  Goal: Verbalizes/displays adequate comfort level or baseline comfort level  Description: Interventions:  - Encourage patient to monitor pain and request assistance  - Assess pain using appropriate  pain scale  - Administer analgesics based on type and severity of pain and evaluate response  - Implement non-pharmacological measures as appropriate and evaluate response  - Consider cultural and social influences on pain and pain management  - Notify physician/advanced practitioner if interventions unsuccessful or patient reports new pain  Outcome: Progressing     Problem: INFECTION - ADULT  Goal: Absence or prevention of progression during hospitalization  Description: INTERVENTIONS:  - Assess and monitor for signs and symptoms of infection  - Monitor lab/diagnostic results  - Monitor all insertion sites, i.e. indwelling lines, tubes, and drains  - Monitor endotracheal if appropriate and nasal secretions for changes in amount and color  - [place] appropriate cooling/warming therapies per order  - Administer medications as ordered  - Instruct and encourage patient and family to use good hand hygiene technique  - Identify and instruct in appropriate isolation precautions for identified infection/condition  Outcome: Progressing     Problem: SAFETY ADULT  Goal: Patient will remain free of falls  Description: INTERVENTIONS:  - Educate patient/family on patient safety including physical limitations  - Instruct patient to call for assistance with activity   - Consult OT/PT to assist with strengthening/mobility   - Keep Call bell within reach  - Keep bed low and locked with side rails adjusted as appropriate  - Keep care items and personal belongings within reach  - Initiate and maintain comfort rounds  - Make Fall Risk Sign visible to staff  - Offer Toileting every 2 Hours, in advance of need  - Initiate/Maintain bed/chair alarm  - Obtain necessary fall risk management equipment: alarms  - Apply yellow socks and bracelet for high fall risk patients  - Consider moving patient to room near nurses station  Outcome: Progressing     Problem: DISCHARGE PLANNING  Goal: Discharge to home or other facility with appropriate  resources  Description: INTERVENTIONS:  - Identify barriers to discharge w/patient and caregiver  - Arrange for needed discharge resources and transportation as appropriate  - Identify discharge learning needs (meds, wound care, etc.)  - Arrange for interpretive services to assist at discharge as needed  - Refer to Case Management Department for coordinating discharge planning if the patient needs post-hospital services based on physician/advanced practitioner order or complex needs related to functional status, cognitive ability, or social support system  Outcome: Progressing     Problem: Knowledge Deficit  Goal: Patient/family/caregiver demonstrates understanding of disease process, treatment plan, medications, and discharge instructions  Description: Complete learning assessment and assess knowledge base.  Interventions:  - Provide teaching at level of understanding  - Provide teaching via preferred learning methods  Outcome: Progressing     Problem: Prexisting or High Potential for Compromised Skin Integrity  Goal: Skin integrity is maintained or improved  Description: INTERVENTIONS:  - Identify patients at risk for skin breakdown  - Assess and monitor skin integrity  - Assess and monitor nutrition and hydration status  - Monitor labs   - Assess for incontinence   - Turn and reposition patient  - Assist with mobility/ambulation  - Relieve pressure over bony prominences  - Avoid friction and shearing  - Provide appropriate hygiene as needed including keeping skin clean and dry  - Evaluate need for skin moisturizer/barrier cream  - Collaborate with interdisciplinary team   - Patient/family teaching  - Consider wound care consult   Outcome: Progressing     Problem: Nutrition/Hydration-ADULT  Goal: Nutrient/Hydration intake appropriate for improving, restoring or maintaining nutritional needs  Description: Monitor and assess patient's nutrition/hydration status for malnutrition. Collaborate with interdisciplinary  team and initiate plan and interventions as ordered.  Monitor patient's weight and dietary intake as ordered or per policy. Utilize nutrition screening tool and intervene as necessary. Determine patient's food preferences and provide high-protein, high-caloric foods as appropriate.     INTERVENTIONS:  - Monitor oral intake, urinary output, labs, and treatment plans  - Assess nutrition and hydration status and recommend course of action  - Evaluate amount of meals eaten  - Assist patient with eating if necessary   - Allow adequate time for meals  - Recommend/ encourage appropriate diets, oral nutritional supplements, and vitamin/mineral supplements  - Order, calculate, and assess calorie counts as needed  - Recommend, monitor, and adjust tube feedings and TPN/PPN based on assessed needs  - Assess need for intravenous fluids  - Provide specific nutrition/hydration education as appropriate  - Include patient/family/caregiver in decisions related to nutrition  Outcome: Progressing

## 2024-07-18 NOTE — ASSESSMENT & PLAN NOTE
Recent hospitalization from 06/25/2024 through 06/28/2024 for osteomyelitis of the left foot requiring a left 1st metatarsal amputation/resection on 06/27/2024 by Podiatry. Now with a left wound infection and associated cellulitis with probable underlying osteomyelitis. Also found to have maggots in his open wound  I appreciate Infectious Disease's input and Podiatry's input.  Checked a left foot wound culture on 07/09/2024, which is positive for Proteus vulgaris and MRSA  Being treated with IV vancomycin  IV cefepime was added on 07/10/2024 per Infectious Disease's recommendations based on the left foot wound culture result  Changed IV cefepime to IV ceftriaxone on 07/12/2024  Underwent left foot wound debridement and washout by Podiatry on 07/11/2024  Await bone margin pathology results and surgical wound cultures (now available with sensitivities) to determine antibiotic treatment course per   S/p PICC 7/16  Final ID recs  Start PO Augmentin 875mg BID plus IV daptomycin 6mg/kg ABW x 6 weeks from bone resection through 8/21/24  Follow-up on bone margin cultures/path  Follow-up outpatient with ID in 2 weeks - okay for virtual   Weekly CBC, CMP and CK on IV antibiotics  Outpatient f/u with podiatry

## 2024-07-18 NOTE — PROGRESS NOTES
OPAT NOTE    Supervising/Discharge physician: Onelia    Diagnosis: Persistent left foot cellulitis, with abscess and osteomyelitis, secondary to recurrent wound dehiscence     Drug: Daptomycin / Augmentin     Dose/Route/Frequency: [id number] / 875mg PO BID     Labs/Frequency: CBCD CMP CK weekly     End Date: 8/21    Infusion/VNA contact: [place]      Next appointment: 7/29        MA assigned: Clarice

## 2024-07-18 NOTE — CASE MANAGEMENT
Case Management Discharge Planning Note    Patient name Art Joseph  Location /424-01 MRN [id number]  : 1960 Date 2024       Current Admission Date: 2024  Current Admission Diagnosis:Open wound of left foot   Patient Active Problem List    Diagnosis Date Noted Date Diagnosed    Osteomyelitis of left foot (HCC) 2024     MRSA colonization 2024     Open wound of left foot 2024     Maggot infestation 2024     Elevated platelet count 2024     Candidiasis, intertrigo 2024     Cellulitis of left lower extremity 2024     Class 1 obesity in adult 2021     Diabetic peripheral angiopathy ([facility]) 2020     Vitamin D deficiency 2019     Ambulatory dysfunction 10/17/2017     Hx of right BKA ([facility]) 2017     Primary hypertension 2017     Hypercholesteremia 2017     Acute osteomyelitis of metatarsal bone of left foot ([facility]) 2017     Type 2 diabetes mellitus with hypoglycemia without coma, with long-term current use of insulin ([facility]) 2017     Diabetic neuropathy ([facility]) 2017       LOS (days): 10  Geometric Mean LOS (GMLOS) (days): 5.6  Days to GMLOS:-4     OBJECTIVE:  Risk of Unplanned Readmission Score: 13.8         Current admission status: Inpatient   Preferred Pharmacy:   [facility] [place] - [address]  [address] [facility] [address]  Phone: [phone] Fax: [phone]    [facility] - [place] - [address]  [address]  [place] [address]  Phone: [phone] Fax: [phone]    [facility]/pharmacy #[id number] - [place] - [address]  [address] [facility] [address]  Phone: [phone] Fax: [phone]    Primary Care Provider: Kerry Christine MD    Primary Insurance: [facility] REP  Secondary Insurance:     DISCHARGE DETAILS:     Patient stable for discharge to [facility]  today.  Patient has PICC line and will continue his Daptomycin IV at facility.    [place] updated in aidin of discharge today confirmed they are good with Daptomycin IV at facility.    Follow up providers listed on AVS.    Discussed with Family there may be an out of pocket expense for transport.      Transport requested in Round Trip secured for   1430  via  [employer].    Medical Necessity for transportation was completed. Copy available for transport team along with face sheet for transport.      Patient brother Willis called and aware of patient discharge today to rehab.

## 2024-07-19 NOTE — UTILIZATION REVIEW
NOTIFICATION OF ADMISSION DISCHARGE   This is a Notification of Discharge from [facility]. Please be advised that this patient has been discharge from our facility. Below you will find the admission and discharge date and time including the patient’s disposition.   UTILIZATION REVIEW CONTACT:  Alonso Flores  Utilization   Network Utilization Review Department  Phone: [phone] x carefully listen to the prompts. All voicemails are confidential.  Email: [email or web address]     ADMISSION INFORMATION  PRESENTATION DATE: 7/8/2024  5:21 PM  OBERVATION ADMISSION DATE: N/A  INPATIENT ADMISSION DATE: 7/8/24  7:00 PM   DISCHARGE DATE: 7/18/2024  2:39 PM   DISPOSITION:Non [facility]    Network Utilization Review Department  ATTENTION: Please call with any questions or concerns to [phone] and carefully listen to the prompts so that you are directed to the right person. All voicemails are confidential.   For Discharge needs, contact Care Management DC Support Team at [phone] opt. 2  Send all requests for admission clinical reviews, approved or denied determinations and any other requests to dedicated fax number below belonging to the campus where the patient is receiving treatment. List of dedicated fax numbers for the Facilities:  FACILITY NAME UR FAX NUMBER   ADMISSION DENIALS (Administrative/Medical Necessity) [phone]   DISCHARGE SUPPORT TEAM ([facility]) [phone]   PARENT CHILD HEALTH (Maternity/NICU/Pediatrics) [phone]   [facility] [phone]   [facility] [phone]   [facility] [phone]   [facility] [phone]   [facility] [phone]   [facility] [phone]   [facility] [phone]   [facility] [phone]    [facility] [phone]   [facility] [phone]   [facility] [phone]   [facility] [phone]

## 2024-07-22 ENCOUNTER — TELEPHONE (OUTPATIENT)
Dept: INFECTIOUS DISEASES | Facility: CLINIC | Age: 64
End: 2024-07-22

## 2024-07-22 NOTE — TELEPHONE ENCOUNTER
Called [place] and spoke with Salma today.   Went over antibiotic plan, weekly labs and follow up appointment.   Informed Salma if there are any further questions or concerns to call the office   Salma verbalizes understanding at this time.

## 2024-07-29 ENCOUNTER — TELEMEDICINE (OUTPATIENT)
Age: 64
End: 2024-07-29
Payer: COMMERCIAL

## 2024-07-29 DIAGNOSIS — M86.172 ACUTE OSTEOMYELITIS OF METATARSAL BONE OF LEFT FOOT (HCC): Primary | ICD-10-CM

## 2024-07-29 DIAGNOSIS — E11.649 TYPE 2 DIABETES MELLITUS WITH HYPOGLYCEMIA WITHOUT COMA, WITH LONG-TERM CURRENT USE OF INSULIN (HCC): ICD-10-CM

## 2024-07-29 DIAGNOSIS — Z79.4 TYPE 2 DIABETES MELLITUS WITH HYPOGLYCEMIA WITHOUT COMA, WITH LONG-TERM CURRENT USE OF INSULIN (HCC): ICD-10-CM

## 2024-07-29 PROCEDURE — G2211 COMPLEX E/M VISIT ADD ON: HCPCS | Performed by: PHYSICIAN ASSISTANT

## 2024-07-29 PROCEDURE — 99214 OFFICE O/P EST MOD 30 MIN: CPT | Performed by: PHYSICIAN ASSISTANT

## 2024-07-29 NOTE — PROGRESS NOTES
Virtual Outpatient Progress Note - [facility] Infectious Disease   Art Joseph 64 y.o. male MRN: [id number]  Encounter: [id number]      Virtual Regular Visit  Verification of patient location: [place]  Patient is located in the following state in which I hold an active license [place]    Problem List Items Addressed This Visit          Endocrine    Type 2 diabetes mellitus with hypoglycemia without coma, with long-term current use of insulin (HCC)       Musculoskeletal and Integument    Acute osteomyelitis of metatarsal bone of left foot (HCC) - Primary            Encounter provider Willie Rubi PA-C    [facility] [address] [address] [phone]    The patient was identified by name and date of birth. Art Joseph was informed that this is a telemedicine visit and that the visit is being conducted through the [employer] platform. He agrees to proceed..  My office door was closed. No one else was in the room.  He acknowledged consent and understanding of privacy and security of the video platform. The patient has agreed to participate and understands they can discontinue the visit at any time.    Patient is aware this is a billable service.       Assessment/Plan:  1.  Persistent left foot cellulitis, with abscess and osteomyelitis, secondary to recurrent wound dehiscence.  Recent admission to [facility] with maggot infestation of wound bed. Wound culture with growth of MRSA ([R] to minocycline) and Proteus.  Patient is status post left foot I&D, with possible residual osteomyelitis clinically.  Operative cultures isolated MRSA. D/w podiatry, will plan for long term course of abx. Unfortunately no safe oral regimen available and patient will need IV plus PO abx course. Regardless, he unfortunately remains high risk for foot loss. He is tolerating the abx. Recent labs from 7/22 reviewed with normal WBC,  CK, and Cr.  -Continue PO Augmentin 875mg BID plus IV daptomycin 6mg/kg ABW x 6 weeks from bone resection through 8/21/24  -RTC in 2-3 weeks - OK for virtual  -Weekly CBCd CMP and CK while on IV abx   -D/C PICC after last dose of IV abx   -Outpatient f/u with podiatry - nothing scheduled, will send amb referral      2.  Recurrent left foot wound dehiscence, despite recent I&D and first metatarsal head resection for surgical cure.  Patient is status post I&D, as above.  Wound care per podiatry.     3. DM, poorly controlled, with hyperglycemia and elevated hemoglobin A1c.  This is risk factor for poor wound healing and infection above.  Management per primary service.    Above assessment and plan discussed in detail with patient during examination.     Antibiotics:  PO augmentin and IV daptomycin     Subjective:  Art Joseph is a 64 y.o. male patient who presents to outpatient ID office virtually for ongoing management of persistent left foot cellulitis and osteomyelitis.  Patient is tolerating the oral Augmentin and IV daptomycin.  He is currently at short-term rehab also for physical therapy resources.  He states his dressings are changed by nursing staff every day.  Thinks wound looks appropriate but has not had any follow-up with podiatry since hospital discharge.  Unsure if he has sutures in place still.  He is looking forward to being discharged home and thinks he can tolerate doing the antibiotics on his own.  States he is getting his haircut and beard trimmed tomorrow.  Enjoying the summer Olympics so far.      Past Medical History:   Diagnosis Date    Diabetes mellitus (HCC)     Hypertension        Past Surgical History:   Procedure Laterality Date    FOOT AMPUTATION Left 6/27/2024    Procedure: AMPUTATION LEFT FOOT 1st METATARSAL RESECTION;  Surgeon: Myron Bhakta DPM;  Location: [facility];  Service: Podiatry    LEG AMPUTATION THROUGH LOWER TIBIA AND FIBULA Right 7/25/2017    Procedure:  AMPUTATION BELOW KNEE (BKA);  Surgeon: Mynor Sanchez MD;  Location: [facility];  Service: General    HI AMPUTATION FOOT TRANSMETARSAL Right 1/26/2017    Procedure: AMPUTATION TRANSMETATARSAL (TMA); OPEN;  Surgeon: Leonard Lomeli DPM;  [facility];  Service: Podiatry    HI AMPUTATION FOOT TRANSMETARSAL Left 4/26/2024    Procedure: LEFT FOOT PARTIAL FIRST RAY AMPUTATION;  Surgeon: Jennifer Rubalcava DPM;  Location: [facility];  Service: Podiatry    HI AMPUTATION METATARSAL W/TOE SINGLE Left 1/30/2017    Procedure: Left partial 1st ray amputation;  Surgeon: Leonard Lomeli DPM;  [facility];  Service: Podiatry    HI COLONOSCOPY FLX DX W/COLLJ SPEC WHEN PFRMD N/A 11/28/2018    Procedure: COLONOSCOPY;  Surgeon: González Napier MD;  Location: [facility];  Service: Gastroenterology    WOUND DEBRIDEMENT Right 1/30/2017    Procedure: DEBRIDEMENT FOOT/TOE (WASH OUT) delayed closure, LINDA;  Surgeon: Leonard Lomeli DPM;  [facility];  Service:     WOUND DEBRIDEMENT Left 7/11/2024    Procedure: DEBRIDEMENT FOOT/TOE (WASH OUT);  Surgeon: Myron Bhakta DPM;  Location: [facility];  Service: Podiatry       Current Outpatient Medications   Medication Sig Dispense Refill    amoxicillin-clavulanate (AUGMENTIN) 875-125 mg per tablet Take 1 tablet by mouth every 12 (twelve) hours 68 tablet 0    aspirin (ECOTRIN LOW STRENGTH) 81 mg EC tablet Take 81 mg by mouth daily Resume on 8/11/17       atorvastatin (LIPITOR) 80 mg tablet Take 1 tablet (80 mg total) by mouth daily 90 tablet 3    Cholecalciferol (VITAMIN D3) 1,000 units tablet Take 1 tablet (1,000 Units total) by mouth daily 30 tablet 0    Dakins, full strength, (DAKIN'S) Apply 1 Application topically daily      DAPTOmycin (CUBICIN) 50 mL IVPB Inject 500 mg into a catheter in a vein over 30 minutes at 100 mL/hr every 24 hours 18 g 0    insulin aspart (NovoLOG FlexPen) 100 UNIT/ML injection pen E11.65 inject 14 units before meals plus scale. TDD 75 units       Insulin Glargine Solostar (Basaglar KwikPen) 100 UNIT/ML SOPN E11.65 inject 25 units daily at bedtime 15 mL 0    Insulin Pen Needle 30G X 8 MM MISC Inject under the skin daily at bedtime 100 each 5    lisinopril (ZESTRIL) 2.5 mg tablet Take 1 tablet (2.5 mg total) by mouth daily 90 tablet 3     No current facility-administered medications for this visit.        No Known Allergies    Video Exam  Exam limited due to virtual visit     There were no vitals filed for this visit.    Physical Exam  Constitutional:       Appearance: Normal appearance.   HENT:      Head: Normocephalic and atraumatic.   Musculoskeletal:         General: Normal range of motion.      Cervical back: Normal range of motion and neck supple.      Comments: Right BKA   Skin:     General: Skin is warm and dry.      Comments: Right upper extremity PICC noted without signs of infection.  Left foot surgical site covered with dry dressing intact without streaking drainage or spreading erythema of the lower extremity.   Neurological:      Mental Status: He is alert.   Psychiatric:         Mood and Affect: Mood normal.      Comments: Overall in good spirits          Labs, Imaging, & Other studies:   All pertinent labs and imaging studies were personally reviewed by me from 7/22.      The following portions of the patient's history were reviewed and updated as appropriate: allergies, current medications, past family history, past medical history, past social history, past surgical history and problem list.    I spent 30 minutes with patient today in which greater than 50% of the time was spent in counseling/coordination of care regarding plan for ongoing antibiotic course, review of recent labs, answered all questions to the best my ability.

## 2024-08-01 ENCOUNTER — TELEPHONE (OUTPATIENT)
Age: 64
End: 2024-08-01

## 2024-08-01 NOTE — TELEPHONE ENCOUNTER
Hello,    Please advise if a forced appointment can be accommodated for the patient:    Call back #: [phone]    Insurance: [employer]    Reason for appointment: POST OP APPT SX 7/11     Requested doctor and/or location: [facility]      Thank you.

## 2024-08-05 ENCOUNTER — HOSPITAL ENCOUNTER (INPATIENT)
Facility: HOSPITAL | Age: 64
LOS: 10 days | Discharge: NURSING, REHAB OR LONG-TERM CARE | DRG: 856 | End: 2024-08-16
Attending: EMERGENCY MEDICINE | Admitting: INTERNAL MEDICINE
Payer: COMMERCIAL

## 2024-08-05 ENCOUNTER — APPOINTMENT (EMERGENCY)
Dept: RADIOLOGY | Facility: HOSPITAL | Age: 64
DRG: 856 | End: 2024-08-05
Payer: COMMERCIAL

## 2024-08-05 DIAGNOSIS — M86.9 OSTEOMYELITIS OF LEFT FOOT, UNSPECIFIED TYPE (HCC): ICD-10-CM

## 2024-08-05 DIAGNOSIS — B87.9 MAGGOT INFESTATION: ICD-10-CM

## 2024-08-05 DIAGNOSIS — I73.9 PERIPHERAL VASCULAR DISEASE (HCC): ICD-10-CM

## 2024-08-05 DIAGNOSIS — E11.65 TYPE 2 DIABETES MELLITUS WITH HYPERGLYCEMIA, WITH LONG-TERM CURRENT USE OF INSULIN (HCC): ICD-10-CM

## 2024-08-05 DIAGNOSIS — Z79.4 TYPE 2 DIABETES MELLITUS WITH HYPERGLYCEMIA, WITH LONG-TERM CURRENT USE OF INSULIN (HCC): ICD-10-CM

## 2024-08-05 DIAGNOSIS — S91.302A OPEN WOUND OF LEFT FOOT: Primary | ICD-10-CM

## 2024-08-05 LAB
ANION GAP SERPL CALCULATED.3IONS-SCNC: 7 MMOL/L (ref 4–13)
APTT PPP: 26 SECONDS (ref 23–34)
BASOPHILS # BLD AUTO: 0.06 THOUSANDS/ÂΜL (ref 0–0.1)
BASOPHILS NFR BLD AUTO: 1 % (ref 0–1)
BUN SERPL-MCNC: 12 MG/DL (ref 5–25)
CALCIUM SERPL-MCNC: 9.3 MG/DL (ref 8.4–10.2)
CHLORIDE SERPL-SCNC: 102 MMOL/L (ref 96–108)
CO2 SERPL-SCNC: 27 MMOL/L (ref 21–32)
CREAT SERPL-MCNC: 0.75 MG/DL (ref 0.6–1.3)
CRP SERPL QL: 15.7 MG/L
EOSINOPHIL # BLD AUTO: 0.19 THOUSAND/ÂΜL (ref 0–0.61)
EOSINOPHIL NFR BLD AUTO: 2 % (ref 0–6)
ERYTHROCYTE [DISTWIDTH] IN BLOOD BY AUTOMATED COUNT: 13.6 % (ref 11.6–15.1)
ERYTHROCYTE [SEDIMENTATION RATE] IN BLOOD: 53 MM/HOUR (ref 0–19)
GFR SERPL CREATININE-BSD FRML MDRD: 96 ML/MIN/1.73SQ M
GLUCOSE SERPL-MCNC: 181 MG/DL (ref 65–140)
GLUCOSE SERPL-MCNC: 224 MG/DL (ref 65–140)
HCT VFR BLD AUTO: 39.6 % (ref 36.5–49.3)
HGB BLD-MCNC: 12.8 G/DL (ref 12–17)
IMM GRANULOCYTES # BLD AUTO: 0.03 THOUSAND/UL (ref 0–0.2)
IMM GRANULOCYTES NFR BLD AUTO: 0 % (ref 0–2)
INR PPP: 0.93 (ref 0.85–1.19)
LYMPHOCYTES # BLD AUTO: 2.21 THOUSANDS/ÂΜL (ref 0.6–4.47)
LYMPHOCYTES NFR BLD AUTO: 23 % (ref 14–44)
MCH RBC QN AUTO: 28.4 PG (ref 26.8–34.3)
MCHC RBC AUTO-ENTMCNC: 32.3 G/DL (ref 31.4–37.4)
MCV RBC AUTO: 88 FL (ref 82–98)
MONOCYTES # BLD AUTO: 0.71 THOUSAND/ÂΜL (ref 0.17–1.22)
MONOCYTES NFR BLD AUTO: 7 % (ref 4–12)
NEUTROPHILS # BLD AUTO: 6.35 THOUSANDS/ÂΜL (ref 1.85–7.62)
NEUTS SEG NFR BLD AUTO: 67 % (ref 43–75)
NRBC BLD AUTO-RTO: 0 /100 WBCS
PLATELET # BLD AUTO: 249 THOUSANDS/UL (ref 149–390)
PMV BLD AUTO: 8.7 FL (ref 8.9–12.7)
POTASSIUM SERPL-SCNC: 4.2 MMOL/L (ref 3.5–5.3)
PROTHROMBIN TIME: 13 SECONDS (ref 12.3–15)
RBC # BLD AUTO: 4.51 MILLION/UL (ref 3.88–5.62)
SODIUM SERPL-SCNC: 136 MMOL/L (ref 135–147)
WBC # BLD AUTO: 9.55 THOUSAND/UL (ref 4.31–10.16)

## 2024-08-05 PROCEDURE — 80048 BASIC METABOLIC PNL TOTAL CA: CPT | Performed by: EMERGENCY MEDICINE

## 2024-08-05 PROCEDURE — 71045 X-RAY EXAM CHEST 1 VIEW: CPT

## 2024-08-05 PROCEDURE — 36415 COLL VENOUS BLD VENIPUNCTURE: CPT | Performed by: EMERGENCY MEDICINE

## 2024-08-05 PROCEDURE — 85025 COMPLETE CBC W/AUTO DIFF WBC: CPT | Performed by: EMERGENCY MEDICINE

## 2024-08-05 PROCEDURE — 99223 1ST HOSP IP/OBS HIGH 75: CPT | Performed by: INTERNAL MEDICINE

## 2024-08-05 PROCEDURE — 85610 PROTHROMBIN TIME: CPT | Performed by: EMERGENCY MEDICINE

## 2024-08-05 PROCEDURE — 85652 RBC SED RATE AUTOMATED: CPT | Performed by: EMERGENCY MEDICINE

## 2024-08-05 PROCEDURE — 85730 THROMBOPLASTIN TIME PARTIAL: CPT | Performed by: EMERGENCY MEDICINE

## 2024-08-05 PROCEDURE — 99285 EMERGENCY DEPT VISIT HI MDM: CPT | Performed by: EMERGENCY MEDICINE

## 2024-08-05 PROCEDURE — 73630 X-RAY EXAM OF FOOT: CPT

## 2024-08-05 PROCEDURE — 99284 EMERGENCY DEPT VISIT MOD MDM: CPT

## 2024-08-05 PROCEDURE — 82948 REAGENT STRIP/BLOOD GLUCOSE: CPT

## 2024-08-05 PROCEDURE — 86140 C-REACTIVE PROTEIN: CPT | Performed by: EMERGENCY MEDICINE

## 2024-08-05 RX ORDER — INSULIN GLARGINE 100 [IU]/ML
15 INJECTION, SOLUTION SUBCUTANEOUS
Status: DISCONTINUED | OUTPATIENT
Start: 2024-08-06 | End: 2024-08-09

## 2024-08-05 RX ORDER — ACETAMINOPHEN 325 MG/1
650 TABLET ORAL EVERY 6 HOURS PRN
Status: DISCONTINUED | OUTPATIENT
Start: 2024-08-05 | End: 2024-08-14

## 2024-08-05 RX ORDER — SODIUM HYPOCHLORITE 5 MG/ML
1 SOLUTION TOPICAL DAILY
Status: DISCONTINUED | OUTPATIENT
Start: 2024-08-06 | End: 2024-08-09

## 2024-08-05 RX ORDER — LISINOPRIL 5 MG/1
2.5 TABLET ORAL DAILY
Status: DISCONTINUED | OUTPATIENT
Start: 2024-08-06 | End: 2024-08-16 | Stop reason: HOSPADM

## 2024-08-05 RX ORDER — INSULIN LISPRO 100 [IU]/ML
8 INJECTION, SOLUTION INTRAVENOUS; SUBCUTANEOUS
Status: DISCONTINUED | OUTPATIENT
Start: 2024-08-06 | End: 2024-08-07

## 2024-08-05 RX ORDER — ATORVASTATIN CALCIUM 40 MG/1
80 TABLET, FILM COATED ORAL
Status: DISCONTINUED | OUTPATIENT
Start: 2024-08-06 | End: 2024-08-16 | Stop reason: HOSPADM

## 2024-08-05 RX ORDER — INSULIN LISPRO 100 [IU]/ML
14 INJECTION, SOLUTION INTRAVENOUS; SUBCUTANEOUS
Status: DISCONTINUED | OUTPATIENT
Start: 2024-08-06 | End: 2024-08-05

## 2024-08-05 RX ORDER — ENOXAPARIN SODIUM 100 MG/ML
40 INJECTION SUBCUTANEOUS DAILY
Status: DISCONTINUED | OUTPATIENT
Start: 2024-08-06 | End: 2024-08-16 | Stop reason: HOSPADM

## 2024-08-05 RX ORDER — ONDANSETRON 2 MG/ML
4 INJECTION INTRAMUSCULAR; INTRAVENOUS EVERY 6 HOURS PRN
Status: DISCONTINUED | OUTPATIENT
Start: 2024-08-05 | End: 2024-08-16 | Stop reason: HOSPADM

## 2024-08-05 NOTE — ED PROVIDER NOTES
History  Chief Complaint   Patient presents with    Wound Check     Patient currently being seen by wound care for wound to left foot. Wound nurse at rehab to change dressing and noted there was maggots in wound. Patient currently getting antibiotic in picc     64-year-old male arrives by ambulance from [place] [facility] [place] for evaluation of his left foot wound.  At dressing change according to an RN onsite maggots were seen in the wound patient was also noted to have a maggot infestation at the end of June.  Patient states the dressing was last changed 3 days ago he is not having any fever or chills he has been able to tolerate a diet he is denying any worsening leg swelling or redness there is no history of trauma patient is currently on Augmentin and IV daptomycin for period of 6 weeks since discharge on 7/18/2024.  Patient's last tetanus 1/26/2017.  Patient did have lab work obtained earlier today CBC was unremarkable CMP was unremarkable CK was normal.  Past medical history type 2 diabetes hypertension hyperlipidemia osteomyelitis of the left foot prior maggot infestation hypertension  Past surgical history includes right below the knee amputation he last had a washout of the wound on 7/11/2024 by podiatry.  He did undergo first metatarsal amputation resection on 6/27/2024 by podiatry.        Prior to Admission Medications   Prescriptions Last Dose Informant Patient Reported? Taking?   Cholecalciferol (VITAMIN D3) 1,000 units tablet 8/5/2024  No Yes   Sig: Take 1 tablet (1,000 Units total) by mouth daily   DAPTOmycin (CUBICIN) 50 mL IVPB 8/4/2024  No Yes   Sig: Inject 500 mg into a catheter in a vein over 30 minutes at 100 mL/hr every 24 hours   Dakins, full strength, (DAKIN'S) Unknown  No No   Sig: Apply 1 Application topically daily   Insulin Glargine Solostar (Basaglar KwikPen) 100 UNIT/ML SOPN 8/4/2024  No Yes   Sig: E11.65 inject 25 units daily at bedtime   Patient taking differently: E11.65 inject  15 units daily at bedtime   Insulin Pen Needle 30G X 8 MM MISC Unknown Self No No   Sig: Inject under the skin daily at bedtime   amoxicillin-clavulanate (AUGMENTIN) 875-125 mg per tablet 8/5/2024  No Yes   Sig: Take 1 tablet by mouth every 12 (twelve) hours   aspirin (ECOTRIN LOW STRENGTH) 81 mg EC tablet 8/5/2024 Self Yes Yes   Sig: Take 81 mg by mouth daily Resume on 8/11/17    atorvastatin (LIPITOR) 80 mg tablet 8/5/2024 Self No Yes   Sig: Take 1 tablet (80 mg total) by mouth daily   insulin aspart (NovoLOG FlexPen) 100 UNIT/ML injection pen 8/5/2024  No Yes   Sig: E11.65 inject 14 units before meals plus scale. TDD 75 units   lisinopril (ZESTRIL) 2.5 mg tablet 8/5/2024 Self No Yes   Sig: Take 1 tablet (2.5 mg total) by mouth daily      Facility-Administered Medications: None       Past Medical History:   Diagnosis Date    Diabetes mellitus (HCC)     Hypertension        Past Surgical History:   Procedure Laterality Date    FOOT AMPUTATION Left 6/27/2024    Procedure: AMPUTATION LEFT FOOT 1st METATARSAL RESECTION;  Surgeon: Myron Bhakta DPM;  Location: [facility];  Service: Podiatry    IR PICC PLACEMENT SINGLE LUMEN  7/16/2024    LEG AMPUTATION THROUGH LOWER TIBIA AND FIBULA Right 7/25/2017    Procedure: AMPUTATION BELOW KNEE (BKA);  Surgeon: Mynor Sanchez MD;  [facility]: [facility];  Service: General    AK AMPUTATION FOOT TRANSMETARSAL Right 1/26/2017    Procedure: AMPUTATION TRANSMETATARSAL (TMA); OPEN;  Surgeon: Leonard Lomeli DPM;  [facility];  Service: Podiatry    AK AMPUTATION FOOT TRANSMETARSAL Left 4/26/2024    Procedure: LEFT FOOT PARTIAL FIRST RAY AMPUTATION;  Surgeon: Jennifer Rubalcava DPM;  Location: [facility];  Service: Podiatry    AK AMPUTATION METATARSAL W/TOE SINGLE Left 1/30/2017    Procedure: Left partial 1st ray amputation;  Surgeon: Leonard Lomeli DPM;  [facility];  Service: Podiatry    AK COLONOSCOPY FLX DX W/COLLJ SPEC WHEN PFRMD N/A 11/28/2018    Procedure:  COLONOSCOPY;  Surgeon: González Napier MD;  Location: [facility];  Service: Gastroenterology    WOUND DEBRIDEMENT Right 1/30/2017    Procedure: DEBRIDEMENT FOOT/TOE (WASH OUT) delayed closure, LINDA;  Surgeon: Leonard Lomeli DPM;  Location:  MAIN OR;  Service:     WOUND DEBRIDEMENT Left 7/11/2024    Procedure: DEBRIDEMENT FOOT/TOE (WASH OUT);  Surgeon: Myron Bhakta DPM;  Location: [facility];  Service: Podiatry       Family History   Problem Relation Age of Onset    Diabetes Mother      I have reviewed and agree with the history as documented.    E-Cigarette/Vaping    E-Cigarette Use Never User      E-Cigarette/Vaping Substances    Nicotine No     THC No     CBD No     Flavoring No     Other No     Unknown No      Social History     Tobacco Use    Smoking status: Former     Types: Cigarettes    Smokeless tobacco: Never    Tobacco comments:     quit 9 years ago   Vaping Use    Vaping status: Never Used   Substance Use Topics    Alcohol use: Yes     Alcohol/week: 11.0 standard drinks of alcohol     Types: 6 Cans of beer, 5 Shots of liquor per week     Comment: social ; occasional use as per [employer]    Drug use: No       Review of Systems   Constitutional:  Negative for activity change, appetite change, chills, fatigue and fever.   HENT:  Negative for congestion, ear pain, rhinorrhea, sneezing and sore throat.    Eyes:  Negative for discharge.   Respiratory:  Negative for cough and shortness of breath.    Cardiovascular:  Negative for chest pain and leg swelling.   Gastrointestinal:  Negative for abdominal pain, blood in stool, diarrhea, nausea and vomiting.   Endocrine: Negative for polyuria.   Genitourinary:  Negative for dysuria, frequency and urgency.   Musculoskeletal:  Negative for back pain and myalgias.   Skin:  Positive for wound. Negative for rash.   Neurological:  Negative for dizziness, weakness and numbness.   Hematological:  Negative for adenopathy.   Psychiatric/Behavioral:  Negative for  confusion.    All other systems reviewed and are negative.      Physical Exam  Physical Exam  Vitals and nursing note reviewed.   Constitutional:       General: He is not in acute distress.     Appearance: He is well-developed. He is not ill-appearing, toxic-appearing or diaphoretic.   HENT:      Head: Normocephalic and atraumatic.      Right Ear: Tympanic membrane and external ear normal.      Left Ear: External ear normal.      Nose: Nose normal.      Mouth/Throat:      Mouth: Oropharynx is clear and moist. Mucous membranes are moist.      Pharynx: No oropharyngeal exudate or posterior oropharyngeal erythema.   Eyes:      General: No scleral icterus.        Right eye: No discharge.         Left eye: No discharge.      Extraocular Movements: Extraocular movements intact and EOM normal.      Conjunctiva/sclera: Conjunctivae normal.      Pupils: Pupils are equal, round, and reactive to light.   Cardiovascular:      Rate and Rhythm: Normal rate and regular rhythm.      Heart sounds: Normal heart sounds.   Pulmonary:      Effort: Pulmonary effort is normal. No respiratory distress.      Breath sounds: No stridor. No wheezing, rhonchi or rales.   Abdominal:      General: Bowel sounds are normal. There is no distension.      Palpations: Abdomen is soft. There is no mass.      Tenderness: There is no abdominal tenderness. There is no right CVA tenderness, left CVA tenderness, guarding or rebound.      Comments: Back: no mildline or CVA tenderness   Musculoskeletal:         General: No tenderness, deformity or edema. Normal range of motion.      Cervical back: Normal range of motion and neck supple.      Comments: Rt BKA'; biphasic left DP pulse; venous statis changes to LLE.    Lymphadenopathy:      Cervical: No cervical adenopathy.   Skin:     General: Skin is warm and dry.      Comments: Left foot wound with good granulation tissue. Please see media tab for futher depiction of wound   Neurological:      Mental Status:  He is alert and oriented to person, place, and time.      Cranial Nerves: No cranial nerve deficit.      Sensory: No sensory deficit.      Motor: No weakness or abnormal muscle tone.      Coordination: Coordination normal.      Deep Tendon Reflexes: Reflexes normal.   Psychiatric:         Mood and Affect: Mood and affect and mood normal.         Vital Signs  ED Triage Vitals [08/05/24 1424]   Temperature Pulse Respirations Blood Pressure SpO2   98.2 °F (36.8 °C) 86 18 136/63 97 %      Temp Source Heart Rate Source Patient Position - Orthostatic VS BP Location FiO2 (%)   Tympanic Monitor Sitting Right arm --      Pain Score       No Pain           Vitals:    08/05/24 1424 08/05/24 1900 08/05/24 2030 08/05/24 2155   BP: 136/63 134/70 142/65 131/72   Pulse: 86 71 71 77   Patient Position - Orthostatic VS: Sitting   Lying         Visual Acuity      ED Medications  Medications - No data to display    Diagnostic Studies  Results Reviewed       Procedure Component Value Units Date/Time    Sedimentation rate, automated [[id number]]  (Abnormal) Collected: 08/05/24 1823    Lab Status: Final result Specimen: Blood from Central Venous Line Updated: 08/05/24 1900     Sed Rate 53 mm/hour     Basic metabolic panel [[id number]]  (Abnormal) Collected: 08/05/24 1823    Lab Status: Final result Specimen: Blood from Line, Venous Updated: 08/05/24 1845     Sodium 136 mmol/L      Potassium 4.2 mmol/L      Chloride 102 mmol/L      CO2 27 mmol/L      ANION GAP 7 mmol/L      BUN 12 mg/dL      Creatinine 0.75 mg/dL      Glucose 224 mg/dL      Calcium 9.3 mg/dL      eGFR 96 ml/min/1.73sq m     Narrative:      National Kid[employer] Foundation guidelines for Chronic Kidney Disease (CKD):     Stage 1 with normal or high GFR (GFR > 90 mL/min/1.73 square meters)    Stage 2 Mild CKD (GFR = 60-89 mL/min/1.73 square meters)    Stage 3A Moderate CKD (GFR = 45-59 mL/min/1.73 square meters)    Stage 3B Moderate CKD (GFR = 30-44 mL/min/1.73 square  meters)    Stage 4 Severe CKD (GFR = 15-29 mL/min/1.73 square meters)    Stage 5 End Stage CKD (GFR <15 mL/min/1.73 square meters)  Note: GFR calculation is accurate only with a steady state creatinine    C-reactive protein [[id number]]  (Abnormal) Collected: 08/05/24 1823    Lab Status: Final result Specimen: Blood from Line, Venous Updated: 08/05/24 1845     CRP 15.7 mg/L     Protime-INR [[id number]]  (Normal) Collected: 08/05/24 1823    Lab Status: Final result Specimen: Blood from Central Venous Line Updated: 08/05/24 1840     Protime 13.0 seconds      INR 0.93    Narrative:      INR Therapeutic Range    Indication                                             INR Range      Atrial Fibrillation                                               2.0-3.0  Hypercoagulable State                                    2.0.2.3  Left Ventricular Asist Device                            2.0-3.0  Mechanical Heart Valve                                  -    Aortic(with afib, MI, embolism, HF, LA enlargement,    and/or coagulopathy)                                     2.0-3.0 (2.5-3.5)     Mitral                                                             2.5-3.5  Prosthetic/Bioprosthetic Heart Valve               2.0-3.0  Venous thromboembolism (VTE: VT, PE        2.0-3.0    APTT [[id number]]  (Normal) Collected: 08/05/24 1823    Lab Status: Final result Specimen: Blood from Central Venous Line Updated: 08/05/24 1840     PTT 26 seconds     CBC and differential [[id number]]  (Abnormal) Collected: 08/05/24 1823    Lab Status: Final result Specimen: Blood from Central Venous Line Updated: 08/05/24 1830     WBC 9.55 Thousand/uL      RBC 4.51 Million/uL      Hemoglobin 12.8 g/dL      Hematocrit 39.6 %      MCV 88 fL      MCH 28.4 pg      MCHC 32.3 g/dL      RDW 13.6 %      MPV 8.7 fL      Platelets 249 Thousands/uL      nRBC 0 /100 WBCs      Segmented % 67 %      Immature Grans % 0 %      Lymphocytes % 23 %      Monocytes % 7 %       Eosinophils Relative 2 %      Basophils Relative 1 %      Absolute Neutrophils 6.35 Thousands/µL      Absolute Immature Grans 0.03 Thousand/uL      Absolute Lymphocytes 2.21 Thousands/µL      Absolute Monocytes 0.71 Thousand/µL      Eosinophils Absolute 0.19 Thousand/µL      Basophils Absolute 0.06 Thousands/µL                    XR chest 1 view portable   ED Interpretation by Melonie Flynn MD (08/05 1639)   Per my independent interpretation. Radiologist to provide formal read. Picc line SVC/rt atrium similar to prev fluoro images. Rt basilar atelectasis       Final Result by Darwin Vega MD (08/05 2145)      Right PICC is present with the tip at the caval atrial junction level. Areas of platelike atelectasis both lung bases with elevated right hemidiaphragm. Multiple healed left rib fractures            Workstation performed: [id number]         XR foot 3+ views LEFT   ED Interpretation by Melonie Flynn MD (08/05 1637)   Per my independent interpretation. Radiologist to provide formal read. Soft tissue defect s/p 1st metatarsal amp/resection no air in soft tissue      Final Result by Darwin Vega MD (08/05 2149)      Extensive postop changes of the foot. Swelling. New soft tissue calcifications at the site of the first ray amputation, but no soft tissue gas or conclusive evidence of osteomyelitis         Computerized Assisted Algorithm (CAA) may have been used to analyze all applicable images.         Workstation performed: [id number]                    Procedures  Procedures         ED Course  ED Course as of 08/05/24 2340   Mon Aug 05, 2024   1536 Per RN 1 maggot was seen at triage   1552 Patient is declining straight stick for labs awaiting CXR to check picc line placement prior to use    2002 CRP slightly increased from 15.7 up from 11.4 1 month ago. Sed rate decrased from 58 to 53 will contact [facility] for hospitalization for futher wound management   2105 Case discussed  with Dr. Reid hosptialist and Dr. Bhakta of podiatry recommend hospitalization; Dr. Reid recommended observation                                 SBIRT 20yo+      Flowsheet Row Most Recent Value   Initial Alcohol Screen: US AUDIT-C     1. How often do you have a drink containing alcohol? 0 Filed at: 08/05/2024 1425   2. How many drinks containing alcohol do you have on a typical day you are drinking?  0 Filed at: 08/05/2024 1425   3a. Male UNDER 65: How often do you have five or more drinks on one occasion? 0 Filed at: 08/05/2024 1425   3b. FEMALE Any Age, or MALE 65+: How often do you have 4 or more drinks on one occassion? 0 Filed at: 08/05/2024 1425   Audit-C Score 0 Filed at: 08/05/2024 1425   GOVIND: How many times in the past year have you...    Used an illegal drug or used a prescription medication for non-medical reasons? Never Filed at: 08/05/2024 1425                      Medical Decision Making  Mdm: Patient transferred from rehabilitation center for evaluation of his open wound of the left foot this is being followed by podiatry.  He is currently on Augmentin as well as daptomycin based on ID recommendations.  At dressing change today at the rehab center maggots were noted and upon arrival triage nurse with dressing removal noticed maggots in the wound.  Patient is otherwise not demonstrating any signs or symptoms of SIRS.  Will obtain plain film x-rays of the foot to assess for air in the soft tissues.  White cell count earlier today was normal we will recheck inflammatory markers of sed rate most recent was in the 58 in mid July as well as a C-reactive protein of 11.4 and at the end of June.  Patient's tetanus will be updated.    Amount and/or Complexity of Data Reviewed  Labs: ordered.  Radiology: ordered and independent interpretation performed.                 Disposition  Final diagnoses:   Open wound of left foot   Maggot infestation - recurrent     Time reflects when diagnosis was documented in  both MDM as applicable and the Disposition within this note       Time User Action Codes Description Comment    8/5/2024  3:45 PM Melonie Flynn [S91.302A] Open wound of left foot     8/5/2024  3:45 PM Melonie Flynn Add [B87.9] Maggot infestation     8/5/2024  3:45 PM Melonie Flynn Modify [B87.9] Maggot infestation recurrent          ED Disposition       None          Follow-up Information    None         Current Discharge Medication List        CONTINUE these medications which have NOT CHANGED    Details   amoxicillin-clavulanate (AUGMENTIN) 875-125 mg per tablet Take 1 tablet by mouth every 12 (twelve) hours  Qty: 68 tablet, Refills: 0    Associated Diagnoses: Acute osteomyelitis of metatarsal bone of left foot (HCC)      aspirin (ECOTRIN LOW STRENGTH) 81 mg EC tablet Take 81 mg by mouth daily Resume on 8/11/17       atorvastatin (LIPITOR) 80 mg tablet Take 1 tablet (80 mg total) by mouth daily  Qty: 90 tablet, Refills: 3    Associated Diagnoses: Hyperlipidemia, unspecified hyperlipidemia type      Cholecalciferol (VITAMIN D3) 1,000 units tablet Take 1 tablet (1,000 Units total) by mouth daily  Qty: 30 tablet, Refills: 0    Associated Diagnoses: Vitamin D deficiency      DAPTOmycin (CUBICIN) 50 mL IVPB Inject 500 mg into a catheter in a vein over 30 minutes at 100 mL/hr every 24 hours  Qty: 18 g, Refills: 0    Associated Diagnoses: Acute osteomyelitis of metatarsal bone of left foot (HCC)      insulin aspart (NovoLOG FlexPen) 100 UNIT/ML injection pen E11.65 inject 14 units before meals plus scale. TDD 75 units    Associated Diagnoses: Type 2 diabetes mellitus with diabetic polyneuropathy, unspecified whether long term insulin use ([facility])      Insulin Glargine Solostar (Basaglar KwikPen) 100 UNIT/ML SOPN E11.65 inject 25 units daily at bedtime  Qty: 15 mL, Refills: 0    Associated Diagnoses: Type 2 diabetes mellitus with diabetic polyneuropathy, unspecified whether long term insulin use ([facility])       lisinopril (ZESTRIL) 2.5 mg tablet Take 1 tablet (2.5 mg total) by mouth daily  Qty: 90 tablet, Refills: 3    Associated Diagnoses: Hypertension, unspecified type      Dakins, full strength, (DAKIN'S) Apply 1 Application topically daily    Associated Diagnoses: Wound dehiscence; Acute osteomyelitis of metatarsal bone of left foot (HCC)      Insulin Pen Needle 30G X 8 MM MISC Inject under the skin daily at bedtime  Qty: 100 each, Refills: 5    Comments: Please supply with whatever is in stock and insurance covers.  Associated Diagnoses: Type 2 diabetes mellitus without complication, with long-term current use of insulin (HCC)             No discharge procedures on file.    PDMP Review       None            ED Provider  Electronically Signed by             Melonie Flynn MD  08/05/24 5033

## 2024-08-06 LAB
ALBUMIN SERPL BCG-MCNC: 3.5 G/DL (ref 3.5–5)
ALP SERPL-CCNC: 119 U/L (ref 34–104)
ALT SERPL W P-5'-P-CCNC: 9 U/L (ref 7–52)
ANION GAP SERPL CALCULATED.3IONS-SCNC: 8 MMOL/L (ref 4–13)
AST SERPL W P-5'-P-CCNC: 8 U/L (ref 13–39)
BASOPHILS # BLD AUTO: 0.08 THOUSANDS/ÂΜL (ref 0–0.1)
BASOPHILS NFR BLD AUTO: 1 % (ref 0–1)
BILIRUB SERPL-MCNC: 0.38 MG/DL (ref 0.2–1)
BUN SERPL-MCNC: 10 MG/DL (ref 5–25)
CALCIUM SERPL-MCNC: 9 MG/DL (ref 8.4–10.2)
CHLORIDE SERPL-SCNC: 103 MMOL/L (ref 96–108)
CO2 SERPL-SCNC: 26 MMOL/L (ref 21–32)
CREAT SERPL-MCNC: 0.73 MG/DL (ref 0.6–1.3)
EOSINOPHIL # BLD AUTO: 0.38 THOUSAND/ÂΜL (ref 0–0.61)
EOSINOPHIL NFR BLD AUTO: 5 % (ref 0–6)
ERYTHROCYTE [DISTWIDTH] IN BLOOD BY AUTOMATED COUNT: 13.3 % (ref 11.6–15.1)
GFR SERPL CREATININE-BSD FRML MDRD: 97 ML/MIN/1.73SQ M
GLUCOSE SERPL-MCNC: 167 MG/DL (ref 65–140)
GLUCOSE SERPL-MCNC: 185 MG/DL (ref 65–140)
GLUCOSE SERPL-MCNC: 185 MG/DL (ref 65–140)
GLUCOSE SERPL-MCNC: 189 MG/DL (ref 65–140)
GLUCOSE SERPL-MCNC: 205 MG/DL (ref 65–140)
HCT VFR BLD AUTO: 38.2 % (ref 36.5–49.3)
HGB BLD-MCNC: 12.6 G/DL (ref 12–17)
IMM GRANULOCYTES # BLD AUTO: 0.02 THOUSAND/UL (ref 0–0.2)
IMM GRANULOCYTES NFR BLD AUTO: 0 % (ref 0–2)
LYMPHOCYTES # BLD AUTO: 2.28 THOUSANDS/ÂΜL (ref 0.6–4.47)
LYMPHOCYTES NFR BLD AUTO: 30 % (ref 14–44)
MAGNESIUM SERPL-MCNC: 2 MG/DL (ref 1.9–2.7)
MCH RBC QN AUTO: 28.6 PG (ref 26.8–34.3)
MCHC RBC AUTO-ENTMCNC: 33 G/DL (ref 31.4–37.4)
MCV RBC AUTO: 87 FL (ref 82–98)
MONOCYTES # BLD AUTO: 0.71 THOUSAND/ÂΜL (ref 0.17–1.22)
MONOCYTES NFR BLD AUTO: 9 % (ref 4–12)
NEUTROPHILS # BLD AUTO: 4.24 THOUSANDS/ÂΜL (ref 1.85–7.62)
NEUTS SEG NFR BLD AUTO: 55 % (ref 43–75)
NRBC BLD AUTO-RTO: 0 /100 WBCS
PLATELET # BLD AUTO: 254 THOUSANDS/UL (ref 149–390)
PMV BLD AUTO: 8.9 FL (ref 8.9–12.7)
POTASSIUM SERPL-SCNC: 3.7 MMOL/L (ref 3.5–5.3)
PROT SERPL-MCNC: 6.7 G/DL (ref 6.4–8.4)
RBC # BLD AUTO: 4.4 MILLION/UL (ref 3.88–5.62)
SODIUM SERPL-SCNC: 137 MMOL/L (ref 135–147)
WBC # BLD AUTO: 7.71 THOUSAND/UL (ref 4.31–10.16)

## 2024-08-06 PROCEDURE — 99232 SBSQ HOSP IP/OBS MODERATE 35: CPT | Performed by: INTERNAL MEDICINE

## 2024-08-06 PROCEDURE — 83735 ASSAY OF MAGNESIUM: CPT | Performed by: INTERNAL MEDICINE

## 2024-08-06 PROCEDURE — 97167 OT EVAL HIGH COMPLEX 60 MIN: CPT

## 2024-08-06 PROCEDURE — 99024 POSTOP FOLLOW-UP VISIT: CPT | Performed by: PODIATRIST

## 2024-08-06 PROCEDURE — 80053 COMPREHEN METABOLIC PANEL: CPT | Performed by: INTERNAL MEDICINE

## 2024-08-06 PROCEDURE — 87081 CULTURE SCREEN ONLY: CPT | Performed by: INTERNAL MEDICINE

## 2024-08-06 PROCEDURE — 82948 REAGENT STRIP/BLOOD GLUCOSE: CPT

## 2024-08-06 PROCEDURE — 97163 PT EVAL HIGH COMPLEX 45 MIN: CPT

## 2024-08-06 PROCEDURE — 85025 COMPLETE CBC W/AUTO DIFF WBC: CPT | Performed by: INTERNAL MEDICINE

## 2024-08-06 RX ORDER — INSULIN LISPRO 100 [IU]/ML
1-6 INJECTION, SOLUTION INTRAVENOUS; SUBCUTANEOUS
Status: DISCONTINUED | OUTPATIENT
Start: 2024-08-06 | End: 2024-08-16 | Stop reason: HOSPADM

## 2024-08-06 RX ORDER — INSULIN LISPRO 100 [IU]/ML
1-5 INJECTION, SOLUTION INTRAVENOUS; SUBCUTANEOUS
Status: DISCONTINUED | OUTPATIENT
Start: 2024-08-06 | End: 2024-08-16 | Stop reason: HOSPADM

## 2024-08-06 RX ADMIN — INSULIN LISPRO 1 UNITS: 100 INJECTION, SOLUTION INTRAVENOUS; SUBCUTANEOUS at 17:01

## 2024-08-06 RX ADMIN — AMOXICILLIN AND CLAVULANATE POTASSIUM 1 TABLET: 875; 125 TABLET, FILM COATED ORAL at 00:10

## 2024-08-06 RX ADMIN — ASPIRIN 81 MG: 81 TABLET, COATED ORAL at 09:06

## 2024-08-06 RX ADMIN — INSULIN LISPRO 8 UNITS: 100 INJECTION, SOLUTION INTRAVENOUS; SUBCUTANEOUS at 11:46

## 2024-08-06 RX ADMIN — ENOXAPARIN SODIUM 40 MG: 40 INJECTION SUBCUTANEOUS at 09:06

## 2024-08-06 RX ADMIN — AMOXICILLIN AND CLAVULANATE POTASSIUM 1 TABLET: 875; 125 TABLET, FILM COATED ORAL at 09:09

## 2024-08-06 RX ADMIN — DAPTOMYCIN 500 MG: 500 INJECTION, POWDER, LYOPHILIZED, FOR SOLUTION INTRAVENOUS at 04:09

## 2024-08-06 RX ADMIN — CHOLECALCIFEROL TAB 25 MCG (1000 UNIT) 1000 UNITS: 25 TAB at 09:06

## 2024-08-06 RX ADMIN — INSULIN LISPRO 1 UNITS: 100 INJECTION, SOLUTION INTRAVENOUS; SUBCUTANEOUS at 14:00

## 2024-08-06 RX ADMIN — LISINOPRIL 2.5 MG: 5 TABLET ORAL at 09:06

## 2024-08-06 RX ADMIN — INSULIN LISPRO 1 UNITS: 100 INJECTION, SOLUTION INTRAVENOUS; SUBCUTANEOUS at 21:52

## 2024-08-06 RX ADMIN — AMOXICILLIN AND CLAVULANATE POTASSIUM 1 TABLET: 875; 125 TABLET, FILM COATED ORAL at 21:52

## 2024-08-06 RX ADMIN — ATORVASTATIN CALCIUM 80 MG: 40 TABLET, FILM COATED ORAL at 17:01

## 2024-08-06 RX ADMIN — DAPTOMYCIN 500 MG: 500 INJECTION, POWDER, LYOPHILIZED, FOR SOLUTION INTRAVENOUS at 22:29

## 2024-08-06 RX ADMIN — INSULIN GLARGINE 15 UNITS: 100 INJECTION, SOLUTION SUBCUTANEOUS at 00:10

## 2024-08-06 RX ADMIN — INSULIN LISPRO 8 UNITS: 100 INJECTION, SOLUTION INTRAVENOUS; SUBCUTANEOUS at 17:01

## 2024-08-06 RX ADMIN — INSULIN LISPRO 8 UNITS: 100 INJECTION, SOLUTION INTRAVENOUS; SUBCUTANEOUS at 09:09

## 2024-08-06 RX ADMIN — INSULIN GLARGINE 15 UNITS: 100 INJECTION, SOLUTION SUBCUTANEOUS at 21:52

## 2024-08-06 NOTE — CASE MANAGEMENT
Case Management Assessment & Discharge Planning Note    Patient name Art Joseph  Location /402-01 MRN [id number]  : 1960 Date 2024       Current Admission Date: 2024  Current Admission Diagnosis:Maggot infestation   Patient Active Problem List    Diagnosis Date Noted Date Diagnosed    Osteomyelitis of left foot (HCC) 2024     MRSA colonization 2024     Open wound of left foot 2024     Maggot infestation 2024     Elevated platelet count 2024     Candidiasis, intertrigo 2024     Cellulitis of left lower extremity 2024     Class 1 obesity in adult 2021     Diabetic peripheral angiopathy ([facility]) 2020     Vitamin D deficiency 2019     Ambulatory dysfunction 10/17/2017     Hx of right BKA ([facility]) 2017     Primary hypertension 2017     Hypercholesteremia 2017     Acute osteomyelitis of metatarsal bone of left foot ([facility]) 2017     Type 2 diabetes mellitus with hypoglycemia without coma, with long-term current use of insulin ([facility]) 2017     Diabetic neuropathy ([facility]) 2017       LOS (days): 0  Geometric Mean LOS (GMLOS) (days):   Days to GMLOS:     OBJECTIVE:              Current admission status: Observation  Referral Reason:  (discharge planning)    Preferred Pharmacy:   [facility] [place] - [address]  [address] [address]  Phone: [phone] Fax: [phone]    [facility] [place] - [address]  [address] [id number]  Phone: [phone] Fax: [phone]    [id number] [place] [place] [address]  [address] [facility] [address] [address]  Phone: [phone] Fax: [phone]    Primary Care Provider: Kerry Christine MD    Primary Insurance: [facility]  Secondary Insurance:     ASSESSMENT:  CM met with patient at the bedside,baseline information  was obtained.  CM discussed the role of CM in helping the patient develop a discharge plan and assist the patient in carry out their plan.    Patient resides at [facility] .    Baseline assist with ADL's     Wound care and PICC line at [facility].    Active Health Care Proxies       Willis Joseph Health Care Representative -    Primary Phone: [phone] (Home)                 Advance Directives  Does patient have a Health Care POA?: No  Was patient offered paperwork?: Yes (declined)  Does patient currently have a Health Care decision maker?: Yes, please see Health Care Proxy section  Does patient have Advance Directives?: No  Was patient offered paperwork?:  (declined)  Primary Contact: Willis Walker  [phone]         Readmission Root Cause  30 Day Readmission: Yes  Who directed you to return to the hospital?: Other (comment) (staff)  Did you understand whom to contact if you had questions or problems?: Yes  Did you get your prescriptions before you left the hospital?: No  Reason:: Not preferred pharmacy  Were you able to get your prescriptions filled when you left the hospital?: Yes  Did you take your medications as prescribed?: Yes  Were you able to get to your follow-up appointments?: Yes  During previous admission, was a post-acute recommendation made?: Yes  What post-acute resources were offered?: [facility] ([facility])  Patient was readmitted due to: wound maggots returned  Action Plan: return to [facility]    Patient Information  Admitted from:: Facility ([facility])  Mental Status: Alert  During Assessment patient was accompanied by: Not accompanied during assessment  Assessment information provided by:: Patient  Primary Caregiver: Family (staff facility)  Caregiver's Name:: Willis Walker [phone]  Caregiver's Relationship to Patient:: Family Member  Caregiver's Telephone Number:: [phone]  Support Systems:  Family members, Other (Comment) (facility)  County of Residence: [place]  What city do you live in?: [facility]  Home entry access options. Select all that apply.: Elevator  Type of Current Residence: Facility  Upon entering residence, is there a bedroom on the main floor (no further steps)?: Yes  Upon entering residence, is there a bathroom on the main floor (no further steps)?: Yes  Living Arrangements: Other (Comment)  Is patient a ?: No    Activities of Daily Living Prior to Admission  Functional Status: Assistance  Completes ADLs independently?: No  Level of ADL dependence: Assistance  Ambulates independently?: No  Level of ambulatory dependence: Assistance  Does patient use assisted devices?: Yes  Assisted Devices (DME) used: Walker, Straight Cane  Does patient currently own DME?: Yes  What DME does the patient currently own?: Walker, Straight Cane  Does patient have a history of Outpatient Therapy (PT/OT)?: No  Does the patient have a history of Short-Term Rehab?: Yes ([facility] currently)  Does patient have a history of HHC?: Yes ([facility])  Does patient currently have HHC?: No         Patient Information Continued  Income Source: Pension/FPC  Does patient have prescription coverage?: Yes  Does patient receive dialysis treatments?: No  Does patient have a history of substance abuse?: No         Means of Transportation  Means of Transport to Appts::  ([facility])      Social Determinants of Health (SDOH)      Flowsheet Row Most Recent Value   Housing Stability    In the last 12 months, was there a time when you were not able to pay the mortgage or rent on time? N   In the past 12 months, how many times have you moved where you were living? 1   At any time in the past 12 months, were you homeless or living in a shelter (including now)? N   Transportation Needs    In the past 12 months, has lack of transportation kept you from medical appointments or from  getting medications? no   In the past 12 months, has lack of transportation kept you from meetings, work, or from getting things needed for daily living? No   Food Insecurity    Within the past 12 months, you worried that your food would run out before you got the money to buy more. Never true   Within the past 12 months, the food you bought just didn't last and you didn't have money to get more. Never true   Utilities    In the past 12 months has the electric, gas, oil, or water company threatened to shut off services in your home? No            DISCHARGE DETAILS:           Patient to return to [facility].    CM to follow for discharge needs.

## 2024-08-06 NOTE — CONSULTS
[facility] Podiatry - Consultation    Patient Information:   Art Joseph 64 y.o. male MRN: [id number]  Unit/Bed#: 402-01 Encounter: [id number]  PCP: Kerry Christine MD  Date of Admission:  8/5/2024  Date of Consultation: 08/06/24  Requesting Physician: Jerome Gutirerez DO      ASSESSMENT:    Art Joseph is a 64 y.o. male with:    Cellulitis left foot  Uncontrolled diabetes  Likely underlying osteomyelitis left foot    PLAN:    Unfortunately his foot is not responding to the directed treatment very well  He is currently on daptomycin and Augmentin and this was going to go through 8/21.  However he is having worsening drainage new onset erythema and a look to be reinfected  We will obtain a bone scan to characterize any infection here, but he will likely need to be taken for a proximal Lisfranc's versus Chopart's amputation to attempt to salvage partial of the remaining limb to assist in transfer  His long-term prognosis for the lower extremity is not good  Dressings with Adaptic, DSD were ordered  Will follow-up after bone scan, but likely need for surgical intervention      SUBJECTIVE    History of Present Illness:    Art Joseph is a 64 y.o. male with past medical history of diabetes, multiple bouts of osteomyelitis, proximal potation right who presents with worsening erythema, maggots in the wound.  He is currently on IV antibiotics and was recently discharged from the hospital  Review of Systems:    Constitutional: Negative.    HENT: Negative.    Eyes: Negative.    Respiratory: Negative.    Cardiovascular: Negative.    Gastrointestinal: Negative.    Musculoskeletal: neg   Skin: as above   Neurological: numbness   Psych: Negative.     Past Medical and Surgical History:     Past Medical History:   Diagnosis Date    Diabetes mellitus (HCC)     Hypertension        Past Surgical History:   Procedure Laterality Date    FOOT AMPUTATION Left 6/27/2024    Procedure: AMPUTATION LEFT FOOT 1st METATARSAL  RESECTION;  Surgeon: Myron Bhakta DPM;  Location: [facility];  Service: Podiatry    IR PICC PLACEMENT SINGLE LUMEN  7/16/2024    LEG AMPUTATION THROUGH LOWER TIBIA AND FIBULA Right 7/25/2017    Procedure: AMPUTATION BELOW KNEE (BKA);  Surgeon: Mynor Sanchez MD;  Location: [facility];  Service: General    MS AMPUTATION FOOT TRANSMETARSAL Right 1/26/2017    Procedure: AMPUTATION TRANSMETATARSAL (TMA); OPEN;  Surgeon: Leonard Lomeli DPM;  [facility];  Service: Podiatry    MS AMPUTATION FOOT TRANSMETARSAL Left 4/26/2024    Procedure: LEFT FOOT PARTIAL FIRST RAY AMPUTATION;  Surgeon: Jennifer Rubalcava DPM;  [facility]: [facility];  Service: Podiatry    MS AMPUTATION METATARSAL W/TOE SINGLE Left 1/30/2017    Procedure: Left partial 1st ray amputation;  Surgeon: Leonard Lomeli DPM;  [facility];  Service: Podiatry    MS COLONOSCOPY FLX DX W/COLLJ SPEC WHEN PFRMD N/A 11/28/2018    Procedure: COLONOSCOPY;  Surgeon: González Napier MD;  Location: [facility];  Service: Gastroenterology    WOUND DEBRIDEMENT Right 1/30/2017    Procedure: DEBRIDEMENT FOOT/TOE (WASH OUT) delayed closure, LINDA;  Surgeon: Leonard Lomeli DPM;  [facility];  Service:     WOUND DEBRIDEMENT Left 7/11/2024    Procedure: DEBRIDEMENT FOOT/TOE (WASH OUT);  Surgeon: Myron Bhakta DPM;  [facility]: [facility];  Service: Podiatry       Meds/Allergies:      Medications Prior to Admission:     amoxicillin-clavulanate (AUGMENTIN) 875-125 mg per tablet    aspirin (ECOTRIN LOW STRENGTH) 81 mg EC tablet    atorvastatin (LIPITOR) 80 mg tablet    Cholecalciferol (VITAMIN D3) 1,000 units tablet    DAPTOmycin (CUBICIN) 50 mL IVPB    insulin aspart (NovoLOG FlexPen) 100 UNIT/ML injection pen    Insulin Glargine Solostar (Basaglar KwikPen) 100 UNIT/ML SOPN    lisinopril (ZESTRIL) 2.5 mg tablet    Dakins, full strength, (DAKIN'S)    Insulin Pen Needle 30G X 8 MM MISC    No Known Allergies    Social History:     Marital Status:  "Single    Substance Use History:   Social History     Substance and Sexual Activity   Alcohol Use Yes    Alcohol/week: 11.0 standard drinks of alcohol    Types: 6 Cans of beer, 5 Shots of liquor per week    Comment: social ; occasional use as per [employer]     Social History     Tobacco Use   Smoking Status Former    Types: Cigarettes   Smokeless Tobacco Never   Tobacco Comments    quit 9 years ago     Social History     Substance and Sexual Activity   Drug Use No       Family History:    Family History   Problem Relation Age of Onset    Diabetes Mother          OBJECTIVE:    Vitals:   Blood Pressure: 144/82 (08/06/24 0717)  Pulse: 77 (08/06/24 0717)  Temperature: 97.8 °F (36.6 °C) (08/06/24 0717)  Temp Source: Oral (08/05/24 2155)  Respirations: 18 (08/06/24 0717)  Height: 5' 10\" (177.8 cm) (08/05/24 2155)  Weight - Scale: 99.6 kg (219 lb 9.3 oz) (08/05/24 2155)  SpO2: 96 % (08/06/24 0717)    Physical Exam:     General Appearance: Alert, cooperative, no distress.  HEENT: Head normocephalic, atraumatic, without obvious abnormality.  Heart: Normal rate and rhythm.  Lungs: Non-labored breathing. No respiratory distress.  Abdomen: Without distension.  Psychiatric: AAOx3  Lower Extremity:  Vascular:   There is increased drainage from the left foot with substantial for this cellulitis and nonblanchable erythema to the periwound.  Foot is warm to touch.  Swollen.  There is probe to bone central wound.  Mixed fibrogranular base mild malodor       additional Data:     Lab Results: I have personally reviewed pertinent labs including:    Results from last 7 days   Lab Units 08/06/24  0545   WBC Thousand/uL 7.71   HEMOGLOBIN g/dL 12.6   HEMATOCRIT % 38.2   PLATELETS Thousands/uL 254   SEGS PCT % 55   LYMPHO PCT % 30   MONO PCT % 9   EOS PCT % 5     Results from last 7 days   Lab Units 08/06/24  0545   POTASSIUM mmol/L 3.7   CHLORIDE mmol/L 103   CO2 mmol/L 26   BUN mg/dL 10   CREATININE mg/dL 0.73   CALCIUM mg/dL 9.0   ALK " "PHOS U/L 119*   ALT U/L 9   AST U/L 8*     Results from last 7 days   Lab Units 08/05/24  1823   INR  0.93       Cultures: I have personally reviewed pertinent cultures including:              Imaging: I have personally reviewed pertinent films in PACS.  EKG, Pathology, and Other Studies: I have personally reviewed pertinent reports.        ** Please Note: Portions of the record may have been created with voice recognition software. Occasional wrong word or \"sound a like\" substitutions may have occurred due to the inherent limitations of voice recognition software. Read the chart carefully and recognize, using context, where substitutions have occurred. **    "

## 2024-08-06 NOTE — ASSESSMENT & PLAN NOTE
Lab Results   Component Value Date    HGBA1C 9.7 (H) 06/25/2024       Recent Labs     08/05/24  2316 08/06/24  0817 08/06/24  1110   POCGLU 181* 167* 185*         Blood Sugar Average: Last 72 hrs:  (P) [id number]    Utilize Lantus 15 Units SQ QHS and Humalog 8 Units TID with meals  ISS with blood glucose monitoring ACHS  Hypoglycemia protocol  Continue lisinopril for renal protection  Outpatient Endocrinology evaluation

## 2024-08-06 NOTE — PLAN OF CARE
Problem: OCCUPATIONAL THERAPY ADULT  Goal: Performs self-care activities at highest level of function for planned discharge setting.  See evaluation for individualized goals.  Description: Treatment Interventions: ADL retraining, Functional transfer training, UE strengthening/ROM, Endurance training, Patient/family training, Energy conservation, Activityengagement, Compensatory technique education       See flowsheet documentation for full assessment, interventions and recommendations.   Note: Limitation: Decreased ADL status, Decreased UE strength, Decreased Safe judgement during ADL, Decreased cognition, Decreased endurance, Decreased high-level ADLs  Prognosis: Good  Assessment: Pt is a 64 y.o. male seen for OT evaluation s/p admit to [facility] on 8/5/2024 w/ Aditya johnson.  Comorbidities affecting pt's functional performance at time of assessment include: type 2 diabetes, acute osteomyelitis of metatarsal bone, HTN, diabetic peripheral angiopathy, diabetic neuropathy, vit D defiency. Personal factors affecting pt at time of IE include:limited home support, difficulty performing ADLS, difficulty performing IADLS , decreased initiation and engagement , and health management . Prior to admission, pt was I with ADLs and required A with IADLs. Pt admitted to hospital from [facility] for PAR services. Upon evaluation: the following deficits impact occupational performance: weakness, decreased strength, decreased balance, decreased tolerance, impaired FMC, impaired problem solving, and decreased safety awareness. Pt to benefit from continued skilled OT tx while in the hospital to address deficits as defined above and maximize level of functional independence w ADL's and functional mobility. Occupational Performance areas to address include: grooming, bathing/shower, toilet hygiene, dressing, functional mobility, community mobility, and clothing management. From OT standpoint, recommendation at time of d/c  would with moderate intensity OT resources.     Rehab Resource Intensity Level, OT: II (Moderate Resource Intensity) (return to [place])     Ama Owusu OTR/L

## 2024-08-06 NOTE — ASSESSMENT & PLAN NOTE
Pt with history of poorly controlled DM with recent hospitalization for LT foot DM ulcer and osteomyelitis s/p amputation of the LT first metatarsal on 6/27 who was recently admitted 7/8 and discharged 7/18 to [facility] on IV abx. He was sent to the ED by the nursing home for wound check as was found with maggots in his open wound.   During his last admission, he was seen by podiatry and taken for wound debridement and washout on 7/11/2024.   Wound culture was positive for MRSA as well as Proteus vulgaris.  During last hospitalization was initially was on IV Vancomycin and Rocephin but was later final course of abx recommended changed to IV Daptomycin and oral Augmentin to be completed from day of washout through 8/21/2024.   Will consult podiatry  Will see in the AM  Possible exploration of wound at bedside  May need a bone scan  Continue with IV abx with Daptomycin and po augmentin to complete through 8/21/24  Surgical path 7/11/24 showed benign bone with mild chronic marrow inflammation and reactive changes.    S/p PICC 7/16  Final ID recs last admit  Start PO Augmentin 875mg BID plus IV daptomycin 6mg/kg ABW x 6 weeks from bone resection through 8/21/24  Follow-up on bone margin cultures/path  Follow-up outpatient with ID in 2 weeks - okay for virtual   Weekly CBC, CMP and CK on IV antibiotics  Outpatient f/u with podiatry

## 2024-08-06 NOTE — OCCUPATIONAL THERAPY NOTE
Occupational Therapy Evaluation     Patient Name: Art Joseph  Today's Date: 8/6/2024  Problem List  Principal Problem:    Aditya johsnon  Active Problems:    Type 2 diabetes mellitus with hypoglycemia without coma, with long-term current use of insulin (HCC)    Acute osteomyelitis of metatarsal bone of left foot (HCC)    Ambulatory dysfunction    Primary hypertension    Diabetic peripheral angiopathy (HCC)    Open wound of left foot    Past Medical History  Past Medical History:   Diagnosis Date    Diabetes mellitus (HCC)     Hypertension      Past Surgical History  Past Surgical History:   Procedure Laterality Date    FOOT AMPUTATION Left 6/27/2024    Procedure: AMPUTATION LEFT FOOT 1st METATARSAL RESECTION;  Surgeon: Myron Bhakta DPM;  Location: [facility];  Service: Podiatry    IR PICC PLACEMENT SINGLE LUMEN  7/16/2024    LEG AMPUTATION THROUGH LOWER TIBIA AND FIBULA Right 7/25/2017    Procedure: AMPUTATION BELOW KNEE (BKA);  Surgeon: Mynor Sanchez MD;  [facility]: [facility];  Service: General    WA AMPUTATION FOOT TRANSMETARSAL Right 1/26/2017    Procedure: AMPUTATION TRANSMETATARSAL (TMA); OPEN;  Surgeon: Leonard Lomeli DPM;  [facility];  Service: Podiatry    WA AMPUTATION FOOT TRANSMETARSAL Left 4/26/2024    Procedure: LEFT FOOT PARTIAL FIRST RAY AMPUTATION;  Surgeon: Jennifer Rubalcava DPM;  [facility];  Service: Podiatry    WA AMPUTATION METATARSAL W/TOE SINGLE Left 1/30/2017    Procedure: Left partial 1st ray amputation;  Surgeon: Leonard Lomeli DPM;  [facility];  Service: Podiatry    WA COLONOSCOPY FLX DX W/COLLJ SPEC WHEN PFRMD N/A 11/28/2018    Procedure: COLONOSCOPY;  Surgeon: González Napier MD;  Location: [facility];  Service: Gastroenterology    WOUND DEBRIDEMENT Right 1/30/2017    Procedure: DEBRIDEMENT FOOT/TOE (WASH OUT) delayed closure, LINDA;  Surgeon: Leonard Lomeli DPM;  [facility];  Service:     WOUND DEBRIDEMENT Left 7/11/2024    Procedure:  "DEBRIDEMENT FOOT/TOE (WASH OUT);  Surgeon: Myron Bhakta DPM;  [facility];  Service: Podiatry           08/06/24 0817   OT Last Visit   OT Visit Date 08/06/24   Note Type   Note type Evaluation   Additional Comments Pt seen as a co-eval with PT due to the patient's co-morbidities, clinically unstable presentation, and present impairments which are a regression from the patient's baseline.   Pain Assessment   Pain Assessment Tool 0-10   Pain Score No Pain   Restrictions/Precautions   Weight Bearing Precautions Per Order No   Braces or Orthoses   (L surgical shoe; R prosthesis)   Other Precautions Fall Risk  (decreased skin integrity)   Home Living   Type of Home Apartment   Home Layout One level   Bathroom Shower/Tub Tub/shower unit   Bathroom Toilet Standard   Bathroom Equipment Shower chair   Bathroom Accessibility Accessible   Home Equipment Cane  (quad cane at baseline)   Additional Comments Pt admitted to the hospital from [place], where patient was receiving PAR services   Prior Function   Level of [place] Independent with ADLs;Independent with functional mobility;Needs assistance with IADLS   Lives With Facility staff   Receives Help From Other (Comment)  (facility staff)   IADLs Family/Friend/Other provides transportation;Family/Friend/Other provides meals;Family/Friend/Other provides medication management   Falls in the last 6 months 0  (pt denies)   Subjective   Subjective \"I have been walking down to therapy\"   ADL   Where Assessed Edge of bed   UB Bathing Assistance 5  Supervision/Setup   LB Bathing Assistance 4  Minimal Assistance   UB Dressing Assistance 5  Supervision/Setup   LB Dressing Assistance 3  Moderate Assistance   LB Dressing Deficit Don/doff L shoe  (to don L surgical shoe; pt demonstrated ability for cross leg technique to don shoe but required A for fasteners and proper positioning)   Bed Mobility   Additional Comments DNT; pt seated on EOB upon arrival and " conclusion of session   Transfers   Sit to Stand 5  Supervision   Additional items Increased time required   Stand to Sit 5  Supervision   Additional items Increased time required   Additional Comments Quad cane use   Functional Mobility   Functional Mobility 5  Supervision   Additional Comments Pt completed long distance mobility ~120 ft w/ quad cane at S level. No significant LOB observed, moderate instability   Additional items   (quad cane)   Balance   Static Sitting Good   Dynamic Sitting Good   Static Standing Fair +   Dynamic Standing Fair   Ambulatory Fair -   Activity Tolerance   Activity Tolerance Patient limited by fatigue   Medical Staff Made Aware Yes, CM made aware of session outcomes   Nurse Made Aware Yes, nursing staff made aware of session outcomes   RUE Assessment   RUE Assessment WFL   RUE Strength   RUE Overall Strength   (3/5)   LUE Assessment   LUE Assessment WFL   LUE Strength   LUE Overall Strength   (3/5)   Hand Function   Gross Motor Coordination Functional   Fine Motor Coordination Impaired   Hand Function Comments impaired FMC with fasteners   Psychosocial   Psychosocial (WDL) WDL   Cognition   Overall Cognitive Status WFL   Arousal/Participation Alert;Responsive;Cooperative   Attention Within functional limits   Orientation Level Oriented X4   Memory Decreased recall of precautions;Decreased recall of recent events   Following Commands Follows one step commands without difficulty   Comments Pt agreeable to OT evaluation, pleasant   Assessment   Limitation Decreased ADL status;Decreased UE strength;Decreased Safe judgement during ADL;Decreased cognition;Decreased endurance;Decreased high-level ADLs   Prognosis Good   Assessment Pt is a 64 y.o. male seen for OT evaluation s/p admit to [facility] on 8/5/2024 w/ Maggot infestation.  Comorbidities affecting pt's functional performance at time of assessment include: type 2 diabetes, acute osteomyelitis of metatarsal bone, HTN, diabetic  peripheral angiopathy, diabetic neuropathy, vit D defiency. Personal factors affecting pt at time of IE include:limited home support, difficulty performing ADLS, difficulty performing IADLS , decreased initiation and engagement , and health management . Prior to admission, pt was I with ADLs and required A with IADLs. Pt admitted to hospital from [place] for PAR services. Upon evaluation: the following deficits impact occupational performance: weakness, decreased strength, decreased balance, decreased tolerance, impaired FMC, impaired problem solving, and decreased safety awareness. Pt to benefit from continued skilled OT tx while in the hospital to address deficits as defined above and maximize level of functional independence w ADL's and functional mobility. Occupational Performance areas to address include: grooming, bathing/shower, toilet hygiene, dressing, functional mobility, community mobility, and clothing management. From OT standpoint, recommendation at time of d/c would with moderate intensity OT resources.   Goals   Patient Goals to rest   Plan   Treatment Interventions ADL retraining;Functional transfer training;UE strengthening/ROM;Endurance training;Patient/family training;Energy conservation;Activityengagement;Compensatory technique education   Goal Expiration Date 08/20/24   OT Treatment Day 0   OT Frequency 3-5x/wk   Discharge Recommendation   Rehab Resource Intensity Level, OT II (Moderate Resource Intensity)  (return to [place])   Additional Comments  The patient's raw score on the AM-PAC Daily Activity inpatient short form is 18, standardized score is 38.66, less than 39.4. Patients at this level are likely to benefit from discharge with moderate intensity OT resources. Please refer to the recommendation of the Occupational Therapist for safe discharge planning.   AM-PAC Daily Activity Inpatient   Lower Body Dressing 2   Bathing 3   Toileting 3   Upper Body Dressing 3   Grooming 3    Eating 4   Daily Activity Raw Score 18   Daily Activity Standardized Score (Calc for Raw Score >=11) 38.66   AM-PAC Applied Cognition Inpatient   Following a Speech/Presentation 3   Understanding Ordinary Conversation 4   Taking Medications 4   Remembering Where Things Are Placed or Put Away 3   Remembering List of 4-5 Errands 3   Taking Care of Complicated Tasks 3   Applied Cognition Raw Score 20   Applied Cognition Standardized Score 41.76     GOALS:      Pt will achieve the following within specified time frame: LTG  Pt will achieve the following goals within 14 days    *ADL transfers with (S) for inc'd independence with ADLs/purposeful tasks    *UB ADL with (I) for inc'd independence with self cares    *LB ADL with (S) using AE prn for inc'd independence with self cares    *Toileting with (S) for clothing management and hygiene for return to PLOF with personal care    *Increase static stand balance and dyn stand balance to G for inc'd safety with standing purposeful tasks    *Increase stand tolerance x7 m for inc'd tolerance with standing purposeful tasks    *Bed mobility- (S) for inc'd independence to manage own comfort and initiate EOB & OOB purposeful tasks    *Participate in 10-15m UE therex to increase overall stamina/activity tolerance for purposeful tasks      Ama Owusu MS, OTR/L

## 2024-08-06 NOTE — ASSESSMENT & PLAN NOTE
Recent hospitalization from 06/25/2024 through 06/28/2024 at [facility] for osteomyelitis of the left foot requiring a left 1st metatarsal amputation/resection on 06/27/2024 by Podiatry.  The patient was also found to have maggots in his open wound.   Recent hospitalization from 07/08/2024 through 07/18/2024 at [facility] and underwent left foot wound debridement and washout by Podiatry on 07/11/2024   Discharged on PO Augmentin 875mg BID plus IV daptomycin 6mg/kg ABW x 6 weeks from bone resection through 08/21/24 per Infectious Disease's recommendations  Now presents with an infection of the left foot  I appreciate Podiatry's input.  Check a nuclear medicine Ceretec white blood cell scan  The patient will likely need a surgical intervention by Podiatry.  Consult Infectious Disease

## 2024-08-06 NOTE — PROGRESS NOTES
"[facility]  Progress Note  Name: Art Joseph I  MRN: [id number]  Unit/Bed#: 402-01 I Date of Admission: 8/5/2024   Date of Service: 8/6/2024 I Hospital Day: 0    Assessment & Plan   * Acute osteomyelitis of metatarsal bone of left foot (HCC)  Assessment & Plan  Recent hospitalization from 06/25/2024 through 06/28/2024 at [facility] for osteomyelitis of the left foot requiring a left 1st metatarsal amputation/resection on 06/27/2024 by Podiatry.  The patient was also found to have maggots in his open wound.   Recent hospitalization from 07/08/2024 through 07/18/2024 at [facility] and underwent left foot wound debridement and washout by Podiatry on 07/11/2024   Discharged on PO Augmentin 875mg BID plus IV daptomycin 6mg/kg ABW x 6 weeks from bone resection through 08/21/24 per Infectious Disease's recommendations  Now presents with an infection of the left foot  I appreciate Podiatry's input.  Check a nuclear medicine Ceretec white blood cell scan  The patient will likely need a surgical intervention by Podiatry.  Consult Infectious Disease    Open wound of left foot  Assessment & Plan  Please see the assessment and plan for \"Acute osteomyelitis of metatarsal bone of left foot\"      Primary hypertension  Assessment & Plan  Continue lisinopril 2.5 mg PO Qdaily  Follow the blood pressure trend    Ambulatory dysfunction  Assessment & Plan  PT/OT evaluations    Type 2 diabetes mellitus with hyperglycemia, with long-term current use of insulin ([facility])  Assessment & Plan  Lab Results   Component Value Date    HGBA1C 9.7 (H) 06/25/2024       Recent Labs     08/05/24  2316 08/06/24  0817 08/06/24  1110   POCGLU 181* 167* 185*         Blood Sugar Average: Last 72 hrs:  (P) [id number]    Utilize Lantus 15 Units SQ QHS and Humalog 8 Units TID with meals  ISS with blood glucose monitoring ACHS  Hypoglycemia protocol  Continue " "lisinopril for renal protection  Outpatient Endocrinology evaluation        The patient now requires at least a 2-midnight hospitalization for IV antibiotic treatment, for a nuclear medicine Ceretec white blood cell scan, and for a probable surgical intervention by Podiatry.  He was now made INPATIENT admission status.      Subjective/Objective     Subjective:   The patient was seen and examined.  The patient denies any extremity pain.  No chest pain.  No shortness of breath.  No abdominal pain.  No nausea or vomiting.      Objective:  Vitals: Blood pressure 144/82, pulse 77, temperature 97.8 °F (36.6 °C), resp. rate 18, height 5' 10\" (1.778 m), weight 99.6 kg (219 lb 9.3 oz), SpO2 96%.,Body mass index is 31.51 kg/m².      Intake/Output Summary (Last 24 hours) at 8/6/2024 1302  Last data filed at 8/6/2024 1240  Gross per 24 hour   Intake 740 ml   Output 1525 ml   Net -785 ml       Invasive Devices       Peripherally Inserted Central Catheter Line  Duration             PICC Line 07/16/24 Right Basilic 20 days                    Physical Exam:   General:  NAD, follows commands  HEENT:  NC/AT, mucous membranes moist  Neck:  Supple, No JVP elevation  CV:  + S1, + S2, RRR  Pulm:  Lung fields are CTA bilaterally  Abd:  Soft, Non-tender, Non-distended  Ext:  No clubbing/cyanosis/edema, Right-sided BKA (below the knee amputation), Open left foot wound with surrounding erythema on the dorsal aspect of the left foot extending to the left anterior shin region  Skin:  Open left foot wound with surrounding erythema on the dorsal aspect of the left foot extending to the left anterior shin region  Neuro:  Awake, alert, oriented  Psych:  Normal mood and affect      Results from last 7 days   Lab Units 08/06/24 0545 08/05/24  1823   WBC Thousand/uL 7.71 9.55   HEMOGLOBIN g/dL 12.6 12.8   HEMATOCRIT % 38.2 39.6   PLATELETS Thousands/uL 254 249     Results from last 7 days   Lab Units 08/06/24 0545 08/05/24  1823 08/05/24  0632 "   SODIUM mmol/L 137 136 138   POTASSIUM mmol/L 3.7 4.2 4.4   CHLORIDE mmol/L 103 102 102   CO2 mmol/L 26 27 25   BUN mg/dL 10 12 13   CREATININE mg/dL 0.73 0.75 0.8   CALCIUM mg/dL 9.0 9.3 9.0     Results from last 7 days   Lab Units 08/06/24  0545   MAGNESIUM mg/dL 2.0     Results from last 7 days   Lab Units 08/06/24  0545 08/05/24  0632   ALK PHOS U/L 119* 142*   ALT U/L 9 15   AST U/L 8* 10       Lab, Imaging and other studies: I have personally reviewed pertinent reports.    VTE Pharmacologic Prophylaxis: Enoxaparin (Lovenox)  VTE Mechanical Prophylaxis: sequential compression device on the left lower extremity only.  No SCD on the right lower extremity with a history of a right-sided BKA.

## 2024-08-06 NOTE — UTILIZATION REVIEW
OBSERVATION 8-5-24 CHANGED TO INPATIENT 8-6-24 FOR IV ANTIBIOTICS.     Initial Clinical Review    Admission: Date/Time/Statement:   Admission Orders (From admission, onward)       Ordered        08/06/24 0928  INPATIENT ADMISSION  Once            08/05/24 2113  Place in Observation  Once                            ED Arrival Information       Expected   -    Arrival   8/5/2024 14:18    Acuity   Urgent              Means of arrival   Ambulance    Escorted by   [facility]   Hospitalist    Admission type   Emergency              Arrival complaint   Wound Check             Chief Complaint   Patient presents with    Wound Check     Patient currently being seen by wound care for wound to left foot. Wound nurse at rehab to change dressing and noted there was maggots in wound. Patient currently getting antibiotic in picc       Initial Presentation: 64 y.o. male presents to ed from home on 8-5 via ems for evaluation and treatment of left foot wound with maggots.  He was in rehab when nurse changing dressing found maggots in the wound.  Currently on augmentin and iv daptomycin for 6 weeks.  PMHX: DM, HTN, osteomyelitis of L foot, R BKA.  Clinical assessment significant for left foot with good granulation tissue. SED 53, glucose 224, CRP 15.7.  Imaging shows new soft tissue calcifications at site of 1st ray amputation w/o osteomyelitis.  Admit to observation for left foot wound with osteomyelitis.     Date:  8-6-24    Day 2: observation changed to inpatient  The patient now requires at least a 2-midnight hospitalization for IV antibiotic treatment, for a nuclear medicine Ceretec white blood cell scan, and for a probable surgical intervention by Podiatry. He was now made INPATIENT admission status. Plan to check nuclear ceretec white blood cell scan and arterial duplex.  Consult ID : Continue iv daptomycin and augmentin.  Podiatry consult:  this wound is not responding to therapy. Drainage is  worsening, new onset erythema, looks reinfected.  Plan for bone scan to characterize infection. Likely needs chopart's vs lisfranc's amputation.        Date: 8-7-24   Day 3: Has surpassed a 2nd midnight with active treatments and services left foot non healing wound. ID consult completed: prognosis for limb salvage is poor per podiatry. Arterial duplex : LLE diffuse disease without significant focal stenosis.  Ceretec pending. Continue iv daptomycin and po augmentin.           ED Triage Vitals [08/05/24 1424]   Temperature Pulse Respirations Blood Pressure SpO2 Pain Score   98.2 °F (36.8 °C) 86 18 136/63 97 % No Pain     Weight (last 2 days)       Date/Time Weight    08/05/24 21:55:16 99.6 (219.58)    08/05/24 1424 97.5 (215)            Vital Signs (last 3 days)       Date/Time Temp Pulse Resp BP MAP (mmHg) SpO2 O2 Device Patient Position - Orthostatic VS Pain    08/06/24 14:11:55 98.1 °F (36.7 °C) 83 18 97/75 82 94 % -- -- --    08/06/24 0909 -- -- -- -- -- -- None (Room air) -- No Pain    08/06/24 0828 -- -- -- -- -- -- -- -- No Pain    08/06/24 0817 -- -- -- -- -- -- -- -- No Pain    08/06/24 07:17:19 97.8 °F (36.6 °C) 77 18 144/82 103 96 % -- -- --    08/05/24 2210 -- -- -- -- -- 96 % None (Room air) -- --    08/05/24 2157 -- -- -- -- -- -- -- -- No Pain    08/05/24 21:55:16 97.7 °F (36.5 °C) 77 17 131/72 92 96 % None (Room air) Lying --    08/05/24 2030 -- 71 -- 142/65 94 96 % -- -- --    08/05/24 1900 -- 71 -- 134/70 96 97 % -- -- --    08/05/24 1424 98.2 °F (36.8 °C) 86 18 136/63 -- 97 % None (Room air) Sitting No Pain              Pertinent Labs/Diagnostic Test Results:   Radiology:  XR chest 1 view portable      Final  (08/05 2145)      Right PICC is present with the tip at the caval atrial junction level. Areas of platelike atelectasis both lung bases with elevated right hemidiaphragm. Multiple healed left rib fractures         XR foot 3+ views LEFT   Final (08/05 2149)      Extensive postop changes of the  foot. Swelling. New soft tissue calcifications at the site of the first ray amputation, but no soft tissue gas or conclusive evidence of osteomyelitis         NM radrx ceretec abscess localization limited    (Results Pending)       ARTERIAL DUPLEX  8-6-24    BKA     LEFT LOWER LIMB:  Diffuse disease noted throughout the femoral-popliteal and tibio-peroneal  arteries without significant focal stenosis.  Ankle/Brachial index: non-compressible secondary to calcified tibial vessels.  (Prior: 1.55, unreliable)  PVR/ PPG tracings are normal.  Metatarsal and Great toe pressures not obtained due to TMA.    Cardiology:  No orders to display     GI:  No orders to display           Results from last 7 days   Lab Units 08/06/24  0545 08/05/24  1823   WBC Thousand/uL 7.71 9.55   HEMOGLOBIN g/dL 12.6 12.8   HEMATOCRIT % 38.2 39.6   PLATELETS Thousands/uL 254 249   TOTAL NEUT ABS Thousands/µL 4.24 6.35         Results from last 7 days   Lab Units 08/06/24  0545 08/05/24  1823 08/05/24  0632   SODIUM mmol/L 137 136 138   POTASSIUM mmol/L 3.7 4.2 4.4   CHLORIDE mmol/L 103 102 102   CO2 mmol/L 26 27 25   ANION GAP mmol/L 8 7 11   BUN mg/dL 10 12 13   CREATININE mg/dL 0.73 0.75 0.8   EGFR ml/min/1.73sq m 97 96 >90   CALCIUM mg/dL 9.0 9.3 9.0   MAGNESIUM mg/dL 2.0  --   --      Results from last 7 days   Lab Units 08/06/24  0545 08/05/24  0632   AST U/L 8* 10   ALT U/L 9 15   ALK PHOS U/L 119* 142*   TOTAL PROTEIN g/dL 6.7 6.7   ALBUMIN g/dL 3.5 3.7*   TOTAL BILIRUBIN mg/dL 0.38 0.3     Results from last 7 days   Lab Units 08/06/24  1611 08/06/24  1110 08/06/24  0817 08/05/24  2316   POC GLUCOSE mg/dl 189* 185* 167* 181*     Results from last 7 days   Lab Units 08/06/24  0545 08/05/24  1823 08/05/24  0632   GLUCOSE RANDOM mg/dL 185* 224* 138*       Results from last 7 days   Lab Units 08/05/24  1823   PROTIME seconds 13.0   INR  0.93   PTT seconds 26       Results from last 7 days   Lab Units 08/05/24  1823   CRP mg/L 15.7*   SED RATE  mm/hour 53*       ED Treatment-Medication Administration from 08/05/2024 1418 to 08/05/2024 2139       None            Past Medical History:   Diagnosis Date    Diabetes mellitus (HCC)     Hypertension      Present on Admission:   Acute osteomyelitis of metatarsal bone of left foot (HCC)   Ambulatory dysfunction   Diabetic peripheral angiopathy (HCC)   Maggot infestation   Primary hypertension   Open wound of left foot   Osteomyelitis of left foot (HCC)      Admitting Diagnosis: Maggot infestation [B87.9]  Open wound of left foot [S91.302A]  Visit for wound check [Z51.89]  Age/Sex: 64 y.o. male    Scheduled Medications:  amoxicillin-clavulanate, 1 tablet, Oral, Q12H JASPER  aspirin, 81 mg, Oral, Daily  atorvastatin, 80 mg, Oral, Daily With Dinner  Cholecalciferol, 1,000 Units, Oral, Daily  Dakins (full strength), 1 Application, Topical, Daily  DAPTOmycin (CUBICIN) 500 mg in sodium chloride 0.9 % 50 mL IVPB, 6 mg/kg (Adjusted), Intravenous, Q24H  enoxaparin, 40 mg, Subcutaneous, Daily  insulin glargine, 15 Units, Subcutaneous, HS  insulin lispro, 1-5 Units, Subcutaneous, HS  insulin lispro, 1-6 Units, Subcutaneous, TID AC  insulin lispro, 8 Units, Subcutaneous, TID With Meals  lisinopril, 2.5 mg, Oral, Daily      Continuous IV Infusions:     PRN Meds:  acetaminophen, 650 mg, Oral, Q6H PRN  ondansetron, 4 mg, Intravenous, Q6H PRN        IP CONSULT TO PODIATRY  IP CONSULT TO CASE MANAGEMENT    Network Utilization Review Department  ATTENTION: Please call with any questions or concerns to [phone] and carefully listen to the prompts so that you are directed to the right person. All voicemails are confidential.   For Discharge needs, contact Care Management DC Support Team at [phone] opt. [phone]  Send all requests for admission clinical reviews, approved or denied determinations and any other requests to dedicated fax number below belonging to the campus where the patient is receiving treatment. List of dedicated fax  numbers for the Facilities:  FACILITY NAME UR FAX NUMBER   ADMISSION DENIALS (Administrative/Medical Necessity) [phone]   DISCHARGE SUPPORT TEAM (NETWORK) [phone]   PARENT CHILD HEALTH (Maternity/NICU/Pediatrics) [phone]   [facility] [phone]   [facility] [phone]   [facility] [phone]   [facility] [phone]   [facility] [phone]   [facility] [phone]   [facility] [phone]   [facility] [phone]   [facility] [phone]   [facility] [phone]   [facility] [phone]   [facility] [phone]

## 2024-08-06 NOTE — PLAN OF CARE
Problem: PHYSICAL THERAPY ADULT  Goal: Performs mobility at highest level of function for planned discharge setting.  See evaluation for individualized goals.  Description: Treatment/Interventions: Functional transfer training, LE strengthening/ROM, Therapeutic exercise, Endurance training, Bed mobility, Gait training          See flowsheet documentation for full assessment, interventions and recommendations.  Note: Prognosis: Good  Problem List: Decreased strength, Decreased endurance, Impaired balance, Decreased mobility, Decreased skin integrity, Orthopedic restrictions  Assessment: Patient is a 64 y.o. male evaluated by Physical Therapy s/p admit to [facility] on 8/5/2024 with admitting diagnosis of: Maggot infestation, Open wound of left foot, Visit for wound check, and principal problem of: Maggot infestation. PT was consulted to assess patient's functional mobility and discharge needs. Ordered are PT Evaluation and treatment with activity level of: up and OOB as tolerated. Comorbidities affecting patient's physical performance at time of assessment include:  DM, hx of osteomyelitis, ambulatory dysfunction, diabetic peripheral angiopathy, neuropathy, hx of R BKA . Personal factors affecting the patient at time of IE include: ambulating with assistive device, inability to navigate community distances, inability/difficulty performing IADLs, and inability/difficulty performing ADLs. Please locate objective findings from PT assessment regarding body systems outlined above. Upon evaluation, pt able to perform all functional mobility with SUP, SBQC, and increased time. Occasional verbal cuing provided for safety awareness and sequencing. Moderate postural sway demonstrated with mobility d/t leg length discrepancy, however no true LOB experienced. Seated rest break taken following ~120' of ambulation; HR and SpO2 remained WFL on RA throughout. The patient's AM-PAC Basic Mobility Inpatient Short Form  Raw Score is 17. A Raw score of greater than 16 suggests the patient may benefit from discharge to home. Please also refer to the recommendation of the Physical Therapist for safe discharge planning. Co treatment with OT secondary to complex medical condition of pt, possible A of 2 required to achieve and maintain transitional movements, requiring the need of skilled therapeutic intervention of 2 therapists to achieve delivery of services. Pt will benefit from continued PT intervention during LOS to address current deficits, increase LOF, and facilitate safe d/c to next level of care when medically appropriate. D/c recommendation at this time is rehabilitation resource intensity level II.        Rehab Resource Intensity Level, PT: II (Moderate Resource Intensity)    See flowsheet documentation for full assessment.

## 2024-08-06 NOTE — PLAN OF CARE
Problem: Prexisting or High Potential for Compromised Skin Integrity  Goal: Skin integrity is maintained or improved  Description: INTERVENTIONS:  - Identify patients at risk for skin breakdown  - Assess and monitor skin integrity  - Assess and monitor nutrition and hydration status  - Monitor labs   - Assess for incontinence   - Turn and reposition patient  - Assist with mobility/ambulation  - Relieve pressure over bony prominences  - Avoid friction and shearing  - Provide appropriate hygiene as needed including keeping skin clean and dry  - Evaluate need for skin moisturizer/barrier cream  - Collaborate with interdisciplinary team   - Patient/family teaching  - Consider wound care consult   Outcome: Progressing     Problem: PAIN - ADULT  Goal: Verbalizes/displays adequate comfort level or baseline comfort level  Description: Interventions:  - Encourage patient to monitor pain and request assistance  - Assess pain using appropriate pain scale  - Administer analgesics based on type and severity of pain and evaluate response  - Implement non-pharmacological measures as appropriate and evaluate response  - Consider cultural and social influences on pain and pain management  - Notify physician/advanced practitioner if interventions unsuccessful or patient reports new pain  Outcome: Progressing     Problem: INFECTION - ADULT  Goal: Absence or prevention of progression during hospitalization  Description: INTERVENTIONS:  - Assess and monitor for signs and symptoms of infection  - Monitor lab/diagnostic results  - Monitor all insertion sites, i.e. indwelling lines, tubes, and drains  - Monitor endotracheal if appropriate and nasal secretions for changes in amount and color  - [place] appropriate cooling/warming therapies per order  - Administer medications as ordered  - Instruct and encourage patient and family to use good hand hygiene technique  - Identify and instruct in appropriate isolation precautions for  identified infection/condition  Outcome: Progressing     Problem: SAFETY ADULT  Goal: Patient will remain free of falls  Description: INTERVENTIONS:  - Educate patient/family on patient safety including physical limitations  - Instruct patient to call for assistance with activity   - Consult OT/PT to assist with strengthening/mobility   - Keep Call bell within reach  - Keep bed low and locked with side rails adjusted as appropriate  - Keep care items and personal belongings within reach  - Initiate and maintain comfort rounds  - Make Fall Risk Sign visible to staff  - Offer Toileting every 2 Hours, in advance of need  - Initiate/Maintain bed alarm  - Obtain necessary fall risk management equipment:   - Apply yellow socks and bracelet for high fall risk patients  - Consider moving patient to room near nurses station  Outcome: Progressing  Goal: Maintain or return to baseline ADL function  Description: INTERVENTIONS:  -  Assess patient's ability to carry out ADLs; assess patient's baseline for ADL function and identify physical deficits which impact ability to perform ADLs (bathing, care of mouth/teeth, toileting, grooming, dressing, etc.)  - Assess/evaluate cause of self-care deficits   - Assess range of motion  - Assess patient's mobility; develop plan if impaired  - Assess patient's need for assistive devices and provide as appropriate  - Encourage maximum independence but intervene and supervise when necessary  - Involve family in performance of ADLs  - Assess for home care needs following discharge   - Consider OT consult to assist with ADL evaluation and planning for discharge  - Provide patient education as appropriate  Outcome: Progressing  Goal: Maintains/Returns to pre admission functional level  Description: INTERVENTIONS:  - Perform AM-PAC 6 Click Basic Mobility/ Daily Activity assessment daily.  - Set and communicate daily mobility goal to care team and patient/family/caregiver.   - Collaborate with  rehabilitation services on mobility goals if consulted  - Perform Range of Motion 3 times a day.  - Reposition patient every 2 hours.  - Dangle patient 3 times a day  - Stand patient 3 times a day  - Ambulate patient 3 times a day  - Out of bed to chair 3 times a day   - Out of bed for meals 3 times a day  - Out of bed for toileting  - Record patient progress and toleration of activity level   Outcome: Progressing     Problem: DISCHARGE PLANNING  Goal: Discharge to home or other facility with appropriate resources  Description: INTERVENTIONS:  - Identify barriers to discharge w/patient and caregiver  - Arrange for needed discharge resources and transportation as appropriate  - Identify discharge learning needs (meds, wound care, etc.)  - Arrange for interpretive services to assist at discharge as needed  - Refer to Case Management Department for coordinating discharge planning if the patient needs post-hospital services based on physician/advanced practitioner order or complex needs related to functional status, cognitive ability, or social support system  Outcome: Progressing     Problem: Knowledge Deficit  Goal: Patient/family/caregiver demonstrates understanding of disease process, treatment plan, medications, and discharge instructions  Description: Complete learning assessment and assess knowledge base.  Interventions:  - Provide teaching at level of understanding  - Provide teaching via preferred learning methods  Outcome: Progressing     Problem: SKIN/TISSUE INTEGRITY - ADULT  Goal: Skin Integrity remains intact(Skin Breakdown Prevention)  Description: Assess:  -Perform Zaid assessment every shift  -Clean and moisturize skin every shift  -Inspect skin when repositioning, toileting, and assisting with ADLS  -Assess extremities for adequate circulation and sensation     Bed Management:  -Have minimal linens on bed & keep smooth, unwrinkled  -Change linens as needed when moist or perspiring  -Avoid sitting or  lying in one position for more than 2 hours while in bed  -Keep HOB at 30 degrees     Toileting:  -Offer bedside commode  -Assess for incontinence every 2 hours  -Use incontinent care products after each incontinent episode    Activity:  -Mobilize patient 3 times a day  -Encourage activity and walks on unit  -Encourage or provide ROM exercises   -Turn and reposition patient every 2 Hours  -Use appropriate equipment to lift or move patient in bed  -Instruct/ Assist with weight shifting every hour when out of bed in chair  -Consider limitation of chair time 1 hour intervals    Skin Care:  -Avoid use of baby powder, tape, friction and shearing, hot water or constrictive clothing  -Relieve pressure over bony prominences  -Do not massage red bony areas    Next Steps:  -Teach patient strategies to minimize risks   -Consider consults to  interdisciplinary teams  Outcome: Progressing  Goal: Incision(s), wounds(s) or drain site(s) healing without S/S of infection  Description: INTERVENTIONS  - Assess and document dressing, incision, wound bed, drain sites and surrounding tissue  - Provide patient and family education  - Perform skin care/dressing changes every day  Outcome: Progressing  Goal: Pressure injury heals and does not worsen  Description: Interventions:  - Implement low air loss mattress or specialty surface (Criteria met)  - Apply silicone foam dressing  - Instruct/assist with weight shifting every 60 minutes when in chair   - Limit chair time to 1 hour intervals  - Use special pressure reducing interventions when in chair   - Apply fecal or urinary incontinence containment device   - Perform passive or active ROM  - Turn and reposition patient & offload bony prominences every 2 hours   - Utilize friction reducing device or surface for transfers   - Consider consults to  interdisciplinary teams  - Use incontinent care products after each incontinent episode  - Consider nutrition services referral as  needed  Outcome: Progressing     Problem: METABOLIC, FLUID AND ELECTROLYTES - ADULT  Goal: Electrolytes maintained within normal limits  Description: INTERVENTIONS:  - Monitor labs and assess patient for signs and symptoms of electrolyte imbalances  - Administer electrolyte replacement as ordered  - Monitor response to electrolyte replacements, including repeat lab results as appropriate  - Instruct patient on fluid and nutrition as appropriate  Outcome: Progressing     Problem: MUSCULOSKELETAL - ADULT  Goal: Maintain or return mobility to safest level of function  Description: INTERVENTIONS:  - Assess patient's ability to carry out ADLs; assess patient's baseline for ADL function and identify physical deficits which impact ability to perform ADLs (bathing, care of mouth/teeth, toileting, grooming, dressing, etc.)  - Assess/evaluate cause of self-care deficits   - Assess range of motion  - Assess patient's mobility  - Assess patient's need for assistive devices and provide as appropriate  - Encourage maximum independence but intervene and supervise when necessary  - Involve family in performance of ADLs  - Assess for home care needs following discharge   - Consider OT consult to assist with ADL evaluation and planning for discharge  - Provide patient education as appropriate  Outcome: Progressing  Goal: Maintain proper alignment of affected body part  Description: INTERVENTIONS:  - Support, maintain and protect limb and body alignment  - Provide patient/ family with appropriate education  Outcome: Progressing

## 2024-08-06 NOTE — ASSESSMENT & PLAN NOTE
Pt sent for wound evaluation by the wound care nurse after noting maggot in wound after dressing change today. Last wound change was on Saturday. Gets dressing changes in NH every other day.   LT foot Xray showing no subcutaneous gas.   Pt had no fever or chills  WBC was wnl, inflammatory markers similar to previous  Will consult podiatry team for possible debridement in the AM  Continue with IV abx

## 2024-08-06 NOTE — ASSESSMENT & PLAN NOTE
Lab Results   Component Value Date    HGBA1C 9.7 (H) 06/25/2024       Recent Labs     08/05/24  2316   POCGLU 181*       Blood Sugar Average: Last 72 hrs:  (P) 181    Not controlled.   Continue with Lantus 15 unit qhs and 8 units of lispro with meals  SSI and accucheks

## 2024-08-06 NOTE — PLAN OF CARE
Problem: Prexisting or High Potential for Compromised Skin Integrity  Goal: Skin integrity is maintained or improved  Description: INTERVENTIONS:  - Identify patients at risk for skin breakdown  - Assess and monitor skin integrity  - Assess and monitor nutrition and hydration status  - Monitor labs   - Assess for incontinence   - Turn and reposition patient  - Assist with mobility/ambulation  - Relieve pressure over bony prominences  - Avoid friction and shearing  - Provide appropriate hygiene as needed including keeping skin clean and dry  - Evaluate need for skin moisturizer/barrier cream  - Collaborate with interdisciplinary team   - Patient/family teaching  - Consider wound care consult   Outcome: Progressing     Problem: PAIN - ADULT  Goal: Verbalizes/displays adequate comfort level or baseline comfort level  Description: Interventions:  - Encourage patient to monitor pain and request assistance  - Assess pain using appropriate pain scale  - Administer analgesics based on type and severity of pain and evaluate response  - Implement non-pharmacological measures as appropriate and evaluate response  - Consider cultural and social influences on pain and pain management  - Notify physician/advanced practitioner if interventions unsuccessful or patient reports new pain  Outcome: Progressing     Problem: INFECTION - ADULT  Goal: Absence or prevention of progression during hospitalization  Description: INTERVENTIONS:  - Assess and monitor for signs and symptoms of infection  - Monitor lab/diagnostic results  - Monitor all insertion sites, i.e. indwelling lines, tubes, and drains  - Monitor endotracheal if appropriate and nasal secretions for changes in amount and color  - [place] appropriate cooling/warming therapies per order  - Administer medications as ordered  - Instruct and encourage patient and family to use good hand hygiene technique  - Identify and instruct in appropriate isolation precautions for  identified infection/condition  Outcome: Progressing  Goal: Absence of fever/infection during neutropenic period  Description: INTERVENTIONS:  - Monitor WBC    Outcome: Progressing     Problem: SAFETY ADULT  Goal: Patient will remain free of falls  Description: INTERVENTIONS:  - Educate patient/family on patient safety including physical limitations  - Instruct patient to call for assistance with activity   - Consult OT/PT to assist with strengthening/mobility   - Keep Call bell within reach  - Keep bed low and locked with side rails adjusted as appropriate  - Keep care items and personal belongings within reach  - Initiate and maintain comfort rounds  - Make Fall Risk Sign visible to staff  - Offer Toileting every 2 Hours, in advance of need  - Initiate/Maintain bed alarm  - Obtain necessary fall risk management equipment:   - Apply yellow socks and bracelet for high fall risk patients  - Consider moving patient to room near nurses station  Outcome: Progressing  Goal: Maintain or return to baseline ADL function  Description: INTERVENTIONS:  -  Assess patient's ability to carry out ADLs; assess patient's baseline for ADL function and identify physical deficits which impact ability to perform ADLs (bathing, care of mouth/teeth, toileting, grooming, dressing, etc.)  - Assess/evaluate cause of self-care deficits   - Assess range of motion  - Assess patient's mobility; develop plan if impaired  - Assess patient's need for assistive devices and provide as appropriate  - Encourage maximum independence but intervene and supervise when necessary  - Involve family in performance of ADLs  - Assess for home care needs following discharge   - Consider OT consult to assist with ADL evaluation and planning for discharge  - Provide patient education as appropriate  Outcome: Progressing  Goal: Maintains/Returns to pre admission functional level  Description: INTERVENTIONS:  - Perform AM-PAC 6 Click Basic Mobility/ Daily Activity  assessment daily.  - Set and communicate daily mobility goal to care team and patient/family/caregiver.   - Collaborate with rehabilitation services on mobility goals if consulted  - Perform Range of Motion 3 times a day.  - Reposition patient every 2 hours.  - Dangle patient 3 times a day  - Stand patient 3 times a day  - Ambulate patient 3 times a day  - Out of bed to chair 3 times a day   - Out of bed for meals 3 times a day  - Out of bed for toileting  - Record patient progress and toleration of activity level   Outcome: Progressing     Problem: DISCHARGE PLANNING  Goal: Discharge to home or other facility with appropriate resources  Description: INTERVENTIONS:  - Identify barriers to discharge w/patient and caregiver  - Arrange for needed discharge resources and transportation as appropriate  - Identify discharge learning needs (meds, wound care, etc.)  - Arrange for interpretive services to assist at discharge as needed  - Refer to Case Management Department for coordinating discharge planning if the patient needs post-hospital services based on physician/advanced practitioner order or complex needs related to functional status, cognitive ability, or social support system  Outcome: Progressing     Problem: Knowledge Deficit  Goal: Patient/family/caregiver demonstrates understanding of disease process, treatment plan, medications, and discharge instructions  Description: Complete learning assessment and assess knowledge base.  Interventions:  - Provide teaching at level of understanding  - Provide teaching via preferred learning methods  Outcome: Progressing     Problem: SKIN/TISSUE INTEGRITY - ADULT  Goal: Skin Integrity remains intact(Skin Breakdown Prevention)  Description: Assess:  -Perform Zaid assessment every shift  -Clean and moisturize skin every shift  -Inspect skin when repositioning, toileting, and assisting with ADLS  -Assess extremities for adequate circulation and sensation     Bed  Management:  -Have minimal linens on bed & keep smooth, unwrinkled  -Change linens as needed when moist or perspiring  -Avoid sitting or lying in one position for more than 2 hours while in bed  -Keep HOB at 30 degrees     Toileting:  -Offer bedside commode  -Assess for incontinence every 2 hours  -Use incontinent care products after each incontinent episode    Activity:  -Mobilize patient 3 times a day  -Encourage activity and walks on unit  -Encourage or provide ROM exercises   -Turn and reposition patient every 2 Hours  -Use appropriate equipment to lift or move patient in bed  -Instruct/ Assist with weight shifting every hour when out of bed in chair  -Consider limitation of chair time 1 hour intervals    Skin Care:  -Avoid use of baby powder, tape, friction and shearing, hot water or constrictive clothing  -Relieve pressure over bony prominences  -Do not massage red bony areas    Next Steps:  -Teach patient strategies to minimize risks   -Consider consults to  interdisciplinary teams  Outcome: Progressing  Goal: Incision(s), wounds(s) or drain site(s) healing without S/S of infection  Description: INTERVENTIONS  - Assess and document dressing, incision, wound bed, drain sites and surrounding tissue  - Provide patient and family education  - Perform skin care/dressing changes every day  Outcome: Progressing  Goal: Pressure injury heals and does not worsen  Description: Interventions:  - Implement low air loss mattress or specialty surface (Criteria met)  - Apply silicone foam dressing  - Instruct/assist with weight shifting every 60 minutes when in chair   - Limit chair time to 1 hour intervals  - Use special pressure reducing interventions when in chair   - Apply fecal or urinary incontinence containment device   - Perform passive or active ROM  - Turn and reposition patient & offload bony prominences every 2 hours   - Utilize friction reducing device or surface for transfers   - Consider consults to   interdisciplinary teams  - Use incontinent care products after each incontinent episode  - Consider nutrition services referral as needed  Outcome: Progressing

## 2024-08-06 NOTE — PHYSICAL THERAPY NOTE
Physical Therapy Evaluation    Patient Name: Art Joseph    Today's Date: 8/6/2024     Problem List  Principal Problem:    Aditya johnson  Active Problems:    Type 2 diabetes mellitus with hypoglycemia without coma, with long-term current use of insulin (HCC)    Acute osteomyelitis of metatarsal bone of left foot (HCC)    Ambulatory dysfunction    Primary hypertension    Diabetic peripheral angiopathy (HCC)    Open wound of left foot       Past Medical History  Past Medical History:   Diagnosis Date    Diabetes mellitus (HCC)     Hypertension         Past Surgical History  Past Surgical History:   Procedure Laterality Date    FOOT AMPUTATION Left 6/27/2024    Procedure: AMPUTATION LEFT FOOT 1st METATARSAL RESECTION;  Surgeon: Myron Bhakta DPM;  [facility];  Service: Podiatry    IR PICC PLACEMENT SINGLE LUMEN  7/16/2024    LEG AMPUTATION THROUGH LOWER TIBIA AND FIBULA Right 7/25/2017    Procedure: AMPUTATION BELOW KNEE (BKA);  Surgeon: Mynor Sanchez MD;  [facility];  Service: General    AK AMPUTATION FOOT TRANSMETARSAL Right 1/26/2017    Procedure: AMPUTATION TRANSMETATARSAL (TMA); OPEN;  Surgeon: Leonard Lomeli DPM;  [facility];  Service: Podiatry    AK AMPUTATION FOOT TRANSMETARSAL Left 4/26/2024    Procedure: LEFT FOOT PARTIAL FIRST RAY AMPUTATION;  Surgeon: Jennifer Rubalcava DPM;  [facility];  Service: Podiatry    AK AMPUTATION METATARSAL W/TOE SINGLE Left 1/30/2017    Procedure: Left partial 1st ray amputation;  Surgeon: Leonard Lomeli DPM;  [facility];  Service: Podiatry    AK COLONOSCOPY FLX DX W/COLLJ SPEC WHEN PFRMD N/A 11/28/2018    Procedure: COLONOSCOPY;  Surgeon: González Napier MD;  [facility];  Service: Gastroenterology    WOUND DEBRIDEMENT Right 1/30/2017    Procedure: DEBRIDEMENT FOOT/TOE (WASH OUT) delayed closure, LINDA;  Surgeon: Leonard Lomeli DPM;  [facility];   Service:     WOUND DEBRIDEMENT Left 7/11/2024    Procedure: DEBRIDEMENT FOOT/TOE (WASH OUT);  Surgeon: Myron Bhakta DPM;  [facility];  Service: Podiatry           08/06/24 0828   PT Last Visit   PT Visit Date 08/06/24   Note Type   Note type Evaluation   Pain Assessment   Pain Assessment Tool 0-10   Pain Score No Pain   Restrictions/Precautions   Weight Bearing Precautions Per Order No   Braces or Orthoses Other (Comment)  (surgical shoe to L foot, BKA prosthetic to R LE)   Other Precautions Fall Risk;Bed Alarm;Contact/isolation   Home Living   Type of Home SNF   Home Equipment Quad cane   Additional Comments pt currently at [facility] for STR   Prior Function   Level of [place] Independent with ADLs;Independent with functional mobility;Needs assistance with IADLS   Lives With Facility staff   Receives Help From Personal care attendant   IADLs Family/Friend/Other provides transportation;Family/Friend/Other provides meals;Family/Friend/Other provides medication management   Falls in the last 6 months 0   Vocational Retired   General   Family/Caregiver Present No   Cognition   Overall Cognitive Status WFL   Arousal/Participation Alert   Orientation Level Oriented X4   Following Commands Follows all commands and directions without difficulty   Subjective   Subjective pt pleasant and cooperative; reports therapy is going well   RLE Assessment   RLE Assessment WFL  (s/p R BKA; residual limb appears WFL)   LLE Assessment   LLE Assessment WFL   Bed Mobility   Additional Comments pt OOB at start/end of session   Transfers   Sit to Stand 5  Supervision   Additional items Increased time required;Verbal cues   Stand to Sit 5  Supervision   Additional items Increased time required;Verbal cues   Additional Comments quad cane used   Ambulation/Elevation   Gait pattern Improper Weight shift;Inconsistent alo;Decreased foot clearance;Wide YOANNA   Gait Assistance 5  Supervision   Additional items  Verbal cues   Assistive Device Small base quad cane   Distance 120'   Balance   Static Sitting Good   Dynamic Sitting Good   Static Standing Fair +   Dynamic Standing Fair   Ambulatory Fair -  (with RW)   Endurance Deficit   Endurance Deficit Yes   Endurance Deficit Description pt appears fatigued with increased ambulation   Activity Tolerance   Activity Tolerance Patient limited by fatigue   Assessment   Prognosis Good   Problem List Decreased strength;Decreased endurance;Impaired balance;Decreased mobility;Decreased skin integrity;Orthopedic restrictions   Assessment Patient is a 64 y.o. male evaluated by Physical Therapy s/p admit to [facility] on 8/5/2024 with admitting diagnosis of: Maggot infestation, Open wound of left foot, Visit for wound check, and principal problem of: Maggot infestation. PT was consulted to assess patient's functional mobility and discharge needs. Ordered are PT Evaluation and treatment with activity level of: up and OOB as tolerated. Comorbidities affecting patient's physical performance at time of assessment include:  DM, hx of osteomyelitis, ambulatory dysfunction, diabetic peripheral angiopathy, neuropathy, hx of R BKA . Personal factors affecting the patient at time of IE include: ambulating with assistive device, inability to navigate community distances, inability/difficulty performing IADLs, and inability/difficulty performing ADLs. Please locate objective findings from PT assessment regarding body systems outlined above. Upon evaluation, pt able to perform all functional mobility with SUP, SBQC, and increased time. Occasional verbal cuing provided for safety awareness and sequencing. Moderate postural sway demonstrated with mobility d/t leg length discrepancy, however no true LOB experienced. Seated rest break taken following ~120' of ambulation; HR and SpO2 remained WFL on RA throughout. The patient's AM-PAC Basic Mobility Inpatient Short Form Raw Score is 17. A  Raw score of greater than 16 suggests the patient may benefit from discharge to home. Please also refer to the recommendation of the Physical Therapist for safe discharge planning. Co treatment with OT secondary to complex medical condition of pt, possible A of 2 required to achieve and maintain transitional movements, requiring the need of skilled therapeutic intervention of 2 therapists to achieve delivery of services. Pt will benefit from continued PT intervention during LOS to address current deficits, increase LOF, and facilitate safe d/c to next level of care when medically appropriate. D/c recommendation at this time is rehabilitation resource intensity level II.   Goals   Patient Goals to get better   LTG Expiration Date 08/20/24   Long Term Goal #1 Pt will participate in B LE strengthening exercises to facilitate improved functional activity tolerance. Pt will perform all functional transfers and bed mobility mod(I) with good safety awareness. Pt will ambulate 250' mod(I) with LRAD while maintaining good functional dynamic balance.   Plan   Treatment/Interventions Functional transfer training;LE strengthening/ROM;Therapeutic exercise;Endurance training;Bed mobility;Gait training   PT Frequency 3-5x/wk   Discharge Recommendation   Rehab Resource Intensity Level, PT II (Moderate Resource Intensity)   AM-PAC Basic Mobility Inpatient   Turning in Flat Bed Without Bedrails 3   Lying on Back to Sitting on Edge of Flat Bed Without Bedrails 3   Moving Bed to Chair 3   Standing Up From Chair Using Arms 3   Walk in Room 3   Climb 3-5 Stairs With Railing 2   Basic Mobility Inpatient Raw Score 17   Basic Mobility Standardized Score 39.67   [facility] Highest Level Of Mobility   -HLM Goal 5: Stand one or more mins   -HLM Achieved 7: Walk 25 feet or more   End of Consult   Patient Position at End of Consult Seated edge of bed;Bed/Chair alarm activated;All needs within reach

## 2024-08-06 NOTE — ASSESSMENT & PLAN NOTE
"Please see the assessment and plan for \"Acute osteomyelitis of metatarsal bone of left foot\"    "

## 2024-08-06 NOTE — H&P
[facility]  H&P  Name: Art Joseph 64 y.o. male I MRN: [id number]  Unit/Bed#: 402-01 I Date of Admission: 8/5/2024   Date of Service: 8/6/2024 I Hospital Day: 0      Assessment & Plan   Open wound of left foot  Assessment & Plan  Pt with history of poorly controlled DM with recent hospitalization for LT foot DM ulcer and osteomyelitis s/p amputation of the LT first metatarsal on 6/27 who was recently admitted 7/8 and discharged 7/18 to [facility] on IV abx. He was sent to the ED by the nursing home for wound check as was found with maggots in his open wound.   During his last admission, he was seen by podiatry and taken for wound debridement and washout on 7/11/2024.   Wound culture was positive for MRSA as well as Proteus vulgaris.  During last hospitalization was initially was on IV Vancomycin and Rocephin but was later final course of abx recommended changed to IV Daptomycin and oral Augmentin to be completed from day of washout through 8/21/2024.   Will consult podiatry  Will see in the AM  Possible exploration of wound at bedside  May need a bone scan  Continue with IV abx with Daptomycin and po augmentin to complete through 8/21/24  Surgical path 7/11/24 showed benign bone with mild chronic marrow inflammation and reactive changes.    S/p PICC 7/16  Final ID recs last admit  Start PO Augmentin 875mg BID plus IV daptomycin 6mg/kg ABW x 6 weeks from bone resection through 8/21/24  Follow-up on bone margin cultures/path  Follow-up outpatient with ID in 2 weeks - rylan for virtual   Weekly CBC, CMP and CK on IV antibiotics  Outpatient f/u with podiatry         Acute osteomyelitis of metatarsal bone of left foot (HCC)  Assessment & Plan  As above.    * Maggot infestation  Assessment & Plan  Pt sent for wound evaluation by the wound care nurse after noting maggot in wound after dressing change today. Last wound change was on Saturday. Gets dressing changes in NH every other day.    LT foot Xray showing no subcutaneous gas.   Pt had no fever or chills  WBC was wnl, inflammatory markers similar to previous  Will consult podiatry team for possible debridement in the AM  Continue with IV abx    Type 2 diabetes mellitus with hypoglycemia without coma, with long-term current use of insulin (HCC)  Assessment & Plan  Lab Results   Component Value Date    HGBA1C 9.7 (H) 06/25/2024       Recent Labs     08/05/24  2316   POCGLU 181*       Blood Sugar Average: Last 72 hrs:  (P) 181    Not controlled.   Continue with Lantus 15 unit qhs and 8 units of lispro with meals  SSI and accucheks      Primary hypertension  Assessment & Plan  Continue with Zestril 2.5 mg daily    Ambulatory dysfunction  Assessment & Plan  Will get PT/OT evaluation  Case management for post acute issues       History of Present Illness     HPI:  Art Joseph is a 64 y.o. male with history of poorly controlled DM with recent hospitalization for LT foot DM ulcer and osteomyelitis s/p amputation of the LT first metatarsal on 6/27 who was recently admitted 7/8 and discharged 7/18 to [facility] on IV abx. He was sent to the ED by the nursing home for wound check as the wound care nurse who evaluated patients LT foot wound noted there were maggots in the wound. Pt also has had maggot infestation back in June. Per the nursing home, his dressing change is for every other day and was changed on Saturday. He denies any fever or chills. No loss of appetite. No history of recent falls or trauma to the foot.   During his last admission, he was seen by podiatry and taken for wound debridement and washout on 7/11/2024. Wound culture was positive for MRSA as well as Proteus vulgaris. Initially was on IV Vancomycin and Rocephin but was later final course of abx recommended changed to IV Daptomycin and oral Augmentin to be completed from day of washout through 8/21/2024.   Work up in the ED was not impressive. WBC was wnl, ESR and CRP  about recent baseline. Xray showed extensive postop changes of the foot. Swelling. New soft tissue calcifications at the site of the first ray amputation, but no soft tissue gas or conclusive evidence of osteomyelitis   Podiatry recommended to admit him and may explore the wound at bedside tomorrow and will consider a bone scan.       Historical Information   Past Medical History:   Diagnosis Date    Diabetes mellitus (HCC)     Hypertension      Patient Active Problem List   Diagnosis    Type 2 diabetes mellitus with hypoglycemia without coma, with long-term current use of insulin ([facility])    Diabetic neuropathy ([facility])    Acute osteomyelitis of metatarsal bone of left foot (HCC)    Hx of right BKA ([facility])    Ambulatory dysfunction    Primary hypertension    Hypercholesteremia    Vitamin D deficiency    Diabetic peripheral angiopathy ([facility])    Class 1 obesity in adult    Cellulitis of left lower extremity    Candidiasis, intertrigo    Elevated platelet count    Maggot infestation    Open wound of left foot    Osteomyelitis of left foot ([facility])    MRSA colonization     Past Surgical History:   Procedure Laterality Date    FOOT AMPUTATION Left 6/27/2024    Procedure: AMPUTATION LEFT FOOT 1st METATARSAL RESECTION;  Surgeon: Myron Bhakta DPM;  [facility];  Service: Podiatry    IR PICC PLACEMENT SINGLE LUMEN  7/16/2024    LEG AMPUTATION THROUGH LOWER TIBIA AND FIBULA Right 7/25/2017    Procedure: AMPUTATION BELOW KNEE (BKA);  Surgeon: Mynor Sanchez MD;  [facility];  Service: General    IA AMPUTATION FOOT TRANSMETARSAL Right 1/26/2017    Procedure: AMPUTATION TRANSMETATARSAL (TMA); OPEN;  Surgeon: Leonard Lomeli DPM;  [facility];  Service: Podiatry    IA AMPUTATION FOOT TRANSMETARSAL Left 4/26/2024    Procedure: LEFT FOOT PARTIAL FIRST RAY AMPUTATION;  Surgeon: Jennifer Rubalcava DPM;  [facility];  Service: Podiatry    IA AMPUTATION METATARSAL W/TOE SINGLE Left 1/30/2017    Procedure:  Left partial 1st ray amputation;  Surgeon: Leonard Lomeli DPM;  Location:  MAIN OR;  Service: Podiatry    [place] COLONOSCOPY FLX DX W/COLLJ SPEC WHEN PFRMD N/A 11/28/2018    Procedure: COLONOSCOPY;  Surgeon: González Napier MD;  [facility];  Service: Gastroenterology    WOUND DEBRIDEMENT Right 1/30/2017    Procedure: DEBRIDEMENT FOOT/TOE (WASH OUT) delayed closure, LINDA;  Surgeon: Leonard Lomeli DPM;  [facility];  Service:     WOUND DEBRIDEMENT Left 7/11/2024    Procedure: DEBRIDEMENT FOOT/TOE (WASH OUT);  Surgeon: Myron Bhakta DPM;  [facility];  Service: Podiatry       Social History   Social History     Substance and Sexual Activity   Alcohol Use Yes    Alcohol/week: 11.0 standard drinks of alcohol    Types: 6 Cans of beer, 5 Shots of liquor per week    Comment: social ; occasional use as per [employer]     Social History     Substance and Sexual Activity   Drug Use No     Social History     Tobacco Use   Smoking Status Former    Types: Cigarettes   Smokeless Tobacco Never   Tobacco Comments    quit 9 years ago       Family History:   Family History   Problem Relation Age of Onset    Diabetes Mother        Meds/Allergies       Current Facility-Administered Medications:     acetaminophen (TYLENOL) tablet 650 mg, 650 mg, Oral, Q6H PRN, Fatemeh Reid MD    amoxicillin-clavulanate (AUGMENTIN) 875-125 mg per tablet 1 tablet, 1 tablet, Oral, Q12H JASPER, Fatemeh Reid MD, 1 tablet at 08/06/24 0010    aspirin (ECOTRIN LOW STRENGTH) EC tablet 81 mg, 81 mg, Oral, Daily, Fatemeh Reid MD    atorvastatin (LIPITOR) tablet 80 mg, 80 mg, Oral, Daily With Dinner, Fatemeh Reid MD    Cholecalciferol (VITAMIN D3) tablet 1,000 Units, 1,000 Units, Oral, Daily, Fatemeh Reid MD    Dakins (full strength) (DAKIN'S) 0.5 percent topical solution 1 Application, 1 Application, Topical, Daily, Fatemeh Reid MD    DAPTOmycin (CUBICIN) 500 mg in sodium chloride 0.9 % 50 mL  "IVPB, 6 mg/kg (Adjusted), Intravenous, Q24H, Fatemeh Reid MD, Last Rate: 100 mL/hr at 08/06/24 0409, 500 mg at 08/06/24 0409    enoxaparin (LOVENOX) subcutaneous injection 40 mg, 40 mg, Subcutaneous, Daily, Fatemeh Reid MD    insulin glargine (LANTUS) subcutaneous injection 15 Units 0.15 mL, 15 Units, Subcutaneous, HS, Fatemeh Reid MD, 15 Units at 08/06/24 0010    insulin lispro (HumALOG/ADMELOG) 100 units/mL subcutaneous injection 8 Units, 8 Units, Subcutaneous, TID With Meals, Fatemeh Reid MD    lisinopril (ZESTRIL) tablet 2.5 mg, 2.5 mg, Oral, Daily, Fatemeh Reid MD    ondansetron (ZOFRAN) injection 4 mg, 4 mg, Intravenous, Q6H PRN, Fatemeh Reid MD    No Known Allergies    Review of Systems  A detailed 12 point review of systems was conducted and is negative apart from those mentioned in the HPI.      Objective   Vitals: Blood pressure 131/72, pulse 77, temperature 97.7 °F (36.5 °C), temperature source Oral, resp. rate 17, height 5' 10\" (1.778 m), weight 99.6 kg (219 lb 9.3 oz), SpO2 96%.    Physical Exam   General- Awake and alert, NAD  HEENT: PERRLA, EOMI, sclera anicteric, moist mucous membranes, tongue mucosa without lesions.  Neck: supple, no JVD, lymphadenopathy, thyromegaly.  Heart: Regular rate and rhythm, S1S2 present.  No murmur, rub or gallop.    Lungs; Clear to auscultation bilaterally.  No wheezing, crackles or rhonchi.  No accessory muscle use or respiratory distress.  Abdomen: soft, non-tender, non-distended, NABS.  No guarding or rebound. No peritoneal sound or mass.  Extremities: RT BKA,  noted with chronic venous stasis changes on the left LE with +1 edema. There is noted a large wound at the site of recent 1st metatarsal with granulated base but with wound edge with some sloughs and purulent discharge. Area with surrounding chronic appearing erythema but no undue warmth or tenderness.  Full range of motion.  Neurologic:  Cranial nerves II-XII intact.  " Strength and sensation globally intact. Speech fluent and goal directed.  Awake, alert and oriented x 3.  Skin: As above    Lab Results:   Results from last 7 days   Lab Units 08/05/24  1823   WBC Thousand/uL 9.55   HEMOGLOBIN g/dL 12.8   HEMATOCRIT % 39.6   PLATELETS Thousands/uL 249     Results from last 7 days   Lab Units 08/05/24  1823 08/05/24  0632   POTASSIUM mmol/L 4.2 4.4   CHLORIDE mmol/L 102 102   CO2 mmol/L 27 25   BUN mg/dL 12 13   CREATININE mg/dL 0.75 0.8   CALCIUM mg/dL 9.3 9.0         Imaging:  XR chest 1 view portable   ED Interpretation by Melonie Flynn MD (08/05 1639)   Per my independent interpretation. Radiologist to provide formal read. Picc line SVC/rt atrium similar to prev fluoro images. Rt basilar atelectasis       Final Result by Darwin Vega MD (08/05 2145)      Right PICC is present with the tip at the caval atrial junction level. Areas of platelike atelectasis both lung bases with elevated right hemidiaphragm. Multiple healed left rib fractures            Workstation performed: [id number]         XR foot 3+ views LEFT   ED Interpretation by Melonie Flynn MD (08/05 1637)   Per my independent interpretation. Radiologist to provide formal read. Soft tissue defect s/p 1st metatarsal amp/resection no air in soft tissue      Final Result by Darwin Vega MD (08/05 2149)      Extensive postop changes of the foot. Swelling. New soft tissue calcifications at the site of the first ray amputation, but no soft tissue gas or conclusive evidence of osteomyelitis         Computerized Assisted Algorithm (CAA) may have been used to analyze all applicable images.         Workstation performed: [id number]                Code Status: Level 1 - Full Code      Counseling / Coordination of Care  Total floor / unit time spent today 70 minutes.  Greater than 50% of total time was spent with the patient and / or family counseling and / or coordination of care.    "      Portions of the record may have been created with voice recognition software. Occasional wrong word or \"sound a like\" substitutions may have occurred due to the inherent limitations of voice recognition software. Read the chart carefully and recognize, using context, where substitutions have occurred.  "

## 2024-08-07 ENCOUNTER — APPOINTMENT (INPATIENT)
Dept: NON INVASIVE DIAGNOSTICS | Facility: HOSPITAL | Age: 64
DRG: 856 | End: 2024-08-07
Payer: COMMERCIAL

## 2024-08-07 ENCOUNTER — APPOINTMENT (OUTPATIENT)
Dept: NUCLEAR MEDICINE | Facility: HOSPITAL | Age: 64
DRG: 856 | End: 2024-08-07
Payer: COMMERCIAL

## 2024-08-07 LAB
ALBUMIN SERPL BCG-MCNC: 3.5 G/DL (ref 3.5–5)
ALP SERPL-CCNC: 114 U/L (ref 34–104)
ALT SERPL W P-5'-P-CCNC: 9 U/L (ref 7–52)
ANION GAP SERPL CALCULATED.3IONS-SCNC: 8 MMOL/L (ref 4–13)
AST SERPL W P-5'-P-CCNC: 9 U/L (ref 13–39)
BASOPHILS # BLD AUTO: 0.07 THOUSANDS/ÂΜL (ref 0–0.1)
BASOPHILS NFR BLD AUTO: 1 % (ref 0–1)
BILIRUB SERPL-MCNC: 0.44 MG/DL (ref 0.2–1)
BUN SERPL-MCNC: 15 MG/DL (ref 5–25)
CALCIUM SERPL-MCNC: 9.2 MG/DL (ref 8.4–10.2)
CHLORIDE SERPL-SCNC: 104 MMOL/L (ref 96–108)
CK SERPL-CCNC: 28 U/L (ref 39–308)
CO2 SERPL-SCNC: 26 MMOL/L (ref 21–32)
CREAT SERPL-MCNC: 0.76 MG/DL (ref 0.6–1.3)
EOSINOPHIL # BLD AUTO: 0.4 THOUSAND/ÂΜL (ref 0–0.61)
EOSINOPHIL NFR BLD AUTO: 5 % (ref 0–6)
ERYTHROCYTE [DISTWIDTH] IN BLOOD BY AUTOMATED COUNT: 13.5 % (ref 11.6–15.1)
EST. AVERAGE GLUCOSE BLD GHB EST-MCNC: 180 MG/DL
GFR SERPL CREATININE-BSD FRML MDRD: 95 ML/MIN/1.73SQ M
GLUCOSE SERPL-MCNC: 146 MG/DL (ref 65–140)
GLUCOSE SERPL-MCNC: 149 MG/DL (ref 65–140)
GLUCOSE SERPL-MCNC: 190 MG/DL (ref 65–140)
GLUCOSE SERPL-MCNC: 195 MG/DL (ref 65–140)
GLUCOSE SERPL-MCNC: 245 MG/DL (ref 65–140)
HBA1C MFR BLD: 7.9 %
HCT VFR BLD AUTO: 40 % (ref 36.5–49.3)
HGB BLD-MCNC: 13 G/DL (ref 12–17)
IMM GRANULOCYTES # BLD AUTO: 0.03 THOUSAND/UL (ref 0–0.2)
IMM GRANULOCYTES NFR BLD AUTO: 0 % (ref 0–2)
LYMPHOCYTES # BLD AUTO: 2.86 THOUSANDS/ÂΜL (ref 0.6–4.47)
LYMPHOCYTES NFR BLD AUTO: 35 % (ref 14–44)
MAGNESIUM SERPL-MCNC: 2.1 MG/DL (ref 1.9–2.7)
MCH RBC QN AUTO: 28.4 PG (ref 26.8–34.3)
MCHC RBC AUTO-ENTMCNC: 32.5 G/DL (ref 31.4–37.4)
MCV RBC AUTO: 87 FL (ref 82–98)
MONOCYTES # BLD AUTO: 0.62 THOUSAND/ÂΜL (ref 0.17–1.22)
MONOCYTES NFR BLD AUTO: 8 % (ref 4–12)
MRSA NOSE QL CULT: NORMAL
NEUTROPHILS # BLD AUTO: 4.2 THOUSANDS/ÂΜL (ref 1.85–7.62)
NEUTS SEG NFR BLD AUTO: 51 % (ref 43–75)
NRBC BLD AUTO-RTO: 0 /100 WBCS
PHOSPHATE SERPL-MCNC: 3.9 MG/DL (ref 2.3–4.1)
PLATELET # BLD AUTO: 273 THOUSANDS/UL (ref 149–390)
PMV BLD AUTO: 9.1 FL (ref 8.9–12.7)
POTASSIUM SERPL-SCNC: 3.9 MMOL/L (ref 3.5–5.3)
PROCALCITONIN SERPL-MCNC: 0.06 NG/ML
PROT SERPL-MCNC: 6.8 G/DL (ref 6.4–8.4)
RBC # BLD AUTO: 4.58 MILLION/UL (ref 3.88–5.62)
SODIUM SERPL-SCNC: 138 MMOL/L (ref 135–147)
WBC # BLD AUTO: 8.18 THOUSAND/UL (ref 4.31–10.16)

## 2024-08-07 PROCEDURE — 93926 LOWER EXTREMITY STUDY: CPT | Performed by: SURGERY

## 2024-08-07 PROCEDURE — 80053 COMPREHEN METABOLIC PANEL: CPT | Performed by: INTERNAL MEDICINE

## 2024-08-07 PROCEDURE — 83735 ASSAY OF MAGNESIUM: CPT | Performed by: INTERNAL MEDICINE

## 2024-08-07 PROCEDURE — 84100 ASSAY OF PHOSPHORUS: CPT | Performed by: INTERNAL MEDICINE

## 2024-08-07 PROCEDURE — 85025 COMPLETE CBC W/AUTO DIFF WBC: CPT | Performed by: INTERNAL MEDICINE

## 2024-08-07 PROCEDURE — 99232 SBSQ HOSP IP/OBS MODERATE 35: CPT | Performed by: INTERNAL MEDICINE

## 2024-08-07 PROCEDURE — 99223 1ST HOSP IP/OBS HIGH 75: CPT | Performed by: INTERNAL MEDICINE

## 2024-08-07 PROCEDURE — A9569 TECHNETIUM TC-99M AUTO WBC: HCPCS

## 2024-08-07 PROCEDURE — 84145 PROCALCITONIN (PCT): CPT | Performed by: INTERNAL MEDICINE

## 2024-08-07 PROCEDURE — 82550 ASSAY OF CK (CPK): CPT | Performed by: INTERNAL MEDICINE

## 2024-08-07 PROCEDURE — 78800 RP LOCLZJ TUM 1 AREA 1 D IMG: CPT

## 2024-08-07 PROCEDURE — 82948 REAGENT STRIP/BLOOD GLUCOSE: CPT

## 2024-08-07 PROCEDURE — 83036 HEMOGLOBIN GLYCOSYLATED A1C: CPT | Performed by: INTERNAL MEDICINE

## 2024-08-07 PROCEDURE — 93926 LOWER EXTREMITY STUDY: CPT

## 2024-08-07 PROCEDURE — 97116 GAIT TRAINING THERAPY: CPT

## 2024-08-07 PROCEDURE — 97110 THERAPEUTIC EXERCISES: CPT

## 2024-08-07 RX ORDER — INSULIN LISPRO 100 [IU]/ML
9 INJECTION, SOLUTION INTRAVENOUS; SUBCUTANEOUS
Status: DISCONTINUED | OUTPATIENT
Start: 2024-08-07 | End: 2024-08-08

## 2024-08-07 RX ADMIN — AMOXICILLIN AND CLAVULANATE POTASSIUM 1 TABLET: 875; 125 TABLET, FILM COATED ORAL at 09:07

## 2024-08-07 RX ADMIN — INSULIN LISPRO 3 UNITS: 100 INJECTION, SOLUTION INTRAVENOUS; SUBCUTANEOUS at 17:26

## 2024-08-07 RX ADMIN — INSULIN LISPRO 9 UNITS: 100 INJECTION, SOLUTION INTRAVENOUS; SUBCUTANEOUS at 12:03

## 2024-08-07 RX ADMIN — INSULIN LISPRO 8 UNITS: 100 INJECTION, SOLUTION INTRAVENOUS; SUBCUTANEOUS at 09:07

## 2024-08-07 RX ADMIN — ASPIRIN 81 MG: 81 TABLET, COATED ORAL at 09:07

## 2024-08-07 RX ADMIN — INSULIN LISPRO 2 UNITS: 100 INJECTION, SOLUTION INTRAVENOUS; SUBCUTANEOUS at 12:03

## 2024-08-07 RX ADMIN — INSULIN GLARGINE 15 UNITS: 100 INJECTION, SOLUTION SUBCUTANEOUS at 21:19

## 2024-08-07 RX ADMIN — CHOLECALCIFEROL TAB 25 MCG (1000 UNIT) 1000 UNITS: 25 TAB at 09:07

## 2024-08-07 RX ADMIN — ATORVASTATIN CALCIUM 80 MG: 40 TABLET, FILM COATED ORAL at 17:27

## 2024-08-07 RX ADMIN — ENOXAPARIN SODIUM 40 MG: 40 INJECTION SUBCUTANEOUS at 09:07

## 2024-08-07 RX ADMIN — INSULIN LISPRO 1 UNITS: 100 INJECTION, SOLUTION INTRAVENOUS; SUBCUTANEOUS at 21:19

## 2024-08-07 RX ADMIN — DAPTOMYCIN 500 MG: 500 INJECTION, POWDER, LYOPHILIZED, FOR SOLUTION INTRAVENOUS at 22:34

## 2024-08-07 RX ADMIN — AMOXICILLIN AND CLAVULANATE POTASSIUM 1 TABLET: 875; 125 TABLET, FILM COATED ORAL at 20:45

## 2024-08-07 RX ADMIN — INSULIN LISPRO 9 UNITS: 100 INJECTION, SOLUTION INTRAVENOUS; SUBCUTANEOUS at 17:27

## 2024-08-07 NOTE — CONSULTS
VIRTUAL CARE DOCUMENTATION:     1. This service was provided via Telemedicine using [employer]     2. Parties in the room with patient during teleconsult Patient only    3. Confidentiality My office door was closed     4. Participants No one else was in the room    5. Patient acknowledged consent and understanding of privacy and security of the  Telemedicine consult. I informed the patient that I have reviewed their record in Epic and presented the opportunity for them to ask any questions regarding the visit today.  The patient agreed to participate.    6. Time spent 4 minutes           TeleConsultation - Infectious Disease   Art Joseph 64 y.o. male MRN: [id number]  Unit/Bed#: 402-01 Encounter: [id number]      IMPRESSION & RECOMMENDATIONS:   1.  Nonhealing infected left foot wound with probable ongoing osteomyelitis.  This despite extensive surgical interventions and broad-spectrum antibiotics with a plan to treat for 6 weeks.  The wound is not really healing and there is significant surrounding swelling and erythema.  There is also increased drainage.  Seems to be tolerating the antibiotics and the CPK on 8/7/2024 is 28  -Continue daptomycin and Augmentin for now  -Recheck CBC with differential, BMP, and CPK to make sure no developing toxicities  -Follow-up Ceretec scan  -Local wound care  -Close podiatry follow-up  -Await additional definitive surgical intervention  -Prognosis poor for limb salvage as per podiatry    2.  Diabetes mellitus.  With hyperglycemia.  Poorly controlled with a hemoglobin A1c of 9.7 in the recent past.  -Tighten diabetic control to improve leukocyte function and wound healing    3.  Peripheral arterial disease.  Consider vascular consult    Extensive review of the medical records in epic including review of the notes, radiographs, and laboratory results    Discussed with the primary service the plan to continue the daptomycin Augmentin for now awaiting additional workup and they  agree with the plan    I spent 90 minutes in evaluation of the patient of which 50 minutes was in counseling/coordination of care    HISTORY OF PRESENT ILLNESS:  Reason for Consult: Left foot osteomyelitis  HPI: Art Joseph is a 64 y.o. year old male with diabetes mellitus, peripheral arterial disease status post right below the knee amputation, chronic venous stasis, and a recent admission for progressive left foot osteomyelitis, currently on a prolonged course of intravenous and oral antibiotics who we are asked to assist with management of progressive infection despite surgical and aggressive medical interventions.  In April of this year in the setting of left foot cellulitis and first metatarsal head osteomyelitis he underwent first ray amputation with a bone margin that was negative for residual osteomyelitis and a wound culture at that time grew Staphylococcus simulans.  He was treated with Bactrim for 7 days postoperatively.  He actually had undergone a second resection of his first metatarsal on 6/27/2024 and podiatry at that time felt good about the possibility of surgical cure.  He was discharged on 10-day course of oral doxycycline.  The patient had recently been admitted to [facility] with maggot infestation of his foot wound with wound cultures growing MRSA and Proteus.  He underwent additional I&D with more proximal resection with a concern about residual osteomyelitis involving the medial cuneiform based upon the MRI findings.  He was initially treated with vancomycin and ceftriaxone postoperatively.  He was eventually discharged on daptomycin and Augmentin with a plan to treat for 6 weeks through 8/21/2024.  He has follow-up bone margin pathology revealed some mild chronic marrow inflammation.  However while at the nursing home at the time of wound check there was a concern about the appearance of the foot.  There was also concern about maggots being in the wound once again.  Therefore  he was sent back to the hospital for further evaluation.  In the emergency department the patient was found to be afebrile and without leukocytosis.  X-ray did not reveal any definitive osteomyelitis.  He has now been admitted and maintained on the daptomycin and Augmentin.    REVIEW OF SYSTEMS:  A complete 12 point system-based review of systems is negative other than that noted in the HPI.    PAST MEDICAL HISTORY:  Past Medical History:   Diagnosis Date    Diabetes mellitus (HCC)     Hypertension      Past Surgical History:   Procedure Laterality Date    FOOT AMPUTATION Left 6/27/2024    Procedure: AMPUTATION LEFT FOOT 1st METATARSAL RESECTION;  Surgeon: Myron Bhakta DPM;  Location: [facility];  Service: Podiatry    IR PICC PLACEMENT SINGLE LUMEN  7/16/2024    LEG AMPUTATION THROUGH LOWER TIBIA AND FIBULA Right 7/25/2017    Procedure: AMPUTATION BELOW KNEE (BKA);  Surgeon: Mynor Sanchez MD;  [facility]: [facility];  Service: General    KY AMPUTATION FOOT TRANSMETARSAL Right 1/26/2017    Procedure: AMPUTATION TRANSMETATARSAL (TMA); OPEN;  Surgeon: Leonard Lomeli DPM;  [facility];  Service: Podiatry    KY AMPUTATION FOOT TRANSMETARSAL Left 4/26/2024    Procedure: LEFT FOOT PARTIAL FIRST RAY AMPUTATION;  Surgeon: Jennifer Rubalcava DPM;  Location: [facility];  Service: Podiatry    KY AMPUTATION METATARSAL W/TOE SINGLE Left 1/30/2017    Procedure: Left partial 1st ray amputation;  Surgeon: Leonard Lomeli DPM;  [facility];  Service: Podiatry    KY COLONOSCOPY FLX DX W/COLLJ SPEC WHEN PFRMD N/A 11/28/2018    Procedure: COLONOSCOPY;  Surgeon: González Napier MD;  Location: [facility];  Service: Gastroenterology    WOUND DEBRIDEMENT Right 1/30/2017    Procedure: DEBRIDEMENT FOOT/TOE (WASH OUT) delayed closure, LINDA;  Surgeon: Leonard Lomeli DPM;  [facility];  Service:     WOUND DEBRIDEMENT Left 7/11/2024    Procedure: DEBRIDEMENT FOOT/TOE (WASH OUT);  Surgeon: Myron Bhakta DPM;   Location: [facility];  Service: Podiatry       FAMILY HISTORY:  Non-contributory    SOCIAL HISTORY:  Social History   Social History     Substance and Sexual Activity   Alcohol Use Yes    Alcohol/week: 11.0 standard drinks of alcohol    Types: 6 Cans of beer, 5 Shots of liquor per week    Comment: social ; occasional use as per [employer]     Social History     Substance and Sexual Activity   Drug Use No     Social History     Tobacco Use   Smoking Status Former    Types: Cigarettes   Smokeless Tobacco Never   Tobacco Comments    quit 9 years ago       ALLERGIES:  No Known Allergies    MEDICATIONS:  All current active medications have been reviewed.  Antibiotics: Daptomycin and Augmentin    PHYSICAL EXAM:  Temp:  [98 °F (36.7 °C)-98.2 °F (36.8 °C)] 98 °F (36.7 °C)  HR:  [77-85] 85  Resp:  [17-18] 17  BP: ()/(73-75) 109/74  SpO2:  [92 %-97 %] 94 %  Temp (24hrs), Av.1 °F (36.7 °C), Min:98 °F (36.7 °C), Max:98.2 °F (36.8 °C)  Current: Temperature: 98 °F (36.7 °C)    Intake/Output Summary (Last 24 hours) at 2024 0743  Last data filed at 2024 0703  Gross per 24 hour   Intake 860 ml   Output 2150 ml   Net -1290 ml        Physical exam has been primarily done by the patient's nurse and/or the primary service due to limited examination abilities on telemedicine    General Appearance:  Appearing well, nontoxic, and in no distress   Head:  Normocephalic, without obvious abnormality, atraumatic   Eyes:  Conjunctiva pink and sclera anicteric, both eyes   Nose: Nares normal, mucosa normal, no drainage   Throat: Oropharynx moist without lesions   Neck: Supple, symmetrical, no adenopathy   Back:   Symmetric, no curvature, ROM normal, no CVA tenderness   Lungs:   Clear to auscultation bilaterally, respirations unlabored   Chest Wall:  No tenderness or deformity   Heart:  RRR; no murmur, rub or gallop   Abdomen:   Soft, non-tender, non-distended, positive bowel sounds,    Extremities: Left foot dressed.  Reviewed  images in epic with nonhealing wound at the hallux amputation site with surrounding erythema.  Right lower extremity BKA.   Skin: No rashes or lesions. No draining wounds noted.   Lymph nodes: Cervical, supraclavicular nodes normal   Neurologic: Alert and oriented times 3, extremity strength 5/5 and symmetric       LABS, IMAGING, & OTHER STUDIES:  Lab Results:  I have personally reviewed pertinent labs.  Results from last 7 days   Lab Units 08/07/24  0556 08/06/24  0545 08/05/24  1823   WBC Thousand/uL 8.18 7.71 9.55   HEMOGLOBIN g/dL 13.0 12.6 12.8   PLATELETS Thousands/uL 273 254 249     Results from last 7 days   Lab Units 08/07/24  0556 08/06/24  0545 08/05/24  1823 08/05/24  0632   POTASSIUM mmol/L 3.9 3.7   < > 4.4   CHLORIDE mmol/L 104 103   < > 102   CO2 mmol/L 26 26   < > 25   BUN mg/dL 15 10   < > 13   CREATININE mg/dL 0.76 0.73   < > 0.8   EGFR ml/min/1.73sq m 95 97   < > >90   CALCIUM mg/dL 9.2 9.0   < > 9.0   AST U/L 9* 8*  --  10   ALT U/L 9 9   < > 15   ALK PHOS U/L 114* 119*   < > 142*    < > = values in this interval not displayed.         Results from last 7 days   Lab Units 08/07/24  0556   PROCALCITONIN ng/ml 0.06     Results from last 7 days   Lab Units 08/05/24  1823   CRP mg/L 15.7*               Imaging Studies:     Chest x-ray.  Atelectasis lung bases.    X-ray left foot.  Postoperative changes of the foot with prominent swelling.  No gas or definitive osteomyelitis.    Images personally reviewed by me in PACS and interpreted by me

## 2024-08-07 NOTE — ASSESSMENT & PLAN NOTE
Recent hospitalization from 06/25/2024 through 06/28/2024 at [facility] for osteomyelitis of the left foot requiring a left 1st metatarsal amputation/resection on 06/27/2024 by Podiatry.  The patient was also found to have maggots in his open wound.   Recent hospitalization from 07/08/2024 through 07/18/2024 at [facility] and underwent left foot wound debridement and washout by Podiatry on 07/11/2024   Discharged on PO Augmentin 875mg BID plus IV daptomycin 6mg/kg ABW x 6 weeks from bone resection through 08/21/24 per Infectious Disease's recommendations  Now presents with an infection of the left foot  I appreciate Podiatry's input.  Check a nuclear medicine Ceretec white blood cell scan  The patient will likely need a surgical intervention by Podiatry.  I appreciate Infectious Disease's input.  Continue IV daptomycin and PO Augmentin for now  Check an arterial duplex of the left lower extremity

## 2024-08-07 NOTE — PHYSICAL THERAPY NOTE
PHYSICAL THERAPY NOTE          Patient Name: Art Joseph  Today's Date: 8/7/2024 08/07/24 1105   PT Last Visit   PT Visit Date 08/07/24   Note Type   Note Type Treatment   Pain Assessment   Pain Assessment Tool 0-10   Pain Score No Pain   Restrictions/Precautions   Weight Bearing Precautions Per Order No   Braces or Orthoses   (L surgical shoe; R prosthesis)   Other Precautions Fall Risk;Bed Alarm;Contact/isolation   General   Chart Reviewed Yes   Family/Caregiver Present No   Cognition   Overall Cognitive Status WFL   Arousal/Participation Alert;Cooperative   Attention Within functional limits   Orientation Level Oriented X4   Following Commands Follows all commands and directions without difficulty   Subjective   Subjective Pt would like to ambulate. offers no c/o   Bed Mobility   Supine to Sit 5  Supervision   Additional items HOB elevated;Increased time required   Additional Comments Sat EOB with fair + sitting balance to don surgical shoe and prostheisis with increased time.   Transfers   Sit to Stand 5  Supervision   Additional items Increased time required;Verbal cues   Stand to Sit 5  Supervision   Additional items Increased time required;Verbal cues   Additional Comments SBQC used   Ambulation/Elevation   Gait pattern   (Improper Weight shift; Inconsistent alo; Decreased foot clearance; Wide YOANNA)   Gait Assistance 5  Supervision   Additional items Verbal cues   Assistive Device Small base quad cane   Distance 200'  (three standing rest breaks)   Balance   Static Sitting Good   Dynamic Sitting Fair +   Static Standing Fair +   Dynamic Standing Fair   Ambulatory Fair -  (SBQC)   Endurance Deficit   Endurance Deficit Yes   Activity Tolerance   Activity Tolerance Patient limited by fatigue   Exercises   Quad Sets Supine;10 reps;AROM;Bilateral   Hip Flexion Supine;15 reps;AROM;Bilateral   Hip Abduction Sitting;20  reps;AROM;Bilateral   Hip Adduction Sitting;20 reps;AROM;Bilateral  (+ add sets)   Knee AROM Long Arc Quad Sitting;15 reps;AROM;Bilateral   Ankle Pumps Supine;Sitting;15 reps;AROM;Right   Assessment   Prognosis Good   Problem List   (Improper Weight shift; Inconsistent alo; Decreased foot clearance; Wide YOANNA)   Assessment Pt. seen for PT treatment session this date with interventions consisting of  therapeutic exercises, bed mobility, transfers and  gait training w/ emphasis on improving pt's ability to ambulate. Pt. Requiring occasional cues for sequence and safety. In comparison to previous session, Pt. With improvements in activity tolerance.   Pt is in need of continued activity in PT to improve strength balance endurance mobility transfers and ambulation with return to maximize LOF. From PT/mobility standpoint, recommendation at time of d/c would be level II: moderate resource intensity in order to promote return to PLOF and independence.   The patient's AM-[facility] Basic Mobility Inpatient Short Form Raw Score is 19. A Raw score of greater than 16 suggests the patient may benefit from discharge to home.  Please also refer to physical therapy recommendation for safe DC planning.   Goals   LTG Expiration Date 08/20/24   PT Treatment Day 1   Plan   Treatment/Interventions Functional transfer training;LE strengthening/ROM;Therapeutic exercise;Endurance training;Bed mobility;Gait training;Spoke to nursing   Progress Progressing toward goals   PT Frequency 3-5x/wk   Discharge Recommendation   Rehab Resource Intensity Level, PT II (Moderate Resource Intensity)   AM-PAC Basic Mobility Inpatient   Turning in Flat Bed Without Bedrails 4   Lying on Back to Sitting on Edge of Flat Bed Without Bedrails 3   Moving Bed to Chair 3   Standing Up From Chair Using Arms 3   Walk in Room 3   Climb 3-5 Stairs With Railing 3   Basic Mobility Inpatient Raw Score 19   Basic Mobility Standardized Score 42.48   [facility] Level  Of Mobility   JH-HLM Goal 6: Walk 10 steps or more   JH-HLM Achieved 7: Walk 25 feet or more   Education   Education Provided Mobility training   Patient Demonstrates verbal understanding   End of Consult   Patient Position at End of Consult Seated edge of bed;Bed/Chair alarm activated;All needs within reach   End of Consult Comments discussed POC with PT

## 2024-08-07 NOTE — PROGRESS NOTES
Patient:    MRN:  [id number]    Aidin Request ID:  [id number]    Level of care reserved:  Skilled Nursing Facility    Partner Reserved:  [facility], [place] [address] [phone]    Clinical needs requested:    Geography searched:  10 miles around [id number]    Start of Service:    Request sent:  10:39am EDT on 8/6/2024 by Marianela De Los Santos    Partner reserved:  12:31pm EDT on 8/7/2024 by Marianela De Los Santos    Choice list shared:  12:31pm EDT on 8/7/2024 by Marianela De Los Santos

## 2024-08-07 NOTE — PLAN OF CARE
Problem: Prexisting or High Potential for Compromised Skin Integrity  Goal: Skin integrity is maintained or improved  Description: INTERVENTIONS:  - Identify patients at risk for skin breakdown  - Assess and monitor skin integrity  - Assess and monitor nutrition and hydration status  - Monitor labs   - Assess for incontinence   - Turn and reposition patient  - Assist with mobility/ambulation  - Relieve pressure over bony prominences  - Avoid friction and shearing  - Provide appropriate hygiene as needed including keeping skin clean and dry  - Evaluate need for skin moisturizer/barrier cream  - Collaborate with interdisciplinary team   - Patient/family teaching  - Consider wound care consult   Outcome: Progressing     Problem: PAIN - ADULT  Goal: Verbalizes/displays adequate comfort level or baseline comfort level  Description: Interventions:  - Encourage patient to monitor pain and request assistance  - Assess pain using appropriate pain scale  - Administer analgesics based on type and severity of pain and evaluate response  - Implement non-pharmacological measures as appropriate and evaluate response  - Consider cultural and social influences on pain and pain management  - Notify physician/advanced practitioner if interventions unsuccessful or patient reports new pain  Outcome: Progressing     Problem: INFECTION - ADULT  Goal: Absence or prevention of progression during hospitalization  Description: INTERVENTIONS:  - Assess and monitor for signs and symptoms of infection  - Monitor lab/diagnostic results  - Monitor all insertion sites, i.e. indwelling lines, tubes, and drains  - Monitor endotracheal if appropriate and nasal secretions for changes in amount and color  - Kelly appropriate cooling/warming therapies per order  - Administer medications as ordered  - Instruct and encourage patient and family to use good hand hygiene technique  - Identify and instruct in appropriate isolation precautions for  identified infection/condition  Outcome: Progressing     Problem: SAFETY ADULT  Goal: Patient will remain free of falls  Description: INTERVENTIONS:  - Educate patient/family on patient safety including physical limitations  - Instruct patient to call for assistance with activity   - Consult OT/PT to assist with strengthening/mobility   - Keep Call bell within reach  - Keep bed low and locked with side rails adjusted as appropriate  - Keep care items and personal belongings within reach  - Initiate and maintain comfort rounds  - Make Fall Risk Sign visible to staff  - Offer Toileting every 2 Hours, in advance of need  - Initiate/Maintain bed alarm  - Obtain necessary fall risk management equipment:   - Apply yellow socks and bracelet for high fall risk patients  - Consider moving patient to room near nurses station  Outcome: Progressing  Goal: Maintain or return to baseline ADL function  Description: INTERVENTIONS:  -  Assess patient's ability to carry out ADLs; assess patient's baseline for ADL function and identify physical deficits which impact ability to perform ADLs (bathing, care of mouth/teeth, toileting, grooming, dressing, etc.)  - Assess/evaluate cause of self-care deficits   - Assess range of motion  - Assess patient's mobility; develop plan if impaired  - Assess patient's need for assistive devices and provide as appropriate  - Encourage maximum independence but intervene and supervise when necessary  - Involve family in performance of ADLs  - Assess for home care needs following discharge   - Consider OT consult to assist with ADL evaluation and planning for discharge  - Provide patient education as appropriate  Outcome: Progressing  Goal: Maintains/Returns to pre admission functional level  Description: INTERVENTIONS:  - Perform AM-PAC 6 Click Basic Mobility/ Daily Activity assessment daily.  - Set and communicate daily mobility goal to care team and patient/family/caregiver.   - Collaborate with  rehabilitation services on mobility goals if consulted  - Perform Range of Motion 3 times a day.  - Reposition patient every 2 hours.  - Dangle patient 3 times a day  - Stand patient 3 times a day  - Ambulate patient 3 times a day  - Out of bed to chair 3 times a day   - Out of bed for meals 3 times a day  - Out of bed for toileting  - Record patient progress and toleration of activity level   Outcome: Progressing     Problem: DISCHARGE PLANNING  Goal: Discharge to home or other facility with appropriate resources  Description: INTERVENTIONS:  - Identify barriers to discharge w/patient and caregiver  - Arrange for needed discharge resources and transportation as appropriate  - Identify discharge learning needs (meds, wound care, etc.)  - Arrange for interpretive services to assist at discharge as needed  - Refer to Case Management Department for coordinating discharge planning if the patient needs post-hospital services based on physician/advanced practitioner order or complex needs related to functional status, cognitive ability, or social support system  Outcome: Progressing     Problem: Knowledge Deficit  Goal: Patient/family/caregiver demonstrates understanding of disease process, treatment plan, medications, and discharge instructions  Description: Complete learning assessment and assess knowledge base.  Interventions:  - Provide teaching at level of understanding  - Provide teaching via preferred learning methods  Outcome: Progressing     Problem: SKIN/TISSUE INTEGRITY - ADULT  Goal: Skin Integrity remains intact(Skin Breakdown Prevention)  Description: Assess:  -Perform Zaid assessment every shift  -Clean and moisturize skin every shift  -Inspect skin when repositioning, toileting, and assisting with ADLS  -Assess extremities for adequate circulation and sensation     Bed Management:  -Have minimal linens on bed & keep smooth, unwrinkled  -Change linens as needed when moist or perspiring  -Avoid sitting or  lying in one position for more than 2 hours while in bed  -Keep HOB at 30 degrees     Toileting:  -Offer bedside commode  -Assess for incontinence every 2 hours  -Use incontinent care products after each incontinent episode    Activity:  -Mobilize patient 3 times a day  -Encourage activity and walks on unit  -Encourage or provide ROM exercises   -Turn and reposition patient every 2 Hours  -Use appropriate equipment to lift or move patient in bed  -Instruct/ Assist with weight shifting every hour when out of bed in chair  -Consider limitation of chair time 1 hour intervals    Skin Care:  -Avoid use of baby powder, tape, friction and shearing, hot water or constrictive clothing  -Relieve pressure over bony prominences  -Do not massage red bony areas    Next Steps:  -Teach patient strategies to minimize risks   -Consider consults to  interdisciplinary teams  Outcome: Progressing  Goal: Incision(s), wounds(s) or drain site(s) healing without S/S of infection  Description: INTERVENTIONS  - Assess and document dressing, incision, wound bed, drain sites and surrounding tissue  - Provide patient and family education  - Perform skin care/dressing changes every day  Outcome: Progressing  Goal: Pressure injury heals and does not worsen  Description: Interventions:  - Implement low air loss mattress or specialty surface (Criteria met)  - Apply silicone foam dressing  - Instruct/assist with weight shifting every 60 minutes when in chair   - Limit chair time to 1 hour intervals  - Use special pressure reducing interventions when in chair   - Apply fecal or urinary incontinence containment device   - Perform passive or active ROM  - Turn and reposition patient & offload bony prominences every 2 hours   - Utilize friction reducing device or surface for transfers   - Consider consults to  interdisciplinary teams  - Use incontinent care products after each incontinent episode  - Consider nutrition services referral as  needed  Outcome: Progressing     Problem: METABOLIC, FLUID AND ELECTROLYTES - ADULT  Goal: Electrolytes maintained within normal limits  Description: INTERVENTIONS:  - Monitor labs and assess patient for signs and symptoms of electrolyte imbalances  - Administer electrolyte replacement as ordered  - Monitor response to electrolyte replacements, including repeat lab results as appropriate  - Instruct patient on fluid and nutrition as appropriate  Outcome: Progressing     Problem: MUSCULOSKELETAL - ADULT  Goal: Maintain or return mobility to safest level of function  Description: INTERVENTIONS:  - Assess patient's ability to carry out ADLs; assess patient's baseline for ADL function and identify physical deficits which impact ability to perform ADLs (bathing, care of mouth/teeth, toileting, grooming, dressing, etc.)  - Assess/evaluate cause of self-care deficits   - Assess range of motion  - Assess patient's mobility  - Assess patient's need for assistive devices and provide as appropriate  - Encourage maximum independence but intervene and supervise when necessary  - Involve family in performance of ADLs  - Assess for home care needs following discharge   - Consider OT consult to assist with ADL evaluation and planning for discharge  - Provide patient education as appropriate  Outcome: Progressing  Goal: Maintain proper alignment of affected body part  Description: INTERVENTIONS:  - Support, maintain and protect limb and body alignment  - Provide patient/ family with appropriate education  Outcome: Progressing

## 2024-08-07 NOTE — ASSESSMENT & PLAN NOTE
Lab Results   Component Value Date    HGBA1C 9.7 (H) 06/25/2024       Recent Labs     08/06/24  1110 08/06/24  1611 08/06/24  2109 08/07/24  0702   POCGLU 185* 189* 205* 146*         Blood Sugar Average: Last 72 hrs:  (P) 178.5615020544158992    Utilize Lantus 15 Units SQ QHS and Humalog 9 Units TID with meals  ISS with blood glucose monitoring [facility]  Hypoglycemia protocol  Continue lisinopril for renal protection  Outpatient Endocrinology evaluation

## 2024-08-07 NOTE — PLAN OF CARE
Problem: PHYSICAL THERAPY ADULT  Goal: Performs mobility at highest level of function for planned discharge setting.  See evaluation for individualized goals.  Description: Treatment/Interventions: Functional transfer training, LE strengthening/ROM, Therapeutic exercise, Endurance training, Bed mobility, Gait training          See flowsheet documentation for full assessment, interventions and recommendations.  Outcome: Progressing  Note: Prognosis: Good  Problem List:  (Improper Weight shift; Inconsistent alo; Decreased foot clearance; Wide YOANNA)  Assessment: Pt. seen for PT treatment session this date with interventions consisting of  therapeutic exercises, bed mobility, transfers and  gait training w/ emphasis on improving pt's ability to ambulate. Pt. Requiring occasional cues for sequence and safety. In comparison to previous session, Pt. With improvements in activity tolerance.   Pt is in need of continued activity in PT to improve strength balance endurance mobility transfers and ambulation with return to maximize LOF. From PT/mobility standpoint, recommendation at time of d/c would be level II: moderate resource intensity in order to promote return to PLOF and independence.   The patient's AM-[facility] Basic Mobility Inpatient Short Form Raw Score is 19. A Raw score of greater than 16 suggests the patient may benefit from discharge to home.  Please also refer to physical therapy recommendation for safe DC planning.        Rehab Resource Intensity Level, PT: II (Moderate Resource Intensity)    See flowsheet documentation for full assessment.

## 2024-08-07 NOTE — PROGRESS NOTES
"[facility]  Progress Note  Name: Art Joseph I  MRN: [id number]  Unit/Bed#: 402-01 I Date of Admission: 8/5/2024   Date of Service: 8/7/2024 I Hospital Day: 1    Assessment & Plan   * Acute osteomyelitis of metatarsal bone of left foot (HCC)  Assessment & Plan  Recent hospitalization from 06/25/2024 through 06/28/2024 at [facility] for osteomyelitis of the left foot requiring a left 1st metatarsal amputation/resection on 06/27/2024 by Podiatry.  The patient was also found to have maggots in his open wound.   Recent hospitalization from 07/08/2024 through 07/18/2024 at [facility] and underwent left foot wound debridement and washout by Podiatry on 07/11/2024   Discharged on PO Augmentin 875mg BID plus IV daptomycin 6mg/kg ABW x 6 weeks from bone resection through 08/21/24 per Infectious Disease's recommendations  Now presents with an infection of the left foot  I appreciate Podiatry's input.  Check a nuclear medicine Ceretec white blood cell scan  The patient will likely need a surgical intervention by Podiatry.  I appreciate Infectious Disease's input.  Continue IV daptomycin and PO Augmentin for now  Check an arterial duplex of the left lower extremity    Open wound of left foot  Assessment & Plan  Please see the assessment and plan for \"Acute osteomyelitis of metatarsal bone of left foot\"      Primary hypertension  Assessment & Plan  Continue lisinopril 2.5 mg PO Qdaily  Follow the blood pressure trend    Ambulatory dysfunction  Assessment & Plan  PT/OT evaluations    Type 2 diabetes mellitus with hyperglycemia, with long-term current use of insulin (HCC)  Assessment & Plan  Lab Results   Component Value Date    HGBA1C 9.7 (H) 06/25/2024       Recent Labs     08/06/24  1110 08/06/24  1611 08/06/24  2109 08/07/24  0702   POCGLU 185* 189* 205* 146*         Blood Sugar Average: Last 72 hrs:  (P) " "[id number]    Utilize Lantus 15 Units SQ QHS and Humalog 9 Units TID with meals  [facility]  Hypoglycemia protocol  Continue lisinopril for renal protection  Outpatient Endocrinology evaluation          Subjective/Objective     Subjective:   The patient was seen and examined.  The patient is doing better.  No extremity pain.  No chest pain.  No shortness of breath.  No abdominal pain.  No nausea or vomiting.      Objective:  Vitals: Blood pressure 104/58, pulse 84, temperature 98 °F (36.7 °C), temperature source Oral, resp. rate 17, height 5' 10\" (1.778 m), weight 99.6 kg (219 lb 9.3 oz), SpO2 97%.,Body mass index is 31.51 kg/m².      Intake/Output Summary (Last 24 hours) at 8/7/2024 1036  Last data filed at 8/7/2024 0813  Gross per 24 hour   Intake 720 ml   Output 2150 ml   Net -1430 ml       Invasive Devices       Peripherally Inserted Central Catheter Line  Duration             PICC Line 07/16/24 Right Basilic 21 days                    Physical Exam:   General:  NAD, follows commands  HEENT:  NC/AT, mucous membranes moist  Neck:  Supple, No JVP elevation  CV:  + S1, + S2, RRR  Pulm:  Lung fields are CTA bilaterally  Abd:  Soft, Non-tender, Non-distended  Ext:  No clubbing/cyanosis/edema, Right-sided BKA, Left foot wound dressing intact  Skin:  No rashes  Neuro:  Awake, alert, oriented  Psych:  Normal mood and affect      Results from last 7 days   Lab Units 08/07/24  0556 08/06/24  0545 08/05/24  1823   WBC Thousand/uL 8.18 7.71 9.55   HEMOGLOBIN g/dL 13.0 12.6 12.8   HEMATOCRIT % 40.0 38.2 39.6   PLATELETS Thousands/uL 273 254 249     Results from last 7 days   Lab Units 08/07/24  0556 08/06/24  0545 08/05/24  1823   SODIUM mmol/L 138 137 136   POTASSIUM mmol/L 3.9 3.7 4.2   CHLORIDE mmol/L 104 103 102   CO2 mmol/L 26 26 27   BUN mg/dL 15 10 12   CREATININE mg/dL 0.76 0.73 0.75   CALCIUM mg/dL 9.2 9.0 9.3     Results from last 7 days   Lab Units 08/07/24  0556 " 08/06/24  0545   MAGNESIUM mg/dL 2.1 2.0     Results from last 7 days   Lab Units 08/07/24  0556 08/06/24  0545 08/05/24  0632   ALK PHOS U/L 114* 119* 142*   ALT U/L 9 9 15   AST U/L 9* 8* 10         Lab, Imaging and other studies: I have personally reviewed pertinent reports.    VTE Pharmacologic Prophylaxis: Enoxaparin (Lovenox)  VTE Mechanical Prophylaxis: sequential compression device on the left lower extremity only.  No SCD on the right lower extremity with a right-sided BKA.

## 2024-08-08 ENCOUNTER — APPOINTMENT (OUTPATIENT)
Dept: NUCLEAR MEDICINE | Facility: HOSPITAL | Age: 64
DRG: 856 | End: 2024-08-08
Payer: COMMERCIAL

## 2024-08-08 PROBLEM — I73.9 PERIPHERAL VASCULAR DISEASE (HCC): Status: ACTIVE | Noted: 2024-08-08

## 2024-08-08 LAB
ALBUMIN SERPL BCG-MCNC: 3.6 G/DL (ref 3.5–5)
ALP SERPL-CCNC: 115 U/L (ref 34–104)
ALT SERPL W P-5'-P-CCNC: 11 U/L (ref 7–52)
ANION GAP SERPL CALCULATED.3IONS-SCNC: 9 MMOL/L (ref 4–13)
AST SERPL W P-5'-P-CCNC: 9 U/L (ref 13–39)
BASOPHILS # BLD AUTO: 0.09 THOUSANDS/ÂΜL (ref 0–0.1)
BASOPHILS NFR BLD AUTO: 1 % (ref 0–1)
BILIRUB SERPL-MCNC: 0.54 MG/DL (ref 0.2–1)
BUN SERPL-MCNC: 20 MG/DL (ref 5–25)
CALCIUM SERPL-MCNC: 9.1 MG/DL (ref 8.4–10.2)
CHLORIDE SERPL-SCNC: 104 MMOL/L (ref 96–108)
CO2 SERPL-SCNC: 25 MMOL/L (ref 21–32)
CREAT SERPL-MCNC: 0.79 MG/DL (ref 0.6–1.3)
EOSINOPHIL # BLD AUTO: 0.44 THOUSAND/ÂΜL (ref 0–0.61)
EOSINOPHIL NFR BLD AUTO: 5 % (ref 0–6)
ERYTHROCYTE [DISTWIDTH] IN BLOOD BY AUTOMATED COUNT: 13.3 % (ref 11.6–15.1)
GFR SERPL CREATININE-BSD FRML MDRD: 94 ML/MIN/1.73SQ M
GLUCOSE SERPL-MCNC: 129 MG/DL (ref 65–140)
GLUCOSE SERPL-MCNC: 136 MG/DL (ref 65–140)
GLUCOSE SERPL-MCNC: 179 MG/DL (ref 65–140)
GLUCOSE SERPL-MCNC: 242 MG/DL (ref 65–140)
GLUCOSE SERPL-MCNC: 281 MG/DL (ref 65–140)
HCT VFR BLD AUTO: 41.4 % (ref 36.5–49.3)
HGB BLD-MCNC: 13.4 G/DL (ref 12–17)
IMM GRANULOCYTES # BLD AUTO: 0.03 THOUSAND/UL (ref 0–0.2)
IMM GRANULOCYTES NFR BLD AUTO: 0 % (ref 0–2)
LYMPHOCYTES # BLD AUTO: 2.76 THOUSANDS/ÂΜL (ref 0.6–4.47)
LYMPHOCYTES NFR BLD AUTO: 30 % (ref 14–44)
MAGNESIUM SERPL-MCNC: 1.9 MG/DL (ref 1.9–2.7)
MCH RBC QN AUTO: 28.1 PG (ref 26.8–34.3)
MCHC RBC AUTO-ENTMCNC: 32.4 G/DL (ref 31.4–37.4)
MCV RBC AUTO: 87 FL (ref 82–98)
MONOCYTES # BLD AUTO: 0.71 THOUSAND/ÂΜL (ref 0.17–1.22)
MONOCYTES NFR BLD AUTO: 8 % (ref 4–12)
NEUTROPHILS # BLD AUTO: 5.08 THOUSANDS/ÂΜL (ref 1.85–7.62)
NEUTS SEG NFR BLD AUTO: 56 % (ref 43–75)
NRBC BLD AUTO-RTO: 0 /100 WBCS
PHOSPHATE SERPL-MCNC: 4.2 MG/DL (ref 2.3–4.1)
PLATELET # BLD AUTO: 294 THOUSANDS/UL (ref 149–390)
PMV BLD AUTO: 8.9 FL (ref 8.9–12.7)
POTASSIUM SERPL-SCNC: 3.9 MMOL/L (ref 3.5–5.3)
PROCALCITONIN SERPL-MCNC: 0.07 NG/ML
PROT SERPL-MCNC: 7 G/DL (ref 6.4–8.4)
RBC # BLD AUTO: 4.77 MILLION/UL (ref 3.88–5.62)
SODIUM SERPL-SCNC: 138 MMOL/L (ref 135–147)
WBC # BLD AUTO: 9.11 THOUSAND/UL (ref 4.31–10.16)

## 2024-08-08 PROCEDURE — 84145 PROCALCITONIN (PCT): CPT | Performed by: INTERNAL MEDICINE

## 2024-08-08 PROCEDURE — 97110 THERAPEUTIC EXERCISES: CPT

## 2024-08-08 PROCEDURE — 99233 SBSQ HOSP IP/OBS HIGH 50: CPT | Performed by: INTERNAL MEDICINE

## 2024-08-08 PROCEDURE — 84100 ASSAY OF PHOSPHORUS: CPT | Performed by: INTERNAL MEDICINE

## 2024-08-08 PROCEDURE — 80053 COMPREHEN METABOLIC PANEL: CPT | Performed by: INTERNAL MEDICINE

## 2024-08-08 PROCEDURE — 83735 ASSAY OF MAGNESIUM: CPT | Performed by: INTERNAL MEDICINE

## 2024-08-08 PROCEDURE — 82948 REAGENT STRIP/BLOOD GLUCOSE: CPT

## 2024-08-08 PROCEDURE — 85025 COMPLETE CBC W/AUTO DIFF WBC: CPT | Performed by: INTERNAL MEDICINE

## 2024-08-08 PROCEDURE — 97116 GAIT TRAINING THERAPY: CPT

## 2024-08-08 PROCEDURE — 99232 SBSQ HOSP IP/OBS MODERATE 35: CPT | Performed by: INTERNAL MEDICINE

## 2024-08-08 RX ORDER — INSULIN LISPRO 100 [IU]/ML
10 INJECTION, SOLUTION INTRAVENOUS; SUBCUTANEOUS
Status: DISCONTINUED | OUTPATIENT
Start: 2024-08-08 | End: 2024-08-09

## 2024-08-08 RX ADMIN — ENOXAPARIN SODIUM 40 MG: 40 INJECTION SUBCUTANEOUS at 08:06

## 2024-08-08 RX ADMIN — INSULIN LISPRO 10 UNITS: 100 INJECTION, SOLUTION INTRAVENOUS; SUBCUTANEOUS at 12:02

## 2024-08-08 RX ADMIN — ASPIRIN 81 MG: 81 TABLET, COATED ORAL at 08:06

## 2024-08-08 RX ADMIN — DAPTOMYCIN 500 MG: 500 INJECTION, POWDER, LYOPHILIZED, FOR SOLUTION INTRAVENOUS at 22:44

## 2024-08-08 RX ADMIN — INSULIN LISPRO 9 UNITS: 100 INJECTION, SOLUTION INTRAVENOUS; SUBCUTANEOUS at 08:07

## 2024-08-08 RX ADMIN — INSULIN LISPRO 3 UNITS: 100 INJECTION, SOLUTION INTRAVENOUS; SUBCUTANEOUS at 21:26

## 2024-08-08 RX ADMIN — AMOXICILLIN AND CLAVULANATE POTASSIUM 1 TABLET: 875; 125 TABLET, FILM COATED ORAL at 08:06

## 2024-08-08 RX ADMIN — INSULIN LISPRO 1 UNITS: 100 INJECTION, SOLUTION INTRAVENOUS; SUBCUTANEOUS at 12:01

## 2024-08-08 RX ADMIN — CHOLECALCIFEROL TAB 25 MCG (1000 UNIT) 1000 UNITS: 25 TAB at 08:06

## 2024-08-08 RX ADMIN — INSULIN LISPRO 3 UNITS: 100 INJECTION, SOLUTION INTRAVENOUS; SUBCUTANEOUS at 17:05

## 2024-08-08 RX ADMIN — AMOXICILLIN AND CLAVULANATE POTASSIUM 1 TABLET: 875; 125 TABLET, FILM COATED ORAL at 20:46

## 2024-08-08 RX ADMIN — INSULIN LISPRO 10 UNITS: 100 INJECTION, SOLUTION INTRAVENOUS; SUBCUTANEOUS at 17:04

## 2024-08-08 RX ADMIN — LISINOPRIL 2.5 MG: 5 TABLET ORAL at 08:06

## 2024-08-08 RX ADMIN — INSULIN GLARGINE 15 UNITS: 100 INJECTION, SOLUTION SUBCUTANEOUS at 21:25

## 2024-08-08 RX ADMIN — ATORVASTATIN CALCIUM 80 MG: 40 TABLET, FILM COATED ORAL at 17:04

## 2024-08-08 NOTE — ASSESSMENT & PLAN NOTE
Lab Results   Component Value Date    HGBA1C 7.9 (H) 08/07/2024       Recent Labs     08/07/24  1052 08/07/24  1606 08/07/24 2056 08/08/24  0655   POCGLU 190* 245* 195* 136         Blood Sugar Average: Last 72 hrs:  (P) 183.9    Continue Lantus 15 Units SQ QHS  Increased Humalog to 10 Units TID with meals on 08/08/2024  ISS with blood glucose monitoring [facility]  Hypoglycemia protocol  Continue lisinopril for renal protection  Outpatient Endocrinology evaluation

## 2024-08-08 NOTE — ASSESSMENT & PLAN NOTE
Continue aspirin 81 mg PO Qdaily and high-intensity statin therapy with atorvastatin 80 mg PO Qdaily with dinner  Outpatient follow-up with Vascular Surgery    Left lower extremity arterial duplex (08/07/2024):  CONCLUSION:     Impression:     RIGHT LOWER LIMB:  BKA     LEFT LOWER LIMB:  Diffuse disease noted throughout the femoral-popliteal and tibio-peroneal  arteries without significant focal stenosis.  Ankle/Brachial index: non-compressible secondary to calcified tibial vessels.  (Prior: 1.55, unreliable)  PVR/ PPG tracings are normal.  Metatarsal and Great toe pressures not obtained due to TMA.     Compared to previous study on 4/25/2024, there is no significant change.  .     SIGNATURE:  Electronically Signed by: UMA HOGAN MD on 2024-08-07 11:57:56 AM

## 2024-08-08 NOTE — PROGRESS NOTES
[facility]  Progress Note  Name: Art Joseph I  MRN: [id number]  Unit/Bed#: 402-01 I Date of Admission: 8/5/2024   Date of Service: 8/8/2024 I Hospital Day: 2    Assessment & Plan   * Acute osteomyelitis of metatarsal bone of left foot (HCC)  Assessment & Plan  Recent hospitalization from 06/25/2024 through 06/28/2024 at [facility] for osteomyelitis of the left foot requiring a left 1st metatarsal amputation/resection on 06/27/2024 by Podiatry.  The patient was also found to have maggots in his open wound.   Recent hospitalization from 07/08/2024 through 07/18/2024 at [facility] and underwent left foot wound debridement and washout by Podiatry on 07/11/2024   Discharged on PO Augmentin 875mg BID plus IV daptomycin 6mg/kg ABW x 6 weeks from bone resection through 08/21/24 per Infectious Disease's recommendations  Now presents with an infection of the left foot  I appreciate Podiatry's input.  Check a nuclear medicine Ceretec white blood cell scan, which is in process  The patient will likely need a surgical intervention by Podiatry.  I appreciate Infectious Disease's input.  Continue IV daptomycin and PO Augmentin for now    Peripheral vascular disease (HCC)  Assessment & Plan  Continue aspirin 81 mg PO Qdaily and high-intensity statin therapy with atorvastatin 80 mg PO Qdaily with dinner  Outpatient follow-up with Vascular Surgery    Left lower extremity arterial duplex (08/07/2024):  CONCLUSION:     Impression:     RIGHT LOWER LIMB:  BKA     LEFT LOWER LIMB:  Diffuse disease noted throughout the femoral-popliteal and tibio-peroneal  arteries without significant focal stenosis.  Ankle/Brachial index: non-compressible secondary to calcified tibial vessels.  (Prior: 1.55, unreliable)  PVR/ PPG tracings are normal.  Metatarsal and Great toe pressures not obtained due to TMA.     Compared to previous study on 4/25/2024, there  "is no significant change.  .     SIGNATURE:  Electronically Signed by: UMA HOGAN MD on 2024-08-07 11:57:56 AM    Primary hypertension  Assessment & Plan  Continue lisinopril 2.5 mg PO Qdaily  Follow the blood pressure trend    Ambulatory dysfunction  Assessment & Plan  PT/OT evaluations    Type 2 diabetes mellitus with hyperglycemia, with long-term current use of insulin (HCC)  Assessment & Plan  Lab Results   Component Value Date    HGBA1C 7.9 (H) 08/07/2024       Recent Labs     08/07/24  1052 08/07/24  1606 08/07/24  2056 08/08/24  0655   POCGLU 190* 245* 195* 136         Blood Sugar Average: Last 72 hrs:  (P) 183.9    Continue Lantus 15 Units SQ QHS  Increased Humalog to 10 Units TID with meals on 08/08/2024  ISS with blood glucose monitoring [facility]  Hypoglycemia protocol  Continue lisinopril for renal protection  Outpatient Endocrinology evaluation          Subjective/Objective     Subjective:   The patient was seen and examined.  The patient is doing better.  No extremity pain.  No chest pain.  No shortness of breath.  No abdominal pain.  No nausea or vomiting.      Objective:  Vitals: Blood pressure 122/82, pulse 86, temperature 97.9 °F (36.6 °C), temperature source Oral, resp. rate 17, height 5' 10\" (1.778 m), weight 99.6 kg (219 lb 9.3 oz), SpO2 96%.,Body mass index is 31.51 kg/m².      Intake/Output Summary (Last 24 hours) at 8/8/2024 1054  Last data filed at 8/8/2024 0835  Gross per 24 hour   Intake 1140 ml   Output 600 ml   Net 540 ml       Invasive Devices       Peripherally Inserted Central Catheter Line  Duration             PICC Line 07/16/24 Right Basilic 22 days                    Physical Exam:   General:  NAD, follows commands  HEENT:  NC/AT, mucous membranes moist  Neck:  Supple, No JVP elevation  CV:  + S1, + S2, RRR  Pulm:  Lung fields are CTA bilaterally  Abd:  Soft, Non-tender, Non-distended  Ext:  No clubbing/cyanosis/edema, Right-sided BKA, Left foot wound dressing intact  Skin:  No " krista  Neuro:  Awake, alert, oriented  Psych:  Normal mood and affect      Results from last 7 days   Lab Units 08/08/24  0516 08/07/24  0556 08/06/24  0545   WBC Thousand/uL 9.11 8.18 7.71   HEMOGLOBIN g/dL 13.4 13.0 12.6   HEMATOCRIT % 41.4 40.0 38.2   PLATELETS Thousands/uL 294 273 254     Results from last 7 days   Lab Units 08/08/24  0516 08/07/24  0556 08/06/24  0545   SODIUM mmol/L 138 138 137   POTASSIUM mmol/L 3.9 3.9 3.7   CHLORIDE mmol/L 104 104 103   CO2 mmol/L 25 26 26   BUN mg/dL 20 15 10   CREATININE mg/dL 0.79 0.76 0.73   CALCIUM mg/dL 9.1 9.2 9.0     Results from last 7 days   Lab Units 08/08/24  0516 08/07/24  0556 08/06/24  0545   MAGNESIUM mg/dL 1.9 2.1 2.0     Results from last 7 days   Lab Units 08/08/24  0516 08/07/24  0556 08/06/24  0545   ALK PHOS U/L 115* 114* 119*   ALT U/L 11 9 9   AST U/L 9* 9* 8*       Lab, Imaging and other studies: I have personally reviewed pertinent reports.    VTE Pharmacologic Prophylaxis: Enoxaparin (Lovenox)  VTE Mechanical Prophylaxis: sequential compression device

## 2024-08-08 NOTE — PHYSICAL THERAPY NOTE
PHYSICAL THERAPY NOTE          Patient Name: Art Joseph  Today's Date: 8/8/2024 08/08/24 0828   PT Last Visit   PT Visit Date 08/08/24   Note Type   Note Type Treatment   Pain Assessment   Pain Assessment Tool 0-10   Pain Score No Pain   Restrictions/Precautions   Weight Bearing Precautions Per Order No   Braces or Orthoses   (L surgical shoe; R prosthesis)   Other Precautions   (Fall Risk; Bed Alarm; Contact/isolation)   General   Chart Reviewed Yes   Response to Previous Treatment Patient with no complaints from previous session.   Family/Caregiver Present No   Cognition   Overall Cognitive Status WFL   Arousal/Participation Alert;Cooperative   Attention Within functional limits   Orientation Level Oriented X4   Following Commands Follows all commands and directions without difficulty   Subjective   Subjective Agreeable to therapy. States he feels a little down today.   Bed Mobility   Additional Comments seated EOB start/end PT session   Transfers   Sit to Stand 5  Supervision   Additional items Increased time required;Verbal cues   Stand to Sit 5  Supervision   Additional items Increased time required;Verbal cues   Additional Comments SBQC used   Ambulation/Elevation   Gait pattern   (Fall Risk; Bed Alarm; Contact/isolation)   Gait Assistance 5  Supervision   Additional items Verbal cues   Assistive Device Small base quad cane   Distance 200'   Balance   Static Sitting Good   Dynamic Sitting Good   Static Standing Fair +   Dynamic Standing Fair   Ambulatory Fair -  (SBQC)   Endurance Deficit   Endurance Deficit Yes   Endurance Deficit Description SpO2 WFL's RA, HR  with ambulation, mild SOB   Activity Tolerance   Activity Tolerance Patient limited by fatigue   Exercises   Hip Flexion Sitting;25 reps;AROM;Bilateral   Hip Abduction Sitting;25 reps;AROM;Bilateral   Hip Adduction Sitting;25 reps;AROM;Bilateral  (+ add sets)    Knee AROM Long Arc Quad Sitting;25 reps;AROM;Bilateral   Ankle Pumps Sitting;25 reps;AROM;Bilateral   Assessment   Prognosis Good   Problem List   (Improper Weight shift; Inconsistent alo; Decreased foot clearance; Wide YOANNA)   Assessment Pt. seen for PT treatment session this date with interventions consisting of  therapeutic exercises, bed mobility, transfers and  gait training w/ emphasis on improving pt's ability to ambulate. Pt. Requiring occasional cues for sequence and safety. In comparison to previous session, Pt. With improvements in activity tolerance. Demonstrates improving endurance.  Pt is in need of continued activity in PT to improve strength balance endurance mobility transfers and ambulation with return to maximize LOF. From PT/mobility standpoint, recommendation at time of d/c would be level II: moderate resource intensity in order to promote return to PLOF and independence.   The patient's AM-[facility] Basic Mobility Inpatient Short Form Raw Score is 21. A Raw score of greater than 16 suggests the patient may benefit from discharge to home.  Please also refer to physical therapy recommendation for safe DC planning.   Goals   LTG Expiration Date 08/20/24   PT Treatment Day 2   Plan   Treatment/Interventions Functional transfer training;LE strengthening/ROM;Therapeutic exercise;Endurance training;Bed mobility;Gait training;Spoke to nursing   Progress Progressing toward goals   PT Frequency 3-5x/wk   Discharge Recommendation   Rehab Resource Intensity Level, PT II (Moderate Resource Intensity)   AM-PAC Basic Mobility Inpatient   Turning in Flat Bed Without Bedrails 4   Lying on Back to Sitting on Edge of Flat Bed Without Bedrails 4   Moving Bed to Chair 3   Standing Up From Chair Using Arms 4   Walk in Room 3   Climb 3-5 Stairs With Railing 3   Basic Mobility Inpatient Raw Score 21   Basic Mobility Standardized Score 45.55   [facility] Highest Level Of Mobility   [facility] Goal 6: Walk 10 steps or more    TEE-GARY Achieved 7: Walk 25 feet or more   Education   Education Provided Mobility training   Patient Demonstrates verbal understanding   End of Consult   Patient Position at End of Consult Seated edge of bed;All needs within reach   End of Consult Comments discussed POC with PT

## 2024-08-08 NOTE — PROGRESS NOTES
VIRTUAL CARE DOCUMENTATION:     1. This service was provided via Telemedicine using [employer] Kit     2. Parties in the room with patient during teleconsult the patient's PCA    3. Confidentiality My office door was closed     4. Participants No one else was in the room    5. Patient acknowledged consent and understanding of privacy and security of the  Telemedicine consult. I informed the patient that I have reviewed their record in Epic and presented the opportunity for them to ask any questions regarding the visit today.  The patient agreed to participate.    6. Time spent 3 minutes          Progress Note - Infectious Disease   Art Joseph 64 y.o. male MRN: [id number]  Unit/Bed#: 402-01 Encounter: [id number]      Impression/Plan:  1.  Nonhealing infected left foot wound with probable ongoing osteomyelitis.  This despite extensive surgical interventions and broad-spectrum antibiotics with a plan to treat for 6 weeks through 8/21/24.  The wound is not really healing and there is significant surrounding swelling and erythema.  There is also increased drainage.  Seems to be tolerating the antibiotics and the CPK on 8/7/2024 is 28  -Continue daptomycin and Augmentin for now  -Recheck CBC with differential, BMP, and CPK to make sure no developing toxicities  -Follow-up Ceretec scan  -Local wound care  -Close podiatry follow-up  -Await additional definitive surgical intervention  -Prognosis poor for limb salvage as per podiatry     2.  Diabetes mellitus.  With hyperglycemia.  Poorly controlled with a hemoglobin A1c of 9.7 in the recent past.  -Tighten diabetic control to improve leukocyte function and wound healing     3.  Peripheral arterial disease.  Consider vascular consult    I spent 50 minutes in evaluation of the patient of which 30 minutes was in counseling/coordination of care    Discussed with the primary service the plan to continue the current antibiotics awaiting the Ceretec scan results and  podiatry's decision about surgical intervention and they agree with the plan    Antibiotics:  Daptomycin  Augmentin    Subjective:  Patient has no fever, chills, sweats; no nausea, vomiting, diarrhea; no cough, shortness of breath; no pain. No new symptoms.  He is in good spirits.    Objective:  Vitals:  Temp:  [97.9 °F (36.6 °C)-98.3 °F (36.8 °C)] 97.9 °F (36.6 °C)  HR:  [84-92] 86  Resp:  [16-18] 17  BP: (104-128)/(58-75) 127/75  SpO2:  [94 %-97 %] 96 %  Temp (24hrs), Av.1 °F (36.7 °C), Min:97.9 °F (36.6 °C), Max:98.3 °F (36.8 °C)  Current: Temperature: 97.9 °F (36.6 °C)    Documented physical exam has been primarily done by the patient's nurse and/or the primary service due to limited examination abilities on telemedicine    Physical Exam:   General Appearance:  Alert, interactive, nontoxic, no acute distress.   Throat: Oropharynx moist without lesions.    Lungs:   Clear to auscultation bilaterally; no wheezes, rhonchi or rales; respirations unlabored   Heart:  RRR; no murmur, rub or gallop   Abdomen:   Soft, non-tender, non-distended, positive bowel sounds.     Extremities: Right lower extremity status post BKA.  Left foot dressed with a dry dressing in place.   Skin: No new rashes or lesions. No draining wounds noted.       Labs, Imaging, & Other studies:   All pertinent labs and imaging studies were personally reviewed  Results from last 7 days   Lab Units 24  0516 24  0556 24  0545   WBC Thousand/uL 9.11 8.18 7.71   HEMOGLOBIN g/dL 13.4 13.0 12.6   PLATELETS Thousands/uL 294 273 254     Results from last 7 days   Lab Units 24  0516 24  0556 24  0545   SODIUM mmol/L 138 138 137   POTASSIUM mmol/L 3.9 3.9 3.7   CHLORIDE mmol/L 104 104 103   CO2 mmol/L 25 26 26   BUN mg/dL 20 15 10   CREATININE mg/dL 0.79 0.76 0.73   EGFR ml/min/1.73sq m 94 95 97   CALCIUM mg/dL 9.1 9.2 9.0   AST U/L 9* 9* 8*   ALT U/L 11 9 9   ALK PHOS U/L 115* 114* 119*     Results from last 7 days   Lab  Units 08/06/24  0548   MRSA CULTURE ONLY  No Methicillin Resistant Staphlyococcus aureus (MRSA) isolated     Results from last 7 days   Lab Units 08/08/24  0516 08/07/24  0556   PROCALCITONIN ng/ml 0.07 0.06     Results from last 7 days   Lab Units 08/05/24  1823   CRP mg/L 15.7*

## 2024-08-08 NOTE — ASSESSMENT & PLAN NOTE
Recent hospitalization from 06/25/2024 through 06/28/2024 at [facility] for osteomyelitis of the left foot requiring a left 1st metatarsal amputation/resection on 06/27/2024 by Podiatry.  The patient was also found to have maggots in his open wound.   Recent hospitalization from 07/08/2024 through 07/18/2024 at [facility] and underwent left foot wound debridement and washout by Podiatry on 07/11/2024   Discharged on PO Augmentin 875mg BID plus IV daptomycin 6mg/kg ABW x 6 weeks from bone resection through 08/21/24 per Infectious Disease's recommendations  Now presents with an infection of the left foot  I appreciate Podiatry's input.  Check a nuclear medicine Ceretec white blood cell scan, which is in process  The patient will likely need a surgical intervention by Podiatry.  I appreciate Infectious Disease's input.  Continue IV daptomycin and PO Augmentin for now

## 2024-08-08 NOTE — PLAN OF CARE
Problem: PAIN - ADULT  Goal: Verbalizes/displays adequate comfort level or baseline comfort level  Description: Interventions:  - Encourage patient to monitor pain and request assistance  - Assess pain using appropriate pain scale  - Administer analgesics based on type and severity of pain and evaluate response  - Implement non-pharmacological measures as appropriate and evaluate response  - Consider cultural and social influences on pain and pain management  - Notify physician/advanced practitioner if interventions unsuccessful or patient reports new pain  Outcome: Progressing     Problem: INFECTION - ADULT  Goal: Absence or prevention of progression during hospitalization  Description: INTERVENTIONS:  - Assess and monitor for signs and symptoms of infection  - Monitor lab/diagnostic results  - Monitor all insertion sites, i.e. indwelling lines, tubes, and drains  - Monitor endotracheal if appropriate and nasal secretions for changes in amount and color  - [place] appropriate cooling/warming therapies per order  - Administer medications as ordered  - Instruct and encourage patient and family to use good hand hygiene technique  - Identify and instruct in appropriate isolation precautions for identified infection/condition  Outcome: Progressing     Problem: Knowledge Deficit  Goal: Patient/family/caregiver demonstrates understanding of disease process, treatment plan, medications, and discharge instructions  Description: Complete learning assessment and assess knowledge base.  Interventions:  - Provide teaching at level of understanding  - Provide teaching via preferred learning methods  Outcome: Progressing     Problem: SKIN/TISSUE INTEGRITY - ADULT  Goal: Skin Integrity remains intact(Skin Breakdown Prevention)  Description: Assess:  -Perform Zaid assessment every shift  -Clean and moisturize skin every shift  -Inspect skin when repositioning, toileting, and assisting with ADLS  -Assess extremities for  adequate circulation and sensation     Bed Management:  -Have minimal linens on bed & keep smooth, unwrinkled  -Change linens as needed when moist or perspiring  -Avoid sitting or lying in one position for more than 2 hours while in bed  -Keep HOB at 30 degrees     Toileting:  -Offer bedside commode  -Assess for incontinence every 2 hours  -Use incontinent care products after each incontinent episode    Activity:  -Mobilize patient 3 times a day  -Encourage activity and walks on unit  -Encourage or provide ROM exercises   -Turn and reposition patient every 2 Hours  -Use appropriate equipment to lift or move patient in bed  -Instruct/ Assist with weight shifting every hour when out of bed in chair  -Consider limitation of chair time 1 hour intervals    Skin Care:  -Avoid use of baby powder, tape, friction and shearing, hot water or constrictive clothing  -Relieve pressure over bony prominences  -Do not massage red bony areas    Next Steps:  -Teach patient strategies to minimize risks   -Consider consults to  interdisciplinary teams  Outcome: Progressing  Goal: Incision(s), wounds(s) or drain site(s) healing without S/S of infection  Description: INTERVENTIONS  - Assess and document dressing, incision, wound bed, drain sites and surrounding tissue  - Provide patient and family education  - Perform skin care/dressing changes every day  Outcome: Progressing  Goal: Pressure injury heals and does not worsen  Description: Interventions:  - Implement low air loss mattress or specialty surface (Criteria met)  - Apply silicone foam dressing  - Instruct/assist with weight shifting every 60 minutes when in chair   - Limit chair time to 1 hour intervals  - Use special pressure reducing interventions when in chair   - Apply fecal or urinary incontinence containment device   - Perform passive or active ROM  - Turn and reposition patient & offload bony prominences every 2 hours   - Utilize friction reducing device or surface for  transfers   - Consider consults to  interdisciplinary teams  - Use incontinent care products after each incontinent episode  - Consider nutrition services referral as needed  Outcome: Progressing     Problem: MUSCULOSKELETAL - ADULT  Goal: Maintain or return mobility to safest level of function  Description: INTERVENTIONS:  - Assess patient's ability to carry out ADLs; assess patient's baseline for ADL function and identify physical deficits which impact ability to perform ADLs (bathing, care of mouth/teeth, toileting, grooming, dressing, etc.)  - Assess/evaluate cause of self-care deficits   - Assess range of motion  - Assess patient's mobility  - Assess patient's need for assistive devices and provide as appropriate  - Encourage maximum independence but intervene and supervise when necessary  - Involve family in performance of ADLs  - Assess for home care needs following discharge   - Consider OT consult to assist with ADL evaluation and planning for discharge  - Provide patient education as appropriate  Outcome: Progressing  Goal: Maintain proper alignment of affected body part  Description: INTERVENTIONS:  - Support, maintain and protect limb and body alignment  - Provide patient/ family with appropriate education  Outcome: Progressing

## 2024-08-08 NOTE — PLAN OF CARE
Problem: Prexisting or High Potential for Compromised Skin Integrity  Goal: Skin integrity is maintained or improved  Description: INTERVENTIONS:  - Identify patients at risk for skin breakdown  - Assess and monitor skin integrity  - Assess and monitor nutrition and hydration status  - Monitor labs   - Assess for incontinence   - Turn and reposition patient  - Assist with mobility/ambulation  - Relieve pressure over bony prominences  - Avoid friction and shearing  - Provide appropriate hygiene as needed including keeping skin clean and dry  - Evaluate need for skin moisturizer/barrier cream  - Collaborate with interdisciplinary team   - Patient/family teaching  - Consider wound care consult   8/8/2024 1122 by Roshan Howell RN  Outcome: Progressing  8/8/2024 1121 by Roshan Howell RN  Outcome: Progressing     Problem: PAIN - ADULT  Goal: Verbalizes/displays adequate comfort level or baseline comfort level  Description: Interventions:  - Encourage patient to monitor pain and request assistance  - Assess pain using appropriate pain scale (0-10)  - Administer analgesics based on type and severity of pain and evaluate response  - Implement non-pharmacological measures as appropriate and evaluate response  - Consider cultural and social influences on pain and pain management  - Notify physician/advanced practitioner if interventions unsuccessful or patient reports new pain  8/8/2024 1122 by Roshan Howell RN  Outcome: Progressing  8/8/2024 1121 by Roshan Howell RN  Outcome: Progressing     Problem: INFECTION - ADULT  Goal: Absence or prevention of progression during hospitalization  Description: INTERVENTIONS:  - Assess and monitor for signs and symptoms of infection  - Monitor lab/diagnostic results  - Monitor all insertion sites, i.e. indwelling lines, tubes, and drains  - Monitor endotracheal if appropriate and nasal secretions for changes in amount and color  - [place] appropriate cooling/warming  therapies per order  - Administer medications as ordered  - Instruct and encourage patient and family to use good hand hygiene technique  - Identify and instruct in appropriate isolation precautions for identified infection/condition  8/8/2024 1122 by Roshan Howell RN  Outcome: Progressing  8/8/2024 1121 by Roshan Howell RN  Outcome: Progressing     Problem: SAFETY ADULT  Goal: Patient will remain free of falls  Description: INTERVENTIONS:  - Educate patient/family on patient safety including physical limitations  - Instruct patient to call for assistance with activity   - Consult OT/PT to assist with strengthening/mobility   - Keep Call bell within reach  - Keep bed low and locked with side rails adjusted as appropriate  - Keep care items and personal belongings within reach  - Initiate and maintain comfort rounds  - Make Fall Risk Sign visible to staff  - Offer Toileting every 2 Hours, in advance of need  - Initiate/Maintain bed alarm  - Obtain necessary fall risk management equipment:   - Apply yellow socks and bracelet for high fall risk patients  - Consider moving patient to room near nurses station  8/8/2024 1122 by Roshan Howell RN  Outcome: Progressing  8/8/2024 1121 by Roshan Howell RN  Outcome: Progressing  Goal: Maintain or return to baseline ADL function  Description: INTERVENTIONS:  -  Assess patient's ability to carry out ADLs; assess patient's baseline for ADL function and identify physical deficits which impact ability to perform ADLs (bathing, care of mouth/teeth, toileting, grooming, dressing, etc.)  - Assess/evaluate cause of self-care deficits   - Assess range of motion  - Assess patient's mobility; develop plan if impaired  - Assess patient's need for assistive devices and provide as appropriate  - Encourage maximum independence but intervene and supervise when necessary  - Involve family in performance of ADLs  - Assess for home care needs following discharge   - Consider OT  consult to assist with ADL evaluation and planning for discharge  - Provide patient education as appropriate  8/8/2024 1122 by Roshan Howell RN  Outcome: Progressing  8/8/2024 1121 by Roshan Howell RN  Outcome: Progressing  Goal: Maintains/Returns to pre admission functional level  Description: INTERVENTIONS:  - Perform AM-PAC 6 Click Basic Mobility/ Daily Activity assessment daily.  - Set and communicate daily mobility goal to care team and patient/family/caregiver.   - Collaborate with rehabilitation services on mobility goals if consulted  - Perform Range of Motion 3 times a day.  - Reposition patient every 2 hours.  - Dangle patient 3 times a day  - Stand patient 3 times a day  - Ambulate patient 3 times a day  - Out of bed to chair 3 times a day   - Out of bed for meals 3 times a day  - Out of bed for toileting  - Record patient progress and toleration of activity level   8/8/2024 1122 by Roshan Howell RN  Outcome: Progressing  8/8/2024 1121 by Roshan Howell RN  Outcome: Progressing     Problem: DISCHARGE PLANNING  Goal: Discharge to home or other facility with appropriate resources  Description: INTERVENTIONS:  - Identify barriers to discharge w/patient and caregiver  - Arrange for needed discharge resources and transportation as appropriate  - Identify discharge learning needs (meds, wound care, etc.)  - Arrange for interpretive services to assist at discharge as needed  - Refer to Case Management Department for coordinating discharge planning if the patient needs post-hospital services based on physician/advanced practitioner order or complex needs related to functional status, cognitive ability, or social support system  8/8/2024 1122 by Roshan Howell RN  Outcome: Progressing  8/8/2024 1121 by Roshan Howell RN  Outcome: Progressing     Problem: Knowledge Deficit  Goal: Patient/family/caregiver demonstrates understanding of disease process, treatment plan, medications, and discharge  instructions  Description: Complete learning assessment and assess knowledge base.  Interventions:  - Provide teaching at level of understanding  - Provide teaching via preferred learning methods  8/8/2024 1122 by Roshan Howell RN  Outcome: Progressing  8/8/2024 1121 by Roshan Howell RN  Outcome: Progressing     Problem: SKIN/TISSUE INTEGRITY - ADULT  Goal: Skin Integrity remains intact(Skin Breakdown Prevention)  Description: Assess:  -Perform Zaid assessment every shift  -Clean and moisturize skin every shift  -Inspect skin when repositioning, toileting, and assisting with ADLS  -Assess extremities for adequate circulation and sensation     Bed Management:  -Have minimal linens on bed & keep smooth, unwrinkled  -Change linens as needed when moist or perspiring  -Avoid sitting or lying in one position for more than 2 hours while in bed  -Keep HOB at 30 degrees     Toileting:  -Offer bedside commode  -Assess for incontinence every 2 hours  -Use incontinent care products after each incontinent episode    Activity:  -Mobilize patient 3 times a day  -Encourage activity and walks on unit  -Encourage or provide ROM exercises   -Turn and reposition patient every 2 Hours  -Use appropriate equipment to lift or move patient in bed  -Instruct/ Assist with weight shifting every hour when out of bed in chair  -Consider limitation of chair time 1 hour intervals    Skin Care:  -Avoid use of baby powder, tape, friction and shearing, hot water or constrictive clothing  -Relieve pressure over bony prominences  -Do not massage red bony areas    Next Steps:  -Teach patient strategies to minimize risks   -Consider consults to  interdisciplinary teams  8/8/2024 1122 by Roshan Howell RN  Outcome: Progressing  8/8/2024 1121 by Roshan Howell RN  Outcome: Progressing  Goal: Incision(s), wounds(s) or drain site(s) healing without S/S of infection  Description: INTERVENTIONS  - Assess and document dressing, incision, wound  bed, drain sites and surrounding tissue  - Provide patient and family education  - Perform skin care/dressing changes every day  8/8/2024 1122 by Roshan Howell RN  Outcome: Progressing  8/8/2024 1121 by Roshan Howell RN  Outcome: Progressing  Goal: Pressure injury heals and does not worsen  Description: Interventions:  - Implement low air loss mattress or specialty surface (Criteria met)  - Apply silicone foam dressing  - Instruct/assist with weight shifting every 60 minutes when in chair   - Limit chair time to 1 hour intervals  - Use special pressure reducing interventions when in chair   - Apply fecal or urinary incontinence containment device   - Perform passive or active ROM  - Turn and reposition patient & offload bony prominences every 2 hours   - Utilize friction reducing device or surface for transfers   - Consider consults to  interdisciplinary teams  - Use incontinent care products after each incontinent episode  - Consider nutrition services referral as needed  8/8/2024 1122 by Roshan Howell RN  Outcome: Progressing  8/8/2024 1121 by Roshan Howell RN  Outcome: Progressing     Problem: METABOLIC, FLUID AND ELECTROLYTES - ADULT  Goal: Electrolytes maintained within normal limits  Description: INTERVENTIONS:  - Monitor labs and assess patient for signs and symptoms of electrolyte imbalances  - Administer electrolyte replacement as ordered  - Monitor response to electrolyte replacements, including repeat lab results as appropriate  - Instruct patient on fluid and nutrition as appropriate  8/8/2024 1122 by Roshan Howell RN  Outcome: Progressing  8/8/2024 1121 by Roshan Howell RN  Outcome: Progressing     Problem: MUSCULOSKELETAL - ADULT  Goal: Maintain or return mobility to safest level of function  Description: INTERVENTIONS:  - Assess patient's ability to carry out ADLs; assess patient's baseline for ADL function and identify physical deficits which impact ability to perform ADLs  (bathing, care of mouth/teeth, toileting, grooming, dressing, etc.)  - Assess/evaluate cause of self-care deficits   - Assess range of motion  - Assess patient's mobility  - Assess patient's need for assistive devices and provide as appropriate  - Encourage maximum independence but intervene and supervise when necessary  - Involve family in performance of ADLs  - Assess for home care needs following discharge   - Consider OT consult to assist with ADL evaluation and planning for discharge  - Provide patient education as appropriate  8/8/2024 1122 by Roshan Howell RN  Outcome: Progressing  8/8/2024 1121 by Roshan Howell RN  Outcome: Progressing  Goal: Maintain proper alignment of affected body part  Description: INTERVENTIONS:  - Support, maintain and protect limb and body alignment  - Provide patient/ family with appropriate education  8/8/2024 1122 by Roshan Howell RN  Outcome: Progressing  8/8/2024 1121 by Roshan Howell RN  Outcome: Progressing     Problem: Nutrition/Hydration-ADULT  Goal: Nutrient/Hydration intake appropriate for improving, restoring or maintaining nutritional needs  Description: Monitor and assess patient's nutrition/hydration status for malnutrition. Collaborate with interdisciplinary team and initiate plan and interventions as ordered.  Monitor patient's weight and dietary intake as ordered or per policy. Utilize nutrition screening tool and intervene as necessary. Determine patient's food preferences and provide high-protein, high-caloric foods as appropriate.     INTERVENTIONS:  - Monitor oral intake, urinary output, labs, and treatment plans  - Assess nutrition and hydration status and recommend course of action  - Evaluate amount of meals eaten  - Assist patient with eating if necessary   - Allow adequate time for meals  - Recommend/ encourage appropriate diets, oral nutritional supplements, and vitamin/mineral supplements  - Order, calculate, and assess calorie counts as  needed  - Recommend, monitor, and adjust tube feedings and TPN/PPN based on assessed needs  - Assess need for intravenous fluids  - Provide specific nutrition/hydration education as appropriate  - Include patient/family/caregiver in decisions related to nutrition  8/8/2024 1122 by Roshan Howell RN  Outcome: Progressing  8/8/2024 1121 by Roshan Howell RN  Outcome: Progressing

## 2024-08-08 NOTE — UTILIZATION REVIEW
Continued Stay Review                   Date: 8-8-24   Day 3: Has surpassed a 2nd midnight with active treatments and services.                  Current Patient Class: Inpatient  Current Level of Care: med surg    HPI:64 y.o. male initially admitted on 8-6-24      Recent hospitalization from 07/08/2024 through 07/18/2024 at [facility] and underwent left foot wound debridement and washout by Podiatry on 07/11/2024   Discharged on PO Augmentin 875mg BID plus IV daptomycin 6mg/kg ABW x 6 weeks from bone resection through 08/21/24 per Infectious Disease's recommendations    Assessment/Plan:     Arterial duplex LLE w/o significant focal stenosis, great toe pressures no obtained due to transmet amputation. Ceretec shows left mid foot osteomyelitis. Continue iv dapatomycin and augmentin.  Continue PT/OT evaluations for functional deficits. Anticipate surgical intervention by podiatry.     Vital Signs (last 3 days)       Date/Time Temp Pulse Resp BP MAP (mmHg) SpO2 O2 Device Pain    08/08/24 14:08:10 99 °F (37.2 °C) 121 17 116/68 84 91 % None (Room air) --    08/08/24 0828 -- -- -- -- -- -- -- No Pain    08/08/24 0806 -- -- -- 122/82 -- -- -- --    08/08/24 0745 -- -- -- -- -- -- None (Room air) No Pain    08/08/24 06:55:58 97.9 °F (36.6 °C) 86 17 127/75 92 96 % None (Room air) --    08/07/24 22:29:52 98.3 °F (36.8 °C) 92 16 128/74 92 94 % -- --    08/07/24 2007 -- -- -- -- -- -- -- No Pain    08/07/24 14:17:13 98.1 °F (36.7 °C) 88 18 112/67 82 96 % -- --    08/07/24 1105 -- -- -- -- -- -- -- No Pain    08/07/24 09:11:18 -- 84 -- 104/58 73 97 % -- --    08/07/24 07:03:19 98 °F (36.7 °C) 85 17 109/74 86 94 % None (Room air) --    08/06/24 2300 -- -- -- -- -- -- -- No Pain    08/06/24 22:27:46 98.2 °F (36.8 °C) 77 17 107/73 84 92 % None (Room air) --    08/06/24 1910 -- -- -- -- -- 97 % None (Room air) --    08/06/24 14:11:55 98.1 °F (36.7 °C) 83 18 97/75 82 94 % -- --    08/06/24 0909 --  -- -- -- -- -- None (Room air) No Pain    08/06/24 0828 -- -- -- -- -- -- -- No Pain    08/06/24 0817 -- -- -- -- -- -- -- No Pain    08/06/24 07:17:19 97.8 °F (36.6 °C) 77 18 144/82 103 96 % -- --    08/05/24 2210 -- -- -- -- -- 96 % None (Room air) --    08/05/24 2157 -- -- -- -- -- -- -- No Pain    08/05/24 21:55:16 97.7 °F (36.5 °C) 77 17 131/72 92 96 % None (Room air) --    08/05/24 2030 -- 71 -- 142/65 94 96 % -- --    08/05/24 1900 -- 71 -- 134/70 96 97 % -- --    08/05/24 1424 98.2 °F (36.8 °C) 86 18 136/63 -- 97 % None (Room air) No Pain          Weight (last 2 days)       None              Pertinent Labs/Diagnostic Results:   Radiology:  NM radrx ceretec abscess localization limited   Final (08/08 1449)      1. There is increased focal radiotracer uptake on both 3 and 24-hour imaging at the medial aspect of the left midfoot suspicious for osteomyelitis.            VAS ARTERIAL DUPLEX-LOWER LIMB UNILATERAL   Final  (08/07 1157)   RIGHT LOWER LIMB:  BKA     LEFT LOWER LIMB:  Diffuse disease noted throughout the femoral-popliteal and tibio-peroneal  arteries without significant focal stenosis.  Ankle/Brachial index: non-compressible secondary to calcified tibial vessels.  (Prior: 1.55, unreliable)  PVR/ PPG tracings are normal.  Metatarsal and Great toe pressures not obtained due to TMA.       XR chest 1 view portable      Final  (08/05 2145)      Right PICC is present with the tip at the caval atrial junction level. Areas of platelike atelectasis both lung bases with elevated right hemidiaphragm. Multiple healed left rib fractures         XR foot 3+ views LEFT      Final (08/05 2149)      Extensive postop changes of the foot. Swelling. New soft tissue calcifications at the site of the first ray amputation, but no soft tissue gas or conclusive evidence of osteomyelitis        Cardiology:  No orders to display     GI:  No orders to display           Results from last 7 days   Lab Units 08/08/24  0543  08/07/24  0556 08/06/24  0545 08/05/24  1823   WBC Thousand/uL 9.11 8.18 7.71 9.55   HEMOGLOBIN g/dL 13.4 13.0 12.6 12.8   HEMATOCRIT % 41.4 40.0 38.2 39.6   PLATELETS Thousands/uL 294 273 254 249   TOTAL NEUT ABS Thousands/µL 5.08 4.20 4.24 6.35         Results from last 7 days   Lab Units 08/08/24  0516 08/07/24  0556 08/06/24  0545 08/05/24  1823 08/05/24  0632   SODIUM mmol/L 138 138 137 136 138   POTASSIUM mmol/L 3.9 3.9 3.7 4.2 4.4   CHLORIDE mmol/L 104 104 103 102 102   CO2 mmol/L 25 26 26 27 25   ANION GAP mmol/L 9 8 8 7 11   BUN mg/dL 20 15 10 12 13   CREATININE mg/dL 0.79 0.76 0.73 0.75 0.8   EGFR ml/min/1.73sq m 94 95 97 96 >90   CALCIUM mg/dL 9.1 9.2 9.0 9.3 9.0   MAGNESIUM mg/dL 1.9 2.1 2.0  --   --    PHOSPHORUS mg/dL 4.2* 3.9  --   --   --      Results from last 7 days   Lab Units 08/08/24  0516 08/07/24  0556 08/06/24  0545 08/05/24  0632   AST U/L 9* 9* 8* 10   ALT U/L 11 9 9 15   ALK PHOS U/L 115* 114* 119* 142*   TOTAL PROTEIN g/dL 7.0 6.8 6.7 6.7   ALBUMIN g/dL 3.6 3.5 3.5 3.7*   TOTAL BILIRUBIN mg/dL 0.54 0.44 0.38 0.3     Results from last 7 days   Lab Units 08/08/24  1056 08/08/24  0655 08/07/24  2056 08/07/24  1606 08/07/24  1052 08/07/24  0702 08/06/24  2109 08/06/24  1611 08/06/24  1110 08/06/24  0817 08/05/24  2316   POC GLUCOSE mg/dl 179* 136 195* 245* 190* 146* 205* 189* 185* 167* 181*     Results from last 7 days   Lab Units 08/08/24  0516 08/07/24  0556 08/06/24  0545 08/05/24  1823 08/05/24  0632   GLUCOSE RANDOM mg/dL 129 149* 185* 224* 138*         Results from last 7 days   Lab Units 08/07/24  0556   HEMOGLOBIN A1C % 7.9*   EAG mg/dl 180       Results from last 7 days   Lab Units 08/07/24  0556   CK TOTAL U/L 28*       Results from last 7 days   Lab Units 08/05/24  1823   PROTIME seconds 13.0   INR  0.93   PTT seconds 26         Results from last 7 days   Lab Units 08/08/24  0516 08/07/24  0556   PROCALCITONIN ng/ml 0.07 0.06     Results from last 7 days   Lab Units 08/05/24  1823    CRP mg/L 15.7*   SED RATE mm/hour 53*     Scheduled Medications:    amoxicillin-clavulanate, 1 tablet, Oral, Q12H JASPER  aspirin, 81 mg, Oral, Daily  atorvastatin, 80 mg, Oral, Daily With Dinner  Cholecalciferol, 1,000 Units, Oral, Daily  Dakins (full strength), 1 Application, Topical, Daily  DAPTOmycin (CUBICIN) 500 mg in sodium chloride 0.9 % 50 mL IVPB, 6 mg/kg (Adjusted), Intravenous, Q24H  enoxaparin, 40 mg, Subcutaneous, Daily  insulin glargine, 15 Units, Subcutaneous, HS  insulin lispro, 1-5 Units, Subcutaneous, HS  insulin lispro, 1-6 Units, Subcutaneous, TID AC  insulin lispro, 10 Units, Subcutaneous, TID With Meals  lisinopril, 2.5 mg, Oral, Daily      Continuous IV Infusions:     PRN Meds:  acetaminophen, 650 mg, Oral, Q6H PRN  ondansetron, 4 mg, Intravenous, Q6H PRN        Discharge Plan:  patient resides at [facility]     [facility] Utilization Review Department  ATTENTION: Please call with any questions or concerns to [phone] and carefully listen to the prompts so that you are directed to the right person. All voicemails are confidential.   For Discharge needs, contact Care Management DC Support Team at [phone] opt. 2  Send all requests for admission clinical reviews, approved or denied determinations and any other requests to dedicated fax number below belonging to the campus where the patient is receiving treatment. List of dedicated fax numbers for the Facilities:  FACILITY NAME UR FAX NUMBER   ADMISSION DENIALS (Administrative/Medical Necessity) [phone]   DISCHARGE SUPPORT TEAM (NETWORK) [phone]   PARENT CHILD HEALTH (Maternity/NICU/Pediatrics) [phone]   General acute hospital [phone]   [facility] [phone]   On license of [facility] [phone]   [facility] [phone]   [facility] [phone]   [facility]  [phone]   [facility] [phone]   [facility] [phone]   [facility] [phone]   [facility] [phone]   [facility] [phone]   [facility] [phone]

## 2024-08-09 ENCOUNTER — APPOINTMENT (INPATIENT)
Dept: RADIOLOGY | Facility: HOSPITAL | Age: 64
DRG: 856 | End: 2024-08-09
Payer: COMMERCIAL

## 2024-08-09 ENCOUNTER — APPOINTMENT (INPATIENT)
Dept: MRI IMAGING | Facility: HOSPITAL | Age: 64
DRG: 856 | End: 2024-08-09
Payer: COMMERCIAL

## 2024-08-09 PROBLEM — A41.9 SEPSIS (HCC): Status: ACTIVE | Noted: 2024-08-09

## 2024-08-09 PROBLEM — R65.20 SEVERE SEPSIS (HCC): Status: ACTIVE | Noted: 2024-08-09

## 2024-08-09 PROBLEM — J98.11 ATELECTASIS: Status: ACTIVE | Noted: 2024-08-09

## 2024-08-09 PROBLEM — E83.42 HYPOMAGNESEMIA: Status: ACTIVE | Noted: 2024-08-09

## 2024-08-09 LAB
ALBUMIN SERPL BCG-MCNC: 3.6 G/DL (ref 3.5–5)
ALP SERPL-CCNC: 116 U/L (ref 34–104)
ALT SERPL W P-5'-P-CCNC: 9 U/L (ref 7–52)
ANION GAP SERPL CALCULATED.3IONS-SCNC: 9 MMOL/L (ref 4–13)
AST SERPL W P-5'-P-CCNC: 7 U/L (ref 13–39)
BACTERIA UR QL AUTO: ABNORMAL /HPF
BASOPHILS # BLD AUTO: 0.11 THOUSANDS/ÂΜL (ref 0–0.1)
BASOPHILS NFR BLD AUTO: 1 % (ref 0–1)
BILIRUB SERPL-MCNC: 0.68 MG/DL (ref 0.2–1)
BILIRUB UR QL STRIP: NEGATIVE
BUN SERPL-MCNC: 22 MG/DL (ref 5–25)
CALCIUM SERPL-MCNC: 9.4 MG/DL (ref 8.4–10.2)
CHLORIDE SERPL-SCNC: 102 MMOL/L (ref 96–108)
CLARITY UR: ABNORMAL
CO2 SERPL-SCNC: 23 MMOL/L (ref 21–32)
COLOR UR: YELLOW
CREAT SERPL-MCNC: 0.9 MG/DL (ref 0.6–1.3)
EOSINOPHIL # BLD AUTO: 0.02 THOUSAND/ÂΜL (ref 0–0.61)
EOSINOPHIL NFR BLD AUTO: 0 % (ref 0–6)
ERYTHROCYTE [DISTWIDTH] IN BLOOD BY AUTOMATED COUNT: 13.4 % (ref 11.6–15.1)
FLUAV RNA RESP QL NAA+PROBE: NEGATIVE
FLUBV RNA RESP QL NAA+PROBE: NEGATIVE
GFR SERPL CREATININE-BSD FRML MDRD: 89 ML/MIN/1.73SQ M
GLUCOSE SERPL-MCNC: 179 MG/DL (ref 65–140)
GLUCOSE SERPL-MCNC: 199 MG/DL (ref 65–140)
GLUCOSE SERPL-MCNC: 229 MG/DL (ref 65–140)
GLUCOSE SERPL-MCNC: 240 MG/DL (ref 65–140)
GLUCOSE SERPL-MCNC: 285 MG/DL (ref 65–140)
GLUCOSE UR STRIP-MCNC: ABNORMAL MG/DL
HCT VFR BLD AUTO: 39.4 % (ref 36.5–49.3)
HGB BLD-MCNC: 13.2 G/DL (ref 12–17)
HGB UR QL STRIP.AUTO: ABNORMAL
IMM GRANULOCYTES # BLD AUTO: 0.16 THOUSAND/UL (ref 0–0.2)
IMM GRANULOCYTES NFR BLD AUTO: 1 % (ref 0–2)
KETONES UR STRIP-MCNC: ABNORMAL MG/DL
LACTATE SERPL-SCNC: 2.4 MMOL/L (ref 0.5–2)
LACTATE SERPL-SCNC: 2.7 MMOL/L (ref 0.5–2)
LEUKOCYTE ESTERASE UR QL STRIP: ABNORMAL
LYMPHOCYTES # BLD AUTO: 2.09 THOUSANDS/ÂΜL (ref 0.6–4.47)
LYMPHOCYTES NFR BLD AUTO: 9 % (ref 14–44)
MAGNESIUM SERPL-MCNC: 1.7 MG/DL (ref 1.9–2.7)
MCH RBC QN AUTO: 28.4 PG (ref 26.8–34.3)
MCHC RBC AUTO-ENTMCNC: 33.5 G/DL (ref 31.4–37.4)
MCV RBC AUTO: 85 FL (ref 82–98)
MONOCYTES # BLD AUTO: 2.18 THOUSAND/ÂΜL (ref 0.17–1.22)
MONOCYTES NFR BLD AUTO: 9 % (ref 4–12)
NEUTROPHILS # BLD AUTO: 18.65 THOUSANDS/ÂΜL (ref 1.85–7.62)
NEUTS SEG NFR BLD AUTO: 80 % (ref 43–75)
NITRITE UR QL STRIP: NEGATIVE
NON-SQ EPI CELLS URNS QL MICRO: ABNORMAL /HPF
NRBC BLD AUTO-RTO: 0 /100 WBCS
PH UR STRIP.AUTO: 5.5 [PH]
PHOSPHATE SERPL-MCNC: 3.6 MG/DL (ref 2.3–4.1)
PLATELET # BLD AUTO: 288 THOUSANDS/UL (ref 149–390)
PMV BLD AUTO: 9 FL (ref 8.9–12.7)
POTASSIUM SERPL-SCNC: 3.9 MMOL/L (ref 3.5–5.3)
PROCALCITONIN SERPL-MCNC: 0.51 NG/ML
PROT SERPL-MCNC: 7.2 G/DL (ref 6.4–8.4)
PROT UR STRIP-MCNC: ABNORMAL MG/DL
RBC # BLD AUTO: 4.64 MILLION/UL (ref 3.88–5.62)
RBC #/AREA URNS AUTO: ABNORMAL /HPF
RSV RNA RESP QL NAA+PROBE: NEGATIVE
SARS-COV-2 RNA RESP QL NAA+PROBE: NEGATIVE
SODIUM SERPL-SCNC: 134 MMOL/L (ref 135–147)
SP GR UR STRIP.AUTO: 1.02 (ref 1–1.03)
UROBILINOGEN UR STRIP-ACNC: <2 MG/DL
WBC # BLD AUTO: 23.21 THOUSAND/UL (ref 4.31–10.16)
WBC #/AREA URNS AUTO: ABNORMAL /HPF

## 2024-08-09 PROCEDURE — 85025 COMPLETE CBC W/AUTO DIFF WBC: CPT | Performed by: INTERNAL MEDICINE

## 2024-08-09 PROCEDURE — A9585 GADOBUTROL INJECTION: HCPCS | Performed by: INTERNAL MEDICINE

## 2024-08-09 PROCEDURE — 80053 COMPREHEN METABOLIC PANEL: CPT | Performed by: INTERNAL MEDICINE

## 2024-08-09 PROCEDURE — 73720 MRI LWR EXTREMITY W/O&W/DYE: CPT

## 2024-08-09 PROCEDURE — 87186 SC STD MICRODIL/AGAR DIL: CPT | Performed by: INTERNAL MEDICINE

## 2024-08-09 PROCEDURE — 84145 PROCALCITONIN (PCT): CPT | Performed by: INTERNAL MEDICINE

## 2024-08-09 PROCEDURE — 87086 URINE CULTURE/COLONY COUNT: CPT | Performed by: INTERNAL MEDICINE

## 2024-08-09 PROCEDURE — 83605 ASSAY OF LACTIC ACID: CPT | Performed by: INTERNAL MEDICINE

## 2024-08-09 PROCEDURE — 83735 ASSAY OF MAGNESIUM: CPT | Performed by: INTERNAL MEDICINE

## 2024-08-09 PROCEDURE — 81001 URINALYSIS AUTO W/SCOPE: CPT | Performed by: INTERNAL MEDICINE

## 2024-08-09 PROCEDURE — 99232 SBSQ HOSP IP/OBS MODERATE 35: CPT | Performed by: INTERNAL MEDICINE

## 2024-08-09 PROCEDURE — 84100 ASSAY OF PHOSPHORUS: CPT | Performed by: INTERNAL MEDICINE

## 2024-08-09 PROCEDURE — 87077 CULTURE AEROBIC IDENTIFY: CPT | Performed by: INTERNAL MEDICINE

## 2024-08-09 PROCEDURE — 87040 BLOOD CULTURE FOR BACTERIA: CPT | Performed by: INTERNAL MEDICINE

## 2024-08-09 PROCEDURE — 82948 REAGENT STRIP/BLOOD GLUCOSE: CPT

## 2024-08-09 PROCEDURE — 71045 X-RAY EXAM CHEST 1 VIEW: CPT

## 2024-08-09 PROCEDURE — 0241U HB NFCT DS VIR RESP RNA 4 TRGT: CPT | Performed by: INTERNAL MEDICINE

## 2024-08-09 PROCEDURE — 99233 SBSQ HOSP IP/OBS HIGH 50: CPT | Performed by: INTERNAL MEDICINE

## 2024-08-09 RX ORDER — CEFEPIME HYDROCHLORIDE 2 G/50ML
2000 INJECTION, SOLUTION INTRAVENOUS EVERY 8 HOURS
Status: DISCONTINUED | OUTPATIENT
Start: 2024-08-09 | End: 2024-08-16

## 2024-08-09 RX ORDER — GADOBUTROL 604.72 MG/ML
9 INJECTION INTRAVENOUS
Status: COMPLETED | OUTPATIENT
Start: 2024-08-09 | End: 2024-08-09

## 2024-08-09 RX ORDER — INSULIN GLARGINE 100 [IU]/ML
20 INJECTION, SOLUTION SUBCUTANEOUS
Status: DISCONTINUED | OUTPATIENT
Start: 2024-08-09 | End: 2024-08-10

## 2024-08-09 RX ORDER — MAGNESIUM SULFATE HEPTAHYDRATE 40 MG/ML
2 INJECTION, SOLUTION INTRAVENOUS ONCE
Status: COMPLETED | OUTPATIENT
Start: 2024-08-09 | End: 2024-08-09

## 2024-08-09 RX ORDER — POTASSIUM CHLORIDE 1500 MG/1
20 TABLET, EXTENDED RELEASE ORAL ONCE
Status: COMPLETED | OUTPATIENT
Start: 2024-08-09 | End: 2024-08-09

## 2024-08-09 RX ORDER — SODIUM CHLORIDE 9 MG/ML
100 INJECTION, SOLUTION INTRAVENOUS CONTINUOUS
Status: DISPENSED | OUTPATIENT
Start: 2024-08-09 | End: 2024-08-13

## 2024-08-09 RX ORDER — INSULIN LISPRO 100 [IU]/ML
12 INJECTION, SOLUTION INTRAVENOUS; SUBCUTANEOUS
Status: DISCONTINUED | OUTPATIENT
Start: 2024-08-09 | End: 2024-08-13

## 2024-08-09 RX ADMIN — SODIUM CHLORIDE 100 ML/HR: 0.9 INJECTION, SOLUTION INTRAVENOUS at 15:59

## 2024-08-09 RX ADMIN — CEFEPIME HYDROCHLORIDE 2000 MG: 2 INJECTION, SOLUTION INTRAVENOUS at 10:22

## 2024-08-09 RX ADMIN — INSULIN LISPRO 12 UNITS: 100 INJECTION, SOLUTION INTRAVENOUS; SUBCUTANEOUS at 17:16

## 2024-08-09 RX ADMIN — INSULIN LISPRO 4 UNITS: 100 INJECTION, SOLUTION INTRAVENOUS; SUBCUTANEOUS at 12:01

## 2024-08-09 RX ADMIN — INSULIN LISPRO 2 UNITS: 100 INJECTION, SOLUTION INTRAVENOUS; SUBCUTANEOUS at 22:20

## 2024-08-09 RX ADMIN — POTASSIUM CHLORIDE 20 MEQ: 1500 TABLET, EXTENDED RELEASE ORAL at 10:16

## 2024-08-09 RX ADMIN — ASPIRIN 81 MG: 81 TABLET, COATED ORAL at 08:18

## 2024-08-09 RX ADMIN — CEFEPIME HYDROCHLORIDE 2000 MG: 2 INJECTION, SOLUTION INTRAVENOUS at 16:25

## 2024-08-09 RX ADMIN — LISINOPRIL 2.5 MG: 5 TABLET ORAL at 08:19

## 2024-08-09 RX ADMIN — ACETAMINOPHEN 650 MG: 325 TABLET, FILM COATED ORAL at 06:33

## 2024-08-09 RX ADMIN — INSULIN LISPRO 2 UNITS: 100 INJECTION, SOLUTION INTRAVENOUS; SUBCUTANEOUS at 08:19

## 2024-08-09 RX ADMIN — DAPTOMYCIN 500 MG: 500 INJECTION, POWDER, LYOPHILIZED, FOR SOLUTION INTRAVENOUS at 22:20

## 2024-08-09 RX ADMIN — MAGNESIUM SULFATE HEPTAHYDRATE 2 G: 40 INJECTION, SOLUTION INTRAVENOUS at 11:14

## 2024-08-09 RX ADMIN — INSULIN LISPRO 2 UNITS: 100 INJECTION, SOLUTION INTRAVENOUS; SUBCUTANEOUS at 17:16

## 2024-08-09 RX ADMIN — SODIUM CHLORIDE 1000 ML: 0.9 INJECTION, SOLUTION INTRAVENOUS at 10:12

## 2024-08-09 RX ADMIN — SODIUM CHLORIDE 1000 ML: 0.9 INJECTION, SOLUTION INTRAVENOUS at 14:51

## 2024-08-09 RX ADMIN — ENOXAPARIN SODIUM 40 MG: 40 INJECTION SUBCUTANEOUS at 08:18

## 2024-08-09 RX ADMIN — GADOBUTROL 9 ML: 604.72 INJECTION INTRAVENOUS at 14:18

## 2024-08-09 RX ADMIN — INSULIN LISPRO 10 UNITS: 100 INJECTION, SOLUTION INTRAVENOUS; SUBCUTANEOUS at 08:18

## 2024-08-09 RX ADMIN — CHOLECALCIFEROL TAB 25 MCG (1000 UNIT) 1000 UNITS: 25 TAB at 08:18

## 2024-08-09 RX ADMIN — ATORVASTATIN CALCIUM 80 MG: 40 TABLET, FILM COATED ORAL at 16:24

## 2024-08-09 RX ADMIN — INSULIN LISPRO 10 UNITS: 100 INJECTION, SOLUTION INTRAVENOUS; SUBCUTANEOUS at 12:00

## 2024-08-09 RX ADMIN — INSULIN GLARGINE 20 UNITS: 100 INJECTION, SOLUTION SUBCUTANEOUS at 22:22

## 2024-08-09 NOTE — PLAN OF CARE
Problem: Prexisting or High Potential for Compromised Skin Integrity  Goal: Skin integrity is maintained or improved  Description: INTERVENTIONS:  - Identify patients at risk for skin breakdown  - Assess and monitor skin integrity  - Assess and monitor nutrition and hydration status  - Monitor labs   - Assess for incontinence   - Turn and reposition patient  - Assist with mobility/ambulation  - Relieve pressure over bony prominences  - Avoid friction and shearing  - Provide appropriate hygiene as needed including keeping skin clean and dry  - Evaluate need for skin moisturizer/barrier cream  - Collaborate with interdisciplinary team   - Patient/family teaching  - Consider wound care consult   Outcome: Progressing     Problem: PAIN - ADULT  Goal: Verbalizes/displays adequate comfort level or baseline comfort level  Description: Interventions:  - Encourage patient to monitor pain and request assistance  - Assess pain using appropriate pain scale (0-10)  - Administer analgesics based on type and severity of pain and evaluate response  - Implement non-pharmacological measures as appropriate and evaluate response  - Consider cultural and social influences on pain and pain management  - Notify physician/advanced practitioner if interventions unsuccessful or patient reports new pain  Outcome: Progressing     Problem: INFECTION - ADULT  Goal: Absence or prevention of progression during hospitalization  Description: INTERVENTIONS:  - Assess and monitor for signs and symptoms of infection  - Monitor lab/diagnostic results  - Monitor all insertion sites, i.e. indwelling lines, tubes, and drains  - Monitor endotracheal if appropriate and nasal secretions for changes in amount and color  - Bigelow appropriate cooling/warming therapies per order  - Administer medications as ordered  - Instruct and encourage patient and family to use good hand hygiene technique  - Identify and instruct in appropriate isolation precautions for  identified infection/condition  Outcome: Progressing     Problem: SAFETY ADULT  Goal: Patient will remain free of falls  Description: INTERVENTIONS:  - Educate patient/family on patient safety including physical limitations  - Instruct patient to call for assistance with activity   - Consult OT/PT to assist with strengthening/mobility   - Keep Call bell within reach  - Keep bed low and locked with side rails adjusted as appropriate  - Keep care items and personal belongings within reach  - Initiate and maintain comfort rounds  - Make Fall Risk Sign visible to staff  - Offer Toileting every 2 Hours, in advance of need  - Initiate/Maintain bed alarm  - Obtain necessary fall risk management equipment:   - Apply yellow socks and bracelet for high fall risk patients  - Consider moving patient to room near nurses station  Outcome: Progressing  Goal: Maintain or return to baseline ADL function  Description: INTERVENTIONS:  -  Assess patient's ability to carry out ADLs; assess patient's baseline for ADL function and identify physical deficits which impact ability to perform ADLs (bathing, care of mouth/teeth, toileting, grooming, dressing, etc.)  - Assess/evaluate cause of self-care deficits   - Assess range of motion  - Assess patient's mobility; develop plan if impaired  - Assess patient's need for assistive devices and provide as appropriate  - Encourage maximum independence but intervene and supervise when necessary  - Involve family in performance of ADLs  - Assess for home care needs following discharge   - Consider OT consult to assist with ADL evaluation and planning for discharge  - Provide patient education as appropriate  Outcome: Progressing  Goal: Maintains/Returns to pre admission functional level  Description: INTERVENTIONS:  - Perform AM-PAC 6 Click Basic Mobility/ Daily Activity assessment daily.  - Set and communicate daily mobility goal to care team and patient/family/caregiver.   - Collaborate with  rehabilitation services on mobility goals if consulted  - Perform Range of Motion 3 times a day.  - Reposition patient every 2 hours.  - Dangle patient 3 times a day  - Stand patient 3 times a day  - Ambulate patient 3 times a day  - Out of bed to chair 3 times a day   - Out of bed for meals 3 times a day  - Out of bed for toileting  - Record patient progress and toleration of activity level   Outcome: Progressing     Problem: DISCHARGE PLANNING  Goal: Discharge to home or other facility with appropriate resources  Description: INTERVENTIONS:  - Identify barriers to discharge w/patient and caregiver  - Arrange for needed discharge resources and transportation as appropriate  - Identify discharge learning needs (meds, wound care, etc.)  - Arrange for interpretive services to assist at discharge as needed  - Refer to Case Management Department for coordinating discharge planning if the patient needs post-hospital services based on physician/advanced practitioner order or complex needs related to functional status, cognitive ability, or social support system  Outcome: Progressing     Problem: Knowledge Deficit  Goal: Patient/family/caregiver demonstrates understanding of disease process, treatment plan, medications, and discharge instructions  Description: Complete learning assessment and assess knowledge base.  Interventions:  - Provide teaching at level of understanding  - Provide teaching via preferred learning methods  Outcome: Progressing     Problem: SKIN/TISSUE INTEGRITY - ADULT  Goal: Skin Integrity remains intact(Skin Breakdown Prevention)  Description: Assess:  -Perform Zaid assessment every shift  -Clean and moisturize skin every shift  -Inspect skin when repositioning, toileting, and assisting with ADLS  -Assess extremities for adequate circulation and sensation     Bed Management:  -Have minimal linens on bed & keep smooth, unwrinkled  -Change linens as needed when moist or perspiring  -Avoid sitting or  lying in one position for more than 2 hours while in bed  -Keep HOB at 30 degrees     Toileting:  -Offer bedside commode  -Assess for incontinence every 2 hours  -Use incontinent care products after each incontinent episode    Activity:  -Mobilize patient 3 times a day  -Encourage activity and walks on unit  -Encourage or provide ROM exercises   -Turn and reposition patient every 2 Hours  -Use appropriate equipment to lift or move patient in bed  -Instruct/ Assist with weight shifting every hour when out of bed in chair  -Consider limitation of chair time 1 hour intervals    Skin Care:  -Avoid use of baby powder, tape, friction and shearing, hot water or constrictive clothing  -Relieve pressure over bony prominences  -Do not massage red bony areas    Next Steps:  -Teach patient strategies to minimize risks   -Consider consults to  interdisciplinary teams  Outcome: Progressing  Goal: Incision(s), wounds(s) or drain site(s) healing without S/S of infection  Description: INTERVENTIONS  - Assess and document dressing, incision, wound bed, drain sites and surrounding tissue  - Provide patient and family education  - Perform skin care/dressing changes every day  Outcome: Progressing  Goal: Pressure injury heals and does not worsen  Description: Interventions:  - Implement low air loss mattress or specialty surface (Criteria met)  - Apply silicone foam dressing  - Instruct/assist with weight shifting every 60 minutes when in chair   - Limit chair time to 1 hour intervals  - Use special pressure reducing interventions when in chair   - Apply fecal or urinary incontinence containment device   - Perform passive or active ROM  - Turn and reposition patient & offload bony prominences every 2 hours   - Utilize friction reducing device or surface for transfers   - Consider consults to  interdisciplinary teams  - Use incontinent care products after each incontinent episode  - Consider nutrition services referral as  needed  Outcome: Progressing     Problem: METABOLIC, FLUID AND ELECTROLYTES - ADULT  Goal: Electrolytes maintained within normal limits  Description: INTERVENTIONS:  - Monitor labs and assess patient for signs and symptoms of electrolyte imbalances  - Administer electrolyte replacement as ordered  - Monitor response to electrolyte replacements, including repeat lab results as appropriate  - Instruct patient on fluid and nutrition as appropriate  Outcome: Progressing     Problem: MUSCULOSKELETAL - ADULT  Goal: Maintain or return mobility to safest level of function  Description: INTERVENTIONS:  - Assess patient's ability to carry out ADLs; assess patient's baseline for ADL function and identify physical deficits which impact ability to perform ADLs (bathing, care of mouth/teeth, toileting, grooming, dressing, etc.)  - Assess/evaluate cause of self-care deficits   - Assess range of motion  - Assess patient's mobility  - Assess patient's need for assistive devices and provide as appropriate  - Encourage maximum independence but intervene and supervise when necessary  - Involve family in performance of ADLs  - Assess for home care needs following discharge   - Consider OT consult to assist with ADL evaluation and planning for discharge  - Provide patient education as appropriate  Outcome: Progressing  Goal: Maintain proper alignment of affected body part  Description: INTERVENTIONS:  - Support, maintain and protect limb and body alignment  - Provide patient/ family with appropriate education  Outcome: Progressing     Problem: Nutrition/Hydration-ADULT  Goal: Nutrient/Hydration intake appropriate for improving, restoring or maintaining nutritional needs  Description: Monitor and assess patient's nutrition/hydration status for malnutrition. Collaborate with interdisciplinary team and initiate plan and interventions as ordered.  Monitor patient's weight and dietary intake as ordered or per policy. Utilize nutrition  screening tool and intervene as necessary. Determine patient's food preferences and provide high-protein, high-caloric foods as appropriate.     INTERVENTIONS:  - Monitor oral intake, urinary output, labs, and treatment plans  - Assess nutrition and hydration status and recommend course of action  - Evaluate amount of meals eaten  - Assist patient with eating if necessary   - Allow adequate time for meals  - Recommend/ encourage appropriate diets, oral nutritional supplements, and vitamin/mineral supplements  - Order, calculate, and assess calorie counts as needed  - Recommend, monitor, and adjust tube feedings and TPN/PPN based on assessed needs  - Assess need for intravenous fluids  - Provide specific nutrition/hydration education as appropriate  - Include patient/family/caregiver in decisions related to nutrition  Outcome: Progressing

## 2024-08-09 NOTE — SEPSIS NOTE
"  Sepsis Note   Art Joseph 64 y.o. male MRN: [id number]  Unit/Bed#: 402-01 Encounter: [id number]       Initial Sepsis Screening       Row Name 08/09/24 1224                Is the patient's history suggestive of a new or worsening infection? Yes (Proceed)  -        Suspected source of infection soft tissue  -        Indicate SIRS criteria Tachycardia > 90 bpm;Hyperthemia > 38.3C (100.9F) OR Hypothermia <36C (96.8F);Leukocytosis (WBC > 48000 IJL) OR Leukopenia (WBC <4000 IJL) OR Bandemia (WBC >10% bands)  -        Are two or more of the above signs & symptoms of infection both present and new to the patient? Yes (Proceed)  -        Assess for evidence of organ dysfunction: Are any of the below criteria present within 6 hours of suspected infection and SIRS criteria that are NOT considered to be chronic conditions? Lactate > 2.0  -        Date of presentation of severe sepsis 08/09/24  -        Time of presentation of severe sepsis --        Sepsis Note: Click \"NEXT\" below (NOT \"close\") to generate sepsis note based on above information. --                  User Key  (r) = Recorded By, (t) = Taken By, (c) = Cosigned By      Initials Name Provider Type     Jerome Gutierrez DO Physician                        Body mass index is 31.51 kg/m².  Wt Readings from Last 1 Encounters:   08/05/24 99.6 kg (219 lb 9.3 oz)     IBW (Ideal Body Weight): 73 kg    Ideal body weight: 73 kg (160 lb 15 oz)  Adjusted ideal body weight: 83.6 kg (184 lb 6.3 oz)    "

## 2024-08-09 NOTE — SEPSIS NOTE
"  Sepsis Note   Art Joseph 64 y.o. male MRN: [id number]  Unit/Bed#: 402-01 Encounter: [id number]       Initial Sepsis Screening       Row Name 08/09/24 1224                Is the patient's history suggestive of a new or worsening infection? Yes (Proceed)  -        Suspected source of infection soft tissue  -        Indicate SIRS criteria Tachycardia > 90 bpm;Hyperthemia > 38.3C (100.9F) OR Hypothermia <36C (96.8F);Leukocytosis (WBC > 85450 IJL) OR Leukopenia (WBC <4000 IJL) OR Bandemia (WBC >10% bands)  -        Are two or more of the above signs & symptoms of infection both present and new to the patient? Yes (Proceed)  -        Assess for evidence of organ dysfunction: Are any of the below criteria present within 6 hours of suspected infection and SIRS criteria that are NOT considered to be chronic conditions? Lactate > 2.0  -        Date of presentation of severe sepsis 08/09/24  -        Time of presentation of severe sepsis --        Sepsis Note: Click \"NEXT\" below (NOT \"close\") to generate sepsis note based on above information. --                  User Key  (r) = Recorded By, (t) = Taken By, (c) = Cosigned By      Initials Name Provider Type    TEE Gutierrez DO Physician                    Default Flowsheet Data (Last 720 Hours)       Sepsis Reassess       Row Name 08/09/24 1310                   Repeat Volume Status and Tissue Perfusion Assessment Performed    Date of Reassessment: 08/09/24  -        Time of Reassessment: --        Sepsis Reassessment Note: Click \"NEXT\" below (NOT \"close\") to generate sepsis reassessment note. YES (proceed by clicking \"NEXT\")  -        Repeat Volume Status and Tissue Perfusion Assessment Performed --                  User Key  (r) = Recorded By, (t) = Taken By, (c) = Cosigned By      Initials Name Provider Type    TEE Gutierrez DO Physician                    Body mass index is 31.51 kg/m².  Wt Readings from Last 1 Encounters: "   08/05/24 99.6 kg (219 lb 9.3 oz)     IBW (Ideal Body Weight): 73 kg    Ideal body weight: 73 kg (160 lb 15 oz)  Adjusted ideal body weight: 83.6 kg (184 lb 6.3 oz)

## 2024-08-09 NOTE — PROGRESS NOTES
"[facility]  Progress Note  Name: Atr Joseph I  MRN: [id number]  Unit/Bed#: 402-01 I Date of Admission: 8/5/2024   Date of Service: 8/9/2024 I Hospital Day: 3    Assessment & Plan   * Severe sepsis (HCC)  Assessment & Plan  Developed severe sepsis on 08/09/2024 with fevers, a leukocytosis, and tachycardia  Also with a lactic acidosis  Due to a surgical wound infection and osteomyelitis of the left foot  A \"Sepsis alert\" was initiated on 08/09/2024.  The sepsis order set was opened and utilized.  Give a NSS IV fluid bolus of 1000 ml  Check blood cultures x 2 sets and a urine culture  IV cefepime was added by Infectious Disease  Continue IV daptomycin  Follow the lactic acid level until the lactic acidosis resolves  Check an MRI of the left foot with contrast to rule-out an underlying abscess    The 30ml/kg fluid bolus was not given to the patient despite hypotension and/or significantly elevated lactate of ? 4 and/or presence of septic shock due to: Concern for fluid/volume overload. The patient will be administered 1L of crystalloid fluid instead. Orders for this have been placed in [facility]. The patient may receive additional colloid or crystalloid fluids thereafter based on clinical condition.     Jerome Gutierrez, DO    Ceretec white blood cells can (08/08/2024):  IMPRESSION:     1. There is increased focal radiotracer uptake on both 3 and 24-hour imaging at the medial aspect of the left midfoot suspicious for osteomyelitis.     The study was marked in EPIC for immediate notification.      Open wound of left foot  Assessment & Plan  Please see the assessment and plan for \"Acute osteomyelitis of metatarsal bone of left foot\"      Acute osteomyelitis of metatarsal bone of left foot (HCC)  Assessment & Plan  Recent hospitalization from 06/25/2024 through 06/28/2024 at [facility] for osteomyelitis of the left foot requiring a left 1st metatarsal " "amputation/resection on 06/27/2024 by Podiatry.  The patient was also found to have maggots in his open wound.   Recent hospitalization from 07/08/2024 through 07/18/2024 at [facility] and underwent left foot wound debridement and washout by Podiatry on 07/11/2024   Discharged on PO Augmentin 875mg BID plus IV daptomycin 6mg/kg ABW x 6 weeks from bone resection through 08/21/24 per Infectious Disease's recommendations  Now presents with an infection of the left foot  I appreciate Podiatry's input and Infectious Disease's input.  Please see the assessment and plan for \"Severe sepsis\"    Hypomagnesemia  Assessment & Plan  Give magnesium sulfate 2 grams IV x 1 dose on 08/09/2024  Follow the magnesium level    Results from last 7 days   Lab Units 08/09/24  0434 08/08/24  0516 08/07/24  0556   MAGNESIUM mg/dL 1.7* 1.9 2.1         Peripheral vascular disease (HCC)  Assessment & Plan  Continue aspirin 81 mg PO Qdaily and high-intensity statin therapy with atorvastatin 80 mg PO Qdaily with dinner  Outpatient follow-up with Vascular Surgery    Left lower extremity arterial duplex (08/07/2024):  CONCLUSION:     Impression:     RIGHT LOWER LIMB:  BKA     LEFT LOWER LIMB:  Diffuse disease noted throughout the femoral-popliteal and tibio-peroneal  arteries without significant focal stenosis.  Ankle/Brachial index: non-compressible secondary to calcified tibial vessels.  (Prior: 1.55, unreliable)  PVR/ PPG tracings are normal.  Metatarsal and Great toe pressures not obtained due to TMA.     Compared to previous study on 4/25/2024, there is no significant change.  .     SIGNATURE:  Electronically Signed by: UMA HOGAN MD on 2024-08-07 11:57:56 AM    Atelectasis  Assessment & Plan  Incentive spirometry 10 times per hour while awake    Primary hypertension  Assessment & Plan  Continue lisinopril 2.5 mg PO Qdaily  Follow the blood pressure trend    Ambulatory dysfunction  Assessment & " "Plan  PT/OT evaluations    Type 2 diabetes mellitus with hyperglycemia, with long-term current use of insulin ([facility])  Assessment & Plan  Lab Results   Component Value Date    HGBA1C 7.9 (H) 08/07/2024       Recent Labs     08/08/24  1559 08/08/24  2030 08/09/24  0714 08/09/24  1110   POCGLU 242* 281* 199* 285*         Blood Sugar Average: Last 72 hrs:  (P) 203.3311508047695502    Increased Lantus to 20 Units SQ QHS on 08/09/2024  Increased Humalog to 12 Units TID with meals on 08/09/2024  ISS with blood glucose monitoring ACHS  Hypoglycemia protocol  Continue lisinopril for renal protection  Outpatient Endocrinology evaluation          Subjective/Objective     Subjective:   The patient was seen and examined.  The patient is experiencing fevers this morning.  No extremity pain.  No chest pain.  No shortness of breath.      Objective:  Vitals: Blood pressure 147/75, pulse (!) 109, temperature 98.2 °F (36.8 °C), resp. rate 20, height 5' 10\" (1.778 m), weight 99.6 kg (219 lb 9.3 oz), SpO2 97%.,Body mass index is 31.51 kg/m².      Intake/Output Summary (Last 24 hours) at 8/9/2024 1234  Last data filed at 8/9/2024 1147  Gross per 24 hour   Intake 780 ml   Output 800 ml   Net -20 ml       Invasive Devices       Peripherally Inserted Central Catheter Line  Duration             PICC Line 07/16/24 Right Basilic 23 days                    Physical Exam:  General:  NAD, follows commands  HEENT:  NC/AT, mucous membranes moist  Neck:  Supple, No JVP elevation  CV:  + S1, + S2, Tachycardic, Regular rhythm  Pulm:  Lung fields are CTA bilaterally  Abd:  Soft, Non-tender, Non-distended  Ext:  No clubbing/cyanosis/edema, Right-sided BKA, Left foot wound dressing intact  Skin:  No rashes  Neuro:  Awake, alert, oriented  Psych:  Normal mood and affect      Results from last 7 days   Lab Units 08/09/24  0434 08/08/24  0516 08/07/24  0556   WBC Thousand/uL 23.21* 9.11 8.18   HEMOGLOBIN g/dL 13.2 13.4 13.0   HEMATOCRIT % 39.4 41.4 40.0 "   PLATELETS Thousands/uL 288 294 273     Results from last 7 days   Lab Units 08/09/24  0434 08/08/24  0516 08/07/24  0556   SODIUM mmol/L 134* 138 138   POTASSIUM mmol/L 3.9 3.9 3.9   CHLORIDE mmol/L 102 104 104   CO2 mmol/L 23 25 26   BUN mg/dL 22 20 15   CREATININE mg/dL 0.90 0.79 0.76   CALCIUM mg/dL 9.4 9.1 9.2     Results from last 7 days   Lab Units 08/09/24  0434 08/08/24  0516 08/07/24  0556   MAGNESIUM mg/dL 1.7* 1.9 2.1     Results from last 7 days   Lab Units 08/09/24  0434 08/08/24  0516 08/07/24  0556   ALK PHOS U/L 116* 115* 114*   ALT U/L 9 11 9   AST U/L 7* 9* 9*       Lab, Imaging and other studies: I have personally reviewed pertinent reports.    VTE Pharmacologic Prophylaxis: Enoxaparin (Lovenox)  VTE Mechanical Prophylaxis: sequential compression device on the left lower extremity only.  No SCD on the right lower extremity with a right-sided BKA.

## 2024-08-09 NOTE — ASSESSMENT & PLAN NOTE
Lab Results   Component Value Date    HGBA1C 7.9 (H) 08/07/2024       Recent Labs     08/08/24  1559 08/08/24  2030 08/09/24  0714 08/09/24  1110   POCGLU 242* 281* 199* 285*         Blood Sugar Average: Last 72 hrs:  (P) 203.9745415013109994    Increased Lantus to 20 Units SQ QHS on 08/09/2024  Increased Humalog to 12 Units TID with meals on 08/09/2024  ISS with blood glucose monitoring ACHS  Hypoglycemia protocol  Continue lisinopril for renal protection  Outpatient Endocrinology evaluation

## 2024-08-09 NOTE — ASSESSMENT & PLAN NOTE
"Recent hospitalization from 06/25/2024 through 06/28/2024 at [facility] for osteomyelitis of the left foot requiring a left 1st metatarsal amputation/resection on 06/27/2024 by Podiatry.  The patient was also found to have maggots in his open wound.   Recent hospitalization from 07/08/2024 through 07/18/2024 at [facility] and underwent left foot wound debridement and washout by Podiatry on 07/11/2024   Discharged on PO Augmentin 875mg BID plus IV daptomycin 6mg/kg ABW x 6 weeks from bone resection through 08/21/24 per Infectious Disease's recommendations  Now presents with an infection of the left foot  I appreciate Podiatry's input and Infectious Disease's input.  Please see the assessment and plan for \"Severe sepsis\"  "

## 2024-08-09 NOTE — PLAN OF CARE
Problem: PAIN - ADULT  Goal: Verbalizes/displays adequate comfort level or baseline comfort level  Description: Interventions:  - Encourage patient to monitor pain and request assistance  - Assess pain using appropriate pain scale (0-10)  - Administer analgesics based on type and severity of pain and evaluate response  - Implement non-pharmacological measures as appropriate and evaluate response  - Consider cultural and social influences on pain and pain management  - Notify physician/advanced practitioner if interventions unsuccessful or patient reports new pain  Outcome: Progressing     Problem: INFECTION - ADULT  Goal: Absence or prevention of progression during hospitalization  Description: INTERVENTIONS:  - Assess and monitor for signs and symptoms of infection  - Monitor lab/diagnostic results  - Monitor all insertion sites, i.e. indwelling lines, tubes, and drains  - Monitor endotracheal if appropriate and nasal secretions for changes in amount and color  - [place] appropriate cooling/warming therapies per order  - Administer medications as ordered  - Instruct and encourage patient and family to use good hand hygiene technique  - Identify and instruct in appropriate isolation precautions for identified infection/condition  Outcome: Progressing     Problem: Knowledge Deficit  Goal: Patient/family/caregiver demonstrates understanding of disease process, treatment plan, medications, and discharge instructions  Description: Complete learning assessment and assess knowledge base.  Interventions:  - Provide teaching at level of understanding  - Provide teaching via preferred learning methods  Outcome: Progressing     Problem: SKIN/TISSUE INTEGRITY - ADULT  Goal: Skin Integrity remains intact(Skin Breakdown Prevention)  Description: Assess:  -Perform Zaid assessment every shift  -Clean and moisturize skin every shift  -Inspect skin when repositioning, toileting, and assisting with ADLS  -Assess extremities for  adequate circulation and sensation     Bed Management:  -Have minimal linens on bed & keep smooth, unwrinkled  -Change linens as needed when moist or perspiring  -Avoid sitting or lying in one position for more than 2 hours while in bed  -Keep HOB at 30 degrees     Toileting:  -Offer bedside commode  -Assess for incontinence every 2 hours  -Use incontinent care products after each incontinent episode    Activity:  -Mobilize patient 3 times a day  -Encourage activity and walks on unit  -Encourage or provide ROM exercises   -Turn and reposition patient every 2 Hours  -Use appropriate equipment to lift or move patient in bed  -Instruct/ Assist with weight shifting every hour when out of bed in chair  -Consider limitation of chair time 1 hour intervals    Skin Care:  -Avoid use of baby powder, tape, friction and shearing, hot water or constrictive clothing  -Relieve pressure over bony prominences  -Do not massage red bony areas    Next Steps:  -Teach patient strategies to minimize risks   -Consider consults to  interdisciplinary teams  Outcome: Progressing  Goal: Incision(s), wounds(s) or drain site(s) healing without S/S of infection  Description: INTERVENTIONS  - Assess and document dressing, incision, wound bed, drain sites and surrounding tissue  - Provide patient and family education  - Perform skin care/dressing changes every day  Outcome: Progressing  Goal: Pressure injury heals and does not worsen  Description: Interventions:  - Implement low air loss mattress or specialty surface (Criteria met)  - Apply silicone foam dressing  - Instruct/assist with weight shifting every 60 minutes when in chair   - Limit chair time to 1 hour intervals  - Use special pressure reducing interventions when in chair   - Apply fecal or urinary incontinence containment device   - Perform passive or active ROM  - Turn and reposition patient & offload bony prominences every 2 hours   - Utilize friction reducing device or surface for  transfers   - Consider consults to  interdisciplinary teams  - Use incontinent care products after each incontinent episode  - Consider nutrition services referral as needed  Outcome: Progressing     Problem: Nutrition/Hydration-ADULT  Goal: Nutrient/Hydration intake appropriate for improving, restoring or maintaining nutritional needs  Description: Monitor and assess patient's nutrition/hydration status for malnutrition. Collaborate with interdisciplinary team and initiate plan and interventions as ordered.  Monitor patient's weight and dietary intake as ordered or per policy. Utilize nutrition screening tool and intervene as necessary. Determine patient's food preferences and provide high-protein, high-caloric foods as appropriate.     INTERVENTIONS:  - Monitor oral intake, urinary output, labs, and treatment plans  - Assess nutrition and hydration status and recommend course of action  - Evaluate amount of meals eaten  - Assist patient with eating if necessary   - Allow adequate time for meals  - Recommend/ encourage appropriate diets, oral nutritional supplements, and vitamin/mineral supplements  - Order, calculate, and assess calorie counts as needed  - Recommend, monitor, and adjust tube feedings and TPN/PPN based on assessed needs  - Assess need for intravenous fluids  - Provide specific nutrition/hydration education as appropriate  - Include patient/family/caregiver in decisions related to nutrition  Outcome: Progressing

## 2024-08-09 NOTE — PROGRESS NOTES
VIRTUAL CARE DOCUMENTATION:     1. This service was provided via Telemedicine using Timescape Kit     2. Parties in the room with patient during teleconsult Patient only    3. Confidentiality My office door was closed     4. Participants No one else was in the room    5. Patient acknowledged consent and understanding of privacy and security of the  Telemedicine consult. I informed the patient that I have reviewed their record in Epic and presented the opportunity for them to ask any questions regarding the visit today.  The patient agreed to participate.    6. Time spent 4 minutes          Progress Note - Infectious Disease   Art Joseph 64 y.o. male MRN: [id number]  Unit/Bed#: 402-01 Encounter: [id number]      Impression/Plan:  1.  Systemic inflammatory response syndrome.  New onset fever and with a rising WBC count.  Unclear etiology without localizing symptomatology.  Consideration for the possibility of a progressive foot infection.  Consideration for the possibility of a catheter related bacteremia.  -Recheck blood cultures x 2 sets  -Continue daptomycin  -Discontinue Augmentin  -Begin cefepime 2 g IV every 8 hours  -Check chest x-ray    2.  Nonhealing infected left foot wound with probable ongoing osteomyelitis.  This despite extensive surgical interventions and broad-spectrum antibiotics with a plan to treat for 6 weeks through 8/21/24.  The wound is not really healing and there is significant surrounding swelling and erythema.  There is also increased drainage.  Seems to be tolerating the antibiotics and the CPK on 8/7/2024 is 28.  Ceretec scan consistent with osteomyelitis.  -Continue daptomycin and Augmentin for now  -Recheck CBC with differential, BMP, and CPK to make sure no developing toxicities  -Follow-up Ceretec scan  -Local wound care  -Close podiatry follow-up  -Await additional definitive surgical intervention  -Prognosis poor for limb salvage as per podiatry     3.  Diabetes mellitus.   With hyperglycemia.  Poorly controlled with a hemoglobin A1c of 9.7 in the recent past.  -Tighten diabetic control to improve leukocyte function and wound healing     4.  Peripheral arterial disease.  Consider vascular consult    I spent 50 minutes in evaluation of the patient of which 30 minutes was in counseling/coordination of care    Discussed with the primary service the plan to change the Augmentin to cefepime and they agree with the plan.    Antibiotics:  Daptomycin  Augmentin    Subjective:  Patient has developed a high fever this morning; no nausea, vomiting, diarrhea; no cough, shortness of breath; no pain. No new symptoms.  Denies any urinary symptoms.    Objective:  Vitals:  Temp:  [99 °F (37.2 °C)-102.2 °F (39 °C)] 99.1 °F (37.3 °C)  HR:  [121-135] 131  Resp:  [16-18] 16  BP: (101-127)/(68-84) 127/79  SpO2:  [91 %-95 %] 93 %  Temp (24hrs), Av.1 °F (37.8 °C), Min:99 °F (37.2 °C), Max:102.2 °F (39 °C)  Current: Temperature: 99.1 °F (37.3 °C)    Documented physical exam has been primarily done by the patient's nurse and/or the primary service due to limited examination abilities on telemedicine    Physical Exam:   General Appearance:  Alert, interactive, nontoxic, no acute distress.   Throat: Oropharynx moist without lesions.    Lungs:   Clear to auscultation bilaterally; no wheezes, rhonchi or rales; respirations unlabored   Heart:  Tachycardic; no murmur, rub or gallop   Abdomen:   Soft, non-tender, non-distended, positive bowel sounds.     Extremities: Left foot dressed with a dry dressing in place.   Skin: No new rashes or lesions. No draining wounds noted.       Labs, Imaging, & Other studies:   All pertinent labs and imaging studies were personally reviewed  Results from last 7 days   Lab Units 24  0434 24  0516 24  0556   WBC Thousand/uL 23.21* 9.11 8.18   HEMOGLOBIN g/dL 13.2 13.4 13.0   PLATELETS Thousands/uL 288 294 273     Results from last 7 days   Lab Units  08/09/24  0434 08/08/24  0516 08/07/24  0556   SODIUM mmol/L 134* 138 138   POTASSIUM mmol/L 3.9 3.9 3.9   CHLORIDE mmol/L 102 104 104   CO2 mmol/L 23 25 26   BUN mg/dL 22 20 15   CREATININE mg/dL 0.90 0.79 0.76   EGFR ml/min/1.73sq m 89 94 95   CALCIUM mg/dL 9.4 9.1 9.2   AST U/L 7* 9* 9*   ALT U/L 9 11 9   ALK PHOS U/L 116* 115* 114*     Results from last 7 days   Lab Units 08/06/24  0548   MRSA CULTURE ONLY  No Methicillin Resistant Staphlyococcus aureus (MRSA) isolated     Results from last 7 days   Lab Units 08/08/24  0516 08/07/24  0556   PROCALCITONIN ng/ml 0.07 0.06     Results from last 7 days   Lab Units 08/05/24  1823   CRP mg/L 15.7*       Ceretec scan.  Increased focal uptake left midfoot    Images personally reviewed by me in PACS

## 2024-08-09 NOTE — ASSESSMENT & PLAN NOTE
Give magnesium sulfate 2 grams IV x 1 dose on 08/09/2024  Follow the magnesium level    Results from last 7 days   Lab Units 08/09/24  0434 08/08/24  0516 08/07/24  0556   MAGNESIUM mg/dL 1.7* 1.9 2.1

## 2024-08-09 NOTE — ASSESSMENT & PLAN NOTE
"Developed severe sepsis on 08/09/2024 with fevers, a leukocytosis, and tachycardia  Also with a lactic acidosis  Due to a surgical wound infection and osteomyelitis of the left foot  A \"Sepsis alert\" was initiated on 08/09/2024.  The sepsis order set was opened and utilized.  Give a NSS IV fluid bolus of 1000 ml  Check blood cultures x 2 sets and a urine culture  IV cefepime was added by Infectious Disease  Continue IV daptomycin  Follow the lactic acid level until the lactic acidosis resolves  Check an MRI of the left foot with contrast to rule-out an underlying abscess    The 30ml/kg fluid bolus was not given to the patient despite hypotension and/or significantly elevated lactate of ? 4 and/or presence of septic shock due to: Concern for fluid/volume overload. The patient will be administered 1L of crystalloid fluid instead. Orders for this have been placed in [facility]. The patient may receive additional colloid or crystalloid fluids thereafter based on clinical condition.     Jerome Gutierrez, DO    Ceretec white blood cells can (08/08/2024):  IMPRESSION:     1. There is increased focal radiotracer uptake on both 3 and 24-hour imaging at the medial aspect of the left midfoot suspicious for osteomyelitis.     The study was marked in EPIC for immediate notification.    "

## 2024-08-09 NOTE — PLAN OF CARE
Problem: Prexisting or High Potential for Compromised Skin Integrity  Goal: Skin integrity is maintained or improved  Description: INTERVENTIONS:  - Identify patients at risk for skin breakdown  - Assess and monitor skin integrity  - Assess and monitor nutrition and hydration status  - Monitor labs   - Assess for incontinence   - Turn and reposition patient  - Assist with mobility/ambulation  - Relieve pressure over bony prominences  - Avoid friction and shearing  - Provide appropriate hygiene as needed including keeping skin clean and dry  - Evaluate need for skin moisturizer/barrier cream  - Collaborate with interdisciplinary team   - Patient/family teaching  - Consider wound care consult   Outcome: Progressing     Problem: PAIN - ADULT  Goal: Verbalizes/displays adequate comfort level or baseline comfort level  Description: Interventions:  - Encourage patient to monitor pain and request assistance  - Assess pain using appropriate pain scale (0-10)  - Administer analgesics based on type and severity of pain and evaluate response  - Implement non-pharmacological measures as appropriate and evaluate response  - Consider cultural and social influences on pain and pain management  - Notify physician/advanced practitioner if interventions unsuccessful or patient reports new pain  Outcome: Progressing     Problem: INFECTION - ADULT  Goal: Absence or prevention of progression during hospitalization  Description: INTERVENTIONS:  - Assess and monitor for signs and symptoms of infection  - Monitor lab/diagnostic results  - Monitor all insertion sites, i.e. indwelling lines, tubes, and drains  - Monitor endotracheal if appropriate and nasal secretions for changes in amount and color  - [place] appropriate cooling/warming therapies per order  - Administer medications as ordered  - Instruct and encourage patient and family to use good hand hygiene technique  - Identify and instruct in appropriate isolation precautions for  identified infection/condition  Outcome: Progressing     Problem: SAFETY ADULT  Goal: Patient will remain free of falls  Description: INTERVENTIONS:  - Educate patient/family on patient safety including physical limitations  - Instruct patient to call for assistance with activity   - Consult OT/PT to assist with strengthening/mobility   - Keep Call bell within reach  - Keep bed low and locked with side rails adjusted as appropriate  - Keep care items and personal belongings within reach  - Initiate and maintain comfort rounds  - Make Fall Risk Sign visible to staff  - Offer Toileting every 2 Hours, in advance of need  - Initiate/Maintain bed alarm  - Obtain necessary fall risk management equipment:   - Apply yellow socks and bracelet for high fall risk patients  - Consider moving patient to room near nurses station  Outcome: Progressing  Goal: Maintain or return to baseline ADL function  Description: INTERVENTIONS:  -  Assess patient's ability to carry out ADLs; assess patient's baseline for ADL function and identify physical deficits which impact ability to perform ADLs (bathing, care of mouth/teeth, toileting, grooming, dressing, etc.)  - Assess/evaluate cause of self-care deficits   - Assess range of motion  - Assess patient's mobility; develop plan if impaired  - Assess patient's need for assistive devices and provide as appropriate  - Encourage maximum independence but intervene and supervise when necessary  - Involve family in performance of ADLs  - Assess for home care needs following discharge   - Consider OT consult to assist with ADL evaluation and planning for discharge  - Provide patient education as appropriate  Outcome: Progressing  Goal: Maintains/Returns to pre admission functional level  Description: INTERVENTIONS:  - Perform AM-PAC 6 Click Basic Mobility/ Daily Activity assessment daily.  - Set and communicate daily mobility goal to care team and patient/family/caregiver.   - Collaborate with  rehabilitation services on mobility goals if consulted  - Perform Range of Motion 3 times a day.  - Reposition patient every 2 hours.  - Dangle patient 3 times a day  - Stand patient 3 times a day  - Ambulate patient 3 times a day  - Out of bed to chair 3 times a day   - Out of bed for meals 3 times a day  - Out of bed for toileting  - Record patient progress and toleration of activity level   Outcome: Progressing     Problem: DISCHARGE PLANNING  Goal: Discharge to home or other facility with appropriate resources  Description: INTERVENTIONS:  - Identify barriers to discharge w/patient and caregiver  - Arrange for needed discharge resources and transportation as appropriate  - Identify discharge learning needs (meds, wound care, etc.)  - Arrange for interpretive services to assist at discharge as needed  - Refer to Case Management Department for coordinating discharge planning if the patient needs post-hospital services based on physician/advanced practitioner order or complex needs related to functional status, cognitive ability, or social support system  Outcome: Progressing     Problem: Knowledge Deficit  Goal: Patient/family/caregiver demonstrates understanding of disease process, treatment plan, medications, and discharge instructions  Description: Complete learning assessment and assess knowledge base.  Interventions:  - Provide teaching at level of understanding  - Provide teaching via preferred learning methods  Outcome: Progressing     Problem: SKIN/TISSUE INTEGRITY - ADULT  Goal: Skin Integrity remains intact(Skin Breakdown Prevention)  Description: Assess:  -Perform Zaid assessment every shift  -Clean and moisturize skin every shift  -Inspect skin when repositioning, toileting, and assisting with ADLS  -Assess extremities for adequate circulation and sensation     Bed Management:  -Have minimal linens on bed & keep smooth, unwrinkled  -Change linens as needed when moist or perspiring  -Avoid sitting or  lying in one position for more than 2 hours while in bed  -Keep HOB at 30 degrees     Toileting:  -Offer bedside commode  -Assess for incontinence every 2 hours  -Use incontinent care products after each incontinent episode    Activity:  -Mobilize patient 3 times a day  -Encourage activity and walks on unit  -Encourage or provide ROM exercises   -Turn and reposition patient every 2 Hours  -Use appropriate equipment to lift or move patient in bed  -Instruct/ Assist with weight shifting every hour when out of bed in chair  -Consider limitation of chair time 1 hour intervals    Skin Care:  -Avoid use of baby powder, tape, friction and shearing, hot water or constrictive clothing  -Relieve pressure over bony prominences  -Do not massage red bony areas    Next Steps:  -Teach patient strategies to minimize risks   -Consider consults to  interdisciplinary teams  Outcome: Progressing  Goal: Incision(s), wounds(s) or drain site(s) healing without S/S of infection  Description: INTERVENTIONS  - Assess and document dressing, incision, wound bed, drain sites and surrounding tissue  - Provide patient and family education  - Perform skin care/dressing changes every day  Outcome: Progressing  Goal: Pressure injury heals and does not worsen  Description: Interventions:  - Implement low air loss mattress or specialty surface (Criteria met)  - Apply silicone foam dressing  - Instruct/assist with weight shifting every 60 minutes when in chair   - Limit chair time to 1 hour intervals  - Use special pressure reducing interventions when in chair   - Apply fecal or urinary incontinence containment device   - Perform passive or active ROM  - Turn and reposition patient & offload bony prominences every 2 hours   - Utilize friction reducing device or surface for transfers   - Consider consults to  interdisciplinary teams  - Use incontinent care products after each incontinent episode  - Consider nutrition services referral as  needed  Outcome: Progressing     Problem: METABOLIC, FLUID AND ELECTROLYTES - ADULT  Goal: Electrolytes maintained within normal limits  Description: INTERVENTIONS:  - Monitor labs and assess patient for signs and symptoms of electrolyte imbalances  - Administer electrolyte replacement as ordered  - Monitor response to electrolyte replacements, including repeat lab results as appropriate  - Instruct patient on fluid and nutrition as appropriate  Outcome: Progressing     Problem: MUSCULOSKELETAL - ADULT  Goal: Maintain or return mobility to safest level of function  Description: INTERVENTIONS:  - Assess patient's ability to carry out ADLs; assess patient's baseline for ADL function and identify physical deficits which impact ability to perform ADLs (bathing, care of mouth/teeth, toileting, grooming, dressing, etc.)  - Assess/evaluate cause of self-care deficits   - Assess range of motion  - Assess patient's mobility  - Assess patient's need for assistive devices and provide as appropriate  - Encourage maximum independence but intervene and supervise when necessary  - Involve family in performance of ADLs  - Assess for home care needs following discharge   - Consider OT consult to assist with ADL evaluation and planning for discharge  - Provide patient education as appropriate  Outcome: Progressing  Goal: Maintain proper alignment of affected body part  Description: INTERVENTIONS:  - Support, maintain and protect limb and body alignment  - Provide patient/ family with appropriate education  Outcome: Progressing     Problem: Nutrition/Hydration-ADULT  Goal: Nutrient/Hydration intake appropriate for improving, restoring or maintaining nutritional needs  Description: Monitor and assess patient's nutrition/hydration status for malnutrition. Collaborate with interdisciplinary team and initiate plan and interventions as ordered.  Monitor patient's weight and dietary intake as ordered or per policy. Utilize nutrition  screening tool and intervene as necessary. Determine patient's food preferences and provide high-protein, high-caloric foods as appropriate.     INTERVENTIONS:  - Monitor oral intake, urinary output, labs, and treatment plans  - Assess nutrition and hydration status and recommend course of action  - Evaluate amount of meals eaten  - Assist patient with eating if necessary   - Allow adequate time for meals  - Recommend/ encourage appropriate diets, oral nutritional supplements, and vitamin/mineral supplements  - Order, calculate, and assess calorie counts as needed  - Recommend, monitor, and adjust tube feedings and TPN/PPN based on assessed needs  - Assess need for intravenous fluids  - Provide specific nutrition/hydration education as appropriate  - Include patient/family/caregiver in decisions related to nutrition  Outcome: Progressing

## 2024-08-09 NOTE — QUICK NOTE
- Bone scan was reviewed and does show osteomyelitis of the medial cuneiform, we will attempt proximal Lisfranc amputation for some attempt at limb salvage  - Will be n.p.o. after midnight in anticipation of intervention on Monday  - If OR cannot accommodate current timing will likely need to delay case until Tuesday continue local wound care for now

## 2024-08-10 LAB
ALBUMIN SERPL BCG-MCNC: 3 G/DL (ref 3.5–5)
ALP SERPL-CCNC: 94 U/L (ref 34–104)
ALT SERPL W P-5'-P-CCNC: 7 U/L (ref 7–52)
ANION GAP SERPL CALCULATED.3IONS-SCNC: 7 MMOL/L (ref 4–13)
AST SERPL W P-5'-P-CCNC: 7 U/L (ref 13–39)
BASOPHILS # BLD AUTO: 0.1 THOUSANDS/ÂΜL (ref 0–0.1)
BASOPHILS NFR BLD AUTO: 0 % (ref 0–1)
BILIRUB SERPL-MCNC: 0.48 MG/DL (ref 0.2–1)
BUN SERPL-MCNC: 20 MG/DL (ref 5–25)
CALCIUM ALBUM COR SERPL-MCNC: 9.5 MG/DL (ref 8.3–10.1)
CALCIUM SERPL-MCNC: 8.7 MG/DL (ref 8.4–10.2)
CHLORIDE SERPL-SCNC: 107 MMOL/L (ref 96–108)
CK SERPL-CCNC: 31 U/L (ref 39–308)
CO2 SERPL-SCNC: 22 MMOL/L (ref 21–32)
CREAT SERPL-MCNC: 0.87 MG/DL (ref 0.6–1.3)
EOSINOPHIL # BLD AUTO: 0.02 THOUSAND/ÂΜL (ref 0–0.61)
EOSINOPHIL NFR BLD AUTO: 0 % (ref 0–6)
ERYTHROCYTE [DISTWIDTH] IN BLOOD BY AUTOMATED COUNT: 13.7 % (ref 11.6–15.1)
GFR SERPL CREATININE-BSD FRML MDRD: 90 ML/MIN/1.73SQ M
GLUCOSE SERPL-MCNC: 149 MG/DL (ref 65–140)
GLUCOSE SERPL-MCNC: 174 MG/DL (ref 65–140)
GLUCOSE SERPL-MCNC: 209 MG/DL (ref 65–140)
GLUCOSE SERPL-MCNC: 216 MG/DL (ref 65–140)
GLUCOSE SERPL-MCNC: 301 MG/DL (ref 65–140)
HCT VFR BLD AUTO: 33.2 % (ref 36.5–49.3)
HGB BLD-MCNC: 10.9 G/DL (ref 12–17)
IMM GRANULOCYTES # BLD AUTO: 0.38 THOUSAND/UL (ref 0–0.2)
IMM GRANULOCYTES NFR BLD AUTO: 2 % (ref 0–2)
LACTATE SERPL-SCNC: 0.7 MMOL/L (ref 0.5–2)
LYMPHOCYTES # BLD AUTO: 2.63 THOUSANDS/ÂΜL (ref 0.6–4.47)
LYMPHOCYTES NFR BLD AUTO: 12 % (ref 14–44)
MAGNESIUM SERPL-MCNC: 2.1 MG/DL (ref 1.9–2.7)
MCH RBC QN AUTO: 28.8 PG (ref 26.8–34.3)
MCHC RBC AUTO-ENTMCNC: 32.8 G/DL (ref 31.4–37.4)
MCV RBC AUTO: 88 FL (ref 82–98)
MONOCYTES # BLD AUTO: 1.79 THOUSAND/ÂΜL (ref 0.17–1.22)
MONOCYTES NFR BLD AUTO: 8 % (ref 4–12)
NEUTROPHILS # BLD AUTO: 17.52 THOUSANDS/ÂΜL (ref 1.85–7.62)
NEUTS SEG NFR BLD AUTO: 78 % (ref 43–75)
NRBC BLD AUTO-RTO: 0 /100 WBCS
PHOSPHATE SERPL-MCNC: 2.3 MG/DL (ref 2.3–4.1)
PLATELET # BLD AUTO: 227 THOUSANDS/UL (ref 149–390)
PMV BLD AUTO: 8.8 FL (ref 8.9–12.7)
POTASSIUM SERPL-SCNC: 3.9 MMOL/L (ref 3.5–5.3)
PROCALCITONIN SERPL-MCNC: 0.63 NG/ML
PROT SERPL-MCNC: 6.2 G/DL (ref 6.4–8.4)
RBC # BLD AUTO: 3.78 MILLION/UL (ref 3.88–5.62)
SODIUM SERPL-SCNC: 136 MMOL/L (ref 135–147)
WBC # BLD AUTO: 22.44 THOUSAND/UL (ref 4.31–10.16)

## 2024-08-10 PROCEDURE — 84100 ASSAY OF PHOSPHORUS: CPT | Performed by: INTERNAL MEDICINE

## 2024-08-10 PROCEDURE — 84145 PROCALCITONIN (PCT): CPT | Performed by: INTERNAL MEDICINE

## 2024-08-10 PROCEDURE — 80053 COMPREHEN METABOLIC PANEL: CPT | Performed by: INTERNAL MEDICINE

## 2024-08-10 PROCEDURE — 83605 ASSAY OF LACTIC ACID: CPT | Performed by: INTERNAL MEDICINE

## 2024-08-10 PROCEDURE — 82948 REAGENT STRIP/BLOOD GLUCOSE: CPT

## 2024-08-10 PROCEDURE — 83735 ASSAY OF MAGNESIUM: CPT | Performed by: INTERNAL MEDICINE

## 2024-08-10 PROCEDURE — 99232 SBSQ HOSP IP/OBS MODERATE 35: CPT | Performed by: INTERNAL MEDICINE

## 2024-08-10 PROCEDURE — 85025 COMPLETE CBC W/AUTO DIFF WBC: CPT | Performed by: INTERNAL MEDICINE

## 2024-08-10 PROCEDURE — 82550 ASSAY OF CK (CPK): CPT | Performed by: INTERNAL MEDICINE

## 2024-08-10 RX ORDER — INSULIN GLARGINE 100 [IU]/ML
24 INJECTION, SOLUTION SUBCUTANEOUS
Status: DISCONTINUED | OUTPATIENT
Start: 2024-08-10 | End: 2024-08-16 | Stop reason: HOSPADM

## 2024-08-10 RX ADMIN — CEFEPIME HYDROCHLORIDE 2000 MG: 2 INJECTION, SOLUTION INTRAVENOUS at 01:05

## 2024-08-10 RX ADMIN — INSULIN LISPRO 12 UNITS: 100 INJECTION, SOLUTION INTRAVENOUS; SUBCUTANEOUS at 08:44

## 2024-08-10 RX ADMIN — CEFEPIME HYDROCHLORIDE 2000 MG: 2 INJECTION, SOLUTION INTRAVENOUS at 08:43

## 2024-08-10 RX ADMIN — ACETAMINOPHEN 650 MG: 325 TABLET, FILM COATED ORAL at 19:57

## 2024-08-10 RX ADMIN — ATORVASTATIN CALCIUM 80 MG: 40 TABLET, FILM COATED ORAL at 17:21

## 2024-08-10 RX ADMIN — ASPIRIN 81 MG: 81 TABLET, COATED ORAL at 08:43

## 2024-08-10 RX ADMIN — SODIUM CHLORIDE 100 ML/HR: 0.9 INJECTION, SOLUTION INTRAVENOUS at 13:52

## 2024-08-10 RX ADMIN — INSULIN LISPRO 2 UNITS: 100 INJECTION, SOLUTION INTRAVENOUS; SUBCUTANEOUS at 08:44

## 2024-08-10 RX ADMIN — ENOXAPARIN SODIUM 40 MG: 40 INJECTION SUBCUTANEOUS at 08:43

## 2024-08-10 RX ADMIN — INSULIN LISPRO 3 UNITS: 100 INJECTION, SOLUTION INTRAVENOUS; SUBCUTANEOUS at 22:28

## 2024-08-10 RX ADMIN — LISINOPRIL 2.5 MG: 5 TABLET ORAL at 08:43

## 2024-08-10 RX ADMIN — CHOLECALCIFEROL TAB 25 MCG (1000 UNIT) 1000 UNITS: 25 TAB at 08:43

## 2024-08-10 RX ADMIN — INSULIN LISPRO 2 UNITS: 100 INJECTION, SOLUTION INTRAVENOUS; SUBCUTANEOUS at 11:52

## 2024-08-10 RX ADMIN — INSULIN GLARGINE 24 UNITS: 100 INJECTION, SOLUTION SUBCUTANEOUS at 22:28

## 2024-08-10 RX ADMIN — INSULIN LISPRO 12 UNITS: 100 INJECTION, SOLUTION INTRAVENOUS; SUBCUTANEOUS at 17:25

## 2024-08-10 RX ADMIN — CEFEPIME HYDROCHLORIDE 2000 MG: 2 INJECTION, SOLUTION INTRAVENOUS at 17:21

## 2024-08-10 RX ADMIN — DAPTOMYCIN 500 MG: 500 INJECTION, POWDER, LYOPHILIZED, FOR SOLUTION INTRAVENOUS at 22:28

## 2024-08-10 RX ADMIN — INSULIN LISPRO 12 UNITS: 100 INJECTION, SOLUTION INTRAVENOUS; SUBCUTANEOUS at 11:53

## 2024-08-10 NOTE — ASSESSMENT & PLAN NOTE
"Developed severe sepsis on 08/09/2024 with fevers, a leukocytosis, and tachycardia  Also with a lactic acidosis, which has now resovled  Due to a surgical wound infection and osteomyelitis of the left foot  A \"Sepsis alert\" was initiated on 08/09/2024.  The sepsis order set was opened and utilized.  Received a NSS IV fluid bolus of 1000 ml  Checked blood cultures x 2 sets and a urine culture on 08/09/2024, which are all pending at this time  IV cefepime was added on 08/09/2024 by Infectious Disease  Continue IV daptomycin  Follow the total CK (creatinine kinase) level while on IV daptomycin treatment  Continue NSS IV fluids at 100 ml/hr    The 30ml/kg fluid bolus was not given to the patient despite hypotension and/or significantly elevated lactate of ? 4 and/or presence of septic shock due to: Concern for fluid/volume overload. The patient will be administered 1L of crystalloid fluid instead. Orders for this have been placed in [facility]. The patient may receive additional colloid or crystalloid fluids thereafter based on clinical condition.     Jerome Gutierrez, DO    Ceretec white blood cells can (08/08/2024):  IMPRESSION:     1. There is increased focal radiotracer uptake on both 3 and 24-hour imaging at the medial aspect of the left midfoot suspicious for osteomyelitis.     The study was marked in EPIC for immediate notification.    "

## 2024-08-10 NOTE — PLAN OF CARE
Problem: Prexisting or High Potential for Compromised Skin Integrity  Goal: Skin integrity is maintained or improved  Description: INTERVENTIONS:  - Identify patients at risk for skin breakdown  - Assess and monitor skin integrity  - Assess and monitor nutrition and hydration status  - Monitor labs   - Assess for incontinence   - Turn and reposition patient  - Assist with mobility/ambulation  - Relieve pressure over bony prominences  - Avoid friction and shearing  - Provide appropriate hygiene as needed including keeping skin clean and dry  - Evaluate need for skin moisturizer/barrier cream  - Collaborate with interdisciplinary team   - Patient/family teaching  - Consider wound care consult   Outcome: Progressing     Problem: PAIN - ADULT  Goal: Verbalizes/displays adequate comfort level or baseline comfort level  Description: Interventions:  - Encourage patient to monitor pain and request assistance  - Assess pain using appropriate pain scale (0-10)  - Administer analgesics based on type and severity of pain and evaluate response  - Implement non-pharmacological measures as appropriate and evaluate response  - Consider cultural and social influences on pain and pain management  - Notify physician/advanced practitioner if interventions unsuccessful or patient reports new pain  Outcome: Progressing     Problem: INFECTION - ADULT  Goal: Absence or prevention of progression during hospitalization  Description: INTERVENTIONS:  - Assess and monitor for signs and symptoms of infection  - Monitor lab/diagnostic results  - Monitor all insertion sites, i.e. indwelling lines, tubes, and drains  - Monitor endotracheal if appropriate and nasal secretions for changes in amount and color  - [place] appropriate cooling/warming therapies per order  - Administer medications as ordered  - Instruct and encourage patient and family to use good hand hygiene technique  - Identify and instruct in appropriate isolation precautions for  identified infection/condition  Outcome: Progressing     Problem: SAFETY ADULT  Goal: Patient will remain free of falls  Description: INTERVENTIONS:  - Educate patient/family on patient safety including physical limitations  - Instruct patient to call for assistance with activity   - Consult OT/PT to assist with strengthening/mobility   - Keep Call bell within reach  - Keep bed low and locked with side rails adjusted as appropriate  - Keep care items and personal belongings within reach  - Initiate and maintain comfort rounds  - Make Fall Risk Sign visible to staff  - Offer Toileting every 2 Hours, in advance of need  - Initiate/Maintain bed alarm  - Obtain necessary fall risk management equipment:   - Apply yellow socks and bracelet for high fall risk patients  - Consider moving patient to room near nurses station  Outcome: Progressing  Goal: Maintain or return to baseline ADL function  Description: INTERVENTIONS:  -  Assess patient's ability to carry out ADLs; assess patient's baseline for ADL function and identify physical deficits which impact ability to perform ADLs (bathing, care of mouth/teeth, toileting, grooming, dressing, etc.)  - Assess/evaluate cause of self-care deficits   - Assess range of motion  - Assess patient's mobility; develop plan if impaired  - Assess patient's need for assistive devices and provide as appropriate  - Encourage maximum independence but intervene and supervise when necessary  - Involve family in performance of ADLs  - Assess for home care needs following discharge   - Consider OT consult to assist with ADL evaluation and planning for discharge  - Provide patient education as appropriate  Outcome: Progressing  Goal: Maintains/Returns to pre admission functional level  Description: INTERVENTIONS:  - Perform AM-PAC 6 Click Basic Mobility/ Daily Activity assessment daily.  - Set and communicate daily mobility goal to care team and patient/family/caregiver.   - Collaborate with  rehabilitation services on mobility goals if consulted  - Perform Range of Motion 3 times a day.  - Reposition patient every 2 hours.  - Dangle patient 3 times a day  - Stand patient 3 times a day  - Ambulate patient 3 times a day  - Out of bed to chair 3 times a day   - Out of bed for meals 3 times a day  - Out of bed for toileting  - Record patient progress and toleration of activity level   Outcome: Progressing     Problem: DISCHARGE PLANNING  Goal: Discharge to home or other facility with appropriate resources  Description: INTERVENTIONS:  - Identify barriers to discharge w/patient and caregiver  - Arrange for needed discharge resources and transportation as appropriate  - Identify discharge learning needs (meds, wound care, etc.)  - Arrange for interpretive services to assist at discharge as needed  - Refer to Case Management Department for coordinating discharge planning if the patient needs post-hospital services based on physician/advanced practitioner order or complex needs related to functional status, cognitive ability, or social support system  Outcome: Progressing     Problem: Knowledge Deficit  Goal: Patient/family/caregiver demonstrates understanding of disease process, treatment plan, medications, and discharge instructions  Description: Complete learning assessment and assess knowledge base.  Interventions:  - Provide teaching at level of understanding  - Provide teaching via preferred learning methods  Outcome: Progressing     Problem: SKIN/TISSUE INTEGRITY - ADULT  Goal: Skin Integrity remains intact(Skin Breakdown Prevention)  Description: Assess:  -Perform Zaid assessment every shift  -Clean and moisturize skin every shift  -Inspect skin when repositioning, toileting, and assisting with ADLS  -Assess extremities for adequate circulation and sensation     Bed Management:  -Have minimal linens on bed & keep smooth, unwrinkled  -Change linens as needed when moist or perspiring  -Avoid sitting or  lying in one position for more than 2 hours while in bed  -Keep HOB at 30 degrees     Toileting:  -Offer bedside commode  -Assess for incontinence every 2 hours  -Use incontinent care products after each incontinent episode    Activity:  -Mobilize patient 3 times a day  -Encourage activity and walks on unit  -Encourage or provide ROM exercises   -Turn and reposition patient every 2 Hours  -Use appropriate equipment to lift or move patient in bed  -Instruct/ Assist with weight shifting every hour when out of bed in chair  -Consider limitation of chair time 1 hour intervals    Skin Care:  -Avoid use of baby powder, tape, friction and shearing, hot water or constrictive clothing  -Relieve pressure over bony prominences  -Do not massage red bony areas    Next Steps:  -Teach patient strategies to minimize risks   -Consider consults to  interdisciplinary teams  Outcome: Progressing  Goal: Incision(s), wounds(s) or drain site(s) healing without S/S of infection  Description: INTERVENTIONS  - Assess and document dressing, incision, wound bed, drain sites and surrounding tissue  - Provide patient and family education  - Perform skin care/dressing changes every day  Outcome: Progressing  Goal: Pressure injury heals and does not worsen  Description: Interventions:  - Implement low air loss mattress or specialty surface (Criteria met)  - Apply silicone foam dressing  - Instruct/assist with weight shifting every 60 minutes when in chair   - Limit chair time to 1 hour intervals  - Use special pressure reducing interventions when in chair   - Apply fecal or urinary incontinence containment device   - Perform passive or active ROM  - Turn and reposition patient & offload bony prominences every 2 hours   - Utilize friction reducing device or surface for transfers   - Consider consults to  interdisciplinary teams  - Use incontinent care products after each incontinent episode  - Consider nutrition services referral as  needed  Outcome: Progressing     Problem: METABOLIC, FLUID AND ELECTROLYTES - ADULT  Goal: Electrolytes maintained within normal limits  Description: INTERVENTIONS:  - Monitor labs and assess patient for signs and symptoms of electrolyte imbalances  - Administer electrolyte replacement as ordered  - Monitor response to electrolyte replacements, including repeat lab results as appropriate  - Instruct patient on fluid and nutrition as appropriate  Outcome: Progressing     Problem: MUSCULOSKELETAL - ADULT  Goal: Maintain or return mobility to safest level of function  Description: INTERVENTIONS:  - Assess patient's ability to carry out ADLs; assess patient's baseline for ADL function and identify physical deficits which impact ability to perform ADLs (bathing, care of mouth/teeth, toileting, grooming, dressing, etc.)  - Assess/evaluate cause of self-care deficits   - Assess range of motion  - Assess patient's mobility  - Assess patient's need for assistive devices and provide as appropriate  - Encourage maximum independence but intervene and supervise when necessary  - Involve family in performance of ADLs  - Assess for home care needs following discharge   - Consider OT consult to assist with ADL evaluation and planning for discharge  - Provide patient education as appropriate  Outcome: Progressing  Goal: Maintain proper alignment of affected body part  Description: INTERVENTIONS:  - Support, maintain and protect limb and body alignment  - Provide patient/ family with appropriate education  Outcome: Progressing     Problem: Nutrition/Hydration-ADULT  Goal: Nutrient/Hydration intake appropriate for improving, restoring or maintaining nutritional needs  Description: Monitor and assess patient's nutrition/hydration status for malnutrition. Collaborate with interdisciplinary team and initiate plan and interventions as ordered.  Monitor patient's weight and dietary intake as ordered or per policy. Utilize nutrition  screening tool and intervene as necessary. Determine patient's food preferences and provide high-protein, high-caloric foods as appropriate.     INTERVENTIONS:  - Monitor oral intake, urinary output, labs, and treatment plans  - Assess nutrition and hydration status and recommend course of action  - Evaluate amount of meals eaten  - Assist patient with eating if necessary   - Allow adequate time for meals  - Recommend/ encourage appropriate diets, oral nutritional supplements, and vitamin/mineral supplements  - Order, calculate, and assess calorie counts as needed  - Recommend, monitor, and adjust tube feedings and TPN/PPN based on assessed needs  - Assess need for intravenous fluids  - Provide specific nutrition/hydration education as appropriate  - Include patient/family/caregiver in decisions related to nutrition  Outcome: Progressing

## 2024-08-10 NOTE — ASSESSMENT & PLAN NOTE
Lab Results   Component Value Date    HGBA1C 7.9 (H) 08/07/2024       Recent Labs     08/09/24  1618 08/09/24  2119 08/10/24  0746 08/10/24  1121   POCGLU 229* 240* 209* 216*         Blood Sugar Average: Last 72 hrs:  (P) [id number]    Increased Humalog to 12 Units TID with meals on 08/09/2024  Increased Lantus to 24 Units SQ QHS on 08/10/2024  ISS with blood glucose monitoring ACHS  Hypoglycemia protocol  Continue lisinopril for renal protection  Outpatient Endocrinology evaluation

## 2024-08-10 NOTE — ASSESSMENT & PLAN NOTE
"Recent hospitalization from 06/25/2024 through 06/28/2024 at [facility] for osteomyelitis of the left foot requiring a left 1st metatarsal amputation/resection on 06/27/2024 by Podiatry.  The patient was also found to have maggots in his open wound.   Recent hospitalization from 07/08/2024 through 07/18/2024 at [facility] and underwent left foot wound debridement and washout by Podiatry on 07/11/2024   Discharged on PO Augmentin 875mg BID plus IV daptomycin 6mg/kg ABW x 6 weeks from bone resection through 08/21/24 per Infectious Disease's recommendations  Now presented with an infection of the left foot  I appreciate Podiatry's input and Infectious Disease's input.  The patient is scheduled to go to the OR on Monday, 08/12/2024, per Podiatry for wound debridement and additional amputation of the left foot.  Please see the assessment and plan for \"Severe sepsis\"  "

## 2024-08-10 NOTE — PROGRESS NOTES
"[facility]  Progress Note  Name: Art Joseph I  MRN: [id number]  Unit/Bed#: 402-01 I Date of Admission: 8/5/2024   Date of Service: 8/10/2024 I Hospital Day: 4    Assessment & Plan   * Severe sepsis (HCC)  Assessment & Plan  Developed severe sepsis on 08/09/2024 with fevers, a leukocytosis, and tachycardia  Also with a lactic acidosis, which has now resovled  Due to a surgical wound infection and osteomyelitis of the left foot  A \"Sepsis alert\" was initiated on 08/09/2024.  The sepsis order set was opened and utilized.  Received a NSS IV fluid bolus of 1000 ml  Checked blood cultures x 2 sets and a urine culture on 08/09/2024, which are all pending at this time  IV cefepime was added on 08/09/2024 by Infectious Disease  Continue IV daptomycin  Follow the total CK (creatinine kinase) level while on IV daptomycin treatment  Continue NSS IV fluids at 100 ml/hr    The 30ml/kg fluid bolus was not given to the patient despite hypotension and/or significantly elevated lactate of ? 4 and/or presence of septic shock due to: Concern for fluid/volume overload. The patient will be administered 1L of crystalloid fluid instead. Orders for this have been placed in [facility]. The patient may receive additional colloid or crystalloid fluids thereafter based on clinical condition.     Jerome Gutierrez, DO    Ceretec white blood cells can (08/08/2024):  IMPRESSION:     1. There is increased focal radiotracer uptake on both 3 and 24-hour imaging at the medial aspect of the left midfoot suspicious for osteomyelitis.     The study was marked in EPIC for immediate notification.      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  The patient was also found to have maggots in his open wound.   Recent hospitalization from 07/08/2024 through 07/18/2024 at [facility] and underwent left foot wound debridement and washout by Podiatry on 07/11/2024   Discharged on PO Augmentin 875mg BID plus IV daptomycin 6mg/kg ABW x 6 weeks from bone resection through 08/21/24 per Infectious Disease's recommendations  Now presented with an infection of the left foot  I appreciate Podiatry's input and Infectious Disease's input.  The patient is scheduled to go to the OR on Monday, 08/12/2024, per Podiatry for wound debridement and additional amputation of the left foot.  Please see the assessment and plan for \"Severe sepsis\"    Hypomagnesemia  Assessment & Plan  Resolved with magnesium sulfate 2 grams IV x 1 dose on 08/09/2024  Follow the magnesium level    Results from last 7 days   Lab Units 08/10/24  0452 08/09/24  0434 08/08/24  0516   MAGNESIUM mg/dL 2.1 1.7* 1.9           Peripheral vascular disease (HCC)  Assessment & Plan  Continue aspirin 81 mg PO Qdaily and high-intensity statin therapy with atorvastatin 80 mg PO Qdaily with dinner  Outpatient follow-up with Vascular Surgery    Left lower extremity arterial duplex (08/07/2024):  CONCLUSION:     Impression:     RIGHT LOWER LIMB:  BKA     LEFT LOWER LIMB:  Diffuse disease noted throughout the femoral-popliteal and tibio-peroneal  arteries without significant focal stenosis.  Ankle/Brachial index: non-compressible secondary to calcified tibial vessels.  (Prior: 1.55, unreliable)  PVR/ PPG tracings are normal.  Metatarsal and Great toe pressures not obtained due to TMA.     Compared to previous study on 4/25/2024, there is no significant change.  .   " "  SIGNATURE:  Electronically Signed by: UMA HOGAN MD on 2024-08-07 11:57:56 AM    Atelectasis  Assessment & Plan  Incentive spirometry 10 times per hour while awake    Primary hypertension  Assessment & Plan  Continue lisinopril 2.5 mg PO Qdaily  Follow the blood pressure trend    Ambulatory dysfunction  Assessment & Plan  PT/OT evaluations    Type 2 diabetes mellitus with hyperglycemia, with long-term current use of insulin (HCC)  Assessment & Plan  Lab Results   Component Value Date    HGBA1C 7.9 (H) 08/07/2024       Recent Labs     08/09/24  1618 08/09/24  2119 08/10/24  0746 08/10/24  1121   POCGLU 229* 240* 209* 216*         Blood Sugar Average: Last 72 hrs:  (P) [id number]    Increased Humalog to 12 Units TID with meals on 08/09/2024  Increased Lantus to 24 Units SQ QHS on 08/10/2024  ISS with blood glucose monitoring ACHS  Hypoglycemia protocol  Continue lisinopril for renal protection  Outpatient Endocrinology evaluation        Subjective/Objective     Subjective:   The patient was seen and examined.  The patient is doing better.  The fevers have resolved.  No chest pain.  No shortness of breath.  No abdominal pain.  No nausea or vomiting.      Objective:  Vitals: Blood pressure 114/66, pulse 94, temperature 98.6 °F (37 °C), resp. rate 20, height 5' 10\" (1.778 m), weight 99.6 kg (219 lb 9.3 oz), SpO2 97%.,Body mass index is 31.51 kg/m².      Intake/Output Summary (Last 24 hours) at 8/10/2024 1155  Last data filed at 8/10/2024 0935  Gross per 24 hour   Intake 600 ml   Output 1360 ml   Net -760 ml       Invasive Devices       Peripherally Inserted Central Catheter Line  Duration             PICC Line 07/16/24 Right Basilic 24 days                    Physical Exam:  General:  NAD, follows commands  HEENT:  NC/AT, mucous membranes moist  Neck:  Supple, No JVP elevation  CV:  + S1, + S2, RRR  Pulm:  Lung fields are CTA bilaterally  Abd:  Soft, Non-tender, Non-distended  Ext:  No " clubbing/cyanosis/edema, Right-sided BKA, Left foot wound dressing intact  Skin:  Venous stasis dermatitis of the left anterior shin  Neuro:  Awake, alert, oriented  Psych:  Normal mood and affect      Results from last 7 days   Lab Units 08/10/24  0452 08/09/24  0434 08/08/24  0516   WBC Thousand/uL 22.44* 23.21* 9.11   HEMOGLOBIN g/dL 10.9* 13.2 13.4   HEMATOCRIT % 33.2* 39.4 41.4   PLATELETS Thousands/uL 227 288 294     Results from last 7 days   Lab Units 08/10/24  0452 08/09/24  0434 08/08/24  0516   SODIUM mmol/L 136 134* 138   POTASSIUM mmol/L 3.9 3.9 3.9   CHLORIDE mmol/L 107 102 104   CO2 mmol/L 22 23 25   BUN mg/dL 20 22 20   CREATININE mg/dL 0.87 0.90 0.79   CALCIUM mg/dL 8.7 9.4 9.1     Results from last 7 days   Lab Units 08/10/24  0452 08/09/24  0434 08/08/24  0516   MAGNESIUM mg/dL 2.1 1.7* 1.9     Results from last 7 days   Lab Units 08/10/24  0452 08/09/24  0434 08/08/24  0516   ALK PHOS U/L 94 116* 115*   ALT U/L 7 9 11   AST U/L 7* 7* 9*       Lab, Imaging and other studies: I have personally reviewed pertinent reports.    VTE Pharmacologic Prophylaxis: Enoxaparin (Lovenox)  VTE Mechanical Prophylaxis: sequential compression device on the left lower extremity only.  No SCD on the right lower extremity with a right-sided BKA.

## 2024-08-10 NOTE — ASSESSMENT & PLAN NOTE
Resolved with magnesium sulfate 2 grams IV x 1 dose on 08/09/2024  Follow the magnesium level    Results from last 7 days   Lab Units 08/10/24  0452 08/09/24  0434 08/08/24  0516   MAGNESIUM mg/dL 2.1 1.7* 1.9

## 2024-08-10 NOTE — PLAN OF CARE
Problem: Prexisting or High Potential for Compromised Skin Integrity  Goal: Skin integrity is maintained or improved  Description: INTERVENTIONS:  - Identify patients at risk for skin breakdown  - Assess and monitor skin integrity  - Assess and monitor nutrition and hydration status  - Monitor labs   - Assess for incontinence   - Turn and reposition patient  - Assist with mobility/ambulation  - Relieve pressure over bony prominences  - Avoid friction and shearing  - Provide appropriate hygiene as needed including keeping skin clean and dry  - Evaluate need for skin moisturizer/barrier cream  - Collaborate with interdisciplinary team   - Patient/family teaching  - Consider wound care consult   Outcome: Progressing     Problem: PAIN - ADULT  Goal: Verbalizes/displays adequate comfort level or baseline comfort level  Description: Interventions:  - Encourage patient to monitor pain and request assistance  - Assess pain using appropriate pain scale (0-10)  - Administer analgesics based on type and severity of pain and evaluate response  - Implement non-pharmacological measures as appropriate and evaluate response  - Consider cultural and social influences on pain and pain management  - Notify physician/advanced practitioner if interventions unsuccessful or patient reports new pain  Outcome: Progressing     Problem: INFECTION - ADULT  Goal: Absence or prevention of progression during hospitalization  Description: INTERVENTIONS:  - Assess and monitor for signs and symptoms of infection  - Monitor lab/diagnostic results  - Monitor all insertion sites, i.e. indwelling lines, tubes, and drains  - Monitor endotracheal if appropriate and nasal secretions for changes in amount and color  - [place] appropriate cooling/warming therapies per order  - Administer medications as ordered  - Instruct and encourage patient and family to use good hand hygiene technique  - Identify and instruct in appropriate isolation precautions for  identified infection/condition  Outcome: Progressing     Problem: SAFETY ADULT  Goal: Patient will remain free of falls  Description: INTERVENTIONS:  - Educate patient/family on patient safety including physical limitations  - Instruct patient to call for assistance with activity   - Consult OT/PT to assist with strengthening/mobility   - Keep Call bell within reach  - Keep bed low and locked with side rails adjusted as appropriate  - Keep care items and personal belongings within reach  - Initiate and maintain comfort rounds  - Make Fall Risk Sign visible to staff  - Offer Toileting every 2 Hours, in advance of need  - Initiate/Maintain bed alarm  - Obtain necessary fall risk management equipment:   - Apply yellow socks and bracelet for high fall risk patients  - Consider moving patient to room near nurses station  Outcome: Progressing  Goal: Maintain or return to baseline ADL function  Description: INTERVENTIONS:  -  Assess patient's ability to carry out ADLs; assess patient's baseline for ADL function and identify physical deficits which impact ability to perform ADLs (bathing, care of mouth/teeth, toileting, grooming, dressing, etc.)  - Assess/evaluate cause of self-care deficits   - Assess range of motion  - Assess patient's mobility; develop plan if impaired  - Assess patient's need for assistive devices and provide as appropriate  - Encourage maximum independence but intervene and supervise when necessary  - Involve family in performance of ADLs  - Assess for home care needs following discharge   - Consider OT consult to assist with ADL evaluation and planning for discharge  - Provide patient education as appropriate  Outcome: Progressing  Goal: Maintains/Returns to pre admission functional level  Description: INTERVENTIONS:  - Perform AM-PAC 6 Click Basic Mobility/ Daily Activity assessment daily.  - Set and communicate daily mobility goal to care team and patient/family/caregiver.   - Collaborate with  rehabilitation services on mobility goals if consulted  - Perform Range of Motion 3 times a day.  - Reposition patient every 2 hours.  - Dangle patient 3 times a day  - Stand patient 3 times a day  - Ambulate patient 3 times a day  - Out of bed to chair 3 times a day   - Out of bed for meals 3 times a day  - Out of bed for toileting  - Record patient progress and toleration of activity level   Outcome: Progressing     Problem: DISCHARGE PLANNING  Goal: Discharge to home or other facility with appropriate resources  Description: INTERVENTIONS:  - Identify barriers to discharge w/patient and caregiver  - Arrange for needed discharge resources and transportation as appropriate  - Identify discharge learning needs (meds, wound care, etc.)  - Arrange for interpretive services to assist at discharge as needed  - Refer to Case Management Department for coordinating discharge planning if the patient needs post-hospital services based on physician/advanced practitioner order or complex needs related to functional status, cognitive ability, or social support system  Outcome: Progressing     Problem: Knowledge Deficit  Goal: Patient/family/caregiver demonstrates understanding of disease process, treatment plan, medications, and discharge instructions  Description: Complete learning assessment and assess knowledge base.  Interventions:  - Provide teaching at level of understanding  - Provide teaching via preferred learning methods  Outcome: Progressing     Problem: SKIN/TISSUE INTEGRITY - ADULT  Goal: Skin Integrity remains intact(Skin Breakdown Prevention)  Description: Assess:  -Perform Zaid assessment every shift  -Clean and moisturize skin every shift  -Inspect skin when repositioning, toileting, and assisting with ADLS  -Assess extremities for adequate circulation and sensation     Bed Management:  -Have minimal linens on bed & keep smooth, unwrinkled  -Change linens as needed when moist or perspiring  -Avoid sitting or  lying in one position for more than 2 hours while in bed  -Keep HOB at 30 degrees     Toileting:  -Offer bedside commode  -Assess for incontinence every 2 hours  -Use incontinent care products after each incontinent episode    Activity:  -Mobilize patient 3 times a day  -Encourage activity and walks on unit  -Encourage or provide ROM exercises   -Turn and reposition patient every 2 Hours  -Use appropriate equipment to lift or move patient in bed  -Instruct/ Assist with weight shifting every hour when out of bed in chair  -Consider limitation of chair time 1 hour intervals    Skin Care:  -Avoid use of baby powder, tape, friction and shearing, hot water or constrictive clothing  -Relieve pressure over bony prominences  -Do not massage red bony areas    Next Steps:  -Teach patient strategies to minimize risks   -Consider consults to  interdisciplinary teams  Outcome: Progressing  Goal: Incision(s), wounds(s) or drain site(s) healing without S/S of infection  Description: INTERVENTIONS  - Assess and document dressing, incision, wound bed, drain sites and surrounding tissue  - Provide patient and family education  - Perform skin care/dressing changes every day  Outcome: Progressing  Goal: Pressure injury heals and does not worsen  Description: Interventions:  - Implement low air loss mattress or specialty surface (Criteria met)  - Apply silicone foam dressing  - Instruct/assist with weight shifting every 60 minutes when in chair   - Limit chair time to 1 hour intervals  - Use special pressure reducing interventions when in chair   - Apply fecal or urinary incontinence containment device   - Perform passive or active ROM  - Turn and reposition patient & offload bony prominences every 2 hours   - Utilize friction reducing device or surface for transfers   - Consider consults to  interdisciplinary teams  - Use incontinent care products after each incontinent episode  - Consider nutrition services referral as  needed  Outcome: Progressing     Problem: METABOLIC, FLUID AND ELECTROLYTES - ADULT  Goal: Electrolytes maintained within normal limits  Description: INTERVENTIONS:  - Monitor labs and assess patient for signs and symptoms of electrolyte imbalances  - Administer electrolyte replacement as ordered  - Monitor response to electrolyte replacements, including repeat lab results as appropriate  - Instruct patient on fluid and nutrition as appropriate  Outcome: Progressing     Problem: MUSCULOSKELETAL - ADULT  Goal: Maintain or return mobility to safest level of function  Description: INTERVENTIONS:  - Assess patient's ability to carry out ADLs; assess patient's baseline for ADL function and identify physical deficits which impact ability to perform ADLs (bathing, care of mouth/teeth, toileting, grooming, dressing, etc.)  - Assess/evaluate cause of self-care deficits   - Assess range of motion  - Assess patient's mobility  - Assess patient's need for assistive devices and provide as appropriate  - Encourage maximum independence but intervene and supervise when necessary  - Involve family in performance of ADLs  - Assess for home care needs following discharge   - Consider OT consult to assist with ADL evaluation and planning for discharge  - Provide patient education as appropriate  Outcome: Progressing  Goal: Maintain proper alignment of affected body part  Description: INTERVENTIONS:  - Support, maintain and protect limb and body alignment  - Provide patient/ family with appropriate education  Outcome: Progressing     Problem: Nutrition/Hydration-ADULT  Goal: Nutrient/Hydration intake appropriate for improving, restoring or maintaining nutritional needs  Description: Monitor and assess patient's nutrition/hydration status for malnutrition. Collaborate with interdisciplinary team and initiate plan and interventions as ordered.  Monitor patient's weight and dietary intake as ordered or per policy. Utilize nutrition  screening tool and intervene as necessary. Determine patient's food preferences and provide high-protein, high-caloric foods as appropriate.     INTERVENTIONS:  - Monitor oral intake, urinary output, labs, and treatment plans  - Assess nutrition and hydration status and recommend course of action  - Evaluate amount of meals eaten  - Assist patient with eating if necessary   - Allow adequate time for meals  - Recommend/ encourage appropriate diets, oral nutritional supplements, and vitamin/mineral supplements  - Order, calculate, and assess calorie counts as needed  - Recommend, monitor, and adjust tube feedings and TPN/PPN based on assessed needs  - Assess need for intravenous fluids  - Provide specific nutrition/hydration education as appropriate  - Include patient/family/caregiver in decisions related to nutrition  Outcome: Progressing

## 2024-08-11 PROBLEM — E83.39 HYPOPHOSPHATEMIA: Status: ACTIVE | Noted: 2024-08-11

## 2024-08-11 LAB
ALBUMIN SERPL BCG-MCNC: 2.9 G/DL (ref 3.5–5)
ALP SERPL-CCNC: 99 U/L (ref 34–104)
ALT SERPL W P-5'-P-CCNC: 9 U/L (ref 7–52)
ANION GAP SERPL CALCULATED.3IONS-SCNC: 6 MMOL/L (ref 4–13)
AST SERPL W P-5'-P-CCNC: 9 U/L (ref 13–39)
BACTERIA UR CULT: ABNORMAL
BASOPHILS # BLD AUTO: 0.05 THOUSANDS/ÂΜL (ref 0–0.1)
BASOPHILS NFR BLD AUTO: 0 % (ref 0–1)
BILIRUB SERPL-MCNC: 0.31 MG/DL (ref 0.2–1)
BUN SERPL-MCNC: 19 MG/DL (ref 5–25)
CALCIUM ALBUM COR SERPL-MCNC: 9.5 MG/DL (ref 8.3–10.1)
CALCIUM SERPL-MCNC: 8.6 MG/DL (ref 8.4–10.2)
CHLORIDE SERPL-SCNC: 106 MMOL/L (ref 96–108)
CK SERPL-CCNC: 24 U/L (ref 39–308)
CO2 SERPL-SCNC: 23 MMOL/L (ref 21–32)
CREAT SERPL-MCNC: 0.75 MG/DL (ref 0.6–1.3)
EOSINOPHIL # BLD AUTO: 0.33 THOUSAND/ÂΜL (ref 0–0.61)
EOSINOPHIL NFR BLD AUTO: 3 % (ref 0–6)
ERYTHROCYTE [DISTWIDTH] IN BLOOD BY AUTOMATED COUNT: 13.6 % (ref 11.6–15.1)
GFR SERPL CREATININE-BSD FRML MDRD: 96 ML/MIN/1.73SQ M
GLUCOSE SERPL-MCNC: 185 MG/DL (ref 65–140)
GLUCOSE SERPL-MCNC: 186 MG/DL (ref 65–140)
GLUCOSE SERPL-MCNC: 200 MG/DL (ref 65–140)
GLUCOSE SERPL-MCNC: 220 MG/DL (ref 65–140)
GLUCOSE SERPL-MCNC: 245 MG/DL (ref 65–140)
HCT VFR BLD AUTO: 33.9 % (ref 36.5–49.3)
HGB BLD-MCNC: 10.7 G/DL (ref 12–17)
IMM GRANULOCYTES # BLD AUTO: 0.12 THOUSAND/UL (ref 0–0.2)
IMM GRANULOCYTES NFR BLD AUTO: 1 % (ref 0–2)
LYMPHOCYTES # BLD AUTO: 1.67 THOUSANDS/ÂΜL (ref 0.6–4.47)
LYMPHOCYTES NFR BLD AUTO: 13 % (ref 14–44)
MAGNESIUM SERPL-MCNC: 1.9 MG/DL (ref 1.9–2.7)
MCH RBC QN AUTO: 27.9 PG (ref 26.8–34.3)
MCHC RBC AUTO-ENTMCNC: 31.6 G/DL (ref 31.4–37.4)
MCV RBC AUTO: 89 FL (ref 82–98)
MONOCYTES # BLD AUTO: 0.94 THOUSAND/ÂΜL (ref 0.17–1.22)
MONOCYTES NFR BLD AUTO: 7 % (ref 4–12)
NEUTROPHILS # BLD AUTO: 10.2 THOUSANDS/ÂΜL (ref 1.85–7.62)
NEUTS SEG NFR BLD AUTO: 76 % (ref 43–75)
NRBC BLD AUTO-RTO: 0 /100 WBCS
PHOSPHATE SERPL-MCNC: 2.2 MG/DL (ref 2.3–4.1)
PLATELET # BLD AUTO: 213 THOUSANDS/UL (ref 149–390)
PMV BLD AUTO: 9.4 FL (ref 8.9–12.7)
POTASSIUM SERPL-SCNC: 3.8 MMOL/L (ref 3.5–5.3)
PROCALCITONIN SERPL-MCNC: 0.38 NG/ML
PROT SERPL-MCNC: 6.1 G/DL (ref 6.4–8.4)
RBC # BLD AUTO: 3.83 MILLION/UL (ref 3.88–5.62)
SODIUM SERPL-SCNC: 135 MMOL/L (ref 135–147)
WBC # BLD AUTO: 13.31 THOUSAND/UL (ref 4.31–10.16)

## 2024-08-11 PROCEDURE — 83735 ASSAY OF MAGNESIUM: CPT | Performed by: INTERNAL MEDICINE

## 2024-08-11 PROCEDURE — 99232 SBSQ HOSP IP/OBS MODERATE 35: CPT | Performed by: INTERNAL MEDICINE

## 2024-08-11 PROCEDURE — 82550 ASSAY OF CK (CPK): CPT | Performed by: INTERNAL MEDICINE

## 2024-08-11 PROCEDURE — 84145 PROCALCITONIN (PCT): CPT | Performed by: INTERNAL MEDICINE

## 2024-08-11 PROCEDURE — 85025 COMPLETE CBC W/AUTO DIFF WBC: CPT | Performed by: INTERNAL MEDICINE

## 2024-08-11 PROCEDURE — 84100 ASSAY OF PHOSPHORUS: CPT | Performed by: INTERNAL MEDICINE

## 2024-08-11 PROCEDURE — 80053 COMPREHEN METABOLIC PANEL: CPT | Performed by: INTERNAL MEDICINE

## 2024-08-11 PROCEDURE — 82948 REAGENT STRIP/BLOOD GLUCOSE: CPT

## 2024-08-11 RX ORDER — LANOLIN ALCOHOL/MO/W.PET/CERES
400 CREAM (GRAM) TOPICAL ONCE
Status: COMPLETED | OUTPATIENT
Start: 2024-08-11 | End: 2024-08-11

## 2024-08-11 RX ADMIN — SODIUM CHLORIDE 100 ML/HR: 0.9 INJECTION, SOLUTION INTRAVENOUS at 12:41

## 2024-08-11 RX ADMIN — CEFEPIME HYDROCHLORIDE 2000 MG: 2 INJECTION, SOLUTION INTRAVENOUS at 00:53

## 2024-08-11 RX ADMIN — INSULIN LISPRO 3 UNITS: 100 INJECTION, SOLUTION INTRAVENOUS; SUBCUTANEOUS at 11:48

## 2024-08-11 RX ADMIN — INSULIN GLARGINE 24 UNITS: 100 INJECTION, SOLUTION SUBCUTANEOUS at 22:27

## 2024-08-11 RX ADMIN — ATORVASTATIN CALCIUM 80 MG: 40 TABLET, FILM COATED ORAL at 16:30

## 2024-08-11 RX ADMIN — Medication 400 MG: at 12:59

## 2024-08-11 RX ADMIN — INSULIN LISPRO 1 UNITS: 100 INJECTION, SOLUTION INTRAVENOUS; SUBCUTANEOUS at 16:33

## 2024-08-11 RX ADMIN — INSULIN LISPRO 12 UNITS: 100 INJECTION, SOLUTION INTRAVENOUS; SUBCUTANEOUS at 16:33

## 2024-08-11 RX ADMIN — ASPIRIN 81 MG: 81 TABLET, COATED ORAL at 09:07

## 2024-08-11 RX ADMIN — Medication 2 TABLET: at 09:06

## 2024-08-11 RX ADMIN — INSULIN LISPRO 12 UNITS: 100 INJECTION, SOLUTION INTRAVENOUS; SUBCUTANEOUS at 11:48

## 2024-08-11 RX ADMIN — INSULIN LISPRO 2 UNITS: 100 INJECTION, SOLUTION INTRAVENOUS; SUBCUTANEOUS at 22:27

## 2024-08-11 RX ADMIN — CEFEPIME HYDROCHLORIDE 2000 MG: 2 INJECTION, SOLUTION INTRAVENOUS at 09:06

## 2024-08-11 RX ADMIN — SODIUM CHLORIDE 100 ML/HR: 0.9 INJECTION, SOLUTION INTRAVENOUS at 00:53

## 2024-08-11 RX ADMIN — INSULIN LISPRO 2 UNITS: 100 INJECTION, SOLUTION INTRAVENOUS; SUBCUTANEOUS at 09:08

## 2024-08-11 RX ADMIN — LISINOPRIL 2.5 MG: 5 TABLET ORAL at 09:06

## 2024-08-11 RX ADMIN — INSULIN LISPRO 12 UNITS: 100 INJECTION, SOLUTION INTRAVENOUS; SUBCUTANEOUS at 09:09

## 2024-08-11 RX ADMIN — CHOLECALCIFEROL TAB 25 MCG (1000 UNIT) 1000 UNITS: 25 TAB at 09:07

## 2024-08-11 RX ADMIN — CEFEPIME HYDROCHLORIDE 2000 MG: 2 INJECTION, SOLUTION INTRAVENOUS at 16:30

## 2024-08-11 RX ADMIN — ENOXAPARIN SODIUM 40 MG: 40 INJECTION SUBCUTANEOUS at 09:06

## 2024-08-11 RX ADMIN — DAPTOMYCIN 500 MG: 500 INJECTION, POWDER, LYOPHILIZED, FOR SOLUTION INTRAVENOUS at 22:27

## 2024-08-11 NOTE — PLAN OF CARE
Problem: Prexisting or High Potential for Compromised Skin Integrity  Goal: Skin integrity is maintained or improved  Description: INTERVENTIONS:  - Identify patients at risk for skin breakdown  - Assess and monitor skin integrity  - Assess and monitor nutrition and hydration status  - Monitor labs   - Assess for incontinence   - Turn and reposition patient  - Assist with mobility/ambulation  - Relieve pressure over bony prominences  - Avoid friction and shearing  - Provide appropriate hygiene as needed including keeping skin clean and dry  - Evaluate need for skin moisturizer/barrier cream  - Collaborate with interdisciplinary team   - Patient/family teaching  - Consider wound care consult   Outcome: Progressing     Problem: PAIN - ADULT  Goal: Verbalizes/displays adequate comfort level or baseline comfort level  Description: Interventions:  - Encourage patient to monitor pain and request assistance  - Assess pain using appropriate pain scale (0-10)  - Administer analgesics based on type and severity of pain and evaluate response  - Implement non-pharmacological measures as appropriate and evaluate response  - Consider cultural and social influences on pain and pain management  - Notify physician/advanced practitioner if interventions unsuccessful or patient reports new pain  Outcome: Progressing     Problem: INFECTION - ADULT  Goal: Absence or prevention of progression during hospitalization  Description: INTERVENTIONS:  - Assess and monitor for signs and symptoms of infection  - Monitor lab/diagnostic results  - Monitor all insertion sites, i.e. indwelling lines, tubes, and drains  - Monitor endotracheal if appropriate and nasal secretions for changes in amount and color  - [place] appropriate cooling/warming therapies per order  - Administer medications as ordered  - Instruct and encourage patient and family to use good hand hygiene technique  - Identify and instruct in appropriate isolation precautions for  identified infection/condition  Outcome: Progressing     Problem: SAFETY ADULT  Goal: Patient will remain free of falls  Description: INTERVENTIONS:  - Educate patient/family on patient safety including physical limitations  - Instruct patient to call for assistance with activity   - Consult OT/PT to assist with strengthening/mobility   - Keep Call bell within reach  - Keep bed low and locked with side rails adjusted as appropriate  - Keep care items and personal belongings within reach  - Initiate and maintain comfort rounds  - Make Fall Risk Sign visible to staff  - Offer Toileting every 2 Hours, in advance of need  - Initiate/Maintain bed alarm  - Obtain necessary fall risk management equipment:   - Apply yellow socks and bracelet for high fall risk patients  - Consider moving patient to room near nurses station  Outcome: Progressing  Goal: Maintain or return to baseline ADL function  Description: INTERVENTIONS:  -  Assess patient's ability to carry out ADLs; assess patient's baseline for ADL function and identify physical deficits which impact ability to perform ADLs (bathing, care of mouth/teeth, toileting, grooming, dressing, etc.)  - Assess/evaluate cause of self-care deficits   - Assess range of motion  - Assess patient's mobility; develop plan if impaired  - Assess patient's need for assistive devices and provide as appropriate  - Encourage maximum independence but intervene and supervise when necessary  - Involve family in performance of ADLs  - Assess for home care needs following discharge   - Consider OT consult to assist with ADL evaluation and planning for discharge  - Provide patient education as appropriate  Outcome: Progressing  Goal: Maintains/Returns to pre admission functional level  Description: INTERVENTIONS:  - Perform AM-PAC 6 Click Basic Mobility/ Daily Activity assessment daily.  - Set and communicate daily mobility goal to care team and patient/family/caregiver.   - Collaborate with  rehabilitation services on mobility goals if consulted  - Perform Range of Motion 3 times a day.  - Reposition patient every 2 hours.  - Dangle patient 3 times a day  - Stand patient 3 times a day  - Ambulate patient 3 times a day  - Out of bed to chair 3 times a day   - Out of bed for meals 3 times a day  - Out of bed for toileting  - Record patient progress and toleration of activity level   Outcome: Progressing     Problem: DISCHARGE PLANNING  Goal: Discharge to home or other facility with appropriate resources  Description: INTERVENTIONS:  - Identify barriers to discharge w/patient and caregiver  - Arrange for needed discharge resources and transportation as appropriate  - Identify discharge learning needs (meds, wound care, etc.)  - Arrange for interpretive services to assist at discharge as needed  - Refer to Case Management Department for coordinating discharge planning if the patient needs post-hospital services based on physician/advanced practitioner order or complex needs related to functional status, cognitive ability, or social support system  Outcome: Progressing     Problem: Knowledge Deficit  Goal: Patient/family/caregiver demonstrates understanding of disease process, treatment plan, medications, and discharge instructions  Description: Complete learning assessment and assess knowledge base.  Interventions:  - Provide teaching at level of understanding  - Provide teaching via preferred learning methods  Outcome: Progressing     Problem: SKIN/TISSUE INTEGRITY - ADULT  Goal: Skin Integrity remains intact(Skin Breakdown Prevention)  Description: Assess:  -Perform Zaid assessment every shift  -Clean and moisturize skin every shift  -Inspect skin when repositioning, toileting, and assisting with ADLS  -Assess extremities for adequate circulation and sensation     Bed Management:  -Have minimal linens on bed & keep smooth, unwrinkled  -Change linens as needed when moist or perspiring  -Avoid sitting or  lying in one position for more than 2 hours while in bed  -Keep HOB at 30 degrees     Toileting:  -Offer bedside commode  -Assess for incontinence every 2 hours  -Use incontinent care products after each incontinent episode    Activity:  -Mobilize patient 3 times a day  -Encourage activity and walks on unit  -Encourage or provide ROM exercises   -Turn and reposition patient every 2 Hours  -Use appropriate equipment to lift or move patient in bed  -Instruct/ Assist with weight shifting every hour when out of bed in chair  -Consider limitation of chair time 1 hour intervals    Skin Care:  -Avoid use of baby powder, tape, friction and shearing, hot water or constrictive clothing  -Relieve pressure over bony prominences  -Do not massage red bony areas    Next Steps:  -Teach patient strategies to minimize risks   -Consider consults to  interdisciplinary teams  Outcome: Progressing  Goal: Incision(s), wounds(s) or drain site(s) healing without S/S of infection  Description: INTERVENTIONS  - Assess and document dressing, incision, wound bed, drain sites and surrounding tissue  - Provide patient and family education  - Perform skin care/dressing changes every day  Outcome: Progressing  Goal: Pressure injury heals and does not worsen  Description: Interventions:  - Implement low air loss mattress or specialty surface (Criteria met)  - Apply silicone foam dressing  - Instruct/assist with weight shifting every 60 minutes when in chair   - Limit chair time to 1 hour intervals  - Use special pressure reducing interventions when in chair   - Apply fecal or urinary incontinence containment device   - Perform passive or active ROM  - Turn and reposition patient & offload bony prominences every 2 hours   - Utilize friction reducing device or surface for transfers   - Consider consults to  interdisciplinary teams  - Use incontinent care products after each incontinent episode  - Consider nutrition services referral as  needed  Outcome: Progressing     Problem: METABOLIC, FLUID AND ELECTROLYTES - ADULT  Goal: Electrolytes maintained within normal limits  Description: INTERVENTIONS:  - Monitor labs and assess patient for signs and symptoms of electrolyte imbalances  - Administer electrolyte replacement as ordered  - Monitor response to electrolyte replacements, including repeat lab results as appropriate  - Instruct patient on fluid and nutrition as appropriate  Outcome: Progressing     Problem: MUSCULOSKELETAL - ADULT  Goal: Maintain or return mobility to safest level of function  Description: INTERVENTIONS:  - Assess patient's ability to carry out ADLs; assess patient's baseline for ADL function and identify physical deficits which impact ability to perform ADLs (bathing, care of mouth/teeth, toileting, grooming, dressing, etc.)  - Assess/evaluate cause of self-care deficits   - Assess range of motion  - Assess patient's mobility  - Assess patient's need for assistive devices and provide as appropriate  - Encourage maximum independence but intervene and supervise when necessary  - Involve family in performance of ADLs  - Assess for home care needs following discharge   - Consider OT consult to assist with ADL evaluation and planning for discharge  - Provide patient education as appropriate  Outcome: Progressing  Goal: Maintain proper alignment of affected body part  Description: INTERVENTIONS:  - Support, maintain and protect limb and body alignment  - Provide patient/ family with appropriate education  Outcome: Progressing     Problem: Nutrition/Hydration-ADULT  Goal: Nutrient/Hydration intake appropriate for improving, restoring or maintaining nutritional needs  Description: Monitor and assess patient's nutrition/hydration status for malnutrition. Collaborate with interdisciplinary team and initiate plan and interventions as ordered.  Monitor patient's weight and dietary intake as ordered or per policy. Utilize nutrition  screening tool and intervene as necessary. Determine patient's food preferences and provide high-protein, high-caloric foods as appropriate.     INTERVENTIONS:  - Monitor oral intake, urinary output, labs, and treatment plans  - Assess nutrition and hydration status and recommend course of action  - Evaluate amount of meals eaten  - Assist patient with eating if necessary   - Allow adequate time for meals  - Recommend/ encourage appropriate diets, oral nutritional supplements, and vitamin/mineral supplements  - Order, calculate, and assess calorie counts as needed  - Recommend, monitor, and adjust tube feedings and TPN/PPN based on assessed needs  - Assess need for intravenous fluids  - Provide specific nutrition/hydration education as appropriate  - Include patient/family/caregiver in decisions related to nutrition  Outcome: Progressing

## 2024-08-11 NOTE — PROGRESS NOTES
"[facility]  Progress Note  Name: Art Joseph I  MRN: [id number]  Unit/Bed#: 402-01 I Date of Admission: 8/5/2024   Date of Service: 8/11/2024 I Hospital Day: 5    Assessment & Plan   * Severe sepsis (HCC)  Assessment & Plan  Developed severe sepsis on 08/09/2024 with fevers, a leukocytosis, and tachycardia  Also with a lactic acidosis, which has now resovled  Due to a surgical wound infection and osteomyelitis of the left foot  A \"Sepsis alert\" was initiated on 08/09/2024.  The sepsis order set was opened and utilized.  Received a NSS IV fluid bolus of 1000 ml  Checked blood cultures x 2 sets on 08/09/2024, which are no growth for 24 hours x 2 sets at this time  Checked a urine culture on 08/09/2024, with results pending at this time  IV cefepime was added on 08/09/2024 by Infectious Disease  Continue IV daptomycin  Follow the total CK (creatinine kinase) level while on IV daptomycin treatment  Continue NSS IV fluids at 100 ml/hr    The 30ml/kg fluid bolus was not given to the patient despite hypotension and/or significantly elevated lactate of ? 4 and/or presence of septic shock due to: Concern for fluid/volume overload. The patient will be administered 1L of crystalloid fluid instead. Orders for this have been placed in [facility]. The patient may receive additional colloid or crystalloid fluids thereafter based on clinical condition.     Jerome Gutierrez, DO    Ceretec white blood cells can (08/08/2024):  IMPRESSION:     1. There is increased focal radiotracer uptake on both 3 and 24-hour imaging at the medial aspect of the left midfoot suspicious for osteomyelitis.     The study was marked in EPIC for immediate notification.      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  The patient was also found to have maggots in his open wound.   Recent hospitalization from 07/08/2024 through 07/18/2024 at [facility] and underwent left foot wound debridement and washout by Podiatry on 07/11/2024   Discharged on PO Augmentin 875mg BID plus IV daptomycin 6mg/kg ABW x 6 weeks from bone resection through 08/21/24 per Infectious Disease's recommendations  Now presented with an infection of the left foot  I appreciate Podiatry's input and Infectious Disease's input.  The patient is scheduled to go to the OR on Monday, 08/12/2024, per Podiatry for wound debridement and additional amputation of the left foot.  Please see the assessment and plan for \"Severe sepsis\"    Hypomagnesemia  Assessment & Plan  Resolved with magnesium sulfate 2 grams IV x 1 dose on 08/09/2024  Follow the magnesium level    Results from last 7 days   Lab Units 08/11/24  0450 08/10/24  0452 08/09/24  0434   MAGNESIUM mg/dL 1.9 2.1 1.7*           Peripheral vascular disease (HCC)  Assessment & Plan  Continue aspirin 81 mg PO Qdaily and high-intensity statin therapy with atorvastatin 80 mg PO Qdaily with dinner  Outpatient follow-up with Vascular Surgery    Left lower extremity arterial duplex (08/07/2024):  CONCLUSION:     Impression:     RIGHT LOWER LIMB:  BKA     LEFT LOWER LIMB:  Diffuse disease noted throughout the femoral-popliteal and tibio-peroneal  arteries without significant focal stenosis.  Ankle/Brachial index: non-compressible secondary to calcified tibial vessels.  (Prior: 1.55, unreliable)  PVR/ PPG tracings are normal.  Metatarsal and Great toe pressures not obtained due to TMA.     Compared to previous study " on 4/25/2024, there is no significant change.  .     SIGNATURE:  Electronically Signed by: UMA HOGAN MD on 2024-08-07 11:57:56 AM    Hypophosphatemia  Assessment & Plan  Give K-phos, 2 tablets PO on 08/11/2024  Follow the phosphorus level    Atelectasis  Assessment & Plan  Incentive spirometry 10 times per hour while awake    Primary hypertension  Assessment & Plan  Continue lisinopril 2.5 mg PO Qdaily  Follow the blood pressure trend    Ambulatory dysfunction  Assessment & Plan  PT/OT evaluations    Type 2 diabetes mellitus with hyperglycemia, with long-term current use of insulin (HCC)  Assessment & Plan  Lab Results   Component Value Date    HGBA1C 7.9 (H) 08/07/2024       Recent Labs     08/10/24  1550 08/10/24  2139 08/11/24  0725 08/11/24  1045   POCGLU 149* 301* 200* 245*         Blood Sugar Average: Last 72 hrs:  (P) [id number]    Increased Humalog to 12 Units TID with meals on 08/09/2024  Increased Lantus to 24 Units SQ QHS on 08/10/2024  ISS with blood glucose monitoring ACHS  Hypoglycemia protocol  Continue lisinopril for renal protection  Outpatient Endocrinology evaluation          VTE Pharmacologic Prophylaxis: VTE Score: 7 High Risk (Score >/= 5) - Pharmacological DVT Prophylaxis Ordered: enoxaparin (Lovenox). Sequential Compression Devices Ordered on the left lower extremity.  No SCD on the right lower extremity with a right-sided BKA.    Mobility:   Basic Mobility Inpatient Raw Score: 20  JH-HLM Goal: 6: Walk 10 steps or more  JH-HLM Achieved: 4: Move to chair/commode  JH-HLM Goal NOT achieved. Continue with multidisciplinary rounding and encourage appropriate mobility to improve upon JH-HLM goals.    Patient Centered Rounds: I performed bedside rounds with nursing staff today.   Discussions with Specialists or Other Care Team Provider: I discussed the case with Podiatry and with Infectious Disease.    Education and Discussions with Family / Patient: Patient declined call to contact  person.     Total Time Spent on Date of Encounter in care of patient: 35 mins. This time was spent on one or more of the following: performing physical exam; counseling and coordination of care; obtaining or reviewing history; documenting in the medical record; reviewing/ordering tests, medications or procedures; communicating with other healthcare professionals and discussing with patient's family/caregivers.    Current Length of Stay: 5 day(s)  Current Patient Status: Inpatient   Certification Statement: The patient will continue to require additional inpatient hospital stay due to the need for IV antibiotic treatment and for a surgical procedure by Podiatry on 2024.  Discharge Plan: Anticipate discharge in 48-72 hrs to rehab facility.    Code Status: Level 1 - Full Code    Subjective:   The patient was seen and examined.  The patient is doing better.  No fevers or chills.  No extremity pain.  No chest pain.  No shortness of breath.  No abdominal pain.  No nausea or vomiting.      Objective:     Vitals:   Temp (24hrs), Av.3 °F (37.4 °C), Min:98.4 °F (36.9 °C), Max:101.2 °F (38.4 °C)    Temp:  [98.4 °F (36.9 °C)-101.2 °F (38.4 °C)] 98.5 °F (36.9 °C)  HR:  [] 98  Resp:  [20] 20  BP: (116-138)/(68-79) 129/79  SpO2:  [95 %-98 %] 95 %  Body mass index is 31.51 kg/m².     Input and Output Summary (last 24 hours):     Intake/Output Summary (Last 24 hours) at 2024 1233  Last data filed at 2024 1141  Gross per 24 hour   Intake 960 ml   Output 3000 ml   Net -2040 ml       Physical Exam:   Physical Exam  General:  NAD, follows commands  HEENT:  NC/AT, mucous membranes moist  Neck:  Supple, No JVP elevation  CV:  + S1, + S2, RRR  Pulm:  Lung fields are CTA bilaterally  Abd:  Soft, Non-tender, Non-distended  Ext:  No clubbing/cyanosis/edema, Right-sided BKA, Left foot wound dressing intact  Skin:  No rashes  Neuro:  Awake, alert, oriented  Psych:  Normal mood and affect      Additional  Data:    Labs:  Results from last 7 days   Lab Units 08/11/24  0450   WBC Thousand/uL 13.31*   HEMOGLOBIN g/dL 10.7*   HEMATOCRIT % 33.9*   PLATELETS Thousands/uL 213   SEGS PCT % 76*   LYMPHO PCT % 13*   MONO PCT % 7   EOS PCT % 3     Results from last 7 days   Lab Units 08/11/24  0450   SODIUM mmol/L 135   POTASSIUM mmol/L 3.8   CHLORIDE mmol/L 106   CO2 mmol/L 23   BUN mg/dL 19   CREATININE mg/dL 0.75   ANION GAP mmol/L 6   CALCIUM mg/dL 8.6   ALBUMIN g/dL 2.9*   TOTAL BILIRUBIN mg/dL 0.31   ALK PHOS U/L 99   ALT U/L 9   AST U/L 9*   GLUCOSE RANDOM mg/dL 186*     Results from last 7 days   Lab Units 08/05/24  1823   INR  0.93     Results from last 7 days   Lab Units 08/11/24  1045 08/11/24  0725 08/10/24  2139 08/10/24  1550 08/10/24  1121 08/10/24  0746 08/09/24  2119 08/09/24  1618 08/09/24  1110 08/09/24  0714 08/08/24  2030 08/08/24  1559   POC GLUCOSE mg/dl 245* 200* 301* 149* 216* 209* 240* 229* 285* 199* 281* 242*     Results from last 7 days   Lab Units 08/07/24  0556   HEMOGLOBIN A1C % 7.9*     Results from last 7 days   Lab Units 08/11/24  0450 08/10/24  0452 08/09/24  1246 08/09/24  1008 08/08/24  0516 08/07/24  0556   LACTIC ACID mmol/L  --  0.7 2.4* 2.7*  --   --    PROCALCITONIN ng/ml 0.38* 0.63*  --  0.51* 0.07 0.06       Lines/Drains:  Invasive Devices       Peripherally Inserted Central Catheter Line  Duration             PICC Line 07/16/24 Right Basilic 25 days                    Central Line:  Goal for removal: Will discontinue when meds requiring line are completed.         Telemetry:  Telemetry Orders (From admission, onward)               24 Hour Telemetry Monitoring  Continuous x 24 Hours (Telem)        Question:  Reason for 24 Hour Telemetry  Answer:  Arrhythmias requiring acute medical intervention / PPM or ICD malfunction                     Telemetry Reviewed: Normal Sinus Rhythm  Indication for Continued Telemetry Use: Arrthymias requiring medical therapy             Imaging: No  pertinent imaging reviewed.    Recent Cultures (last 7 days):   Results from last 7 days   Lab Units 08/09/24  1722 08/09/24  0955 08/09/24  0902   BLOOD CULTURE   --  No Growth at 24 hrs. No Growth at 24 hrs.   URINE CULTURE  Culture results to follow.  --   --        Last 24 Hours Medication List:   Current Facility-Administered Medications   Medication Dose Route Frequency Provider Last Rate    acetaminophen  650 mg Oral Q6H PRN Fateemh Reid MD      aspirin  81 mg Oral Daily Fatemeh Reid MD      atorvastatin  80 mg Oral Daily With Dinner Fatemeh Reid MD      cefepime  2,000 mg Intravenous Q8H Cristiano Heart MD 2,000 mg (08/11/24 0906)    Cholecalciferol  1,000 Units Oral Daily Fatemeh Reid MD      DAPTOmycin (CUBICIN) 500 mg in sodium chloride 0.9 % 50 mL IVPB  6 mg/kg (Adjusted) Intravenous Q24H Fatemeh Redi  mg (08/10/24 2228)    enoxaparin  40 mg Subcutaneous Daily Fatemeh Reid MD      insulin glargine  24 Units Subcutaneous HS Jerome Gutierrez DO      insulin lispro  1-5 Units Subcutaneous HS Jerome Gutierrez DO      insulin lispro  1-6 Units Subcutaneous TID AC Jerome Gutierrez DO      insulin lispro  12 Units Subcutaneous TID With Meals Jerome Gutierrez DO      lisinopril  2.5 mg Oral Daily Fatemeh Reid MD      magnesium Oxide  400 mg Oral Once Jerome Gutierrez DO      ondansetron  4 mg Intravenous Q6H PRN Fatemeh Reid MD      sodium chloride  100 mL/hr Intravenous Continuous Jerome Gutierrez  mL/hr (08/11/24 0053)        Today, Patient Was Seen By: Jerome Gutierrez DO    **Please Note: This note may have been constructed using a voice recognition system.**

## 2024-08-11 NOTE — ASSESSMENT & PLAN NOTE
Lab Results   Component Value Date    HGBA1C 7.9 (H) 08/07/2024       Recent Labs     08/10/24  1550 08/10/24  2139 08/11/24  0725 08/11/24  1045   POCGLU 149* 301* 200* 245*         Blood Sugar Average: Last 72 hrs:  (P) [id number]    Increased Humalog to 12 Units TID with meals on 08/09/2024  Increased Lantus to 24 Units SQ QHS on 08/10/2024  ISS with blood glucose monitoring ACHS  Hypoglycemia protocol  Continue lisinopril for renal protection  Outpatient Endocrinology evaluation

## 2024-08-11 NOTE — ASSESSMENT & PLAN NOTE
Resolved with magnesium sulfate 2 grams IV x 1 dose on 08/09/2024  Follow the magnesium level    Results from last 7 days   Lab Units 08/11/24  0450 08/10/24  0452 08/09/24  0434   MAGNESIUM mg/dL 1.9 2.1 1.7*

## 2024-08-11 NOTE — ASSESSMENT & PLAN NOTE
"Developed severe sepsis on 08/09/2024 with fevers, a leukocytosis, and tachycardia  Also with a lactic acidosis, which has now resovled  Due to a surgical wound infection and osteomyelitis of the left foot  A \"Sepsis alert\" was initiated on 08/09/2024.  The sepsis order set was opened and utilized.  Received a NSS IV fluid bolus of 1000 ml  Checked blood cultures x 2 sets on 08/09/2024, which are no growth for 24 hours x 2 sets at this time  Checked a urine culture on 08/09/2024, with results pending at this time  IV cefepime was added on 08/09/2024 by Infectious Disease  Continue IV daptomycin  Follow the total CK (creatinine kinase) level while on IV daptomycin treatment  Continue NSS IV fluids at 100 ml/hr    The 30ml/kg fluid bolus was not given to the patient despite hypotension and/or significantly elevated lactate of ? 4 and/or presence of septic shock due to: Concern for fluid/volume overload. The patient will be administered 1L of crystalloid fluid instead. Orders for this have been placed in [facility]. The patient may receive additional colloid or crystalloid fluids thereafter based on clinical condition.     Jerome Gutierrez, DO    Ceretec white blood cells can (08/08/2024):  IMPRESSION:     1. There is increased focal radiotracer uptake on both 3 and 24-hour imaging at the medial aspect of the left midfoot suspicious for osteomyelitis.     The study was marked in EPIC for immediate notification.    "

## 2024-08-11 NOTE — ASSESSMENT & PLAN NOTE
"Recent hospitalization from 06/25/2024 through 06/28/2024 at [facility] for osteomyelitis of the left foot requiring a left 1st metatarsal amputation/resection on 06/27/2024 by Podiatry.  The patient was also found to have maggots in his open wound.   Recent hospitalization from 07/08/2024 through 07/18/2024 at [facility] and underwent left foot wound debridement and washout by Podiatry on 07/11/2024   Discharged on PO Augmentin 875mg BID plus IV daptomycin 6mg/kg ABW x 6 weeks from bone resection through 08/21/24 per Infectious Disease's recommendations  Now presented with an infection of the left foot  I appreciate Podiatry's input and Infectious Disease's input.  The patient is scheduled to go to the OR on Monday, 08/12/2024, per Podiatry for wound debridement and additional amputation of the left foot.  Please see the assessment and plan for \"Severe sepsis\"  "

## 2024-08-11 NOTE — PLAN OF CARE
Problem: Prexisting or High Potential for Compromised Skin Integrity  Goal: Skin integrity is maintained or improved  Description: INTERVENTIONS:  - Identify patients at risk for skin breakdown  - Assess and monitor skin integrity  - Assess and monitor nutrition and hydration status  - Monitor labs   - Assess for incontinence   - Turn and reposition patient  - Assist with mobility/ambulation  - Relieve pressure over bony prominences  - Avoid friction and shearing  - Provide appropriate hygiene as needed including keeping skin clean and dry  - Evaluate need for skin moisturizer/barrier cream  - Collaborate with interdisciplinary team   - Patient/family teaching  - Consider wound care consult   Outcome: Progressing     Problem: PAIN - ADULT  Goal: Verbalizes/displays adequate comfort level or baseline comfort level  Description: Interventions:  - Encourage patient to monitor pain and request assistance  - Assess pain using appropriate pain scale (0-10)  - Administer analgesics based on type and severity of pain and evaluate response  - Implement non-pharmacological measures as appropriate and evaluate response  - Consider cultural and social influences on pain and pain management  - Notify physician/advanced practitioner if interventions unsuccessful or patient reports new pain  Outcome: Progressing     Problem: INFECTION - ADULT  Goal: Absence or prevention of progression during hospitalization  Description: INTERVENTIONS:  - Assess and monitor for signs and symptoms of infection  - Monitor lab/diagnostic results  - Monitor all insertion sites, i.e. indwelling lines, tubes, and drains  - Monitor endotracheal if appropriate and nasal secretions for changes in amount and color  - [place] appropriate cooling/warming therapies per order  - Administer medications as ordered  - Instruct and encourage patient and family to use good hand hygiene technique  - Identify and instruct in appropriate isolation precautions for  identified infection/condition  Outcome: Progressing     Problem: SAFETY ADULT  Goal: Patient will remain free of falls  Description: INTERVENTIONS:  - Educate patient/family on patient safety including physical limitations  - Instruct patient to call for assistance with activity   - Consult OT/PT to assist with strengthening/mobility   - Keep Call bell within reach  - Keep bed low and locked with side rails adjusted as appropriate  - Keep care items and personal belongings within reach  - Initiate and maintain comfort rounds  - Make Fall Risk Sign visible to staff  - Offer Toileting every 2 Hours, in advance of need  - Initiate/Maintain bed alarm  - Obtain necessary fall risk management equipment:   - Apply yellow socks and bracelet for high fall risk patients  - Consider moving patient to room near nurses station  Outcome: Progressing  Goal: Maintain or return to baseline ADL function  Description: INTERVENTIONS:  -  Assess patient's ability to carry out ADLs; assess patient's baseline for ADL function and identify physical deficits which impact ability to perform ADLs (bathing, care of mouth/teeth, toileting, grooming, dressing, etc.)  - Assess/evaluate cause of self-care deficits   - Assess range of motion  - Assess patient's mobility; develop plan if impaired  - Assess patient's need for assistive devices and provide as appropriate  - Encourage maximum independence but intervene and supervise when necessary  - Involve family in performance of ADLs  - Assess for home care needs following discharge   - Consider OT consult to assist with ADL evaluation and planning for discharge  - Provide patient education as appropriate  Outcome: Progressing  Goal: Maintains/Returns to pre admission functional level  Description: INTERVENTIONS:  - Perform AM-PAC 6 Click Basic Mobility/ Daily Activity assessment daily.  - Set and communicate daily mobility goal to care team and patient/family/caregiver.   - Collaborate with  rehabilitation services on mobility goals if consulted  - Perform Range of Motion 3 times a day.  - Reposition patient every 2 hours.  - Dangle patient 3 times a day  - Stand patient 3 times a day  - Ambulate patient 3 times a day  - Out of bed to chair 3 times a day   - Out of bed for meals 3 times a day  - Out of bed for toileting  - Record patient progress and toleration of activity level   Outcome: Progressing     Problem: DISCHARGE PLANNING  Goal: Discharge to home or other facility with appropriate resources  Description: INTERVENTIONS:  - Identify barriers to discharge w/patient and caregiver  - Arrange for needed discharge resources and transportation as appropriate  - Identify discharge learning needs (meds, wound care, etc.)  - Arrange for interpretive services to assist at discharge as needed  - Refer to Case Management Department for coordinating discharge planning if the patient needs post-hospital services based on physician/advanced practitioner order or complex needs related to functional status, cognitive ability, or social support system  Outcome: Progressing     Problem: Knowledge Deficit  Goal: Patient/family/caregiver demonstrates understanding of disease process, treatment plan, medications, and discharge instructions  Description: Complete learning assessment and assess knowledge base.  Interventions:  - Provide teaching at level of understanding  - Provide teaching via preferred learning methods  Outcome: Progressing     Problem: SKIN/TISSUE INTEGRITY - ADULT  Goal: Skin Integrity remains intact(Skin Breakdown Prevention)  Description: Assess:  -Perform Zaid assessment every shift  -Clean and moisturize skin every shift  -Inspect skin when repositioning, toileting, and assisting with ADLS  -Assess extremities for adequate circulation and sensation     Bed Management:  -Have minimal linens on bed & keep smooth, unwrinkled  -Change linens as needed when moist or perspiring  -Avoid sitting or  lying in one position for more than 2 hours while in bed  -Keep HOB at 30 degrees     Toileting:  -Offer bedside commode  -Assess for incontinence every 2 hours  -Use incontinent care products after each incontinent episode    Activity:  -Mobilize patient 3 times a day  -Encourage activity and walks on unit  -Encourage or provide ROM exercises   -Turn and reposition patient every 2 Hours  -Use appropriate equipment to lift or move patient in bed  -Instruct/ Assist with weight shifting every hour when out of bed in chair  -Consider limitation of chair time 1 hour intervals    Skin Care:  -Avoid use of baby powder, tape, friction and shearing, hot water or constrictive clothing  -Relieve pressure over bony prominences  -Do not massage red bony areas    Next Steps:  -Teach patient strategies to minimize risks   -Consider consults to  interdisciplinary teams  Outcome: Progressing  Goal: Incision(s), wounds(s) or drain site(s) healing without S/S of infection  Description: INTERVENTIONS  - Assess and document dressing, incision, wound bed, drain sites and surrounding tissue  - Provide patient and family education  - Perform skin care/dressing changes every day  Outcome: Progressing  Goal: Pressure injury heals and does not worsen  Description: Interventions:  - Implement low air loss mattress or specialty surface (Criteria met)  - Apply silicone foam dressing  - Instruct/assist with weight shifting every 60 minutes when in chair   - Limit chair time to 1 hour intervals  - Use special pressure reducing interventions when in chair   - Apply fecal or urinary incontinence containment device   - Perform passive or active ROM  - Turn and reposition patient & offload bony prominences every 2 hours   - Utilize friction reducing device or surface for transfers   - Consider consults to  interdisciplinary teams  - Use incontinent care products after each incontinent episode  - Consider nutrition services referral as  needed  Outcome: Progressing     Problem: METABOLIC, FLUID AND ELECTROLYTES - ADULT  Goal: Electrolytes maintained within normal limits  Description: INTERVENTIONS:  - Monitor labs and assess patient for signs and symptoms of electrolyte imbalances  - Administer electrolyte replacement as ordered  - Monitor response to electrolyte replacements, including repeat lab results as appropriate  - Instruct patient on fluid and nutrition as appropriate  Outcome: Progressing     Problem: MUSCULOSKELETAL - ADULT  Goal: Maintain or return mobility to safest level of function  Description: INTERVENTIONS:  - Assess patient's ability to carry out ADLs; assess patient's baseline for ADL function and identify physical deficits which impact ability to perform ADLs (bathing, care of mouth/teeth, toileting, grooming, dressing, etc.)  - Assess/evaluate cause of self-care deficits   - Assess range of motion  - Assess patient's mobility  - Assess patient's need for assistive devices and provide as appropriate  - Encourage maximum independence but intervene and supervise when necessary  - Involve family in performance of ADLs  - Assess for home care needs following discharge   - Consider OT consult to assist with ADL evaluation and planning for discharge  - Provide patient education as appropriate  Outcome: Progressing  Goal: Maintain proper alignment of affected body part  Description: INTERVENTIONS:  - Support, maintain and protect limb and body alignment  - Provide patient/ family with appropriate education  Outcome: Progressing     Problem: Nutrition/Hydration-ADULT  Goal: Nutrient/Hydration intake appropriate for improving, restoring or maintaining nutritional needs  Description: Monitor and assess patient's nutrition/hydration status for malnutrition. Collaborate with interdisciplinary team and initiate plan and interventions as ordered.  Monitor patient's weight and dietary intake as ordered or per policy. Utilize nutrition  screening tool and intervene as necessary. Determine patient's food preferences and provide high-protein, high-caloric foods as appropriate.     INTERVENTIONS:  - Monitor oral intake, urinary output, labs, and treatment plans  - Assess nutrition and hydration status and recommend course of action  - Evaluate amount of meals eaten  - Assist patient with eating if necessary   - Allow adequate time for meals  - Recommend/ encourage appropriate diets, oral nutritional supplements, and vitamin/mineral supplements  - Order, calculate, and assess calorie counts as needed  - Recommend, monitor, and adjust tube feedings and TPN/PPN based on assessed needs  - Assess need for intravenous fluids  - Provide specific nutrition/hydration education as appropriate  - Include patient/family/caregiver in decisions related to nutrition  Outcome: Progressing

## 2024-08-12 ENCOUNTER — ANESTHESIA EVENT (INPATIENT)
Dept: PERIOP | Facility: HOSPITAL | Age: 64
DRG: 856 | End: 2024-08-12
Payer: COMMERCIAL

## 2024-08-12 ENCOUNTER — ANESTHESIA (INPATIENT)
Dept: PERIOP | Facility: HOSPITAL | Age: 64
DRG: 856 | End: 2024-08-12
Payer: COMMERCIAL

## 2024-08-12 PROBLEM — E83.39 HYPOPHOSPHATEMIA: Status: RESOLVED | Noted: 2024-08-11 | Resolved: 2024-08-12

## 2024-08-12 PROBLEM — E83.42 HYPOMAGNESEMIA: Status: RESOLVED | Noted: 2024-08-09 | Resolved: 2024-08-12

## 2024-08-12 PROBLEM — M86.9 OSTEOMYELITIS OF LEFT FOOT (HCC): Status: RESOLVED | Noted: 2024-07-11 | Resolved: 2024-08-12

## 2024-08-12 LAB
ALBUMIN SERPL BCG-MCNC: 3.1 G/DL (ref 3.5–5)
ALP SERPL-CCNC: 102 U/L (ref 34–104)
ALT SERPL W P-5'-P-CCNC: 15 U/L (ref 7–52)
ANION GAP SERPL CALCULATED.3IONS-SCNC: 9 MMOL/L (ref 4–13)
AST SERPL W P-5'-P-CCNC: 13 U/L (ref 13–39)
BASOPHILS # BLD AUTO: 0.05 THOUSANDS/ÂΜL (ref 0–0.1)
BASOPHILS NFR BLD AUTO: 1 % (ref 0–1)
BILIRUB SERPL-MCNC: 0.32 MG/DL (ref 0.2–1)
BUN SERPL-MCNC: 12 MG/DL (ref 5–25)
CALCIUM ALBUM COR SERPL-MCNC: 8.9 MG/DL (ref 8.3–10.1)
CALCIUM SERPL-MCNC: 8.2 MG/DL (ref 8.4–10.2)
CHLORIDE SERPL-SCNC: 105 MMOL/L (ref 96–108)
CK SERPL-CCNC: 23 U/L (ref 39–308)
CO2 SERPL-SCNC: 22 MMOL/L (ref 21–32)
CREAT SERPL-MCNC: 0.66 MG/DL (ref 0.6–1.3)
EOSINOPHIL # BLD AUTO: 0.42 THOUSAND/ÂΜL (ref 0–0.61)
EOSINOPHIL NFR BLD AUTO: 5 % (ref 0–6)
ERYTHROCYTE [DISTWIDTH] IN BLOOD BY AUTOMATED COUNT: 13.4 % (ref 11.6–15.1)
GFR SERPL CREATININE-BSD FRML MDRD: 101 ML/MIN/1.73SQ M
GLUCOSE SERPL-MCNC: 161 MG/DL (ref 65–140)
GLUCOSE SERPL-MCNC: 168 MG/DL (ref 65–140)
GLUCOSE SERPL-MCNC: 219 MG/DL (ref 65–140)
GLUCOSE SERPL-MCNC: 256 MG/DL (ref 65–140)
GLUCOSE SERPL-MCNC: 299 MG/DL (ref 65–140)
HCT VFR BLD AUTO: 34.6 % (ref 36.5–49.3)
HGB BLD-MCNC: 11.4 G/DL (ref 12–17)
IMM GRANULOCYTES # BLD AUTO: 0.09 THOUSAND/UL (ref 0–0.2)
IMM GRANULOCYTES NFR BLD AUTO: 1 % (ref 0–2)
LYMPHOCYTES # BLD AUTO: 1.8 THOUSANDS/ÂΜL (ref 0.6–4.47)
LYMPHOCYTES NFR BLD AUTO: 19 % (ref 14–44)
MAGNESIUM SERPL-MCNC: 1.9 MG/DL (ref 1.9–2.7)
MCH RBC QN AUTO: 28.3 PG (ref 26.8–34.3)
MCHC RBC AUTO-ENTMCNC: 32.9 G/DL (ref 31.4–37.4)
MCV RBC AUTO: 86 FL (ref 82–98)
MONOCYTES # BLD AUTO: 1.12 THOUSAND/ÂΜL (ref 0.17–1.22)
MONOCYTES NFR BLD AUTO: 12 % (ref 4–12)
NEUTROPHILS # BLD AUTO: 5.91 THOUSANDS/ÂΜL (ref 1.85–7.62)
NEUTS SEG NFR BLD AUTO: 62 % (ref 43–75)
NRBC BLD AUTO-RTO: 0 /100 WBCS
PHOSPHATE SERPL-MCNC: 2.6 MG/DL (ref 2.3–4.1)
PLATELET # BLD AUTO: 223 THOUSANDS/UL (ref 149–390)
PMV BLD AUTO: 9.2 FL (ref 8.9–12.7)
POTASSIUM SERPL-SCNC: 3.7 MMOL/L (ref 3.5–5.3)
PROCALCITONIN SERPL-MCNC: 0.26 NG/ML
PROT SERPL-MCNC: 6.4 G/DL (ref 6.4–8.4)
RBC # BLD AUTO: 4.03 MILLION/UL (ref 3.88–5.62)
SODIUM SERPL-SCNC: 136 MMOL/L (ref 135–147)
WBC # BLD AUTO: 9.39 THOUSAND/UL (ref 4.31–10.16)

## 2024-08-12 PROCEDURE — 99232 SBSQ HOSP IP/OBS MODERATE 35: CPT | Performed by: HOSPITALIST

## 2024-08-12 PROCEDURE — 82948 REAGENT STRIP/BLOOD GLUCOSE: CPT

## 2024-08-12 PROCEDURE — 80053 COMPREHEN METABOLIC PANEL: CPT | Performed by: INTERNAL MEDICINE

## 2024-08-12 PROCEDURE — 84100 ASSAY OF PHOSPHORUS: CPT | Performed by: INTERNAL MEDICINE

## 2024-08-12 PROCEDURE — 84145 PROCALCITONIN (PCT): CPT | Performed by: INTERNAL MEDICINE

## 2024-08-12 PROCEDURE — 83735 ASSAY OF MAGNESIUM: CPT | Performed by: INTERNAL MEDICINE

## 2024-08-12 PROCEDURE — 85025 COMPLETE CBC W/AUTO DIFF WBC: CPT | Performed by: INTERNAL MEDICINE

## 2024-08-12 PROCEDURE — 82550 ASSAY OF CK (CPK): CPT | Performed by: INTERNAL MEDICINE

## 2024-08-12 PROCEDURE — G0408 INPT/TELE FOLLOW UP 35: HCPCS | Performed by: INTERNAL MEDICINE

## 2024-08-12 PROCEDURE — G0316 PR PROLONG INPT EVAL ADD15 M: HCPCS | Performed by: INTERNAL MEDICINE

## 2024-08-12 RX ADMIN — SODIUM CHLORIDE 100 ML/HR: 0.9 INJECTION, SOLUTION INTRAVENOUS at 22:08

## 2024-08-12 RX ADMIN — CEFEPIME HYDROCHLORIDE 2000 MG: 2 INJECTION, SOLUTION INTRAVENOUS at 17:00

## 2024-08-12 RX ADMIN — DAPTOMYCIN 500 MG: 500 INJECTION, POWDER, LYOPHILIZED, FOR SOLUTION INTRAVENOUS at 22:08

## 2024-08-12 RX ADMIN — LISINOPRIL 2.5 MG: 5 TABLET ORAL at 08:36

## 2024-08-12 RX ADMIN — INSULIN LISPRO 3 UNITS: 100 INJECTION, SOLUTION INTRAVENOUS; SUBCUTANEOUS at 22:08

## 2024-08-12 RX ADMIN — INSULIN LISPRO 12 UNITS: 100 INJECTION, SOLUTION INTRAVENOUS; SUBCUTANEOUS at 08:28

## 2024-08-12 RX ADMIN — INSULIN LISPRO 3 UNITS: 100 INJECTION, SOLUTION INTRAVENOUS; SUBCUTANEOUS at 16:59

## 2024-08-12 RX ADMIN — INSULIN GLARGINE 24 UNITS: 100 INJECTION, SOLUTION SUBCUTANEOUS at 22:08

## 2024-08-12 RX ADMIN — ENOXAPARIN SODIUM 40 MG: 40 INJECTION SUBCUTANEOUS at 08:27

## 2024-08-12 RX ADMIN — ASPIRIN 81 MG: 81 TABLET, COATED ORAL at 08:27

## 2024-08-12 RX ADMIN — ATORVASTATIN CALCIUM 80 MG: 40 TABLET, FILM COATED ORAL at 16:58

## 2024-08-12 RX ADMIN — INSULIN LISPRO 1 UNITS: 100 INJECTION, SOLUTION INTRAVENOUS; SUBCUTANEOUS at 08:34

## 2024-08-12 RX ADMIN — INSULIN LISPRO 12 UNITS: 100 INJECTION, SOLUTION INTRAVENOUS; SUBCUTANEOUS at 11:59

## 2024-08-12 RX ADMIN — SODIUM CHLORIDE 100 ML/HR: 0.9 INJECTION, SOLUTION INTRAVENOUS at 11:27

## 2024-08-12 RX ADMIN — CHOLECALCIFEROL TAB 25 MCG (1000 UNIT) 1000 UNITS: 25 TAB at 08:27

## 2024-08-12 RX ADMIN — CEFEPIME HYDROCHLORIDE 2000 MG: 2 INJECTION, SOLUTION INTRAVENOUS at 08:29

## 2024-08-12 RX ADMIN — INSULIN LISPRO 2 UNITS: 100 INJECTION, SOLUTION INTRAVENOUS; SUBCUTANEOUS at 11:59

## 2024-08-12 RX ADMIN — CEFEPIME HYDROCHLORIDE 2000 MG: 2 INJECTION, SOLUTION INTRAVENOUS at 00:36

## 2024-08-12 RX ADMIN — INSULIN LISPRO 12 UNITS: 100 INJECTION, SOLUTION INTRAVENOUS; SUBCUTANEOUS at 16:58

## 2024-08-12 NOTE — ASSESSMENT & PLAN NOTE
"Developed severe sepsis on 08/09/2024 with fevers, a leukocytosis, and tachycardia  Also with a lactic acidosis, which has now resovled  Due to a surgical wound infection and osteomyelitis of the left foot  A \"Sepsis alert\" was initiated on 08/09/2024. The sepsis order set was opened and utilized.  Received a NSS IV fluid bolus of 1000 ml  Checked blood cultures x 2 sets on 08/09/2024, which are no growth for 24 hours x 2 sets at this time  Checked a urine culture on 08/09/2024, with results pending at this time  IV cefepime was added on 08/09/2024 by Infectious Disease  Continue IV daptomycin  Follow the total CK (creatinine kinase) level while on IV daptomycin treatment  "

## 2024-08-12 NOTE — PLAN OF CARE
Problem: Prexisting or High Potential for Compromised Skin Integrity  Goal: Skin integrity is maintained or improved  Description: INTERVENTIONS:  - Identify patients at risk for skin breakdown  - Assess and monitor skin integrity  - Assess and monitor nutrition and hydration status  - Monitor labs   - Assess for incontinence   - Turn and reposition patient  - Assist with mobility/ambulation  - Relieve pressure over bony prominences  - Avoid friction and shearing  - Provide appropriate hygiene as needed including keeping skin clean and dry  - Evaluate need for skin moisturizer/barrier cream  - Collaborate with interdisciplinary team   - Patient/family teaching  - Consider wound care consult   Outcome: Progressing     Problem: PAIN - ADULT  Goal: Verbalizes/displays adequate comfort level or baseline comfort level  Description: Interventions:  - Encourage patient to monitor pain and request assistance  - Assess pain using appropriate pain scale (0-10)  - Administer analgesics based on type and severity of pain and evaluate response  - Implement non-pharmacological measures as appropriate and evaluate response  - Consider cultural and social influences on pain and pain management  - Notify physician/advanced practitioner if interventions unsuccessful or patient reports new pain  Outcome: Progressing     Problem: INFECTION - ADULT  Goal: Absence or prevention of progression during hospitalization  Description: INTERVENTIONS:  - Assess and monitor for signs and symptoms of infection  - Monitor lab/diagnostic results  - Monitor all insertion sites, i.e. indwelling lines, tubes, and drains  - Monitor endotracheal if appropriate and nasal secretions for changes in amount and color  - [place] appropriate cooling/warming therapies per order  - Administer medications as ordered  - Instruct and encourage patient and family to use good hand hygiene technique  - Identify and instruct in appropriate isolation precautions for  identified infection/condition  Outcome: Progressing     Problem: SAFETY ADULT  Goal: Patient will remain free of falls  Description: INTERVENTIONS:  - Educate patient/family on patient safety including physical limitations  - Instruct patient to call for assistance with activity   - Consult OT/PT to assist with strengthening/mobility   - Keep Call bell within reach  - Keep bed low and locked with side rails adjusted as appropriate  - Keep care items and personal belongings within reach  - Initiate and maintain comfort rounds  - Make Fall Risk Sign visible to staff  - Offer Toileting every 2 Hours, in advance of need  - Initiate/Maintain bed alarm  - Obtain necessary fall risk management equipment:   - Apply yellow socks and bracelet for high fall risk patients  - Consider moving patient to room near nurses station  Outcome: Progressing  Goal: Maintain or return to baseline ADL function  Description: INTERVENTIONS:  -  Assess patient's ability to carry out ADLs; assess patient's baseline for ADL function and identify physical deficits which impact ability to perform ADLs (bathing, care of mouth/teeth, toileting, grooming, dressing, etc.)  - Assess/evaluate cause of self-care deficits   - Assess range of motion  - Assess patient's mobility; develop plan if impaired  - Assess patient's need for assistive devices and provide as appropriate  - Encourage maximum independence but intervene and supervise when necessary  - Involve family in performance of ADLs  - Assess for home care needs following discharge   - Consider OT consult to assist with ADL evaluation and planning for discharge  - Provide patient education as appropriate  Outcome: Progressing  Goal: Maintains/Returns to pre admission functional level  Description: INTERVENTIONS:  - Perform AM-PAC 6 Click Basic Mobility/ Daily Activity assessment daily.  - Set and communicate daily mobility goal to care team and patient/family/caregiver.   - Collaborate with  rehabilitation services on mobility goals if consulted  - Perform Range of Motion 3 times a day.  - Reposition patient every 2 hours.  - Dangle patient 3 times a day  - Stand patient 3 times a day  - Ambulate patient 3 times a day  - Out of bed to chair 3 times a day   - Out of bed for meals 3 times a day  - Out of bed for toileting  - Record patient progress and toleration of activity level   Outcome: Progressing     Problem: DISCHARGE PLANNING  Goal: Discharge to home or other facility with appropriate resources  Description: INTERVENTIONS:  - Identify barriers to discharge w/patient and caregiver  - Arrange for needed discharge resources and transportation as appropriate  - Identify discharge learning needs (meds, wound care, etc.)  - Arrange for interpretive services to assist at discharge as needed  - Refer to Case Management Department for coordinating discharge planning if the patient needs post-hospital services based on physician/advanced practitioner order or complex needs related to functional status, cognitive ability, or social support system  Outcome: Progressing     Problem: Knowledge Deficit  Goal: Patient/family/caregiver demonstrates understanding of disease process, treatment plan, medications, and discharge instructions  Description: Complete learning assessment and assess knowledge base.  Interventions:  - Provide teaching at level of understanding  - Provide teaching via preferred learning methods  Outcome: Progressing     Problem: SKIN/TISSUE INTEGRITY - ADULT  Goal: Skin Integrity remains intact(Skin Breakdown Prevention)  Description: Assess:  -Perform Zaid assessment every shift  -Clean and moisturize skin every shift  -Inspect skin when repositioning, toileting, and assisting with ADLS  -Assess extremities for adequate circulation and sensation     Bed Management:  -Have minimal linens on bed & keep smooth, unwrinkled  -Change linens as needed when moist or perspiring  -Avoid sitting or  lying in one position for more than 2 hours while in bed  -Keep HOB at 30 degrees     Toileting:  -Offer bedside commode  -Assess for incontinence every 2 hours  -Use incontinent care products after each incontinent episode    Activity:  -Mobilize patient 3 times a day  -Encourage activity and walks on unit  -Encourage or provide ROM exercises   -Turn and reposition patient every 2 Hours  -Use appropriate equipment to lift or move patient in bed  -Instruct/ Assist with weight shifting every hour when out of bed in chair  -Consider limitation of chair time 1 hour intervals    Skin Care:  -Avoid use of baby powder, tape, friction and shearing, hot water or constrictive clothing  -Relieve pressure over bony prominences  -Do not massage red bony areas    Next Steps:  -Teach patient strategies to minimize risks   -Consider consults to  interdisciplinary teams  Outcome: Progressing  Goal: Incision(s), wounds(s) or drain site(s) healing without S/S of infection  Description: INTERVENTIONS  - Assess and document dressing, incision, wound bed, drain sites and surrounding tissue  - Provide patient and family education  - Perform skin care/dressing changes every day  Outcome: Progressing  Goal: Pressure injury heals and does not worsen  Description: Interventions:  - Implement low air loss mattress or specialty surface (Criteria met)  - Apply silicone foam dressing  - Instruct/assist with weight shifting every 60 minutes when in chair   - Limit chair time to 1 hour intervals  - Use special pressure reducing interventions when in chair   - Apply fecal or urinary incontinence containment device   - Perform passive or active ROM  - Turn and reposition patient & offload bony prominences every 2 hours   - Utilize friction reducing device or surface for transfers   - Consider consults to  interdisciplinary teams  - Use incontinent care products after each incontinent episode  - Consider nutrition services referral as  needed  Outcome: Progressing     Problem: METABOLIC, FLUID AND ELECTROLYTES - ADULT  Goal: Electrolytes maintained within normal limits  Description: INTERVENTIONS:  - Monitor labs and assess patient for signs and symptoms of electrolyte imbalances  - Administer electrolyte replacement as ordered  - Monitor response to electrolyte replacements, including repeat lab results as appropriate  - Instruct patient on fluid and nutrition as appropriate  Outcome: Progressing     Problem: MUSCULOSKELETAL - ADULT  Goal: Maintain or return mobility to safest level of function  Description: INTERVENTIONS:  - Assess patient's ability to carry out ADLs; assess patient's baseline for ADL function and identify physical deficits which impact ability to perform ADLs (bathing, care of mouth/teeth, toileting, grooming, dressing, etc.)  - Assess/evaluate cause of self-care deficits   - Assess range of motion  - Assess patient's mobility  - Assess patient's need for assistive devices and provide as appropriate  - Encourage maximum independence but intervene and supervise when necessary  - Involve family in performance of ADLs  - Assess for home care needs following discharge   - Consider OT consult to assist with ADL evaluation and planning for discharge  - Provide patient education as appropriate  Outcome: Progressing  Goal: Maintain proper alignment of affected body part  Description: INTERVENTIONS:  - Support, maintain and protect limb and body alignment  - Provide patient/ family with appropriate education  Outcome: Progressing     Problem: Nutrition/Hydration-ADULT  Goal: Nutrient/Hydration intake appropriate for improving, restoring or maintaining nutritional needs  Description: Monitor and assess patient's nutrition/hydration status for malnutrition. Collaborate with interdisciplinary team and initiate plan and interventions as ordered.  Monitor patient's weight and dietary intake as ordered or per policy. Utilize nutrition  screening tool and intervene as necessary. Determine patient's food preferences and provide high-protein, high-caloric foods as appropriate.     INTERVENTIONS:  - Monitor oral intake, urinary output, labs, and treatment plans  - Assess nutrition and hydration status and recommend course of action  - Evaluate amount of meals eaten  - Assist patient with eating if necessary   - Allow adequate time for meals  - Recommend/ encourage appropriate diets, oral nutritional supplements, and vitamin/mineral supplements  - Order, calculate, and assess calorie counts as needed  - Recommend, monitor, and adjust tube feedings and TPN/PPN based on assessed needs  - Assess need for intravenous fluids  - Provide specific nutrition/hydration education as appropriate  - Include patient/family/caregiver in decisions related to nutrition  Outcome: Progressing

## 2024-08-12 NOTE — ASSESSMENT & PLAN NOTE
Continue aspirin 81 mg PO Qdaily and high-intensity statin therapy with atorvastatin 80 mg PO Qdaily with dinner  8/7 ABIs similar to prior, image result reviewed  Outpatient follow-up with Vascular Surgery

## 2024-08-12 NOTE — ASSESSMENT & PLAN NOTE
"Please see the assessment and plan for \"Acute osteomyelitis of metatarsal bone of left foot\"    " --continue keppra

## 2024-08-12 NOTE — PROGRESS NOTES
"[facility]  Progress Note  Name: Art Joseph I  MRN: [id number]  Unit/Bed#: 402-01 I Date of Admission: 8/5/2024   Date of Service: 8/12/2024 I Hospital Day: 6    Assessment & Plan   * Severe sepsis (HCC)  Assessment & Plan  Developed severe sepsis on 08/09/2024 with fevers, a leukocytosis, and tachycardia  Also with a lactic acidosis, which has now resovled  Due to a surgical wound infection and osteomyelitis of the left foot  A \"Sepsis alert\" was initiated on 08/09/2024. The sepsis order set was opened and utilized.  Received a NSS IV fluid bolus of 1000 ml  Checked blood cultures x 2 sets on 08/09/2024, which are no growth for 24 hours x 2 sets at this time  Checked a urine culture on 08/09/2024, with results pending at this time  IV cefepime was added on 08/09/2024 by Infectious Disease  Continue IV daptomycin  Follow the total CK (creatinine kinase) level while on IV daptomycin treatment    Acute osteomyelitis of metatarsal bone of left foot (HCC)  Assessment & Plan  Recent hospitalization from 06/25/2024 through 06/28/2024 at [facility] for osteomyelitis of the left foot requiring a left 1st metatarsal amputation/resection on 06/27/2024 by Podiatry.  The patient was also found to have maggots in his open wound.   Recent hospitalization from 07/08/2024 through 07/18/2024 at [facility] and underwent left foot wound debridement and washout by Podiatry on 07/11/2024   Discharged on PO Augmentin 875mg BID plus IV daptomycin 6mg/kg ABW x 6 weeks from bone resection through 08/21/24 per Infectious Disease's recommendations  Now presented with an infection of the left foot  I appreciate Podiatry's input and Infectious Disease's input.  Continue Cefepime and Daptomycin  The patient is scheduled to go to the OR on Monday, 08/13/2024, per Podiatry for wound debridement and additional amputation of the left foot.  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    Ambulatory dysfunction  Assessment & Plan  PT/OT evaluations    Type 2 diabetes mellitus with hyperglycemia, with long-term current use of insulin (HCC)  Assessment & Plan  Lab Results   Component Value Date    HGBA1C 7.9 (H) 08/07/2024       Recent Labs     08/11/24  1606 08/11/24  2143 08/12/24  0700 08/12/24  1104   POCGLU 185* 220* 161* 219*         Blood Sugar Average: Last 72 hrs:  (P) [id number]    Increased Humalog to 12 Units TID with meals on 08/09/2024  Increased Lantus to 24 Units SQ QHS on 08/10/2024  ISS with blood glucose monitoring ACHS  Hypoglycemia protocol  Continue lisinopril for renal protection  Outpatient Endocrinology evaluation      Atelectasis  Assessment & Plan  Incentive spirometry 10 times per hour while awake    Peripheral vascular disease (HCC)  Assessment & Plan  Continue aspirin 81 mg PO Qdaily and high-intensity statin therapy with atorvastatin 80 mg PO Qdaily with dinner  8/7 ABIs similar to prior, image result reviewed  Outpatient follow-up with Vascular Surgery    Primary hypertension  Assessment & Plan  Continue lisinopril 2.5 mg PO Qdaily  Follow the blood pressure trend    Hypophosphatemia-resolved as of 8/12/2024  Assessment & Plan  Give K-phos, 2 tablets PO on 08/11/2024  Phos level improved    Hypomagnesemia-resolved as of 8/12/2024  Assessment & Plan  Resolved with magnesium sulfate 2 grams IV x 1 dose on 08/09/2024  Follow the magnesium level - improved    Results from last 7 days   Lab Units 08/12/24  0509 08/11/24  0450 08/10/24  0452   MAGNESIUM mg/dL 1.9 1.9 2.1                      VTE Pharmacologic Prophylaxis: VTE Score: 7 High Risk (Score >/= 5) - Pharmacological DVT " Prophylaxis Ordered: enoxaparin (Lovenox). Sequential Compression Devices Ordered.    Mobility:   Basic Mobility Inpatient Raw Score: 20  JH-HLM Goal: 6: Walk 10 steps or more  JH-HLM Achieved: 6: Walk 10 steps or more  JH-HLM Goal achieved. Continue to encourage appropriate mobility.    Patient Centered Rounds: I performed bedside rounds with nursing staff today.   Discussions with Specialists or Other Care Team Provider: none    Total Time Spent on Date of Encounter in care of patient: 28 mins. This time was spent on one or more of the following: performing physical exam; counseling and coordination of care; obtaining or reviewing history; documenting in the medical record; reviewing/ordering tests, medications or procedures; communicating with other healthcare professionals and discussing with patient's family/caregivers.    Current Length of Stay: 6 day(s)  Current Patient Status: Inpatient   Certification Statement: The patient will continue to require additional inpatient hospital stay due to osteomyelitis  Discharge Plan: Anticipate discharge in 48-72 hrs to rehab facility.    Code Status: Level 1 - Full Code    Subjective:   Patient has no acute new complaints    Objective:     Vitals:   Temp (24hrs), Av °F (37.2 °C), Min:98 °F (36.7 °C), Max:99.6 °F (37.6 °C)    Temp:  [98 °F (36.7 °C)-99.6 °F (37.6 °C)] 98.5 °F (36.9 °C)  HR:  [86-95] 92  Resp:  [15-20] 17  BP: (127-145)/(78-95) 142/79  SpO2:  [90 %-98 %] 95 %  Body mass index is 31.51 kg/m².     Input and Output Summary (last 24 hours):     Intake/Output Summary (Last 24 hours) at 2024 1330  Last data filed at 2024 1314  Gross per 24 hour   Intake 600 ml   Output 2750 ml   Net -2150 ml       Physical Exam:   Physical Exam  Vitals and nursing note reviewed.   Constitutional:       General: He is not in acute distress.     Appearance: He is well-developed.   HENT:      Head: Normocephalic and atraumatic.   Eyes:      Conjunctiva/sclera:  Conjunctivae normal.   Cardiovascular:      Rate and Rhythm: Normal rate and regular rhythm.      Heart sounds: No murmur heard.  Pulmonary:      Effort: Pulmonary effort is normal. No respiratory distress.      Breath sounds: Normal breath sounds.   Abdominal:      Palpations: Abdomen is soft.      Tenderness: There is no abdominal tenderness.   Musculoskeletal:      Cervical back: Neck supple.      Comments: R BKA, Left foot wrapped   Skin:     General: Skin is warm and dry.      Capillary Refill: Capillary refill takes less than 2 seconds.   Neurological:      Mental Status: He is alert.   Psychiatric:         Mood and Affect: Mood normal.          Additional Data:     Labs:  Results from last 7 days   Lab Units 08/12/24  0509   WBC Thousand/uL 9.39   HEMOGLOBIN g/dL 11.4*   HEMATOCRIT % 34.6*   PLATELETS Thousands/uL 223   SEGS PCT % 62   LYMPHO PCT % 19   MONO PCT % 12   EOS PCT % 5     Results from last 7 days   Lab Units 08/12/24  0509   SODIUM mmol/L 136   POTASSIUM mmol/L 3.7   CHLORIDE mmol/L 105   CO2 mmol/L 22   BUN mg/dL 12   CREATININE mg/dL 0.66   ANION GAP mmol/L 9   CALCIUM mg/dL 8.2*   ALBUMIN g/dL 3.1*   TOTAL BILIRUBIN mg/dL 0.32   ALK PHOS U/L 102   ALT U/L 15   AST U/L 13   GLUCOSE RANDOM mg/dL 168*     Results from last 7 days   Lab Units 08/05/24  1823   INR  0.93     Results from last 7 days   Lab Units 08/12/24  1104 08/12/24  0700 08/11/24  2143 08/11/24  1606 08/11/24  1045 08/11/24  0725 08/10/24  2139 08/10/24  1550 08/10/24  1121 08/10/24  0746 08/09/24  2119 08/09/24  1618   POC GLUCOSE mg/dl 219* 161* 220* 185* 245* 200* 301* 149* 216* 209* 240* 229*     Results from last 7 days   Lab Units 08/07/24  0556   HEMOGLOBIN A1C % 7.9*     Results from last 7 days   Lab Units 08/12/24  0509 08/11/24  0450 08/10/24  0452 08/09/24  1246 08/09/24  1008 08/08/24  0516   LACTIC ACID mmol/L  --   --  0.7 2.4* 2.7*  --    PROCALCITONIN ng/ml 0.26* 0.38* 0.63*  --  0.51* 0.07        Lines/Drains:  Invasive Devices       Peripherally Inserted Central Catheter Line  Duration             PICC Line 07/16/24 Right Basilic 26 days                    Central Line:  Goal for removal: N/A - Discharging with PICC for IV ABX/medications             Imaging: No pertinent imaging reviewed.    Recent Cultures (last 7 days):   Results from last 7 days   Lab Units 08/09/24  1722 08/09/24  0955 08/09/24  0902   BLOOD CULTURE   --  No Growth at 72 hrs. No Growth at 72 hrs.   URINE CULTURE  40,000-49,000 cfu/ml Providencia stuartii*  --   --        Last 24 Hours Medication List:   Current Facility-Administered Medications   Medication Dose Route Frequency Provider Last Rate    acetaminophen  650 mg Oral Q6H PRN Fatemeh Reid MD      aspirin  81 mg Oral Daily Fatemeh Reid MD      atorvastatin  80 mg Oral Daily With Dinner Fatemeh Reid MD      cefepime  2,000 mg Intravenous Q8H Cristiano Heart MD 2,000 mg (08/12/24 0829)    Cholecalciferol  1,000 Units Oral Daily Fatemeh Reid MD      DAPTOmycin (CUBICIN) 500 mg in sodium chloride 0.9 % 50 mL IVPB  6 mg/kg (Adjusted) Intravenous Q24H Fatemeh Reid  mg (08/11/24 2227)    enoxaparin  40 mg Subcutaneous Daily Fatemeh Reid MD      insulin glargine  24 Units Subcutaneous HS Jerome Gutierrez DO      insulin lispro  1-5 Units Subcutaneous HS Jerome Gutierrez DO      insulin lispro  1-6 Units Subcutaneous TID AC Jerome Gutierrez DO      insulin lispro  12 Units Subcutaneous TID With Meals Jerome Gutierrez DO      lisinopril  2.5 mg Oral Daily Fatemeh Reid MD      ondansetron  4 mg Intravenous Q6H PRN Fatemeh Reid MD      sodium chloride  100 mL/hr Intravenous Continuous Jerome Gutierrez  mL/hr (08/12/24 1127)        Today, Patient Was Seen By: Abdirahman Jackson DO    **Please Note: This note may have been constructed using a voice recognition system.**

## 2024-08-12 NOTE — PLAN OF CARE
Problem: Prexisting or High Potential for Compromised Skin Integrity  Goal: Skin integrity is maintained or improved  Description: INTERVENTIONS:  - Identify patients at risk for skin breakdown  - Assess and monitor skin integrity  - Assess and monitor nutrition and hydration status  - Monitor labs   - Assess for incontinence   - Turn and reposition patient  - Assist with mobility/ambulation  - Relieve pressure over bony prominences  - Avoid friction and shearing  - Provide appropriate hygiene as needed including keeping skin clean and dry  - Evaluate need for skin moisturizer/barrier cream  - Collaborate with interdisciplinary team   - Patient/family teaching  - Consider wound care consult   Outcome: Progressing     Problem: PAIN - ADULT  Goal: Verbalizes/displays adequate comfort level or baseline comfort level  Description: Interventions:  - Encourage patient to monitor pain and request assistance  - Assess pain using appropriate pain scale (0-10)  - Administer analgesics based on type and severity of pain and evaluate response  - Implement non-pharmacological measures as appropriate and evaluate response  - Consider cultural and social influences on pain and pain management  - Notify physician/advanced practitioner if interventions unsuccessful or patient reports new pain  Outcome: Progressing     Problem: INFECTION - ADULT  Goal: Absence or prevention of progression during hospitalization  Description: INTERVENTIONS:  - Assess and monitor for signs and symptoms of infection  - Monitor lab/diagnostic results  - Monitor all insertion sites, i.e. indwelling lines, tubes, and drains  - Monitor endotracheal if appropriate and nasal secretions for changes in amount and color  - Adrian appropriate cooling/warming therapies per order  - Administer medications as ordered  - Instruct and encourage patient and family to use good hand hygiene technique  - Identify and instruct in appropriate isolation precautions for  identified infection/condition  Outcome: Progressing     Problem: SAFETY ADULT  Goal: Patient will remain free of falls  Description: INTERVENTIONS:  - Educate patient/family on patient safety including physical limitations  - Instruct patient to call for assistance with activity   - Consult OT/PT to assist with strengthening/mobility   - Keep Call bell within reach  - Keep bed low and locked with side rails adjusted as appropriate  - Keep care items and personal belongings within reach  - Initiate and maintain comfort rounds  - Make Fall Risk Sign visible to staff  - Offer Toileting every 2 Hours, in advance of need  - Initiate/Maintain bed alarm  - Obtain necessary fall risk management equipment:   - Apply yellow socks and bracelet for high fall risk patients  - Consider moving patient to room near nurses station  Outcome: Progressing  Goal: Maintain or return to baseline ADL function  Description: INTERVENTIONS:  -  Assess patient's ability to carry out ADLs; assess patient's baseline for ADL function and identify physical deficits which impact ability to perform ADLs (bathing, care of mouth/teeth, toileting, grooming, dressing, etc.)  - Assess/evaluate cause of self-care deficits   - Assess range of motion  - Assess patient's mobility; develop plan if impaired  - Assess patient's need for assistive devices and provide as appropriate  - Encourage maximum independence but intervene and supervise when necessary  - Involve family in performance of ADLs  - Assess for home care needs following discharge   - Consider OT consult to assist with ADL evaluation and planning for discharge  - Provide patient education as appropriate  Outcome: Progressing  Goal: Maintains/Returns to pre admission functional level  Description: INTERVENTIONS:  - Perform AM-PAC 6 Click Basic Mobility/ Daily Activity assessment daily.  - Set and communicate daily mobility goal to care team and patient/family/caregiver.   - Collaborate with  rehabilitation services on mobility goals if consulted  - Perform Range of Motion 3 times a day.  - Reposition patient every 2 hours.  - Dangle patient 3 times a day  - Stand patient 3 times a day  - Ambulate patient 3 times a day  - Out of bed to chair 3 times a day   - Out of bed for meals 3 times a day  - Out of bed for toileting  - Record patient progress and toleration of activity level   Outcome: Progressing     Problem: DISCHARGE PLANNING  Goal: Discharge to home or other facility with appropriate resources  Description: INTERVENTIONS:  - Identify barriers to discharge w/patient and caregiver  - Arrange for needed discharge resources and transportation as appropriate  - Identify discharge learning needs (meds, wound care, etc.)  - Arrange for interpretive services to assist at discharge as needed  - Refer to Case Management Department for coordinating discharge planning if the patient needs post-hospital services based on physician/advanced practitioner order or complex needs related to functional status, cognitive ability, or social support system  Outcome: Progressing     Problem: Knowledge Deficit  Goal: Patient/family/caregiver demonstrates understanding of disease process, treatment plan, medications, and discharge instructions  Description: Complete learning assessment and assess knowledge base.  Interventions:  - Provide teaching at level of understanding  - Provide teaching via preferred learning methods  Outcome: Progressing     Problem: SKIN/TISSUE INTEGRITY - ADULT  Goal: Skin Integrity remains intact(Skin Breakdown Prevention)  Description: Assess:  -Perform Zaid assessment every shift  -Clean and moisturize skin every shift  -Inspect skin when repositioning, toileting, and assisting with ADLS  -Assess extremities for adequate circulation and sensation     Bed Management:  -Have minimal linens on bed & keep smooth, unwrinkled  -Change linens as needed when moist or perspiring  -Avoid sitting or  lying in one position for more than 2 hours while in bed  -Keep HOB at 30 degrees     Toileting:  -Offer bedside commode  -Assess for incontinence every 2 hours  -Use incontinent care products after each incontinent episode    Activity:  -Mobilize patient 3 times a day  -Encourage activity and walks on unit  -Encourage or provide ROM exercises   -Turn and reposition patient every 2 Hours  -Use appropriate equipment to lift or move patient in bed  -Instruct/ Assist with weight shifting every hour when out of bed in chair  -Consider limitation of chair time 1 hour intervals    Skin Care:  -Avoid use of baby powder, tape, friction and shearing, hot water or constrictive clothing  -Relieve pressure over bony prominences  -Do not massage red bony areas    Next Steps:  -Teach patient strategies to minimize risks   -Consider consults to  interdisciplinary teams  Outcome: Progressing  Goal: Incision(s), wounds(s) or drain site(s) healing without S/S of infection  Description: INTERVENTIONS  - Assess and document dressing, incision, wound bed, drain sites and surrounding tissue  - Provide patient and family education  - Perform skin care/dressing changes every day  Outcome: Progressing  Goal: Pressure injury heals and does not worsen  Description: Interventions:  - Implement low air loss mattress or specialty surface (Criteria met)  - Apply silicone foam dressing  - Instruct/assist with weight shifting every 60 minutes when in chair   - Limit chair time to 1 hour intervals  - Use special pressure reducing interventions when in chair   - Apply fecal or urinary incontinence containment device   - Perform passive or active ROM  - Turn and reposition patient & offload bony prominences every 2 hours   - Utilize friction reducing device or surface for transfers   - Consider consults to  interdisciplinary teams  - Use incontinent care products after each incontinent episode  - Consider nutrition services referral as  needed  Outcome: Progressing     Problem: METABOLIC, FLUID AND ELECTROLYTES - ADULT  Goal: Electrolytes maintained within normal limits  Description: INTERVENTIONS:  - Monitor labs and assess patient for signs and symptoms of electrolyte imbalances  - Administer electrolyte replacement as ordered  - Monitor response to electrolyte replacements, including repeat lab results as appropriate  - Instruct patient on fluid and nutrition as appropriate  Outcome: Progressing     Problem: MUSCULOSKELETAL - ADULT  Goal: Maintain or return mobility to safest level of function  Description: INTERVENTIONS:  - Assess patient's ability to carry out ADLs; assess patient's baseline for ADL function and identify physical deficits which impact ability to perform ADLs (bathing, care of mouth/teeth, toileting, grooming, dressing, etc.)  - Assess/evaluate cause of self-care deficits   - Assess range of motion  - Assess patient's mobility  - Assess patient's need for assistive devices and provide as appropriate  - Encourage maximum independence but intervene and supervise when necessary  - Involve family in performance of ADLs  - Assess for home care needs following discharge   - Consider OT consult to assist with ADL evaluation and planning for discharge  - Provide patient education as appropriate  Outcome: Progressing  Goal: Maintain proper alignment of affected body part  Description: INTERVENTIONS:  - Support, maintain and protect limb and body alignment  - Provide patient/ family with appropriate education  Outcome: Progressing     Problem: Nutrition/Hydration-ADULT  Goal: Nutrient/Hydration intake appropriate for improving, restoring or maintaining nutritional needs  Description: Monitor and assess patient's nutrition/hydration status for malnutrition. Collaborate with interdisciplinary team and initiate plan and interventions as ordered.  Monitor patient's weight and dietary intake as ordered or per policy. Utilize nutrition  screening tool and intervene as necessary. Determine patient's food preferences and provide high-protein, high-caloric foods as appropriate.     INTERVENTIONS:  - Monitor oral intake, urinary output, labs, and treatment plans  - Assess nutrition and hydration status and recommend course of action  - Evaluate amount of meals eaten  - Assist patient with eating if necessary   - Allow adequate time for meals  - Recommend/ encourage appropriate diets, oral nutritional supplements, and vitamin/mineral supplements  - Order, calculate, and assess calorie counts as needed  - Recommend, monitor, and adjust tube feedings and TPN/PPN based on assessed needs  - Assess need for intravenous fluids  - Provide specific nutrition/hydration education as appropriate  - Include patient/family/caregiver in decisions related to nutrition  Outcome: Progressing

## 2024-08-12 NOTE — QUICK NOTE
Plan for OR tomorrow 8/13 with Dr. Bhakta for Lisfranc amputation left foot.  Patient was called and updated on date change.  N.p.o. at midnight tonight, consent to be signed with surgeon prior to OR.  Patient was agreeable to the procedure.  Benefits, risk, complications and concerns of procedure discussed with the patient's understanding.    -Continue antibiotics per ID team  -Continue dressing change per wound care orders, appreciate nursing assistance with dressing changes

## 2024-08-12 NOTE — QUICK NOTE
Plan for OR tomorrow 8/12 with Dr. Bhakta, for Lisfranc amputation left foot. NPO at midnight, consent to be signed with surgeon prior to OR. The patient was agreeable to the procedure. Benefits, risks, complications and concerns of procedure discussed with the patients understanding.  Patient was upset about having to be n.p.o. from midnight until time of surgery.  Continue antibiotics per ID team.  MRI reviewed, osteomyelitis of the medial cuneiform, nonspecific bone marrow edema without T1 replacement and middle cuneiform, reactive changes versus early osteomyelitis.  No localized/drainable fluid collection.

## 2024-08-12 NOTE — ASSESSMENT & PLAN NOTE
Lab Results   Component Value Date    HGBA1C 7.9 (H) 08/07/2024       Recent Labs     08/11/24  1606 08/11/24  2143 08/12/24  0700 08/12/24  1104   POCGLU 185* 220* 161* 219*         Blood Sugar Average: Last 72 hrs:  (P) [id number]    Increased Humalog to 12 Units TID with meals on 08/09/2024  Increased Lantus to 24 Units SQ QHS on 08/10/2024  ISS with blood glucose monitoring ACHS  Hypoglycemia protocol  Continue lisinopril for renal protection  Outpatient Endocrinology evaluation

## 2024-08-12 NOTE — ASSESSMENT & PLAN NOTE
"Recent hospitalization from 06/25/2024 through 06/28/2024 at [facility] for osteomyelitis of the left foot requiring a left 1st metatarsal amputation/resection on 06/27/2024 by Podiatry.  The patient was also found to have maggots in his open wound.   Recent hospitalization from 07/08/2024 through 07/18/2024 at [facility] and underwent left foot wound debridement and washout by Podiatry on 07/11/2024   Discharged on PO Augmentin 875mg BID plus IV daptomycin 6mg/kg ABW x 6 weeks from bone resection through 08/21/24 per Infectious Disease's recommendations  Now presented with an infection of the left foot  I appreciate Podiatry's input and Infectious Disease's input.  Continue Cefepime and Daptomycin  The patient is scheduled to go to the OR on Monday, 08/13/2024, per Podiatry for wound debridement and additional amputation of the left foot.  Please see the assessment and plan for \"Severe sepsis\"  "

## 2024-08-12 NOTE — PROGRESS NOTES
VIRTUAL CARE DOCUMENTATION:     1. This service was provided via Telemedicine using [employer]     2. Parties in the room with patient during teleconsult Patient only    3. Confidentiality My office door was closed     4. Participants No one else was in the room    5. Patient acknowledged consent and understanding of privacy and security of the  Telemedicine consult. I informed the patient that I have reviewed their record in Epic and presented the opportunity for them to ask any questions regarding the visit today.  The patient agreed to participate.    6. Time spent 3 minutes          Progress Note - Infectious Disease   Art Joseph 64 y.o. male MRN: [id number]  Unit/Bed#: 402-01 Encounter: [id number]      Impression/Plan:  1.  Systemic inflammatory response syndrome.  New onset fever and with a rising WBC count.  Possibly all secondary to a Providencia UTI.  Consideration for the possibility of a progressive foot infection.  Consideration for the possibility of a catheter related bacteremia although blood cultures remain negative.  The temperature and the white cell count have come down.  Chest x-ray without pneumonia.  -Antibiotics as below  -Follow-up blood cultures  -Recheck CBC with differential and BMP to make sure no worsening toxicities     2.  Nonhealing infected left foot wound with ongoing osteomyelitis.  This despite extensive surgical interventions and broad-spectrum antibiotics with a plan to treat for 6 weeks through 8/21/24.  The wound is not really healing and there is significant surrounding swelling and erythema.  There is also increased drainage.  Seems to be tolerating the antibiotics and the CPK on 8/7/2024 is 28.  Ceretec scan consistent with osteomyelitis.  MRI with osteomyelitis of the cuneiform bones no abscess seen.  -Continue daptomycin and cefepime for now  -Patient to the OR for Lisfranc amputation tomorrow  -Recheck CBC with differential, BMP, and CPK to make sure no  developing toxicities  -Local wound care  -Close podiatry follow-up  -Prognosis poor for limb salvage as per podiatry    3.  Providencia UTI.  Seems to have improved with broadening the gram-negative coverage.  -Continue cefepime as above     4.  Diabetes mellitus.  With hyperglycemia.  Poorly controlled with a hemoglobin A1c of 9.7 in the recent past.  -Tighten diabetic control to improve leukocyte function and wound healing     5.  Peripheral arterial disease.  Consider vascular consult    I spent 50 minutes in evaluation of the patient of which 30 minutes was in counseling/coordination of care    Discussed with the primary service the plan to continue the daptomycin and cefepime for now and they agree with the plan    Antibiotics:  Daptomycin  Cefepime 4    Subjective:  Patient has no fever, chills, sweats; no nausea, vomiting, diarrhea; no cough, shortness of breath; no pain. No new symptoms.    Objective:  Vitals:  Temp:  [98 °F (36.7 °C)-99.6 °F (37.6 °C)] 99.2 °F (37.3 °C)  HR:  [89-98] 89  Resp:  [15-20] 15  BP: (127-145)/(78-95) 144/83  SpO2:  [90 %-98 %] 95 %  Temp (24hrs), Av °F (37.2 °C), Min:98 °F (36.7 °C), Max:99.6 °F (37.6 °C)  Current: Temperature: 99.2 °F (37.3 °C)    Documented physical exam has been primarily done by the patient's nurse and/or the primary service due to limited examination abilities on telemedicine    Physical Exam:   General Appearance:  Alert, interactive, nontoxic, no acute distress.   Throat: Oropharynx moist without lesions.    Lungs:   Clear to auscultation bilaterally; no wheezes, rhonchi or rales; respirations unlabored   Heart:  RRR; no murmur, rub or gallop   Abdomen:   Soft, non-tender, non-distended, positive bowel sounds.     Extremities: No clubbing, cyanosis or edema   Skin: No new rashes or lesions. No draining wounds noted.       Labs, Imaging, & Other studies:   All pertinent labs and imaging studies were personally reviewed  Results from last 7 days   Lab  Units 08/12/24  0509 08/11/24  0450 08/10/24  0452   WBC Thousand/uL 9.39 13.31* 22.44*   HEMOGLOBIN g/dL 11.4* 10.7* 10.9*   PLATELETS Thousands/uL 223 213 227     Results from last 7 days   Lab Units 08/12/24  0509 08/11/24  0450 08/10/24  0452   SODIUM mmol/L 136 135 136   POTASSIUM mmol/L 3.7 3.8 3.9   CHLORIDE mmol/L 105 106 107   CO2 mmol/L 22 23 22   BUN mg/dL 12 19 20   CREATININE mg/dL 0.66 0.75 0.87   EGFR ml/min/1.73sq m 101 96 90   CALCIUM mg/dL 8.2* 8.6 8.7   AST U/L 13 9* 7*   ALT U/L 15 9 7   ALK PHOS U/L 102 99 94     Results from last 7 days   Lab Units 08/09/24  1722 08/09/24  0955 08/09/24  0902 08/06/24  0548   BLOOD CULTURE   --  No Growth at 48 hrs. No Growth at 48 hrs.  --    URINE CULTURE  40,000-49,000 cfu/ml Providencia stuartii*  --   --   --    MRSA CULTURE ONLY   --   --   --  No Methicillin Resistant Staphlyococcus aureus (MRSA) isolated     Results from last 7 days   Lab Units 08/12/24  0509 08/11/24  0450 08/10/24  0452 08/09/24  1008 08/08/24  0516 08/07/24  0556   PROCALCITONIN ng/ml 0.26* 0.38* 0.63* 0.51* 0.07 0.06     Results from last 7 days   Lab Units 08/05/24  1823   CRP mg/L 15.7*

## 2024-08-12 NOTE — ASSESSMENT & PLAN NOTE
Resolved with magnesium sulfate 2 grams IV x 1 dose on 08/09/2024  Follow the magnesium level - improved    Results from last 7 days   Lab Units 08/12/24  0509 08/11/24  0450 08/10/24  0452   MAGNESIUM mg/dL 1.9 1.9 2.1

## 2024-08-13 ENCOUNTER — APPOINTMENT (INPATIENT)
Dept: RADIOLOGY | Facility: HOSPITAL | Age: 64
DRG: 856 | End: 2024-08-13
Payer: COMMERCIAL

## 2024-08-13 PROBLEM — N30.00 ACUTE CYSTITIS WITHOUT HEMATURIA: Status: ACTIVE | Noted: 2024-08-13

## 2024-08-13 LAB
ANION GAP SERPL CALCULATED.3IONS-SCNC: 8 MMOL/L (ref 4–13)
BASOPHILS # BLD AUTO: 0.08 THOUSANDS/ÂΜL (ref 0–0.1)
BASOPHILS NFR BLD AUTO: 1 % (ref 0–1)
BUN SERPL-MCNC: 13 MG/DL (ref 5–25)
CALCIUM SERPL-MCNC: 8.1 MG/DL (ref 8.4–10.2)
CHLORIDE SERPL-SCNC: 105 MMOL/L (ref 96–108)
CK SERPL-CCNC: 25 U/L (ref 39–308)
CO2 SERPL-SCNC: 24 MMOL/L (ref 21–32)
CREAT SERPL-MCNC: 0.62 MG/DL (ref 0.6–1.3)
EOSINOPHIL # BLD AUTO: 0.53 THOUSAND/ÂΜL (ref 0–0.61)
EOSINOPHIL NFR BLD AUTO: 6 % (ref 0–6)
ERYTHROCYTE [DISTWIDTH] IN BLOOD BY AUTOMATED COUNT: 13.5 % (ref 11.6–15.1)
GFR SERPL CREATININE-BSD FRML MDRD: 104 ML/MIN/1.73SQ M
GLUCOSE SERPL-MCNC: 130 MG/DL (ref 65–140)
GLUCOSE SERPL-MCNC: 131 MG/DL (ref 65–140)
GLUCOSE SERPL-MCNC: 161 MG/DL (ref 65–140)
GLUCOSE SERPL-MCNC: 178 MG/DL (ref 65–140)
GLUCOSE SERPL-MCNC: 222 MG/DL (ref 65–140)
HCT VFR BLD AUTO: 35.8 % (ref 36.5–49.3)
HGB BLD-MCNC: 11.3 G/DL (ref 12–17)
IMM GRANULOCYTES # BLD AUTO: 0.13 THOUSAND/UL (ref 0–0.2)
IMM GRANULOCYTES NFR BLD AUTO: 1 % (ref 0–2)
LYMPHOCYTES # BLD AUTO: 2.23 THOUSANDS/ÂΜL (ref 0.6–4.47)
LYMPHOCYTES NFR BLD AUTO: 23 % (ref 14–44)
MCH RBC QN AUTO: 27.9 PG (ref 26.8–34.3)
MCHC RBC AUTO-ENTMCNC: 31.6 G/DL (ref 31.4–37.4)
MCV RBC AUTO: 88 FL (ref 82–98)
MONOCYTES # BLD AUTO: 1.22 THOUSAND/ÂΜL (ref 0.17–1.22)
MONOCYTES NFR BLD AUTO: 13 % (ref 4–12)
NEUTROPHILS # BLD AUTO: 5.44 THOUSANDS/ÂΜL (ref 1.85–7.62)
NEUTS SEG NFR BLD AUTO: 56 % (ref 43–75)
NRBC BLD AUTO-RTO: 0 /100 WBCS
PLATELET # BLD AUTO: 247 THOUSANDS/UL (ref 149–390)
PMV BLD AUTO: 9.6 FL (ref 8.9–12.7)
POTASSIUM SERPL-SCNC: 3.9 MMOL/L (ref 3.5–5.3)
RBC # BLD AUTO: 4.05 MILLION/UL (ref 3.88–5.62)
SODIUM SERPL-SCNC: 137 MMOL/L (ref 135–147)
WBC # BLD AUTO: 9.63 THOUSAND/UL (ref 4.31–10.16)

## 2024-08-13 PROCEDURE — 87077 CULTURE AEROBIC IDENTIFY: CPT | Performed by: PODIATRIST

## 2024-08-13 PROCEDURE — 88311 DECALCIFY TISSUE: CPT | Performed by: STUDENT IN AN ORGANIZED HEALTH CARE EDUCATION/TRAINING PROGRAM

## 2024-08-13 PROCEDURE — G0316 PR PROLONG INPT EVAL ADD15 M: HCPCS | Performed by: INTERNAL MEDICINE

## 2024-08-13 PROCEDURE — 82550 ASSAY OF CK (CPK): CPT | Performed by: INTERNAL MEDICINE

## 2024-08-13 PROCEDURE — 87205 SMEAR GRAM STAIN: CPT | Performed by: PODIATRIST

## 2024-08-13 PROCEDURE — 99232 SBSQ HOSP IP/OBS MODERATE 35: CPT

## 2024-08-13 PROCEDURE — 80048 BASIC METABOLIC PNL TOTAL CA: CPT | Performed by: INTERNAL MEDICINE

## 2024-08-13 PROCEDURE — 87186 SC STD MICRODIL/AGAR DIL: CPT | Performed by: PODIATRIST

## 2024-08-13 PROCEDURE — 73630 X-RAY EXAM OF FOOT: CPT

## 2024-08-13 PROCEDURE — 85025 COMPLETE CBC W/AUTO DIFF WBC: CPT | Performed by: INTERNAL MEDICINE

## 2024-08-13 PROCEDURE — 88307 TISSUE EXAM BY PATHOLOGIST: CPT | Performed by: STUDENT IN AN ORGANIZED HEALTH CARE EDUCATION/TRAINING PROGRAM

## 2024-08-13 PROCEDURE — 0Y6N0Z0 DETACHMENT AT LEFT FOOT, COMPLETE, OPEN APPROACH: ICD-10-PCS | Performed by: PODIATRIST

## 2024-08-13 PROCEDURE — 87075 CULTR BACTERIA EXCEPT BLOOD: CPT | Performed by: PODIATRIST

## 2024-08-13 PROCEDURE — 82948 REAGENT STRIP/BLOOD GLUCOSE: CPT

## 2024-08-13 PROCEDURE — 87070 CULTURE OTHR SPECIMN AEROBIC: CPT | Performed by: PODIATRIST

## 2024-08-13 PROCEDURE — G0408 INPT/TELE FOLLOW UP 35: HCPCS | Performed by: INTERNAL MEDICINE

## 2024-08-13 RX ORDER — PROPOFOL 10 MG/ML
INJECTION, EMULSION INTRAVENOUS CONTINUOUS PRN
Status: DISCONTINUED | OUTPATIENT
Start: 2024-08-13 | End: 2024-08-13

## 2024-08-13 RX ORDER — CHLORHEXIDINE GLUCONATE ORAL RINSE 1.2 MG/ML
15 SOLUTION DENTAL ONCE
Status: COMPLETED | OUTPATIENT
Start: 2024-08-13 | End: 2024-08-13

## 2024-08-13 RX ORDER — SODIUM CHLORIDE, SODIUM LACTATE, POTASSIUM CHLORIDE, CALCIUM CHLORIDE 600; 310; 30; 20 MG/100ML; MG/100ML; MG/100ML; MG/100ML
20 INJECTION, SOLUTION INTRAVENOUS CONTINUOUS
Status: DISCONTINUED | OUTPATIENT
Start: 2024-08-13 | End: 2024-08-13

## 2024-08-13 RX ORDER — SODIUM CHLORIDE, SODIUM LACTATE, POTASSIUM CHLORIDE, CALCIUM CHLORIDE 600; 310; 30; 20 MG/100ML; MG/100ML; MG/100ML; MG/100ML
INJECTION, SOLUTION INTRAVENOUS CONTINUOUS PRN
Status: DISCONTINUED | OUTPATIENT
Start: 2024-08-13 | End: 2024-08-13

## 2024-08-13 RX ORDER — INSULIN LISPRO 100 [IU]/ML
12 INJECTION, SOLUTION INTRAVENOUS; SUBCUTANEOUS
Status: DISCONTINUED | OUTPATIENT
Start: 2024-08-13 | End: 2024-08-13

## 2024-08-13 RX ORDER — INSULIN LISPRO 100 [IU]/ML
12 INJECTION, SOLUTION INTRAVENOUS; SUBCUTANEOUS
Status: DISCONTINUED | OUTPATIENT
Start: 2024-08-13 | End: 2024-08-16 | Stop reason: HOSPADM

## 2024-08-13 RX ORDER — FENTANYL CITRATE/PF 50 MCG/ML
25 SYRINGE (ML) INJECTION
Status: DISCONTINUED | OUTPATIENT
Start: 2024-08-13 | End: 2024-08-13 | Stop reason: HOSPADM

## 2024-08-13 RX ORDER — PHENYLEPHRINE HCL IN 0.9% NACL 1 MG/10 ML
SYRINGE (ML) INTRAVENOUS AS NEEDED
Status: DISCONTINUED | OUTPATIENT
Start: 2024-08-13 | End: 2024-08-13

## 2024-08-13 RX ORDER — MAGNESIUM HYDROXIDE 1200 MG/15ML
LIQUID ORAL AS NEEDED
Status: DISCONTINUED | OUTPATIENT
Start: 2024-08-13 | End: 2024-08-13 | Stop reason: HOSPADM

## 2024-08-13 RX ORDER — ONDANSETRON 2 MG/ML
INJECTION INTRAMUSCULAR; INTRAVENOUS AS NEEDED
Status: DISCONTINUED | OUTPATIENT
Start: 2024-08-13 | End: 2024-08-13

## 2024-08-13 RX ORDER — GLYCOPYRROLATE 0.2 MG/ML
INJECTION INTRAMUSCULAR; INTRAVENOUS AS NEEDED
Status: DISCONTINUED | OUTPATIENT
Start: 2024-08-13 | End: 2024-08-13

## 2024-08-13 RX ORDER — PROPOFOL 10 MG/ML
INJECTION, EMULSION INTRAVENOUS AS NEEDED
Status: DISCONTINUED | OUTPATIENT
Start: 2024-08-13 | End: 2024-08-13

## 2024-08-13 RX ADMIN — ASPIRIN 81 MG: 81 TABLET, COATED ORAL at 08:07

## 2024-08-13 RX ADMIN — GLYCOPYRROLATE 0.2 MG: 0.2 INJECTION INTRAMUSCULAR; INTRAVENOUS at 15:24

## 2024-08-13 RX ADMIN — ONDANSETRON 4 MG: 2 INJECTION INTRAMUSCULAR; INTRAVENOUS at 15:24

## 2024-08-13 RX ADMIN — INSULIN LISPRO 1 UNITS: 100 INJECTION, SOLUTION INTRAVENOUS; SUBCUTANEOUS at 08:11

## 2024-08-13 RX ADMIN — Medication 100 MCG: at 16:15

## 2024-08-13 RX ADMIN — Medication 100 MCG: at 16:06

## 2024-08-13 RX ADMIN — PROPOFOL 100 MCG/KG/MIN: 10 INJECTION, EMULSION INTRAVENOUS at 15:24

## 2024-08-13 RX ADMIN — INSULIN LISPRO 2 UNITS: 100 INJECTION, SOLUTION INTRAVENOUS; SUBCUTANEOUS at 22:26

## 2024-08-13 RX ADMIN — CHLORHEXIDINE GLUCONATE 0.12% ORAL RINSE 15 ML: 1.2 LIQUID ORAL at 14:58

## 2024-08-13 RX ADMIN — INSULIN GLARGINE 24 UNITS: 100 INJECTION, SOLUTION SUBCUTANEOUS at 22:26

## 2024-08-13 RX ADMIN — CEFEPIME HYDROCHLORIDE 2000 MG: 2 INJECTION, SOLUTION INTRAVENOUS at 01:01

## 2024-08-13 RX ADMIN — SODIUM CHLORIDE, SODIUM LACTATE, POTASSIUM CHLORIDE, AND CALCIUM CHLORIDE: .6; .31; .03; .02 INJECTION, SOLUTION INTRAVENOUS at 15:18

## 2024-08-13 RX ADMIN — Medication 50 MCG: at 15:45

## 2024-08-13 RX ADMIN — LISINOPRIL 2.5 MG: 5 TABLET ORAL at 08:07

## 2024-08-13 RX ADMIN — Medication 100 MCG: at 16:11

## 2024-08-13 RX ADMIN — PROPOFOL 50 MG: 10 INJECTION, EMULSION INTRAVENOUS at 15:24

## 2024-08-13 RX ADMIN — Medication 100 MCG: at 15:49

## 2024-08-13 RX ADMIN — ENOXAPARIN SODIUM 40 MG: 40 INJECTION SUBCUTANEOUS at 08:07

## 2024-08-13 RX ADMIN — DAPTOMYCIN 500 MG: 500 INJECTION, POWDER, LYOPHILIZED, FOR SOLUTION INTRAVENOUS at 22:27

## 2024-08-13 RX ADMIN — CHOLECALCIFEROL TAB 25 MCG (1000 UNIT) 1000 UNITS: 25 TAB at 08:07

## 2024-08-13 RX ADMIN — CEFEPIME HYDROCHLORIDE 2000 MG: 2 INJECTION, SOLUTION INTRAVENOUS at 08:07

## 2024-08-13 RX ADMIN — Medication 100 MCG: at 16:02

## 2024-08-13 RX ADMIN — SODIUM CHLORIDE 100 ML/HR: 0.9 INJECTION, SOLUTION INTRAVENOUS at 10:39

## 2024-08-13 RX ADMIN — CEFEPIME HYDROCHLORIDE 2000 MG: 2 INJECTION, SOLUTION INTRAVENOUS at 15:23

## 2024-08-13 NOTE — PLAN OF CARE
Problem: Prexisting or High Potential for Compromised Skin Integrity  Goal: Skin integrity is maintained or improved  Description: INTERVENTIONS:  - Identify patients at risk for skin breakdown  - Assess and monitor skin integrity  - Assess and monitor nutrition and hydration status  - Monitor labs   - Assess for incontinence   - Turn and reposition patient  - Assist with mobility/ambulation  - Relieve pressure over bony prominences  - Avoid friction and shearing  - Provide appropriate hygiene as needed including keeping skin clean and dry  - Evaluate need for skin moisturizer/barrier cream  - Collaborate with interdisciplinary team   - Patient/family teaching  - Consider wound care consult   Outcome: Progressing     Problem: PAIN - ADULT  Goal: Verbalizes/displays adequate comfort level or baseline comfort level  Description: Interventions:  - Encourage patient to monitor pain and request assistance  - Assess pain using appropriate pain scale (0-10)  - Administer analgesics based on type and severity of pain and evaluate response  - Implement non-pharmacological measures as appropriate and evaluate response  - Consider cultural and social influences on pain and pain management  - Notify physician/advanced practitioner if interventions unsuccessful or patient reports new pain  Outcome: Progressing     Problem: INFECTION - ADULT  Goal: Absence or prevention of progression during hospitalization  Description: INTERVENTIONS:  - Assess and monitor for signs and symptoms of infection  - Monitor lab/diagnostic results  - Monitor all insertion sites, i.e. indwelling lines, tubes, and drains  - Monitor endotracheal if appropriate and nasal secretions for changes in amount and color  - [place] appropriate cooling/warming therapies per order  - Administer medications as ordered  - Instruct and encourage patient and family to use good hand hygiene technique  - Identify and instruct in appropriate isolation precautions for  identified infection/condition  Outcome: Progressing     Problem: SAFETY ADULT  Goal: Patient will remain free of falls  Description: INTERVENTIONS:  - Educate patient/family on patient safety including physical limitations  - Instruct patient to call for assistance with activity   - Consult OT/PT to assist with strengthening/mobility   - Keep Call bell within reach  - Keep bed low and locked with side rails adjusted as appropriate  - Keep care items and personal belongings within reach  - Initiate and maintain comfort rounds  - Make Fall Risk Sign visible to staff  - Offer Toileting every 2 Hours, in advance of need  - Initiate/Maintain bed alarm  - Obtain necessary fall risk management equipment:   - Apply yellow socks and bracelet for high fall risk patients  - Consider moving patient to room near nurses station  Outcome: Progressing  Goal: Maintain or return to baseline ADL function  Description: INTERVENTIONS:  -  Assess patient's ability to carry out ADLs; assess patient's baseline for ADL function and identify physical deficits which impact ability to perform ADLs (bathing, care of mouth/teeth, toileting, grooming, dressing, etc.)  - Assess/evaluate cause of self-care deficits   - Assess range of motion  - Assess patient's mobility; develop plan if impaired  - Assess patient's need for assistive devices and provide as appropriate  - Encourage maximum independence but intervene and supervise when necessary  - Involve family in performance of ADLs  - Assess for home care needs following discharge   - Consider OT consult to assist with ADL evaluation and planning for discharge  - Provide patient education as appropriate  Outcome: Progressing  Goal: Maintains/Returns to pre admission functional level  Description: INTERVENTIONS:  - Perform AM-PAC 6 Click Basic Mobility/ Daily Activity assessment daily.  - Set and communicate daily mobility goal to care team and patient/family/caregiver.   - Collaborate with  rehabilitation services on mobility goals if consulted  - Perform Range of Motion 3 times a day.  - Reposition patient every 2 hours.  - Dangle patient 3 times a day  - Stand patient 3 times a day  - Ambulate patient 3 times a day  - Out of bed to chair 3 times a day   - Out of bed for meals 3 times a day  - Out of bed for toileting  - Record patient progress and toleration of activity level   Outcome: Progressing     Problem: DISCHARGE PLANNING  Goal: Discharge to home or other facility with appropriate resources  Description: INTERVENTIONS:  - Identify barriers to discharge w/patient and caregiver  - Arrange for needed discharge resources and transportation as appropriate  - Identify discharge learning needs (meds, wound care, etc.)  - Arrange for interpretive services to assist at discharge as needed  - Refer to Case Management Department for coordinating discharge planning if the patient needs post-hospital services based on physician/advanced practitioner order or complex needs related to functional status, cognitive ability, or social support system  Outcome: Progressing     Problem: Knowledge Deficit  Goal: Patient/family/caregiver demonstrates understanding of disease process, treatment plan, medications, and discharge instructions  Description: Complete learning assessment and assess knowledge base.  Interventions:  - Provide teaching at level of understanding  - Provide teaching via preferred learning methods  Outcome: Progressing     Problem: SKIN/TISSUE INTEGRITY - ADULT  Goal: Skin Integrity remains intact(Skin Breakdown Prevention)  Description: Assess:  -Perform Zaid assessment every shift  -Clean and moisturize skin every shift  -Inspect skin when repositioning, toileting, and assisting with ADLS  -Assess extremities for adequate circulation and sensation     Bed Management:  -Have minimal linens on bed & keep smooth, unwrinkled  -Change linens as needed when moist or perspiring  -Avoid sitting or  lying in one position for more than 2 hours while in bed  -Keep HOB at 30 degrees     Toileting:  -Offer bedside commode  -Assess for incontinence every 2 hours  -Use incontinent care products after each incontinent episode    Activity:  -Mobilize patient 3 times a day  -Encourage activity and walks on unit  -Encourage or provide ROM exercises   -Turn and reposition patient every 2 Hours  -Use appropriate equipment to lift or move patient in bed  -Instruct/ Assist with weight shifting every hour when out of bed in chair  -Consider limitation of chair time 1 hour intervals    Skin Care:  -Avoid use of baby powder, tape, friction and shearing, hot water or constrictive clothing  -Relieve pressure over bony prominences  -Do not massage red bony areas    Next Steps:  -Teach patient strategies to minimize risks   -Consider consults to  interdisciplinary teams  Outcome: Progressing  Goal: Incision(s), wounds(s) or drain site(s) healing without S/S of infection  Description: INTERVENTIONS  - Assess and document dressing, incision, wound bed, drain sites and surrounding tissue  - Provide patient and family education  - Perform skin care/dressing changes every day  Outcome: Progressing  Goal: Pressure injury heals and does not worsen  Description: Interventions:  - Implement low air loss mattress or specialty surface (Criteria met)  - Apply silicone foam dressing  - Instruct/assist with weight shifting every 60 minutes when in chair   - Limit chair time to 1 hour intervals  - Use special pressure reducing interventions when in chair   - Apply fecal or urinary incontinence containment device   - Perform passive or active ROM  - Turn and reposition patient & offload bony prominences every 2 hours   - Utilize friction reducing device or surface for transfers   - Consider consults to  interdisciplinary teams  - Use incontinent care products after each incontinent episode  - Consider nutrition services referral as  needed  Outcome: Progressing     Problem: METABOLIC, FLUID AND ELECTROLYTES - ADULT  Goal: Electrolytes maintained within normal limits  Description: INTERVENTIONS:  - Monitor labs and assess patient for signs and symptoms of electrolyte imbalances  - Administer electrolyte replacement as ordered  - Monitor response to electrolyte replacements, including repeat lab results as appropriate  - Instruct patient on fluid and nutrition as appropriate  Outcome: Progressing     Problem: MUSCULOSKELETAL - ADULT  Goal: Maintain or return mobility to safest level of function  Description: INTERVENTIONS:  - Assess patient's ability to carry out ADLs; assess patient's baseline for ADL function and identify physical deficits which impact ability to perform ADLs (bathing, care of mouth/teeth, toileting, grooming, dressing, etc.)  - Assess/evaluate cause of self-care deficits   - Assess range of motion  - Assess patient's mobility  - Assess patient's need for assistive devices and provide as appropriate  - Encourage maximum independence but intervene and supervise when necessary  - Involve family in performance of ADLs  - Assess for home care needs following discharge   - Consider OT consult to assist with ADL evaluation and planning for discharge  - Provide patient education as appropriate  Outcome: Progressing  Goal: Maintain proper alignment of affected body part  Description: INTERVENTIONS:  - Support, maintain and protect limb and body alignment  - Provide patient/ family with appropriate education  Outcome: Progressing     Problem: Nutrition/Hydration-ADULT  Goal: Nutrient/Hydration intake appropriate for improving, restoring or maintaining nutritional needs  Description: Monitor and assess patient's nutrition/hydration status for malnutrition. Collaborate with interdisciplinary team and initiate plan and interventions as ordered.  Monitor patient's weight and dietary intake as ordered or per policy. Utilize nutrition  screening tool and intervene as necessary. Determine patient's food preferences and provide high-protein, high-caloric foods as appropriate.     INTERVENTIONS:  - Monitor oral intake, urinary output, labs, and treatment plans  - Assess nutrition and hydration status and recommend course of action  - Evaluate amount of meals eaten  - Assist patient with eating if necessary   - Allow adequate time for meals  - Recommend/ encourage appropriate diets, oral nutritional supplements, and vitamin/mineral supplements  - Order, calculate, and assess calorie counts as needed  - Recommend, monitor, and adjust tube feedings and TPN/PPN based on assessed needs  - Assess need for intravenous fluids  - Provide specific nutrition/hydration education as appropriate  - Include patient/family/caregiver in decisions related to nutrition  Outcome: Progressing

## 2024-08-13 NOTE — ASSESSMENT & PLAN NOTE
Patient with a history of HTN  Home medication regimen: Lisinopril 2.5 mg daily  Continue home medications  Most Recent Blood Pressure: 144/76   Continue monitoring BP

## 2024-08-13 NOTE — ASSESSMENT & PLAN NOTE
Lab Results   Component Value Date    HGBA1C 7.9 (H) 08/07/2024       Recent Labs     08/12/24  1104 08/12/24  1557 08/12/24  2124 08/13/24  0735   POCGLU 219* 256* 299* 161*       Blood Sugar Average: Last 72 hrs:  (P) 217    Increased Humalog to 12 Units TID with meals on 08/09/2024  Increased Lantus to 24 Units SQ QHS on 08/10/2024  Hold mealtime insulin while NPO  ISS with blood glucose monitoring ACHS  Hypoglycemia protocol  Continue lisinopril for renal protection  Outpatient Endocrinology evaluation

## 2024-08-13 NOTE — ASSESSMENT & PLAN NOTE
"Recent hospitalization from 06/25/2024 through 06/28/2024 at [facility] for osteomyelitis of the left foot requiring a left 1st metatarsal amputation/resection on 06/27/2024 by Podiatry.  The patient was also found to have maggots in his open wound.   Recent hospitalization from 07/08/2024 through 07/18/2024 at [facility] and underwent left foot wound debridement and washout by Podiatry on 07/11/2024   Discharged on PO Augmentin 875mg BID plus IV daptomycin 6mg/kg ABW x 6 weeks from bone resection through 08/21/24 per Infectious Disease's recommendations  Now presented with an infection of the left foot  8/9 MRI left foot: Osteomyelitis of the medial cuneiform.  Nonspecific minimal bone marrow edema without T1 marrow signal hypointensity in the middle cuneiform, likely related to reactive changes or early osteomyelitis.  Ulcer in the medial aspect of the foot tracking to the level of the medial cuneiform with associated diffuse soft tissue edema and enhancement, suggestive of cellulitis.  No localized/drainable fluid collection.  I appreciate Podiatry's input and Infectious Disease's input.  Continue Cefepime and Daptomycin  The patient is scheduled to go to the OR on Tuesday 08/13/2024, per Podiatry for wound debridement and additional amputation of the left foot.  Please see the assessment and plan for \"Severe sepsis\"  "

## 2024-08-13 NOTE — PROGRESS NOTES
VIRTUAL CARE DOCUMENTATION:     1. This service was provided via Telemedicine using [employer]     2. Parties in the room with patient during teleconsult Patient only    3. Confidentiality My office door was closed     4. Participants No one else was in the room    5. Patient acknowledged consent and understanding of privacy and security of the  Telemedicine consult. I informed the patient that I have reviewed their record in Epic and presented the opportunity for them to ask any questions regarding the visit today.  The patient agreed to participate.    6. Time spent 3 minutes          Progress Note - Infectious Disease   Art Joseph 64 y.o. male MRN: [id number]  Unit/Bed#: 402-01 Encounter: [id number]      Impression/Plan:  1.  Systemic inflammatory response syndrome.  New onset fever and with a rising WBC count.  Possibly all secondary to a Providencia UTI.  Consideration for the possibility of a progressive foot infection.  Consideration for the possibility of a catheter related bacteremia although blood cultures remain negative.  The temperature and the white cell count have come down.  Chest x-ray without pneumonia.  -Antibiotics as below  -Follow-up blood cultures  -Recheck CBC with differential and BMP to make sure no worsening toxicities     2.  Nonhealing infected left foot wound with ongoing osteomyelitis.  This despite extensive surgical interventions and broad-spectrum antibiotics with a plan to treat for 6 weeks through 8/21/24.  The wound is not really healing and there is significant surrounding swelling and erythema.  There is also increased drainage.  Seems to be tolerating the antibiotics.  Ceretec scan consistent with osteomyelitis.  MRI with osteomyelitis of the cuneiform bones no abscess seen.  CPK on 8/13/2024 is 25  -Continue daptomycin and cefepime for now  -Patient to the OR for Lisfranc amputation today  -Recheck CBC with differential, BMP, and CPK to make sure no developing  toxicities  -Local wound care  -Close podiatry follow-up  -Prognosis poor for limb salvage as per podiatry     3.  Providencia UTI.  Seems to have improved with broadening the gram-negative coverage.  -Continue cefepime as above     4.  Diabetes mellitus.  With hyperglycemia.  Poorly controlled with a hemoglobin A1c of 9.7 in the recent past.  -Tighten diabetic control to improve leukocyte function and wound healing     5.  Peripheral arterial disease.  Consider vascular consult    I spent 50 minutes in evaluation of the patient of which 30 minutes was in counseling/coordination of care    Discussed with the primary service the plan to continue the daptomycin and cefepime for now and they agree with the plan    Antibiotics:  Daptomycin  Cefepime 5    Subjective:  Patient has no fever, chills, sweats; no nausea, vomiting, diarrhea; no cough, shortness of breath; no pain. No new symptoms.    Objective:  Vitals:  Temp:  [98.3 °F (36.8 °C)-98.5 °F (36.9 °C)] 98.3 °F (36.8 °C)  HR:  [86-94] 94  Resp:  [17] 17  BP: (118-142)/(65-81) 118/65  SpO2:  [91 %-96 %] 96 %  Temp (24hrs), Av.4 °F (36.9 °C), Min:98.3 °F (36.8 °C), Max:98.5 °F (36.9 °C)  Current: Temperature: 98.3 °F (36.8 °C)    Documented physical exam has been primarily done by the patient's nurse and/or the primary service due to limited examination abilities on telemedicine    Physical Exam:   General Appearance:  Alert, interactive, nontoxic, no acute distress.   Throat: Oropharynx moist without lesions.    Lungs:   Clear to auscultation bilaterally; no wheezes, rhonchi or rales; respirations unlabored   Heart:  RRR; no murmur, rub or gallop   Abdomen:   Soft, non-tender, non-distended, positive bowel sounds.     Extremities: Left foot dressed   Skin: No new rashes or lesions. No draining wounds noted.       Labs, Imaging, & Other studies:   All pertinent labs and imaging studies were personally reviewed  Results from last 7 days   Lab Units 24  8182  08/12/24  0509 08/11/24  0450   WBC Thousand/uL 9.63 9.39 13.31*   HEMOGLOBIN g/dL 11.3* 11.4* 10.7*   PLATELETS Thousands/uL 247 223 213     Results from last 7 days   Lab Units 08/13/24  0444 08/12/24  0509 08/11/24  0450 08/10/24  0452   SODIUM mmol/L 137 136 135 136   POTASSIUM mmol/L 3.9 3.7 3.8 3.9   CHLORIDE mmol/L 105 105 106 107   CO2 mmol/L 24 22 23 22   BUN mg/dL 13 12 19 20   CREATININE mg/dL 0.62 0.66 0.75 0.87   EGFR ml/min/1.73sq m 104 101 96 90   CALCIUM mg/dL 8.1* 8.2* 8.6 8.7   AST U/L  --  13 9* 7*   ALT U/L  --  15 9 7   ALK PHOS U/L  --  102 99 94     Results from last 7 days   Lab Units 08/09/24  1722 08/09/24  0955 08/09/24  0902   BLOOD CULTURE   --  No Growth at 72 hrs. No Growth at 72 hrs.   URINE CULTURE  40,000-49,000 cfu/ml Providencia stuartii*  --   --      Results from last 7 days   Lab Units 08/12/24  0509 08/11/24  0450 08/10/24  0452 08/09/24  1008 08/08/24  0516 08/07/24  0556   PROCALCITONIN ng/ml 0.26* 0.38* 0.63* 0.51* 0.07 0.06

## 2024-08-13 NOTE — ASSESSMENT & PLAN NOTE
Developed severe sepsis on 08/09/2024 with fevers, a leukocytosis, and tachycardia.  Sepsis alert initiated at that time including IV fluid bolus  Also with a lactic acidosis, which has now resovled  Due to a surgical wound infection and osteomyelitis of the left foot  Checked blood cultures x 2 sets on 08/09/2024, which are no growth for 72 hours x 2 sets at this time  Checked a urine culture on 08/09/2024, positive for 40-49,000 Providencia stuartii.  Susceptible to cefepime  IV cefepime was added on 08/09/2024 by Infectious Disease  Continue IV daptomycin and cefepime per ID  Follow the total CK (creatinine kinase) level while on IV daptomycin treatment

## 2024-08-13 NOTE — ASSESSMENT & PLAN NOTE
8/9 urine culture positive for 40-49,000 Providencia stuartii  Susceptible to IV cefepime which patient is on  ID following, input appreciated  See plan under severe sepsis

## 2024-08-13 NOTE — INTERVAL H&P NOTE
H&P reviewed. After examining the patient I find no changes in the patients condition since the H&P had been written.    Vitals:    08/13/24 0736   BP: 144/76   Pulse: 83   Resp: 16   Temp: 98 °F (36.7 °C)   SpO2: 96%     Delicia Ward MD  Department of Anesthesiology and Acute Pain Management  August 13, 2024  1:36 PM

## 2024-08-13 NOTE — QUICK NOTE
- likely surgical cure of OM with cuneiform excision  - however, due to the multiple failures of previous surgeries, may benefit from extended soft tissue coverage  - Strict NWB to the left foot until sutures are removed  - qod dsg changes with adapticjuli

## 2024-08-13 NOTE — OP NOTE
OPERATIVE REPORT  PATIENT NAME: Art Joseph    :  1960  MRN: [id number]  Pt Location: [facility] OR ROOM 01    SURGERY DATE: 2024    Surgeons and Role:     * Myron Bhakta DPM - Primary    Preop Diagnosis:  Osteomyelitis of left foot, unspecified type (HCC) [M86.9]    Post-Op Diagnosis Codes:     * Osteomyelitis of left foot, unspecified type (HCC) [M86.9]    Procedure(s):  Left - AMPUTATION FOOT. Lis franc amp    Specimen(s):  ID Type Source Tests Collected by Time Destination   1 : left forefoot Tissue Foot, Left TISSUE EXAM Myron Bhakta DPM 2024 1555    A : left foot soft tissue cultures Wound Foot, Left ANAEROBIC CULTURE AND GRAM STAIN, WOUND CULTURE Myron Bhakta DPM 2024 1558        Estimated Blood Loss:   200 mL    Drains:  * No LDAs found *    Anesthesia Type:   Conscious Sedation     Operative Indications:  Osteomyelitis of left foot, unspecified type (HCC) [M86.9]      Operative Findings:  C/w dx diffusely soft bone      Complications:   None    Procedure and Technique:  Patient was prepared for the procedure on the litter. A full time out was then performed to idenifty the correct location and procedure. Attention was directed to the left footwhere a wound was noted which was excised. Modified fishmouth incision was made to bone. Remaining digits were removed. Sagittal saw was utilized to cut through the bases of the metatarsal. The base of the 2nd was left, as well as the base of the 5th. Medial cuneiform was excised in toto. Bone was soft. Tissue was fibrotic. Saline was utilized to irrigate the wound and closure was obtained. Patient tolerated the procedure and anesthesia well.        I was present for the entire procedure.    Patient Disposition:  PACU         SIGNATURE: Myron Bhakta DPM  DATE: 2024  TIME: 4:34 PM

## 2024-08-13 NOTE — PROGRESS NOTES
[facility]  Progress Note  Name: Art Joseph I  MRN: [id number]  Unit/Bed#: 402-01 I Date of Admission: 8/5/2024   Date of Service: 8/13/2024 I Hospital Day: 7    Assessment & Plan   * Severe sepsis (HCC)  Assessment & Plan  Developed severe sepsis on 08/09/2024 with fevers, a leukocytosis, and tachycardia.  Sepsis alert initiated at that time including IV fluid bolus  Also with a lactic acidosis, which has now resovled  Due to a surgical wound infection and osteomyelitis of the left foot  Checked blood cultures x 2 sets on 08/09/2024, which are no growth for 72 hours x 2 sets at this time  Checked a urine culture on 08/09/2024, positive for 40-49,000 Providencia stuartii.  Susceptible to cefepime  IV cefepime was added on 08/09/2024 by Infectious Disease  Continue IV daptomycin and cefepime per ID  Follow the total CK (creatinine kinase) level while on IV daptomycin treatment    Acute osteomyelitis of metatarsal bone of left foot (HCC)  Assessment & Plan  Recent hospitalization from 06/25/2024 through 06/28/2024 at [facility] for osteomyelitis of the left foot requiring a left 1st metatarsal amputation/resection on 06/27/2024 by Podiatry.  The patient was also found to have maggots in his open wound.   Recent hospitalization from 07/08/2024 through 07/18/2024 at [facility] and underwent left foot wound debridement and washout by Podiatry on 07/11/2024   Discharged on PO Augmentin 875mg BID plus IV daptomycin 6mg/kg ABW x 6 weeks from bone resection through 08/21/24 per Infectious Disease's recommendations  Now presented with an infection of the left foot  8/9 MRI left foot: Osteomyelitis of the medial cuneiform.  Nonspecific minimal bone marrow edema without T1 marrow signal hypointensity in the middle cuneiform, likely related to reactive changes or early osteomyelitis.  Ulcer in the medial aspect of the foot tracking  "to the level of the medial cuneiform with associated diffuse soft tissue edema and enhancement, suggestive of cellulitis.  No localized/drainable fluid collection.  I appreciate Podiatry's input and Infectious Disease's input.  Continue Cefepime and Daptomycin  The patient is scheduled to go to the OR on Tuesday 08/13/2024, per Podiatry for wound debridement and additional amputation of the left foot.  Please see the assessment and plan for \"Severe sepsis\"    Acute cystitis without hematuria  Assessment & Plan  8/9 urine culture positive for 40-49,000 Providencia stuartii  Susceptible to IV cefepime which patient is on  ID following, input appreciated  See plan under severe sepsis    Atelectasis  Assessment & Plan  Incentive spirometry 10 times per hour while awake    Peripheral vascular disease (HCC)  Assessment & Plan  Continue aspirin 81 mg PO Qdaily and high-intensity statin therapy with atorvastatin 80 mg PO Qdaily with dinner  8/7 ABIs similar to prior, image result reviewed  Outpatient follow-up with Vascular Surgery    Maggot infestation  Assessment & Plan  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    Primary hypertension  Assessment & Plan  Patient with a history of HTN  Home medication regimen: Lisinopril 2.5 mg daily  Continue home medications  Most Recent Blood Pressure: 144/76   Continue monitoring BP    Ambulatory dysfunction  Assessment & Plan  PT/OT evaluations, currently recommending STR    Type 2 diabetes mellitus with hyperglycemia, with long-term current use of insulin ([facility])  Assessment & Plan  Lab Results   Component Value Date    HGBA1C 7.9 (H) 08/07/2024       Recent Labs     08/12/24  1104 08/12/24  1557 08/12/24  2124 08/13/24  0735   POCGLU 219* 256* 299* 161*       Blood Sugar Average: Last 72 hrs:  (P) 217    Increased Humalog to 12 Units TID with meals on 08/09/2024  Increased Lantus to 24 Units SQ QHS on 08/10/2024  Hold mealtime insulin while NPO  ISS with blood " glucose monitoring ACHS  Hypoglycemia protocol  Continue lisinopril for renal protection  Outpatient Endocrinology evaluation               VTE Pharmacologic Prophylaxis: VTE Score: 7 High Risk (Score >/= 5) - Pharmacological DVT Prophylaxis Ordered: enoxaparin (Lovenox). Sequential Compression Devices Ordered.    Mobility:   Basic Mobility Inpatient Raw Score: 20  JH-HLM Goal: 6: Walk 10 steps or more  JH-HLM Achieved: 6: Walk 10 steps or more  JH-HLM Goal achieved. Continue to encourage appropriate mobility.    Patient Centered Rounds: I performed bedside rounds with nursing staff today.   Discussions with Specialists or Other Care Team Provider: ID, case management    Education and Discussions with Family / Patient: Patient declined call to .     Total Time Spent on Date of Encounter in care of patient:  mins. This time was spent on one or more of the following: performing physical exam; counseling and coordination of care; obtaining or reviewing history; documenting in the medical record; reviewing/ordering tests, medications or procedures; communicating with other healthcare professionals and discussing with patient's family/caregivers.    Current Length of Stay: 7 day(s)  Current Patient Status: Inpatient   Certification Statement: The patient will continue to require additional inpatient hospital stay due to OR today, IV antibiotics  Discharge Plan: Anticipate discharge in >72 hrs to rehab facility.    Code Status: Level 1 - Full Code    Subjective:   Patient is feeling good today.  He would like to have surgery over with.  He denies pain, diarrhea, or constipation.  He has no questions or concerns at this time.    Objective:     Vitals:   Temp (24hrs), Av.2 °F (36.8 °C), Min:98 °F (36.7 °C), Max:98.3 °F (36.8 °C)    Temp:  [98 °F (36.7 °C)-98.3 °F (36.8 °C)] 98 °F (36.7 °C)  HR:  [83-94] 83  Resp:  [16-17] 16  BP: (118-144)/(65-81) 144/76  SpO2:  [95 %-96 %] 96 %  Body mass index is 31.51  kg/m².     Input and Output Summary (last 24 hours):     Intake/Output Summary (Last 24 hours) at 8/13/2024 0828  Last data filed at 8/13/2024 0827  Gross per 24 hour   Intake 600 ml   Output 3200 ml   Net -2600 ml       Physical Exam:   Physical Exam  Vitals and nursing note reviewed.   Constitutional:       General: He is not in acute distress.     Appearance: Normal appearance. He is obese.   HENT:      Head: Normocephalic.      Nose: Nose normal.      Mouth/Throat:      Mouth: Mucous membranes are moist.      Pharynx: Oropharynx is clear.   Eyes:      Conjunctiva/sclera: Conjunctivae normal.   Cardiovascular:      Rate and Rhythm: Normal rate and regular rhythm.      Pulses: Normal pulses.      Heart sounds: Normal heart sounds. No murmur heard.  Pulmonary:      Effort: Pulmonary effort is normal. No respiratory distress.      Breath sounds: Normal breath sounds. No wheezing or rhonchi.   Abdominal:      General: Bowel sounds are normal. There is no distension.      Palpations: Abdomen is soft.      Tenderness: There is no abdominal tenderness. There is no guarding.   Musculoskeletal:      Right lower leg: No edema.      Left lower leg: No edema.      Right Lower Extremity: Right leg is amputated below knee.   Skin:     General: Skin is warm and dry.      Comments: Dressing to left foot CDI   Neurological:      General: No focal deficit present.      Mental Status: He is alert and oriented to person, place, and time. Mental status is at baseline.   Psychiatric:         Mood and Affect: Mood normal.         Thought Content: Thought content normal.        Additional Data:     Labs:  Results from last 7 days   Lab Units 08/13/24  0444   WBC Thousand/uL 9.63   HEMOGLOBIN g/dL 11.3*   HEMATOCRIT % 35.8*   PLATELETS Thousands/uL 247   SEGS PCT % 56   LYMPHO PCT % 23   MONO PCT % 13*   EOS PCT % 6     Results from last 7 days   Lab Units 08/13/24  0444 08/12/24  0509   SODIUM mmol/L 137 136   POTASSIUM mmol/L 3.9 3.7    CHLORIDE mmol/L 105 105   CO2 mmol/L 24 22   BUN mg/dL 13 12   CREATININE mg/dL 0.62 0.66   ANION GAP mmol/L 8 9   CALCIUM mg/dL 8.1* 8.2*   ALBUMIN g/dL  --  3.1*   TOTAL BILIRUBIN mg/dL  --  0.32   ALK PHOS U/L  --  102   ALT U/L  --  15   AST U/L  --  13   GLUCOSE RANDOM mg/dL 178* 168*         Results from last 7 days   Lab Units 08/13/24  0735 08/12/24  2124 08/12/24  1557 08/12/24  1104 08/12/24  0700 08/11/24  2143 08/11/24  1606 08/11/24  1045 08/11/24  0725 08/10/24  2139 08/10/24  1550 08/10/24  1121   POC GLUCOSE mg/dl 161* 299* 256* 219* 161* 220* 185* 245* 200* 301* 149* 216*     Results from last 7 days   Lab Units 08/07/24  0556   HEMOGLOBIN A1C % 7.9*     Results from last 7 days   Lab Units 08/12/24  0509 08/11/24  0450 08/10/24  0452 08/09/24  1246 08/09/24  1008 08/08/24  0516   LACTIC ACID mmol/L  --   --  0.7 2.4* 2.7*  --    PROCALCITONIN ng/ml 0.26* 0.38* 0.63*  --  0.51* 0.07       Lines/Drains:  Invasive Devices       Peripherally Inserted Central Catheter Line  Duration             PICC Line 07/16/24 Right Basilic 27 days                    Central Line:  Goal for removal: N/A - Discharging with PICC for IV ABX/medications             Imaging: Reviewed radiology reports from this admission including: MRI left foot    Recent Cultures (last 7 days):   Results from last 7 days   Lab Units 08/09/24  1722 08/09/24  0955 08/09/24  0902   BLOOD CULTURE   --  No Growth at 72 hrs. No Growth at 72 hrs.   URINE CULTURE  40,000-49,000 cfu/ml Providencia stuartii*  --   --        Last 24 Hours Medication List:   Current Facility-Administered Medications   Medication Dose Route Frequency Provider Last Rate    acetaminophen  650 mg Oral Q6H PRN Fatemeh Reid MD      aspirin  81 mg Oral Daily Fatemeh Reid MD      atorvastatin  80 mg Oral Daily With Dinner Fatemeh Reid MD      cefepime  2,000 mg Intravenous Q8H Cristiano Heart MD 2,000 mg (08/13/24 0807)    Cholecalciferol  1,000 Units  Oral Daily Fatemeh Reid MD      DAPTOmycin (CUBICIN) 500 mg in sodium chloride 0.9 % 50 mL IVPB  6 mg/kg (Adjusted) Intravenous Q24H Fatemeh Reid  mg (08/12/24 2208)    enoxaparin  40 mg Subcutaneous Daily Fatemeh Reid MD      insulin glargine  24 Units Subcutaneous HS Jerome Gutierrez,       insulin lispro  1-5 Units Subcutaneous HS Jerome Gutierrez, DO      insulin lispro  1-6 Units Subcutaneous TID AC Jerome Gutierrez DO      insulin lispro  12 Units Subcutaneous TID With Meals ALFONSO Velazquez      lisinopril  2.5 mg Oral Daily Fatemeh Reid MD      ondansetron  4 mg Intravenous Q6H PRN Fatemeh Reid MD      sodium chloride  100 mL/hr Intravenous Continuous ALFONSO Velazquez 100 mL/hr (08/12/24 2208)        Today, Patient Was Seen By: ALFONSO Velazquez    **Please Note: This note may have been constructed using a voice recognition system.**

## 2024-08-13 NOTE — ANESTHESIA POSTPROCEDURE EVALUATION
Post-Op Assessment Note    CV Status:  Stable    Pain management: satisfactory to patient       Mental Status:  Alert, awake and sleepy   Hydration Status:  Euvolemic   PONV Controlled:  Controlled   Airway Patency:  Patent     Post Op Vitals Reviewed: Yes    No anethesia notable event occurred.    Staff: Anesthesiologist         JAIRO

## 2024-08-13 NOTE — ANESTHESIA PREPROCEDURE EVALUATION
EF60%    Procedure:  AMPUTATION FOOT, Lis franc amp (Left: Foot)    Relevant Problems   ANESTHESIA (within normal limits)      CARDIO   (+) Hypercholesteremia   (+) Primary hypertension      ENDO   (+) Type 2 diabetes mellitus with hyperglycemia, with long-term current use of insulin (HCC)      NEURO/PSYCH   (+) Diabetic neuropathy (HCC)      Other   (+) Acute osteomyelitis of metatarsal bone of left foot (HCC)        Physical Exam    Airway    Mallampati score: III  TM Distance: <3 FB  Neck ROM: limited     Dental       Cardiovascular  Rate: normal    Pulmonary  Pulmonary exam normal     Other Findings  Per pt denies anything remaining that is loose or removeable      Anesthesia Plan  ASA Score- 3     Anesthesia Type- IV sedation with anesthesia with ASA Monitors.         Additional Monitors:     Airway Plan:            Plan Factors-Exercise tolerance (METS): >4 METS.    Chart reviewed.    Patient summary reviewed.    Patient is not a current smoker.              Induction- intravenous.    Postoperative Plan- Plan for postoperative opioid use.     Perioperative Resuscitation Plan - Level 1 - Full Code.       Informed Consent- Anesthetic plan and risks discussed with patient.  I personally reviewed this patient with the CRNA. Discussed and agreed on the Anesthesia Plan with the CRNA..

## 2024-08-13 NOTE — OCCUPATIONAL THERAPY NOTE
Occupational Therapy         Patient Name: Art Joseph  Today's Date: 8/13/2024 08/13/24 1122   OT Last Visit   OT Visit Date 08/13/24   Note Type   Note type Cancelled Session   Cancel Reasons Patient to operating room     Roshan Ward

## 2024-08-14 LAB
ANION GAP SERPL CALCULATED.3IONS-SCNC: 9 MMOL/L (ref 4–13)
BACTERIA BLD CULT: NORMAL
BACTERIA BLD CULT: NORMAL
BUN SERPL-MCNC: 18 MG/DL (ref 5–25)
CALCIUM SERPL-MCNC: 8.5 MG/DL (ref 8.4–10.2)
CHLORIDE SERPL-SCNC: 101 MMOL/L (ref 96–108)
CK SERPL-CCNC: 21 U/L (ref 39–308)
CO2 SERPL-SCNC: 25 MMOL/L (ref 21–32)
CREAT SERPL-MCNC: 0.78 MG/DL (ref 0.6–1.3)
ERYTHROCYTE [DISTWIDTH] IN BLOOD BY AUTOMATED COUNT: 13.4 % (ref 11.6–15.1)
GFR SERPL CREATININE-BSD FRML MDRD: 94 ML/MIN/1.73SQ M
GLUCOSE SERPL-MCNC: 128 MG/DL (ref 65–140)
GLUCOSE SERPL-MCNC: 148 MG/DL (ref 65–140)
GLUCOSE SERPL-MCNC: 180 MG/DL (ref 65–140)
GLUCOSE SERPL-MCNC: 196 MG/DL (ref 65–140)
GLUCOSE SERPL-MCNC: 205 MG/DL (ref 65–140)
HCT VFR BLD AUTO: 33.9 % (ref 36.5–49.3)
HGB BLD-MCNC: 11.1 G/DL (ref 12–17)
MAGNESIUM SERPL-MCNC: 1.9 MG/DL (ref 1.9–2.7)
MCH RBC QN AUTO: 28 PG (ref 26.8–34.3)
MCHC RBC AUTO-ENTMCNC: 32.7 G/DL (ref 31.4–37.4)
MCV RBC AUTO: 85 FL (ref 82–98)
PLATELET # BLD AUTO: 296 THOUSANDS/UL (ref 149–390)
PMV BLD AUTO: 9.4 FL (ref 8.9–12.7)
POTASSIUM SERPL-SCNC: 3.9 MMOL/L (ref 3.5–5.3)
RBC # BLD AUTO: 3.97 MILLION/UL (ref 3.88–5.62)
SODIUM SERPL-SCNC: 135 MMOL/L (ref 135–147)
WBC # BLD AUTO: 13.19 THOUSAND/UL (ref 4.31–10.16)

## 2024-08-14 PROCEDURE — 99232 SBSQ HOSP IP/OBS MODERATE 35: CPT

## 2024-08-14 PROCEDURE — G0316 PR PROLONG INPT EVAL ADD15 M: HCPCS | Performed by: INTERNAL MEDICINE

## 2024-08-14 PROCEDURE — 83735 ASSAY OF MAGNESIUM: CPT

## 2024-08-14 PROCEDURE — 97530 THERAPEUTIC ACTIVITIES: CPT

## 2024-08-14 PROCEDURE — 82948 REAGENT STRIP/BLOOD GLUCOSE: CPT

## 2024-08-14 PROCEDURE — G0408 INPT/TELE FOLLOW UP 35: HCPCS | Performed by: INTERNAL MEDICINE

## 2024-08-14 PROCEDURE — 97164 PT RE-EVAL EST PLAN CARE: CPT

## 2024-08-14 PROCEDURE — 97168 OT RE-EVAL EST PLAN CARE: CPT

## 2024-08-14 PROCEDURE — 97535 SELF CARE MNGMENT TRAINING: CPT

## 2024-08-14 PROCEDURE — 82550 ASSAY OF CK (CPK): CPT

## 2024-08-14 PROCEDURE — 80048 BASIC METABOLIC PNL TOTAL CA: CPT

## 2024-08-14 PROCEDURE — 85027 COMPLETE CBC AUTOMATED: CPT

## 2024-08-14 RX ORDER — ACETAMINOPHEN 325 MG/1
650 TABLET ORAL EVERY 6 HOURS PRN
Status: DISCONTINUED | OUTPATIENT
Start: 2024-08-14 | End: 2024-08-16 | Stop reason: HOSPADM

## 2024-08-14 RX ORDER — SODIUM CHLORIDE, SODIUM GLUCONATE, SODIUM ACETATE, POTASSIUM CHLORIDE, MAGNESIUM CHLORIDE, SODIUM PHOSPHATE, DIBASIC, AND POTASSIUM PHOSPHATE .53; .5; .37; .037; .03; .012; .00082 G/100ML; G/100ML; G/100ML; G/100ML; G/100ML; G/100ML; G/100ML
100 INJECTION, SOLUTION INTRAVENOUS CONTINUOUS
Status: DISCONTINUED | OUTPATIENT
Start: 2024-08-14 | End: 2024-08-14

## 2024-08-14 RX ORDER — OXYCODONE HYDROCHLORIDE 5 MG/1
5 TABLET ORAL EVERY 6 HOURS PRN
Status: DISCONTINUED | OUTPATIENT
Start: 2024-08-14 | End: 2024-08-16 | Stop reason: HOSPADM

## 2024-08-14 RX ORDER — MORPHINE SULFATE 4 MG/ML
4 INJECTION, SOLUTION INTRAMUSCULAR; INTRAVENOUS EVERY 4 HOURS PRN
Status: DISCONTINUED | OUTPATIENT
Start: 2024-08-14 | End: 2024-08-16 | Stop reason: HOSPADM

## 2024-08-14 RX ADMIN — INSULIN LISPRO 12 UNITS: 100 INJECTION, SOLUTION INTRAVENOUS; SUBCUTANEOUS at 17:00

## 2024-08-14 RX ADMIN — CEFEPIME HYDROCHLORIDE 2000 MG: 2 INJECTION, SOLUTION INTRAVENOUS at 00:48

## 2024-08-14 RX ADMIN — ENOXAPARIN SODIUM 40 MG: 40 INJECTION SUBCUTANEOUS at 08:20

## 2024-08-14 RX ADMIN — CEFEPIME HYDROCHLORIDE 2000 MG: 2 INJECTION, SOLUTION INTRAVENOUS at 17:00

## 2024-08-14 RX ADMIN — DAPTOMYCIN 500 MG: 500 INJECTION, POWDER, LYOPHILIZED, FOR SOLUTION INTRAVENOUS at 23:15

## 2024-08-14 RX ADMIN — ACETAMINOPHEN 650 MG: 325 TABLET, FILM COATED ORAL at 03:22

## 2024-08-14 RX ADMIN — CHOLECALCIFEROL TAB 25 MCG (1000 UNIT) 1000 UNITS: 25 TAB at 08:20

## 2024-08-14 RX ADMIN — INSULIN LISPRO 1 UNITS: 100 INJECTION, SOLUTION INTRAVENOUS; SUBCUTANEOUS at 21:53

## 2024-08-14 RX ADMIN — SODIUM CHLORIDE, SODIUM GLUCONATE, SODIUM ACETATE, POTASSIUM CHLORIDE, MAGNESIUM CHLORIDE, SODIUM PHOSPHATE, DIBASIC, AND POTASSIUM PHOSPHATE 100 ML/HR: .53; .5; .37; .037; .03; .012; .00082 INJECTION, SOLUTION INTRAVENOUS at 03:22

## 2024-08-14 RX ADMIN — INSULIN LISPRO 12 UNITS: 100 INJECTION, SOLUTION INTRAVENOUS; SUBCUTANEOUS at 11:43

## 2024-08-14 RX ADMIN — INSULIN LISPRO 1 UNITS: 100 INJECTION, SOLUTION INTRAVENOUS; SUBCUTANEOUS at 11:43

## 2024-08-14 RX ADMIN — INSULIN LISPRO 2 UNITS: 100 INJECTION, SOLUTION INTRAVENOUS; SUBCUTANEOUS at 17:00

## 2024-08-14 RX ADMIN — ASPIRIN 81 MG: 81 TABLET, COATED ORAL at 08:20

## 2024-08-14 RX ADMIN — INSULIN GLARGINE 24 UNITS: 100 INJECTION, SOLUTION SUBCUTANEOUS at 21:53

## 2024-08-14 RX ADMIN — ATORVASTATIN CALCIUM 80 MG: 40 TABLET, FILM COATED ORAL at 17:00

## 2024-08-14 RX ADMIN — INSULIN LISPRO 12 UNITS: 100 INJECTION, SOLUTION INTRAVENOUS; SUBCUTANEOUS at 07:48

## 2024-08-14 RX ADMIN — CEFEPIME HYDROCHLORIDE 2000 MG: 2 INJECTION, SOLUTION INTRAVENOUS at 08:21

## 2024-08-14 RX ADMIN — LISINOPRIL 2.5 MG: 5 TABLET ORAL at 08:20

## 2024-08-14 NOTE — PLAN OF CARE
Problem: PHYSICAL THERAPY ADULT  Goal: Performs mobility at highest level of function for planned discharge setting.  See evaluation for individualized goals.  Description: Treatment/Interventions: Functional transfer training, LE strengthening/ROM, Therapeutic exercise, Endurance training, Bed mobility, Gait training          See flowsheet documentation for full assessment, interventions and recommendations.  Outcome: Progressing  Note: Prognosis: Good  Problem List: Decreased strength, Decreased endurance, Impaired balance, Decreased mobility, Orthopedic restrictions  Assessment: Pt seen this date for PT re-evaluation s/p lis franc amputation of L LE; now NWB to L LE. Upon evaluation, pt requiring modA x 2 to perform all sit <> stand transfers. Education/demonstration provided on how to safely ambulate and perform stand pivot transfers while maintaining WBS, however pt unable to successfully hop on R LE, likely d/t R prosthetic. D/t this, bed replaced behind pt with BSC and then recliner chair to facilitate OOB mobility. Prior to mobility, pt able to don R LE sock and prosthetic with set up. No true LOB experienced throughout; HR and SpO2 remained WFL on RA. The patient's AM-PAC Basic Mobility Inpatient Short Form Raw Score is 12. A Raw score of less than or equal to 16 suggests the patient may benefit from discharge to post-acute rehabilitation services. Please also refer to the recommendation of the Physical Therapist for safe discharge planning. Co treatment with OT secondary to complex medical condition of pt, possible A of 2 required to achieve and maintain transitional movements, requiring the need of skilled therapeutic intervention of 2 therapists to achieve delivery of services. Continued PT intervention indicated to address remaining deficits, increase LOF, and facilitate safe d/c to next level of care when medically appropriate. D/c recommendation at this time is rehab resource intensity level I.         Rehab Resource Intensity Level, PT: I (Maximum Resource Intensity)    See flowsheet documentation for full assessment.

## 2024-08-14 NOTE — OCCUPATIONAL THERAPY NOTE
Occupational Therapy Evaluation     Patient Name: Art Joseph  Today's Date: 8/14/2024  Problem List  Principal Problem:    Severe sepsis (HCC)  Active Problems:    Type 2 diabetes mellitus with hyperglycemia, with long-term current use of insulin (HCC)    Acute osteomyelitis of metatarsal bone of left foot (HCC)    Ambulatory dysfunction    Primary hypertension    Maggot infestation    Peripheral vascular disease (HCC)    Atelectasis    Acute cystitis without hematuria    Past Medical History  Past Medical History:   Diagnosis Date    Diabetes mellitus (HCC)     Hypertension      Past Surgical History  Past Surgical History:   Procedure Laterality Date    FOOT AMPUTATION Left 6/27/2024    Procedure: AMPUTATION LEFT FOOT 1st METATARSAL RESECTION;  Surgeon: Myron Bhakta DPM;  Location: [facility];  Service: Podiatry    IR PICC PLACEMENT SINGLE LUMEN  7/16/2024    LEG AMPUTATION THROUGH LOWER TIBIA AND FIBULA Right 7/25/2017    Procedure: AMPUTATION BELOW KNEE (BKA);  Surgeon: Mynor Sanchez MD;  [facility]: [facility];  Service: General    VT AMPUTATION FOOT TRANSMETARSAL Right 1/26/2017    Procedure: AMPUTATION TRANSMETATARSAL (TMA); OPEN;  Surgeon: Leonard Lomeli DPM;  [facility];  Service: Podiatry    VT AMPUTATION FOOT TRANSMETARSAL Left 4/26/2024    Procedure: LEFT FOOT PARTIAL FIRST RAY AMPUTATION;  Surgeon: Jennifer Rubalcava DPM;  Location: [facility];  Service: Podiatry    VT AMPUTATION METATARSAL W/TOE SINGLE Left 1/30/2017    Procedure: Left partial 1st ray amputation;  Surgeon: Leonard Lomeli DPM;  [facility];  Service: Podiatry    VT COLONOSCOPY FLX DX W/COLLJ SPEC WHEN PFRMD N/A 11/28/2018    Procedure: COLONOSCOPY;  Surgeon: González Napier MD;  Location: [facility];  Service: Gastroenterology    WOUND DEBRIDEMENT Right 1/30/2017    Procedure: DEBRIDEMENT FOOT/TOE (WASH OUT) delayed closure, LINDA;  Surgeon: Leonard Lomeli DPM;  [facility];  Service:     WOUND  "DEBRIDEMENT Left 7/11/2024    Procedure: DEBRIDEMENT FOOT/TOE (WASH OUT);  Surgeon: Myron Bhakta DPM;  Location: [facility];  Service: Podiatry           08/14/24 0932   OT Last Visit   OT Visit Date 08/14/24   Note Type   Note type Re-Evaluation   Pain Assessment   Pain Score No Pain   Restrictions/Precautions   Weight Bearing Precautions Per Order Yes   LLE Weight Bearing Per Order NWB   Braces or Orthoses Other (Comment)  (wrapping)   Other Precautions Fall Risk;Multiple lines;Chair Alarm;Bed Alarm   Home Living   Additional Comments see initial evaluation for details   Prior Function   Comments see initial evaluation for details   Subjective   Subjective \"how am I going to do that?\"   ADL   Where Assessed Other (Comment)  ([facility])   Grooming Assistance 5  Supervision/Setup   UB Bathing Assistance 4  Minimal Assistance   LB Bathing Assistance 3  Moderate Assistance   UB Dressing Assistance 4  Minimal Assistance   LB Dressing Assistance 3  Moderate Assistance   Toileting Assistance  3  Moderate Assistance   Toileting Deficit   (pt requires max (A) from BSC in standing due to requiring B UE support to RW)   Additional Comments Given fxl performance skills + medical complexity, therapist suspecting via clinical judgement + skilled analysis; pt currently requires stated assist above to perform each area of ADL d/t limitations including: generalized muscle weakness, sitting/standing balance deficits, fxl activity tolerance limitations, difficulty performing bend/reach-anterior trunk flexion, requires external assist to complete transitional movements, decreased core strength, decreased postural control, instability in stance, cognitive deficits, safety awareness concerns, and decreased problem solving abilities.   Bed Mobility   Supine to Sit 5  Supervision   Additional items Bedrails;Increased time required   Sit to Supine   (seated in chair at end of session)   Additional Comments pt on RA during session; " SPO2 WFL with no complaints of SOB, however appears mildly SOB with functional activity   Transfers   Sit to Stand 3  Moderate assistance   Additional items Assist x 2;Bedrails;Increased time required;Verbal cues  (RW)   Stand to Sit 3  Moderate assistance   Additional items Assist x 2;Increased time required;Verbal cues;Bedrails  (RW)   Toilet transfer 3  Moderate assistance   Additional items Assist x 2;Verbal cues;Increased time required;Commode  (RW)   Additional Comments pt utilizes RW during functional transfers following instruction to NWB status of L LE; pt is noncompliant with NWB at times and requires cues to correct; difficulties due to R prosthetic in place   Functional Mobility   Additional Comments pt is unable to perform functional mobility nor perform SPT to chair nor BSC, requires furniture to be removed and replaced from behind for OOB sitting tolerance in chair and use of BSC; standing tolerance of ~2 min   Balance   Static Sitting Good   Dynamic Sitting Fair +   Static Standing Fair -   Dynamic Standing Poor +   Activity Tolerance   Activity Tolerance Patient limited by fatigue   RUE Assessment   RUE Assessment WFL   LUE Assessment   LUE Assessment WFL   LUE Strength   LUE Overall Strength Within Functional Limits - able to perform ADL tasks with strength   Hand Function   Gross Motor Coordination Functional   Fine Motor Coordination Functional   Sensation   Light Touch No apparent deficits   Sharp/Dull No apparent deficits   Psychosocial   Psychosocial (WDL) WDL   Cognition   Overall Cognitive Status WFL   Arousal/Participation Alert   Attention Within functional limits   Orientation Level Oriented X4   Memory Within functional limits   Following Commands Follows all commands and directions without difficulty   Assessment   Limitation Decreased ADL status;Decreased UE strength;Decreased Safe judgement during ADL;Decreased endurance;Decreased self-care trans;Decreased high-level ADLs   Assessment  Pt is a 64 y.o. male seen for OT re-evaluation s/p admit to [facility] on 8/5/2024 w/ Severe sepsis (HCC).  Pt presents for re-evaluation due to surgical intervention to L LE and si now NWB to L LE. Co morbidities affecting pt's functional performance at time of assessment include:  DM, osteomyelitis of L metatarsal bone, maggot infestation, PVD, severe sepsis, neuropathy, hypercholesteremia, R BKA-prosthetic . Personal factors affecting pt at time of IE include:limited home support, behavioral pattern, difficulty performing ADLS, difficulty performing IADLS , limited insight into deficits, compliance, decreased initiation and engagement , and health management . Prior to admission, pt was (A) with ADLs and IADLs with use of quad cane during mobility at SNF following STR. Upon evaluation: Pt requires (S)-max (A) with use of RW during functional transfers 2* the following deficits impacting occupational performance: weakness, decreased strength, decreased balance, decreased tolerance, impaired initiation, impaired problem solving, decreased safety awareness, orthopedic restrictions, and impaired interpersonal skills. Pt to benefit from continued skilled OT tx while in the hospital to address deficits as defined above and maximize level of functional independence w ADL's and functional mobility. Occupational Performance areas to address include: bathing/shower, toilet hygiene, dressing, functional mobility, community mobility, and clothing management. The patient's raw score on the AM-PAC Daily Activity Inpatient Short Form is 17. A raw score of less than 19 suggests the patient may benefit from discharge to post-acute rehabilitation services. Discharge recommendation at this time is level I maximum resource intensity.  Pt benefited from co-evaluation of skilled OT and PT therapists in order to most appropriately address functional deficits d/t extensive assistance required for safe functional mobility, decreased activity  tolerance, and regression from functioning level prior to admission and/or onset of present illness. OT/PT objectives were addressed separately; please see PT note for specific goal areas targeted.   Goals   Patient Goals to feel better   Short Term Goal  pt will perform UE strengthening exercises   Long Term Goal #1 pt will perform functional transfers with min (A) x1 and use of RW   Long Term Goal #2 pt will perform UB/LB bathing and grooming tasks at min (A) level with LH AE PRN   Long Term Goal pt will perform functional mobility via hopping technique and NWB to L LE at min (A) level   Plan   Treatment Interventions ADL retraining;Functional transfer training;UE strengthening/ROM;Endurance training;Patient/family training;Equipment evaluation/education;Compensatory technique education;Activityengagement   Goal Expiration Date 08/28/24   OT Frequency 3-5x/wk   Discharge Recommendation   Rehab Resource Intensity Level, OT I (Maximum Resource Intensity)   AM-PAC Daily Activity Inpatient   Lower Body Dressing 2   Bathing 2   Toileting 2   Upper Body Dressing 3   Grooming 4   Eating 4   Daily Activity Raw Score 17   Daily Activity Standardized Score (Calc for Raw Score >=11) 37.26   AM-PAC Applied Cognition Inpatient   Following a Speech/Presentation 4   Understanding Ordinary Conversation 4   Taking Medications 3   Remembering Where Things Are Placed or Put Away 3   Remembering List of 4-5 Errands 3   Taking Care of Complicated Tasks 3   Applied Cognition Raw Score 20   Applied Cognition Standardized Score 41.76           Fall with Harm Risk

## 2024-08-14 NOTE — ASSESSMENT & PLAN NOTE
"Recent hospitalization from 06/25/2024 through 06/28/2024 at [facility] for osteomyelitis of the left foot requiring a left 1st metatarsal amputation/resection on 06/27/2024 by Podiatry.  The patient was also found to have maggots in his open wound.   Recent hospitalization from 07/08/2024 through 07/18/2024 at [facility] and underwent left foot wound debridement and washout by Podiatry on 07/11/2024   Discharged on PO Augmentin 875mg BID plus IV daptomycin 6mg/kg ABW x 6 weeks from bone resection through 08/21/24 per Infectious Disease's recommendations  Now presented with an infection of the left foot  8/9 MRI left foot: Osteomyelitis of the medial cuneiform.  Nonspecific minimal bone marrow edema without T1 marrow signal hypointensity in the middle cuneiform, likely related to reactive changes or early osteomyelitis.  Ulcer in the medial aspect of the foot tracking to the level of the medial cuneiform with associated diffuse soft tissue edema and enhancement, suggestive of cellulitis.  No localized/drainable fluid collection.  I appreciate Podiatry's input and Infectious Disease's input.  Continue Cefepime and Daptomycin  8/13 OR with podiatry for left Lisfranc amputation  Podiatry optimistic postsurgery of removing a lot of osteomyelitis but recommend extended soft tissue antibiotic coverage  8/13 left foot x-ray: No acute osseous abnormality.  Postoperative change.  Nonweightbearing to left foot until sutures are removed  Please see the assessment and plan for \"Severe sepsis\"  "

## 2024-08-14 NOTE — ASSESSMENT & PLAN NOTE
Lab Results   Component Value Date    HGBA1C 7.9 (H) 08/07/2024       Recent Labs     08/13/24  1058 08/13/24  1414 08/13/24 2053 08/14/24  0730   POCGLU 131 130 222* 128       Blood Sugar Average: Last 72 hrs:  (P) [id number]    Increased Humalog to 12 Units TID with meals on 08/09/2024  Increased Lantus to 24 Units SQ QHS on 08/10/2024  Hold mealtime insulin while NPO  ISS with blood glucose monitoring ACHS  Hypoglycemia protocol  Continue lisinopril for renal protection  Outpatient Endocrinology evaluation

## 2024-08-14 NOTE — PLAN OF CARE
Problem: OCCUPATIONAL THERAPY ADULT  Goal: Performs self-care activities at highest level of function for planned discharge setting.  See evaluation for individualized goals.  Description: Treatment Interventions: ADL retraining, Functional transfer training, UE strengthening/ROM, Endurance training, Patient/family training, Energy conservation, Activityengagement, Compensatory technique education          See flowsheet documentation for full assessment, interventions and recommendations.   Note: Limitation: Decreased ADL status, Decreased UE strength, Decreased Safe judgement during ADL, Decreased endurance, Decreased self-care trans, Decreased high-level ADLs  Prognosis: Good  Assessment: Pt is a 64 y.o. male seen for OT re-evaluation s/p admit to [facility] on 8/5/2024 w/ Severe sepsis (HCC).  Pt presents for re-evaluation due to surgical intervention to L LE and si now NWB to L LE. Co morbidities affecting pt's functional performance at time of assessment include:  DM, osteomyelitis of L metatarsal bone, maggot infestation, PVD, severe sepsis, neuropathy, hypercholesteremia, R BKA-prosthetic . Personal factors affecting pt at time of IE include:limited home support, behavioral pattern, difficulty performing ADLS, difficulty performing IADLS , limited insight into deficits, compliance, decreased initiation and engagement , and health management . Prior to admission, pt was (A) with ADLs and IADLs with use of quad cane during mobility at SNF following STR. Upon evaluation: Pt requires (S)-max (A) with use of RW during functional transfers 2* the following deficits impacting occupational performance: weakness, decreased strength, decreased balance, decreased tolerance, impaired initiation, impaired problem solving, decreased safety awareness, orthopedic restrictions, and impaired interpersonal skills. Pt to benefit from continued skilled OT tx while in the hospital to address deficits as defined above and maximize level  of functional independence w ADL's and functional mobility. Occupational Performance areas to address include: bathing/shower, toilet hygiene, dressing, functional mobility, community mobility, and clothing management. The patient's raw score on the -PAC Daily Activity Inpatient Short Form is 17. A raw score of less than 19 suggests the patient may benefit from discharge to post-acute rehabilitation services. Discharge recommendation at this time is level I maximum resource intensity.  Pt benefited from co-evaluation of skilled OT and PT therapists in order to most appropriately address functional deficits d/t extensive assistance required for safe functional mobility, decreased activity tolerance, and regression from functioning level prior to admission and/or onset of present illness. OT/PT objectives were addressed separately; please see PT note for specific goal areas targeted.     Rehab Resource Intensity Level, OT: I (Maximum Resource Intensity)

## 2024-08-14 NOTE — ASSESSMENT & PLAN NOTE
Developed severe sepsis on 08/09/2024 with fevers, a leukocytosis, and tachycardia.  Sepsis alert initiated at that time including IV fluid bolus  Also with a lactic acidosis, which has now resovled  Due to a surgical wound infection and osteomyelitis of the left foot  Checked blood cultures x 2 sets on 08/09/2024, which are no growth for 72 hours x 2 sets at this time  Checked a urine culture on 08/09/2024, positive for 40-49,000 Providencia stuartii.  Susceptible to cefepime  IV cefepime was added on 08/09/2024 by Infectious Disease  Follow the total CK (creatinine kinase) level while on IV daptomycin treatment  8/14 patient with fever of 102.5 possibly inflammatory response to the OR.  If fever recurs plan to repeat blood cultures  8/14 anticipate transition to oral antibiotics in the next 48 hours per ID, continue IV cefepime and daptomycin at this time

## 2024-08-14 NOTE — ASSESSMENT & PLAN NOTE
PT/OT evaluations, currently recommending STR  Postoperatively patient nonweightbearing to left foot  PT requested to reevaluate

## 2024-08-14 NOTE — ASSESSMENT & PLAN NOTE
Developed severe sepsis on 08/09/2024 with fevers, a leukocytosis, lactic acidosis, and tachycardia.  Sepsis alert initiated at that time including IV fluid bolus  Suspected due to a surgical wound infection and osteomyelitis of the left foot now with clinical improvement  Blood cultures x 2 on 08/09/2024 no growth x 5 days  Urine culture 8/9 positive for 40-49,000 Providencia stuartii.  Susceptible to cefepime  On IV cefepime and Daptomycin currently per ID likely transition to oral tomorrow   Monitor CK while on Dapto--current level remain stable/flat  Surgical wound cultures pending  8/14 patient with fever of 102.5 possibly inflammatory response to the OR.  If fever recurs plan to repeat blood cultures  8/16 transitioned to PO levaquin through 8.27

## 2024-08-14 NOTE — ASSESSMENT & PLAN NOTE
"Recent hospitalization from 06/25/2024 through 06/28/2024 at [facility] for osteomyelitis of the left foot requiring a left 1st metatarsal amputation/resection on 06/27/2024 by Podiatry.  The patient was also found to have maggots in his open wound.   Recent hospitalization from 07/08/2024 through 07/18/2024 at [facility] and underwent left foot wound debridement and washout by Podiatry on 07/11/2024   Discharged on PO Augmentin 875mg BID plus IV daptomycin 6mg/kg ABW x 6 weeks from bone resection through 08/21/24 per Infectious Disease's recommendations  Now presented with an infection of the left foot  8/9 MRI left foot: Osteomyelitis of the medial cuneiform.  Nonspecific minimal bone marrow edema without T1 marrow signal hypointensity in the middle cuneiform, likely related to reactive changes or early osteomyelitis.  Ulcer in the medial aspect of the foot tracking to the level of the medial cuneiform with associated diffuse soft tissue edema and enhancement, suggestive of cellulitis.  No localized/drainable fluid collection.  ID and Podiatry onboard ongoing recs appreciated  Continue Cefepime and Daptomycin - switched to PO levaquin by ID through 8/27 8/13 OR with podiatry for left Lisfranc amputation  Podiatry optimistic postsurgery of removing a lot of osteomyelitis but recommend extended soft tissue antibiotic coverage  8/13 left foot x-ray: No acute osseous abnormality.  Postoperative change.  Strict NWB to left foot until sutures are removed  Please see the assessment and plan for \"Severe sepsis\"  "

## 2024-08-14 NOTE — CASE MANAGEMENT
[facility] received request for authorization from Care Manager.  Authorization request submitted for: SNF  Facility Name: [facility] NPI: [id number]  Facility MD: Jose L Lopez NPI: [id number]  Authorization initiated by contacting insurance: [employer]   Via: [employer] Portal   Clinicals submitted via [employer] attachment   Pending Reference #: [id number]    Care Manager notified: Marianela hayes     Updates to authorization status will be noted in chart. Please reach out to CM for updates on any clinical information.

## 2024-08-14 NOTE — PROGRESS NOTES
[facility]  Progress Note  Name: Art Joseph I  MRN: [id number]  Unit/Bed#: 402-01 I Date of Admission: 8/5/2024   Date of Service: 8/14/2024 I Hospital Day: 8    Assessment & Plan   * Severe sepsis (HCC)  Assessment & Plan  Developed severe sepsis on 08/09/2024 with fevers, a leukocytosis, and tachycardia.  Sepsis alert initiated at that time including IV fluid bolus  Also with a lactic acidosis, which has now resovled  Due to a surgical wound infection and osteomyelitis of the left foot  Checked blood cultures x 2 sets on 08/09/2024, which are no growth for 72 hours x 2 sets at this time  Checked a urine culture on 08/09/2024, positive for 40-49,000 Providencia stuartii.  Susceptible to cefepime  IV cefepime was added on 08/09/2024 by Infectious Disease  Follow the total CK (creatinine kinase) level while on IV daptomycin treatment  8/14 patient with fever of 102.5 possibly inflammatory response to the OR.  If fever recurs plan to repeat blood cultures  8/14 anticipate transition to oral antibiotics in the next 48 hours per ID, continue IV cefepime and daptomycin at this time    Acute osteomyelitis of metatarsal bone of left foot (HCC)  Assessment & Plan  Recent hospitalization from 06/25/2024 through 06/28/2024 at [facility] for osteomyelitis of the left foot requiring a left 1st metatarsal amputation/resection on 06/27/2024 by Podiatry.  The patient was also found to have maggots in his open wound.   Recent hospitalization from 07/08/2024 through 07/18/2024 at [facility] and underwent left foot wound debridement and washout by Podiatry on 07/11/2024   Discharged on PO Augmentin 875mg BID plus IV daptomycin 6mg/kg ABW x 6 weeks from bone resection through 08/21/24 per Infectious Disease's recommendations  Now presented with an infection of the left foot  8/9 MRI left foot: Osteomyelitis of the medial cuneiform.   "Nonspecific minimal bone marrow edema without T1 marrow signal hypointensity in the middle cuneiform, likely related to reactive changes or early osteomyelitis.  Ulcer in the medial aspect of the foot tracking to the level of the medial cuneiform with associated diffuse soft tissue edema and enhancement, suggestive of cellulitis.  No localized/drainable fluid collection.  I appreciate Podiatry's input and Infectious Disease's input.  Continue Cefepime and Daptomycin  8/13 OR with podiatry for left Lisfranc amputation  Podiatry optimistic postsurgery of removing a lot of osteomyelitis but recommend extended soft tissue antibiotic coverage  8/13 left foot x-ray: No acute osseous abnormality.  Postoperative change.  Nonweightbearing to left foot until sutures are removed  Please see the assessment and plan for \"Severe sepsis\"    Acute cystitis without hematuria  Assessment & Plan  8/9 urine culture positive for 40-49,000 Providencia stuartii  Susceptible to IV cefepime which patient is on  ID following, input appreciated  See plan under severe sepsis    Atelectasis  Assessment & Plan  Incentive spirometry 10 times per hour while awake    Peripheral vascular disease (HCC)  Assessment & Plan  Continue aspirin 81 mg PO Qdaily and high-intensity statin therapy with atorvastatin 80 mg PO Qdaily with dinner  8/7 ABIs similar to prior, image result reviewed  Outpatient follow-up with Vascular Surgery    Adamt infestation  Assessment & Plan  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    Primary hypertension  Assessment & Plan  Patient with a history of HTN  Home medication regimen: Lisinopril 2.5 mg daily  Continue home medications  Most Recent Blood Pressure: 134/68   Continue monitoring BP    Ambulatory dysfunction  Assessment & Plan  PT/OT evaluations, currently recommending STR  Postoperatively patient nonweightbearing to left foot  PT requested to reevaluate    Type 2 diabetes mellitus with hyperglycemia, " with long-term current use of insulin ([facility])  Assessment & Plan  Lab Results   Component Value Date    HGBA1C 7.9 (H) 08/07/2024       Recent Labs     08/13/24  1058 08/13/24  1414 08/13/24 2053 08/14/24  0730   POCGLU 131 130 222* 128       Blood Sugar Average: Last 72 hrs:  (P) [id number]    Increased Humalog to 12 Units TID with meals on 08/09/2024  Increased Lantus to 24 Units SQ QHS on 08/10/2024  Hold mealtime insulin while NPO  ISS with blood glucose monitoring ACHS  Hypoglycemia protocol  Continue lisinopril for renal protection  Outpatient Endocrinology evaluation                 VTE Pharmacologic Prophylaxis: VTE Score: 7 High Risk (Score >/= 5) - Pharmacological DVT Prophylaxis Ordered: enoxaparin (Lovenox). Sequential Compression Devices Ordered.    Mobility:   Basic Mobility Inpatient Raw Score: 20  JH-HLM Goal: 6: Walk 10 steps or more  JH-HLM Achieved: 2: Bed activities/Dependent transfer  JH-HLM Goal NOT achieved. Continue with multidisciplinary rounding and encourage appropriate mobility to improve upon JH-HLM goals.    Patient Centered Rounds: I performed bedside rounds with nursing staff today.   Discussions with Specialists or Other Care Team Provider: Infectious disease, case management    Education and Discussions with Family / Patient: Patient declined call to .     Total Time Spent on Date of Encounter in care of patient:  mins. This time was spent on one or more of the following: performing physical exam; counseling and coordination of care; obtaining or reviewing history; documenting in the medical record; reviewing/ordering tests, medications or procedures; communicating with other healthcare professionals and discussing with patient's family/caregivers.    Current Length of Stay: 8 day(s)  Current Patient Status: Inpatient   Certification Statement: The patient will continue to require additional inpatient hospital stay due to IV antibiotics and postsurgical wound  management  Discharge Plan: Anticipate discharge in 48-72 hrs to rehab facility.    Code Status: Level 1 - Full Code    Subjective:   Patient is feeling good this morning and denies pain.  He states he did not know he was not supposed to put weight on his left foot since surgery.  He had bleeding through dressing earlier but it appears stable at this time.  He denies constipation or diarrhea.  He is hopeful to be transition to p.o. antibiotics soon.  All questions and concerns addressed.    Objective:     Vitals:   Temp (24hrs), Av.2 °F (36.8 °C), Min:97 °F (36.1 °C), Max:102.5 °F (39.2 °C)    Temp:  [97 °F (36.1 °C)-102.5 °F (39.2 °C)] 98.4 °F (36.9 °C)  HR:  [] 94  Resp:  [16-24] 20  BP: (103-139)/(57-95) 134/68  SpO2:  [91 %-99 %] 95 %  Body mass index is 31.42 kg/m².     Input and Output Summary (last 24 hours):     Intake/Output Summary (Last 24 hours) at 2024 1058  Last data filed at 2024 0846  Gross per 24 hour   Intake 2330 ml   Output 2650 ml   Net -320 ml       Physical Exam:   Physical Exam  Vitals and nursing note reviewed.   Constitutional:       General: He is not in acute distress.     Appearance: Normal appearance. He is obese.   HENT:      Head: Normocephalic.      Nose: Nose normal.      Mouth/Throat:      Mouth: Mucous membranes are moist.      Pharynx: Oropharynx is clear.   Eyes:      Conjunctiva/sclera: Conjunctivae normal.   Cardiovascular:      Rate and Rhythm: Normal rate and regular rhythm.      Pulses: Normal pulses.      Heart sounds: Normal heart sounds. No murmur heard.  Pulmonary:      Effort: Pulmonary effort is normal. No respiratory distress.      Breath sounds: Normal breath sounds. No wheezing or rhonchi.   Abdominal:      General: Bowel sounds are decreased. There is no distension.      Palpations: Abdomen is soft.      Tenderness: There is no abdominal tenderness. There is no guarding.   Musculoskeletal:      Right lower leg: No edema.      Left lower leg: No  edema.      Right Lower Extremity: Right leg is amputated below knee.   Skin:     General: Skin is warm and dry.      Comments: Dressing to left foot CDI   Neurological:      General: No focal deficit present.      Mental Status: He is alert and oriented to person, place, and time. Mental status is at baseline.   Psychiatric:         Mood and Affect: Mood normal.         Thought Content: Thought content normal.          Additional Data:     Labs:  Results from last 7 days   Lab Units 08/14/24  0436 08/13/24  0444   WBC Thousand/uL 13.19* 9.63   HEMOGLOBIN g/dL 11.1* 11.3*   HEMATOCRIT % 33.9* 35.8*   PLATELETS Thousands/uL 296 247   SEGS PCT %  --  56   LYMPHO PCT %  --  23   MONO PCT %  --  13*   EOS PCT %  --  6     Results from last 7 days   Lab Units 08/14/24  0436 08/13/24  0444 08/12/24  0509   SODIUM mmol/L 135   < > 136   POTASSIUM mmol/L 3.9   < > 3.7   CHLORIDE mmol/L 101   < > 105   CO2 mmol/L 25   < > 22   BUN mg/dL 18   < > 12   CREATININE mg/dL 0.78   < > 0.66   ANION GAP mmol/L 9   < > 9   CALCIUM mg/dL 8.5   < > 8.2*   ALBUMIN g/dL  --   --  3.1*   TOTAL BILIRUBIN mg/dL  --   --  0.32   ALK PHOS U/L  --   --  102   ALT U/L  --   --  15   AST U/L  --   --  13   GLUCOSE RANDOM mg/dL 148*   < > 168*    < > = values in this interval not displayed.         Results from last 7 days   Lab Units 08/14/24  0730 08/13/24  2053 08/13/24  1414 08/13/24  1058 08/13/24  0735 08/12/24  2124 08/12/24  1557 08/12/24  1104 08/12/24  0700 08/11/24  2143 08/11/24  1606 08/11/24  1045   POC GLUCOSE mg/dl 128 222* 130 131 161* 299* 256* 219* 161* 220* 185* 245*         Results from last 7 days   Lab Units 08/12/24  0509 08/11/24  0450 08/10/24  0452 08/09/24  1246 08/09/24  1008 08/08/24  0516   LACTIC ACID mmol/L  --   --  0.7 2.4* 2.7*  --    PROCALCITONIN ng/ml 0.26* 0.38* 0.63*  --  0.51* 0.07       Lines/Drains:  Invasive Devices       Peripherally Inserted Central Catheter Line  Duration             PICC Line  07/16/24 Right Basilic 28 days                    Central Line:  Goal for removal: Will discontinue when meds requiring line are completed.             Imaging: Reviewed radiology reports from this admission including: xray(s)    Recent Cultures (last 7 days):   Results from last 7 days   Lab Units 08/13/24  1558 08/09/24  1722 08/09/24  0955 08/09/24  0902   BLOOD CULTURE   --   --  No Growth After 4 Days. No Growth After 4 Days.   GRAM STAIN RESULT  No Polys or Bacteria seen  --   --   --    URINE CULTURE   --  40,000-49,000 cfu/ml Providencia stuartii*  --   --        Last 24 Hours Medication List:   Current Facility-Administered Medications   Medication Dose Route Frequency Provider Last Rate    acetaminophen  650 mg Oral Q6H PRN Rashid Rubalcava MD      aspirin  81 mg Oral Daily ALFONSO Velazquez      atorvastatin  80 mg Oral Daily With Dinner ALFONSO Velazquez      cefepime  2,000 mg Intravenous Q8H ALFONSO Velazquez 2,000 mg (08/14/24 0821)    Cholecalciferol  1,000 Units Oral Daily ALFONSO Velazquez      DAPTOmycin (CUBICIN) 500 mg in sodium chloride 0.9 % 50 mL IVPB  6 mg/kg (Adjusted) Intravenous Q24H ALFONSO Velazquez 500 mg (08/13/24 2227)    enoxaparin  40 mg Subcutaneous Daily ALFONSO Velazquez      insulin glargine  24 Units Subcutaneous HS ALFONSO Velazquez      insulin lispro  1-5 Units Subcutaneous HS ALFONSO Velazquez      insulin lispro  1-6 Units Subcutaneous TID AC ALFONSO Velazquez      insulin lispro  12 Units Subcutaneous TID With Meals ALFONSO Velazquez      lisinopril  2.5 mg Oral Daily ALFONSO Velazquez      morphine injection  4 mg Intravenous Q4H PRN Rashid Rubalcava MD      multi-electrolyte  100 mL/hr Intravenous Continuous Rashid Rubalcava  mL/hr (08/14/24 0322)    ondansetron  4 mg Intravenous Q6H PRN ALFONSO Velazquez      oxyCODONE  5 mg Oral Q6H PRN Rashid Guajardo  MD Khadar          Today, Patient Was Seen By: ALFONSO Velazquez    **Please Note: This note may have been constructed using a voice recognition system.**

## 2024-08-14 NOTE — ASSESSMENT & PLAN NOTE
Patient with a history of HTN  Home medication regimen: Lisinopril 2.5 mg daily  Continue home medications  Most Recent Blood Pressure: 134/68   Continue monitoring BP

## 2024-08-14 NOTE — PROGRESS NOTES
VIRTUAL CARE DOCUMENTATION:     1. This service was provided via Telemedicine using [employer]     2. Parties in the room with patient during teleconsult Patient only    3. Confidentiality My office door was closed     4. Participants No one else was in the room    5. Patient acknowledged consent and understanding of privacy and security of the  Telemedicine consult. I informed the patient that I have reviewed their record in Epic and presented the opportunity for them to ask any questions regarding the visit today.  The patient agreed to participate.    6. Time spent 3 minutes          Progress Note - Infectious Disease   Art Joseph 64 y.o. male MRN: [id number]  Unit/Bed#: 402-01 Encounter: [id number]      Impression/Plan:  1.  Systemic inflammatory response syndrome.  New onset fever and with a rising WBC count.  Possibly all secondary to a Providencia UTI.  Consideration for the possibility of a progressive foot infection.  Consideration for the possibility of a catheter related bacteremia although blood cultures remain negative.  The temperature and the white cell count have come down.  Chest x-ray without pneumonia.  New postoperative fever possibly inflammatory response to the procedure.  White cell count is also increased.  -Antibiotics as below  -Follow-up blood cultures  -If fever recurs, recommend repeat blood cultures x 2 sets  -Recheck CBC with differential and BMP to make sure no worsening toxicities     2.  Nonhealing infected left foot wound with ongoing osteomyelitis.  This despite extensive surgical interventions and broad-spectrum antibiotics with a plan to treat for 6 weeks through 8/21/24.  The wound is not really healing and there is significant surrounding swelling and erythema.  There is also increased drainage.  Seems to be tolerating the antibiotics.  Ceretec scan consistent with osteomyelitis.  MRI with osteomyelitis of the cuneiform bones no abscess seen.  CPK on 8/13/2024 is  25.  Patient now status post Lisfranc amputation with surgical cure of any bone infection.  -Continue daptomycin and cefepime for now  -Follow-up operative cultures and adjust antibiotics as needed  -Anticipate transition to oral antibiotics in the next 48 hours  -Recheck CBC with differential, BMP to make sure no developing toxicities  -Local wound care  -Close podiatry follow-up     3.  Providencia UTI.  Seems to have improved with broadening the gram-negative coverage.  -Continue cefepime as above     4.  Diabetes mellitus.  With hyperglycemia.  Poorly controlled with a hemoglobin A1c of 9.7 in the recent past.  -Tighten diabetic control to improve leukocyte function and wound healing     5.  Peripheral arterial disease.  Consider vascular consult    I spent 50 minutes in evaluation of the patient of which 30 minutes was in counseling/coordination of care    Discussed with primary service the plan to continue the daptomycin and cefepime over the next 24 to 48 hours and they agree with the plan.    Antibiotics:  Daptomycin  Cefepime 6  Postop day 1    Subjective:  Patient developed high fever overnight but he has been asymptomatic and did not realize he had the fever; no nausea, vomiting, diarrhea; no cough, shortness of breath; no pain. No new symptoms.    Objective:  Vitals:  Temp:  [97 °F (36.1 °C)-102.5 °F (39.2 °C)] 98.4 °F (36.9 °C)  HR:  [] 94  Resp:  [16-24] 20  BP: (103-144)/(57-95) 134/68  SpO2:  [91 %-99 %] 95 %  Temp (24hrs), Av.2 °F (36.8 °C), Min:97 °F (36.1 °C), Max:102.5 °F (39.2 °C)  Current: Temperature: 98.4 °F (36.9 °C)    Documented physical exam has been primarily done by the patient's nurse and/or the primary service due to limited examination abilities on telemedicine    Physical Exam:   General Appearance:  Alert, interactive, nontoxic, no acute distress.   Throat: Oropharynx moist without lesions.    Lungs:   Clear to auscultation bilaterally; no wheezes, rhonchi or rales;  respirations unlabored   Heart:  RRR; no murmur, rub or gallop   Abdomen:   Soft, non-tender, non-distended, positive bowel sounds.     Extremities: Left foot dressed with a dry dressing in place.   Skin: No new rashes or lesions. No draining wounds noted.       Labs, Imaging, & Other studies:   All pertinent labs and imaging studies were personally reviewed  Results from last 7 days   Lab Units 08/14/24  0436 08/13/24  0444 08/12/24  0509   WBC Thousand/uL 13.19* 9.63 9.39   HEMOGLOBIN g/dL 11.1* 11.3* 11.4*   PLATELETS Thousands/uL 296 247 223     Results from last 7 days   Lab Units 08/14/24  0436 08/13/24  0444 08/12/24  0509 08/11/24  0450 08/10/24  0452   SODIUM mmol/L 135 137 136 135 136   POTASSIUM mmol/L 3.9 3.9 3.7 3.8 3.9   CHLORIDE mmol/L 101 105 105 106 107   CO2 mmol/L 25 24 22 23 22   BUN mg/dL 18 13 12 19 20   CREATININE mg/dL 0.78 0.62 0.66 0.75 0.87   EGFR ml/min/1.73sq m 94 104 101 96 90   CALCIUM mg/dL 8.5 8.1* 8.2* 8.6 8.7   AST U/L  --   --  13 9* 7*   ALT U/L  --   --  15 9 7   ALK PHOS U/L  --   --  102 99 94     Results from last 7 days   Lab Units 08/13/24  1558 08/09/24  1722 08/09/24  0955 08/09/24  0902   BLOOD CULTURE   --   --  No Growth After 4 Days. No Growth After 4 Days.   GRAM STAIN RESULT  No Polys or Bacteria seen  --   --   --    URINE CULTURE   --  40,000-49,000 cfu/ml Providencia stuartii*  --   --      Results from last 7 days   Lab Units 08/12/24  0509 08/11/24  0450 08/10/24  0452 08/09/24  1008 08/08/24  0516   PROCALCITONIN ng/ml 0.26* 0.38* 0.63* 0.51* 0.07       X-ray left foot.  Status post Lisfranc amputation    Images personally reviewed by me in PACS and interpreted by me

## 2024-08-14 NOTE — PHYSICAL THERAPY NOTE
Physical Therapy Re-Evaluation    Patient Name: Art Joseph    Today's Date: 8/14/2024     Problem List  Principal Problem:    Severe sepsis (HCC)  Active Problems:    Type 2 diabetes mellitus with hyperglycemia, with long-term current use of insulin (HCC)    Acute osteomyelitis of metatarsal bone of left foot (HCC)    Ambulatory dysfunction    Primary hypertension    Maggot infestation    Peripheral vascular disease (HCC)    Atelectasis    Acute cystitis without hematuria       Past Medical History  Past Medical History:   Diagnosis Date    Diabetes mellitus (HCC)     Hypertension         Past Surgical History  Past Surgical History:   Procedure Laterality Date    FOOT AMPUTATION Left 6/27/2024    Procedure: AMPUTATION LEFT FOOT 1st METATARSAL RESECTION;  Surgeon: Myron Bhakta DPM;  [facility];  Service: Podiatry    IR PICC PLACEMENT SINGLE LUMEN  7/16/2024    LEG AMPUTATION THROUGH LOWER TIBIA AND FIBULA Right 7/25/2017    Procedure: AMPUTATION BELOW KNEE (BKA);  Surgeon: Mynor Sanchez MD;  [facility];  Service: General    OR AMPUTATION FOOT TRANSMETARSAL Right 1/26/2017    Procedure: AMPUTATION TRANSMETATARSAL (TMA); OPEN;  Surgeon: Leonard Lomeli DPM;  [facility];  Service: Podiatry    OR AMPUTATION FOOT TRANSMETARSAL Left 4/26/2024    Procedure: LEFT FOOT PARTIAL FIRST RAY AMPUTATION;  Surgeon: Jennifer uRbalcava DPM;  [facility];  Service: Podiatry    OR AMPUTATION METATARSAL W/TOE SINGLE Left 1/30/2017    Procedure: Left partial 1st ray amputation;  Surgeon: Leonard Lomeli DPM;  [facility];  Service: Podiatry    OR COLONOSCOPY FLX DX W/COLLJ SPEC WHEN PFRMD N/A 11/28/2018    Procedure: COLONOSCOPY;  Surgeon: González Napier MD;  [facility];  Service: Gastroenterology    WOUND DEBRIDEMENT Right 1/30/2017    Procedure: DEBRIDEMENT FOOT/TOE (WASH OUT) delayed closure, LINDA;  Surgeon: Leonard  "BERNY Lomeli;  [facility];  Service:     WOUND DEBRIDEMENT Left 7/11/2024    Procedure: DEBRIDEMENT FOOT/TOE (WASH OUT);  Surgeon: Myron Bhakta DPM;  [facility];  Service: Podiatry           08/14/24 0933   PT Last Visit   PT Visit Date 08/14/24   Note Type   Note type Re-Evaluation   Pain Assessment   Pain Assessment Tool 0-10   Pain Score No Pain   Restrictions/Precautions   Weight Bearing Precautions Per Order Yes: NWB L LE   Braces or Orthoses Other (Comment)  (R prosthesis)   Other Precautions Fall Risk;Multiple lines;WBS;Bed Alarm;Chair Alarm;Contact/isolation   Home Living   Additional Comments see initial eval   Prior Function   Comments see initial eval   General   Family/Caregiver Present No   Cognition   Overall Cognitive Status WFL   Arousal/Participation Alert   Attention Within functional limits   Orientation Level Oriented X4   Following Commands Follows all commands and directions without difficulty   Subjective   Subjective \"I don't know if I'm comfortable with that\"   RLE Assessment   RLE Assessment X  (s/p BKA; residual limb 4-/5 grossly)   LLE Assessment   LLE Assessment X  (4-/5 grossly; NT at ankle d/t NWB status)   Bed Mobility   Supine to Sit 5  Supervision   Additional items Increased time required;Verbal cues;HOB elevated;Bedrails   Sit to Supine   (pt OOB at end of session)   Transfers   Sit to Stand 3  Moderate assistance   Additional items Assist x 2;Increased time required;Verbal cues   Stand to Sit 3  Moderate assistance   Additional items Assist x 2;Increased time required;Verbal cues   Stand pivot Unable to assess   Toilet transfer 3  Moderate assistance   Additional items Assist x 2;Increased time required;Verbal cues;Commode   Additional Comments RW used   Ambulation/Elevation   Gait pattern Not appropriate;Not tested   Balance   Static Sitting Good   Dynamic Sitting Good   Static Standing Fair -   Dynamic Standing Poor +   Endurance Deficit "   Endurance Deficit Yes   Endurance Deficit Description pt appears fatigued following transfer training   Activity Tolerance   Activity Tolerance Patient limited by fatigue   Assessment   Prognosis Good   Problem List Decreased strength;Decreased endurance;Impaired balance;Decreased mobility;Orthopedic restrictions   Assessment Pt seen this date for PT re-evaluation s/p lis franc amputation of L LE; now NWB to L LE. Upon evaluation, pt requiring modA x 2 to perform all sit <> stand transfers. Education/demonstration provided on how to safely ambulate and perform stand pivot transfers while maintaining WBS, however pt unable to successfully hop on R LE, likely d/t R prosthetic. D/t this, bed replaced behind pt with BSC and then recliner chair to facilitate OOB mobility. Prior to mobility, pt able to don R LE sock and prosthetic with set up. No true LOB experienced throughout; HR and SpO2 remained WFL on RA. The patient's AM-PAC Basic Mobility Inpatient Short Form Raw Score is 12. A Raw score of less than or equal to 16 suggests the patient may benefit from discharge to post-acute rehabilitation services. Please also refer to the recommendation of the Physical Therapist for safe discharge planning. Co treatment with OT secondary to complex medical condition of pt, possible A of 2 required to achieve and maintain transitional movements, requiring the need of skilled therapeutic intervention of 2 therapists to achieve delivery of services. Continued PT intervention indicated to address remaining deficits, increase LOF, and facilitate safe d/c to next level of care when medically appropriate. D/c recommendation at this time is rehab resource intensity level I.   Goals   Patient Goals to get stronger   LTG Expiration Date 08/28/24   Long Term Goal #1 Pt will participate in B LE strengthening exercises to facilitate improved functional activity tolerance. Pt will perform all functional transfers and bed mobility mod(I)  with good safety awareness. Pt will ambulate 50' mod(I) with LRAD while maintaining good functional dynamic balance.   PT Treatment Day 1   Plan   Treatment/Interventions Functional transfer training;LE strengthening/ROM;Therapeutic exercise;Endurance training;Bed mobility;Gait training   PT Frequency 3-5x/wk   Discharge Recommendation   Rehab Resource Intensity Level, PT I (Maximum Resource Intensity)   AM-PAC Basic Mobility Inpatient   Turning in Flat Bed Without Bedrails 3   Lying on Back to Sitting on Edge of Flat Bed Without Bedrails 3   Moving Bed to Chair 2   Standing Up From Chair Using Arms 2   Walk in Room 1   Climb 3-5 Stairs With Railing 1   Basic Mobility Inpatient Raw Score 12   Basic Mobility Standardized Score 32.23   [facility] Highest Level Of Mobility   -[facility] Goal 4: Move to chair/commode   -[facility] Achieved 5: Stand (1 or more minutes)   End of Consult   Patient Position at End of Consult Bedside chair;Bed/Chair alarm activated;All needs within reach

## 2024-08-14 NOTE — CASE MANAGEMENT
Case Management Discharge Planning Note    Patient name Art Joseph  Location /402-01 MRN [id number]  : 1960 Date 2024       Current Admission Date: 2024  Current Admission Diagnosis:Severe sepsis ([facility])   Patient Active Problem List    Diagnosis Date Noted Date Diagnosed    Acute cystitis without hematuria 2024     Severe sepsis (HCC) 2024     Atelectasis 2024     Peripheral vascular disease (HCC) 2024     MRSA colonization 2024     Open wound of left foot 2024     Maggot infestation 2024     Elevated platelet count 2024     Candidiasis, intertrigo 2024     Cellulitis of left lower extremity 2024     Class 1 obesity in adult 2021     Vitamin D deficiency 2019     Ambulatory dysfunction 10/17/2017     Hx of right BKA ([facility]) 2017     Primary hypertension 2017     Hypercholesteremia 2017     Acute osteomyelitis of metatarsal bone of left foot ([facility]) 2017     Type 2 diabetes mellitus with hyperglycemia, with long-term current use of insulin ([facility]) 2017     Diabetic neuropathy ([facility]) 2017       LOS (days): 8  Geometric Mean LOS (GMLOS) (days): 4.1  Days to GMLOS:-4     OBJECTIVE:  Risk of Unplanned Readmission Score: 20.16         Current admission status: Inpatient   Preferred Pharmacy:   [facility] [place] [address]  [address] [facility] [address]  Phone: [phone] Fax: [phone]    [facility] - [place] - [address] [facility] [facility]  [address] [facility] [address]  Phone: [phone] Fax: [phone]    [facility] #[id number] - [place] - [address]  [address] [facility] [address]  Phone: [phone] Fax: [phone]    Primary Care Provider: Kerry Christine MD    Primary Insurance: AYLA  REP  Secondary Insurance:     DISCHARGE DETAILS:        Auth started with  discharge support to return to [facility] anticipated dc 48 hrs.

## 2024-08-14 NOTE — PLAN OF CARE
Problem: Prexisting or High Potential for Compromised Skin Integrity  Goal: Skin integrity is maintained or improved  Description: INTERVENTIONS:  - Identify patients at risk for skin breakdown  - Assess and monitor skin integrity  - Assess and monitor nutrition and hydration status  - Monitor labs   - Assess for incontinence   - Turn and reposition patient  - Assist with mobility/ambulation  - Relieve pressure over bony prominences  - Avoid friction and shearing  - Provide appropriate hygiene as needed including keeping skin clean and dry  - Evaluate need for skin moisturizer/barrier cream  - Collaborate with interdisciplinary team   - Patient/family teaching  - Consider wound care consult   Outcome: Progressing     Problem: PAIN - ADULT  Goal: Verbalizes/displays adequate comfort level or baseline comfort level  Description: Interventions:  - Encourage patient to monitor pain and request assistance  - Assess pain using appropriate pain scale (0-10)  - Administer analgesics based on type and severity of pain and evaluate response  - Implement non-pharmacological measures as appropriate and evaluate response  - Consider cultural and social influences on pain and pain management  - Notify physician/advanced practitioner if interventions unsuccessful or patient reports new pain  Outcome: Progressing     Problem: INFECTION - ADULT  Goal: Absence or prevention of progression during hospitalization  Description: INTERVENTIONS:  - Assess and monitor for signs and symptoms of infection  - Monitor lab/diagnostic results  - Monitor all insertion sites, i.e. indwelling lines, tubes, and drains  - Monitor endotracheal if appropriate and nasal secretions for changes in amount and color  - [place] appropriate cooling/warming therapies per order  - Administer medications as ordered  - Instruct and encourage patient and family to use good hand hygiene technique  - Identify and instruct in appropriate isolation precautions for  identified infection/condition  Outcome: Progressing

## 2024-08-15 LAB
ANION GAP SERPL CALCULATED.3IONS-SCNC: 8 MMOL/L (ref 4–13)
BUN SERPL-MCNC: 18 MG/DL (ref 5–25)
CALCIUM SERPL-MCNC: 8.4 MG/DL (ref 8.4–10.2)
CHLORIDE SERPL-SCNC: 103 MMOL/L (ref 96–108)
CK SERPL-CCNC: 21 U/L (ref 39–308)
CO2 SERPL-SCNC: 27 MMOL/L (ref 21–32)
CREAT SERPL-MCNC: 0.71 MG/DL (ref 0.6–1.3)
ERYTHROCYTE [DISTWIDTH] IN BLOOD BY AUTOMATED COUNT: 13.7 % (ref 11.6–15.1)
GFR SERPL CREATININE-BSD FRML MDRD: 98 ML/MIN/1.73SQ M
GLUCOSE SERPL-MCNC: 127 MG/DL (ref 65–140)
GLUCOSE SERPL-MCNC: 136 MG/DL (ref 65–140)
GLUCOSE SERPL-MCNC: 162 MG/DL (ref 65–140)
GLUCOSE SERPL-MCNC: 222 MG/DL (ref 65–140)
GLUCOSE SERPL-MCNC: 232 MG/DL (ref 65–140)
GLUCOSE SERPL-MCNC: 251 MG/DL (ref 65–140)
HCT VFR BLD AUTO: 31.6 % (ref 36.5–49.3)
HGB BLD-MCNC: 10.2 G/DL (ref 12–17)
MCH RBC QN AUTO: 28.1 PG (ref 26.8–34.3)
MCHC RBC AUTO-ENTMCNC: 32.3 G/DL (ref 31.4–37.4)
MCV RBC AUTO: 87 FL (ref 82–98)
PLATELET # BLD AUTO: 313 THOUSANDS/UL (ref 149–390)
PMV BLD AUTO: 9.1 FL (ref 8.9–12.7)
POTASSIUM SERPL-SCNC: 3.6 MMOL/L (ref 3.5–5.3)
RBC # BLD AUTO: 3.63 MILLION/UL (ref 3.88–5.62)
SODIUM SERPL-SCNC: 138 MMOL/L (ref 135–147)
WBC # BLD AUTO: 12.67 THOUSAND/UL (ref 4.31–10.16)

## 2024-08-15 PROCEDURE — 80048 BASIC METABOLIC PNL TOTAL CA: CPT

## 2024-08-15 PROCEDURE — 82948 REAGENT STRIP/BLOOD GLUCOSE: CPT

## 2024-08-15 PROCEDURE — 97110 THERAPEUTIC EXERCISES: CPT

## 2024-08-15 PROCEDURE — 85027 COMPLETE CBC AUTOMATED: CPT

## 2024-08-15 PROCEDURE — G0316 PR PROLONG INPT EVAL ADD15 M: HCPCS | Performed by: INTERNAL MEDICINE

## 2024-08-15 PROCEDURE — 99232 SBSQ HOSP IP/OBS MODERATE 35: CPT | Performed by: NURSE PRACTITIONER

## 2024-08-15 PROCEDURE — G0408 INPT/TELE FOLLOW UP 35: HCPCS | Performed by: INTERNAL MEDICINE

## 2024-08-15 PROCEDURE — 82550 ASSAY OF CK (CPK): CPT

## 2024-08-15 PROCEDURE — 97530 THERAPEUTIC ACTIVITIES: CPT

## 2024-08-15 RX ADMIN — LISINOPRIL 2.5 MG: 5 TABLET ORAL at 09:37

## 2024-08-15 RX ADMIN — INSULIN LISPRO 12 UNITS: 100 INJECTION, SOLUTION INTRAVENOUS; SUBCUTANEOUS at 16:10

## 2024-08-15 RX ADMIN — INSULIN GLARGINE 24 UNITS: 100 INJECTION, SOLUTION SUBCUTANEOUS at 22:06

## 2024-08-15 RX ADMIN — ASPIRIN 81 MG: 81 TABLET, COATED ORAL at 09:37

## 2024-08-15 RX ADMIN — ATORVASTATIN CALCIUM 80 MG: 40 TABLET, FILM COATED ORAL at 16:07

## 2024-08-15 RX ADMIN — CEFEPIME HYDROCHLORIDE 2000 MG: 2 INJECTION, SOLUTION INTRAVENOUS at 09:37

## 2024-08-15 RX ADMIN — ENOXAPARIN SODIUM 40 MG: 40 INJECTION SUBCUTANEOUS at 09:37

## 2024-08-15 RX ADMIN — DAPTOMYCIN 500 MG: 500 INJECTION, POWDER, LYOPHILIZED, FOR SOLUTION INTRAVENOUS at 22:06

## 2024-08-15 RX ADMIN — CEFEPIME HYDROCHLORIDE 2000 MG: 2 INJECTION, SOLUTION INTRAVENOUS at 01:03

## 2024-08-15 RX ADMIN — INSULIN LISPRO 12 UNITS: 100 INJECTION, SOLUTION INTRAVENOUS; SUBCUTANEOUS at 11:59

## 2024-08-15 RX ADMIN — INSULIN LISPRO 1 UNITS: 100 INJECTION, SOLUTION INTRAVENOUS; SUBCUTANEOUS at 22:06

## 2024-08-15 RX ADMIN — INSULIN LISPRO 2 UNITS: 100 INJECTION, SOLUTION INTRAVENOUS; SUBCUTANEOUS at 11:59

## 2024-08-15 RX ADMIN — INSULIN LISPRO 3 UNITS: 100 INJECTION, SOLUTION INTRAVENOUS; SUBCUTANEOUS at 16:11

## 2024-08-15 RX ADMIN — CHOLECALCIFEROL TAB 25 MCG (1000 UNIT) 1000 UNITS: 25 TAB at 09:37

## 2024-08-15 RX ADMIN — INSULIN LISPRO 12 UNITS: 100 INJECTION, SOLUTION INTRAVENOUS; SUBCUTANEOUS at 09:36

## 2024-08-15 RX ADMIN — CEFEPIME HYDROCHLORIDE 2000 MG: 2 INJECTION, SOLUTION INTRAVENOUS at 16:07

## 2024-08-15 NOTE — PLAN OF CARE
Problem: Prexisting or High Potential for Compromised Skin Integrity  Goal: Skin integrity is maintained or improved  Description: INTERVENTIONS:  - Identify patients at risk for skin breakdown  - Assess and monitor skin integrity  - Assess and monitor nutrition and hydration status  - Monitor labs   - Assess for incontinence   - Turn and reposition patient  - Assist with mobility/ambulation  - Relieve pressure over bony prominences  - Avoid friction and shearing  - Provide appropriate hygiene as needed including keeping skin clean and dry  - Evaluate need for skin moisturizer/barrier cream  - Collaborate with interdisciplinary team   - Patient/family teaching  - Consider wound care consult   8/15/2024 1352 by Babar Damon LPN  Outcome: Progressing  8/15/2024 1351 by Babar Damon LPN  Outcome: Progressing     Problem: PAIN - ADULT  Goal: Verbalizes/displays adequate comfort level or baseline comfort level  Description: Interventions:  - Encourage patient to monitor pain and request assistance  - Assess pain using appropriate pain scale (0-10)  - Administer analgesics based on type and severity of pain and evaluate response  - Implement non-pharmacological measures as appropriate and evaluate response  - Consider cultural and social influences on pain and pain management  - Notify physician/advanced practitioner if interventions unsuccessful or patient reports new pain  8/15/2024 1352 by Babar Damon LPN  Outcome: Progressing  8/15/2024 1351 by Babar Damon LPN  Outcome: Progressing     Problem: INFECTION - ADULT  Goal: Absence or prevention of progression during hospitalization  Description: INTERVENTIONS:  - Assess and monitor for signs and symptoms of infection  - Monitor lab/diagnostic results  - Monitor all insertion sites, i.e. indwelling lines, tubes, and drains  - Monitor endotracheal if appropriate and nasal secretions for changes in amount and color  - [place] appropriate cooling/warming therapies  per order  - Administer medications as ordered  - Instruct and encourage patient and family to use good hand hygiene technique  - Identify and instruct in appropriate isolation precautions for identified infection/condition  8/15/2024 1352 by aBbar Damon LPN  Outcome: Progressing  8/15/2024 1351 by Babar Damon LPN  Outcome: Progressing     Problem: SAFETY ADULT  Goal: Patient will remain free of falls  Description: INTERVENTIONS:  - Educate patient/family on patient safety including physical limitations  - Instruct patient to call for assistance with activity   - Consult OT/PT to assist with strengthening/mobility   - Keep Call bell within reach  - Keep bed low and locked with side rails adjusted as appropriate  - Keep care items and personal belongings within reach  - Initiate and maintain comfort rounds  - Make Fall Risk Sign visible to staff  - Offer Toileting every 2 Hours, in advance of need  - Initiate/Maintain bed alarm  - Obtain necessary fall risk management equipment:   - Apply yellow socks and bracelet for high fall risk patients  - Consider moving patient to room near nurses station  8/15/2024 1352 by Babar Damon LPN  Outcome: Progressing  8/15/2024 1351 by Babar Damon LPN  Outcome: Progressing  Goal: Maintain or return to baseline ADL function  Description: INTERVENTIONS:  -  Assess patient's ability to carry out ADLs; assess patient's baseline for ADL function and identify physical deficits which impact ability to perform ADLs (bathing, care of mouth/teeth, toileting, grooming, dressing, etc.)  - Assess/evaluate cause of self-care deficits   - Assess range of motion  - Assess patient's mobility; develop plan if impaired  - Assess patient's need for assistive devices and provide as appropriate  - Encourage maximum independence but intervene and supervise when necessary  - Involve family in performance of ADLs  - Assess for home care needs following discharge   - Consider OT consult to assist  with ADL evaluation and planning for discharge  - Provide patient education as appropriate  8/15/2024 1352 by Babar Damon LPN  Outcome: Progressing  8/15/2024 1351 by Babar Damon LPN  Outcome: Progressing  Goal: Maintains/Returns to pre admission functional level  Description: INTERVENTIONS:  - Perform AM-PAC 6 Click Basic Mobility/ Daily Activity assessment daily.  - Set and communicate daily mobility goal to care team and patient/family/caregiver.   - Collaborate with rehabilitation services on mobility goals if consulted  - Perform Range of Motion 3 times a day.  - Reposition patient every 2 hours.  - Dangle patient 3 times a day  - Stand patient 3 times a day  - Ambulate patient 3 times a day  - Out of bed to chair 3 times a day   - Out of bed for meals 3 times a day  - Out of bed for toileting  - Record patient progress and toleration of activity level   8/15/2024 1352 by Babar Damon LPN  Outcome: Progressing  8/15/2024 1351 by Babar Damon LPN  Outcome: Progressing     Problem: DISCHARGE PLANNING  Goal: Discharge to home or other facility with appropriate resources  Description: INTERVENTIONS:  - Identify barriers to discharge w/patient and caregiver  - Arrange for needed discharge resources and transportation as appropriate  - Identify discharge learning needs (meds, wound care, etc.)  - Arrange for interpretive services to assist at discharge as needed  - Refer to Case Management Department for coordinating discharge planning if the patient needs post-hospital services based on physician/advanced practitioner order or complex needs related to functional status, cognitive ability, or social support system  8/15/2024 1352 by Babar Damon LPN  Outcome: Progressing  8/15/2024 1351 by Babar Damon LPN  Outcome: Progressing     Problem: Knowledge Deficit  Goal: Patient/family/caregiver demonstrates understanding of disease process, treatment plan, medications, and discharge instructions  Description:  Complete learning assessment and assess knowledge base.  Interventions:  - Provide teaching at level of understanding  - Provide teaching via preferred learning methods  8/15/2024 1352 by Babar Damon LPN  Outcome: Progressing  8/15/2024 1351 by Babar Damon LPN  Outcome: Progressing     Problem: SKIN/TISSUE INTEGRITY - ADULT  Goal: Skin Integrity remains intact(Skin Breakdown Prevention)  Description: Assess:  -Perform Zaid assessment every shift  -Clean and moisturize skin every shift  -Inspect skin when repositioning, toileting, and assisting with ADLS  -Assess extremities for adequate circulation and sensation     Bed Management:  -Have minimal linens on bed & keep smooth, unwrinkled  -Change linens as needed when moist or perspiring  -Avoid sitting or lying in one position for more than 2 hours while in bed  -Keep HOB at 30 degrees     Toileting:  -Offer bedside commode  -Assess for incontinence every 2 hours  -Use incontinent care products after each incontinent episode    Activity:  -Mobilize patient 3 times a day  -Encourage activity and walks on unit  -Encourage or provide ROM exercises   -Turn and reposition patient every 2 Hours  -Use appropriate equipment to lift or move patient in bed  -Instruct/ Assist with weight shifting every hour when out of bed in chair  -Consider limitation of chair time 1 hour intervals    Skin Care:  -Avoid use of baby powder, tape, friction and shearing, hot water or constrictive clothing  -Relieve pressure over bony prominences  -Do not massage red bony areas    Next Steps:  -Teach patient strategies to minimize risks   -Consider consults to  interdisciplinary teams  8/15/2024 1352 by Babar Damon LPN  Outcome: Progressing  8/15/2024 1351 by Babar Damon LPN  Outcome: Progressing  Goal: Incision(s), wounds(s) or drain site(s) healing without S/S of infection  Description: INTERVENTIONS  - Assess and document dressing, incision, wound bed, drain sites and surrounding  tissue  - Provide patient and family education  - Perform skin care/dressing changes every day  8/15/2024 1352 by Babar Damon LPN  Outcome: Progressing  8/15/2024 1351 by Babar Damon LPN  Outcome: Progressing  Goal: Pressure injury heals and does not worsen  Description: Interventions:  - Implement low air loss mattress or specialty surface (Criteria met)  - Apply silicone foam dressing  - Instruct/assist with weight shifting every 60 minutes when in chair   - Limit chair time to 1 hour intervals  - Use special pressure reducing interventions when in chair   - Apply fecal or urinary incontinence containment device   - Perform passive or active ROM  - Turn and reposition patient & offload bony prominences every 2 hours   - Utilize friction reducing device or surface for transfers   - Consider consults to  interdisciplinary teams  - Use incontinent care products after each incontinent episode  - Consider nutrition services referral as needed  8/15/2024 1352 by Babar Damon LPN  Outcome: Progressing  8/15/2024 1351 by Babar Damon LPN  Outcome: Progressing     Problem: METABOLIC, FLUID AND ELECTROLYTES - ADULT  Goal: Electrolytes maintained within normal limits  Description: INTERVENTIONS:  - Monitor labs and assess patient for signs and symptoms of electrolyte imbalances  - Administer electrolyte replacement as ordered  - Monitor response to electrolyte replacements, including repeat lab results as appropriate  - Instruct patient on fluid and nutrition as appropriate  8/15/2024 1352 by Babar Damon LPN  Outcome: Progressing  8/15/2024 1351 by Babar Damon LPN  Outcome: Progressing     Problem: MUSCULOSKELETAL - ADULT  Goal: Maintain or return mobility to safest level of function  Description: INTERVENTIONS:  - Assess patient's ability to carry out ADLs; assess patient's baseline for ADL function and identify physical deficits which impact ability to perform ADLs (bathing, care of mouth/teeth, toileting,  grooming, dressing, etc.)  - Assess/evaluate cause of self-care deficits   - Assess range of motion  - Assess patient's mobility  - Assess patient's need for assistive devices and provide as appropriate  - Encourage maximum independence but intervene and supervise when necessary  - Involve family in performance of ADLs  - Assess for home care needs following discharge   - Consider OT consult to assist with ADL evaluation and planning for discharge  - Provide patient education as appropriate  8/15/2024 1352 by Babar Damon LPN  Outcome: Progressing  8/15/2024 1351 by Babar Damon LPN  Outcome: Progressing  Goal: Maintain proper alignment of affected body part  Description: INTERVENTIONS:  - Support, maintain and protect limb and body alignment  - Provide patient/ family with appropriate education  8/15/2024 1352 by Babar Damon LPN  Outcome: Progressing  8/15/2024 1351 by Babar Damon LPN  Outcome: Progressing     Problem: Nutrition/Hydration-ADULT  Goal: Nutrient/Hydration intake appropriate for improving, restoring or maintaining nutritional needs  Description: Monitor and assess patient's nutrition/hydration status for malnutrition. Collaborate with interdisciplinary team and initiate plan and interventions as ordered.  Monitor patient's weight and dietary intake as ordered or per policy. Utilize nutrition screening tool and intervene as necessary. Determine patient's food preferences and provide high-protein, high-caloric foods as appropriate.     INTERVENTIONS:  - Monitor oral intake, urinary output, labs, and treatment plans  - Assess nutrition and hydration status and recommend course of action  - Evaluate amount of meals eaten  - Assist patient with eating if necessary   - Allow adequate time for meals  - Recommend/ encourage appropriate diets, oral nutritional supplements, and vitamin/mineral supplements  - Order, calculate, and assess calorie counts as needed  - Recommend, monitor, and adjust tube  feedings and TPN/PPN based on assessed needs  - Assess need for intravenous fluids  - Provide specific nutrition/hydration education as appropriate  - Include patient/family/caregiver in decisions related to nutrition  8/15/2024 1352 by Babar Damon LPN  Outcome: Progressing  8/15/2024 1351 by Babar Damon LPN  Outcome: Progressing

## 2024-08-15 NOTE — DISCHARGE INSTR - OTHER ORDERS
Dressing change:  Change dressing every other day or as needed  Apply Adaptic  Cover with 4 x 4, ABD pad, and Kerlix

## 2024-08-15 NOTE — CASE MANAGEMENT
[place] [facility] [place] has received APPROVED authorization.  Insurance:  [employer]   Called in by Rep: Cori  Authorization received for: SNF  Facility: [place]   Authorization #: [id number]   Start of Care: 8/15  Next Review Date: 8/28  Continued Stay Care Coordinator: Cori  Submit next review to: fax# [phone]    Care Manager notified: Marianela SIMMONS     Please reach out to CM for updates on any clinical information.

## 2024-08-15 NOTE — CASE MANAGEMENT
Case Management Discharge Planning Note    Patient name Art Joseph  Location /402-01 MRN [id number]  : 1960 Date 8/15/2024       Current Admission Date: 2024  Current Admission Diagnosis:Severe sepsis ([facility])   Patient Active Problem List    Diagnosis Date Noted Date Diagnosed    Acute cystitis without hematuria 2024     Severe sepsis (HCC) 2024     Atelectasis 2024     Peripheral vascular disease (HCC) 2024     MRSA colonization 2024     Open wound of left foot 2024     Maggot infestation 2024     Elevated platelet count 2024     Candidiasis, intertrigo 2024     Cellulitis of left lower extremity 2024     Class 1 obesity in adult 2021     Vitamin D deficiency 2019     Ambulatory dysfunction 10/17/2017     Hx of right BKA ([facility]) 2017     Primary hypertension 2017     Hypercholesteremia 2017     Acute osteomyelitis of metatarsal bone of left foot ([facility]) 2017     Type 2 diabetes mellitus with hyperglycemia, with long-term current use of insulin ([facility]) 2017     Diabetic neuropathy ([facility]) 2017       LOS (days): 9  Geometric Mean LOS (GMLOS) (days): 4.1  Days to GMLOS:-4.9     OBJECTIVE:  Risk of Unplanned Readmission Score: 18.31         Current admission status: Inpatient   Preferred Pharmacy:   [facility] - [address]  [address] [facility] [address]  Phone: [phone] Fax: [phone]    [facility] [place] [facility] [address] [facility]  [facility] [address]  Phone: [phone] Fax: [phone]    [facility]/pharmacy #[id number] - [place] - [address]  [address] [facility] [address]  Phone: [phone] Fax: [phone]    Primary Care Provider: Kerry Christine MD    Primary Insurance: AYLA MOTLEY  Secondary Insurance:     DISCHARGE DETAILS:                                                                                                                Facility Insurance Auth Number: [id number]

## 2024-08-15 NOTE — CASE MANAGEMENT
Case Management Discharge Planning Note    Patient name Art Joseph  Location /402-01 MRN [id number]  : 1960 Date 8/15/2024       Current Admission Date: 2024  Current Admission Diagnosis:Severe sepsis ([facility])   Patient Active Problem List    Diagnosis Date Noted Date Diagnosed    Acute cystitis without hematuria 2024     Severe sepsis (HCC) 2024     Atelectasis 2024     Peripheral vascular disease (HCC) 2024     MRSA colonization 2024     Open wound of left foot 2024     Maggot infestation 2024     Elevated platelet count 2024     Candidiasis, intertrigo 2024     Cellulitis of left lower extremity 2024     Class 1 obesity in adult 2021     Vitamin D deficiency 2019     Ambulatory dysfunction 10/17/2017     Hx of right BKA ([facility]) 2017     Primary hypertension 2017     Hypercholesteremia 2017     Acute osteomyelitis of metatarsal bone of left foot ([facility]) 2017     Type 2 diabetes mellitus with hyperglycemia, with long-term current use of insulin ([facility]) 2017     Diabetic neuropathy ([facility]) 2017       LOS (days): 9  Geometric Mean LOS (GMLOS) (days): 4.1  Days to GMLOS:-4.9     OBJECTIVE:  Risk of Unplanned Readmission Score: 18.31         Current admission status: Inpatient   Preferred Pharmacy:   [facility] - [address]  [address] [facility] [address]  Phone: [phone] Fax: [phone]    [facility] [place] - [address] [facility]  [address] [facility] [address]  Phone: [phone] Fax: [phone]    [facility]/pharmacy [facility][id number] - [place] [address]  [address] [facility] [address]  Phone: [phone] Fax: [phone]    Primary Care Provider: Kerry Christine MD    Primary Insurance: AYLA MOTLEY  Secondary Insurance:     DISCHARGE DETAILS:     Auth obtained when  patient is stable for discharge to [facility].       [facility] has received APPROVED authorization.  Insurance:  [employer]   Called in by Rep: Cori  Authorization received for: SNF  Facility: [facility]   Authorization #: [id number]   Start of Care: 8/15  Next Review Date: 8/28  Continued Stay Care Coordinator: Cori  Submit next review to: fax# [phone]

## 2024-08-15 NOTE — PROGRESS NOTES
[facility]  Progress Note  Name: Art Joseph I MRN: [id number]  Unit/Bed#: 402-01I Date of Admission: 8/5/2024   Date of Service: 8/5/2024  I Hospital Day: 9    * Severe sepsis (HCC)  Assessment & Plan  Developed severe sepsis on 08/09/2024 with fevers, a leukocytosis, lactic acidosis, and tachycardia.  Sepsis alert initiated at that time including IV fluid bolus  Suspected due to a surgical wound infection and osteomyelitis of the left foot now with clinical improvement  Blood cultures x 2 on 08/09/2024 no growth x 5 days  Urine culture 8/9 positive for 40-49,000 Providencia stuartii.  Susceptible to cefepime  On IV cefepime and Daptomycin currently per ID likely transition to oral tomorrow   Monitor CK while on Dapto--current level remain stable/flat  Surgical wound cultures pending  8/14 patient with fever of 102.5 possibly inflammatory response to the OR.  If fever recurs plan to repeat blood cultures  8/15 anticipate transition to oral antibiotics in the next 24 hours per ID, continue IV cefepime and daptomycin at this time    Acute osteomyelitis of metatarsal bone of left foot (HCC)  Assessment & Plan  Recent hospitalization from 06/25/2024 through 06/28/2024 at [facility] for osteomyelitis of the left foot requiring a left 1st metatarsal amputation/resection on 06/27/2024 by Podiatry.  The patient was also found to have maggots in his open wound.   Recent hospitalization from 07/08/2024 through 07/18/2024 at [facility] and underwent left foot wound debridement and washout by Podiatry on 07/11/2024   Discharged on PO Augmentin 875mg BID plus IV daptomycin 6mg/kg ABW x 6 weeks from bone resection through 08/21/24 per Infectious Disease's recommendations  Now presented with an infection of the left foot  8/9 MRI left foot: Osteomyelitis of the medial cuneiform.  Nonspecific minimal bone marrow edema without T1 marrow  "signal hypointensity in the middle cuneiform, likely related to reactive changes or early osteomyelitis.  Ulcer in the medial aspect of the foot tracking to the level of the medial cuneiform with associated diffuse soft tissue edema and enhancement, suggestive of cellulitis.  No localized/drainable fluid collection.  ID and Podiatry onboard ongoing recs appreciated  Continue Cefepime and Daptomycin  8/13 OR with podiatry for left Lisfranc amputation  Podiatry optimistic postsurgery of removing a lot of osteomyelitis but recommend extended soft tissue antibiotic coverage  8/13 left foot x-ray: No acute osseous abnormality.  Postoperative change.  Strict NWB to left foot until sutures are removed  Please see the assessment and plan for \"Severe sepsis\"    Acute cystitis without hematuria  Assessment & Plan  8/9 urine culture positive for 40-49,000 Providencia stuartii  Susceptible to IV cefepime continue per ID recs  ID following, input appreciated  See plan under severe sepsis    Atelectasis  Assessment & Plan  Incentive spirometry 10 times per hour while awake     Peripheral vascular disease (HCC)  Assessment & Plan  Continue aspirin 81 mg PO Qdaily and high-intensity statin therapy with atorvastatin 80 mg PO Qdaily with dinner  8/7 ABIs similar to prior, image result reviewed  Outpatient follow-up with Vascular Surgery     Maggot infestation  Assessment & Plan  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)  Assessment & Plan  Lab Results "   Component Value Date    HGBA1C 7.9 (H) 08/07/2024       Recent Labs     08/14/24  1105 08/14/24  1614 08/14/24  2132 08/15/24  0737   POCGLU 180* 205* 196* 136         Blood Sugar Average: Last 72 hrs:  (P) [id number]    continue Humalog to 12 Units TID with meals on 08/09/2024(increased)  Lantus to 24 Units SQ QHS on 08/10/2024 (increased)  ISS with blood glucose monitoring ACHS  Hypoglycemia protocol  Continue lisinopril for renal protection  Outpatient Endocrinology evaluation            VTE Pharmacologic Prophylaxis: VTE Score: 7 High Risk (Score >/= 5) - Pharmacological DVT Prophylaxis Ordered: enoxaparin (Lovenox). Sequential Compression Devices Ordered.    Mobility:   Basic Mobility Inpatient Raw Score: 12  JH-HLM Goal: 4: Move to chair/commode  JH-HLM Achieved: 4: Move to chair/commode  JH-HLM Goal achieved. Continue to encourage appropriate mobility.    Patient Centered Rounds: I performed bedside rounds with nursing staff today.   Discussions with Specialists or Other Care Team Provider: Podiatry    Education and Discussions with Family / Patient: Patient declined call to .     Total Time Spent on Date of Encounter in care of patient: 50 mins. This time was spent on one or more of the following: performing physical exam; counseling and coordination of care; obtaining or reviewing history; documenting in the medical record; reviewing/ordering tests, medications or procedures; communicating with other healthcare professionals and discussing with patient's family/caregivers.    Current Length of Stay: 9 day(s)  Current Patient Status: Inpatient   Certification Statement: The patient will continue to require additional inpatient hospital stay due to post amputation by podiatry to left foot with need for IV antibiotics for another 24 hours  Discharge Plan: Anticipate discharge tomorrow to rehab facility.    Code Status: Level 1 - Full Code    Subjective:   Patient pleasant reports just  wants to make sure everything is in place he is still remains agreeable to rehab just wants to make sure dressing changes are made clear to the nursing facility prior to going over.    Objective:     Vitals:   Temp (24hrs), Av.4 °F (37.4 °C), Min:98.8 °F (37.1 °C), Max:100.5 °F (38.1 °C)    Temp:  [98.8 °F (37.1 °C)-100.5 °F (38.1 °C)] 98.8 °F (37.1 °C)  HR:  [87-96] 87  Resp:  [20] 20  BP: (131-145)/(64-70) 145/70  SpO2:  [92 %-94 %] 94 %  Body mass index is 31.42 kg/m².     Input and Output Summary (last 24 hours):     Intake/Output Summary (Last 24 hours) at 8/15/2024 1011  Last data filed at 8/15/2024 0930  Gross per 24 hour   Intake 180 ml   Output 1400 ml   Net -1220 ml       Physical Exam:   Physical Exam  Vitals and nursing note reviewed.   Constitutional:       General: He is not in acute distress.     Appearance: He is well-developed.   HENT:      Head: Normocephalic and atraumatic.   Eyes:      Conjunctiva/sclera: Conjunctivae normal.   Cardiovascular:      Rate and Rhythm: Normal rate and regular rhythm.      Heart sounds: No murmur heard.  Pulmonary:      Effort: Pulmonary effort is normal. No respiratory distress.      Breath sounds: Normal breath sounds.   Abdominal:      Palpations: Abdomen is soft.      Tenderness: There is no abdominal tenderness.   Musculoskeletal:         General: No swelling.      Cervical back: Neck supple.   Skin:     General: Skin is warm and dry.      Capillary Refill: Capillary refill takes less than 2 seconds.      Comments: Dressing clean dry and intact to left lower extremity   Neurological:      Mental Status: He is alert and oriented to person, place, and time.   Psychiatric:         Mood and Affect: Mood normal.         Additional Data:     Labs:  Results from last 7 days   Lab Units 08/15/24  0554 24  0436 24  0444   WBC Thousand/uL 12.67*   < > 9.63   HEMOGLOBIN g/dL 10.2*   < > 11.3*   HEMATOCRIT % 31.6*   < > 35.8*   PLATELETS Thousands/uL 313   <  > 247   SEGS PCT %  --   --  56   LYMPHO PCT %  --   --  23   MONO PCT %  --   --  13*   EOS PCT %  --   --  6    < > = values in this interval not displayed.     Results from last 7 days   Lab Units 08/15/24  0554 08/13/24  0444 08/12/24  0509   SODIUM mmol/L 138   < > 136   POTASSIUM mmol/L 3.6   < > 3.7   CHLORIDE mmol/L 103   < > 105   CO2 mmol/L 27   < > 22   BUN mg/dL 18   < > 12   CREATININE mg/dL 0.71   < > 0.66   ANION GAP mmol/L 8   < > 9   CALCIUM mg/dL 8.4   < > 8.2*   ALBUMIN g/dL  --   --  3.1*   TOTAL BILIRUBIN mg/dL  --   --  0.32   ALK PHOS U/L  --   --  102   ALT U/L  --   --  15   AST U/L  --   --  13   GLUCOSE RANDOM mg/dL 127   < > 168*    < > = values in this interval not displayed.         Results from last 7 days   Lab Units 08/15/24  0919 08/15/24  0737 08/14/24  2132 08/14/24  1614 08/14/24  1105 08/14/24  0730 08/13/24  2053 08/13/24  1414 08/13/24  1058 08/13/24  0735 08/12/24  2124 08/12/24  1557   POC GLUCOSE mg/dl 232* 136 196* 205* 180* 128 222* 130 131 161* 299* 256*         Results from last 7 days   Lab Units 08/12/24  0509 08/11/24  0450 08/10/24  0452 08/09/24  1246 08/09/24  1008   LACTIC ACID mmol/L  --   --  0.7 2.4* 2.7*   PROCALCITONIN ng/ml 0.26* 0.38* 0.63*  --  0.51*       Lines/Drains:  Invasive Devices       Peripherally Inserted Central Catheter Line  Duration             PICC Line 07/16/24 Right Basilic 29 days                    Central Line:  Goal for removal: Will discontinue when meds requiring line are completed.             Imaging: No pertinent imaging reviewed.    Recent Cultures (last 7 days):   Results from last 7 days   Lab Units 08/13/24  1558 08/09/24  1722 08/09/24  0955 08/09/24  0902   BLOOD CULTURE   --   --  No Growth After 5 Days. No Growth After 5 Days.   GRAM STAIN RESULT  No Polys or Bacteria seen  --   --   --    URINE CULTURE   --  40,000-49,000 cfu/ml Providencia stuartii*  --   --    WOUND CULTURE  1+ Growth of Oxidase Positive gram negative  glenis*  1+ Growth of Diphtheroids  --   --   --        Last 24 Hours Medication List:   Current Facility-Administered Medications   Medication Dose Route Frequency Provider Last Rate    acetaminophen  650 mg Oral Q6H PRN Rashid Rubalcava MD      aspirin  81 mg Oral Daily ALFONSO Velazquez      atorvastatin  80 mg Oral Daily With Dinner ALFONSO Velazquez      cefepime  2,000 mg Intravenous Q8H ALFONSO Velazquez 2,000 mg (08/15/24 0937)    Cholecalciferol  1,000 Units Oral Daily ALFONSO Velazquez      DAPTOmycin (CUBICIN) 500 mg in sodium chloride 0.9 % 50 mL IVPB  6 mg/kg (Adjusted) Intravenous Q24H ALFONSO Velazquez 500 mg (08/14/24 2315)    enoxaparin  40 mg Subcutaneous Daily ALFONSO Velazquez      insulin glargine  24 Units Subcutaneous HS ALFONSO Velazquez      insulin lispro  1-5 Units Subcutaneous HS ALFONSO Velazquez      insulin lispro  1-6 Units Subcutaneous TID AC ALFONSO Velazquez      insulin lispro  12 Units Subcutaneous TID With Meals ALFONSO Velazquez      lisinopril  2.5 mg Oral Daily ALFONSO Velazquez      morphine injection  4 mg Intravenous Q4H PRN Rashid Rubalcava MD      ondansetron  4 mg Intravenous Q6H PRN ALFONSO Velazquez      oxyCODONE  5 mg Oral Q6H PRN Rashid Rubalcava MD          Today, Patient Was Seen By: ALFONSO Galloway    **Please Note: This note may have been constructed using a voice recognition system.**

## 2024-08-15 NOTE — ASSESSMENT & PLAN NOTE
8/9 urine culture positive for 40-49,000 Providencia stuartii  Susceptible to IV cefepime continue per ID recs  ID following, input appreciated  See plan under severe sepsis

## 2024-08-15 NOTE — PLAN OF CARE
Problem: PHYSICAL THERAPY ADULT  Goal: Performs mobility at highest level of function for planned discharge setting.  See evaluation for individualized goals.  Description: Treatment/Interventions: Functional transfer training, LE strengthening/ROM, Therapeutic exercise, Endurance training, Bed mobility, Gait training          See flowsheet documentation for full assessment, interventions and recommendations.  Outcome: Progressing  Note: Prognosis: Good  Problem List:  (Decreased strength; Decreased endurance; Impaired balance; Decreased mobility; Decreased skin integrity; Orthopedic restrictions)  Assessment: Pt. seen for PT treatment session this date with interventions consisting of  therapeutic exercises, and transfers. Cues for  technique, sequence and safety with transfers. In comparison to previous session, Pt. With no change  in activity tolerance. Pt. Motivated to improve and regain his mobility.  Pt is in need of continued activity in PT to improve strength balance endurance mobility transfers and ambulation with return to maximize LOF. From PT/mobility standpoint, recommendation at time of d/c would be level I: max resource intensity  in order to promote return to PLOF and independence.   The patient's -[facility] Basic Mobility Inpatient Short Form Raw Score is 12. A Raw score of less than or equal to 16 suggests the patient may benefit from discharge to post-acute rehabilitation services.  Please also refer to physical therapy recommendation for safe DC planning.        Rehab Resource Intensity Level, PT: I (Maximum Resource Intensity)    See flowsheet documentation for full assessment.

## 2024-08-15 NOTE — ASSESSMENT & PLAN NOTE
Lab Results   Component Value Date    HGBA1C 7.9 (H) 08/07/2024       Recent Labs     08/15/24  1607 08/15/24  2103 08/16/24  0654 08/16/24  1059   POCGLU 251* 162* 133 209*       Blood Sugar Average: Last 72 hrs:  (P) [id number]    continue Humalog to 12 Units TID with meals on 08/09/2024(increased)  Lantus to 24 Units SQ QHS on 08/10/2024 (increased)  ISS with blood glucose monitoring ACHS  Hypoglycemia protocol  Continue lisinopril for renal protection  Outpatient Endocrinology evaluation

## 2024-08-15 NOTE — PLAN OF CARE
Problem: Prexisting or High Potential for Compromised Skin Integrity  Goal: Skin integrity is maintained or improved  Description: INTERVENTIONS:  - Identify patients at risk for skin breakdown  - Assess and monitor skin integrity  - Assess and monitor nutrition and hydration status  - Monitor labs   - Assess for incontinence   - Turn and reposition patient  - Assist with mobility/ambulation  - Relieve pressure over bony prominences  - Avoid friction and shearing  - Provide appropriate hygiene as needed including keeping skin clean and dry  - Evaluate need for skin moisturizer/barrier cream  - Collaborate with interdisciplinary team   - Patient/family teaching  - Consider wound care consult   Outcome: Progressing     Problem: PAIN - ADULT  Goal: Verbalizes/displays adequate comfort level or baseline comfort level  Description: Interventions:  - Encourage patient to monitor pain and request assistance  - Assess pain using appropriate pain scale (0-10)  - Administer analgesics based on type and severity of pain and evaluate response  - Implement non-pharmacological measures as appropriate and evaluate response  - Consider cultural and social influences on pain and pain management  - Notify physician/advanced practitioner if interventions unsuccessful or patient reports new pain  Outcome: Progressing     Problem: INFECTION - ADULT  Goal: Absence or prevention of progression during hospitalization  Description: INTERVENTIONS:  - Assess and monitor for signs and symptoms of infection  - Monitor lab/diagnostic results  - Monitor all insertion sites, i.e. indwelling lines, tubes, and drains  - Monitor endotracheal if appropriate and nasal secretions for changes in amount and color  - [place] appropriate cooling/warming therapies per order  - Administer medications as ordered  - Instruct and encourage patient and family to use good hand hygiene technique  - Identify and instruct in appropriate isolation precautions for  identified infection/condition  Outcome: Progressing     Problem: SAFETY ADULT  Goal: Patient will remain free of falls  Description: INTERVENTIONS:  - Educate patient/family on patient safety including physical limitations  - Instruct patient to call for assistance with activity   - Consult OT/PT to assist with strengthening/mobility   - Keep Call bell within reach  - Keep bed low and locked with side rails adjusted as appropriate  - Keep care items and personal belongings within reach  - Initiate and maintain comfort rounds  - Make Fall Risk Sign visible to staff  - Offer Toileting every 2 Hours, in advance of need  - Initiate/Maintain bed alarm  - Obtain necessary fall risk management equipment:   - Apply yellow socks and bracelet for high fall risk patients  - Consider moving patient to room near nurses station  Outcome: Progressing  Goal: Maintain or return to baseline ADL function  Description: INTERVENTIONS:  -  Assess patient's ability to carry out ADLs; assess patient's baseline for ADL function and identify physical deficits which impact ability to perform ADLs (bathing, care of mouth/teeth, toileting, grooming, dressing, etc.)  - Assess/evaluate cause of self-care deficits   - Assess range of motion  - Assess patient's mobility; develop plan if impaired  - Assess patient's need for assistive devices and provide as appropriate  - Encourage maximum independence but intervene and supervise when necessary  - Involve family in performance of ADLs  - Assess for home care needs following discharge   - Consider OT consult to assist with ADL evaluation and planning for discharge  - Provide patient education as appropriate  Outcome: Progressing  Goal: Maintains/Returns to pre admission functional level  Description: INTERVENTIONS:  - Perform AM-PAC 6 Click Basic Mobility/ Daily Activity assessment daily.  - Set and communicate daily mobility goal to care team and patient/family/caregiver.   - Collaborate with  rehabilitation services on mobility goals if consulted  - Perform Range of Motion 3 times a day.  - Reposition patient every 2 hours.  - Dangle patient 3 times a day  - Stand patient 3 times a day  - Ambulate patient 3 times a day  - Out of bed to chair 3 times a day   - Out of bed for meals 3 times a day  - Out of bed for toileting  - Record patient progress and toleration of activity level   Outcome: Progressing     Problem: DISCHARGE PLANNING  Goal: Discharge to home or other facility with appropriate resources  Description: INTERVENTIONS:  - Identify barriers to discharge w/patient and caregiver  - Arrange for needed discharge resources and transportation as appropriate  - Identify discharge learning needs (meds, wound care, etc.)  - Arrange for interpretive services to assist at discharge as needed  - Refer to Case Management Department for coordinating discharge planning if the patient needs post-hospital services based on physician/advanced practitioner order or complex needs related to functional status, cognitive ability, or social support system  Outcome: Progressing     Problem: Knowledge Deficit  Goal: Patient/family/caregiver demonstrates understanding of disease process, treatment plan, medications, and discharge instructions  Description: Complete learning assessment and assess knowledge base.  Interventions:  - Provide teaching at level of understanding  - Provide teaching via preferred learning methods  Outcome: Progressing     Problem: SKIN/TISSUE INTEGRITY - ADULT  Goal: Skin Integrity remains intact(Skin Breakdown Prevention)  Description: Assess:  -Perform Zaid assessment every shift  -Clean and moisturize skin every shift  -Inspect skin when repositioning, toileting, and assisting with ADLS  -Assess extremities for adequate circulation and sensation     Bed Management:  -Have minimal linens on bed & keep smooth, unwrinkled  -Change linens as needed when moist or perspiring  -Avoid sitting or  lying in one position for more than 2 hours while in bed  -Keep HOB at 30 degrees     Toileting:  -Offer bedside commode  -Assess for incontinence every 2 hours  -Use incontinent care products after each incontinent episode    Activity:  -Mobilize patient 3 times a day  -Encourage activity and walks on unit  -Encourage or provide ROM exercises   -Turn and reposition patient every 2 Hours  -Use appropriate equipment to lift or move patient in bed  -Instruct/ Assist with weight shifting every hour when out of bed in chair  -Consider limitation of chair time 1 hour intervals    Skin Care:  -Avoid use of baby powder, tape, friction and shearing, hot water or constrictive clothing  -Relieve pressure over bony prominences  -Do not massage red bony areas    Next Steps:  -Teach patient strategies to minimize risks   -Consider consults to  interdisciplinary teams  Outcome: Progressing  Goal: Incision(s), wounds(s) or drain site(s) healing without S/S of infection  Description: INTERVENTIONS  - Assess and document dressing, incision, wound bed, drain sites and surrounding tissue  - Provide patient and family education  - Perform skin care/dressing changes every day  Outcome: Progressing  Goal: Pressure injury heals and does not worsen  Description: Interventions:  - Implement low air loss mattress or specialty surface (Criteria met)  - Apply silicone foam dressing  - Instruct/assist with weight shifting every 60 minutes when in chair   - Limit chair time to 1 hour intervals  - Use special pressure reducing interventions when in chair   - Apply fecal or urinary incontinence containment device   - Perform passive or active ROM  - Turn and reposition patient & offload bony prominences every 2 hours   - Utilize friction reducing device or surface for transfers   - Consider consults to  interdisciplinary teams  - Use incontinent care products after each incontinent episode  - Consider nutrition services referral as  needed  Outcome: Progressing     Problem: METABOLIC, FLUID AND ELECTROLYTES - ADULT  Goal: Electrolytes maintained within normal limits  Description: INTERVENTIONS:  - Monitor labs and assess patient for signs and symptoms of electrolyte imbalances  - Administer electrolyte replacement as ordered  - Monitor response to electrolyte replacements, including repeat lab results as appropriate  - Instruct patient on fluid and nutrition as appropriate  Outcome: Progressing     Problem: MUSCULOSKELETAL - ADULT  Goal: Maintain or return mobility to safest level of function  Description: INTERVENTIONS:  - Assess patient's ability to carry out ADLs; assess patient's baseline for ADL function and identify physical deficits which impact ability to perform ADLs (bathing, care of mouth/teeth, toileting, grooming, dressing, etc.)  - Assess/evaluate cause of self-care deficits   - Assess range of motion  - Assess patient's mobility  - Assess patient's need for assistive devices and provide as appropriate  - Encourage maximum independence but intervene and supervise when necessary  - Involve family in performance of ADLs  - Assess for home care needs following discharge   - Consider OT consult to assist with ADL evaluation and planning for discharge  - Provide patient education as appropriate  Outcome: Progressing  Goal: Maintain proper alignment of affected body part  Description: INTERVENTIONS:  - Support, maintain and protect limb and body alignment  - Provide patient/ family with appropriate education  Outcome: Progressing     Problem: Nutrition/Hydration-ADULT  Goal: Nutrient/Hydration intake appropriate for improving, restoring or maintaining nutritional needs  Description: Monitor and assess patient's nutrition/hydration status for malnutrition. Collaborate with interdisciplinary team and initiate plan and interventions as ordered.  Monitor patient's weight and dietary intake as ordered or per policy. Utilize nutrition  screening tool and intervene as necessary. Determine patient's food preferences and provide high-protein, high-caloric foods as appropriate.     INTERVENTIONS:  - Monitor oral intake, urinary output, labs, and treatment plans  - Assess nutrition and hydration status and recommend course of action  - Evaluate amount of meals eaten  - Assist patient with eating if necessary   - Allow adequate time for meals  - Recommend/ encourage appropriate diets, oral nutritional supplements, and vitamin/mineral supplements  - Order, calculate, and assess calorie counts as needed  - Recommend, monitor, and adjust tube feedings and TPN/PPN based on assessed needs  - Assess need for intravenous fluids  - Provide specific nutrition/hydration education as appropriate  - Include patient/family/caregiver in decisions related to nutrition  Outcome: Progressing

## 2024-08-15 NOTE — PROGRESS NOTES
VIRTUAL CARE DOCUMENTATION:     1. This service was provided via Telemedicine using [employer]     2. Parties in the room with patient during teleconsult Patient only    3. Confidentiality My office door was closed     4. Participants No one else was in the room    5. Patient acknowledged consent and understanding of privacy and security of the  Telemedicine consult. I informed the patient that I have reviewed their record in Epic and presented the opportunity for them to ask any questions regarding the visit today.  The patient agreed to participate.    6. Time spent 3 minutes          Progress Note - Infectious Disease   Art Joseph 64 y.o. male MRN: [id number]  Unit/Bed#: 402-01 Encounter: [id number]      Impression/Plan:  1.  Systemic inflammatory response syndrome.  New onset fever and with a rising WBC count.  Possibly all secondary to a Providencia UTI.  Consideration for the possibility of a progressive foot infection.  Consideration for the possibility of a catheter related bacteremia although blood cultures remain negative.  The temperature and the white cell count have come down.  Chest x-ray without pneumonia.  New postoperative fever possibly inflammatory response to the procedure.  No recurrence of fever spike.  White cell count is also increased and now stabilized.  -Antibiotics as below  -Follow-up blood cultures  -If fever recurs, recommend repeat blood cultures x 2 sets  -Recheck CBC with differential and BMP to make sure no worsening toxicities     2.  Nonhealing infected left foot wound with ongoing osteomyelitis.  This despite extensive surgical interventions and broad-spectrum antibiotics with a plan to treat for 6 weeks through 8/21/24.  The wound is not really healing and there is significant surrounding swelling and erythema.  There is also increased drainage.  Seems to be tolerating the antibiotics.  Ceretec scan consistent with osteomyelitis.  MRI with osteomyelitis of the  cuneiform bones no abscess seen.  CPK on 2024 is 25.  Patient now status post Lisfranc amputation with surgical cure of any bone infection.  -Continue daptomycin and cefepime for now  -Follow-up operative cultures and adjust antibiotics as needed  -Anticipate transition to oral antibiotics in the next 24 hours  -Recheck CBC with differential, BMP to make sure no developing toxicities  -Local wound care  -Close podiatry follow-up     3.  Providencia UTI.  Seems to have improved with broadening the gram-negative coverage.  -Continue cefepime as above     4.  Diabetes mellitus.  With hyperglycemia.  Poorly controlled with a hemoglobin A1c of 9.7 in the recent past.  -Tighten diabetic control to improve leukocyte function and wound healing     5.  Peripheral arterial disease.  Consider vascular consult    I spent 50 minutes in evaluation of the patient of which 30 minutes was in counseling/coordination of care    Discussed with the primary service the plan to continue the daptomycin and cefepime for now and they agree with the plan.    Antibiotics:  Daptomycin  Cefepime 7  Postop day 2    Subjective:  Patient has no fever, chills, sweats; no nausea, vomiting, diarrhea; no cough, shortness of breath; no pain. No new symptoms.    Objective:  Vitals:  Temp:  [98.8 °F (37.1 °C)-100.5 °F (38.1 °C)] 98.8 °F (37.1 °C)  HR:  [87-96] 87  Resp:  [20] 20  BP: (131-145)/(64-70) 145/70  SpO2:  [92 %-94 %] 94 %  Temp (24hrs), Av.4 °F (37.4 °C), Min:98.8 °F (37.1 °C), Max:100.5 °F (38.1 °C)  Current: Temperature: 98.8 °F (37.1 °C)    Documented physical exam has been primarily done by the patient's nurse and/or the primary service due to limited examination abilities on telemedicine    Physical Exam:   General Appearance:  Alert, interactive, nontoxic, no acute distress.   Throat: Oropharynx moist without lesions.    Lungs:   Clear to auscultation bilaterally; no wheezes, rhonchi or rales; respirations unlabored   Heart:   RRR; no murmur, rub or gallop   Abdomen:   Soft, non-tender, non-distended, positive bowel sounds.     Extremities: No clubbing, cyanosis or edema   Skin: No new rashes or lesions. No draining wounds noted.       Labs, Imaging, & Other studies:   All pertinent labs and imaging studies were personally reviewed  Results from last 7 days   Lab Units 08/15/24  0554 08/14/24  0436 08/13/24  0444   WBC Thousand/uL 12.67* 13.19* 9.63   HEMOGLOBIN g/dL 10.2* 11.1* 11.3*   PLATELETS Thousands/uL 313 296 247     Results from last 7 days   Lab Units 08/15/24  0554 08/14/24  0436 08/13/24  0444 08/12/24  0509 08/11/24 0450 08/10/24  0452   SODIUM mmol/L 138 135 137 136 135 136   POTASSIUM mmol/L 3.6 3.9 3.9 3.7 3.8 3.9   CHLORIDE mmol/L 103 101 105 105 106 107   CO2 mmol/L 27 25 24 22 23 22   BUN mg/dL 18 18 13 12 19 20   CREATININE mg/dL 0.71 0.78 0.62 0.66 0.75 0.87   EGFR ml/min/1.73sq m 98 94 104 101 96 90   CALCIUM mg/dL 8.4 8.5 8.1* 8.2* 8.6 8.7   AST U/L  --   --   --  13 9* 7*   ALT U/L  --   --   --  15 9 7   ALK PHOS U/L  --   --   --  102 99 94     Results from last 7 days   Lab Units 08/13/24  1558 08/09/24  1722 08/09/24  0955 08/09/24  0902   BLOOD CULTURE   --   --  No Growth After 5 Days. No Growth After 5 Days.   GRAM STAIN RESULT  No Polys or Bacteria seen  --   --   --    URINE CULTURE   --  40,000-49,000 cfu/ml Providencia stuartii*  --   --    WOUND CULTURE  No growth  --   --   --      Results from last 7 days   Lab Units 08/12/24  0509 08/11/24  0450 08/10/24  0452 08/09/24  1008   PROCALCITONIN ng/ml 0.26* 0.38* 0.63* 0.51*

## 2024-08-15 NOTE — PHYSICAL THERAPY NOTE
"                                                                                  PHYSICAL THERAPY NOTE          Patient Name: Art Joseph  Today's Date: 8/15/2024   08/15/24 1025   PT Last Visit   PT Visit Date 08/15/24   Note Type   Note Type Treatment for insurance authorization   Pain Assessment   Pain Assessment Tool 0-10   Pain Score No Pain   Restrictions/Precautions   Weight Bearing Precautions Per Order Yes   LLE Weight Bearing Per Order NWB   Other Precautions Fall Risk;Multiple lines;Contact/isolation;WBS;Chair Alarm;Bed Alarm   General   Chart Reviewed Yes   Response to Previous Treatment Patient with no complaints from previous session.   Family/Caregiver Present No   Cognition   Overall Cognitive Status WFL   Arousal/Participation Alert;Cooperative   Attention Within functional limits   Orientation Level Oriented X4   Memory Within functional limits   Following Commands Follows all commands and directions without difficulty   Subjective   Subjective \"I'll try it\"   Bed Mobility   Additional Comments OOB in chair start/end PT session   Transfers   Sit to Stand 2  Maximal assistance   Additional items Assist x 1;Armrests;Increased time required;Verbal cues   Stand to Sit 2  Maximal assistance   Additional items Assist x 1;Armrests;Increased time required;Verbal cues   Stand pivot Unable to assess   Additional Comments RW used. Completes 3/4 stand with max x 1.  Cues for sequence.  Emphasis placed on technique and to maintain NWB. Performed  x 6 reps with rest as needed.   Ambulation/Elevation   Gait pattern Not appropriate;Not tested   Balance   Static Sitting Good   Dynamic Sitting Good   Static Standing Fair -   Dynamic Standing Poor +  (RW)   Endurance Deficit   Endurance Deficit Yes   Activity Tolerance   Activity Tolerance Patient limited by fatigue   Exercises   Quad Sets Sitting;25 reps;Bilateral   Hip Flexion Sitting;25 reps;AROM;Bilateral   Hip Abduction Sitting;25 reps;AROM;Bilateral   Hip " Adduction Sitting;25 reps;AROM;Bilateral  (+ add sets)   Knee AROM Long Arc Quad Sitting;25 reps;AROM;Bilateral   Assessment   Prognosis Good   Problem List   (Decreased strength; Decreased endurance; Impaired balance; Decreased mobility; Decreased skin integrity; Orthopedic restrictions)   Assessment Pt. seen for PT treatment session this date with interventions consisting of  therapeutic exercises, and transfers. Cues for  technique, sequence and safety with transfers. In comparison to previous session, Pt. With no change  in activity tolerance. Pt. Motivated to improve and regain his mobility.  Pt is in need of continued activity in PT to improve strength balance endurance mobility transfers and ambulation with return to maximize LOF. From PT/mobility standpoint, recommendation at time of d/c would be level I: max resource intensity  in order to promote return to PLOF and independence.   The patient's AM-[facility] Basic Mobility Inpatient Short Form Raw Score is 12. A Raw score of less than or equal to 16 suggests the patient may benefit from discharge to post-acute rehabilitation services.  Please also refer to physical therapy recommendation for safe DC planning.   Goals   LTG Expiration Date 08/28/24   PT Treatment Day 2   Plan   Treatment/Interventions Functional transfer training;LE strengthening/ROM;Therapeutic exercise;Endurance training;Bed mobility;Gait training;Spoke to nursing   Progress Progressing toward goals   PT Frequency 3-5x/wk   Discharge Recommendation   Rehab Resource Intensity Level, PT I (Maximum Resource Intensity)   AM-PAC Basic Mobility Inpatient   Turning in Flat Bed Without Bedrails 4   Lying on Back to Sitting on Edge of Flat Bed Without Bedrails 4   Moving Bed to Chair 1   Standing Up From Chair Using Arms 1   Walk in Room 1   Climb 3-5 Stairs With Railing 1   Basic Mobility Inpatient Raw Score 12   Basic Mobility Standardized Score 32.23   [facility] Level Of Mobility   [facility]  Goal 4: Move to chair/commode   -HLM Achieved 4: Move to chair/commode   Education   Education Provided Mobility training   Patient Demonstrates verbal understanding   End of Consult   Patient Position at End of Consult Bedside chair;Bed/Chair alarm activated;All needs within reach   End of Consult Comments discussed POC with PT

## 2024-08-15 NOTE — ASSESSMENT & PLAN NOTE
PT/OT evaluations, currently recommending STR  Postoperatively patient nonweightbearing to left foot  Discharge to [facility] today

## 2024-08-15 NOTE — ASSESSMENT & PLAN NOTE
Patient with a history of HTN  Home medication regimen: Lisinopril 2.5 mg daily  Continue home medications  Most Recent Blood Pressure: 145/70   Continue monitoring BP

## 2024-08-16 ENCOUNTER — TELEPHONE (OUTPATIENT)
Age: 64
End: 2024-08-16

## 2024-08-16 VITALS
HEIGHT: 70 IN | RESPIRATION RATE: 21 BRPM | WEIGHT: 219 LBS | HEART RATE: 89 BPM | SYSTOLIC BLOOD PRESSURE: 129 MMHG | BODY MASS INDEX: 31.35 KG/M2 | DIASTOLIC BLOOD PRESSURE: 65 MMHG | TEMPERATURE: 97.7 F | OXYGEN SATURATION: 93 %

## 2024-08-16 LAB
GLUCOSE SERPL-MCNC: 133 MG/DL (ref 65–140)
GLUCOSE SERPL-MCNC: 209 MG/DL (ref 65–140)

## 2024-08-16 PROCEDURE — 97530 THERAPEUTIC ACTIVITIES: CPT

## 2024-08-16 PROCEDURE — 82948 REAGENT STRIP/BLOOD GLUCOSE: CPT

## 2024-08-16 PROCEDURE — 99239 HOSP IP/OBS DSCHRG MGMT >30: CPT | Performed by: PHYSICIAN ASSISTANT

## 2024-08-16 PROCEDURE — 99233 SBSQ HOSP IP/OBS HIGH 50: CPT | Performed by: INTERNAL MEDICINE

## 2024-08-16 PROCEDURE — 97110 THERAPEUTIC EXERCISES: CPT

## 2024-08-16 RX ORDER — LEVOFLOXACIN 750 MG/1
750 TABLET, FILM COATED ORAL EVERY 24 HOURS
Qty: 11 TABLET | Refills: 0 | Status: SHIPPED | OUTPATIENT
Start: 2024-08-17 | End: 2024-08-28

## 2024-08-16 RX ORDER — LEVOFLOXACIN 750 MG/1
750 TABLET, FILM COATED ORAL EVERY 24 HOURS
Status: DISCONTINUED | OUTPATIENT
Start: 2024-08-16 | End: 2024-08-16 | Stop reason: HOSPADM

## 2024-08-16 RX ADMIN — LEVOFLOXACIN 750 MG: 750 TABLET, FILM COATED ORAL at 09:25

## 2024-08-16 RX ADMIN — ASPIRIN 81 MG: 81 TABLET, COATED ORAL at 09:25

## 2024-08-16 RX ADMIN — INSULIN LISPRO 12 UNITS: 100 INJECTION, SOLUTION INTRAVENOUS; SUBCUTANEOUS at 12:15

## 2024-08-16 RX ADMIN — LISINOPRIL 2.5 MG: 5 TABLET ORAL at 09:25

## 2024-08-16 RX ADMIN — ENOXAPARIN SODIUM 40 MG: 40 INJECTION SUBCUTANEOUS at 09:25

## 2024-08-16 RX ADMIN — CHOLECALCIFEROL TAB 25 MCG (1000 UNIT) 1000 UNITS: 25 TAB at 09:25

## 2024-08-16 RX ADMIN — INSULIN LISPRO 2 UNITS: 100 INJECTION, SOLUTION INTRAVENOUS; SUBCUTANEOUS at 12:15

## 2024-08-16 RX ADMIN — CEFEPIME HYDROCHLORIDE 2000 MG: 2 INJECTION, SOLUTION INTRAVENOUS at 01:17

## 2024-08-16 RX ADMIN — INSULIN LISPRO 12 UNITS: 100 INJECTION, SOLUTION INTRAVENOUS; SUBCUTANEOUS at 09:25

## 2024-08-16 NOTE — CASE MANAGEMENT
Case Management Discharge Planning Note    Patient name Art Joseph  Location /402-01 MRN [id number]  : 1960 Date 2024       Current Admission Date: 2024  Current Admission Diagnosis:Severe sepsis ([facility])   Patient Active Problem List    Diagnosis Date Noted Date Diagnosed    Acute cystitis without hematuria 2024     Severe sepsis (HCC) 2024     Atelectasis 2024     Peripheral vascular disease (HCC) 2024     MRSA colonization 2024     Open wound of left foot 2024     Maggot infestation 2024     Elevated platelet count 2024     Candidiasis, intertrigo 2024     Cellulitis of left lower extremity 2024     Class 1 obesity in adult 2021     Vitamin D deficiency 2019     Ambulatory dysfunction 10/17/2017     Hx of right BKA ([facility]) 2017     Primary hypertension 2017     Hypercholesteremia 2017     Acute osteomyelitis of metatarsal bone of left foot ([facility]) 2017     Type 2 diabetes mellitus with hyperglycemia, with long-term current use of insulin ([facility]) 2017     Diabetic neuropathy ([facility]) 2017       LOS (days): 10  Geometric Mean LOS (GMLOS) (days): 4.1  Days to GMLOS:-5.9     OBJECTIVE:  Risk of Unplanned Readmission Score: 18.58         Current admission status: Inpatient   Preferred Pharmacy:   [facility] [place] - [address]  [address] [facility] [address]  Phone: [phone] Fax: [phone]    [facility] - [place] - [address] [facility]  [facility] [place] [address]  Phone: [phone] Fax: [phone]    [facility]/pharmacy #[id number] - [place] - [address]  [address] [facility] [address]  Phone: [phone] Fax: [phone]    Primary Care Provider: Kerry Christine MD    Primary Insurance: AYLA MOTLEY  Secondary Insurance:     DISCHARGE DETAILS:    Discharge planning  discussed with:: patient  Freedom of Choice: Yes  Comments - Freedom of Choice: [facility]  CM contacted family/caregiver?: No- see comments (patient declined)  Were Treatment Team discharge recommendations reviewed with patient/caregiver?: Yes  Did patient/caregiver verbalize understanding of patient care needs?: Yes  Were patient/caregiver advised of the risks associated with not following Treatment Team discharge recommendations?: Yes    Contacts  Patient Contacts: see faces sheet    Requested Home Health Care         Is the patient interested in HHC at discharge?: No    DME Referral Provided  Referral made for DME?: No         Would you like to participate in our [facility] service program?  : No - Declined    Treatment Team Recommendation: Short Term Rehab  Discharge Destination Plan:: Short Term Rehab ([facility])  Transport at Discharge : Wheelchair van     Transfer Mode: Wheelchair      CM spoke to patient at the bedside, reviewed DC IMM with patient and informed that patient can stay an additional 4 hours for reconsidering appealing the discharge as the medicare rights were review on the day of discharge. Pt verbalized understanding and feels ready to go home and does not intend to stay 4 hours to reconsider.   Copy placed in bin to be scanned to system. Patient provided medicare number at bedside.    IMM Given (Date):: 08/16/24  IMM Given to:: Patient           Patient stable for discharge today to [facility].  [facility] updated in aidin of discharge and can accept patient today.    Transport requested in Round Trip via [employer] van. Secured for 1330     Medical Necessity for transportation was completed. Copy available for transport team along with face sheet for transport.    Discussed with Patient  there may be an out of pocket expense for transport.      Follow up providers listed on AVS.

## 2024-08-16 NOTE — PLAN OF CARE
Problem: Prexisting or High Potential for Compromised Skin Integrity  Goal: Skin integrity is maintained or improved  Description: INTERVENTIONS:  - Identify patients at risk for skin breakdown  - Assess and monitor skin integrity  - Assess and monitor nutrition and hydration status  - Monitor labs   - Assess for incontinence   - Turn and reposition patient  - Assist with mobility/ambulation  - Relieve pressure over bony prominences  - Avoid friction and shearing  - Provide appropriate hygiene as needed including keeping skin clean and dry  - Evaluate need for skin moisturizer/barrier cream  - Collaborate with interdisciplinary team   - Patient/family teaching  - Consider wound care consult   Outcome: Progressing     Problem: PAIN - ADULT  Goal: Verbalizes/displays adequate comfort level or baseline comfort level  Description: Interventions:  - Encourage patient to monitor pain and request assistance  - Assess pain using appropriate pain scale (0-10)  - Administer analgesics based on type and severity of pain and evaluate response  - Implement non-pharmacological measures as appropriate and evaluate response  - Consider cultural and social influences on pain and pain management  - Notify physician/advanced practitioner if interventions unsuccessful or patient reports new pain  Outcome: Progressing     Problem: INFECTION - ADULT  Goal: Absence or prevention of progression during hospitalization  Description: INTERVENTIONS:  - Assess and monitor for signs and symptoms of infection  - Monitor lab/diagnostic results  - Monitor all insertion sites, i.e. indwelling lines, tubes, and drains  - Monitor endotracheal if appropriate and nasal secretions for changes in amount and color  - Echo appropriate cooling/warming therapies per order  - Administer medications as ordered  - Instruct and encourage patient and family to use good hand hygiene technique  - Identify and instruct in appropriate isolation precautions for  identified infection/condition  Outcome: Progressing     Problem: SAFETY ADULT  Goal: Patient will remain free of falls  Description: INTERVENTIONS:  - Educate patient/family on patient safety including physical limitations  - Instruct patient to call for assistance with activity   - Consult OT/PT to assist with strengthening/mobility   - Keep Call bell within reach  - Keep bed low and locked with side rails adjusted as appropriate  - Keep care items and personal belongings within reach  - Initiate and maintain comfort rounds  - Make Fall Risk Sign visible to staff  - Offer Toileting every 2 Hours, in advance of need  - Initiate/Maintain bed alarm  - Obtain necessary fall risk management equipment:   - Apply yellow socks and bracelet for high fall risk patients  - Consider moving patient to room near nurses station  Outcome: Progressing  Goal: Maintain or return to baseline ADL function  Description: INTERVENTIONS:  -  Assess patient's ability to carry out ADLs; assess patient's baseline for ADL function and identify physical deficits which impact ability to perform ADLs (bathing, care of mouth/teeth, toileting, grooming, dressing, etc.)  - Assess/evaluate cause of self-care deficits   - Assess range of motion  - Assess patient's mobility; develop plan if impaired  - Assess patient's need for assistive devices and provide as appropriate  - Encourage maximum independence but intervene and supervise when necessary  - Involve family in performance of ADLs  - Assess for home care needs following discharge   - Consider OT consult to assist with ADL evaluation and planning for discharge  - Provide patient education as appropriate  Outcome: Progressing  Goal: Maintains/Returns to pre admission functional level  Description: INTERVENTIONS:  - Perform AM-PAC 6 Click Basic Mobility/ Daily Activity assessment daily.  - Set and communicate daily mobility goal to care team and patient/family/caregiver.   - Collaborate with  rehabilitation services on mobility goals if consulted  - Perform Range of Motion 3 times a day.  - Reposition patient every 2 hours.  - Dangle patient 3 times a day  - Stand patient 3 times a day  - Ambulate patient 3 times a day  - Out of bed to chair 3 times a day   - Out of bed for meals 3 times a day  - Out of bed for toileting  - Record patient progress and toleration of activity level   Outcome: Progressing     Problem: DISCHARGE PLANNING  Goal: Discharge to home or other facility with appropriate resources  Description: INTERVENTIONS:  - Identify barriers to discharge w/patient and caregiver  - Arrange for needed discharge resources and transportation as appropriate  - Identify discharge learning needs (meds, wound care, etc.)  - Arrange for interpretive services to assist at discharge as needed  - Refer to Case Management Department for coordinating discharge planning if the patient needs post-hospital services based on physician/advanced practitioner order or complex needs related to functional status, cognitive ability, or social support system  Outcome: Progressing     Problem: Knowledge Deficit  Goal: Patient/family/caregiver demonstrates understanding of disease process, treatment plan, medications, and discharge instructions  Description: Complete learning assessment and assess knowledge base.  Interventions:  - Provide teaching at level of understanding  - Provide teaching via preferred learning methods  Outcome: Progressing     Problem: SKIN/TISSUE INTEGRITY - ADULT  Goal: Skin Integrity remains intact(Skin Breakdown Prevention)  Description: Assess:  -Perform Zaid assessment every shift  -Clean and moisturize skin every shift  -Inspect skin when repositioning, toileting, and assisting with ADLS  -Assess extremities for adequate circulation and sensation     Bed Management:  -Have minimal linens on bed & keep smooth, unwrinkled  -Change linens as needed when moist or perspiring  -Avoid sitting or  lying in one position for more than 2 hours while in bed  -Keep HOB at 30 degrees     Toileting:  -Offer bedside commode  -Assess for incontinence every 2 hours  -Use incontinent care products after each incontinent episode    Activity:  -Mobilize patient 3 times a day  -Encourage activity and walks on unit  -Encourage or provide ROM exercises   -Turn and reposition patient every 2 Hours  -Use appropriate equipment to lift or move patient in bed  -Instruct/ Assist with weight shifting every hour when out of bed in chair  -Consider limitation of chair time 1 hour intervals    Skin Care:  -Avoid use of baby powder, tape, friction and shearing, hot water or constrictive clothing  -Relieve pressure over bony prominences  -Do not massage red bony areas    Next Steps:  -Teach patient strategies to minimize risks   -Consider consults to  interdisciplinary teams  Outcome: Progressing  Goal: Incision(s), wounds(s) or drain site(s) healing without S/S of infection  Description: INTERVENTIONS  - Assess and document dressing, incision, wound bed, drain sites and surrounding tissue  - Provide patient and family education  - Perform skin care/dressing changes every day  Outcome: Progressing  Goal: Pressure injury heals and does not worsen  Description: Interventions:  - Implement low air loss mattress or specialty surface (Criteria met)  - Apply silicone foam dressing  - Instruct/assist with weight shifting every 60 minutes when in chair   - Limit chair time to 1 hour intervals  - Use special pressure reducing interventions when in chair   - Apply fecal or urinary incontinence containment device   - Perform passive or active ROM  - Turn and reposition patient & offload bony prominences every 2 hours   - Utilize friction reducing device or surface for transfers   - Consider consults to  interdisciplinary teams  - Use incontinent care products after each incontinent episode  - Consider nutrition services referral as  needed  Outcome: Progressing     Problem: METABOLIC, FLUID AND ELECTROLYTES - ADULT  Goal: Electrolytes maintained within normal limits  Description: INTERVENTIONS:  - Monitor labs and assess patient for signs and symptoms of electrolyte imbalances  - Administer electrolyte replacement as ordered  - Monitor response to electrolyte replacements, including repeat lab results as appropriate  - Instruct patient on fluid and nutrition as appropriate  Outcome: Progressing     Problem: MUSCULOSKELETAL - ADULT  Goal: Maintain or return mobility to safest level of function  Description: INTERVENTIONS:  - Assess patient's ability to carry out ADLs; assess patient's baseline for ADL function and identify physical deficits which impact ability to perform ADLs (bathing, care of mouth/teeth, toileting, grooming, dressing, etc.)  - Assess/evaluate cause of self-care deficits   - Assess range of motion  - Assess patient's mobility  - Assess patient's need for assistive devices and provide as appropriate  - Encourage maximum independence but intervene and supervise when necessary  - Involve family in performance of ADLs  - Assess for home care needs following discharge   - Consider OT consult to assist with ADL evaluation and planning for discharge  - Provide patient education as appropriate  Outcome: Progressing  Goal: Maintain proper alignment of affected body part  Description: INTERVENTIONS:  - Support, maintain and protect limb and body alignment  - Provide patient/ family with appropriate education  Outcome: Progressing     Problem: Nutrition/Hydration-ADULT  Goal: Nutrient/Hydration intake appropriate for improving, restoring or maintaining nutritional needs  Description: Monitor and assess patient's nutrition/hydration status for malnutrition. Collaborate with interdisciplinary team and initiate plan and interventions as ordered.  Monitor patient's weight and dietary intake as ordered or per policy. Utilize nutrition  screening tool and intervene as necessary. Determine patient's food preferences and provide high-protein, high-caloric foods as appropriate.     INTERVENTIONS:  - Monitor oral intake, urinary output, labs, and treatment plans  - Assess nutrition and hydration status and recommend course of action  - Evaluate amount of meals eaten  - Assist patient with eating if necessary   - Allow adequate time for meals  - Recommend/ encourage appropriate diets, oral nutritional supplements, and vitamin/mineral supplements  - Order, calculate, and assess calorie counts as needed  - Recommend, monitor, and adjust tube feedings and TPN/PPN based on assessed needs  - Assess need for intravenous fluids  - Provide specific nutrition/hydration education as appropriate  - Include patient/family/caregiver in decisions related to nutrition  Outcome: Progressing

## 2024-08-16 NOTE — PLAN OF CARE
Problem: Prexisting or High Potential for Compromised Skin Integrity  Goal: Skin integrity is maintained or improved  Description: INTERVENTIONS:  - Identify patients at risk for skin breakdown  - Assess and monitor skin integrity  - Assess and monitor nutrition and hydration status  - Monitor labs   - Assess for incontinence   - Turn and reposition patient  - Assist with mobility/ambulation  - Relieve pressure over bony prominences  - Avoid friction and shearing  - Provide appropriate hygiene as needed including keeping skin clean and dry  - Evaluate need for skin moisturizer/barrier cream  - Collaborate with interdisciplinary team   - Patient/family teaching  - Consider wound care consult   Outcome: Progressing     Problem: PAIN - ADULT  Goal: Verbalizes/displays adequate comfort level or baseline comfort level  Description: Interventions:  - Encourage patient to monitor pain and request assistance  - Assess pain using appropriate pain scale (0-10)  - Administer analgesics based on type and severity of pain and evaluate response  - Implement non-pharmacological measures as appropriate and evaluate response  - Consider cultural and social influences on pain and pain management  - Notify physician/advanced practitioner if interventions unsuccessful or patient reports new pain  Outcome: Progressing     Problem: INFECTION - ADULT  Goal: Absence or prevention of progression during hospitalization  Description: INTERVENTIONS:  - Assess and monitor for signs and symptoms of infection  - Monitor lab/diagnostic results  - Monitor all insertion sites, i.e. indwelling lines, tubes, and drains  - Monitor endotracheal if appropriate and nasal secretions for changes in amount and color  - [place] appropriate cooling/warming therapies per order  - Administer medications as ordered  - Instruct and encourage patient and family to use good hand hygiene technique  - Identify and instruct in appropriate isolation precautions for  identified infection/condition  Outcome: Progressing     Problem: SAFETY ADULT  Goal: Patient will remain free of falls  Description: INTERVENTIONS:  - Educate patient/family on patient safety including physical limitations  - Instruct patient to call for assistance with activity   - Consult OT/PT to assist with strengthening/mobility   - Keep Call bell within reach  - Keep bed low and locked with side rails adjusted as appropriate  - Keep care items and personal belongings within reach  - Initiate and maintain comfort rounds  - Make Fall Risk Sign visible to staff  - Offer Toileting every 2 Hours, in advance of need  - Initiate/Maintain bed alarm  - Obtain necessary fall risk management equipment:   - Apply yellow socks and bracelet for high fall risk patients  - Consider moving patient to room near nurses station  Outcome: Progressing  Goal: Maintain or return to baseline ADL function  Description: INTERVENTIONS:  -  Assess patient's ability to carry out ADLs; assess patient's baseline for ADL function and identify physical deficits which impact ability to perform ADLs (bathing, care of mouth/teeth, toileting, grooming, dressing, etc.)  - Assess/evaluate cause of self-care deficits   - Assess range of motion  - Assess patient's mobility; develop plan if impaired  - Assess patient's need for assistive devices and provide as appropriate  - Encourage maximum independence but intervene and supervise when necessary  - Involve family in performance of ADLs  - Assess for home care needs following discharge   - Consider OT consult to assist with ADL evaluation and planning for discharge  - Provide patient education as appropriate  Outcome: Progressing  Goal: Maintains/Returns to pre admission functional level  Description: INTERVENTIONS:  - Perform AM-PAC 6 Click Basic Mobility/ Daily Activity assessment daily.  - Set and communicate daily mobility goal to care team and patient/family/caregiver.   - Collaborate with  rehabilitation services on mobility goals if consulted  - Perform Range of Motion 3 times a day.  - Reposition patient every 2 hours.  - Dangle patient 3 times a day  - Stand patient 3 times a day  - Ambulate patient 3 times a day  - Out of bed to chair 3 times a day   - Out of bed for meals 3 times a day  - Out of bed for toileting  - Record patient progress and toleration of activity level   Outcome: Progressing     Problem: DISCHARGE PLANNING  Goal: Discharge to home or other facility with appropriate resources  Description: INTERVENTIONS:  - Identify barriers to discharge w/patient and caregiver  - Arrange for needed discharge resources and transportation as appropriate  - Identify discharge learning needs (meds, wound care, etc.)  - Arrange for interpretive services to assist at discharge as needed  - Refer to Case Management Department for coordinating discharge planning if the patient needs post-hospital services based on physician/advanced practitioner order or complex needs related to functional status, cognitive ability, or social support system  Outcome: Progressing     Problem: Knowledge Deficit  Goal: Patient/family/caregiver demonstrates understanding of disease process, treatment plan, medications, and discharge instructions  Description: Complete learning assessment and assess knowledge base.  Interventions:  - Provide teaching at level of understanding  - Provide teaching via preferred learning methods  Outcome: Progressing     Problem: SKIN/TISSUE INTEGRITY - ADULT  Goal: Skin Integrity remains intact(Skin Breakdown Prevention)  Description: Assess:  -Perform Zaid assessment every shift  -Clean and moisturize skin every shift  -Inspect skin when repositioning, toileting, and assisting with ADLS  -Assess extremities for adequate circulation and sensation     Bed Management:  -Have minimal linens on bed & keep smooth, unwrinkled  -Change linens as needed when moist or perspiring  -Avoid sitting or  lying in one position for more than 2 hours while in bed  -Keep HOB at 30 degrees     Toileting:  -Offer bedside commode  -Assess for incontinence every 2 hours  -Use incontinent care products after each incontinent episode    Activity:  -Mobilize patient 3 times a day  -Encourage activity and walks on unit  -Encourage or provide ROM exercises   -Turn and reposition patient every 2 Hours  -Use appropriate equipment to lift or move patient in bed  -Instruct/ Assist with weight shifting every hour when out of bed in chair  -Consider limitation of chair time 1 hour intervals    Skin Care:  -Avoid use of baby powder, tape, friction and shearing, hot water or constrictive clothing  -Relieve pressure over bony prominences  -Do not massage red bony areas    Next Steps:  -Teach patient strategies to minimize risks   -Consider consults to  interdisciplinary teams  Outcome: Progressing  Goal: Incision(s), wounds(s) or drain site(s) healing without S/S of infection  Description: INTERVENTIONS  - Assess and document dressing, incision, wound bed, drain sites and surrounding tissue  - Provide patient and family education  - Perform skin care/dressing changes every day  Outcome: Progressing  Goal: Pressure injury heals and does not worsen  Description: Interventions:  - Implement low air loss mattress or specialty surface (Criteria met)  - Apply silicone foam dressing  - Instruct/assist with weight shifting every 60 minutes when in chair   - Limit chair time to 1 hour intervals  - Use special pressure reducing interventions when in chair   - Apply fecal or urinary incontinence containment device   - Perform passive or active ROM  - Turn and reposition patient & offload bony prominences every 2 hours   - Utilize friction reducing device or surface for transfers   - Consider consults to  interdisciplinary teams  - Use incontinent care products after each incontinent episode  - Consider nutrition services referral as  needed  Outcome: Progressing     Problem: METABOLIC, FLUID AND ELECTROLYTES - ADULT  Goal: Electrolytes maintained within normal limits  Description: INTERVENTIONS:  - Monitor labs and assess patient for signs and symptoms of electrolyte imbalances  - Administer electrolyte replacement as ordered  - Monitor response to electrolyte replacements, including repeat lab results as appropriate  - Instruct patient on fluid and nutrition as appropriate  Outcome: Progressing     Problem: MUSCULOSKELETAL - ADULT  Goal: Maintain or return mobility to safest level of function  Description: INTERVENTIONS:  - Assess patient's ability to carry out ADLs; assess patient's baseline for ADL function and identify physical deficits which impact ability to perform ADLs (bathing, care of mouth/teeth, toileting, grooming, dressing, etc.)  - Assess/evaluate cause of self-care deficits   - Assess range of motion  - Assess patient's mobility  - Assess patient's need for assistive devices and provide as appropriate  - Encourage maximum independence but intervene and supervise when necessary  - Involve family in performance of ADLs  - Assess for home care needs following discharge   - Consider OT consult to assist with ADL evaluation and planning for discharge  - Provide patient education as appropriate  Outcome: Progressing  Goal: Maintain proper alignment of affected body part  Description: INTERVENTIONS:  - Support, maintain and protect limb and body alignment  - Provide patient/ family with appropriate education  Outcome: Progressing     Problem: Nutrition/Hydration-ADULT  Goal: Nutrient/Hydration intake appropriate for improving, restoring or maintaining nutritional needs  Description: Monitor and assess patient's nutrition/hydration status for malnutrition. Collaborate with interdisciplinary team and initiate plan and interventions as ordered.  Monitor patient's weight and dietary intake as ordered or per policy. Utilize nutrition  screening tool and intervene as necessary. Determine patient's food preferences and provide high-protein, high-caloric foods as appropriate.     INTERVENTIONS:  - Monitor oral intake, urinary output, labs, and treatment plans  - Assess nutrition and hydration status and recommend course of action  - Evaluate amount of meals eaten  - Assist patient with eating if necessary   - Allow adequate time for meals  - Recommend/ encourage appropriate diets, oral nutritional supplements, and vitamin/mineral supplements  - Order, calculate, and assess calorie counts as needed  - Recommend, monitor, and adjust tube feedings and TPN/PPN based on assessed needs  - Assess need for intravenous fluids  - Provide specific nutrition/hydration education as appropriate  - Include patient/family/caregiver in decisions related to nutrition  Outcome: Progressing

## 2024-08-16 NOTE — DISCHARGE SUMMARY
[facility]  Discharge- Art Joseph 1960, 64 y.o. male MRN: [id number]  Unit/Bed#: 402-01 Encounter: [id number]  Primary Care Provider: Kerry Christine MD   Date and time admitted to hospital: 8/5/2024  2:18 PM    Acute cystitis without hematuria  Assessment & Plan  8/9 urine culture positive for 40-49,000 Providencia stuartii  Susceptible to IV cefepime continue per ID recs  ID following, input appreciated  See plan under severe sepsis    Atelectasis  Assessment & Plan  Incentive spirometry 10 times per hour while awake     Peripheral vascular disease (HCC)  Assessment & Plan  Continue aspirin 81 mg PO Qdaily and high-intensity statin therapy with atorvastatin 80 mg PO Qdaily with dinner  8/7 ABIs similar to prior, image result reviewed  Outpatient follow-up with Vascular Surgery     Maggot infestation  Assessment & Plan  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    Primary hypertension  Assessment & Plan  Patient with a history of HTN  Home medication regimen: Lisinopril 2.5 mg daily  Continue home medications  Most Recent Blood Pressure: 145/70   Continue monitoring BP    Ambulatory dysfunction  Assessment & Plan  PT/OT evaluations, currently recommending STR  Postoperatively patient nonweightbearing to left foot  Discharge to [facility] today    Acute osteomyelitis of metatarsal bone of left foot (HCC)  Assessment & Plan  Recent hospitalization from 06/25/2024 through 06/28/2024 at [facility] for osteomyelitis of the left foot requiring a left 1st metatarsal amputation/resection on 06/27/2024 by Podiatry.  The patient was also found to have maggots in his open wound.   Recent hospitalization from 07/08/2024 through 07/18/2024 at [facility] and underwent left foot wound debridement and washout by Podiatry on 07/11/2024   Discharged on PO Augmentin 875mg BID plus IV daptomycin 6mg/kg ABW x 6  "weeks from bone resection through 08/21/24 per Infectious Disease's recommendations  Now presented with an infection of the left foot  8/9 MRI left foot: Osteomyelitis of the medial cuneiform.  Nonspecific minimal bone marrow edema without T1 marrow signal hypointensity in the middle cuneiform, likely related to reactive changes or early osteomyelitis.  Ulcer in the medial aspect of the foot tracking to the level of the medial cuneiform with associated diffuse soft tissue edema and enhancement, suggestive of cellulitis.  No localized/drainable fluid collection.  ID and Podiatry onboard ongoing recs appreciated  Continue Cefepime and Daptomycin - switched to PO levaquin by ID through 8/27 8/13 OR with podiatry for left Lisfranc amputation  Podiatry optimistic postsurgery of removing a lot of osteomyelitis but recommend extended soft tissue antibiotic coverage  8/13 left foot x-ray: No acute osseous abnormality.  Postoperative change.  Strict NWB to left foot until sutures are removed  Please see the assessment and plan for \"Severe sepsis\"    Type 2 diabetes mellitus with hyperglycemia, with long-term current use of insulin (HCC)  Assessment & Plan  Lab Results   Component Value Date    HGBA1C 7.9 (H) 08/07/2024       Recent Labs     08/15/24  1607 08/15/24  2103 08/16/24  0654 08/16/24  1059   POCGLU 251* 162* 133 209*       Blood Sugar Average: Last 72 hrs:  (P) [id number]    continue Humalog to 12 Units TID with meals on 08/09/2024(increased)  Lantus to 24 Units SQ QHS on 08/10/2024 (increased)  ISS with blood glucose monitoring ACHS  Hypoglycemia protocol  Continue lisinopril for renal protection  Outpatient Endocrinology evaluation      * Severe sepsis (HCC)  Assessment & Plan  Developed severe sepsis on 08/09/2024 with fevers, a leukocytosis, lactic acidosis, and tachycardia.  Sepsis alert initiated at that time including IV fluid bolus  Suspected due to a surgical wound infection and osteomyelitis of " the left foot now with clinical improvement  Blood cultures x 2 on 08/09/2024 no growth x 5 days  Urine culture 8/9 positive for 40-49,000 Providencia stuartii.  Susceptible to cefepime  On IV cefepime and Daptomycin currently per ID likely transition to oral tomorrow   Monitor CK while on Dapto--current level remain stable/flat  Surgical wound cultures pending  8/14 patient with fever of 102.5 possibly inflammatory response to the OR.  If fever recurs plan to repeat blood cultures  8/16 transitioned to PO levaquin through 8.27        Medical Problems       Resolved Problems  Date Reviewed: 8/12/2024            Resolved    Osteomyelitis of left foot (HCC) 8/12/2024     Resolved by  Abdirahman Gil Counts, DO    Hypomagnesemia 8/12/2024     Resolved by  Abdirahman Gil Counts, DO    Hypophosphatemia 8/12/2024     Resolved by  Abdirahman Gil Counts, DO        Discharging Physician / Practitioner: Trinity Deleon PA-C  PCP: Kerry hCristine MD  Admission Date:   Admission Orders (From admission, onward)       Ordered        08/06/24 0928  INPATIENT ADMISSION  Once            08/05/24 2113  Place in Observation  Once                          Discharge Date: 08/16/24    Consultations During Hospital Stay:  Podiatry  Infectious disease    Procedures Performed:   Lisfranc amputation    Significant Findings / Test Results:     XR foot left 3+ views   Final Result      No acute osseous abnormality. Postoperative change.         Computerized Assisted Algorithm (CAA) may have been used to analyze all applicable images.         Workstation performed: [id number]         MRI foot/forefoot toes left w wo contrast   Final Result      1. Osteomyelitis of medial cuneiform.      2. Nonspecific minimal bone marrow edema without T1 marrow signal hypointensity in the middle cuneiform, likely related to reactive changes or early osteomyelitis.      3. Ulcer in the medial aspect of the foot tracking to the level of the medial cuneiform with  associated diffuse soft tissue edema and enhancement, suggestive of cellulitis. No localized/drainable fluid collection.      The study was marked in EPIC for immediate notification.      Workstation performed: [id number]         XR chest portable   Final Result      Stable findings with  right-sided PICC line with tip at cavoatrial junction      Persistent bibasal subsegmental atelectasis            Workstation performed: [id number]         NM radrx ceretec abscess localization limited   Final Result      1. There is increased focal radiotracer uptake on both 3 and 24-hour imaging at the medial aspect of the left midfoot suspicious for osteomyelitis.      The study was marked in EPIC for immediate notification.         Resident: Yony Hill I, the attending radiologist, have reviewed the images and agree with the final report above.      Workstation performed: [id number]         VAS ARTERIAL DUPLEX-LOWER LIMB UNILATERAL   Final Result      XR chest 1 view portable   ED Interpretation   Per my independent interpretation. Radiologist to provide formal read. Picc line SVC/rt atrium similar to prev fluoro images. Rt basilar atelectasis       Final Result      Right PICC is present with the tip at the caval atrial junction level. Areas of platelike atelectasis both lung bases with elevated right hemidiaphragm. Multiple healed left rib fractures            Workstation performed: [id number]         XR foot 3+ views LEFT   ED Interpretation   Per my independent interpretation. Radiologist to provide formal read. Soft tissue defect s/p 1st metatarsal amp/resection no air in soft tissue      Final Result      Extensive postop changes of the foot. Swelling. New soft tissue calcifications at the site of the first ray amputation, but no soft tissue gas or conclusive evidence of osteomyelitis         Computerized Assisted Algorithm (CAA) may have been used to analyze all applicable images.         Workstation  "performed: [id number]               Incidental Findings:   none    Test Results Pending at Discharge (will require follow up):   none     Outpatient Tests Requested:  none    Complications:  none    Reason for Admission: foot infection    Hospital Course:   Art Joseph is a 64 y.o. male patient who originally presented to the hospital on 8/5/2024 due to maggots in the wound. Pt also has had maggot infestation back in June. Per the nursing home, his dressing change is for every other day and was changed on Saturday. He denies any fever or chills. No loss of appetite. No history of recent falls or trauma to the foot.   During his last admission, he was seen by podiatry and taken for wound debridement and washout on 7/11/2024. Wound culture was positive for MRSA as well as Proteus vulgaris. Initially was on IV Vancomycin and Rocephin but was later final course of abx recommended changed to IV Daptomycin and oral Augmentin to be completed from day of washout through 8/21/2024.   Work up in the ED was not impressive. WBC was wnl, ESR and CRP about recent baseline. Xray showed extensive postop changes of the foot. Swelling. New soft tissue calcifications at the site of the first ray amputation, but no soft tissue gas or conclusive evidence of osteomyelitis      Please see above list of diagnoses and related plan for additional information.     Condition at Discharge: fair    Discharge Day Visit / Exam:   Subjective:  patient denies any acute complaints today. Using the bathroom without issue. Sleeping and eating well. Denies CP or SOB.   Vitals: Blood Pressure: 129/65 (08/16/24 0922)  Pulse: 89 (08/16/24 0922)  Temperature: 97.7 °F (36.5 °C) (08/16/24 0655)  Temp Source: Oral (08/14/24 0635)  Respirations: 21 (08/16/24 0655)  Height: 5' 10\" (177.8 cm) (08/13/24 1413)  Weight - Scale: 99.3 kg (219 lb) (08/13/24 1413)  SpO2: 93 % (08/16/24 0922)  Exam:   Physical Exam  Vitals and nursing note reviewed.   Constitutional:  "      Appearance: He is obese. He is ill-appearing.   Cardiovascular:      Rate and Rhythm: Normal rate and regular rhythm.      Pulses: Normal pulses.      Heart sounds: Normal heart sounds. No murmur heard.     No gallop.   Pulmonary:      Effort: Pulmonary effort is normal.      Breath sounds: Normal breath sounds. No wheezing or rales.   Abdominal:      General: Bowel sounds are normal.      Palpations: Abdomen is soft.   Musculoskeletal:      Comments: Right BKA   Skin:     Comments: Left foot wrapped   Neurological:      Mental Status: He is alert.   Psychiatric:         Mood and Affect: Mood normal.         Behavior: Behavior normal.          Discussion with Family: Patient declined call to .     Discharge instructions/Information to patient and family:   See after visit summary for information provided to patient and family.      Provisions for Follow-Up Care:  See after visit summary for information related to follow-up care and any pertinent home health orders.      Mobility at time of Discharge:   Basic Mobility Inpatient Raw Score: 12  JH-HLM Goal: 4: Move to chair/commode  JH-HLM Achieved: 4: Move to chair/commode  HLM Goal achieved. Continue to encourage appropriate mobility.     Disposition:   Other Skilled Nursing Facility at [facility]    Planned Readmission: none     Discharge Statement:  I spent 42 minutes discharging the patient. This time was spent on the day of discharge. I had direct contact with the patient on the day of discharge. Greater than 50% of the total time was spent examining patient, answering all patient questions, arranging and discussing plan of care with patient as well as directly providing post-discharge instructions.  Additional time then spent on discharge activities.    Discharge Medications:  See after visit summary for reconciled discharge medications provided to patient and/or family.      **Please Note: This note may have been constructed using a voice  recognition system**

## 2024-08-16 NOTE — NURSING NOTE
Pt d/c with all belongings via stretcher with transport team; Pt in no signs of acute distress at this time; Report called to [place] with no response;

## 2024-08-16 NOTE — PROGRESS NOTES
VIRTUAL CARE DOCUMENTATION:     1. This service was provided via Telemedicine using [employer]     2. Parties in the room with patient during teleconsult Patient only    3. Confidentiality My office door was closed     4. Participants No one else was in the room    5. Patient acknowledged consent and understanding of privacy and security of the  Telemedicine consult. I informed the patient that I have reviewed their record in Epic and presented the opportunity for them to ask any questions regarding the visit today.  The patient agreed to participate.    6. Time spent 3 minutes          Progress Note - Infectious Disease   Art Joseph 64 y.o. male MRN: [id number]  Unit/Bed#: 402-01 Encounter: [id number]      Impression/Plan:  1.  Systemic inflammatory response syndrome.  New onset fever and with a rising WBC count.  Possibly all secondary to a Providencia UTI.  Consideration for the possibility of a progressive foot infection.  Consideration for the possibility of a catheter related bacteremia although blood cultures remain negative.  The temperature and the white cell count have come down.  Chest x-ray without pneumonia.  New postoperative fever possibly inflammatory response to the procedure.  No recurrence of fever spike.  White cell count is also increased and now stabilized.  -Antibiotics as below  -Follow-up blood cultures  -If fever recurs, recommend repeat blood cultures x 2 sets  -Recheck CBC with differential and BMP to make sure no worsening toxicities     2.  Nonhealing infected left foot wound with ongoing osteomyelitis.  Pseudomonas aeruginosa from the operative cultures.  Initial plan was to go with broad-spectrum antibiotics through 8/21/2024.  There was increased drainage and worsening of the wound.   Ceretec scan consistent with osteomyelitis.  MRI with osteomyelitis of the cuneiform bones no abscess seen.  Patient now status post Lisfranc amputation with surgical cure of any bone  infection.   I discussed with the patient the risk of permanent tendinopathy and neuropathy with the quinolones and the patient is going to accept those risks since quinolones are the only oral option.  -Discontinue daptomycin and cefepime  -Begin levofloxacin 750 mg p.o. every 24 hours through 2024 as podiatry favors an extended course of antibiotics for soft tissue infection postoperatively  -Recheck CBC with differential, BMP in 1 week to make sure no developing toxicities  -Local wound care  -Close podiatry follow-up  -Messaged the infectious [facility] office about need for follow-up laboratories in 1 week     3.  Providencia UTI.  Seems to have improved with broadening the gram-negative coverage.  -Continue cefepime as above     4.  Diabetes mellitus.  With hyperglycemia.  Poorly controlled with a hemoglobin A1c of 9.7 in the recent past.  -Tighten diabetic control to improve leukocyte function and wound healing     5.  Peripheral arterial disease.  Consider vascular consult    I spent 50 minutes in evaluation of the patient of which 30 minutes was in counseling/coordination of care    Discussed with the primary service the plan to change to levofloxacin and they agree with the plan.    Antibiotics:  Daptomycin  Cefepime 8  Postop day 3    Subjective:  Patient has no fever, chills, sweats; no nausea, vomiting, diarrhea; no cough, shortness of breath; no pain. No new symptoms.    Objective:  Vitals:  Temp:  [97.7 °F (36.5 °C)-99.6 °F (37.6 °C)] 97.7 °F (36.5 °C)  HR:  [84-89] 84  Resp:  [16-21] 21  BP: (134-138)/(65-67) 134/67  SpO2:  [94 %-95 %] 95 %  Temp (24hrs), Av.7 °F (37.1 °C), Min:97.7 °F (36.5 °C), Max:99.6 °F (37.6 °C)  Current: Temperature: 97.7 °F (36.5 °C)    Documented physical exam has been primarily done by the patient's nurse and/or the primary service due to limited examination abilities on telemedicine    Physical Exam:   General Appearance:  Alert, interactive, nontoxic, no acute  distress.   Throat: Oropharynx moist without lesions.    Lungs:   Clear to auscultation bilaterally; no wheezes, rhonchi or rales; respirations unlabored   Heart:  RRR; no murmur, rub or gallop   Abdomen:   Soft, non-tender, non-distended, positive bowel sounds.     Extremities: No clubbing, cyanosis or edema   Skin: No new rashes or lesions. No draining wounds noted.       Labs, Imaging, & Other studies:   All pertinent labs and imaging studies were personally reviewed  Results from last 7 days   Lab Units 08/15/24  0554 08/14/24  0436 08/13/24  0444   WBC Thousand/uL 12.67* 13.19* 9.63   HEMOGLOBIN g/dL 10.2* 11.1* 11.3*   PLATELETS Thousands/uL 313 296 247     Results from last 7 days   Lab Units 08/15/24  0554 08/14/24  0436 08/13/24  0444 08/12/24  0509 08/11/24  0450 08/10/24  0452   SODIUM mmol/L 138 135 137 136 135 136   POTASSIUM mmol/L 3.6 3.9 3.9 3.7 3.8 3.9   CHLORIDE mmol/L 103 101 105 105 106 107   CO2 mmol/L 27 25 24 22 23 22   BUN mg/dL 18 18 13 12 19 20   CREATININE mg/dL 0.71 0.78 0.62 0.66 0.75 0.87   EGFR ml/min/1.73sq m 98 94 104 101 96 90   CALCIUM mg/dL 8.4 8.5 8.1* 8.2* 8.6 8.7   AST U/L  --   --   --  13 9* 7*   ALT U/L  --   --   --  15 9 7   ALK PHOS U/L  --   --   --  102 99 94     Results from last 7 days   Lab Units 08/13/24  1558 08/09/24  1722 08/09/24  0955 08/09/24  0902   BLOOD CULTURE   --   --  No Growth After 5 Days. No Growth After 5 Days.   GRAM STAIN RESULT  No Polys or Bacteria seen  --   --   --    URINE CULTURE   --  40,000-49,000 cfu/ml Providencia stuartii*  --   --    WOUND CULTURE  1+ Growth of Pseudomonas aeruginosa*  1+ Growth of Diphtheroids  --   --   --      Results from last 7 days   Lab Units 08/12/24  0509 08/11/24  0450 08/10/24  0452 08/09/24  1008   PROCALCITONIN ng/ml 0.26* 0.38* 0.63* 0.51*

## 2024-08-16 NOTE — PHYSICAL THERAPY NOTE
PHYSICAL THERAPY NOTE          Patient Name: Art Joseph  Today's Date: 8/16/2024 08/16/24 0909   PT Last Visit   PT Visit Date 08/16/24   Note Type   Note Type Treatment for insurance authorization   Pain Assessment   Pain Assessment Tool 0-10   Pain Score No Pain   Restrictions/Precautions   Weight Bearing Precautions Per Order Yes   LLE Weight Bearing Per Order NWB   Braces or Orthoses   (R prosthesis)   Other Precautions Fall Risk;Multiple lines;Contact/isolation;WBS;Chair Alarm;Bed Alarm   General   Chart Reviewed Yes   Response to Previous Treatment Patient with no complaints from previous session.   Family/Caregiver Present No   Cognition   Overall Cognitive Status WFL   Arousal/Participation Alert   Attention Within functional limits   Orientation Level Oriented X4   Memory Within functional limits   Following Commands Follows all commands and directions without difficulty   Subjective   Subjective Pt reports he was able to perform SPT back to bed with nursing yesterday and was able to maintain NWB   Bed Mobility   Supine to Sit 5  Supervision   Additional items Increased time required;Verbal cues   Additional Comments Sat EOB with Good sitting balance to don R prosthetic.   Transfers   Sit to Stand 3  Moderate assistance   Additional items Assist x 2;Increased time required;Verbal cues   Stand to Sit 4  Minimal assistance   Additional items Assist x 2;Armrests;Increased time required;Verbal cues   Stand pivot 3  Moderate assistance   Additional items Assist x 2;Increased time required;Verbal cues   Additional Comments RW used. Unable to maintain NWB status. Cues and education provided to maintain NWB   Ambulation/Elevation   Gait pattern Not appropriate;Not tested   Balance   Static Sitting Good   Dynamic Sitting Good   Static Standing Fair -   Dynamic Standing Poor +  (RW)   Endurance Deficit   Endurance Deficit Yes    Activity Tolerance   Activity Tolerance Patient limited by fatigue   Exercises   Quad Sets Supine;25 reps;AROM;Bilateral   Hip Flexion Sitting;25 reps;AROM;Bilateral   Hip Abduction Sitting;25 reps;AROM;Bilateral   Hip Adduction Sitting;25 reps;AROM;Bilateral  (+ add sets)   Knee AROM Long Arc Quad Sitting;25 reps;AROM;Bilateral   Assessment   Prognosis Good   Problem List   (Decreased strength; Decreased endurance; Impaired balance; Decreased mobility; Decreased skin integrity; Orthopedic restrictions)   Assessment Pt. seen for PT treatment session this date with interventions consisting of  therapeutic exercises, bed mobility and  transfers w/ emphasis on improving pt's strength, endurance and functional mobility. Pt. Requiring  cues for sequence and safety.  Unable to maintain NWB with pivot to chair. Pt. Preceded to pivot to chair when advised not if unable to maintain NWB.  Educated on importance of strict NWB.  Pt is in need of continued activity in PT to improve strength balance endurance mobility transfers and ambulation with return to maximize LOF. From PT/mobility standpoint, recommendation at time of d/c would be level I: Max resource intensity  in order to promote return to PLOF and independence.   The patient's AM-PAC Basic Mobility Inpatient Short Form Raw Score is 12. A Raw score of less than or equal to 16 suggests the patient may benefit from discharge to post-acute rehabilitation services.  Please also refer to physical therapy recommendation for safe DC planning.   Goals   Patient Goals to get stronger   LTG Expiration Date 08/28/24   PT Treatment Day 3   Plan   Treatment/Interventions Functional transfer training;LE strengthening/ROM;Therapeutic exercise;Endurance training;Bed mobility;Gait training;Spoke to nursing   Progress Progressing toward goals   PT Frequency 3-5x/wk

## 2024-08-16 NOTE — TELEPHONE ENCOUNTER
Caller: -Morris Run    Doctor and/or Office: Dr. Bhakta/either location    #: [phone]    Escalation: Surgery Patient needs a post op scheduled at either location. Please return call to . Thank you  
Passed

## 2024-08-17 LAB
BACTERIA SPEC ANAEROBE CULT: NORMAL
BACTERIA WND AEROBE CULT: ABNORMAL
BACTERIA WND AEROBE CULT: ABNORMAL
GRAM STN SPEC: ABNORMAL

## 2024-08-19 ENCOUNTER — TELEPHONE (OUTPATIENT)
Dept: INFECTIOUS DISEASES | Facility: CLINIC | Age: 64
End: 2024-08-19

## 2024-08-19 DIAGNOSIS — S91.302A OPEN WOUND OF LEFT FOOT, INITIAL ENCOUNTER: ICD-10-CM

## 2024-08-19 DIAGNOSIS — M86.9 OSTEOMYELITIS OF LEFT FOOT, UNSPECIFIED TYPE (HCC): Primary | ICD-10-CM

## 2024-08-19 NOTE — PROGRESS NOTES
OPAT NOTE    Supervising/Discharge physician: Sly     Diagnosis: infected left foot wound / osteomyelitis     Drug: Levofloxacin     Dose/Route/Frequency: 750mg PO Q24     Labs/Frequency: CBCD BMP due 8/21    End Date: 8/27    Infusion/VNA/SNF contact: Edgardo Gerard     Next appointment: no follow up appointment needed         MA assigned: Clarice

## 2024-08-19 NOTE — UTILIZATION REVIEW
NOTIFICATION OF ADMISSION DISCHARGE   This is a Notification of Discharge from [facility]. Please be advised that this patient has been discharge from our facility. Below you will find the admission and discharge date and time including the patient’s disposition.   UTILIZATION REVIEW CONTACT:  Alonso Flores  Utilization   Network Utilization Review Department  Phone: [phone] x carefully listen to the prompts. All voicemails are confidential.  Email: [email or web address]     ADMISSION INFORMATION  PRESENTATION DATE: 8/5/2024  2:18 PM  OBERVATION ADMISSION DATE: 08/05/2024 2113  INPATIENT ADMISSION DATE: 8/6/24  9:28 AM   DISCHARGE DATE: 8/16/2024  1:28 PM   [facility]    Network Utilization Review Department  ATTENTION: Please call with any questions or concerns to [phone] and carefully listen to the prompts so that you are directed to the right person. All voicemails are confidential.   For Discharge needs, contact Care Management DC Support Team at [phone] opt. 2  Send all requests for admission clinical reviews, approved or denied determinations and any other requests to dedicated fax number below belonging to the campus where the patient is receiving treatment. List of dedicated fax numbers for the Facilities:  FACILITY NAME UR FAX NUMBER   ADMISSION DENIALS (Administrative/Medical Necessity) [phone]   DISCHARGE SUPPORT TEAM (NETWORK) [phone]   PARENT CHILD HEALTH (Maternity/NICU/Pediatrics) [phone]   [facility] [phone]   [facility] [phone]   [facility] [phone]   [facility] [phone]   [facility] [phone]   [facility] [phone]   [facility] [phone]   [facility]  [phone]   [facility] [phone]   [facility] [phone]   [facility] [phone]   [facility] [phone]

## 2024-08-19 NOTE — TELEPHONE ENCOUNTER
[facility].   Went over antibiotic plan and labs. Informed Rosita patient does not need follow up appointment.   Informed Rosita if there are any further questions or concerns to call the office   Rosita verbalizes understanding at this time.

## 2024-08-21 ENCOUNTER — OFFICE VISIT (OUTPATIENT)
Dept: PODIATRY | Facility: CLINIC | Age: 64
End: 2024-08-21

## 2024-08-21 VITALS — DIASTOLIC BLOOD PRESSURE: 73 MMHG | HEART RATE: 96 BPM | SYSTOLIC BLOOD PRESSURE: 121 MMHG

## 2024-08-21 DIAGNOSIS — Z89.429 HISTORY OF AMPUTATION OF TOE (HCC): Primary | ICD-10-CM

## 2024-08-21 PROCEDURE — 88307 TISSUE EXAM BY PATHOLOGIST: CPT | Performed by: STUDENT IN AN ORGANIZED HEALTH CARE EDUCATION/TRAINING PROGRAM

## 2024-08-21 PROCEDURE — 99024 POSTOP FOLLOW-UP VISIT: CPT | Performed by: PODIATRIST

## 2024-08-21 PROCEDURE — 88311 DECALCIFY TISSUE: CPT | Performed by: STUDENT IN AN ORGANIZED HEALTH CARE EDUCATION/TRAINING PROGRAM

## 2024-08-21 NOTE — PROGRESS NOTES
Assessment/Plan:      Diagnoses and all orders for this visit:    History of amputation of toe (HCC)  -     Ambulatory Referral to Wound Care; Future      -Patient is doing all very well status post amputation, strict nonweightbearing to the left foot  - He is to have daily changes for now with Betadine soaked Adaptic, 4 x 4's, ABD as necessary, Johan with a noncompressive Ace  - Strict nonweightbearing and you can follow-up with me in wound care for suture removal due to the likely dehiscence of the incision    Subjective:     Patient ID: Art Joseph is a 64 y.o. male.    Patient presents for evaluation management of his left foot, he is doing well postoperatively nonweightbearing having dressing changes as directed.  No new pedal complaints        Review of Systems   Constitutional:  Negative for chills and fever.   HENT:  Negative for ear pain and sore throat.    Eyes:  Negative for pain and visual disturbance.   Respiratory:  Negative for cough and shortness of breath.    Cardiovascular:  Negative for chest pain and palpitations.   Gastrointestinal:  Negative for abdominal pain and vomiting.   Genitourinary:  Negative for dysuria and hematuria.   Musculoskeletal:  Negative for arthralgias and back pain.   Skin:  Negative for color change and rash.   Neurological:  Negative for seizures and syncope.   All other systems reviewed and are negative.        Objective:     Physical Exam  Skin:     Comments: He has dependent rubor with blanchable erythema to the periwound, no signs of infection.  Normal postop.,  There is some slight medial dehiscence noted

## 2024-08-23 ENCOUNTER — VBI (OUTPATIENT)
Dept: ADMINISTRATIVE | Facility: OTHER | Age: 64
End: 2024-08-23

## 2024-08-23 NOTE — TELEPHONE ENCOUNTER
08/23/24 2:00 PM     Chart reviewed for Microalbumin Creatinine Urine Ratio OR Albumin Creatinine Urine Ratio  ; nothing is submitted to the patient's insurance at this time.     Cate Biswas   [facility]

## 2024-09-03 ENCOUNTER — OFFICE VISIT (OUTPATIENT)
Dept: WOUND CARE | Facility: CLINIC | Age: 64
End: 2024-09-03
Payer: COMMERCIAL

## 2024-09-03 VITALS
HEART RATE: 78 BPM | TEMPERATURE: 97 F | DIASTOLIC BLOOD PRESSURE: 60 MMHG | SYSTOLIC BLOOD PRESSURE: 140 MMHG | RESPIRATION RATE: 18 BRPM

## 2024-09-03 DIAGNOSIS — T81.89XA PROBLEM INVOLVING SURGICAL INCISION: Primary | ICD-10-CM

## 2024-09-03 PROCEDURE — 99024 POSTOP FOLLOW-UP VISIT: CPT | Performed by: PODIATRIST

## 2024-09-03 PROCEDURE — 99213 OFFICE O/P EST LOW 20 MIN: CPT | Performed by: PODIATRIST

## 2024-09-03 RX ORDER — LIDOCAINE 40 MG/G
CREAM TOPICAL ONCE
Status: COMPLETED | OUTPATIENT
Start: 2024-09-03 | End: 2024-09-03

## 2024-09-03 RX ORDER — SENNOSIDES 8.6 MG
650 CAPSULE ORAL EVERY 8 HOURS PRN
COMMUNITY

## 2024-09-03 RX ORDER — BISACODYL 5 MG/1
5 TABLET, DELAYED RELEASE ORAL DAILY PRN
COMMUNITY

## 2024-09-03 RX ADMIN — LIDOCAINE: 40 CREAM TOPICAL at 09:08

## 2024-09-03 NOTE — PATIENT INSTRUCTIONS
Orders Placed This Encounter   Procedures    Wound cleansing and dressings Left Foot     Wash your hands with soap and water.  Remove old dressing, discard into plastic bag and place in trash.  Cleanse the wound with NSS or mild soap and water prior to applying a clean dressing. Do not use tissue or cotton balls. Do not scrub the wound. Pat dry using gauze  Shower no do not get dressing wet    Apply betadine soaked adaptic (non adherent gauze) to the left foot wound.  Cover with gauze   Secure with tristen and tape   Change dressing daily     Follow up in 1 week     Continue non weight baring left foot     Standing Status:   Future     Standing Expiration Date:   9/10/2024    Wound Procedure Treatment Left Foot     This order was created via procedure documentation

## 2024-09-03 NOTE — PROGRESS NOTES
Wound Procedure Treatment Left Foot    Performed by: Marianela Crawford RN  Authorized by: Myron Bhakta DPM    Associated wounds:   Wound 08/13/24 Foot Left  Wound cleansed with:  NSS  Applied Topical: Betadine    Applied primary dressing:  Non adherent contact layer  Applied secondary dressing:  Gauze and ABD  Dressing secured with:  Kerlix and Tape

## 2024-09-03 NOTE — PROGRESS NOTES
Patient ID: Art Joseph is a 64 y.o. male Date of Birth 1960       Chief Complaint   Patient presents with    Follow Up Wound Care Visit     Left post amputation site       Allergies:  Patient has no known allergies.    Diagnosis:  1. Problem involving surgical incision  -     Wound cleansing and dressings Left Foot; Future  -     lidocaine (LMX) 4 % cream  -     Wound Procedure Treatment Left Foot     Diagnosis ICD-10-CM Associated Orders   1. Problem involving surgical incision  T81.89XA Wound cleansing and dressings Left Foot     lidocaine (LMX) 4 % cream     Wound Procedure Treatment Left Foot           Assessment & Plan:  See wound orders.   -Continue strict nonweightbearing to the left foot  - We did discuss the need for this as this is likely improving his overall outcomes  - No debridement today  - Advise use of Betadine soaked Adaptic, DSD to the left foot      Subjective:   Patient seen and evaluated for his left foot, had amputation a few weeks ago.  Currently nonweightbearing.  Having dressing changes.  Following directions.  No new pedal complaints        The following portions of the patient's history were reviewed and updated as appropriate:   Patient Active Problem List   Diagnosis    Type 2 diabetes mellitus with hyperglycemia, with long-term current use of insulin (HCC)    Diabetic neuropathy (HCC)    Acute osteomyelitis of metatarsal bone of left foot (HCC)    Hx of right BKA ([facility])    Ambulatory dysfunction    Primary hypertension    Hypercholesteremia    Vitamin D deficiency    Class 1 obesity in adult    Cellulitis of left lower extremity    Candidiasis, intertrigo    Elevated platelet count    Maggot infestation    Open wound of left foot    MRSA colonization    Peripheral vascular disease (HCC)    Severe sepsis (HCC)    Atelectasis    Acute cystitis without hematuria     Past Medical History:   Diagnosis Date    Diabetes mellitus (HCC)     Hypertension      Past Surgical History:    Procedure Laterality Date    FOOT AMPUTATION Left 6/27/2024    Procedure: AMPUTATION LEFT FOOT 1st METATARSAL RESECTION;  Surgeon: Myron Bhakta DPM;  Location: [facility];  Service: Podiatry    FOOT AMPUTATION Left 8/13/2024    Procedure: AMPUTATION FOOT, Lis franc amp;  Surgeon: Myron Bhakta DPM;  Location: [facility];  Service: Podiatry    IR PICC PLACEMENT SINGLE LUMEN  7/16/2024    LEG AMPUTATION THROUGH LOWER TIBIA AND FIBULA Right 7/25/2017    Procedure: AMPUTATION BELOW KNEE (BKA);  Surgeon: Mynor Sanchez MD;  [facility]: [facility];  Service: General    IN AMPUTATION FOOT TRANSMETARSAL Right 1/26/2017    Procedure: AMPUTATION TRANSMETATARSAL (TMA); OPEN;  Surgeon: Leonard Lomeli DPM;  [facility];  Service: Podiatry    IN AMPUTATION FOOT TRANSMETARSAL Left 4/26/2024    Procedure: LEFT FOOT PARTIAL FIRST RAY AMPUTATION;  Surgeon: Jennifer Rubalcava DPM;  Location: [facility];  Service: Podiatry    IN AMPUTATION METATARSAL W/TOE SINGLE Left 1/30/2017    Procedure: Left partial 1st ray amputation;  Surgeon: Leonard Lomeli DPM;  [facility];  Service: Podiatry    IN COLONOSCOPY FLX DX W/COLLJ SPEC WHEN PFRMD N/A 11/28/2018    Procedure: COLONOSCOPY;  Surgeon: González Napier MD;  Location: [facility];  Service: Gastroenterology    WOUND DEBRIDEMENT Right 1/30/2017    Procedure: DEBRIDEMENT FOOT/TOE (WASH OUT) delayed closure, LINDA;  Surgeon: Leonard Lomeli DPM;  [facility];  Service:     WOUND DEBRIDEMENT Left 7/11/2024    Procedure: DEBRIDEMENT FOOT/TOE (WASH OUT);  Surgeon: Myron Bhakta DPM;  Location: [facility];  Service: Podiatry     Social History     Socioeconomic History    Marital status: Single     Spouse name: Not on file    Number of children: Not on file    Years of education: Not on file    Highest education level: Not on file   Occupational History    Occupation: retired   Tobacco Use    Smoking status: Former     Types: Cigarettes     Smokeless tobacco: Never    Tobacco comments:     quit 9 years ago   Vaping Use    Vaping status: Never Used   Substance and Sexual Activity    Alcohol use: Yes     Alcohol/week: 11.0 standard drinks of alcohol     Types: 6 Cans of beer, 5 Shots of liquor per week     Comment: social ; occasional use as per [employer]    Drug use: No    Sexual activity: Not on file   Other Topics Concern    Not on file   Social History Narrative    Always uses seat belt    Caffeine use    Dental care regularly     Social Determinants of Health     Financial Resource Strain: Medium Risk (4/25/2023)    Overall Financial Resource Strain (CARDIA)     Difficulty of Paying Living Expenses: Somewhat hard   Food Insecurity: No Food Insecurity (8/6/2024)    Hunger Vital Sign     Worried About Running Out of Food in the Last Year: Never true     Ran Out of Food in the Last Year: Never true   Recent Concern: Food Insecurity - Food Insecurity Present (6/24/2024)    Hunger Vital Sign     Worried About Running Out of Food in the Last Year: Sometimes true     Ran Out of Food in the Last Year: Patient declined   Transportation Needs: No Transportation Needs (8/6/2024)    PRAPARE - Transportation     Lack of Transportation (Medical): No     Lack of Transportation (Non-Medical): No   Physical Activity: Not on file   Stress: Not on file   Social Connections: Unknown (7/22/2024)    Received from [facility]    Social [employer]     How often do you feel lonely or isolated from those around you? (Adult - for ages 18 years and over): Not on file   Intimate Partner Violence: Not on file   Housing Stability: Low Risk  (8/6/2024)    Housing Stability Vital Sign     Unable to Pay for Housing in the Last Year: No     Number of Times Moved in the Last Year: 1     Homeless in the Last Year: No   Recent Concern: Housing Stability - High Risk (6/24/2024)    Housing Stability Vital Sign     Unable to Pay for Housing in the Last Year: Yes     Number of Times Moved  in the Last Year: 0     Homeless in the Last Year: No        Current Outpatient Medications:     acetaminophen (TYLENOL) 650 mg CR tablet, Take 650 mg by mouth every 8 (eight) hours as needed for mild pain, Disp: , Rfl:     aspirin (ECOTRIN LOW STRENGTH) 81 mg EC tablet, Take 81 mg by mouth daily Resume on 8/11/17 , Disp: , Rfl:     atorvastatin (LIPITOR) 80 mg tablet, Take 1 tablet (80 mg total) by mouth daily, Disp: 90 tablet, Rfl: 3    bisacodyl (DULCOLAX) 5 mg EC tablet, Take 5 mg by mouth daily as needed for constipation, Disp: , Rfl:     Cholecalciferol (VITAMIN D3) 1,000 units tablet, Take 1 tablet (1,000 Units total) by mouth daily, Disp: 30 tablet, Rfl: 0    insulin aspart (NovoLOG FlexPen) 100 UNIT/ML injection pen, E11.65 inject 14 units before meals plus scale. TDD 75 units, Disp: , Rfl:     Insulin Glargine Solostar (Basaglar KwikPen) 100 UNIT/ML SOPN, E11.65 inject 25 units daily at bedtime (Patient taking differently: E11.65 inject 15 units daily at bedtime), Disp: 15 mL, Rfl: 0    Insulin Pen Needle 30G X 8 MM MISC, Inject under the skin daily at bedtime, Disp: 100 each, Rfl: 5    lisinopril (ZESTRIL) 2.5 mg tablet, Take 1 tablet (2.5 mg total) by mouth daily, Disp: 90 tablet, Rfl: 3    magnesium hydroxide (MILK OF MAGNESIA) 800 MG/5ML suspension, Take 30 mL by mouth daily as needed for constipation, Disp: , Rfl:     potassium-sodium phosphates (PHOS-NAK) 280 mg (P)-160 mg (Na)-250 mg (K) packet, Take 1 packet by mouth once, Disp: , Rfl:     Dakins, full strength, (DAKIN'S), Apply 1 Application topically daily, Disp: , Rfl:     Current Facility-Administered Medications:     lidocaine (LMX) 4 % cream, , Topical, Once, Myron Bhakta, BERNY  Family History   Problem Relation Age of Onset    Diabetes Mother       Review of Systems   Constitutional:  Negative for chills and fever.   HENT:  Negative for ear pain and sore throat.    Eyes:  Negative for pain and visual disturbance.   Respiratory:   Negative for cough and shortness of breath.    Cardiovascular:  Negative for chest pain and palpitations.   Gastrointestinal:  Negative for abdominal pain and vomiting.   Genitourinary:  Negative for dysuria and hematuria.   Musculoskeletal:  Negative for arthralgias and back pain.   Skin:  Negative for color change and rash.   Neurological:  Negative for seizures and syncope.   All other systems reviewed and are negative.        Objective:  /60   Pulse 78   Temp (!) 97 °F (36.1 °C)   Resp 18     Physical Exam  Musculoskeletal:      Comments: Sutures to the lateral aspect of the incision are healing well, continued dehiscence to the medial aspect of the incision, slight maceration to the manju-incisional area.  Granular base,             Wound 08/13/24 Foot Left (Active)   Wound Image    09/03/24 0809   Wound Description Pink;Yellow;Slough;Eschar 09/03/24 0811   Manju-wound Assessment Brown;Yellow-brown;White 09/03/24 0811   Wound Length (cm) 1.5 cm 09/03/24 0811   Wound Width (cm) 3 cm 09/03/24 0811   Wound Depth (cm) 0.2 cm 09/03/24 0811   Wound Surface Area (cm^2) 4.5 cm^2 09/03/24 0811   Wound Volume (cm^3) 0.9 cm^3 09/03/24 0811   Calculated Wound Volume (cm^3) 0.9 cm^3 09/03/24 0811   Number of underminings 1 09/03/24 0811   Undermining 1 0.3 09/03/24 0811   Undermining 1 is depth extending from 3 oclock 09/03/24 0811   Wound Site Closure Sutures 09/03/24 0811   Drainage Amount Large 09/03/24 0811   Drainage Description Sanguineous;Brown 09/03/24 0811   Non-staged Wound Description Full thickness 09/03/24 0811                         Procedures     Results from last 6 Months   Lab Units 08/13/24  1558   WOUND CULTURE  1+ Growth of Pseudomonas aeruginosa*  1+ Growth of Corynebacterium striatum*         Wound Instructions:  Orders Placed This Encounter   Procedures    Wound cleansing and dressings Left Foot     Wash your hands with soap and water.  Remove old dressing, discard into plastic bag and place  "in trash.  Cleanse the wound with NSS or mild soap and water prior to applying a clean dressing. Do not use tissue or cotton balls. Do not scrub the wound. Pat dry using gauze  Shower no do not get dressing wet    Apply betadine soaked adaptic (non adherent gauze) to the left foot wound.  Cover with gauze   Secure with tristen and tape   Change dressing daily     Follow up in 1 week     Continue non weight baring left foot     Standing Status:   Future     Standing Expiration Date:   9/10/2024    Wound Procedure Treatment Left Foot     This order was created via procedure documentation         Myron Bhakta DPM      Portions of the record may have been created with voice recognition software. Occasional wrong word or \"sound a like\" substitutions may have occurred due to the inherent limitations of voice recognition software. Read the chart carefully and recognize, using context, where substitutions have occurred.      "

## 2024-09-06 ENCOUNTER — OFFICE VISIT (OUTPATIENT)
Dept: ENDOCRINOLOGY | Facility: CLINIC | Age: 64
End: 2024-09-06
Payer: COMMERCIAL

## 2024-09-06 VITALS
WEIGHT: 315 LBS | TEMPERATURE: 97.8 F | HEIGHT: 70 IN | SYSTOLIC BLOOD PRESSURE: 112 MMHG | HEART RATE: 80 BPM | BODY MASS INDEX: 45.1 KG/M2 | OXYGEN SATURATION: 97 % | DIASTOLIC BLOOD PRESSURE: 60 MMHG

## 2024-09-06 DIAGNOSIS — E78.00 HYPERCHOLESTEREMIA: ICD-10-CM

## 2024-09-06 DIAGNOSIS — E11.65 TYPE 2 DIABETES MELLITUS WITH HYPERGLYCEMIA, WITH LONG-TERM CURRENT USE OF INSULIN (HCC): Primary | ICD-10-CM

## 2024-09-06 DIAGNOSIS — I10 PRIMARY HYPERTENSION: ICD-10-CM

## 2024-09-06 DIAGNOSIS — E11.42 TYPE 2 DIABETES MELLITUS WITH DIABETIC POLYNEUROPATHY, UNSPECIFIED WHETHER LONG TERM INSULIN USE (HCC): ICD-10-CM

## 2024-09-06 DIAGNOSIS — Z79.4 TYPE 2 DIABETES MELLITUS WITH HYPERGLYCEMIA, WITH LONG-TERM CURRENT USE OF INSULIN (HCC): Primary | ICD-10-CM

## 2024-09-06 PROBLEM — N18.5 CHRONIC RENAL DISEASE, STAGE V (HCC): Status: ACTIVE | Noted: 2024-09-06

## 2024-09-06 PROCEDURE — G2211 COMPLEX E/M VISIT ADD ON: HCPCS | Performed by: STUDENT IN AN ORGANIZED HEALTH CARE EDUCATION/TRAINING PROGRAM

## 2024-09-06 PROCEDURE — 99214 OFFICE O/P EST MOD 30 MIN: CPT | Performed by: STUDENT IN AN ORGANIZED HEALTH CARE EDUCATION/TRAINING PROGRAM

## 2024-09-06 RX ORDER — INSULIN ASPART 100 [IU]/ML
INJECTION, SOLUTION INTRAVENOUS; SUBCUTANEOUS
Qty: 30 ML | Refills: 3 | Status: SHIPPED | OUTPATIENT
Start: 2024-09-06

## 2024-09-06 RX ORDER — INSULIN GLARGINE 100 [IU]/ML
INJECTION, SOLUTION SUBCUTANEOUS
Qty: 15 ML | Refills: 0 | Status: SHIPPED | OUTPATIENT
Start: 2024-09-06

## 2024-09-06 NOTE — PROGRESS NOTES
Ambulatory Visit  Name: Art Joseph      : 1960      MRN: [id number]  Encounter Provider: Torsten Christina DO  Encounter Date: 2024   Encounter department: [facility]    Assessment & Plan   1. Type 2 diabetes mellitus with hyperglycemia, with long-term current use of insulin (HCC)  Assessment & Plan:  Diabetes is improving by A1c.  CBG testing shows postprandial hyperglycemia following lunch and supper.  There is fasting euglycemia.  I am recommending a change in Lantus to 30 units once daily dosing to simplify regimen and reduce injection burden.  I am recommending continuation of Humalog 14 units with breakfast however increasing Humalog 17 units before lunch and supper.  He may continue correction scale.  Recommend send capillary blood sugars for review in 2 weeks via fax.  Return to clinic in 4 months with updated labs including A1c.    Orders:  -     Basic metabolic panel; Future; Expected date: 2024  -     Hemoglobin A1C; Future; Expected date: 2024  2. Hypercholesteremia  Assessment & Plan:  Continue statin therapy  3. Type 2 diabetes mellitus with diabetic polyneuropathy, unspecified whether long term insulin use (HCC)  -     Insulin Glargine Solostar (Basaglar KwikPen) 100 UNIT/ML SOPN; E11.65 inject 30 units daily at bedtime  -     insulin aspart (NovoLOG FlexPen) 100 UNIT/ML injection pen; E11.65 inject 14 units before breakfast, inject 17 units before lunch and supper plus scale. TDD 75 units  4. Primary hypertension  Assessment & Plan:  At goal    History of Present Illness     Art Joseph is a 64 y.o. male who presents in follow-up of type 2 diabetes.  He currently is presenting from [facility] where he is recovering from osteomyelitis of the foot status post amputation.  His diabetes is longstanding and also complicated by DPN, right BKA.    Art has no acute concerns today.  He was recovering well.  He is  "optimistic that he will be able to improve his health and become weightbearing soon and be discharged from rehab.  He denies any symptoms commensurate with hyperglycemia.  He denies any hypoglycemic events.  He is presently on Lantus 5 units in the morning and 25 units in the evening.  He is also on Humalog 14 units with meals plus scale sensitivity of 25 greater than 200.  Capillary fingerstick testing is done 3 times daily before meals and shows fasting euglycemia and pre-lunch euglycemia with elevated presupper and evening blood sugars noted.    Review of Systems   Constitutional:  Negative for diaphoresis and unexpected weight change.   Gastrointestinal:  Negative for nausea and vomiting.   Endocrine: Negative for polydipsia and polyuria.   All other systems reviewed and are negative.      Objective     /60 (BP Location: Left arm, Patient Position: Sitting, Cuff Size: Large)   Pulse 80   Temp 97.8 °F (36.6 °C) (Temporal)   Ht 5' 10\" (1.778 m)   Wt (!) 154 kg (339 lb)   SpO2 97%   BMI 48.64 kg/m²     Physical Exam  Vitals reviewed.   Constitutional:       General: He is not in acute distress.     Appearance: Normal appearance.   HENT:      Head: Normocephalic and atraumatic.      Nose: Nose normal.   Eyes:      General: No scleral icterus.     Conjunctiva/sclera: Conjunctivae normal.   Pulmonary:      Effort: Pulmonary effort is normal. No respiratory distress.   Musculoskeletal:         General: Deformity (right bka, left tma) present.      Cervical back: Normal range of motion.   Skin:     General: Skin is warm and dry.   Neurological:      Mental Status: He is alert.   Psychiatric:         Mood and Affect: Mood normal.         Behavior: Behavior normal.       Lab Results   Component Value Date    HGBA1C 7.9 (H) 08/07/2024         Administrative Statements           "

## 2024-09-06 NOTE — ASSESSMENT & PLAN NOTE
Diabetes is improving by A1c.  CBG testing shows postprandial hyperglycemia following lunch and supper.  There is fasting euglycemia.  I am recommending a change in Lantus to 30 units once daily dosing to simplify regimen and reduce injection burden.  I am recommending continuation of Humalog 14 units with breakfast however increasing Humalog 17 units before lunch and supper.  He may continue correction scale.  Recommend send capillary blood sugars for review in 2 weeks via fax.  Return to clinic in 4 months with updated labs including A1c.

## 2024-09-10 ENCOUNTER — OFFICE VISIT (OUTPATIENT)
Dept: WOUND CARE | Facility: CLINIC | Age: 64
End: 2024-09-10
Payer: COMMERCIAL

## 2024-09-10 VITALS
TEMPERATURE: 96.8 F | RESPIRATION RATE: 18 BRPM | DIASTOLIC BLOOD PRESSURE: 60 MMHG | SYSTOLIC BLOOD PRESSURE: 140 MMHG | HEART RATE: 78 BPM

## 2024-09-10 DIAGNOSIS — T81.89XA PROBLEM INVOLVING SURGICAL INCISION: Primary | ICD-10-CM

## 2024-09-10 DIAGNOSIS — L97.921 ULCER OF LEG, CHRONIC, LEFT, LIMITED TO BREAKDOWN OF SKIN (HCC): ICD-10-CM

## 2024-09-10 PROCEDURE — 99213 OFFICE O/P EST LOW 20 MIN: CPT | Performed by: PODIATRIST

## 2024-09-10 PROCEDURE — 29580 STRAPPING UNNA BOOT: CPT

## 2024-09-10 PROCEDURE — 99024 POSTOP FOLLOW-UP VISIT: CPT | Performed by: PODIATRIST

## 2024-09-10 NOTE — PROGRESS NOTES
Patient ID: Art Joseph is a 64 y.o. male Date of Birth 1960       Chief Complaint   Patient presents with    Follow Up Wound Care Visit     Wound left foot       Allergies:  Patient has no known allergies.    Diagnosis:  1. Problem involving surgical incision  -     Wound cleansing and dressings Left Foot; Future     Diagnosis ICD-10-CM Associated Orders   1. Problem involving surgical incision  T81.89XA Wound cleansing and dressings Left Foot           Assessment & Plan:  See wound orders.   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 and strict nonweightbearing in the nursing home has no new pedal complaints        The following portions of the patient's history were reviewed and updated as appropriate:   Patient Active Problem List   Diagnosis    Type 2 diabetes mellitus with hyperglycemia, with long-term current use of insulin ([facility])    Diabetic neuropathy ([facility])    Acute osteomyelitis of metatarsal bone of left foot ([facility])    Hx of right BKA ([facility])    Ambulatory dysfunction    Primary hypertension    Hypercholesteremia    Vitamin D deficiency    Class 1 obesity in adult    Cellulitis of left lower extremity    Candidiasis, intertrigo    Elevated platelet count    Maggot infestation    Open wound of left foot    MRSA colonization    Peripheral vascular disease (HCC)    Severe sepsis ([facility])    Atelectasis    Acute cystitis without hematuria    Chronic renal disease, stage V ([facility])     Past Medical  History:   Diagnosis Date    Diabetes mellitus (HCC)     Hypertension      Past Surgical History:   Procedure Laterality Date    FOOT AMPUTATION Left 6/27/2024    Procedure: AMPUTATION LEFT FOOT 1st METATARSAL RESECTION;  Surgeon: Myron Bhakta DPM;  Location: [facility];  Service: Podiatry    FOOT AMPUTATION Left 8/13/2024    Procedure: AMPUTATION FOOT, Lis franc amp;  Surgeon: Myron Bhakta DPM;  Location: [facility];  Service: Podiatry    IR PICC PLACEMENT SINGLE LUMEN  7/16/2024    LEG AMPUTATION THROUGH LOWER TIBIA AND FIBULA Right 7/25/2017    Procedure: AMPUTATION BELOW KNEE (BKA);  Surgeon: Mynor Sanchez MD;  [facility]: [facility];  Service: General    DC AMPUTATION FOOT TRANSMETARSAL Right 1/26/2017    Procedure: AMPUTATION TRANSMETATARSAL (TMA); OPEN;  Surgeon: Leonard Lomeli DPM;  [facility];  Service: Podiatry    DC AMPUTATION FOOT TRANSMETARSAL Left 4/26/2024    Procedure: LEFT FOOT PARTIAL FIRST RAY AMPUTATION;  Surgeon: Jennifer Rubalcava DPM;  Location: [facility];  Service: Podiatry    DC AMPUTATION METATARSAL W/TOE SINGLE Left 1/30/2017    Procedure: Left partial 1st ray amputation;  Surgeon: Leonard Lomeli DPM;  [facility];  Service: Podiatry    DC COLONOSCOPY FLX DX W/COLLJ SPEC WHEN PFRMD N/A 11/28/2018    Procedure: COLONOSCOPY;  Surgeon: González Napier MD;  Location: [facility];  Service: Gastroenterology    WOUND DEBRIDEMENT Right 1/30/2017    Procedure: DEBRIDEMENT FOOT/TOE (WASH OUT) delayed closure, LINDA;  Surgeon: Leonard Lomeli DPM;  [facility];  Service:     WOUND DEBRIDEMENT Left 7/11/2024    Procedure: DEBRIDEMENT FOOT/TOE (WASH OUT);  Surgeon: Myron Bhakta DPM;  Location: [facility];  Service: Podiatry     Social History     Socioeconomic History    Marital status: Single     Spouse name: Not on file    Number of children: Not on file    Years of education: Not on file    Highest education level: Not on file   Occupational  History    Occupation: retired   Tobacco Use    Smoking status: Former     Types: Cigarettes    Smokeless tobacco: Never    Tobacco comments:     quit 9 years ago   Vaping Use    Vaping status: Never Used   Substance and Sexual Activity    Alcohol use: Yes     Alcohol/week: 11.0 standard drinks of alcohol     Types: 6 Cans of beer, 5 Shots of liquor per week     Comment: social ; occasional use as per [employer]    Drug use: No    Sexual activity: Not on file   Other Topics Concern    Not on file   Social History Narrative    Always uses seat belt    Caffeine use    Dental care regularly     Social Determinants of Health     Financial Resource Strain: Medium Risk (4/25/2023)    Overall Financial Resource Strain (CARDIA)     Difficulty of Paying Living Expenses: Somewhat hard   Food Insecurity: No Food Insecurity (8/6/2024)    Hunger Vital Sign     Worried About Running Out of Food in the Last Year: Never true     Ran Out of Food in the Last Year: Never true   Recent Concern: Food Insecurity - Food Insecurity Present (6/24/2024)    Hunger Vital Sign     Worried About Running Out of Food in the Last Year: Sometimes true     Ran Out of Food in the Last Year: Patient declined   Transportation Needs: No Transportation Needs (8/6/2024)    PRAPARE - Transportation     Lack of Transportation (Medical): No     Lack of Transportation (Non-Medical): No   Physical Activity: Not on file   Stress: Not on file   Social Connections: Unknown (7/22/2024)    Received from [employer]    Social [employer]     How often do you feel lonely or isolated from those around you? (Adult - for ages 18 years and over): Not on file   Intimate Partner Violence: Not on file   Housing Stability: Low Risk  (8/6/2024)    Housing Stability Vital Sign     Unable to Pay for Housing in the Last Year: No     Number of Times Moved in the Last Year: 1     Homeless in the Last Year: No   Recent Concern: Housing Stability - High Risk (6/24/2024)    Housing  Stability Vital Sign     Unable to Pay for Housing in the Last Year: Yes     Number of Times Moved in the Last Year: 0     Homeless in the Last Year: No        Current Outpatient Medications:     acetaminophen (TYLENOL) 650 mg CR tablet, Take 650 mg by mouth every 8 (eight) hours as needed for mild pain, Disp: , Rfl:     aspirin (ECOTRIN LOW STRENGTH) 81 mg EC tablet, Take 81 mg by mouth daily Resume on 8/11/17 , Disp: , Rfl:     atorvastatin (LIPITOR) 80 mg tablet, Take 1 tablet (80 mg total) by mouth daily, Disp: 90 tablet, Rfl: 3    bisacodyl (DULCOLAX) 5 mg EC tablet, Take 5 mg by mouth daily as needed for constipation, Disp: , Rfl:     Cholecalciferol (VITAMIN D3) 1,000 units tablet, Take 1 tablet (1,000 Units total) by mouth daily, Disp: 30 tablet, Rfl: 0    insulin aspart (NovoLOG FlexPen) 100 UNIT/ML injection pen, E11.65 inject 14 units before breakfast, inject 17 units before lunch and supper plus scale. TDD 75 units, Disp: 30 mL, Rfl: 3    Insulin Glargine Solostar (Basaglar KwikPen) 100 UNIT/ML SOPN, E11.65 inject 30 units daily at bedtime, Disp: 15 mL, Rfl: 0    Insulin Pen Needle 30G X 8 MM MISC, Inject under the skin daily at bedtime, Disp: 100 each, Rfl: 5    lisinopril (ZESTRIL) 2.5 mg tablet, Take 1 tablet (2.5 mg total) by mouth daily, Disp: 90 tablet, Rfl: 3    magnesium hydroxide (MILK OF MAGNESIA) 800 MG/5ML suspension, Take 30 mL by mouth daily as needed for constipation, Disp: , Rfl:     potassium-sodium phosphates (PHOS-NAK) 280 mg (P)-160 mg (Na)-250 mg (K) packet, Take 1 packet by mouth once, Disp: , Rfl:   Family History   Problem Relation Age of Onset    Diabetes Mother       Review of Systems   Constitutional:  Negative for chills and fever.   HENT:  Negative for ear pain and sore throat.    Eyes:  Negative for pain and visual disturbance.   Respiratory:  Negative for cough and shortness of breath.    Cardiovascular:  Negative for chest pain and palpitations.   Gastrointestinal:   Negative for abdominal pain and vomiting.   Genitourinary:  Negative for dysuria and hematuria.   Musculoskeletal:  Negative for arthralgias and back pain.   Skin:  Negative for color change and rash.   Neurological:  Negative for seizures and syncope.   All other systems reviewed and are negative.        Objective:  /60   Pulse 78   Temp (!) 96.8 °F (36 °C)   Resp 18     Physical Exam  Skin:     Comments: Around 7075% of the lateral aspect of the incision has healed, there is dehiscence medially with probe to periosteum, this is much improved since last time, minimal tension along the incisional site             Wound 08/13/24 Foot Left (Active)   Wound Image   09/10/24 1109   Wound Description Pink;Slough;Eschar;White 09/10/24 1110   Kelly-wound Assessment Brown;Yellow-brown;White 09/10/24 1110   Wound Length (cm) 0.4 cm 09/10/24 1110   Wound Width (cm) 3 cm 09/10/24 1110   Wound Depth (cm) 0.5 cm 09/10/24 1110   Wound Surface Area (cm^2) 1.2 cm^2 09/10/24 1110   Wound Volume (cm^3) 0.6 cm^3 09/10/24 1110   Calculated Wound Volume (cm^3) 0.6 cm^3 09/10/24 1110   Change in Wound Size % 33.33 09/10/24 1110   Number of underminings 1 09/03/24 0811   Undermining 1 0.3 09/03/24 0811   Undermining 1 is depth extending from 3 oclock 09/03/24 0811   Wound Site Closure Sutures 09/10/24 1110   Drainage Amount Moderate 09/10/24 1110   Drainage Description Sanguineous;Brown 09/10/24 1110   Non-staged Wound Description Full thickness 09/10/24 1110                         Procedures     Results from last 6 Months   Lab Units 08/13/24  1558   WOUND CULTURE  1+ Growth of Pseudomonas aeruginosa*  1+ Growth of Corynebacterium striatum*         Wound Instructions:  Orders Placed This Encounter   Procedures    Wound cleansing and dressings Left Foot     ound cleansing and dressings Left Foot       Wash your hands with soap and water.  Remove old dressing, discard into plastic bag and place in trash.  Cleanse the wound with  "NSS or mild soap and water prior to applying a clean dressing. Do not use tissue or cotton balls. Do not scrub the wound. Pat dry using gauze  Shower no do not get dressing wet     Apply acticoat 7 to the left foot wound.  Cover with gauze   Secure with tristen and tape   Apply unna boot   Wound care will be completed at wound center      Follow up in 1 week      Continue non weight baring left foot    Please assess the  needed for diuretics     Standing Status:   Future     Standing Expiration Date:   9/17/2024         Myron Bhakta DPM      Portions of the record may have been created with voice recognition software. Occasional wrong word or \"sound a like\" substitutions may have occurred due to the inherent limitations of voice recognition software. Read the chart carefully and recognize, using context, where substitutions have occurred.      "

## 2024-09-10 NOTE — PROGRESS NOTES
"Cast Application    Date/Time: 9/10/2024 11:00 AM    Performed by: Marianela Crawford RN  Authorized by: Myron Bhakta DPM  Universal Protocol:  Consent: Verbal consent obtained.  Consent given by: patient  Time out: Immediately prior to procedure a \"time out\" was called to verify the correct patient, procedure, equipment, support staff and site/side marked as required.  Patient understanding: patient states understanding of the procedure being performed  Patient consent: the patient's understanding of the procedure matches consent given  Procedure consent: procedure consent matches procedure scheduled  Patient identity confirmed: verbally with patient    Pre-procedure details:     Sensation:  Normal    Skin color:  Wnl  Procedure details:     Laterality:  Left    Location:  Leg    Leg:  L lower legCast type:  Unna boot        Supplies:  2 layer wrap  Post-procedure details:     Pain:  Improved    Sensation:  Normal    Skin color:  Wnl    Patient tolerance of procedure:  Tolerated well, no immediate complications  Comments:      UNNA BOOT wrap procedure     Before application, WILLY and/or TBI determined to be adequate for healing and application of compression. Lower extremity washed prior to application of compression wrap. With the foot in dorsiflexed position, Unna boot was applied as per physician orders without complications or complaint of pain.  The procedure was tolerated well. Toes warm & pink post application.  Patient provided education & reinforced to observe toes for any discoloration, swelling or tingling and instructed when to report to the Wound Center or to remove compression. Wound care as per providers orders, patient tolerated well.               "

## 2024-09-10 NOTE — PROGRESS NOTES
Wound Procedure Treatment Left Foot    Performed by: Marianela Crawford RN  Authorized by: Myron Bhakta DPM    Associated wounds:   Wound 08/13/24 Foot Left  Wound cleansed with:  NSS  Applied primary dressing:  Acticoat  Applied secondary dressing:  Gauze  Dressing secured with:  Johan and Tape

## 2024-09-10 NOTE — PATIENT INSTRUCTIONS
Orders Placed This Encounter   Procedures    Wound cleansing and dressings Left Foot     ound cleansing and dressings Left Foot       Wash your hands with soap and water.  Remove old dressing, discard into plastic bag and place in trash.  Cleanse the wound with NSS or mild soap and water prior to applying a clean dressing. Do not use tissue or cotton balls. Do not scrub the wound. Pat dry using gauze  Shower no do not get dressing wet     Apply acticoat 7 to the left foot wound.  Cover with gauze   Secure with tristen and tape   Apply unna boot   Wound care will be completed at wound center      Follow up in 1 week      Continue non weight baring left foot    Please assess the  needed for diuretics     Standing Status:   Future     Standing Expiration Date:   9/17/2024

## 2024-09-11 PROBLEM — A41.9 SEVERE SEPSIS (HCC): Status: RESOLVED | Noted: 2024-08-09 | Resolved: 2024-09-11

## 2024-09-11 PROBLEM — R65.20 SEVERE SEPSIS (HCC): Status: RESOLVED | Noted: 2024-08-09 | Resolved: 2024-09-11

## 2024-09-12 PROBLEM — N30.00 ACUTE CYSTITIS WITHOUT HEMATURIA: Status: RESOLVED | Noted: 2024-08-13 | Resolved: 2024-09-12

## 2024-09-17 ENCOUNTER — OFFICE VISIT (OUTPATIENT)
Dept: WOUND CARE | Facility: CLINIC | Age: 64
End: 2024-09-17
Payer: COMMERCIAL

## 2024-09-17 VITALS
DIASTOLIC BLOOD PRESSURE: 60 MMHG | HEART RATE: 80 BPM | SYSTOLIC BLOOD PRESSURE: 130 MMHG | TEMPERATURE: 98 F | RESPIRATION RATE: 20 BRPM

## 2024-09-17 DIAGNOSIS — T81.89XA PROBLEM INVOLVING SURGICAL INCISION: ICD-10-CM

## 2024-09-17 DIAGNOSIS — L97.921 ULCER OF LEG, CHRONIC, LEFT, LIMITED TO BREAKDOWN OF SKIN (HCC): Primary | ICD-10-CM

## 2024-09-17 PROCEDURE — 11042 DBRDMT SUBQ TIS 1ST 20SQCM/<: CPT | Performed by: PODIATRIST

## 2024-09-17 RX ORDER — LIDOCAINE 40 MG/G
CREAM TOPICAL ONCE
Status: COMPLETED | OUTPATIENT
Start: 2024-09-17 | End: 2024-09-17

## 2024-09-17 RX ADMIN — LIDOCAINE: 40 CREAM TOPICAL at 11:34

## 2024-09-17 NOTE — PROGRESS NOTES
Wound Procedure Treatment    Performed by: Marianela Crawford RN  Authorized by: Myron Bhakta DPM    Associated wounds:   Wound 08/13/24 Foot Left  Wound 09/10/24 Leg Lower;Left;Anterior  Wound cleansed with:  Soap and water  Applied primary dressing:  Acticoat  Applied secondary dressing:  Gauze  Dressing secured with:  Tape, Johan and Compression wrap  Offloading device appllied:  Surgical shoe  Verbal consent obtained?: Yes    Risks and benefits: Risks, benefits and alternatives were discussed    Time Out:     Time out: Immediately prior to the procedure a time out was called    Patient states understanding of procedure being performed: Yes    Patient's understanding of procedure matches consent: Yes    Procedure consent matches procedure scheduled: Yes    Patient identity confirmed:  Verbally with patient  Compression -Pre-procedure details:     Sensation:  Normal    Skin color:  Wnl    Laterality:  Left    Location:  Leg    Cast type:  Unna boot    Pain:  Unchanged    Sensation:  Normal    Skin color:  Wnl    Patient tolerance of procedure:  Tolerated well, no immediate complications     UNNA BOOT     Before application, WILLY and/or TBI determined to be adequate for healing and application of compression. Lower extremity washed prior to application of compression wrap. With the foot in dorsiflexed position, unna boot was applied as per physician orders without complications or complaint of pain.  The procedure was tolerated well. Toes warm & pink post application.  Patient provided education & reinforced to observe toes for any discoloration, swelling or tingling and instructed when to report to the Wound Center or to remove compression

## 2024-09-17 NOTE — PROGRESS NOTES
Patient ID: Art Joseph is a 64 y.o. male Date of Birth 1960       Chief Complaint   Patient presents with    Follow Up Wound Care Visit     Wound left foot       Allergies:  Patient has no known allergies.    Diagnosis:  1. Ulcer of leg, chronic, left, limited to breakdown of skin ([facility])  -     Wound cleansing and dressings; Future  -     lidocaine (LMX) 4 % cream  2. Problem involving surgical incision  -     Wound cleansing and dressings; Future  -     lidocaine (LMX) 4 % cream     Diagnosis ICD-10-CM Associated Orders   1. Ulcer of leg, chronic, left, limited to breakdown of skin ([facility])  L97.921 Wound cleansing and dressings     lidocaine (LMX) 4 % cream      2. Problem involving surgical incision  T81.89XA Wound cleansing and dressings     lidocaine (LMX) 4 % cream           Assessment & Plan:  See wound orders.   -He may now be heel weightbearing to the left foot in surgical shoe  - Excisional debridement as below, continue wound care to the left lower extremity with Unna boot and also can continue Acticoat to the left foot.    Subjective:   Patient transfer evaluation management of his left foot.  And left leg doing overall very well.  Been compliant with offloading and nonweightbearing        The following portions of the patient's history were reviewed and updated as appropriate:   Patient Active Problem List   Diagnosis    Type 2 diabetes mellitus with hyperglycemia, with long-term current use of insulin ([facility])    Diabetic neuropathy ([facility])    Acute osteomyelitis of metatarsal bone of left foot ([facility])    Hx of right BKA ([facility])    Ambulatory dysfunction    Primary hypertension    Hypercholesteremia    Vitamin D deficiency    Class 1 obesity in adult    Cellulitis of left lower extremity    Candidiasis, intertrigo    Elevated platelet count    Maggot infestation    Open wound of left foot    MRSA colonization    Peripheral vascular disease ([facility])    Atelectasis    Chronic renal disease, stage V ([facility])      Past Medical History:   Diagnosis Date    Diabetes mellitus (HCC)     Hypertension      Past Surgical History:   Procedure Laterality Date    FOOT AMPUTATION Left 6/27/2024    Procedure: AMPUTATION LEFT FOOT 1st METATARSAL RESECTION;  Surgeon: Myron Bhakta DPM;  [facility]: [facility];  Service: Podiatry    FOOT AMPUTATION Left 8/13/2024    Procedure: AMPUTATION FOOT, Lis franc amp;  Surgeon: Myron Bhakta DPM;  Location: [facility];  Service: Podiatry    IR PICC PLACEMENT SINGLE LUMEN  7/16/2024    LEG AMPUTATION THROUGH LOWER TIBIA AND FIBULA Right 7/25/2017    Procedure: AMPUTATION BELOW KNEE (BKA);  Surgeon: Mynor Sanchez MD;  [facility];  Service: General    WA AMPUTATION FOOT TRANSMETARSAL Right 1/26/2017    Procedure: AMPUTATION TRANSMETATARSAL (TMA); OPEN;  Surgeon: Leonard Lomeli DPM;  [facility];  Service: Podiatry    WA AMPUTATION FOOT TRANSMETARSAL Left 4/26/2024    Procedure: LEFT FOOT PARTIAL FIRST RAY AMPUTATION;  Surgeon: Jennifer Rubalcava DPM;  [facility]: [facility];  Service: Podiatry    WA AMPUTATION METATARSAL W/TOE SINGLE Left 1/30/2017    Procedure: Left partial 1st ray amputation;  Surgeon: Leonard Lomeli DPM;  [facility];  Service: Podiatry    WA COLONOSCOPY FLX DX W/COLLJ SPEC WHEN PFRMD N/A 11/28/2018    Procedure: COLONOSCOPY;  Surgeon: González Napier MD;  Location: [facility];  Service: Gastroenterology    WOUND DEBRIDEMENT Right 1/30/2017    Procedure: DEBRIDEMENT FOOT/TOE (WASH OUT) delayed closure, LINDA;  Surgeon: Leonard Lomeli DPM;  [facility];  Service:     WOUND DEBRIDEMENT Left 7/11/2024    Procedure: DEBRIDEMENT FOOT/TOE (WASH OUT);  Surgeon: Myron Bhakta DPM;  [facility];  Service: Podiatry     Social History     Socioeconomic History    Marital status: Single     Spouse name: Not on file    Number of children: Not on file    Years of education: Not on file    Highest education level: Not on file    Occupational History    Occupation: retired   Tobacco Use    Smoking status: Former     Types: Cigarettes    Smokeless tobacco: Never    Tobacco comments:     quit 9 years ago   Vaping Use    Vaping status: Never Used   Substance and Sexual Activity    Alcohol use: Yes     Alcohol/week: 11.0 standard drinks of alcohol     Types: 6 Cans of beer, 5 Shots of liquor per week     Comment: social ; occasional use as per [employer]    Drug use: No    Sexual activity: Not on file   Other Topics Concern    Not on file   Social History Narrative    Always uses seat belt    Caffeine use    Dental care regularly     Social Determinants of Health     Financial Resource Strain: Medium Risk (4/25/2023)    Overall Financial Resource Strain (CARDIA)     Difficulty of Paying Living Expenses: Somewhat hard   Food Insecurity: No Food Insecurity (8/6/2024)    Hunger Vital Sign     Worried About Running Out of Food in the Last Year: Never true     Ran Out of Food in the Last Year: Never true   Recent Concern: Food Insecurity - Food Insecurity Present (6/24/2024)    Hunger Vital Sign     Worried About Running Out of Food in the Last Year: Sometimes true     Ran Out of Food in the Last Year: Patient declined   Transportation Needs: No Transportation Needs (8/6/2024)    PRAPARE - Transportation     Lack of Transportation (Medical): No     Lack of Transportation (Non-Medical): No   Physical Activity: Not on file   Stress: Not on file   Social Connections: Unknown (7/22/2024)    Received from [employer]    Social Med[employer]s Liaison     How often do you feel lonely or isolated from those around you? (Adult - for ages 18 years and over): Not on file   Intimate Partner Violence: Not on file   Housing Stability: Low Risk  (8/6/2024)    Housing Stability Vital Sign     Unable to Pay for Housing in the Last Year: No     Number of Times Moved in the Last Year: 1     Homeless in the Last Year: No   Recent Concern: Housing Stability - High Risk (6/24/2024)     Housing Stability Vital Sign     Unable to Pay for Housing in the Last Year: Yes     Number of Times Moved in the Last Year: 0     Homeless in the Last Year: No        Current Outpatient Medications:     acetaminophen (TYLENOL) 650 mg CR tablet, Take 650 mg by mouth every 8 (eight) hours as needed for mild pain, Disp: , Rfl:     aspirin (ECOTRIN LOW STRENGTH) 81 mg EC tablet, Take 81 mg by mouth daily Resume on 8/11/17 , Disp: , Rfl:     atorvastatin (LIPITOR) 80 mg tablet, Take 1 tablet (80 mg total) by mouth daily, Disp: 90 tablet, Rfl: 3    bisacodyl (DULCOLAX) 5 mg EC tablet, Take 5 mg by mouth daily as needed for constipation, Disp: , Rfl:     Cholecalciferol (VITAMIN D3) 1,000 units tablet, Take 1 tablet (1,000 Units total) by mouth daily, Disp: 30 tablet, Rfl: 0    insulin aspart (NovoLOG FlexPen) 100 UNIT/ML injection pen, E11.65 inject 14 units before breakfast, inject 17 units before lunch and supper plus scale. TDD 75 units, Disp: 30 mL, Rfl: 3    Insulin Glargine Solostar (Basaglar KwikPen) 100 UNIT/ML SOPN, E11.65 inject 30 units daily at bedtime, Disp: 15 mL, Rfl: 0    Insulin Pen Needle 30G X 8 MM MISC, Inject under the skin daily at bedtime, Disp: 100 each, Rfl: 5    lisinopril (ZESTRIL) 2.5 mg tablet, Take 1 tablet (2.5 mg total) by mouth daily, Disp: 90 tablet, Rfl: 3    magnesium hydroxide (MILK OF MAGNESIA) 800 MG/5ML suspension, Take 30 mL by mouth daily as needed for constipation, Disp: , Rfl:     potassium-sodium phosphates (PHOS-NAK) 280 mg (P)-160 mg (Na)-250 mg (K) packet, Take 1 packet by mouth once, Disp: , Rfl:     Current Facility-Administered Medications:     lidocaine (LMX) 4 % cream, , Topical, Once, Myron Bhakta DPM  Family History   Problem Relation Age of Onset    Diabetes Mother       Review of Systems   Constitutional:  Negative for chills and fever.   HENT:  Negative for ear pain and sore throat.    Eyes:  Negative for pain and visual disturbance.    Respiratory:  Negative for cough and shortness of breath.    Cardiovascular:  Negative for chest pain and palpitations.   Gastrointestinal:  Negative for abdominal pain and vomiting.   Genitourinary:  Negative for dysuria and hematuria.   Musculoskeletal:  Negative for arthralgias and back pain.   Skin:  Negative for color change and rash.   Neurological:  Negative for seizures and syncope.   All other systems reviewed and are negative.        Objective:  /60   Pulse 80   Temp 98 °F (36.7 °C)   Resp 20     Physical Exam  Skin:     Comments: His foot looks to be free of infection minimal erythema and is finally exhibiting signs of healing.  Good granular base following debridement no deep probing areas.  Bone appears to be covered             Wound 08/13/24 Foot Left (Active)   Wound Image   09/17/24 1055   Wound Description Pink;Slough;White 09/17/24 1057   Kelly-wound Assessment Brown;Scaly;Dry 09/17/24 1057   Wound Length (cm) 0.6 cm 09/17/24 1057   Wound Width (cm) 2.2 cm 09/17/24 1057   Wound Depth (cm) 0.5 cm 09/17/24 1057   Wound Surface Area (cm^2) 1.32 cm^2 09/17/24 1057   Wound Volume (cm^3) 0.66 cm^3 09/17/24 1057   Calculated Wound Volume (cm^3) 0.66 cm^3 09/17/24 1057   Change in Wound Size % 26.67 09/17/24 1057   Number of underminings 1 09/03/24 0811   Undermining 1 0.3 09/03/24 0811   Undermining 1 is depth extending from 3 oclock 09/03/24 0811   Wound Site Closure Sutures 09/10/24 1110   Drainage Amount Moderate 09/10/24 1110   Drainage Description Sanguineous 09/17/24 1057   Non-staged Wound Description Full thickness 09/17/24 1057       Wound 09/10/24 Leg Lower;Left;Anterior (Active)   Wound Image   09/17/24 1056   Wound Description Eschar;Pink 09/17/24 1058   Kelly-wound Assessment Pink;Scar Tissue 09/17/24 1058   Wound Length (cm) 0.3 cm 09/17/24 1058   Wound Width (cm) 0.4 cm 09/17/24 1058   Wound Depth (cm) 0.1 cm 09/17/24 1058   Wound Surface Area (cm^2) 0.12 cm^2 09/17/24 1053    Wound Volume (cm^3) 0.012 cm^3 09/17/24 1058   Calculated Wound Volume (cm^3) 0.01 cm^3 09/17/24 1058   Change in Wound Size % 99.67 09/17/24 1058   Drainage Amount Small 09/17/24 1058   Drainage Description Serous 09/17/24 1058   Non-staged Wound Description Full thickness 09/17/24 1058                         Debridement    Universal Protocol:  Consent: Verbal consent obtained.  Risks and benefits: risks, benefits and alternatives were discussed  Consent given by: patient  Patient understanding: patient states understanding of the procedure being performed  Patient consent: the patient's understanding of the procedure matches consent given  Patient identity confirmed: verbally with patient    Debridement Details  Performed by: physician  Debridement type: surgical  Level of debridement: subcutaneous tissue  Pain control: lidocaine 4%      Post-debridement measurements  Length (cm): 0.6  Width (cm): 2.2  Depth (cm): 0.3  Percent debrided: 100%  Surface Area (cm^2): 1.32  Area Debrided (cm^2): 1.32  Volume (cm^3): 0.4    Tissue and other material debrided: subcutaneous tissue  Devitalized tissue debrided: biofilm, necrotic debris and slough  Instrument(s) utilized: curette  Technique utilized: excisionalBleeding: medium  Hemostasis obtained with: pressure  Procedural pain (0-10): insensate  Post-procedural pain: insensate   Response to treatment: procedure was tolerated well         Results from last 6 Months   Lab Units 08/13/24  1558   WOUND CULTURE  1+ Growth of Pseudomonas aeruginosa*  1+ Growth of Corynebacterium striatum*         Wound Instructions:  Orders Placed This Encounter   Procedures    Wound cleansing and dressings     Wound cleansing and dressings Left Foot                            Wash your hands with soap and water.  Remove old dressing, discard into plastic bag and place in trash.  Cleanse the wound with NSS or mild soap and water prior to applying a clean dressing. Do not use tissue or  "cotton balls. Do not scrub the wound. Pat dry using gauze  Shower no do not get dressing wet     Apply acticoat 7 to the left foot wound.  Cover with gauze   Secure with tristen and tape   Apply unna boot   Wound care will be completed at wound center      Follow up in 1 week      May ambulate fully in surgical shoe     Standing Status:   Future     Standing Expiration Date:   9/24/2024         Myron hBakta DPM      Portions of the record may have been created with voice recognition software. Occasional wrong word or \"sound a like\" substitutions may have occurred due to the inherent limitations of voice recognition software. Read the chart carefully and recognize, using context, where substitutions have occurred.      "

## 2024-09-17 NOTE — PATIENT INSTRUCTIONS
Orders Placed This Encounter   Procedures    Wound cleansing and dressings     Wound cleansing and dressings Left Foot                            Wash your hands with soap and water.  Remove old dressing, discard into plastic bag and place in trash.  Cleanse the wound with NSS or mild soap and water prior to applying a clean dressing. Do not use tissue or cotton balls. Do not scrub the wound. Pat dry using gauze  Shower no do not get dressing wet     Apply acticoat 7 to the left foot wound.  Cover with gauze   Secure with tristen and tape   Apply unna boot   Wound care will be completed at wound center      Follow up in 1 week      May ambulate fully in surgical shoe     Standing Status:   Future     Standing Expiration Date:   9/24/2024

## 2024-09-23 ENCOUNTER — CONSULT (OUTPATIENT)
Dept: VASCULAR SURGERY | Facility: HOSPITAL | Age: 64
End: 2024-09-23
Attending: HOSPITALIST
Payer: COMMERCIAL

## 2024-09-23 VITALS
BODY MASS INDEX: 48.64 KG/M2 | SYSTOLIC BLOOD PRESSURE: 126 MMHG | HEART RATE: 76 BPM | HEIGHT: 70 IN | DIASTOLIC BLOOD PRESSURE: 56 MMHG

## 2024-09-23 DIAGNOSIS — I73.9 PERIPHERAL VASCULAR DISEASE (HCC): ICD-10-CM

## 2024-09-23 PROCEDURE — 99204 OFFICE O/P NEW MOD 45 MIN: CPT | Performed by: SURGERY

## 2024-09-23 NOTE — ASSESSMENT & PLAN NOTE
64-year-old male with a past medical history of hypertension, diabetes, CKD, right BKA 10 years ago, and recent left TMA performed on 8/13/2024 presented to the office for evaluation of his peripheral arterial disease.  Patient has no left lower extremity complaints.  He is currently seeing wound care and has had healing of his TMA site although it has been somewhat slow.    I reviewed the images in the media tab and there is been significant improvement over time with his left foot wound.    Arterial duplex was unable to obtain an WILLY or metatarsal pressures but on review of the velocities there does not appear to be any hemodynamically significant lesions    At this point with the patient's wound healing I would hold off on intervention but if we find that he plateaus or begins to worsen we can follow with a left lower extremity angiogram.  Continue on aspirin and atorvastatin.  Will have him follow-up in a year with an arterial duplex.  Patient can follow-up sooner if there is any concerns for left lower extremity wounds or symptoms    Orders:    Ambulatory Referral to Vascular Surgery    VAS ARTERIAL DUPLEX-LOWER LIMB UNILATERAL; Future

## 2024-09-23 NOTE — PROGRESS NOTES
Ambulatory Visit  Name: Art Joseph      : 1960      MRN: [id number]  Encounter Provider: Jeffrey Linton MD  Encounter Date: 2024   Enc[facility] department: [facility] [place]    Assessment & Plan  Peripheral vascular disease ([facility])  64-year-old male with a past medical history of hypertension, diabetes, CKD, right BKA 10 years ago, and recent left TMA performed on 2024 presented to the office for evaluation of his peripheral arterial disease.  Patient has no left lower extremity complaints.  He is currently seeing wound care and has had healing of his TMA site although it has been somewhat slow.    I reviewed the images in the media tab and there is been significant improvement over time with his left foot wound.    Arterial duplex was unable to obtain an WILLY or metatarsal pressures but on review of the velocities there does not appear to be any hemodynamically significant lesions    At this point with the patient's wound healing I would hold off on intervention but if we find that he plateaus or begins to worsen we can follow with a left lower extremity angiogram.  Continue on aspirin and atorvastatin.  Will have him follow-up in a year with an arterial duplex.  Patient can follow-up sooner if there is any concerns for left lower extremity wounds or symptoms    Orders:    Ambulatory Referral to Vascular Surgery    VAS ARTERIAL DUPLEX-LOWER LIMB UNILATERAL; Future      History of Present Illness     Art Joseph is a 64 y.o. male      New presents to [facility] for eval of wound healing s/p foot amp 813. Patient does see wound care.    History obtained from : patient  Review of Systems   Constitutional: Negative.    HENT: Negative.     Eyes: Negative.    Respiratory: Negative.     Cardiovascular: Negative.    Gastrointestinal: Negative.    Endocrine: Negative.    Genitourinary: Negative.    Musculoskeletal:  Positive for gait problem.   Skin:  Positive for wound.  "  Allergic/Immunologic: Negative.    Hematological: Negative.    Psychiatric/Behavioral: Negative.       Medical History Reviewed by provider this encounter:  Tobacco  Allergies  Meds  Problems  Med Hx  Surg Hx  Fam Hx           Objective     /56 (BP Location: Left arm, Patient Position: Sitting, Cuff Size: Standard)   Pulse 76   Ht 5' 10\" (1.778 m)   BMI 48.64 kg/m²     Physical Exam  Vitals and nursing note reviewed.   Constitutional:       Appearance: He is well-developed.   HENT:      Head: Normocephalic and atraumatic.   Eyes:      Conjunctiva/sclera: Conjunctivae normal.   Cardiovascular:      Rate and Rhythm: Normal rate and regular rhythm.   Pulmonary:      Effort: Pulmonary effort is normal.      Breath sounds: Normal breath sounds.   Abdominal:      Palpations: Abdomen is soft.      Tenderness: There is no abdominal tenderness.   Musculoskeletal:      Cervical back: Neck supple.   Skin:     General: Skin is warm and dry.      Capillary Refill: Capillary refill takes less than 2 seconds.   Neurological:      General: No focal deficit present.      Mental Status: He is alert and oriented to person, place, and time.   Psychiatric:         Mood and Affect: Mood normal.       Administrative Statements   I have spent a total time of 45 minutes in caring for this patient on the day of the visit/encounter including Diagnostic results, Prognosis, Risks and benefits of tx options, Instructions for management, Patient and family education, Importance of tx compliance, Risk factor reductions, Impressions, Counseling / Coordination of care, Documenting in the medical record, Reviewing / ordering tests, medicine, procedures  , and Obtaining or reviewing history  .  "

## 2024-09-24 ENCOUNTER — OFFICE VISIT (OUTPATIENT)
Dept: WOUND CARE | Facility: CLINIC | Age: 64
End: 2024-09-24
Payer: COMMERCIAL

## 2024-09-24 VITALS
TEMPERATURE: 98.4 F | RESPIRATION RATE: 18 BRPM | HEART RATE: 84 BPM | DIASTOLIC BLOOD PRESSURE: 68 MMHG | SYSTOLIC BLOOD PRESSURE: 144 MMHG

## 2024-09-24 DIAGNOSIS — T81.89XA PROBLEM INVOLVING SURGICAL INCISION: Primary | ICD-10-CM

## 2024-09-24 DIAGNOSIS — L97.921 ULCER OF LEG, CHRONIC, LEFT, LIMITED TO BREAKDOWN OF SKIN (HCC): ICD-10-CM

## 2024-09-24 PROCEDURE — 11042 DBRDMT SUBQ TIS 1ST 20SQCM/<: CPT | Performed by: PODIATRIST

## 2024-09-24 NOTE — PROGRESS NOTES
Patient ID: Art Joseph is a 64 y.o. male Date of Birth 1960       Chief Complaint   Patient presents with    Follow Up Wound Care Visit     Left leg and foot wound       Allergies:  Patient has no known allergies.    Diagnosis:  1. Problem involving surgical incision  -     Wound cleansing and dressings; Future  -     Wound Procedure Treatment Left Foot  2. Ulcer of leg, chronic, left, limited to breakdown of skin (HCC)  -     Wound cleansing and dressings; Future     Diagnosis ICD-10-CM Associated Orders   1. Problem involving surgical incision  T81.89XA Wound cleansing and dressings     Wound Procedure Treatment Left Foot      2. Ulcer of leg, chronic, left, limited to breakdown of skin (HCC)  L97.921 Wound cleansing and dressings           Assessment & Plan:  See wound orders.   -He is finally improving.  - Excisional debridement today  - Continue current treatment  - Advised continued elevation and conservative care for venous stasis  - Return 1 week for continued debridement and care    Subjective:   Patient presents for evaluation and management of the left lower extremity.  Was wearing his Unna boot and a dressing as directed and offloading        The following portions of the patient's history were reviewed and updated as appropriate:   Patient Active Problem List   Diagnosis    Type 2 diabetes mellitus with hyperglycemia, with long-term current use of insulin (HCC)    Diabetic neuropathy ([facility])    Acute osteomyelitis of metatarsal bone of left foot ([facility])    Hx of right BKA ([facility])    Ambulatory dysfunction    Primary hypertension    Hypercholesteremia    Vitamin D deficiency    Class 1 obesity in adult    Cellulitis of left lower extremity    Candidiasis, intertrigo    Elevated platelet count    Maggot infestation    Open wound of left foot    MRSA colonization    Peripheral vascular disease (HCC)    Atelectasis    Chronic renal disease, stage V ([facility])     Past Medical History:   Diagnosis Date     Diabetes mellitus (HCC)     Hypertension      Past Surgical History:   Procedure Laterality Date    FOOT AMPUTATION Left 6/27/2024    Procedure: AMPUTATION LEFT FOOT 1st METATARSAL RESECTION;  Surgeon: Myron Bhakta DPM;  Location: [facility];  Service: Podiatry    FOOT AMPUTATION Left 8/13/2024    Procedure: AMPUTATION FOOT, Lis franc amp;  Surgeon: Myron Bhakta DPM;  Location: [facility];  Service: Podiatry    IR PICC PLACEMENT SINGLE LUMEN  7/16/2024    LEG AMPUTATION THROUGH LOWER TIBIA AND FIBULA Right 7/25/2017    Procedure: AMPUTATION BELOW KNEE (BKA);  Surgeon: Mynor Sanchez MD;  [facility]: [facility];  Service: General    KS AMPUTATION FOOT TRANSMETARSAL Right 1/26/2017    Procedure: AMPUTATION TRANSMETATARSAL (TMA); OPEN;  Surgeon: Leonard Lomeli DPM;  [facility];  Service: Podiatry    KS AMPUTATION FOOT TRANSMETARSAL Left 4/26/2024    Procedure: LEFT FOOT PARTIAL FIRST RAY AMPUTATION;  Surgeon: Jennifer Rubalcava DPM;  Location: [facility];  Service: Podiatry    KS AMPUTATION METATARSAL W/TOE SINGLE Left 1/30/2017    Procedure: Left partial 1st ray amputation;  Surgeon: Leonard Lomeli DPM;  [facility];  Service: Podiatry    KS COLONOSCOPY FLX DX W/COLLJ SPEC WHEN PFRMD N/A 11/28/2018    Procedure: COLONOSCOPY;  Surgeon: González Napier MD;  Location: [facility];  Service: Gastroenterology    WOUND DEBRIDEMENT Right 1/30/2017    Procedure: DEBRIDEMENT FOOT/TOE (WASH OUT) delayed closure, LINDA;  Surgeon: Leonard Lomeli DPM;  [facility];  Service:     WOUND DEBRIDEMENT Left 7/11/2024    Procedure: DEBRIDEMENT FOOT/TOE (WASH OUT);  Surgeon: Myron Bhakta DPM;  Location: [facility];  Service: Podiatry     Social History     Socioeconomic History    Marital status: Single     Spouse name: None    Number of children: None    Years of education: None    Highest education level: None   Occupational History    Occupation: retired   Tobacco Use    Smoking  status: Former     Types: Cigarettes    Smokeless tobacco: Never    Tobacco comments:     quit 9 years ago   Vaping Use    Vaping status: Never Used   Substance and Sexual Activity    Alcohol use: Yes     Alcohol/week: 11.0 standard drinks of alcohol     Types: 6 Cans of beer, 5 Shots of liquor per week     Comment: social ; occasional use as per [employer]    Drug use: No    Sexual activity: None   Other Topics Concern    None   Social History Narrative    Always uses seat belt    Caffeine use    Dental care regularly     Social Determinants of Health     Financial Resource Strain: Medium Risk (4/25/2023)    Overall Financial Resource Strain (CARDIA)     Difficulty of Paying Living Expenses: Somewhat hard   Food Insecurity: No Food Insecurity (8/6/2024)    Hunger Vital Sign     Worried About Running Out of Food in the Last Year: Never true     Ran Out of Food in the Last Year: Never true   Recent Concern: Food Insecurity - Food Insecurity Present (6/24/2024)    Hunger Vital Sign     Worried About Running Out of Food in the Last Year: Sometimes true     Ran Out of Food in the Last Year: Patient declined   Transportation Needs: No Transportation Needs (8/6/2024)    PRAPARE - Transportation     Lack of Transportation (Medical): No     Lack of Transportation (Non-Medical): No   Physical Activity: Not on file   Stress: Not on file   Social Connections: Unknown (7/22/2024)    Received from [employer]    Social [employer]     How often do you feel lonely or isolated from those around you? (Adult - for ages 18 years and over): Not on file   Intimate Partner Violence: Not on file   Housing Stability: Low Risk  (8/6/2024)    Housing Stability Vital Sign     Unable to Pay for Housing in the Last Year: No     Number of Times Moved in the Last Year: 1     Homeless in the Last Year: No   Recent Concern: Housing Stability - High Risk (6/24/2024)    Housing Stability Vital Sign     Unable to Pay for Housing in the Last Year: Yes      Number of Times Moved in the Last Year: 0     Homeless in the Last Year: No        Current Outpatient Medications:     acetaminophen (TYLENOL) 650 mg CR tablet, Take 650 mg by mouth every 8 (eight) hours as needed for mild pain, Disp: , Rfl:     aspirin (ECOTRIN LOW STRENGTH) 81 mg EC tablet, Take 81 mg by mouth daily Resume on 8/11/17 , Disp: , Rfl:     atorvastatin (LIPITOR) 80 mg tablet, Take 1 tablet (80 mg total) by mouth daily, Disp: 90 tablet, Rfl: 3    bisacodyl (DULCOLAX) 5 mg EC tablet, Take 5 mg by mouth daily as needed for constipation, Disp: , Rfl:     Cholecalciferol (VITAMIN D3) 1,000 units tablet, Take 1 tablet (1,000 Units total) by mouth daily, Disp: 30 tablet, Rfl: 0    insulin aspart (NovoLOG FlexPen) 100 UNIT/ML injection pen, E11.65 inject 14 units before breakfast, inject 17 units before lunch and supper plus scale. TDD 75 units, Disp: 30 mL, Rfl: 3    Insulin Glargine Solostar (Basaglar KwikPen) 100 UNIT/ML SOPN, E11.65 inject 30 units daily at bedtime, Disp: 15 mL, Rfl: 0    Insulin Pen Needle 30G X 8 MM MISC, Inject under the skin daily at bedtime, Disp: 100 each, Rfl: 5    lisinopril (ZESTRIL) 2.5 mg tablet, Take 1 tablet (2.5 mg total) by mouth daily, Disp: 90 tablet, Rfl: 3    magnesium hydroxide (MILK OF MAGNESIA) 800 MG/5ML suspension, Take 30 mL by mouth daily as needed for constipation, Disp: , Rfl:     potassium-sodium phosphates (PHOS-NAK) 280 mg (P)-160 mg (Na)-250 mg (K) packet, Take 1 packet by mouth once, Disp: , Rfl:   Family History   Problem Relation Age of Onset    Diabetes Mother       Review of Systems   Constitutional:  Negative for chills and fever.   HENT:  Negative for ear pain and sore throat.    Eyes:  Negative for pain and visual disturbance.   Respiratory:  Negative for cough and shortness of breath.    Cardiovascular:  Negative for chest pain and palpitations.   Gastrointestinal:  Negative for abdominal pain and vomiting.   Genitourinary:  Negative for  dysuria and hematuria.   Musculoskeletal:  Negative for arthralgias and back pain.   Skin:  Negative for color change and rash.   Neurological:  Negative for seizures and syncope.         Objective:  /68   Pulse 84   Temp 98.4 °F (36.9 °C)   Resp 18     Physical Exam  Musculoskeletal:      Comments: Improving wound to the amputation site, granular base, bone remains covered.  No deep probing areas.  Healthy appearing remainder of the foot leg ulceration is healed             Wound 08/13/24 Foot Left (Active)   Wound Image   09/24/24 0843   Wound Description Pink;Slough;White;Epithelialization 09/24/24 0823   Kelly-wound Assessment Brown;Scaly;Dry 09/24/24 0823   Wound Length (cm) 0.3 cm 09/24/24 0823   Wound Width (cm) 1.5 cm 09/24/24 0823   Wound Depth (cm) 0.5 cm 09/24/24 0823   Wound Surface Area (cm^2) 0.45 cm^2 09/24/24 0823   Wound Volume (cm^3) 0.225 cm^3 09/24/24 0823   Calculated Wound Volume (cm^3) 0.23 cm^3 09/24/24 0823   Change in Wound Size % 74.44 09/24/24 0823   Number of underminings 1 09/03/24 0811   Undermining 1 0.3 09/03/24 0811   Undermining 1 is depth extending from 3 oclock 09/03/24 0811   Wound Site Closure Sutures 09/10/24 1110   Drainage Amount Moderate 09/24/24 0823   Drainage Description Serosanguineous 09/24/24 0823   Non-staged Wound Description Full thickness 09/24/24 0823   Treatments Cleansed 09/24/24 0823       Wound 09/10/24 Leg Lower;Left;Anterior (Active)   Wound Image   09/24/24 0821   Wound Description Epithelialization;Eschar 09/24/24 0824   Kelly-wound Assessment Intact 09/24/24 0824   Wound Length (cm) 0 cm 09/24/24 0824   Wound Width (cm) 0 cm 09/24/24 0824   Wound Depth (cm) 0 cm 09/24/24 0824   Wound Surface Area (cm^2) 0 cm^2 09/24/24 0824   Wound Volume (cm^3) 0 cm^3 09/24/24 0824   Calculated Wound Volume (cm^3) 0 cm^3 09/24/24 0824   Change in Wound Size % 100 09/24/24 0824   Drainage Amount None 09/24/24 0824   Drainage Description Serous 09/17/24 1052    Non-staged Wound Description Not applicable 09/24/24 0824                         Debridement   Wound 08/13/24 Foot Left    Universal Protocol:  Consent: Verbal consent obtained.  Risks and benefits: risks, benefits and alternatives were discussed  Consent given by: patient  Patient understanding: patient states understanding of the procedure being performed  Patient consent: the patient's understanding of the procedure matches consent given  Patient identity confirmed: verbally with patient    Debridement Details  Performed by: physician  Debridement type: surgical  Level of debridement: subcutaneous tissue  Pain control: lidocaine 4%      Post-debridement measurements  Length (cm): 0.3  Width (cm): 1.5  Depth (cm): 0.5  Percent debrided: 100%  Surface Area (cm^2): 0.45  Area Debrided (cm^2): 0.45  Volume (cm^3): 0.22    Tissue and other material debrided: subcutaneous tissue  Devitalized tissue debrided: callus, necrotic debris and slough  Instrument(s) utilized: curette  Bleeding: medium  Hemostasis obtained with: pressure  Procedural pain (0-10): insensate  Post-procedural pain: insensate   Response to treatment: procedure was tolerated well         Results from last 6 Months   Lab Units 08/13/24  1558   WOUND CULTURE  1+ Growth of Pseudomonas aeruginosa*  1+ Growth of Corynebacterium striatum*         Wound Instructions:  Orders Placed This Encounter   Procedures    Wound cleansing and dressings     Wash your hands with soap and water.  Remove old dressing, discard into plastic bag and place in trash.  Cleanse the wound with NSS or mild soap and water prior to applying a clean dressing. Do not use tissue or cotton balls. Do not scrub the wound. Pat dry using gauze  Shower no do not get dressing wet     Apply acticoat 3 to the left foot wound.  Cover with gauze   Secure with tristen and tape   Change every 3 days     Elastic Tubular Stocking    Tubular elastic bandage: Apply from base of toes to behind the  "knee. Apply in AM, may remove for sleep.    Avoid prolonged standing in one place.    Elevate leg(s) above the level of the heart when sitting or as much as possible.          Follow up in 1 week      May ambulate fully in surgical shoe     Standing Status:   Future     Standing Expiration Date:   10/1/2024    Wound Procedure Treatment Left Foot     This order was created via procedure documentation         Myron Bhakta DPM      Portions of the record may have been created with voice recognition software. Occasional wrong word or \"sound a like\" substitutions may have occurred due to the inherent limitations of voice recognition software. Read the chart carefully and recognize, using context, where substitutions have occurred.      "

## 2024-09-24 NOTE — PROGRESS NOTES
Wound Procedure Treatment Left Foot    Performed by: Kimberlee Irizarry RN  Authorized by: Myron Bhakta DPM    Associated wounds:   Wound 08/13/24 Foot Left  Wound cleansed with:  NSS  Applied primary dressing:  Acticoat  Applied secondary dressing:  Gauze  Dressing secured with:  Johan, Tape, Size F and Elastic tubular stocking

## 2024-09-24 NOTE — PATIENT INSTRUCTIONS
Orders Placed This Encounter   Procedures    Wound cleansing and dressings     Wash your hands with soap and water.  Remove old dressing, discard into plastic bag and place in trash.  Cleanse the wound with NSS or mild soap and water prior to applying a clean dressing. Do not use tissue or cotton balls. Do not scrub the wound. Pat dry using gauze  Shower no do not get dressing wet     Apply acticoat 3 to the left foot wound.  Cover with gauze   Secure with tristen and tape   Change every 3 days     Elastic Tubular Stocking    Tubular elastic bandage: Apply from base of toes to behind the knee. Apply in AM, may remove for sleep.    Avoid prolonged standing in one place.    Elevate leg(s) above the level of the heart when sitting or as much as possible.          Follow up in 1 week      May ambulate fully in surgical shoe     Standing Status:   Future     Standing Expiration Date:   10/1/2024

## 2024-10-01 ENCOUNTER — OFFICE VISIT (OUTPATIENT)
Dept: WOUND CARE | Facility: CLINIC | Age: 64
End: 2024-10-01
Payer: COMMERCIAL

## 2024-10-01 VITALS
DIASTOLIC BLOOD PRESSURE: 60 MMHG | RESPIRATION RATE: 20 BRPM | HEART RATE: 70 BPM | TEMPERATURE: 97.2 F | SYSTOLIC BLOOD PRESSURE: 128 MMHG

## 2024-10-01 DIAGNOSIS — E11.621 TYPE 2 DIABETES MELLITUS WITH FOOT ULCER, WITH LONG-TERM CURRENT USE OF INSULIN (HCC): ICD-10-CM

## 2024-10-01 DIAGNOSIS — Z79.4 TYPE 2 DIABETES MELLITUS WITH FOOT ULCER, WITH LONG-TERM CURRENT USE OF INSULIN (HCC): ICD-10-CM

## 2024-10-01 DIAGNOSIS — L97.509 TYPE 2 DIABETES MELLITUS WITH FOOT ULCER, WITH LONG-TERM CURRENT USE OF INSULIN (HCC): ICD-10-CM

## 2024-10-01 DIAGNOSIS — T81.89XA PROBLEM INVOLVING SURGICAL INCISION: Primary | ICD-10-CM

## 2024-10-01 PROCEDURE — 99212 OFFICE O/P EST SF 10 MIN: CPT | Performed by: PODIATRIST

## 2024-10-01 PROCEDURE — 99024 POSTOP FOLLOW-UP VISIT: CPT | Performed by: PODIATRIST

## 2024-10-01 NOTE — PROGRESS NOTES
Diabetic Foot Exam    Patient's shoes and socks removed.    Right Foot/Ankle   Right Foot Inspection  Amputation: amputation right foot (Comments: R BKA)    Left Foot/Ankle  Left Foot Inspection  Amputation: amputation left foot (Comments: Left proximal Lis franc)    Toe Exam: left toe deformity.     Sensory   Vibration: absent  Proprioception: absent  Monofilament testing: absent    Vascular  Capillary refills: < 3 seconds  The left DP pulse is 1+. The left PT pulse is 1+.     Assign Risk Category  Deformity present  Loss of protective sensation  Weak pulses  Risk: 3          Patient ID: Art Joseph is a 64 y.o. male Date of Birth 1960       Chief Complaint   Patient presents with    Follow Up Wound Care Visit     Wound left foot       Allergies:  Patient has no known allergies.    Diagnosis:  1. Problem involving surgical incision  -     Wound cleansing and dressings Left Foot; Future  -     Diabetic Shoe  -     Diabetic Shoe Inserts  2. Type 2 diabetes mellitus with foot ulcer, with long-term current use of insulin (HCC)  -     Diabetic Shoe  -     Diabetic Shoe Inserts     Diagnosis ICD-10-CM Associated Orders   1. Problem involving surgical incision  T81.89XA Wound cleansing and dressings Left Foot     Diabetic Shoe     Diabetic Shoe Inserts      2. Type 2 diabetes mellitus with foot ulcer, with long-term current use of insulin (HCC)  E11.621 Diabetic Shoe    L97.509 Diabetic Shoe Inserts    Z79.4            Assessment & Plan:  See wound orders.   -No open lesions thankfully he is healed.  - We will need to obtain custom offloading insert to the left foot and also bilateral custom shoes to help offload his left foot deformity with severe neuropathy  - He is incredibly high risk for reulceration and he will need to be seen in my podiatry office every 2 months for a pedal check, he does have priority to get in the office if he does have any open lesions    Subjective:   Patient transfer evaluation  management left foot, reports no drainage.  Has no new pedal issues, he is wondering what shoes to wear.  States he is leaving the nursing home on Thursday        The following portions of the patient's history were reviewed and updated as appropriate:   Patient Active Problem List   Diagnosis    Type 2 diabetes mellitus with hyperglycemia, with long-term current use of insulin ([facility])    Diabetic neuropathy (HCC)    Acute osteomyelitis of metatarsal bone of left foot (HCC)    Hx of right BKA ([facility])    Ambulatory dysfunction    Primary hypertension    Hypercholesteremia    Vitamin D deficiency    Class 1 obesity in adult    Cellulitis of left lower extremity    Candidiasis, intertrigo    Elevated platelet count    Maggot infestation    Open wound of left foot    MRSA colonization    Peripheral vascular disease (HCC)    Atelectasis    Chronic renal disease, stage V ([facility])    Type 2 diabetes mellitus with foot ulcer, with long-term current use of insulin ([facility])    Problem involving surgical incision     Past Medical History:   Diagnosis Date    Diabetes mellitus (HCC)     Hypertension      Past Surgical History:   Procedure Laterality Date    FOOT AMPUTATION Left 6/27/2024    Procedure: AMPUTATION LEFT FOOT 1st METATARSAL RESECTION;  Surgeon: Myron Bhakta DPM;  Location: [facility];  Service: Podiatry    FOOT AMPUTATION Left 8/13/2024    Procedure: AMPUTATION FOOT, Lis franc amp;  Surgeon: Myron Bhakta DPM;  [facility]: [facility];  Service: Podiatry    IR PICC PLACEMENT SINGLE LUMEN  7/16/2024    LEG AMPUTATION THROUGH LOWER TIBIA AND FIBULA Right 7/25/2017    Procedure: AMPUTATION BELOW KNEE (BKA);  Surgeon: Mynor Sanchez MD;  Location:  MAIN OR;  Service: General    NE AMPUTATION FOOT TRANSMETARSAL Right 1/26/2017    Procedure: AMPUTATION TRANSMETATARSAL (TMA); OPEN;  Surgeon: Leonard Lomeli DPM;  [facility];  Service: Podiatry    NE AMPUTATION FOOT TRANSMETARSAL Left 4/26/2024     Procedure: LEFT FOOT PARTIAL FIRST RAY AMPUTATION;  Surgeon: Jennifer Rubalcava DPM;  [facility]: [facility];  Service: Podiatry    DC AMPUTATION METATARSAL W/TOE SINGLE Left 1/30/2017    Procedure: Left partial 1st ray amputation;  Surgeon: Leonard Lomeli DPM;  Location:  MAIN OR;  Service: Podiatry    DC COLONOSCOPY FLX DX W/COLLJ SPEC WHEN PFRMD N/A 11/28/2018    Procedure: COLONOSCOPY;  Surgeon: González Napier MD;  Location: [facility];  Service: Gastroenterology    WOUND DEBRIDEMENT Right 1/30/2017    Procedure: DEBRIDEMENT FOOT/TOE (WASH OUT) delayed closure, LINDA;  Surgeon: Leonard Lomeli DPM;  [facility];  Service:     WOUND DEBRIDEMENT Left 7/11/2024    Procedure: DEBRIDEMENT FOOT/TOE (WASH OUT);  Surgeon: Myron Bhakta DPM;  [facility];  Service: Podiatry     Social History     Socioeconomic History    Marital status: Single     Spouse name: Not on file    Number of children: Not on file    Years of education: Not on file    Highest education level: Not on file   Occupational History    Occupation: retired   Tobacco Use    Smoking status: Former     Types: Cigarettes    Smokeless tobacco: Never    Tobacco comments:     quit 9 years ago   Vaping Use    Vaping status: Never Used   Substance and Sexual Activity    Alcohol use: Yes     Alcohol/week: 11.0 standard drinks of alcohol     Types: 6 Cans of beer, 5 Shots of liquor per week     Comment: social ; occasional use as per [employer]    Drug use: No    Sexual activity: Not on file   Other Topics Concern    Not on file   Social History Narrative    Always uses seat belt    Caffeine use    Dental care regularly     Social Determinants of Health     Financial Resource Strain: Medium Risk (4/25/2023)    Overall Financial Resource Strain (CARDIA)     Difficulty of Paying Living Expenses: Somewhat hard   Food Insecurity: No Food Insecurity (8/6/2024)    Hunger Vital Sign     Worried About Running Out of Food in the Last Year: Never true      Ran Out of Food in the Last Year: Never true   Recent Concern: Food Insecurity - Food Insecurity Present (6/24/2024)    Hunger Vital Sign     Worried About Running Out of Food in the Last Year: Sometimes true     Ran Out of Food in the Last Year: Patient declined   Transportation Needs: No Transportation Needs (8/6/2024)    PRAPARE - Transportation     Lack of Transportation (Medical): No     Lack of Transportation (Non-Medical): No   Physical Activity: Not on file   Stress: Not on file   Social Connections: Unknown (7/22/2024)    Received from [employer]    Social [employer]     How often do you feel lonely or isolated from those around you? (Adult - for ages 18 years and over): Not on file   Intimate Partner Violence: Not on file   Housing Stability: Low Risk  (8/6/2024)    Housing Stability Vital Sign     Unable to Pay for Housing in the Last Year: No     Number of Times Moved in the Last Year: 1     Homeless in the Last Year: No   Recent Concern: Housing Stability - High Risk (6/24/2024)    Housing Stability Vital Sign     Unable to Pay for Housing in the Last Year: Yes     Number of Times Moved in the Last Year: 0     Homeless in the Last Year: No        Current Outpatient Medications:     acetaminophen (TYLENOL) 650 mg CR tablet, Take 650 mg by mouth every 8 (eight) hours as needed for mild pain, Disp: , Rfl:     aspirin (ECOTRIN LOW STRENGTH) 81 mg EC tablet, Take 81 mg by mouth daily Resume on 8/11/17 , Disp: , Rfl:     atorvastatin (LIPITOR) 80 mg tablet, Take 1 tablet (80 mg total) by mouth daily, Disp: 90 tablet, Rfl: 3    bisacodyl (DULCOLAX) 5 mg EC tablet, Take 5 mg by mouth daily as needed for constipation, Disp: , Rfl:     Cholecalciferol (VITAMIN D3) 1,000 units tablet, Take 1 tablet (1,000 Units total) by mouth daily, Disp: 30 tablet, Rfl: 0    insulin aspart (NovoLOG FlexPen) 100 UNIT/ML injection pen, E11.65 inject 14 units before breakfast, inject 17 units before lunch and supper plus scale.  TDD 75 units, Disp: 30 mL, Rfl: 3    Insulin Glargine Solostar (Basaglar KwikPen) 100 UNIT/ML SOPN, E11.65 inject 30 units daily at bedtime, Disp: 15 mL, Rfl: 0    Insulin Pen Needle 30G X 8 MM MISC, Inject under the skin daily at bedtime, Disp: 100 each, Rfl: 5    lisinopril (ZESTRIL) 2.5 mg tablet, Take 1 tablet (2.5 mg total) by mouth daily, Disp: 90 tablet, Rfl: 3    magnesium hydroxide (MILK OF MAGNESIA) 800 MG/5ML suspension, Take 30 mL by mouth daily as needed for constipation, Disp: , Rfl:     potassium-sodium phosphates (PHOS-NAK) 280 mg (P)-160 mg (Na)-250 mg (K) packet, Take 1 packet by mouth once, Disp: , Rfl:   Family History   Problem Relation Age of Onset    Diabetes Mother       Review of Systems   Constitutional:  Negative for chills and fever.   HENT:  Negative for ear pain and sore throat.    Eyes:  Negative for pain and visual disturbance.   Respiratory:  Negative for cough and shortness of breath.    Cardiovascular:  Negative for chest pain and palpitations.   Gastrointestinal:  Negative for abdominal pain and vomiting.   Genitourinary:  Negative for dysuria and hematuria.   Musculoskeletal:  Negative for arthralgias and back pain.   Skin:  Negative for color change and rash.   Neurological:  Negative for seizures and syncope.   All other systems reviewed and are negative.        Objective:  /60   Pulse 70   Temp (!) 97.2 °F (36.2 °C)   Resp 20     Physical Exam  Cardiovascular:      Pulses: Pulses are weak.           Dorsalis pedis pulses are Right DP grade: Proximal amp. and 1+ on the left side.        Posterior tibial pulses are 1+ on the left side.   Musculoskeletal:        Feet:    Feet:      Right foot: amputated     Left foot: amputated  Skin:     Comments: No drainage via tissue test, ulceration is healed.  No ulcers at all lower extremities either.  No open lesions             Wound 08/13/24 Foot Left (Active)   Wound Image   10/01/24 0831   Wound Description  "Eschar;Epithelialization 10/01/24 0830   Kelly-wound Assessment Dry;Intact;Scaly 10/01/24 0830   Wound Length (cm) 0 cm 10/01/24 0830   Wound Width (cm) 0 cm 10/01/24 0830   Wound Depth (cm) 0 cm 10/01/24 0830   Wound Surface Area (cm^2) 0 cm^2 10/01/24 0830   Wound Volume (cm^3) 0 cm^3 10/01/24 0830   Calculated Wound Volume (cm^3) 0 cm^3 10/01/24 0830   Change in Wound Size % 100 10/01/24 0830   Number of underminings 1 09/03/24 0811   Undermining 1 0.3 09/03/24 0811   Undermining 1 is depth extending from 3 oclock 09/03/24 0811   Wound Site Closure Sutures 09/10/24 1110   Drainage Amount None 10/01/24 0830   Drainage Description Serosanguineous 09/24/24 0823   Non-staged Wound Description Full thickness 09/24/24 0823   Treatments Cleansed 09/24/24 0823                         Procedures     Results from last 6 Months   Lab Units 08/13/24  1558   WOUND CULTURE  1+ Growth of Pseudomonas aeruginosa*  1+ Growth of Corynebacterium striatum*         Wound Instructions:  Orders Placed This Encounter   Procedures    Wound cleansing and dressings Left Foot     Wound is healed  Please check the bottom of your foot daily and report any openings immediately   Use moisturizing lotion to foot and wear spandagrip or compression stockings  Please see Dr Bhakta every 2 months in his office  Obtain diabetic shoe with insert   Follow up as needed     Standing Status:   Future     Standing Expiration Date:   10/8/2024    Diabetic Shoe     Order Specific Question:   Type     Answer:   Custom Molded    Diabetic Shoe Inserts     Left proximal Lisfranc amputation     Order Specific Question:   Type     Answer:   Custom Fabricated         Myron Bhakta DPM      Portions of the record may have been created with voice recognition software. Occasional wrong word or \"sound a like\" substitutions may have occurred due to the inherent limitations of voice recognition software. Read the chart carefully and recognize, using context, " where substitutions have occurred.

## 2024-10-01 NOTE — PATIENT INSTRUCTIONS
Orders Placed This Encounter   Procedures    Wound cleansing and dressings Left Foot     Wound is healed  Please check the bottom of your foot daily and report any openings immediately   Use moisturizing lotion to foot and wear spandagrip or compression stockings  Please see Dr Bhakta every 2 months in his office  Obtain diabetic shoe with insert   Follow up as needed     Standing Status:   Future     Standing Expiration Date:   10/8/2024

## 2024-10-17 ENCOUNTER — OFFICE VISIT (OUTPATIENT)
Dept: INTERNAL MEDICINE CLINIC | Facility: CLINIC | Age: 64
End: 2024-10-17
Payer: COMMERCIAL

## 2024-10-17 ENCOUNTER — TRANSITIONAL CARE MANAGEMENT (OUTPATIENT)
Dept: INTERNAL MEDICINE CLINIC | Facility: CLINIC | Age: 64
End: 2024-10-17

## 2024-10-17 VITALS
WEIGHT: 225.2 LBS | OXYGEN SATURATION: 95 % | TEMPERATURE: 98.3 F | HEIGHT: 70 IN | HEART RATE: 105 BPM | BODY MASS INDEX: 32.24 KG/M2 | SYSTOLIC BLOOD PRESSURE: 132 MMHG | DIASTOLIC BLOOD PRESSURE: 68 MMHG

## 2024-10-17 DIAGNOSIS — E55.9 VITAMIN D DEFICIENCY: ICD-10-CM

## 2024-10-17 DIAGNOSIS — E11.65 TYPE 2 DIABETES MELLITUS WITH HYPERGLYCEMIA, WITH LONG-TERM CURRENT USE OF INSULIN (HCC): Primary | ICD-10-CM

## 2024-10-17 DIAGNOSIS — Z79.4 TYPE 2 DIABETES MELLITUS WITH HYPERGLYCEMIA, WITH LONG-TERM CURRENT USE OF INSULIN (HCC): Primary | ICD-10-CM

## 2024-10-17 DIAGNOSIS — E78.00 HYPERCHOLESTEREMIA: ICD-10-CM

## 2024-10-17 DIAGNOSIS — Z89.511 HX OF RIGHT BKA (HCC): ICD-10-CM

## 2024-10-17 DIAGNOSIS — I10 PRIMARY HYPERTENSION: ICD-10-CM

## 2024-10-17 DIAGNOSIS — E11.43 DIABETIC AUTONOMIC NEUROPATHY ASSOCIATED WITH TYPE 2 DIABETES MELLITUS (HCC): ICD-10-CM

## 2024-10-17 DIAGNOSIS — E11.621 TYPE 2 DIABETES MELLITUS WITH FOOT ULCER, WITH LONG-TERM CURRENT USE OF INSULIN (HCC): ICD-10-CM

## 2024-10-17 DIAGNOSIS — M86.172 ACUTE OSTEOMYELITIS OF METATARSAL BONE OF LEFT FOOT (HCC): ICD-10-CM

## 2024-10-17 DIAGNOSIS — Z79.4 TYPE 2 DIABETES MELLITUS WITH FOOT ULCER, WITH LONG-TERM CURRENT USE OF INSULIN (HCC): ICD-10-CM

## 2024-10-17 DIAGNOSIS — L97.509 TYPE 2 DIABETES MELLITUS WITH FOOT ULCER, WITH LONG-TERM CURRENT USE OF INSULIN (HCC): ICD-10-CM

## 2024-10-17 PROCEDURE — 99495 TRANSJ CARE MGMT MOD F2F 14D: CPT | Performed by: FAMILY MEDICINE

## 2024-10-17 NOTE — PROGRESS NOTES
Transition of Care Visit  Name: Art Joseph      : 1960      MRN: [id number]  Encounter Provider: Kerry Christine MD  Encounter Date: 10/17/2024   Encounter department: [facility] CARE [place]    Assessment & Plan  Type 2 diabetes mellitus with hyperglycemia, with long-term current use of insulin ([facility])    Lab Results   Component Value Date    HGBA1C 7.9 (H) 2024     Hemoglobin A1c slowly improving.  Continue to follow-up with endocrinology.  Many medications are cost prohibitive for him.  Continue with the insulin regimen.  Reviewed this with him.  Continue to check left foot at least once to twice a day.  Continue to follow-up with podiatry/wound care.  Watch for any open areas.  Continue to follow-up with ophthalmology regularly.  Watch carbohydrate intake.  Watch for any hypoglycemic episodes.  Orders:    CBC and differential; Future    Comprehensive metabolic panel; Future    Hemoglobin A1C; Future    Type 2 diabetes mellitus with foot ulcer, with long-term current use of insulin ([facility])    Lab Results   Component Value Date    HGBA1C 7.9 (H) 2024       Orders:    CBC and differential; Future    Comprehensive metabolic panel; Future    Hemoglobin A1C; Future    Primary hypertension  Blood pressure initially elevated but did decrease significantly on recheck.  Continue with the lisinopril.  Will continue to follow blood pressures with routine visits.  Will adjust dose of the lisinopril if needed.  Watch salt intake.  Stay adequately hydrated.  Orders:    CBC and differential; Future    Comprehensive metabolic panel; Future    Diabetic autonomic neuropathy associated with type 2 diabetes mellitus (HCC)    Lab Results   Component Value Date    HGBA1C 7.9 (H) 2024       Orders:    CBC and differential; Future    Comprehensive metabolic panel; Future    Acute osteomyelitis of metatarsal bone of left foot (HCC)  Area of ulceration has resolved.  Continue to follow-up with  podiatry.  Check left foot at least once to twice daily.  Orders:    CBC and differential; Future    Comprehensive metabolic panel; Future    Hx of right BKA (HCC)    Orders:    CBC and differential; Future    Comprehensive metabolic panel; Future    Hypercholesteremia  Continue with the atorvastatin.  Watch diet.  Try to remain as active as possible.  Orders:    Lipid panel; Future    TSH, 3rd generation; Future    Vitamin D deficiency  Continue with vitamin D supplementation.  Will continue to follow vitamin D level with routine laboratory testing.  Orders:    Vitamin D 25 hydroxy; Future    Orders and recommendations as noted above.  He is up-to-date on his flu shot.  Continue with home health services.  Will have him follow-up in about 2 to 3 months or sooner if needed.       History of Present Illness     Transitional Care Management Review:   Art Joseph is a 64 y.o. male here for TCM follow up.     During the TCM phone call patient stated:  TCM Call       Date and time call was made  10/17/2024  8:57 AM    Hospital care reviewed  Discussed with Inpatient Physician    Patient was hospitialized at  Other (comment)    Comment  [facility]    Date of Admission  08/16/24    Date of discharge  10/03/24    Diagnosis  OPEN WOUND L FOOT    Disposition  Home    Were the patients medications reviewed and updated  Yes    Current Symptoms  None          TCM Call       Post hospital issues  None    Should patient be enrolled in anticoag monitoring?  No    Scheduled for follow up?  No    Did you obtain your prescribed medications  Yes    Do you need help managing your prescriptions or medications  No    Is transportation to your appointment needed  Yes    Specify why  PATIENT DOES NOT DRIVE    I have advised the patient to call PCP with any new or worsening symptoms  Yesica Ng,     Living Arrangements  Alone    Support System  Family    The type of support provided  Emotional;  Physical    Do you have social support  Yes, some    Are you recieving any outpatient services  No    Are you recieving home care services  No    Types of home care services  Nurse visit; Other (comment)    Comment  [facility]    Are you using any community resources  Yes    Which resources are you recieving  TRANSPORTATION    Current waiver services  No    Have you fallen in the last 12 months  No    Interperter language line needed  No    Counseling  Patient    Counseling topics  Diagnostic results    Comments  Pt stated he does not have any pain.  Stated he does have a boot on his L foot.  Stated he can ambulate, but must walk on his L heel.          He presents for follow-up after hospitalizations and rehab stay.  Has generally been doing well.  Ambulation has improved.  Continues to use the cane.  Does have a walker but does not use this if possible.  He has been checking his left foot at least once to twice a day.  Continues to follow-up with podiatry.  Previous ulceration and surgical site have healed.  Continues to follow-up with podiatry.  Denies any lower extremity pain but does admit he has significant neuropathy so he does not feel much into the left foot.  Tolerating his lisinopril without difficulty.  Denies any headaches or localized weakness.  Tolerating the atorvastatin well.  Denies any significant muscle aches or weakness.  He has been trying to eat more healthy.  Has been taking his insulin as directed by endocrinology.  Denies any hypoglycemic episodes.  Blood sugars have improved.  Vision has been stable.      Review of Systems   Constitutional:  Negative for activity change, appetite change, chills and fever.   HENT:  Negative for hearing loss.    Eyes:  Negative for pain and visual disturbance.   Respiratory:  Negative for chest tightness and shortness of breath.    Cardiovascular:  Negative for chest pain and palpitations.   Gastrointestinal:  Negative for abdominal pain, blood in stool,  "diarrhea, nausea and vomiting.   Endocrine: Negative for polydipsia, polyphagia and polyuria.   Genitourinary:  Negative for dysuria.   Musculoskeletal:  Positive for gait problem. Negative for arthralgias.   Skin:  Negative for color change.   Neurological:  Negative for dizziness and headaches.   Hematological:  Does not bruise/bleed easily.   Psychiatric/Behavioral:  Negative for behavioral problems. The patient is not nervous/anxious.      Objective     /68 (BP Location: Left arm, Patient Position: Sitting, Cuff Size: Large)   Pulse 105   Temp 98.3 °F (36.8 °C)   Ht 5' 10\" (1.778 m)   Wt 102 kg (225 lb 3.2 oz)   SpO2 95%   BMI 32.31 kg/m²     Physical Exam  Vitals and nursing note reviewed.   Constitutional:       General: He is not in acute distress.     Appearance: He is well-developed, well-groomed and overweight.   HENT:      Head: Normocephalic and atraumatic.      Nose: Nose normal.   Eyes:      Conjunctiva/sclera: Conjunctivae normal.      Pupils: Pupils are equal, round, and reactive to light.   Cardiovascular:      Rate and Rhythm: Normal rate and regular rhythm.      Heart sounds: Normal heart sounds. No murmur heard.     No friction rub. No gallop.   Pulmonary:      Breath sounds: No wheezing or rales.   Chest:      Chest wall: No tenderness.   Abdominal:      General: There is no distension.      Tenderness: There is no abdominal tenderness. There is no guarding or rebound.   Musculoskeletal:      Comments: Right AKA   Lymphadenopathy:      Cervical: No cervical adenopathy.   Skin:     General: Skin is warm and dry.   Neurological:      Mental Status: He is alert and oriented to person, place, and time.   Psychiatric:         Mood and Affect: Mood and affect normal.         Speech: Speech normal.         Behavior: Behavior normal. Behavior is cooperative.         Cognition and Memory: Cognition and memory normal.       Medications have been reviewed by provider in current encounter      "

## 2024-10-17 NOTE — ASSESSMENT & PLAN NOTE
Area of ulceration has resolved.  Continue to follow-up with podiatry.  Check left foot at least once to twice daily.  Orders:    CBC and differential; Future    Comprehensive metabolic panel; Future

## 2024-10-17 NOTE — ASSESSMENT & PLAN NOTE
Lab Results   Component Value Date    HGBA1C 7.9 (H) 08/07/2024     Hemoglobin A1c slowly improving.  Continue to follow-up with endocrinology.  Many medications are cost prohibitive for him.  Continue with the insulin regimen.  Reviewed this with him.  Continue to check left foot at least once to twice a day.  Continue to follow-up with podiatry/wound care.  Watch for any open areas.  Continue to follow-up with ophthalmology regularly.  Watch carbohydrate intake.  Watch for any hypoglycemic episodes.  Orders:    CBC and differential; Future    Comprehensive metabolic panel; Future    Hemoglobin A1C; Future

## 2024-10-17 NOTE — ASSESSMENT & PLAN NOTE
Blood pressure initially elevated but did decrease significantly on recheck.  Continue with the lisinopril.  Will continue to follow blood pressures with routine visits.  Will adjust dose of the lisinopril if needed.  Watch salt intake.  Stay adequately hydrated.  Orders:    CBC and differential; Future    Comprehensive metabolic panel; Future

## 2024-10-17 NOTE — ASSESSMENT & PLAN NOTE
Lab Results   Component Value Date    HGBA1C 7.9 (H) 08/07/2024       Orders:    CBC and differential; Future    Comprehensive metabolic panel; Future

## 2024-10-17 NOTE — ASSESSMENT & PLAN NOTE
Lab Results   Component Value Date    HGBA1C 7.9 (H) 08/07/2024       Orders:    CBC and differential; Future    Comprehensive metabolic panel; Future    Hemoglobin A1C; Future

## 2024-10-18 NOTE — ASSESSMENT & PLAN NOTE
Continue with the atorvastatin.  Watch diet.  Try to remain as active as possible.  Orders:    Lipid panel; Future    TSH, 3rd generation; Future

## 2024-10-18 NOTE — ASSESSMENT & PLAN NOTE
Continue with vitamin D supplementation.  Will continue to follow vitamin D level with routine laboratory testing.  Orders:    Vitamin D 25 hydroxy; Future

## 2024-11-04 ENCOUNTER — TELEPHONE (OUTPATIENT)
Age: 64
End: 2024-11-04

## 2024-11-04 NOTE — TELEPHONE ENCOUNTER
Caller: Art Joseph    Doctor and/or Office: Dr. Bhakta/Efrain ESPARZA    #: [phone]    [place]: Care/Was given RX for diabetic shoes/inserts but not told where to go to get these items. Please call back and advise. Thanks

## 2024-11-12 ENCOUNTER — TELEPHONE (OUTPATIENT)
Dept: PODIATRY | Facility: CLINIC | Age: 64
End: 2024-11-12

## 2024-11-12 NOTE — TELEPHONE ENCOUNTER
Caller: Art Joseph    Doctor: Myron Bhakta DPM    Reason for call: Could someone in Wound Care call Art with the phone number for [facility] in [place] so Art can schedule an appointment for diabetic shoes and inserts.  Also, the signed notes from 10/1/24 and the orders for the diabetic shoes and inserts will need to be faxed over to [facility].  Thank you!    Call back#: [phone]

## 2024-11-18 ENCOUNTER — RA CDI HCC (OUTPATIENT)
Dept: OTHER | Facility: HOSPITAL | Age: 64
End: 2024-11-18

## 2024-11-18 NOTE — PROGRESS NOTES
Suggestion used  HCC coding opportunities       Chart reviewed, no opportunity found: CHART REVIEWED, NO OPPORTUNITY FOUND        Patients Insurance     Medicare Insurance: [employer]age

## 2024-11-25 ENCOUNTER — OFFICE VISIT (OUTPATIENT)
Dept: INTERNAL MEDICINE CLINIC | Facility: CLINIC | Age: 64
End: 2024-11-25
Payer: COMMERCIAL

## 2024-11-25 VITALS
HEIGHT: 70 IN | DIASTOLIC BLOOD PRESSURE: 64 MMHG | BODY MASS INDEX: 32.13 KG/M2 | SYSTOLIC BLOOD PRESSURE: 136 MMHG | WEIGHT: 224.4 LBS | HEART RATE: 87 BPM | TEMPERATURE: 98.2 F | OXYGEN SATURATION: 98 %

## 2024-11-25 DIAGNOSIS — E11.43 DIABETIC AUTONOMIC NEUROPATHY ASSOCIATED WITH TYPE 2 DIABETES MELLITUS (HCC): ICD-10-CM

## 2024-11-25 DIAGNOSIS — Z12.5 SCREENING FOR PROSTATE CANCER: ICD-10-CM

## 2024-11-25 DIAGNOSIS — Z23 ENCOUNTER FOR IMMUNIZATION: ICD-10-CM

## 2024-11-25 DIAGNOSIS — I73.9 PERIPHERAL VASCULAR DISEASE (HCC): ICD-10-CM

## 2024-11-25 DIAGNOSIS — E11.65 TYPE 2 DIABETES MELLITUS WITH HYPERGLYCEMIA, WITH LONG-TERM CURRENT USE OF INSULIN (HCC): Primary | ICD-10-CM

## 2024-11-25 DIAGNOSIS — Z79.4 TYPE 2 DIABETES MELLITUS WITH HYPERGLYCEMIA, WITH LONG-TERM CURRENT USE OF INSULIN (HCC): Primary | ICD-10-CM

## 2024-11-25 DIAGNOSIS — E78.00 HYPERCHOLESTEREMIA: ICD-10-CM

## 2024-11-25 DIAGNOSIS — I10 PRIMARY HYPERTENSION: ICD-10-CM

## 2024-11-25 DIAGNOSIS — E55.9 VITAMIN D DEFICIENCY: ICD-10-CM

## 2024-11-25 PROCEDURE — 99214 OFFICE O/P EST MOD 30 MIN: CPT | Performed by: FAMILY MEDICINE

## 2024-11-25 PROCEDURE — G0009 ADMIN PNEUMOCOCCAL VACCINE: HCPCS | Performed by: FAMILY MEDICINE

## 2024-11-25 PROCEDURE — G0439 PPPS, SUBSEQ VISIT: HCPCS | Performed by: FAMILY MEDICINE

## 2024-11-25 PROCEDURE — 90677 PCV20 VACCINE IM: CPT | Performed by: FAMILY MEDICINE

## 2024-11-25 NOTE — PROGRESS NOTES
Name: Art Joseph      : 1960      MRN: [id number]  Encounter Provider: Kerry Christine MD  Encounter Date: 2024   Encounter department: [facility] [place]    Assessment & Plan  Type 2 diabetes mellitus with hyperglycemia, with long-term current use of insulin (HCC)  Reviewed previous laboratory testing with him.  Did not have repeat laboratory testing done yet.  Discussed with him having this done prior to his appointment with endocrinology in the near future.  Discussed home blood sugars with him.  They remain significantly elevated usually above 200.  Discussed watching diet more closely.  Discussed potentially increasing the dose of the Basaglar but he plans to wait to discuss this with endocrinology.  Discussed importance of good blood sugar control to prevent further wounds on the left foot.  Follow-up with ophthalmology regularly.  Continue to follow-up with podiatry.  Lab Results   Component Value Date    HGBA1C 7.9 (H) 2024       Orders:    CBC and differential; Future    Comprehensive metabolic panel; Future    Hemoglobin A1C; Future    Diabetic autonomic neuropathy associated with type 2 diabetes mellitus (HCC)  Be very careful to avoid falls.  Check foot daily for any open areas.  Lab Results   Component Value Date    HGBA1C 7.9 (H) 2024       Orders:    CBC and differential; Future    Comprehensive metabolic panel; Future    Hemoglobin A1C; Future    Primary hypertension  Blood pressure controlled.  Continue with the lisinopril.  Watch salt intake.  Stay adequately hydrated.  Orders:    CBC and differential; Future    Comprehensive metabolic panel; Future    Peripheral vascular disease (HCC)  Continue to follow-up with podiatry.  Check left foot daily.  Orders:    CBC and differential; Future    Comprehensive metabolic panel; Future    Vitamin D deficiency  Continue with vitamin D supplementation.  Slip given for repeat laboratory testing.  Orders:     CBC and differential; Future    Comprehensive metabolic panel; Future    Vitamin D 25 hydroxy; Future    Hypercholesteremia  Continue with the atorvastatin.  Watch diet.  Try to increase fiber in diet.  Orders:    CBC and differential; Future    Comprehensive metabolic panel; Future    Lipid panel; Future    TSH, 3rd generation; Future    Encounter for immunization    Orders:    Pneumococcal Conjugate Vaccine 20-valent (Pcv20)    Orders and recommendations as noted above.  Up-to-date on flu shot.  Pneumonia vaccine given today.  Very careful to avoid falls.  Continue to follow-up with endocrinology and podiatry.  Discussed with him going to get the specialty diabetic shoes as recommended through podiatry.  Will have him follow-up in about 3 to 5 months or sooner if needed.    Depression Screening and Follow-up Plan: Patient was screened for depression during today's encounter. They screened negative with a PHQ-2 score of 0.    Falls Plan of Care: balance, strength, and gait training instructions were provided. Recommended assistive device to help with gait and balance.       Preventive health issues were discussed with patient, and age appropriate screening tests were ordered as noted in patient's After Visit Summary. Personalized health advice and appropriate referrals for health education or preventive services given if needed, as noted in patient's After Visit Summary.    History of Present Illness     He presents for routine follow-up as well as Medicare wellness visit.  Has generally been doing well.  Balance is occasionally an issue and uses the cane.  He has been following up with podiatry and they recommended specialty shoes and he is looking into this.  Denies any recurrent open areas on the left foot.  He has been following his blood sugars and they have been usually above 200.  Rarely above 300.  He does have follow-up with endocrinology over the next few weeks.  Continues with the Basaglar and NovoLog.   Denies any hypoglycemic episodes.  Appetite remains steady.  Does not always eat healthy per his report.  Tolerating lisinopril without difficulty.  Denies any headaches or localized weakness.  Tolerating the atorvastatin well.  Denies any significant muscle aches or weakness.  Has minimal pain into the left leg.  Will be celebrating Thanksgiving with his brother.       Patient Care Team:  Kerry Christine MD as PCP - General (Family Medicine)  Mayo Jacob, DO Nereyda Bain, DO González Napier MD as Endoscopist    Review of Systems   Constitutional:  Positive for activity change. Negative for appetite change, chills and fever.   HENT:  Negative for hearing loss.    Eyes:  Negative for pain and visual disturbance.   Respiratory:  Negative for chest tightness and shortness of breath.    Cardiovascular:  Negative for chest pain and palpitations.   Gastrointestinal:  Negative for abdominal pain, blood in stool, diarrhea, nausea and vomiting.   Endocrine: Negative for polydipsia, polyphagia and polyuria.        As per HPI   Genitourinary:  Negative for dysuria.   Musculoskeletal:  Positive for arthralgias and gait problem.   Skin:  Negative for color change.   Neurological:  Negative for dizziness and headaches.   Hematological:  Does not bruise/bleed easily.   Psychiatric/Behavioral:  Negative for behavioral problems. The patient is not nervous/anxious.      Medical History Reviewed by provider this encounter:       Annual Wellness Visit Questionnaire   Art is here for his Subsequent Wellness visit. Last Medicare Wellness visit information reviewed, patient interviewed and updates made to the record today.      Health Risk Assessment:   Patient rates overall health as good. Patient feels that their physical health rating is same. Patient is satisfied with their life. Eyesight was rated as same. Hearing was rated as same. Patient feels that their emotional and mental health rating is same. Patients states they  are never, rarely angry. Patient states they are never, rarely unusually tired/fatigued. Pain experienced in the last 7 days has been none. Patient states that he has experienced no weight loss or gain in last 6 months.     Depression Screening:   PHQ-2 Score: 0      Fall Risk Screening:   In the past year, patient has experienced: history of falling in past year    Number of falls: 2 or more  Injured during fall?: No    Feels unsteady when standing or walking?: No    Worried about falling?: No      Home Safety:  Patient does not have trouble with stairs inside or outside of their home. Patient has working smoke alarms and has working carbon monoxide detector. Home safety hazards include: none.     Nutrition:   Current diet is Diabetic.     Medications:   Patient is currently taking over-the-counter supplements. OTC medications include: see medication list. Patient is able to manage medications.     Activities of Daily Living (ADLs)/Instrumental Activities of Daily Living (IADLs):   Walk and transfer into and out of bed and chair?: Yes  Dress and groom yourself?: Yes    Bathe or shower yourself?: Yes    Feed yourself? Yes  Do your laundry/housekeeping?: Yes  Manage your money, pay your bills and track your expenses?: Yes  Make your own meals?: Yes    Do your own shopping?: Yes    Previous Hospitalizations:   Any hospitalizations or ED visits within the last 12 months?: Yes    How many hospitalizations have you had in the last year?: 1-2    Advance Care Planning:   Living will: Yes    Durable POA for healthcare: Yes    Advanced directive: Yes    Provider agrees with end of life decisions: Yes      Cognitive Screening:   Provider or family/friend/caregiver concerned regarding cognition?: No    PREVENTIVE SCREENINGS      Cardiovascular Screening:    General: Screening Not Indicated and History Lipid Disorder      Diabetes Screening:     General: Screening Not Indicated and History Diabetes      Colorectal Cancer  Screening:     General: Screening Current      Prostate Cancer Screening:      Due for: PSA      Osteoporosis Screening:    General: Screening Not Indicated      Abdominal Aortic Aneurysm (AAA) Screening:    Risk factors include: age between 65-74 yo and tobacco use        General: Patient Declines      Lung Cancer Screening:     General: Screening Not Indicated      Hepatitis C Screening:    General: Screening Current    Screening, Brief Intervention, and Referral to Treatment (SBIRT)    Screening  Typical number of drinks in a day: 0  Typical number of drinks in a week: 3  Interpretation: Low risk drinking behavior.    Single Item Drug Screening:  How often have you used an illegal drug (including marijuana) or a prescription medication for non-medical reasons in the past year? never    Single Item Drug Screen Score: 0  Interpretation: Negative screen for possible drug use disorder    Brief Intervention  Alcohol & drug use screenings were reviewed. No concerns regarding substance use disorder identified.     Social Drivers of Health     Financial Resource Strain: Medium Risk (4/25/2023)    Overall Financial Resource Strain (CARDIA)     Difficulty of Paying Living Expenses: Somewhat hard   Food Insecurity: No Food Insecurity (11/25/2024)    Hunger Vital Sign     Worried About Running Out of Food in the Last Year: Never true     Ran Out of Food in the Last Year: Never true   Transportation Needs: No Transportation Needs (11/25/2024)    PRAPARE - Transportation     Lack of Transportation (Medical): No     Lack of Transportation (Non-Medical): No   Housing Stability: Low Risk  (11/25/2024)    Housing Stability Vital Sign     Unable to Pay for Housing in the Last Year: No     Number of Times Moved in the Last Year: 1     Homeless in the Last Year: No   Utilities: Not At Risk (11/25/2024)    [facility] Utilities     Threatened with loss of utilities: No     No results found.    Objective   /64 (BP Location: Left arm,  "Patient Position: Sitting, Cuff Size: Large)   Pulse 87   Temp 98.2 °F (36.8 °C)   Ht 5' 10\" (1.778 m)   Wt 102 kg (224 lb 6.4 oz)   SpO2 98%   BMI 32.20 kg/m²     Physical Exam  Vitals and nursing note reviewed.   Constitutional:       General: He is not in acute distress.     Appearance: He is well-developed.   HENT:      Head: Normocephalic and atraumatic.      Nose: Nose normal.   Eyes:      Conjunctiva/sclera: Conjunctivae normal.      Pupils: Pupils are equal, round, and reactive to light.   Cardiovascular:      Rate and Rhythm: Normal rate and regular rhythm.      Heart sounds: Normal heart sounds. No murmur heard.     No friction rub. No gallop.   Pulmonary:      Breath sounds: No wheezing or rales.   Chest:      Chest wall: No tenderness.   Abdominal:      General: There is no distension.      Tenderness: There is no abdominal tenderness. There is no guarding or rebound.   Musculoskeletal:      Comments: Right BKA   Lymphadenopathy:      Cervical: No cervical adenopathy.   Skin:     General: Skin is warm and dry.   Neurological:      Mental Status: He is alert and oriented to person, place, and time.   Psychiatric:         Mood and Affect: Mood and affect normal.         Speech: Speech normal.         Behavior: Behavior normal. Behavior is cooperative.         Cognition and Memory: Cognition and memory normal.         "

## 2024-11-26 NOTE — ASSESSMENT & PLAN NOTE
Be very careful to avoid falls.  Check foot daily for any open areas.  Lab Results   Component Value Date    HGBA1C 7.9 (H) 08/07/2024       Orders:    CBC and differential; Future    Comprehensive metabolic panel; Future    Hemoglobin A1C; Future

## 2024-11-26 NOTE — ASSESSMENT & PLAN NOTE
Continue with vitamin D supplementation.  Slip given for repeat laboratory testing.  Orders:    CBC and differential; Future    Comprehensive metabolic panel; Future    Vitamin D 25 hydroxy; Future

## 2024-11-26 NOTE — ASSESSMENT & PLAN NOTE
Continue to follow-up with podiatry.  Check left foot daily.  Orders:    CBC and differential; Future    Comprehensive metabolic panel; Future

## 2024-11-26 NOTE — ASSESSMENT & PLAN NOTE
Blood pressure controlled.  Continue with the lisinopril.  Watch salt intake.  Stay adequately hydrated.  Orders:    CBC and differential; Future    Comprehensive metabolic panel; Future

## 2024-11-26 NOTE — ASSESSMENT & PLAN NOTE
Reviewed previous laboratory testing with him.  Did not have repeat laboratory testing done yet.  Discussed with him having this done prior to his appointment with endocrinology in the near future.  Discussed home blood sugars with him.  They remain significantly elevated usually above 200.  Discussed watching diet more closely.  Discussed potentially increasing the dose of the Basaglar but he plans to wait to discuss this with endocrinology.  Discussed importance of good blood sugar control to prevent further wounds on the left foot.  Follow-up with ophthalmology regularly.  Continue to follow-up with podiatry.  Lab Results   Component Value Date    HGBA1C 7.9 (H) 08/07/2024       Orders:    CBC and differential; Future    Comprehensive metabolic panel; Future    Hemoglobin A1C; Future

## 2024-11-26 NOTE — ASSESSMENT & PLAN NOTE
Continue with the atorvastatin.  Watch diet.  Try to increase fiber in diet.  Orders:    CBC and differential; Future    Comprehensive metabolic panel; Future    Lipid panel; Future    TSH, 3rd generation; Future

## 2024-12-03 ENCOUNTER — TELEPHONE (OUTPATIENT)
Dept: ENDOCRINOLOGY | Facility: CLINIC | Age: 64
End: 2024-12-03

## 2025-01-16 ENCOUNTER — TELEPHONE (OUTPATIENT)
Dept: ENDOCRINOLOGY | Facility: CLINIC | Age: 65
End: 2025-01-16

## 2025-03-26 ENCOUNTER — TELEPHONE (OUTPATIENT)
Dept: ENDOCRINOLOGY | Facility: CLINIC | Age: 65
End: 2025-03-26

## 2025-03-27 ENCOUNTER — OFFICE VISIT (OUTPATIENT)
Dept: INTERNAL MEDICINE CLINIC | Facility: CLINIC | Age: 65
End: 2025-03-27
Payer: COMMERCIAL

## 2025-03-27 VITALS
OXYGEN SATURATION: 96 % | BODY MASS INDEX: 27.24 KG/M2 | WEIGHT: 190.3 LBS | HEART RATE: 115 BPM | TEMPERATURE: 97.9 F | HEIGHT: 70 IN

## 2025-03-27 DIAGNOSIS — R63.4 EXCESSIVE WEIGHT LOSS: ICD-10-CM

## 2025-03-27 DIAGNOSIS — L97.509 TYPE 2 DIABETES MELLITUS WITH FOOT ULCER, WITH LONG-TERM CURRENT USE OF INSULIN (HCC): ICD-10-CM

## 2025-03-27 DIAGNOSIS — Z79.4 TYPE 2 DIABETES MELLITUS WITH HYPERGLYCEMIA, WITH LONG-TERM CURRENT USE OF INSULIN (HCC): Primary | ICD-10-CM

## 2025-03-27 DIAGNOSIS — R22.1 NECK MASS: ICD-10-CM

## 2025-03-27 DIAGNOSIS — E11.43 DIABETIC AUTONOMIC NEUROPATHY ASSOCIATED WITH TYPE 2 DIABETES MELLITUS (HCC): ICD-10-CM

## 2025-03-27 DIAGNOSIS — R35.89 POLYURIA: ICD-10-CM

## 2025-03-27 DIAGNOSIS — Z89.511 HX OF RIGHT BKA (HCC): ICD-10-CM

## 2025-03-27 DIAGNOSIS — I10 PRIMARY HYPERTENSION: ICD-10-CM

## 2025-03-27 DIAGNOSIS — E11.42 TYPE 2 DIABETES MELLITUS WITH DIABETIC POLYNEUROPATHY, UNSPECIFIED WHETHER LONG TERM INSULIN USE (HCC): ICD-10-CM

## 2025-03-27 DIAGNOSIS — E11.621 TYPE 2 DIABETES MELLITUS WITH FOOT ULCER, WITH LONG-TERM CURRENT USE OF INSULIN (HCC): ICD-10-CM

## 2025-03-27 DIAGNOSIS — E11.65 TYPE 2 DIABETES MELLITUS WITH HYPERGLYCEMIA, WITH LONG-TERM CURRENT USE OF INSULIN (HCC): Primary | ICD-10-CM

## 2025-03-27 DIAGNOSIS — Z79.4 TYPE 2 DIABETES MELLITUS WITH FOOT ULCER, WITH LONG-TERM CURRENT USE OF INSULIN (HCC): ICD-10-CM

## 2025-03-27 PROBLEM — T81.89XA PROBLEM INVOLVING SURGICAL INCISION: Status: RESOLVED | Noted: 2024-10-01 | Resolved: 2025-03-27

## 2025-03-27 PROBLEM — L03.116 CELLULITIS OF LEFT LOWER EXTREMITY: Status: RESOLVED | Noted: 2024-04-24 | Resolved: 2025-03-27

## 2025-03-27 PROBLEM — B87.9 MAGGOT INFESTATION: Status: RESOLVED | Noted: 2024-07-08 | Resolved: 2025-03-27

## 2025-03-27 PROBLEM — M86.172 ACUTE OSTEOMYELITIS OF METATARSAL BONE OF LEFT FOOT (HCC): Status: RESOLVED | Noted: 2017-01-30 | Resolved: 2025-03-27

## 2025-03-27 LAB — GLUCOSE SERPL-MCNC: 306 MG/DL (ref 65–140)

## 2025-03-27 PROCEDURE — 83036 HEMOGLOBIN GLYCOSYLATED A1C: CPT | Performed by: FAMILY MEDICINE

## 2025-03-27 PROCEDURE — 99214 OFFICE O/P EST MOD 30 MIN: CPT | Performed by: FAMILY MEDICINE

## 2025-03-27 PROCEDURE — G2211 COMPLEX E/M VISIT ADD ON: HCPCS | Performed by: FAMILY MEDICINE

## 2025-03-27 RX ORDER — INSULIN GLARGINE 100 [IU]/ML
INJECTION, SOLUTION SUBCUTANEOUS
Qty: 15 ML | Refills: 0 | Status: SHIPPED | OUTPATIENT
Start: 2025-03-27

## 2025-03-28 PROBLEM — N18.5 CHRONIC RENAL DISEASE, STAGE V (HCC): Status: RESOLVED | Noted: 2024-09-06 | Resolved: 2025-03-28

## 2025-03-28 NOTE — ASSESSMENT & PLAN NOTE
Importance of better blood sugar control discussed.  Especially with the history of the ulcerations on the left lower leg.

## 2025-03-28 NOTE — ASSESSMENT & PLAN NOTE
He has had an excessive weight loss.  Has lost over 30 pounds.  He denies any symptoms of this, however.  Possibly related to the significantly elevated blood sugars.  Attempted to get a urine specimen today to check for ketones but he could not provide this.  Discussed with him the importance of getting his routine laboratory testing.  Will have him follow-up in about 3 to 4 weeks or sooner if needed.

## 2025-03-28 NOTE — ASSESSMENT & PLAN NOTE
See above.  Lab Results   Component Value Date    HGBA1C 7.9 (H) 08/07/2024       Orders:  •  Fingerstick Glucose (POCT)  •  POCT hemoglobin A1c  •  UA (URINE) with reflex to Scope; Future

## 2025-03-28 NOTE — ASSESSMENT & PLAN NOTE
There is a palpable lump in the left side of his neck.  This is concerning especially with the recent significant weight loss.  Will check a CT scan for further investigation.  Orders:  •  CT soft tissue neck wo contrast; Future

## 2025-03-28 NOTE — ASSESSMENT & PLAN NOTE
Continue to follow-up with podiatry.  Check left foot daily.  Watch for any open areas.  Lab Results   Component Value Date    HGBA1C 7.9 (H) 08/07/2024

## 2025-03-28 NOTE — PROGRESS NOTES
Name: Art Joseph      : 1960      MRN: [id number]  Encounter Provider: Kerry Christine MD  Encounter Date: 3/27/2025   Encounter department: [facility] [place]  :  Assessment & Plan  Type 2 diabetes mellitus with diabetic polyneuropathy, unspecified whether long term insulin use (HCC)  Hemoglobin A1c completed in the office today and was over 11.  Discussed with him the importance of better blood sugar control.  He is following up with endocrinology in the near future.  Discussed with him increasing his Lantus to 40 units.  Likely this will not be adequate.  Discussed using continuous glucose monitor to help monitor his blood sugars more regularly.  Discussed his diet with him.  His diet is relatively poor.  Discussed watching carbohydrate intake.  Try to increase activity level.  Continue to follow-up with podiatry regularly.  Watch for any open areas on the left foot.  Lab Results   Component Value Date    HGBA1C 7.9 (H) 2024       Orders:  •  Insulin Glargine Solostar (Basaglar KwikPen) 100 UNIT/ML SOPN; E11.65 inject 40 units daily at bedtime    Type 2 diabetes mellitus with hyperglycemia, with long-term current use of insulin (HCC)  See above.  Lab Results   Component Value Date    HGBA1C 7.9 (H) 2024       Orders:  •  Fingerstick Glucose (POCT)  •  POCT hemoglobin A1c  •  UA (URINE) with reflex to Scope; Future    Type 2 diabetes mellitus with foot ulcer, with long-term current use of insulin (HCC)  Continue to follow-up with podiatry.  Check left foot daily.  Watch for any open areas.  Lab Results   Component Value Date    HGBA1C 7.9 (H) 2024            Diabetic autonomic neuropathy associated with type 2 diabetes mellitus (HCC)  Neuropathy symptoms discussed.  Make sure to check left foot daily.  Importance of better blood sugar control discussed.  Lab Results   Component Value Date    HGBA1C 7.9 (H) 2024            Excessive weight loss  He has had an  excessive weight loss.  Has lost over 30 pounds.  He denies any symptoms of this, however.  Possibly related to the significantly elevated blood sugars.  Attempted to get a urine specimen today to check for ketones but he could not provide this.  Discussed with him the importance of getting his routine laboratory testing.  Will have him follow-up in about 3 to 4 weeks or sooner if needed.       Neck mass  There is a palpable lump in the left side of his neck.  This is concerning especially with the recent significant weight loss.  Will check a CT scan for further investigation.  Orders:  •  CT soft tissue neck wo contrast; Future    Primary hypertension  Blood pressure currently controlled.  Continue with the lisinopril.  Watch salt intake.  Stay adequately hydrated.       Polyuria  Has been urinating frequently.  Likely related to significantly elevated blood sugars.  Will check urine specimen as noted.  Orders:  •  UA (URINE) with reflex to Scope; Future  •  Urine culture; Future    Hx of right BKA (HCC)  Importance of better blood sugar control discussed.  Especially with the history of the ulcerations on the left lower leg.             Falls Plan of Care: balance, strength, and gait training instructions were provided. Recommended assistive device to help with gait and balance. Home safety education provided.       History of Present Illness   He presents for follow-up.  He states that he has been doing generally well although appears less steady and more shaky today.  He states that he has been taking his insulin regularly.  Has been taking 30 units of the Lantus and having rapid acting insulin with his meals.  States that his blood sugar this morning was 189.  Denies any hypoglycemic episodes.  He states that his blood sugars are usually below 250.  He has been following up with podiatry regularly.  Denies any open areas on the left foot.  Has been checking this daily.  Has lost a significant amount of weight.  " Denies any significant changes in his eating habits, however.  States that he has adequate food.  Does not eat as healthy as he should.  Denies any fevers or chills.  Denies any abdominal pain.  Denies any changes in bowel movements.  Denies any significant shortness of breath.  No chest pain or palpitations.  Usually sleeps relatively well.      Review of Systems   Constitutional:  Negative for activity change, appetite change, chills and fever.   HENT:  Negative for hearing loss.    Eyes:  Negative for pain and visual disturbance.   Respiratory:  Negative for chest tightness and shortness of breath.    Cardiovascular:  Negative for chest pain and palpitations.   Gastrointestinal:  Negative for abdominal pain, blood in stool, diarrhea, nausea and vomiting.   Endocrine: Negative for polydipsia, polyphagia and polyuria.   Genitourinary:  Negative for dysuria.   Musculoskeletal:  Positive for gait problem. Negative for arthralgias.   Skin:  Negative for color change.   Neurological:  Negative for dizziness and headaches.   Hematological:  Does not bruise/bleed easily.   Psychiatric/Behavioral:  Negative for behavioral problems. The patient is not nervous/anxious.        Objective   Pulse (!) 115   Temp 97.9 °F (36.6 °C)   Ht 5' 10\" (1.778 m)   Wt 86.3 kg (190 lb 4.8 oz)   SpO2 96%   BMI 27.31 kg/m²      Physical Exam  Vitals and nursing note reviewed.   Constitutional:       General: He is not in acute distress.     Appearance: He is well-developed.   HENT:      Head: Normocephalic and atraumatic.      Nose: Nose normal.   Eyes:      Conjunctiva/sclera: Conjunctivae normal.      Pupils: Pupils are equal, round, and reactive to light.   Neck:      Comments: Palpable lump in the left side of the neck; nonpulsatile  Cardiovascular:      Rate and Rhythm: Normal rate and regular rhythm.      Heart sounds: Normal heart sounds. No murmur heard.     No friction rub. No gallop.   Pulmonary:      Breath sounds: No " wheezing or rales.   Chest:      Chest wall: No tenderness.   Abdominal:      General: There is no distension.      Tenderness: There is no abdominal tenderness. There is no guarding or rebound.   Musculoskeletal:      Comments: Right BKA   Lymphadenopathy:      Cervical: No cervical adenopathy.   Skin:     General: Skin is warm and dry.   Neurological:      Mental Status: He is alert and oriented to person, place, and time.      Motor: Tremor present.   Psychiatric:         Mood and Affect: Mood and affect normal.         Speech: Speech normal.         Behavior: Behavior normal. Behavior is cooperative.         Cognition and Memory: Cognition and memory normal.

## 2025-03-28 NOTE — ASSESSMENT & PLAN NOTE
Blood pressure currently controlled.  Continue with the lisinopril.  Watch salt intake.  Stay adequately hydrated.

## 2025-03-28 NOTE — ASSESSMENT & PLAN NOTE
Neuropathy symptoms discussed.  Make sure to check left foot daily.  Importance of better blood sugar control discussed.  Lab Results   Component Value Date    HGBA1C 7.9 (H) 08/07/2024

## 2025-03-28 NOTE — ASSESSMENT & PLAN NOTE
Has been urinating frequently.  Likely related to significantly elevated blood sugars.  Will check urine specimen as noted.  Orders:  •  UA (URINE) with reflex to Scope; Future  •  Urine culture; Future

## 2025-04-01 LAB — SL AMB POCT HEMOGLOBIN AIC: 11.5 (ref ?–6.5)

## 2025-04-10 ENCOUNTER — HOSPITAL ENCOUNTER (OUTPATIENT)
Dept: CT IMAGING | Facility: HOSPITAL | Age: 65
Discharge: HOME | End: 2025-04-10
Payer: COMMERCIAL

## 2025-04-10 DIAGNOSIS — R22.1 NECK MASS: ICD-10-CM

## 2025-04-10 PROCEDURE — 70490 CT SOFT TISSUE NECK W/O DYE: CPT

## 2025-04-16 ENCOUNTER — RESULTS FOLLOW-UP (OUTPATIENT)
Dept: INTERNAL MEDICINE CLINIC | Facility: CLINIC | Age: 65
End: 2025-04-16

## 2025-05-02 DIAGNOSIS — E11.42 TYPE 2 DIABETES MELLITUS WITH DIABETIC POLYNEUROPATHY, UNSPECIFIED WHETHER LONG TERM INSULIN USE (HCC): ICD-10-CM

## 2025-05-02 RX ORDER — INSULIN GLARGINE-YFGN 100 [IU]/ML
INJECTION, SOLUTION SUBCUTANEOUS
Qty: 45 ML | Refills: 1 | Status: SHIPPED | OUTPATIENT
Start: 2025-05-02

## 2025-05-08 ENCOUNTER — OFFICE VISIT (OUTPATIENT)
Dept: ENDOCRINOLOGY | Facility: CLINIC | Age: 65
End: 2025-05-08
Payer: COMMERCIAL

## 2025-05-08 VITALS
OXYGEN SATURATION: 95 % | WEIGHT: 198 LBS | TEMPERATURE: 97 F | HEIGHT: 70 IN | DIASTOLIC BLOOD PRESSURE: 68 MMHG | HEART RATE: 75 BPM | BODY MASS INDEX: 28.35 KG/M2 | SYSTOLIC BLOOD PRESSURE: 124 MMHG

## 2025-05-08 DIAGNOSIS — E11.43 DIABETIC AUTONOMIC NEUROPATHY ASSOCIATED WITH TYPE 2 DIABETES MELLITUS (HCC): ICD-10-CM

## 2025-05-08 DIAGNOSIS — E11.42 TYPE 2 DIABETES MELLITUS WITH DIABETIC POLYNEUROPATHY, UNSPECIFIED WHETHER LONG TERM INSULIN USE (HCC): ICD-10-CM

## 2025-05-08 DIAGNOSIS — I10 PRIMARY HYPERTENSION: ICD-10-CM

## 2025-05-08 DIAGNOSIS — Z79.4 TYPE 2 DIABETES MELLITUS WITH HYPERGLYCEMIA, WITH LONG-TERM CURRENT USE OF INSULIN (HCC): Primary | ICD-10-CM

## 2025-05-08 DIAGNOSIS — E11.65 TYPE 2 DIABETES MELLITUS WITH HYPERGLYCEMIA, WITH LONG-TERM CURRENT USE OF INSULIN (HCC): Primary | ICD-10-CM

## 2025-05-08 DIAGNOSIS — E78.00 HYPERCHOLESTEREMIA: ICD-10-CM

## 2025-05-08 PROCEDURE — 99214 OFFICE O/P EST MOD 30 MIN: CPT | Performed by: STUDENT IN AN ORGANIZED HEALTH CARE EDUCATION/TRAINING PROGRAM

## 2025-05-08 RX ORDER — INSULIN ASPART 100 [IU]/ML
INJECTION, SOLUTION INTRAVENOUS; SUBCUTANEOUS
Qty: 30 ML | Refills: 3 | Status: SHIPPED | OUTPATIENT
Start: 2025-05-08

## 2025-05-08 RX ORDER — INSULIN GLARGINE 100 [IU]/ML
INJECTION, SOLUTION SUBCUTANEOUS
COMMUNITY
Start: 2025-05-02

## 2025-05-08 NOTE — ASSESSMENT & PLAN NOTE
Blood glucose levels are being monitored twice daily, with recent readings around 150 and mid-200s, per patient. No actionable CBG data available. A1c levels have increased over the past few months. Discussed the use of Lantus and NovoLog, with specific dosages for breakfast, lunch, and supper. Provided a sliding scale for NovoLog to adjust doses based on pre-meal blood glucose levels. Discussed CGM and referred to CDE for assistance with supporting its ongoing use.     Follow-up: The patient will follow up in a few months.    Lab Results   Component Value Date    HGBA1C 11.5 (A) 04/01/2025       Orders:  •  Ambulatory referral to Diabetic Education; Future  •  Comprehensive metabolic panel; Future  •  Hemoglobin A1C; Future  •  Lipid Panel with Direct LDL reflex; Future

## 2025-05-08 NOTE — ASSESSMENT & PLAN NOTE
Lab Results   Component Value Date    HGBA1C 11.5 (A) 04/01/2025     Orders:  •  Ambulatory Referral to Ophthalmology; Future

## 2025-05-08 NOTE — PATIENT INSTRUCTIONS
Blood Glucose               Additional Insulin Units                             150-200                               2  201-250                               4  251-300                               6  301-350                               8  351-400                               10  >400                                    12

## 2025-05-08 NOTE — PROGRESS NOTES
Name: Art Joseph      : 1960      MRN: [id number]  Encounter Provider: Torsten Christina DO  Encounter Date: 2025   Encounter department: [facility]    No chief complaint on file.  :  Assessment & Plan  Type 2 diabetes mellitus with hyperglycemia, with long-term current use of insulin (HCC)  Blood glucose levels are being monitored twice daily, with recent readings around 150 and mid-200s, per patient. No actionable CBG data available. A1c levels have increased over the past few months. Discussed the use of Lantus and NovoLog, with specific dosages for breakfast, lunch, and supper. Provided a sliding scale for NovoLog to adjust doses based on pre-meal blood glucose levels. Discussed CGM and referred to CDE for assistance with supporting its ongoing use.     Follow-up: The patient will follow up in a few months.    Lab Results   Component Value Date    HGBA1C 11.5 (A) 2025       Orders:  •  Ambulatory referral to Diabetic Education; Future  •  Comprehensive metabolic panel; Future  •  Hemoglobin A1C; Future  •  Lipid Panel with Direct LDL reflex; Future    Hypercholesteremia         Primary hypertension         Type 2 diabetes mellitus with diabetic polyneuropathy, unspecified whether long term insulin use (HCC)    Lab Results   Component Value Date    HGBA1C 11.5 (A) 2025     Orders:  •  insulin aspart (NovoLOG FlexPen) 100 UNIT/ML injection pen; E11.65 inject 17 units before meals. TDD 75 units    Diabetic autonomic neuropathy associated with type 2 diabetes mellitus (HCC)    Lab Results   Component Value Date    HGBA1C 11.5 (A) 2025     Orders:  •  Ambulatory Referral to Ophthalmology; Future        History of Present Illness   History of Present Illness  The patient is a 65-year-old male here today for a follow-up of type 2 diabetes, which is chronic, longstanding, and complicated by diabetic peripheral neuropathy with a history of  "right below-knee amputation (BKA). He is on chronic insulin.    He reports no new health issues. His blood glucose level was 150 this morning, which he considers satisfactory. He acknowledges a recent increase in his A1c levels but does not report any specific health concerns during that period. He admits to a lack of physical activity recently but expresses a desire to incorporate daily walks into his routine.    He monitors his blood glucose levels twice daily, aiming to maintain them in the mid-200s. His current regimen includes Lantus and NovoLog, with doses of 17 units at breakfast and lunch, and 40 units at night. He also uses a Jass 2 glucose monitor but reports difficulty in keeping it adhered to his skin.    He has undergone an eye examination this year, which did not necessitate a new prescription.    Past Surgical History: The patient has a history of right below-knee amputation (BKA).      Last Eye Exam: 04/03/2023  Last Foot Exam: 10/01/2024  Health Maintenance   Topic Date Due   • Diabetic Eye Exam  04/03/2025     Pertinent Medical History         Review of Systems as per [facility]         Medical History Reviewed by provider this encounter:  Tobacco  Allergies  Meds  Problems  Med Hx  Surg Hx  Fam Hx     .    Objective   /68 (BP Location: Left arm, Patient Position: Sitting)   Pulse 75   Temp (!) 97 °F (36.1 °C)   Ht 5' 10\" (1.778 m)   Wt 89.8 kg (198 lb)   SpO2 95%   BMI 28.41 kg/m²      Body mass index is 28.41 kg/m².  Wt Readings from Last 3 Encounters:   05/08/25 89.8 kg (198 lb)   03/27/25 86.3 kg (190 lb 4.8 oz)   11/25/24 102 kg (224 lb 6.4 oz)     Physical Exam  Patient appears well and in no acute distress.  Physical Exam  Vitals reviewed.   Constitutional:       General: He is not in acute distress.     Appearance: Normal appearance.   HENT:      Head: Normocephalic and atraumatic.      Nose: Nose normal.   Eyes:      General: No scleral icterus.     Conjunctiva/sclera: " Conjunctivae normal.   Pulmonary:      Effort: Pulmonary effort is normal. No respiratory distress.   Musculoskeletal:         General: Deformity (Right BKA) present.      Cervical back: Normal range of motion.   Skin:     General: Skin is warm and dry.   Neurological:      Mental Status: He is alert.   Psychiatric:         Mood and Affect: Mood normal.         Behavior: Behavior normal.       Results    Labs:   Lab Results   Component Value Date    HGBA1C 11.5 (A) 04/01/2025    HGBA1C 7.9 (H) 08/07/2024    HGBA1C 9.7 (H) 06/25/2024     Lab Results   Component Value Date    CREATININE 0.8 09/23/2024    CREATININE 0.8 08/21/2024    CREATININE 0.71 08/15/2024    BUN 11 09/23/2024     01/06/2014    K 5.2 (H) 09/23/2024     09/23/2024    CO2 28 09/23/2024     EGFR   Date Value Ref Range Status   09/23/2024 >90 >=60 mL/min Final     Comment:     eGFR is calculated based on the CKD-EPI 2021 equation.     Lab Results   Component Value Date    HDL 33 (L) 10/27/2023    TRIG 187 (H) 10/27/2023     Lab Results   Component Value Date    ALT 15 08/12/2024    AST 13 08/12/2024    ALKPHOS 102 08/12/2024     Lab Results   Component Value Date    EGF4NWBIVNKQ 1.653 10/27/2023    VDT2WEIZWZJV 1.457 07/21/2023    GZO9RHTLJQNM 0.936 04/24/2023       Patient Instructions       Blood Glucose               Additional Insulin Units                             150-200                               2  201-250                               4  251-300                               6  301-350                               8  351-400                               10  >400                                    12    Discussed with the patient and all questioned fully answered. He will call me if any problems arise.

## 2025-05-09 ENCOUNTER — TELEPHONE (OUTPATIENT)
Age: 65
End: 2025-05-09

## 2025-05-09 NOTE — TELEPHONE ENCOUNTER
Patient called in asking of Dr. Bazzi knows of any more affordable options instead of   Lantus SoloStar  ? Please advise. Thank you!     Art: [phone]

## 2025-05-12 ENCOUNTER — TELEPHONE (OUTPATIENT)
Age: 65
End: 2025-05-12

## 2025-05-12 NOTE — TELEPHONE ENCOUNTER
I called patient but could not leave a message because his mailbox is full.    I want to know how much he pays for the Lantus.

## 2025-05-12 NOTE — TELEPHONE ENCOUNTER
Patient called stating the lantus is $75. He wants to know if there is something cheaper? I informed him to call his insurance company to see what would be covered for him, but he doesn't want to do so.  Please advise.

## 2025-05-12 NOTE — TELEPHONE ENCOUNTER
Spoke to patient he stated the pharmacy told him $ 75 , called pharmacy and left them message to call office to let us know if there is cheaper alternative to Lantus

## 2025-05-14 NOTE — TELEPHONE ENCOUNTER
Spoke to pharmacist he stated script was for 90 days for $75 , they changed to 30 day for $25 tried to reach patient unsuccessful in case he calls back script is at pharmacy to be picked up

## 2025-06-18 ENCOUNTER — RA CDI HCC (OUTPATIENT)
Dept: OTHER | Facility: HOSPITAL | Age: 65
End: 2025-06-18

## 2025-06-19 NOTE — PROGRESS NOTES
HCC coding opportunities    E11.51     Chart Reviewed number of suggestions sent to Provider: 1     Patients Insurance     Medicare Insurance: [employer]

## 2025-06-23 ENCOUNTER — TELEPHONE (OUTPATIENT)
Dept: OTHER | Facility: OTHER | Age: 65
End: 2025-06-23

## 2025-06-24 NOTE — TELEPHONE ENCOUNTER
Patient is calling regarding cancelling an appointment.    Date/Time: 6/24/25 0300 pm     Patient was rescheduled: YES [] NO [x]    Patient requesting call back to reschedule: YES [x] NO []    Unable top reschedule pt due to blocks, please call pt to reschedule when office reopens. Thank you

## (undated) DEVICE — CURITY NON-ADHERENT STRIPS: Brand: CURITY

## (undated) DEVICE — NEEDLE 18 G X 1 1/2 SAFETY

## (undated) DEVICE — LUBRICANT SURGILUBE TUBE 4 OZ  FLIP TOP

## (undated) DEVICE — TUBING SUCTION 5MM X 12 FT

## (undated) DEVICE — CURITY STRETCH BANDAGE: Brand: CURITY

## (undated) DEVICE — ABDOMINAL PAD: Brand: DERMACEA

## (undated) DEVICE — POV-IOD SOLUTION 4OZ BT

## (undated) DEVICE — 10FR FRAZIER SUCTION HANDLE: Brand: CARDINAL HEALTH

## (undated) DEVICE — PLUMEPEN PRO 10FT

## (undated) DEVICE — GLOVE SRG BIOGEL 8

## (undated) DEVICE — GAUZE SPONGES,16 PLY: Brand: CURITY

## (undated) DEVICE — SPONGE PVP SCRUB WING STERILE

## (undated) DEVICE — MAYO STAND COVER: Brand: CONVERTORS

## (undated) DEVICE — COBAN 4 IN STERILE

## (undated) DEVICE — BLADE SAGITTAL 25.6 X 9.5MM

## (undated) DEVICE — GLOVE INDICATOR PI UNDERGLOVE SZ 7 BLUE

## (undated) DEVICE — KNEE IMMOBILIZER: Brand: DEROYAL

## (undated) DEVICE — MEDI-VAC YANKAUER SUCTION HANDLE W/STRAIGHT TIP & CONTROL VENT: Brand: CARDINAL HEALTH

## (undated) DEVICE — SUT SILK 0 SH 30 IN K834H

## (undated) DEVICE — WET SKIN PREP TRAY: Brand: MEDLINE INDUSTRIES, INC.

## (undated) DEVICE — CULTURE TUBE ANAEROBIC

## (undated) DEVICE — KERLIX BANDAGE ROLL: Brand: KERLIX

## (undated) DEVICE — CULTURE TUBE AEROBIC

## (undated) DEVICE — SINGLE PORT MANIFOLD: Brand: NEPTUNE 2

## (undated) DEVICE — INTENDED FOR TISSUE SEPARATION, AND OTHER PROCEDURES THAT REQUIRE A SHARP SURGICAL BLADE TO PUNCTURE OR CUT.: Brand: BARD-PARKER ® CARBON RIB-BACK BLADES

## (undated) DEVICE — BULB SYRINGE,IRRIGATION WITH PROTECTIVE CAP: Brand: DOVER

## (undated) DEVICE — SPECIMEN CONTAINER STERILE PEEL PACK

## (undated) DEVICE — NEEDLE 25G X 1 1/2

## (undated) DEVICE — PADDING CAST 4 IN  COTTON STRL

## (undated) DEVICE — BETHLEHEM UNIVERSAL  MIONR EXT: Brand: CARDINAL HEALTH

## (undated) DEVICE — SUT ETHILON 2-0 FS 18 IN 664H

## (undated) DEVICE — ACE WRAP 4 IN STERILE

## (undated) DEVICE — SUT SILK 0 30 IN A306H

## (undated) DEVICE — SUT VICRYL 3-0 REEL 54 IN J285G

## (undated) DEVICE — DRESSING XEROFORM 5 X 9

## (undated) DEVICE — INTENDED FOR TISSUE SEPARATION, AND OTHER PROCEDURES THAT REQUIRE A SHARP SURGICAL BLADE TO PUNCTURE OR CUT.: Brand: BARD-PARKER SAFETY BLADES SIZE 10, STERILE

## (undated) DEVICE — SUT VICRYL 2-0 SH 27 IN UNDYED J417H

## (undated) DEVICE — REM POLYHESIVE ADULT PATIENT RETURN ELECTRODE: Brand: VALLEYLAB

## (undated) DEVICE — ASTOUND IMPERVIOUS SURGICAL GOWN: Brand: CONVERTORS

## (undated) DEVICE — ACE WRAP 4 IN UNSTERILE

## (undated) DEVICE — ACE WRAP 6 IN UNSTERILE

## (undated) DEVICE — CYSTO TUBING SINGLE IRRIGATION

## (undated) DEVICE — THE SIMPULSE SOLO SYSTEM WITH ULTREX RETRACTABLE SPLASH SHIELD TIP: Brand: SIMPULSE SOLO

## (undated) DEVICE — ZIMMER® STERILE DISPOSABLE TOURNIQUET CUFF, DUAL PORT, SINGLE BLADDER, 18 IN. (46 CM)

## (undated) DEVICE — SUT VICRYL 0 CT-1 27 IN J260H

## (undated) DEVICE — NEPTUNE E-SEP SMOKE EVACUATION PENCIL, COATED, 70MM BLADE, PUSH BUTTON SWITCH: Brand: NEPTUNE E-SEP

## (undated) DEVICE — STOCKINETTE REGULAR

## (undated) DEVICE — PAD CAST 4 IN COTTON NON STERILE

## (undated) DEVICE — 2000CC GUARDIAN II: Brand: GUARDIAN

## (undated) DEVICE — 3000CC GUARDIAN II: Brand: GUARDIAN

## (undated) DEVICE — UNIVERSAL MAJOR EXTREMITY,KIT: Brand: CARDINAL HEALTH

## (undated) DEVICE — GLOVE INDICATOR PI UNDERGLOVE SZ 7.5 BLUE

## (undated) DEVICE — STOCKINETTE IMPERVIOUS LG

## (undated) DEVICE — GLOVE SRG LF STRL BGL SKNSNS 7 PF

## (undated) DEVICE — THE LARIAT SNARE IS AN ELECTROSURGICAL DEVICE USED TO ENDOSCOPICALLY GRASP, DISSECT, AND TRANSECT TISSUE DURING GASTROINTESTINAL (GI) ENDOSCOPIC PROCEDURES.  THE SNARE CAN BE USED WITH OR WITHOUT THE USE OF MONOPOLAR DIATHERMIC ENERGY.: Brand: LARIAT

## (undated) DEVICE — 3M™ DURAPORE™ SURGICAL TAPE 1538-3, 3 INCH X 10 YARD (7,5CM X 9,1M), 4 ROLLS/BOX: Brand: 3M™ DURAPORE™

## (undated) DEVICE — PREMIUM DRY TRAY LF: Brand: MEDLINE INDUSTRIES, INC.

## (undated) DEVICE — THE EXACTO COLD SNARE IS INTENDED TO BE USED WITHOUT DIATHERMIC ENERGY FOR THE ENDOSCOPIC RESECTION OF POLYP TISSUE IN THE GASTROINTESTINAL TRACT.: Brand: EXACTO

## (undated) DEVICE — GLOVE INDICATOR PI UNDERGLOVE SZ 8 BLUE

## (undated) DEVICE — PROXIMATE PLUS MD MULTI-DIRECTIONAL RELEASE SKIN STAPLERS CONTAINS 35 STAINLESS STEEL STAPLES APPROXIMATE CLOSED DIMENSIONS: 6.9MM X 3.9MM WIDE: Brand: PROXIMATE

## (undated) DEVICE — CUFF TOURNIQUET 24 X 4 IN QUICK CONNECT DISP 1BLA

## (undated) DEVICE — GLOVE SRG BIOGEL 6.5

## (undated) DEVICE — OCCLUSIVE GAUZE STRIP,3% BISMUTH TRIBROMOPHENATE IN PETROLATUM BLEND: Brand: XEROFORM

## (undated) DEVICE — SUT VICRYL 0 REEL 54 IN J287G

## (undated) DEVICE — SUT ETHILON 3-0 INFS-1 30 IN 669H

## (undated) DEVICE — GLOVE SRG BIOGEL 7

## (undated) DEVICE — U-DRAPE: Brand: CONVERTORS

## (undated) DEVICE — DISPOSABLE OR TOWEL: Brand: CARDINAL HEALTH

## (undated) DEVICE — GLOVE SRG BIOGEL 7.5

## (undated) DEVICE — GAUZE SPONGES,USP TYPE VII GAUZE, 12 PLY: Brand: CURITY

## (undated) DEVICE — SUT ETHILON 2-0 PS 18 IN 585H

## (undated) DEVICE — SPONGE STICK WITH PVP-I: Brand: KENDALL

## (undated) DEVICE — SYRINGE 10ML LL CONTROL TOP

## (undated) DEVICE — Device

## (undated) DEVICE — 2108 SERIES SAGITTAL BLADE (18.6 X 0.64 X 61.1MM)

## (undated) DEVICE — 4-PORT MANIFOLD: Brand: NEPTUNE 2

## (undated) DEVICE — INTENDED FOR TISSUE SEPARATION, AND OTHER PROCEDURES THAT REQUIRE A SHARP SURGICAL BLADE TO PUNCTURE OR CUT.: Brand: BARD-PARKER SAFETY BLADES SIZE 15, STERILE

## (undated) DEVICE — NON-STERILE REUSABLE TOURNIQUET CUFF SINGLE BLADDER, DUAL PORT AND QUICK CONNECT CONNECTOR: Brand: COLOR CUFF

## (undated) DEVICE — SPONGE LAP 18 X 18 IN

## (undated) DEVICE — LIGHT GLOVE GREEN

## (undated) DEVICE — CHLORAPREP HI-LITE 26ML ORANGE